# Patient Record
Sex: MALE | Race: WHITE | NOT HISPANIC OR LATINO | Employment: OTHER | URBAN - METROPOLITAN AREA
[De-identification: names, ages, dates, MRNs, and addresses within clinical notes are randomized per-mention and may not be internally consistent; named-entity substitution may affect disease eponyms.]

---

## 2017-01-31 ENCOUNTER — TRANSCRIBE ORDERS (OUTPATIENT)
Dept: ADMINISTRATIVE | Age: 57
End: 2017-01-31

## 2017-01-31 ENCOUNTER — HOSPITAL ENCOUNTER (OUTPATIENT)
Dept: RADIOLOGY | Age: 57
Discharge: HOME | End: 2017-01-31
Payer: COMMERCIAL

## 2017-01-31 ENCOUNTER — LAB (OUTPATIENT)
Dept: LAB | Age: 57
End: 2017-01-31
Payer: COMMERCIAL

## 2017-01-31 ENCOUNTER — ALLSCRIPTS OFFICE VISIT (OUTPATIENT)
Dept: OTHER | Facility: OTHER | Age: 57
End: 2017-01-31

## 2017-01-31 DIAGNOSIS — R07.89 OTHER CHEST PAIN: ICD-10-CM

## 2017-01-31 DIAGNOSIS — I10 ESSENTIAL HYPERTENSION, MALIGNANT: Primary | ICD-10-CM

## 2017-01-31 DIAGNOSIS — E11.65 TYPE 2 DIABETES MELLITUS WITH HYPERGLYCEMIA (HCC): ICD-10-CM

## 2017-01-31 DIAGNOSIS — IMO0002 UNCONTROLLED DIABETES MELLITUS WITH OTHER COMPLICATION: ICD-10-CM

## 2017-01-31 DIAGNOSIS — I10 ESSENTIAL (PRIMARY) HYPERTENSION: ICD-10-CM

## 2017-01-31 LAB
ALBUMIN SERPL BCP-MCNC: 3.9 G/DL (ref 3.5–5)
ALP SERPL-CCNC: 70 U/L (ref 46–116)
ALT SERPL W P-5'-P-CCNC: 46 U/L (ref 12–78)
ANION GAP SERPL CALCULATED.3IONS-SCNC: 8 MMOL/L (ref 4–13)
AST SERPL W P-5'-P-CCNC: 27 U/L (ref 5–45)
BILIRUB SERPL-MCNC: 0.43 MG/DL (ref 0.2–1)
BILIRUB UR QL STRIP: NEGATIVE
BUN SERPL-MCNC: 19 MG/DL (ref 5–25)
CALCIUM SERPL-MCNC: 9.3 MG/DL (ref 8.3–10.1)
CHLORIDE SERPL-SCNC: 105 MMOL/L (ref 100–108)
CLARITY UR: NORMAL
CO2 SERPL-SCNC: 27 MMOL/L (ref 21–32)
COLOR UR: YELLOW
CREAT SERPL-MCNC: 1.35 MG/DL (ref 0.6–1.3)
DEPRECATED D DIMER PPP: 333 NG/ML (FEU) (ref 0–424)
ERYTHROCYTE [DISTWIDTH] IN BLOOD BY AUTOMATED COUNT: 13.6 % (ref 11.6–15.1)
GFR SERPL CREATININE-BSD FRML MDRD: 54.7 ML/MIN/1.73SQ M
GLUCOSE (HISTORICAL): NEGATIVE
GLUCOSE SERPL-MCNC: 139 MG/DL (ref 65–140)
HCT VFR BLD AUTO: 44.7 % (ref 36.5–49.3)
HGB BLD-MCNC: 15.3 G/DL (ref 12–17)
HGB UR QL STRIP.AUTO: NEGATIVE
KETONES UR STRIP-MCNC: NEGATIVE MG/DL
LEUKOCYTE ESTERASE UR QL STRIP: NEGATIVE
MCH RBC QN AUTO: 31.4 PG (ref 26.8–34.3)
MCHC RBC AUTO-ENTMCNC: 34.2 G/DL (ref 31.4–37.4)
MCV RBC AUTO: 92 FL (ref 82–98)
NITRITE UR QL STRIP: NEGATIVE
PH UR STRIP.AUTO: NEGATIVE [PH]
PLATELET # BLD AUTO: 272 THOUSANDS/UL (ref 149–390)
PMV BLD AUTO: 11.1 FL (ref 8.9–12.7)
POTASSIUM SERPL-SCNC: 4.2 MMOL/L (ref 3.5–5.3)
PROT SERPL-MCNC: 7.7 G/DL (ref 6.4–8.2)
PROT UR STRIP-MCNC: NEGATIVE MG/DL
RBC # BLD AUTO: 4.87 MILLION/UL (ref 3.88–5.62)
SODIUM SERPL-SCNC: 140 MMOL/L (ref 136–145)
SP GR UR STRIP.AUTO: NEGATIVE
UROBILINOGEN UR QL STRIP.AUTO: NEGATIVE
WBC # BLD AUTO: 7.29 THOUSAND/UL (ref 4.31–10.16)

## 2017-01-31 PROCEDURE — 80053 COMPREHEN METABOLIC PANEL: CPT

## 2017-01-31 PROCEDURE — 71020 HB CHEST X-RAY 2VW FRONTAL&LATL: CPT

## 2017-01-31 PROCEDURE — 36415 COLL VENOUS BLD VENIPUNCTURE: CPT

## 2017-01-31 PROCEDURE — 85379 FIBRIN DEGRADATION QUANT: CPT

## 2017-01-31 PROCEDURE — 85027 COMPLETE CBC AUTOMATED: CPT

## 2017-02-15 ENCOUNTER — APPOINTMENT (OUTPATIENT)
Dept: LAB | Age: 57
End: 2017-02-15
Payer: COMMERCIAL

## 2017-02-15 DIAGNOSIS — I10 ESSENTIAL HYPERTENSION, MALIGNANT: ICD-10-CM

## 2017-02-15 DIAGNOSIS — IMO0002 UNCONTROLLED DIABETES MELLITUS WITH OTHER COMPLICATION: ICD-10-CM

## 2017-02-15 LAB
ALBUMIN SERPL BCP-MCNC: 4 G/DL (ref 3.5–5)
ALP SERPL-CCNC: 62 U/L (ref 46–116)
ALT SERPL W P-5'-P-CCNC: 51 U/L (ref 12–78)
ANION GAP SERPL CALCULATED.3IONS-SCNC: 8 MMOL/L (ref 4–13)
AST SERPL W P-5'-P-CCNC: 25 U/L (ref 5–45)
BACTERIA UR QL AUTO: NORMAL /HPF
BASOPHILS # BLD AUTO: 0.04 THOUSANDS/ΜL (ref 0–0.1)
BASOPHILS NFR BLD AUTO: 1 % (ref 0–1)
BILIRUB SERPL-MCNC: 0.66 MG/DL (ref 0.2–1)
BILIRUB UR QL STRIP: NEGATIVE
BUN SERPL-MCNC: 19 MG/DL (ref 5–25)
CALCIUM SERPL-MCNC: 9.1 MG/DL (ref 8.3–10.1)
CHLORIDE SERPL-SCNC: 104 MMOL/L (ref 100–108)
CHOLEST SERPL-MCNC: 151 MG/DL (ref 50–200)
CLARITY UR: CLEAR
CO2 SERPL-SCNC: 24 MMOL/L (ref 21–32)
COLOR UR: YELLOW
CREAT SERPL-MCNC: 1.3 MG/DL (ref 0.6–1.3)
CREAT UR-MCNC: 158 MG/DL
EOSINOPHIL # BLD AUTO: 0.25 THOUSAND/ΜL (ref 0–0.61)
EOSINOPHIL NFR BLD AUTO: 3 % (ref 0–6)
ERYTHROCYTE [DISTWIDTH] IN BLOOD BY AUTOMATED COUNT: 13.9 % (ref 11.6–15.1)
EST. AVERAGE GLUCOSE BLD GHB EST-MCNC: 126 MG/DL
GFR SERPL CREATININE-BSD FRML MDRD: 57.1 ML/MIN/1.73SQ M
GLUCOSE SERPL-MCNC: 95 MG/DL (ref 65–140)
GLUCOSE UR STRIP-MCNC: ABNORMAL MG/DL
HBA1C MFR BLD: 6 % (ref 4.2–6.3)
HCT VFR BLD AUTO: 44.9 % (ref 36.5–49.3)
HDLC SERPL-MCNC: 33 MG/DL (ref 40–60)
HGB BLD-MCNC: 15 G/DL (ref 12–17)
HGB UR QL STRIP.AUTO: NEGATIVE
KETONES UR STRIP-MCNC: NEGATIVE MG/DL
LDLC SERPL CALC-MCNC: 71 MG/DL (ref 0–100)
LEUKOCYTE ESTERASE UR QL STRIP: NEGATIVE
LYMPHOCYTES # BLD AUTO: 2.2 THOUSANDS/ΜL (ref 0.6–4.47)
LYMPHOCYTES NFR BLD AUTO: 29 % (ref 14–44)
MCH RBC QN AUTO: 30.7 PG (ref 26.8–34.3)
MCHC RBC AUTO-ENTMCNC: 33.4 G/DL (ref 31.4–37.4)
MCV RBC AUTO: 92 FL (ref 82–98)
MICROALBUMIN UR-MCNC: 6.2 MG/L (ref 0–20)
MICROALBUMIN/CREAT 24H UR: 4 MG/G CREATININE (ref 0–30)
MONOCYTES # BLD AUTO: 0.72 THOUSAND/ΜL (ref 0.17–1.22)
MONOCYTES NFR BLD AUTO: 9 % (ref 4–12)
NEUTROPHILS # BLD AUTO: 4.38 THOUSANDS/ΜL (ref 1.85–7.62)
NEUTS SEG NFR BLD AUTO: 58 % (ref 43–75)
NITRITE UR QL STRIP: NEGATIVE
NON-SQ EPI CELLS URNS QL MICRO: NORMAL /HPF
NRBC BLD AUTO-RTO: 0 /100 WBCS
PH UR STRIP.AUTO: 5.5 [PH] (ref 4.5–8)
PLATELET # BLD AUTO: 285 THOUSANDS/UL (ref 149–390)
PMV BLD AUTO: 10.8 FL (ref 8.9–12.7)
POTASSIUM SERPL-SCNC: 4.3 MMOL/L (ref 3.5–5.3)
PROT SERPL-MCNC: 7.3 G/DL (ref 6.4–8.2)
PROT UR STRIP-MCNC: NEGATIVE MG/DL
PSA SERPL-MCNC: 0.5 NG/ML (ref 0–4)
RBC # BLD AUTO: 4.88 MILLION/UL (ref 3.88–5.62)
RBC #/AREA URNS AUTO: NORMAL /HPF
SODIUM SERPL-SCNC: 136 MMOL/L (ref 136–145)
SP GR UR STRIP.AUTO: 1.03 (ref 1–1.03)
T4 FREE SERPL-MCNC: 1.09 NG/DL (ref 0.76–1.46)
TRIGL SERPL-MCNC: 233 MG/DL
TSH SERPL DL<=0.05 MIU/L-ACNC: 2.97 UIU/ML (ref 0.36–3.74)
UROBILINOGEN UR QL STRIP.AUTO: 0.2 E.U./DL
WBC # BLD AUTO: 7.64 THOUSAND/UL (ref 4.31–10.16)
WBC #/AREA URNS AUTO: NORMAL /HPF

## 2017-02-15 PROCEDURE — 84439 ASSAY OF FREE THYROXINE: CPT

## 2017-02-15 PROCEDURE — 85025 COMPLETE CBC W/AUTO DIFF WBC: CPT

## 2017-02-15 PROCEDURE — 80053 COMPREHEN METABOLIC PANEL: CPT

## 2017-02-15 PROCEDURE — 81001 URINALYSIS AUTO W/SCOPE: CPT | Performed by: INTERNAL MEDICINE

## 2017-02-15 PROCEDURE — 83036 HEMOGLOBIN GLYCOSYLATED A1C: CPT

## 2017-02-15 PROCEDURE — 82570 ASSAY OF URINE CREATININE: CPT | Performed by: INTERNAL MEDICINE

## 2017-02-15 PROCEDURE — G0103 PSA SCREENING: HCPCS

## 2017-02-15 PROCEDURE — 84443 ASSAY THYROID STIM HORMONE: CPT

## 2017-02-15 PROCEDURE — 36415 COLL VENOUS BLD VENIPUNCTURE: CPT

## 2017-02-15 PROCEDURE — 82043 UR ALBUMIN QUANTITATIVE: CPT | Performed by: INTERNAL MEDICINE

## 2017-02-15 PROCEDURE — 80061 LIPID PANEL: CPT

## 2017-02-22 ENCOUNTER — ALLSCRIPTS OFFICE VISIT (OUTPATIENT)
Dept: OTHER | Facility: OTHER | Age: 57
End: 2017-02-22

## 2017-03-20 ENCOUNTER — GENERIC CONVERSION - ENCOUNTER (OUTPATIENT)
Dept: OTHER | Facility: OTHER | Age: 57
End: 2017-03-20

## 2017-03-25 ENCOUNTER — GENERIC CONVERSION - ENCOUNTER (OUTPATIENT)
Dept: OTHER | Facility: OTHER | Age: 57
End: 2017-03-25

## 2017-03-27 LAB
LEFT EYE DIABETIC RETINOPATHY: NORMAL
RIGHT EYE DIABETIC RETINOPATHY: NORMAL

## 2017-03-29 ENCOUNTER — GENERIC CONVERSION - ENCOUNTER (OUTPATIENT)
Dept: OTHER | Facility: OTHER | Age: 57
End: 2017-03-29

## 2017-05-08 ENCOUNTER — GENERIC CONVERSION - ENCOUNTER (OUTPATIENT)
Dept: OTHER | Facility: OTHER | Age: 57
End: 2017-05-08

## 2017-06-19 ENCOUNTER — TRANSCRIBE ORDERS (OUTPATIENT)
Dept: ADMINISTRATIVE | Age: 57
End: 2017-06-19

## 2017-06-19 ENCOUNTER — APPOINTMENT (OUTPATIENT)
Dept: LAB | Age: 57
End: 2017-06-19
Payer: COMMERCIAL

## 2017-06-19 DIAGNOSIS — E11.65 TYPE 2 DIABETES MELLITUS WITH HYPERGLYCEMIA (HCC): ICD-10-CM

## 2017-06-19 LAB
EST. AVERAGE GLUCOSE BLD GHB EST-MCNC: 126 MG/DL
GLUCOSE P FAST SERPL-MCNC: 101 MG/DL (ref 65–99)
HBA1C MFR BLD: 6 % (ref 4.2–6.3)

## 2017-06-19 PROCEDURE — 83036 HEMOGLOBIN GLYCOSYLATED A1C: CPT

## 2017-06-19 PROCEDURE — 82947 ASSAY GLUCOSE BLOOD QUANT: CPT

## 2017-06-19 PROCEDURE — 36415 COLL VENOUS BLD VENIPUNCTURE: CPT

## 2017-06-23 ENCOUNTER — ALLSCRIPTS OFFICE VISIT (OUTPATIENT)
Dept: OTHER | Facility: OTHER | Age: 57
End: 2017-06-23

## 2017-06-23 DIAGNOSIS — R19.7 DIARRHEA: ICD-10-CM

## 2017-06-23 DIAGNOSIS — E11.65 TYPE 2 DIABETES MELLITUS WITH HYPERGLYCEMIA (HCC): ICD-10-CM

## 2017-06-23 DIAGNOSIS — I10 ESSENTIAL (PRIMARY) HYPERTENSION: ICD-10-CM

## 2017-06-24 ENCOUNTER — APPOINTMENT (OUTPATIENT)
Dept: LAB | Age: 57
End: 2017-06-24
Payer: COMMERCIAL

## 2017-06-24 DIAGNOSIS — R19.7 DIARRHEA: ICD-10-CM

## 2017-06-24 LAB — C DIFF TOX GENS STL QL NAA+PROBE: NORMAL

## 2017-06-24 PROCEDURE — 87046 STOOL CULTR AEROBIC BACT EA: CPT

## 2017-06-24 PROCEDURE — 87177 OVA AND PARASITES SMEARS: CPT

## 2017-06-24 PROCEDURE — 87899 AGENT NOS ASSAY W/OPTIC: CPT

## 2017-06-24 PROCEDURE — 87045 FECES CULTURE AEROBIC BACT: CPT

## 2017-06-24 PROCEDURE — 87015 SPECIMEN INFECT AGNT CONCNTJ: CPT

## 2017-06-24 PROCEDURE — 87209 SMEAR COMPLEX STAIN: CPT

## 2017-06-24 PROCEDURE — 87493 C DIFF AMPLIFIED PROBE: CPT

## 2017-06-26 ENCOUNTER — ALLSCRIPTS OFFICE VISIT (OUTPATIENT)
Dept: OTHER | Facility: OTHER | Age: 57
End: 2017-06-26

## 2017-06-26 LAB
BACTERIA STL CULT: NORMAL
BACTERIA STL CULT: NORMAL

## 2017-06-28 LAB — O+P STL CONC: NORMAL

## 2017-06-29 ENCOUNTER — GENERIC CONVERSION - ENCOUNTER (OUTPATIENT)
Dept: OTHER | Facility: OTHER | Age: 57
End: 2017-06-29

## 2017-09-19 ENCOUNTER — GENERIC CONVERSION - ENCOUNTER (OUTPATIENT)
Dept: OTHER | Facility: OTHER | Age: 57
End: 2017-09-19

## 2017-10-18 ENCOUNTER — TRANSCRIBE ORDERS (OUTPATIENT)
Dept: ADMINISTRATIVE | Age: 57
End: 2017-10-18

## 2017-10-18 ENCOUNTER — APPOINTMENT (OUTPATIENT)
Dept: LAB | Age: 57
End: 2017-10-18
Payer: COMMERCIAL

## 2017-10-18 DIAGNOSIS — E11.65 TYPE 2 DIABETES MELLITUS WITH HYPERGLYCEMIA (HCC): ICD-10-CM

## 2017-10-18 DIAGNOSIS — I10 ESSENTIAL (PRIMARY) HYPERTENSION: ICD-10-CM

## 2017-10-18 LAB
ALBUMIN SERPL BCP-MCNC: 3.8 G/DL (ref 3.5–5)
ALP SERPL-CCNC: 54 U/L (ref 46–116)
ALT SERPL W P-5'-P-CCNC: 43 U/L (ref 12–78)
ANION GAP SERPL CALCULATED.3IONS-SCNC: 6 MMOL/L (ref 4–13)
AST SERPL W P-5'-P-CCNC: 29 U/L (ref 5–45)
BACTERIA UR QL AUTO: NORMAL /HPF
BASOPHILS # BLD AUTO: 0.04 THOUSANDS/ΜL (ref 0–0.1)
BASOPHILS NFR BLD AUTO: 1 % (ref 0–1)
BILIRUB SERPL-MCNC: 0.57 MG/DL (ref 0.2–1)
BILIRUB UR QL STRIP: NEGATIVE
BUN SERPL-MCNC: 22 MG/DL (ref 5–25)
CALCIUM SERPL-MCNC: 8.7 MG/DL (ref 8.3–10.1)
CHLORIDE SERPL-SCNC: 107 MMOL/L (ref 100–108)
CHOLEST SERPL-MCNC: 139 MG/DL (ref 50–200)
CLARITY UR: CLEAR
CO2 SERPL-SCNC: 27 MMOL/L (ref 21–32)
COLOR UR: YELLOW
CREAT SERPL-MCNC: 1.25 MG/DL (ref 0.6–1.3)
CREAT UR-MCNC: 153 MG/DL
EOSINOPHIL # BLD AUTO: 0.27 THOUSAND/ΜL (ref 0–0.61)
EOSINOPHIL NFR BLD AUTO: 4 % (ref 0–6)
ERYTHROCYTE [DISTWIDTH] IN BLOOD BY AUTOMATED COUNT: 13.7 % (ref 11.6–15.1)
EST. AVERAGE GLUCOSE BLD GHB EST-MCNC: 126 MG/DL
GFR SERPL CREATININE-BSD FRML MDRD: 64 ML/MIN/1.73SQ M
GLUCOSE P FAST SERPL-MCNC: 76 MG/DL (ref 65–99)
GLUCOSE UR STRIP-MCNC: ABNORMAL MG/DL
HBA1C MFR BLD: 6 % (ref 4.2–6.3)
HCT VFR BLD AUTO: 44.1 % (ref 36.5–49.3)
HDLC SERPL-MCNC: 27 MG/DL (ref 40–60)
HGB BLD-MCNC: 14.4 G/DL (ref 12–17)
HGB UR QL STRIP.AUTO: ABNORMAL
HYALINE CASTS #/AREA URNS LPF: NORMAL /LPF
KETONES UR STRIP-MCNC: NEGATIVE MG/DL
LDLC SERPL CALC-MCNC: 75 MG/DL (ref 0–100)
LEUKOCYTE ESTERASE UR QL STRIP: NEGATIVE
LYMPHOCYTES # BLD AUTO: 1.99 THOUSANDS/ΜL (ref 0.6–4.47)
LYMPHOCYTES NFR BLD AUTO: 31 % (ref 14–44)
MCH RBC QN AUTO: 30.6 PG (ref 26.8–34.3)
MCHC RBC AUTO-ENTMCNC: 32.7 G/DL (ref 31.4–37.4)
MCV RBC AUTO: 94 FL (ref 82–98)
MICROALBUMIN UR-MCNC: 7.2 MG/L (ref 0–20)
MICROALBUMIN/CREAT 24H UR: 5 MG/G CREATININE (ref 0–30)
MONOCYTES # BLD AUTO: 0.73 THOUSAND/ΜL (ref 0.17–1.22)
MONOCYTES NFR BLD AUTO: 12 % (ref 4–12)
NEUTROPHILS # BLD AUTO: 3.28 THOUSANDS/ΜL (ref 1.85–7.62)
NEUTS SEG NFR BLD AUTO: 52 % (ref 43–75)
NITRITE UR QL STRIP: NEGATIVE
NON-SQ EPI CELLS URNS QL MICRO: NORMAL /HPF
NRBC BLD AUTO-RTO: 0 /100 WBCS
PH UR STRIP.AUTO: 6 [PH] (ref 4.5–8)
PLATELET # BLD AUTO: 244 THOUSANDS/UL (ref 149–390)
PMV BLD AUTO: 10.6 FL (ref 8.9–12.7)
POTASSIUM SERPL-SCNC: 4.1 MMOL/L (ref 3.5–5.3)
PROT SERPL-MCNC: 7.1 G/DL (ref 6.4–8.2)
PROT UR STRIP-MCNC: NEGATIVE MG/DL
RBC # BLD AUTO: 4.71 MILLION/UL (ref 3.88–5.62)
RBC #/AREA URNS AUTO: NORMAL /HPF
SODIUM SERPL-SCNC: 140 MMOL/L (ref 136–145)
SP GR UR STRIP.AUTO: 1.02 (ref 1–1.03)
TRIGL SERPL-MCNC: 186 MG/DL
TSH SERPL DL<=0.05 MIU/L-ACNC: 2.17 UIU/ML (ref 0.36–3.74)
UROBILINOGEN UR QL STRIP.AUTO: 0.2 E.U./DL
WBC # BLD AUTO: 6.35 THOUSAND/UL (ref 4.31–10.16)
WBC #/AREA URNS AUTO: NORMAL /HPF

## 2017-10-18 PROCEDURE — 84443 ASSAY THYROID STIM HORMONE: CPT

## 2017-10-18 PROCEDURE — 82570 ASSAY OF URINE CREATININE: CPT

## 2017-10-18 PROCEDURE — 80053 COMPREHEN METABOLIC PANEL: CPT

## 2017-10-18 PROCEDURE — 81001 URINALYSIS AUTO W/SCOPE: CPT

## 2017-10-18 PROCEDURE — 85025 COMPLETE CBC W/AUTO DIFF WBC: CPT

## 2017-10-18 PROCEDURE — 80061 LIPID PANEL: CPT

## 2017-10-18 PROCEDURE — 82043 UR ALBUMIN QUANTITATIVE: CPT

## 2017-10-18 PROCEDURE — 83036 HEMOGLOBIN GLYCOSYLATED A1C: CPT

## 2017-10-18 PROCEDURE — 36415 COLL VENOUS BLD VENIPUNCTURE: CPT

## 2017-10-24 ENCOUNTER — ALLSCRIPTS OFFICE VISIT (OUTPATIENT)
Dept: OTHER | Facility: OTHER | Age: 57
End: 2017-10-24

## 2017-10-24 DIAGNOSIS — E11.9 TYPE 2 DIABETES MELLITUS WITHOUT COMPLICATIONS (HCC): ICD-10-CM

## 2017-11-15 ENCOUNTER — GENERIC CONVERSION - ENCOUNTER (OUTPATIENT)
Dept: OTHER | Facility: OTHER | Age: 57
End: 2017-11-15

## 2017-11-28 ENCOUNTER — GENERIC CONVERSION - ENCOUNTER (OUTPATIENT)
Dept: OTHER | Facility: OTHER | Age: 57
End: 2017-11-28

## 2017-12-29 ENCOUNTER — CLINICAL SUPPORT (OUTPATIENT)
Dept: OTHER | Facility: OTHER | Age: 57
End: 2017-12-29

## 2017-12-29 DIAGNOSIS — M54.16 RADICULOPATHY OF LUMBAR REGION: ICD-10-CM

## 2018-01-11 NOTE — PROGRESS NOTES
Assessment    1  History of snoring (V15 89) (Z87 898)   2  Chronic obstructive pulmonary disease (496) (J44 9)   3  Hypertension (401 9) (I10)   4  Hyperlipidemia (272 4) (E78 5)   5  Controlled insulin dependent diabetes mellitus (250 00,V58 67) (E11 9,Z79 4)   6  Lower back pain (724 2) (M54 5)   7  Encounter for preventive health examination (V70 0) (Z00 00)    Plan  Class 2 obesity with alveolar hypoventilation and body mass index (BMI) of 36 0 to 36 9  in adult, unspecified whether serious comorbidity present, History of snoring    · Sleep Study Unattended - Home; Status:Need Information - Financial Authorization; Requested [id number]; Controlled insulin dependent diabetes mellitus    · (1) GLUCOSE,  FASTING; Status:Active; Requested [id number];    · (1) HEMOGLOBIN A1C; Status:Active; Requested [id number]; Health Maintenance    · Follow-up visit in 1 month Evaluation and Treatment  Follow-up  Status: Hold For -  Scheduling  Requested for: 61GXQ0195  Need for influenza vaccination    · Fluzone Quadrivalent Intramuscular Suspension    Discussion/Summary  health maintenance visit Currently, he eats a poor diet and has an inadequate exercise regimen  Prostate cancer screening: the risks and benefits of prostate cancer screening were discussed and prostate cancer screening is current  Colorectal cancer screening: the risks and benefits of colorectal cancer screening were discussed, colorectal cancer screening is current, colonoscopy is needed every ten years and fecal occult blood testing is needed every year  The risks and benefits of immunizations were discussed, immunizations are needed and immunizations will be given as outlined in the orders  Advice and education were given regarding nutrition, aerobic exercise, cardiovascular risk reduction, weight loss, sunscreen use and vitamin D supplements  Patient discussion: discussed with the patient  1  History of snoring   Patient states he was recently seen by the cardiologist and they are suggesting the patient have a sleep study performed  He does admit to having problems with loud snoring at night and also has problems with daytime sleepiness  We will schedule the patient for an in-home study and he will be seen in the office in 1 month to discuss the results  2  Chronic obstructive pulmonary disease  Patient has had no recent exacerbation and will receive a influenza vaccine today  Patient was told to call if any upper respiratory tract symptoms for further evaluation and treatment acutely  3  Hypertension  Patient's blood pressure is mildly elevated today in the office and patient states he neglected to take his medication today which may be the reason  His blood pressure will be checked in 1 month and if it is still elevated we will modify treatment  4  Controlled insulin-dependent diabetes  Patient is following his blood sugars at home closely and makes adjustments to his insulin dose in order to compensate for hyper, hypoglycemia  We are pleased with the results with a hemoglobin A1c of 6 0, patient has no evidence of diabetic neuropathy and microalbumin in the urine was negative  Patient continues to go yearly for eye exams  Number 5  Low back pain  We did discuss with the patient going back to pain management to see if they can be helpful as far as alleviating his pain  Patient has failed attempts with physical therapy in the past  We did point to to the fact that he severe obesity and reduction in his weight may put less stress on his low back  6  Health maintenance  As mentioned patient had a root in colonoscopy recently along with an EGD  Patient is up-to-date with all parameters as far as controlling his diabetes and he continues to follow up with Cardiology regarding his coronary artery disease     The patient was counseled regarding diagnostic results, instructions for management, risk factor reductions, prognosis, patient and family education, impressions, risks and benefits of treatment options, importance of compliance with treatment  Possible side effects of new medications were reviewed with the patient/guardian today  The treatment plan was reviewed with the patient/guardian  The patient/guardian understands and agrees with the treatment plan      Chief Complaint  Patient is here today for a yearly physical w/ labs      History of Present Illness  HM, Adult Male: The patient is being seen for a health maintenance evaluation  The last health maintenance visit was One year year(s) ago  Social History: He is unmarried  Work status: not currently employed  The patient is a former cigarette smoker  He reports rare alcohol use  The patient has no concerns about alcohol abuse  He has never used illicit drugs  General Health: The patient's health since the last visit is described as poor  He does not have regular dental visits  He complains of vision problems  He denies hearing loss  Immunizations status: up to date  Lifestyle:  He does not have a healthy diet  He has weight concerns  He does not exercise regularly  He does not use tobacco  He consumes alcohol  He denies drug use  Reproductive health:  the patient is not sexually active  Screening: cancer screening reviewed and updated  metabolic screening reviewed and updated  risk screening reviewed and updated  HPI: Patient is a 42-year-old male with a history of hypertension, hyperlipidemia, diabetes mellitus type 2 now on insulin, chronic obstructive pulmonary disease, DJD with chronic low back pain, 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  Patient is here today for a general physical  Patient did have extensive lab testing performed prior to the visit today and we did discuss the results  Patient's comprehensive metabolic profile was normal including a fasting blood sugar 76   His CBC with again was also normal  The only abnormality noted was a cholesterol 139 with an HDL of 27 and LDL was 75 but a triglyceride level of 186  Patient is on a statin and also fenofibrate  Patient states recently he has been having problems again with the low back pain  He has not followed up with pain management because it was not helpful  He continues to take Vicodin occasionally  He states with breast his pain usually subsides after a few days  His weight is still elevated and patient has not been successful in reducing weight and states that a big part of this is the fact that he cannot exercise secondary to his low back pain  He has not been restricting his caloric intake  Review of Systems    Constitutional: recent 6 pound weight gain since last visit lb weight gain, but no fever, not feeling poorly, no chills, not feeling tired and no recent weight loss  Eyes: eyesight problems, but no eye pain, no dryness of the eyes, eyes not red, no purulent discharge from the eyes and no itching of the eyes  ENT: no complaints of earache, no hearing loss, no nosebleeds, no nasal discharge, no sore throat, no hoarseness  Cardiovascular: lower extremity edema and Chronic edema bilateral lower extremities which is much, but the heart rate was not slow, no chest pain, no intermittent leg claudication, the heart rate was not fast and no palpitations  Respiratory: No complaints of shortness of breath, no wheezing, no cough, no SOB on exertion, no orthopnea or PND  Gastrointestinal: no abdominal pain, no nausea, no vomiting, no constipation, no diarrhea and no blood in stools  Genitourinary: No complaints of dysuria, no incontinence, no hesitancy, no nocturia, no genital lesion, no testicular pain  Musculoskeletal: arthralgias, myalgias and joint stiffness, but as noted in HPI, no limb pain and no limb swelling  Integumentary: no rashes, no itching, no dry skin, no skin lesions and no skin wound     Neurological: No compliants of headache, no confusion, no convulsions, no numbness or tingling, no dizziness or fainting, no limb weakness, no difficulty walking  Psychiatric: sleep disturbances and Her loud snoring, daytime sleepiness, but not suicidal, no anxiety, no personality change, no depression and no emotional problems  Endocrine: No complaints of proptosis, no hot flashes, no muscle weakness, no erectile dysfunction, no deepening of the voice, no feelings of weakness  Hematologic/Lymphatic: No complaints of swollen glands, no swollen glands in the neck, does not bleed easily, no easy bruising  ROS reviewed  Active Problems    1  Abdominal pain (789 00) (R10 9)   2  Abscess of left thigh (682 6) (L02 416)   3  Acute bronchitis (466 0) (J20 9)   4  Acute chest wall pain (786 52) (R07 89)   5  Ankle pain, unspecified laterality   6  Arteriosclerotic cardiovascular disease (429 2,440 9) (I25 10)   7  Chronic obstructive pulmonary disease (496) (J44 9)   8  Controlled insulin dependent diabetes mellitus (250 00,V58 67) (E11 9,Z79 4)   9  Diarrhea, unspecified type (787 91) (R19 7)   10  Fatty liver (571 8) (K76 0)   11  Hyperlipidemia (272 4) (E78 5)   12  Hypertension (401 9) (I10)   13  Low back pain (724 2) (M54 5)   14  Lower back pain (724 2) (M54 5)   15  Need for influenza vaccination (V04 81) (Z23)   16  Nicotine dependence (305 1) (F17 200)   17  Obesity (278 00) (E66 9)   18  Poorly controlled type 2 diabetes mellitus (250 00) (E11 65)   19  Screening for diabetic retinopathy (V80 2) (Z13 5)   20  Sinusitis (473 9) (J32 9)   21  Spondylosis of lumbar region without myelopathy or radiculopathy (721 3) (M47 816)   22  Symptoms referable to back (724 8)   23   Vertigo (780 4) (R42)    Past Medical History    · History of Acute myocardial infarction (410 90) (I21 9)   · History of Anxiety (300 00) (F41 9)   · History of Coronary Artery Disease (V12 59)   · History of Diabetes Mellitus (250 00)   · History of Gastric ulcer (531 90) (K25 9)   · History of chest pain (V13 89) (S01 465)   · History of gastroesophageal reflux (GERD) (V12 79) (Z87 19)   · History of hyperlipidemia (V12 29) (Z86 39)   · History of hypertension (V12 59) (Z86 79)    Surgical History    · History of Cath Stent Placement   · History of Neuroplasty Decompression Median Nerve At Carpal Tunnel    Family History  Mother    · Family history of Alzheimer Disease   · Family history of Congestive Heart Failure  Father    · Family history of Acute Myocardial Infarction (V17 3)  Family History    · Family history of Back Pain   · Family history of Hypertension (V17 49)   · Family history of Stroke Complications    Social History    · Denied: History of Alcohol Use (History)   · Denied: History of Drug Use (305 90)   · Former smoker (V15 82) (D76 183)   · Marital History - Single    Current Meds   1  Atorvastatin Calcium 40 MG Oral Tablet; Take 1 tablet daily; Therapy: 65UTR2748 to (22 210176)  Requested for: 12Jun2017; Last   Rx:12Jun2017 Ordered   2  BD Pen Needle Mini U/F 31G X 5 MM Miscellaneous; USE AS DIRECTED; Therapy: 65EAJ4580 to (Evaluate:82Odq4661)  Requested for: 84Wgh1044; Last   Rx:22Feb2017 Ordered   3  Benazepril HCl - 10 MG Oral Tablet; take 1 tablet every day; Therapy: 47ABJ7584 to (Re Mena)  Requested for: 34XIL2684; Last   Rx:04Nov2016 Ordered   4  Chantix 1 MG Oral Tablet; take 1 tablet twice a day; Therapy: 89JRZ8563 to (Evaluate:06Nov2017)  Requested for: 76Mon5911; Last   Rx:12Wcd3193 Ordered   5  Clopidogrel Bisulfate 75 MG Oral Tablet; take 1 tablet every day; Therapy: 69SYN2598 to (Kavita Boss)  Requested for: 76NKU4060; Last   Rx:17Yop4214 Ordered   6  Diphenoxylate-Atropine 2 5-0 025 MG Oral Tablet; TAKE 1 TABLET 4 TIMES DAILY AS   NEEDED; Therapy: 72NQK8678 to (Evaluate:28Jun2017); Last Rx:23Jun2017 Ordered   7  Famotidine 20 MG Oral Tablet; TAKE 1 TABLET TWICE DAILY; Therapy: 14NEW2187 to [id number]) Recorded   8  Fenofibrate 145 MG Oral Tablet;  Take 1 tablet daily; Therapy: 13KDE4592 to ([phone])  Requested for: 71TPB5124; Last   Rx:46Doy7552 Ordered   9  Jardiance 25 MG Oral Tablet; take 1 tablet by mouth every day; Therapy: 73CAC3183 to (96 409460)  Requested for: 43REF5337; Last   FS:25YHB1701 Ordered   10  Lantus SoloStar 100 UNIT/ML Subcutaneous Solution Pen-injector; inject 90 units daily; Therapy: 02FCL0873 to (Evaluate:94Tcp2432)  Requested for: 29Itl0364; Last    Rx:04Pfw6736 Ordered   11  Metoprolol Tartrate 50 MG Oral Tablet; TAKE 1 TABLET DAILY AS DIRECTED; Therapy: 21ZVC9225 to (Evaluate:53Dnf6134)  Requested for: 98PKP5447; Last    Rx:93Cja9620 Ordered   12  Nitrostat 0 3 MG Sublingual Tablet Sublingual; TAKE AS DIRECTED; Therapy: 26YHQ0503 to (Last Rx:49Ege2956)  Requested for: 46Jrx4193 Ordered   13  OneTouch Ultra Blue In [employer]; test blood sugar 4 times a day; Therapy: 75ERN9238 to (Evaluate:77Czc6393)  Requested for: 65Bky4362; Last    Rx:49Xlk1305 Ordered   14  ProAir  (90 Base) MCG/ACT Inhalation Aerosol Solution; INHALE 1 TO 2 PUFFS    EVERY 4 TO 6 HOURS AS NEEDED; Therapy: 33WBM1018 to (Last Rx:70Mlh4813)  Requested for: 82BYL7028 Ordered   15  Vicodin 5-300 MG Oral Tablet; TAKE 1 TABLET EVERY 12 HOURS AS NEEDED; Therapy: 25OPC8304 to (Evaluate:60Muy5158); Last Rx:61Lcb2165 Ordered    Allergies    1  Ceclor CAPS   2  Zocor TABS    Vitals   Recorded: 04CLN4450 12:54PM   Temperature 98 1 F   Heart Rate 87   Systolic 178   Diastolic 76   Height 5 ft 6 in   Weight 267 lb    BMI Calculated 43 1   BSA Calculated 2 26   O2 Saturation 96     Physical Exam    Constitutional Pleasant obese64year-old male who is awake alert and difficulties with ambulation secondary to his back pain  Head and Face   Head and face: Normal     Palpation of the face and sinuses: No sinus tenderness  Eyes   Conjunctiva and lids: No erythema, swelling or discharge      Pupils and irises: Equal, round, reactive to light     Ophthalmoscopic examination: Abnormal   Pupils are small unable to see fundi patient does get regular dilated exam since diagnosed with diabetes  Ears, Nose, Mouth, and Throat   External inspection of ears and nose: Normal     Otoscopic examination: Tympanic membranes translucent with normal light reflex  Canals patent without erythema  Hearing: Normal     Nasal mucosa, septum, and turbinates: Normal without edema or erythema  Lips, teeth, and gums: Normal, good dentition  Oropharynx: Normal with no erythema, edema, exudate or lesions  Neck   Neck: Abnormal   thick neck no masses  Thyroid: Normal, no thyromegaly  Pulmonary   Respiratory effort: No increased work of breathing or signs of respiratory distress  Percussion of chest: Normal     Palpation of chest: Normal     Auscultation of lungs: Abnormal   Decreased breath sounds at deferred anterior and posteriorly with no rales rhonchi wheezes  Cardiovascular   Palpation of heart: Normal PMI, no thrills  Auscultation of heart: Normal rate and rhythm, normal S1 and S2, no murmurs  Carotid pulses: 2+ bilaterally  Abdominal aorta: Normal     Femoral pulses: 2+ bilaterally  Pedal pulses: 2+ bilaterally  Peripheral vascular exam: Normal     Examination of extremities for edema and/or varicosities: Abnormal   Trace edema bilateral lower extremities  Chest   Breasts: Abnormal   Gynecomastia bilaterally but no masses, retraction  Palpation of breasts and axillae: Normal, no masses palpated  Chest: Normal     Abdomen   Abdomen: Abnormal   Morbidly obese soft nontender with positive bowel x4 quadrants  Liver and spleen: No hepatomegaly or splenomegaly  Examination for hernias: No hernias appreciated  Anus, perineum, and rectum: Normal sphincter tone, no masses, no prolapse  He has recently  Genitourinary Defers examination stating it is stream the difficult to perform because of his severe low back pain  Lymphatic   Palpation of lymph nodes in neck: No lymphadenopathy  Palpation of lymph nodes in axillae: No lymphadenopathy  Palpation of lymph nodes in groin: No lymphadenopathy  Palpation of lymph nodes in other areas: No lymphadenopathy  Musculoskeletal   Gait and station: Abnormal   Slow antalgic gait secondary to back pain  Inspection/palpation of digits and nails: Normal without clubbing or cyanosis  Inspection/palpation of joints, bones, and muscles: Abnormal   Examination patient's back has decreased range of motion throughout the pain with all movements  Patient has decreased rate leg raising bilaterally but no weakness to lower extremities  Range of motion: Normal     Stability: Normal     Muscle strength/tone: Normal     Skin   Skin and subcutaneous tissue: Normal without rashes or lesions  Palpation of skin and subcutaneous tissue: Normal turgor  Neurologic   Cranial nerves: Cranial nerves 2-12 intact  Cortical function: Normal mental status  Reflexes: 2+ and symmetric  Sensation: No sensory loss  Coordination: Normal finger to nose and heel to shin  Diabetic Foot Exam: Socks and shoes removed, Right Foot Findings: swelling, but no erythema, no dryness, no tenderness, no warmth, no maceration, no calluses, no pre-ulcers and no ulcers  The toes were normal  The sensory exam showed normal vibratory sensation at the level of the toes and normal position sense at the level of the toes  Socks and Shoes removed, Left Foot Findings: swelling, but no erythema, no dryness, no tenderness, no warmth, no maceration, no calluses, no pre-ulcers and no ulcers  The toes were normal  The sensory exam showed normal vibratory sensation at the level of the toes and normal position sense at the level of the toes  Monofilament Testing:  Vascular: Capillary refills findings on the right were normal in the toes  Pulses: 1+ in the posterior tibialis and 1+ in the dorsalis pedis   Capillary refills findings on the left were normal in the toes  Pulses: 1+ in the posterior tibialis and 1+ in the dorsalis pedis  Assign Risk Category: 0: No loss of protective sensation, no deformity  No present risk  Psychiatric   Judgment and insight: Normal     Orientation to person, place and time: Normal     Recent and remote memory: Intact  Mood and affect: Normal        Results/Data  PHQ-2 Adult Depression Screening 81NPW5963 12:57PM User, s     Test Name Result Flag Reference   PHQ-2 Adult Depression Score 1     Over the last two weeks, how often have you been bothered by any of the following problems?   Little interest or pleasure in doing things: Not at all - 0  Feeling down, depressed, or hopeless: Several days - 1   PHQ-2 Adult Depression Screening Negative         Signatures   Electronically signed by : Steve Brunner, DO; Oct 24 2017  2:31PM EST                       (Author)

## 2018-01-12 VITALS
DIASTOLIC BLOOD PRESSURE: 78 MMHG | SYSTOLIC BLOOD PRESSURE: 136 MMHG | WEIGHT: 264.25 LBS | HEART RATE: 95 BPM | TEMPERATURE: 97.6 F | HEIGHT: 66 IN | BODY MASS INDEX: 42.47 KG/M2 | OXYGEN SATURATION: 98 %

## 2018-01-13 VITALS
DIASTOLIC BLOOD PRESSURE: 72 MMHG | HEIGHT: 66 IN | SYSTOLIC BLOOD PRESSURE: 126 MMHG | TEMPERATURE: 99 F | OXYGEN SATURATION: 95 % | BODY MASS INDEX: 41.95 KG/M2 | HEART RATE: 90 BPM | WEIGHT: 261 LBS

## 2018-01-13 VITALS
HEART RATE: 87 BPM | TEMPERATURE: 98.1 F | OXYGEN SATURATION: 96 % | SYSTOLIC BLOOD PRESSURE: 140 MMHG | HEIGHT: 66 IN | WEIGHT: 267 LBS | DIASTOLIC BLOOD PRESSURE: 76 MMHG | BODY MASS INDEX: 42.91 KG/M2

## 2018-01-14 VITALS
BODY MASS INDEX: 42.17 KG/M2 | HEART RATE: 84 BPM | TEMPERATURE: 97.7 F | DIASTOLIC BLOOD PRESSURE: 70 MMHG | OXYGEN SATURATION: 96 % | WEIGHT: 262.38 LBS | RESPIRATION RATE: 18 BRPM | SYSTOLIC BLOOD PRESSURE: 130 MMHG | HEIGHT: 66 IN

## 2018-01-14 VITALS
BODY MASS INDEX: 41.79 KG/M2 | WEIGHT: 260.01 LBS | HEIGHT: 66 IN | RESPIRATION RATE: 16 BRPM | DIASTOLIC BLOOD PRESSURE: 74 MMHG | TEMPERATURE: 99.1 F | OXYGEN SATURATION: 97 % | SYSTOLIC BLOOD PRESSURE: 146 MMHG | HEART RATE: 94 BPM

## 2018-01-16 ENCOUNTER — HOSPITAL ENCOUNTER (OUTPATIENT)
Dept: MRI IMAGING | Facility: HOSPITAL | Age: 58
Discharge: HOME | End: 2018-01-16
Attending: ANESTHESIOLOGY
Payer: COMMERCIAL

## 2018-01-16 DIAGNOSIS — M54.16 RADICULOPATHY OF LUMBAR REGION: ICD-10-CM

## 2018-01-16 PROCEDURE — 72148 MRI LUMBAR SPINE W/O DYE: CPT

## 2018-01-18 NOTE — PROGRESS NOTES
Assessment    1  Hyperlipidemia (272 4) (E78 5)   2  Hypertension (401 9) (I10)   3  Chronic obstructive pulmonary disease (496) (J44 9)   4  Arteriosclerotic cardiovascular disease (429 2,440 9) (I25 10)   5  Poorly controlled type 2 diabetes mellitus (250 00) (E11 65)    Plan  Chronic obstructive pulmonary disease    · Moxifloxacin HCl - 400 MG Oral Tablet (Avelox)  Hyperlipidemia    · Atorvastatin Calcium 40 MG Oral Tablet; Take 1 tablet daily  Poorly controlled type 2 diabetes mellitus    · (1) GLUCOSE,  FASTING; Status:Active; Requested ABD:[id number];    · (1) HEMOGLOBIN A1C; Status:Active; Requested DKC:[id number];    · *VB-Foot Exam; Status:Active - Retrospective By Protocol Authorization; Requested  OHS:[id number];    · Follow-up visit in 4 Months Evaluation and Treatment  Follow-up  Status: Hold For -  Scheduling  Requested for: 13Jun2016    Discussion/Summary  Impression: health maintenance visit  Currently, he has an inadequate exercise regimen  Prostate cancer screening: the risks and benefits of prostate cancer screening were discussed and prostate cancer screening is current  Testicular cancer screening: the risks and benefits of testicular cancer screening were discussed and testicular cancer screening is current  Colorectal cancer screening: the risks and benefits of colorectal cancer screening were discussed and the patient declines colorectal cancer screening  The risks and benefits of immunizations were discussed, immunizations are needed and patient declines immunizations  Advice and education were given regarding nutrition, aerobic exercise, weight loss and advanced directive planning  Patient discussion: discussed with the patient  #1  Hyperlipidemia  Patient's HDL cholesterol is low especially in light of the fact that he has coronary artery disease   Patient was instructed to start some type of aerobic activity which may be beneficial and he recently had an increased dose of his atorvastatin by his cardiologist  Patient will have this checked in another 6-8 months  #2  Hypertension  Patient's blood pressure shows good control with present treatment and patient will continue with present medication  #3  History of chronic obstructive pulmonary disease  Patient at this point in time is not smoking and was told importance of continuing to do without this  Patient states intermittently he will restart smoking for short period time but is always been able to quit  #4  History of atherosclerotic cardiovascular disease  Patient was recently seen by his cardiologist and there was no evidence of new disease  Patient was started on increased dose of atorvastatin  #5  Diabetes mellitus type 2  Patient sugar showing good control with present diet, medication  We will continue to monitor his blood sugars and check them again with his next visit in 4 months  Possible side effects of new medications were reviewed with the patient/guardian today  The was counseled regarding diagnostic results, instructions for management, risk factor reductions, prognosis, patient and family education, impressions, risks and benefits of treatment options, importance of compliance with treatment  Chief Complaint  Patient is here today for his yearly physical w/labs      Advance Directives  Advance Directive [facility]:   The patient is not in agreement to receive the Advance Care Planning service    NO - Patient does not have an advance health care directive  Capacity/Competence: This patient has full decision making capacity for discussion of advance care planning and This patient has full decision making competency for discussion of advance care planning  Summary of Advance Directive Conversation  Patient has had yearly advice about obtaining a living will  Patient states that he has all the information home and has declined to completed        History of Present Illness  , Adult Male: The patient is being seen for a health maintenance evaluation  The last health maintenance visit was One year year(s) ago  Social History: He is unmarried  Work status: Recently retired is night watchman  The patient is a former cigarette smoker and quit smoking Patient states he quit cigarette smoking approximately 6 months ago  He has smoked for 35 pack years year(s)  He Patient intermittent restart smoking but is now smoke free  He reports rare alcohol use  The patient has no concerns about alcohol abuse  He has never used illicit drugs  General Health: The patient's health since the last visit is described as poor  He has regular dental visits  He denies vision problems  He has hearing loss  Immunizations status: not up to date  Lifestyle:  He does not have a healthy diet  He has weight concerns  He does not exercise regularly  He does not use tobacco  He consumes alcohol  He denies drug use  Reproductive health:  the patient is not sexually active  Screening: cancer screening reviewed and updated  metabolic screening reviewed and updated  risk screening reviewed and updated  HPI: Patient is a 72-year-old male with a history of multiple medical problems including hypertension, hyperlipidemia, diabetes mellitus type 2, history of coronary artery disease, history of tobacco abuse in the past, DJD with chronic low back pain, exogenous obesity  Patient is here today for routine physical  He did have complete labs prior to the visit today and we did discuss the results  Patient's cholesterol was not perfect and she just had an increased dose of atorvastatin by his cardiologist  His sugars is showing good control with present treatment and diet  Patient has some very mild renal insufficiency  Review of Systems    Constitutional: recent 4 pound weight gain since last visit lb weight gain and feeling tired, but no fever, not feeling poorly, no chills and no recent weight loss     Eyes: eyesight problems, but no eye pain, no dryness of the eyes, eyes not red, no purulent discharge from the eyes and no itching of the eyes  ENT: no complaints of earache, no hearing loss, no nosebleeds, no nasal discharge, no sore throat, no hoarseness  Cardiovascular: lower extremity edema and Chronic edema bilateral lower extremities which is much, but the heart rate was not slow, no chest pain, no intermittent leg claudication, the heart rate was not fast and no palpitations  Respiratory: No complaints of shortness of breath, no wheezing, no cough, no SOB on exertion, no orthopnea or PND  Gastrointestinal: No complaints of abdominal pain, no constipation, no nausea or vomiting, no diarrhea or bloody stools  Genitourinary: No complaints of dysuria, no incontinence, no hesitancy, no nocturia, no genital lesion, no testicular pain  Musculoskeletal: arthralgias, myalgias and joint stiffness, but no limb pain and no limb swelling  Integumentary: a rash, itching, skin lesion and Occasional insect bites to both forearms which get very itchy and patient is unknown what causes this  They only happen every month or 2 , but no dry skin and no skin wound  Neurological: No compliants of headache, no confusion, no convulsions, no numbness or tingling, no dizziness or fainting, no limb weakness, no difficulty walking  Psychiatric: Is not suicidal, no sleep disturbances, no anxiety or depression, no change in personality, no emotional problems  Endocrine: No complaints of proptosis, no hot flashes, no muscle weakness, no erectile dysfunction, no deepening of the voice, no feelings of weakness  Hematologic/Lymphatic: No complaints of swollen glands, no swollen glands in the neck, does not bleed easily, no easy bruising  Over the past 2 weeks, how often have you been bothered by the following problems? 1 ) Little interest or pleasure in doing things? Not at all    2 ) Feeling down, depressed or hopeless?  Not at all    3 ) Trouble falling asleep or sleeping too much? Not at all    4 ) Feeling tired or having little energy? Several days  5 ) Poor appetite or overeating? Not at all    6 ) Feeling bad about yourself, or that you are a failure, or have let yourself or your family down? Not at all    7 ) Trouble concentrating on things, such as reading a newspaper or watching television? Not at all    8 ) Moving or speaking so slowly that other people could have noticed, or the opposite, moving or speaking faster than usual? Not at all  How difficult have these problems made it for you to do your work, take care of things at home, or get along with people? Not at all  Score 1     ROS reviewed  Active Problems    1  Abdominal pain (789 00) (R10 9)   2  Abscess of left thigh (682 6) (L02 416)   3  Acute bronchitis (466 0) (J20 9)   4  Acute chest wall pain (786 52) (R07 89)   5  Ankle pain, unspecified laterality   6  Arteriosclerotic cardiovascular disease (429 2,440 9) (I25 10)   7  Chronic obstructive pulmonary disease (496) (J44 9)   8  Fatty liver (571 8) (K76 0)   9  Hyperlipidemia (272 4) (E78 5)   10  Hypertension (401 9) (I10)   11  Low back pain (724 2) (M54 5)   12  Lower back pain (724 2) (M54 5)   13  Nicotine dependence (305 1) (F17 200)   14  Obesity (278 00) (E66 9)   15  Poorly controlled type 2 diabetes mellitus (250 00) (E11 65)   16  Screening for diabetic retinopathy (V80 2) (Z13 5)   17  Sinusitis (473 9) (J32 9)   18  Spondylosis of lumbar region without myelopathy or radiculopathy (721 3) (M47 816)   19  Symptoms referable to back (724 8)   20   Vertigo (780 4) (R42)    Past Medical History    · History of Acute myocardial infarction (410 90) (I21 3)   · History of Anxiety (300 00) (F41 9)   · History of Coronary Artery Disease (V12 59)   · History of Diabetes Mellitus (250 00)   · History of Gastric ulcer (531 90) (K25 9)   · History of chest pain (V13 89) (S08 584)   · History of gastroesophageal reflux (GERD) (V12 79) (Z87 19)   · History of hyperlipidemia (V12 29) (Z86 39)   · History of hypertension (V12 59) (Z86 79)    Surgical History    · History of Cath Stent Placement   · History of Neuroplasty Decompression Median Nerve At Carpal Tunnel    Family History  Mother    · Family history of Alzheimer Disease   · Family history of Congestive Heart Failure  Father    · Family history of Acute Myocardial Infarction (V17 3)  Family History    · Family history of Back Pain   · Family history of Hypertension (V17 49)   · Family history of Stroke Complications    Social History    · Denied: History of Alcohol Use (History)   · Denied: History of Drug Use (305 90)   · Former smoker (V15 82) (V36 550)   · Marital History - Single    Current Meds   1  Atorvastatin Calcium 10 MG Oral Tablet; take 1 tablet every day; Therapy: 96JWQ7739 to (Sruthi Cross)  Requested for: 90KAP9633; Last   Rx:27May2014 Ordered   2  BD Pen Needle Mini U/F 31G X 5 MM Miscellaneous; USE AS DIRECTED; Therapy: 17MUF5470 to (Evaluate:02Jan2017)  Requested for: 71QVU4222; Last   Rx:08Jan2016 Ordered   3  Benazepril HCl - 10 MG Oral Tablet; take 1 tablet every day; Therapy: 96IEY3429 to [phone])  Requested for: 84CVQ8655; Last   Rx:03Nov2015 Ordered   4  Chantix 1 MG Oral Tablet; take 1 tablet twice a day; Therapy: 55FZY4854 to (Evaluate:07Oct2016)  Requested for: 25Mar2016; Last   Rx:25Mar2016 Ordered   5  Clopidogrel Bisulfate 75 MG Oral Tablet; take 1 tablet every day; Therapy: 94MTQ7513 to ([phone])  Requested for: 93LUV4856; Last   Rx:06Apr2016 Ordered   6  Fenofibrate 145 MG Oral Tablet; Take 1 tablet daily; Therapy: 65TAE1190 to ([phone])  Requested for: 62LLU9864; Last   Rx:06Apr2016 Ordered   7  Jardiance 25 MG Oral Tablet; take 1 tablet by mouth every day; Therapy: 92EVE3819 to ([facility])  Requested for: 03OEP5651; Last   Rx:24May2016 Ordered   8   Lantus SoloStar 100 UNIT/ML Subcutaneous Solution Pen-injector; INJECT 90 UNIT   Daily; Therapy: 73POE7349 to (Caridad Medrano)  Requested for: 21MDV6066; Last   Rx:03Vub4780 Ordered   9  Metoprolol Tartrate 50 MG Oral Tablet; TAKE 1 TABLET DAILY AS DIRECTED; Therapy: 10PEH9062 to (Evaluate:17Aug2016)  Requested for: 25YOK4694; Last   Rx:40Jqn6039 Ordered   10  Moxifloxacin HCl - 400 MG Oral Tablet; take 1 tablet daily; Therapy: 41BKR4819 to (Jumana Edwards)  Requested for: 06AZQ4346; Last    Rx:81Zqi6808 Ordered   11  Nitrostat 0 3 MG Sublingual Tablet Sublingual; TAKE AS DIRECTED; Therapy: 62VVW6654 to (Last Rx:20Oct2011)  Requested for: 20Oct2011 Ordered   12  OneTouch Ultra Blue In [employer]; test blood sugar 4 times a day; Therapy: 74RNV3005 to ([phone])  Requested for: 90EYR5270; Last    Rx:01Jun2015 Ordered   13  ProAir  (90 Base) MCG/ACT Inhalation Aerosol Solution; INHALE 1 TO 2 PUFFS    EVERY 4 TO 6 HOURS AS NEEDED; Therapy: 83YRT8282 to (Last Rx:06Rsl9403)  Requested for: 08UAF9850 Ordered   14  SLPG Compound Medication; Flurbiprofen 5%, Cyclobenzaprine 2%, Lidocaine 5%; Therapy: 57BXJ0464 to (Last XY:35MEP2391) Ordered   15  Vicodin 5-300 MG Oral Tablet; TAKE 1 TABLET EVERY 12 HOURS AS NEEDED; Therapy: 92NIH3435 to (Evaluate:06May2016); Last Rx:06Apr2016 Ordered    Allergies    1  Ceclor CAPS   2  Zocor TABS    Vitals   Recorded: 13Jun2016 12:53PM   Temperature 99 1 F   Heart Rate 86   Respiration 18   Systolic 461   Diastolic 70   Height 5 ft 6 in   Weight 258 lb 6 08 oz   BMI Calculated 41 7   BSA Calculated 2 24   O2 Saturation 98     Physical Exam    Constitutional Pleasant obese54year-old male who is awake alert and difficulties with ambulation secondary to his back pain  Head and Face   Head and face: Normal     Palpation of the face and sinuses: No sinus tenderness  Eyes   Conjunctiva and lids: No erythema, swelling or discharge      Pupils and irises: Equal, round, reactive to light  Ophthalmoscopic examination: Abnormal   Pupils are small unable to see fundi patient does get regular dilated exam since diagnosed with diabetes  Ears, Nose, Mouth, and Throat   External inspection of ears and nose: Normal     Otoscopic examination: Tympanic membranes translucent with normal light reflex  Canals patent without erythema  Hearing: Normal     Nasal mucosa, septum, and turbinates: Normal without edema or erythema  Lips, teeth, and gums: Normal, good dentition  Oropharynx: Normal with no erythema, edema, exudate or lesions  Neck   Neck: Abnormal   thick neck no masses  Thyroid: Normal, no thyromegaly  Pulmonary   Respiratory effort: No increased work of breathing or signs of respiratory distress  Percussion of chest: Normal     Palpation of chest: Normal     Auscultation of lungs: Abnormal   Decreased breath sounds at deferred anterior and posteriorly with no rales rhonchi wheezes  Cardiovascular   Palpation of heart: Normal PMI, no thrills  Auscultation of heart: Normal rate and rhythm, normal S1 and S2, no murmurs  Carotid pulses: 2+ bilaterally  Abdominal aorta: Normal     Femoral pulses: 2+ bilaterally  Pedal pulses: 2+ bilaterally  Peripheral vascular exam: Normal     Examination of extremities for edema and/or varicosities: Abnormal   Trace edema bilateral lower extremities  Chest   Breasts: Normal, no dimpling or skin changes appreciated  Palpation of breasts and axillae: Normal, no masses palpated  Chest: Normal     Abdomen   Abdomen: Non-tender, no masses  Liver and spleen: No hepatomegaly or splenomegaly  Examination for hernias: No hernias appreciated  Anus, perineum, and rectum: Normal sphincter tone, no masses, no prolapse  Patient states he's due for routine colonoscopy and has not provided us with heme occult  Genitourinary   Scrotal contents: Normal testes, no masses  Penis: Normal, no lesions      Digital rectal exam of prostate: Normal size, no masses  Lymphatic   Palpation of lymph nodes in neck: No lymphadenopathy  Palpation of lymph nodes in axillae: No lymphadenopathy  Palpation of lymph nodes in groin: No lymphadenopathy  Palpation of lymph nodes in other areas: No lymphadenopathy  Musculoskeletal   Gait and station: Abnormal   Slow antalgic gait secondary to back pain  Inspection/palpation of digits and nails: Normal without clubbing or cyanosis  Inspection/palpation of joints, bones, and muscles: Abnormal   Examination patient's back has decreased range of motion throughout the pain with all movements  Patient has decreased rate leg raising bilaterally but no weakness to lower extremities  Range of motion: Normal     Stability: Normal     Muscle strength/tone: Normal     Skin   Skin and subcutaneous tissue: Normal without rashes or lesions  Palpation of skin and subcutaneous tissue: Normal turgor  Neurologic   Cranial nerves: Cranial nerves 2-12 intact  Cortical function: Normal mental status  Reflexes: 2+ and symmetric  Sensation: No sensory loss  Coordination: Normal finger to nose and heel to shin  Psychiatric   Judgment and insight: Normal     Orientation to person, place and time: Normal     Recent and remote memory: Intact      Mood and affect: Normal        Future Appointments    Date/Time Provider Specialty Site   10/17/2016 01:30 PM Daisy Brooke DO Internal Medicine [facility] INTERNAL MED     Signatures   Electronically signed by : Zaria Mckeon DO; Jun 13 2016  1:35PM EST                       (Author)

## 2018-01-22 ENCOUNTER — GENERIC CONVERSION - ENCOUNTER (OUTPATIENT)
Dept: OTHER | Facility: OTHER | Age: 58
End: 2018-01-22

## 2018-01-22 VITALS
OXYGEN SATURATION: 96 % | WEIGHT: 267.5 LBS | RESPIRATION RATE: 16 BRPM | HEART RATE: 100 BPM | DIASTOLIC BLOOD PRESSURE: 96 MMHG | SYSTOLIC BLOOD PRESSURE: 144 MMHG | TEMPERATURE: 98.8 F | BODY MASS INDEX: 42.99 KG/M2 | HEIGHT: 66 IN

## 2018-01-22 VITALS
SYSTOLIC BLOOD PRESSURE: 132 MMHG | OXYGEN SATURATION: 96 % | TEMPERATURE: 97.7 F | DIASTOLIC BLOOD PRESSURE: 70 MMHG | WEIGHT: 273 LBS | BODY MASS INDEX: 43.87 KG/M2 | HEIGHT: 66 IN | HEART RATE: 84 BPM

## 2018-01-23 ENCOUNTER — GENERIC CONVERSION - ENCOUNTER (OUTPATIENT)
Dept: OTHER | Facility: OTHER | Age: 58
End: 2018-01-23

## 2018-01-23 VITALS
HEART RATE: 101 BPM | WEIGHT: 269 LBS | HEIGHT: 66 IN | DIASTOLIC BLOOD PRESSURE: 79 MMHG | SYSTOLIC BLOOD PRESSURE: 139 MMHG | TEMPERATURE: 98.9 F | BODY MASS INDEX: 43.23 KG/M2

## 2018-01-24 ENCOUNTER — TELEPHONE (OUTPATIENT)
Dept: PAIN MEDICINE | Facility: CLINIC | Age: 58
End: 2018-01-24

## 2018-01-24 NOTE — RESULT NOTES
Verified Results  * MRI LUMBAR SPINE [address] [employer] [id number] 01:45PM Sohail Jeffers Order Number: [id number]    - Patient Instructions: To schedule this appointment, please contact Central Scheduling at [phone]  Test Name Result Flag Reference   MRI LUMBAR SPINE [address] [employer] (Report)     MRI LUMBAR SPINE WITHOUT CONTRAST     INDICATION: M54 16: Radiculopathy, lumbar region  History taken directly from the electronic ordering system  Low back pain radiating to right leg      COMPARISON: 4/3/2014     TECHNIQUE: Sagittal T1, sagittal T2, sagittal inversion recovery, axial T1 and axial T2, coronal T2       IMAGE QUALITY: Diagnostic     FINDINGS:     ALIGNMENT: Normal alignment of the lumbar spine  No compression fracture  No spondylolysis or spondylolisthesis  No scoliosis  MARROW SIGNAL: Normal marrow signal is identified within the visualized bony structures  No discrete marrow lesion  DISTAL CORD AND CONUS: Normal size and signal within the distal cord and conus  The conus ends at the L1 level  PARASPINAL SOFT TISSUES: Rounded T2 hyperintense lesion in the right kidney measures 3 8 cm, likely a cyst, present previously  SACRUM: Normal signal within the sacrum  No evidence of insufficiency or stress fracture  LOWER THORACIC DISC SPACES: Normal disc height and signal  No disc herniation, canal stenosis or foraminal narrowing  LUMBAR DISC SPACES:        L1-L2: Normal      L2-L3: Normal      L3-L4: Tiny left paracentral disc protrusion  No significant central canal or neural foraminal narrowing  This level is similar to the prior study  L4-L5: There is a diffuse disk bulge  No significant central canal or neural foraminal narrowing  Bilateral facet hypertrophy noted  This level is similar to the prior study  L5-S1: There is a diffuse disk bulge  No significant central canal or neural foraminal narrowing  Bilateral facet hypertrophy noted   This level is similar to the prior study         IMPRESSION:     Mild spondylotic changes are similar to the previous study  No significant central canal or neural foraminal narrowing  Workstation performed: [id number]     Signed by:    Rafa Egan MD   1/17/18

## 2018-01-24 NOTE — MISCELLANEOUS
Message   Recorded as Task   Date: 01/22/2018 09:21 AM, Created By: Quan Jefferson   Task Name: Call Patient with results   Assigned To: [facility]   Regarding Patient: Evans Spencer, Status: In Progress   Comment:    Quan Jefferson - 22 Jan 2018 9:21 AM     Patient Phone: [phone]      Please notify the patient that the MRI of his lumbar spine reveals degenerative disc disease from L3-4 to L5-S1  There is a small disc protrusion at L3-4 without any central or foraminal stenosis  There is degenerative changes of the facets from L4-5 to L5-S1  There is no evidence of neural impingement or compression   Tresa Barnhart - 22 Jan 2018 12:49 PM     TASK REASSIGNED: Previously Assigned To Mahnaz Schwab - 22 Jan 2018 1:01 PM     TASK EDITED  **Two tasks open on this patient, once JW gives answer, and c/b pt , give him MRI results as well   Margy Cohen - 23 Jan 2018 7:55 AM     TASK EDITED  Attempted to call the pt  and left a detailed mom in regards to the previous task  Pt  to CB if any questions  Margy Cohen - 23 Jan 2018 7:55 AM     TASK IN PROGRESS   Dorene Singh - 23 Jan 2018 3:36 PM     TASK REPLIED TO: Previously Assigned To [facility]  MRI impression added to alternate task where more information for pt  is being addressed  Please refer to alternate task for more details  Active Problems    1  Abdominal pain (789 00) (R10 9)   2  Abscess of left thigh (682 6) (L02 416)   3  Acute bronchitis (466 0) (J20 9)   4  Acute chest wall pain (786 52) (R07 89)   5  Ankle pain, unspecified laterality   6  Arteriosclerotic cardiovascular disease (429 2,440 9) (I25 10)   7  Chronic obstructive pulmonary disease (496) (J44 9)   8  Class 2 obesity with alveolar hypoventilation and body mass index (BMI) of 36 0 to 36 9   in adult, unspecified whether serious comorbidity present (278 03,V85 36)   (E66 2,Z68 36)   9   Controlled insulin dependent diabetes mellitus (250 00,V58 67) (E11 9,Z79 4)   10  Diarrhea, unspecified type (787 91) (R19 7)   11  Fatty liver (571 8) (K76 0)   12  History of snoring (V15 89) (Z87 898)   13  Hyperlipidemia (272 4) (E78 5)   14  Hypertension (401 9) (I10)   15  Low back pain (724 2) (M54 5)   16  Lower back pain (724 2) (M54 5)   17  Lumbar radiculopathy (724 4) (M54 16)   18  Myofascial pain (729 1) (M79 1)   19  Need for influenza vaccination (V04 81) (Z23)   20  Nicotine dependence (305 1) (F17 200)   21  Obesity (278 00) (E66 9)   22  Obstructive sleep apnea (327 23) (G47 33)   23  Poorly controlled type 2 diabetes mellitus (250 00) (E11 65)   24  Sacroiliitis (720 2) (M46 1)   25  Screening for diabetic retinopathy (V80 2) (Z13 5)   26  Sinusitis (473 9) (J32 9)   27  Sleep apnea (780 57) (G47 30)   28  Spondylosis of lumbar region without myelopathy or radiculopathy (721 3) (M47 816)   29  Symptoms referable to back (724 8)   30  Vertigo (780 4) (R42)    Current Meds   1  Aspirin 325 MG Oral Tablet; Therapy: 31EQF0010 to Recorded   2  Atorvastatin Calcium 40 MG Oral Tablet; Take 1 tablet daily; Therapy: 01FTA4361 to (Evaluate:08Jan2018)  Requested for: 12Jun2017; Last   Rx:12Jun2017 Ordered   3  BD Pen Needle Mini U/F 31G X 5 MM Miscellaneous; use as directed; Therapy: 16YBZ1742 to ([place])  Requested for: 31Oct2017; Last   Rx:31Oct2017 Ordered   4  Benazepril HCl - 10 MG Oral Tablet; take one tablet by mouth every day; Therapy: 39CBR2553 to (Last Rx:28Nov2017)  Requested for: 28Nov2017 Ordered   5  Chantix 1 MG Oral Tablet; take 1 tablet twice a day; Therapy: 05FWC0841 to (Evaluate:01Oqw8779)  Requested for: 13YPC0633; Last   Rx:30Oct2017 Ordered   6  Clopidogrel Bisulfate 75 MG Oral Tablet; take 1 tablet every day; Therapy: 25KXF8860 to (60 124 37 75)  Requested for: 26FJG1074; Last   Rx:04Oct2017 Ordered   7   Diphenoxylate-Atropine 2 5-0 025 MG Oral Tablet; TAKE 1 TABLET 4 TIMES DAILY AS   NEEDED; Therapy: 61SUV3695 to (Evaluate:28Jun2017); Last Rx:58Uot6836 Ordered   8  Famotidine 20 MG Oral Tablet; TAKE 1 TABLET TWICE DAILY; Therapy: 31GAL5868 to 40-45-11-94) Recorded   9  Fenofibrate 145 MG Oral Tablet (Tricor); Take 1 tablet daily; Therapy: 60KZG1437 to (Evaluate:02Apr2018)  Requested for: 04Oct2017; Last   Rx:85Amj8987 Ordered   10  Jardiance 25 MG Oral Tablet; take 1 tablet every day; Therapy: 67GHL2853 to (Evaluate:74Ocm3019)  Requested for: 00PLT0805; Last    Rx:66Yed5100 Ordered   11  Lantus SoloStar 100 UNIT/ML Subcutaneous Solution Pen-injector; inject 90 units daily; Therapy: 54PRF6786 to (Evaluate:07Wxj9842)  Requested for: 98Mnk5242; Last    Rx:65Slf1066 Ordered   12  Methocarbamol 750 MG Oral Tablet; TAKE 1 TABLET 3 TIMES DAILY AS NEEDED FOR    MUSCLE SPASM; Therapy: 42FKF9948 to [id number] [id number])  Requested for: 77LLU2767; Last    Rx:50Iqi7093 Ordered   13  Metoprolol Tartrate 50 MG Oral Tablet; TAKE 1 TABLET DAILY AS DIRECTED; Therapy: 29XSK3225 to (Evaluate:30Frd7846)  Requested for: 59HON1288; Last    Rx:79Dec4295 Ordered   14  Nitrostat 0 3 MG Sublingual Tablet Sublingual (Nitroglycerin); TAKE AS DIRECTED; Therapy: 61XRZ6797 to (Last Rx:20Oct2011)  Requested for: 20Oct2011 Ordered   15  OneTouch Ultra Blue In [employer]; test blood sugar 4 times a day; Therapy: 26PCF8894 to (Evaluate:44Dvm7634)  Requested for: 04Zwp8481; Last    Rx:33Wdo0154 Ordered   16  ProAir  (90 Base) MCG/ACT Inhalation Aerosol Solution; INHALE 1 TO 2 PUFFS    EVERY 4 TO 6 HOURS AS NEEDED; Therapy: 56AUT8974 to (Last Rx:13Zdn4320)  Requested for: 23GPH0103 Ordered   17  Vicodin 5-300 MG Oral Tablet; TAKE 1 TABLET EVERY 12 HOURS AS NEEDED; Therapy: 01ARZ7828 to (Evaluate:73Qms4049); Last Rx:04Jan2018 Ordered    Allergies    1  Ceclor CAPS   2   Zocor TABS    Signatures   Electronically signed by : Zoey Segundo RN; Jan 23 2018  3:36PM EST (Author)

## 2018-01-24 NOTE — TELEPHONE ENCOUNTER
Attempted to call the pt  in regards to previous message stating his pain has been increasing and meds are not helping  Per JW previous note, offer pt  B/L L3,L4,L5 MBB  Pt  to CB and schedule

## 2018-01-29 NOTE — TELEPHONE ENCOUNTER
[facility]- message left on anserve    Given to:             [facility]    Reason: ROUTINE/OFFICE   Pt's Dr: Becca David       For: OFFICE     2nd Call: NO        From: Krystian Gandhi     Phone: [phone]   Ext:     Pt Name: Florian Pierce    Pt : 1960     Message:  [facility] DEPT   LOOKING TO SET-UP APPT FOR INJECTIONS            TRIED TO CALL EARLIER & CALL GOT CUT OFF  [facility] CB

## 2018-02-06 DIAGNOSIS — M54.42 CHRONIC BILATERAL LOW BACK PAIN WITH BILATERAL SCIATICA: Primary | ICD-10-CM

## 2018-02-06 DIAGNOSIS — M54.41 CHRONIC BILATERAL LOW BACK PAIN WITH BILATERAL SCIATICA: Primary | ICD-10-CM

## 2018-02-06 DIAGNOSIS — G89.29 CHRONIC BILATERAL LOW BACK PAIN WITH BILATERAL SCIATICA: Primary | ICD-10-CM

## 2018-02-06 RX ORDER — HYDROCODONE BITARTRATE AND ACETAMINOPHEN 5; 300 MG/1; MG/1
5-300 TABLET ORAL
COMMUNITY
Start: 2018-01-04 | End: 2018-02-06 | Stop reason: SDUPTHER

## 2018-02-06 RX ORDER — HYDROCODONE BITARTRATE AND ACETAMINOPHEN 5; 300 MG/1; MG/1
1 TABLET ORAL EVERY 6 HOURS PRN
Qty: 112 TABLET | Refills: 0 | Status: SHIPPED | OUTPATIENT
Start: 2018-02-06 | End: 2018-03-08

## 2018-02-10 DIAGNOSIS — I10 ESSENTIAL HYPERTENSION: Primary | ICD-10-CM

## 2018-02-10 RX ORDER — METOPROLOL TARTRATE 50 MG/1
TABLET, FILM COATED ORAL
Qty: 30 TABLET | Refills: 5 | Status: SHIPPED | OUTPATIENT
Start: 2018-02-10 | End: 2018-08-03 | Stop reason: DRUGHIGH

## 2018-02-21 ENCOUNTER — HOSPITAL ENCOUNTER (OUTPATIENT)
Dept: RADIOLOGY | Facility: CLINIC | Age: 58
Discharge: HOME | End: 2018-02-21
Payer: COMMERCIAL

## 2018-02-21 VITALS
OXYGEN SATURATION: 94 % | SYSTOLIC BLOOD PRESSURE: 117 MMHG | RESPIRATION RATE: 22 BRPM | DIASTOLIC BLOOD PRESSURE: 74 MMHG | TEMPERATURE: 99 F | HEART RATE: 86 BPM

## 2018-02-21 DIAGNOSIS — M54.50 LOW BACK PAIN: ICD-10-CM

## 2018-02-21 DIAGNOSIS — M47.816 LUMBAR SPONDYLOSIS: ICD-10-CM

## 2018-02-21 PROCEDURE — 64493 INJ PARAVERT F JNT L/S 1 LEV: CPT | Performed by: ANESTHESIOLOGY

## 2018-02-21 PROCEDURE — 64494 INJ PARAVERT F JNT L/S 2 LEV: CPT | Performed by: ANESTHESIOLOGY

## 2018-02-21 RX ORDER — LIDOCAINE HYDROCHLORIDE 10 MG/ML
10 INJECTION, SOLUTION EPIDURAL; INFILTRATION; INTRACAUDAL; PERINEURAL ONCE
Status: COMPLETED | OUTPATIENT
Start: 2018-02-21 | End: 2018-02-21

## 2018-02-21 RX ORDER — ALBUTEROL SULFATE 90 UG/1
90 AEROSOL, METERED RESPIRATORY (INHALATION) AS NEEDED
COMMUNITY
Start: 2012-10-16 | End: 2022-05-26 | Stop reason: SDUPTHER

## 2018-02-21 RX ADMIN — LIDOCAINE HYDROCHLORIDE 6 ML: 10 INJECTION, SOLUTION EPIDURAL; INFILTRATION; INTRACAUDAL; PERINEURAL at 13:27

## 2018-02-21 RX ADMIN — LIDOCAINE HYDROCHLORIDE 3 ML: 20 INJECTION, SOLUTION EPIDURAL; INFILTRATION; INTRACAUDAL; PERINEURAL at 13:24

## 2018-02-21 NOTE — H&P
History of Present Illness: The patient is a 62 y o  male who presents with complaints of low back pain  There is no problem list on file for this patient        Past Medical History:   Diagnosis Date    Abscess of left thigh     Arteriosclerotic cardiovascular disease     Class 2 obesity with alveolar hypoventilation and body mass index (BMI) of 36 0 to 36 9 in adult [facility])     COPD (chronic obstructive pulmonary disease) (HCC)     Coronary artery disease     Diabetes mellitus ([facility] [address] )     Fatty liver     Gastric ulcer     Hyperlipidemia     Hypertension     Low back pain     Myocardial infarction     Obstructive sleep apnea     Spondylosis of lumbar region without myelopathy or radiculopathy        Past Surgical History:   Procedure Laterality Date    CORONARY ANGIOPLASTY WITH STENT PLACEMENT      NEUROPLASTY / TRANSPOSITION MEDIAN NERVE AT CARPAL TUNNEL           Current Outpatient Prescriptions:     albuterol (PROVENTIL HFA,VENTOLIN HFA) 90 mcg/act inhaler, Inhale 90 puffs as needed, Disp: , Rfl:     HYDROcodone-acetaminophen (XODOL) 5-300 MG per tablet, Take 1 tablet by mouth every 6 (six) hours as needed for moderate pain for up to 30 days Earliest Fill Date: 2/6/18 Max Daily Amount: 4 tablets, Disp: 112 tablet, Rfl: 0    metoprolol tartrate (LOPRESSOR) 50 mg tablet, TAKE 1 TABLET DAILY AS DIRECTED , Disp: 30 tablet, Rfl: 5    Current Facility-Administered Medications:     lidocaine (PF) (XYLOCAINE-MPF) 1 % injection 10 mL, 10 mL, Intra-articular, Once, Tony Minort, DO    lidocaine (PF) (XYLOCAINE-MPF) 2 % injection 4 mL, 4 mL, Intra-articular, Once, Tony Cabrera, DO    Allergies   Allergen Reactions    Cefaclor Shortness Of Breath    Simvastatin Shortness Of Breath       Physical Exam:   Vitals:    02/21/18 1252   BP: 150/88   Pulse: 104   Resp: 20   Temp: 99 °F (37 2 °C)   SpO2: 93%     General: Awake, Alert, Oriented x 3, Mood and affect appropriate  Respiratory: Respirations even and unlabored  Cardiovascular: Peripheral pulses intact; no edema  Musculoskeletal Exam:  Bilateral lumbar paraspinals tender to palpation  ASA Score: 2    Assessment:   1  Low back pain     2  Lumbar spondylosis    Plan: B/L L3,L4,L5 MBB #1       Assessment   Assessed    1  Lumbar radiculopathy (724 4) (M54 16)   2  Myofascial pain (729 1) (M79 1)   3  Sacroiliitis (720 2) (M46 1)   4  Spondylosis of lumbar region without myelopathy or radiculopathy (721 3) (M47 816)     Plan   Myofascial pain    · Start: Methocarbamol 500 MG Oral Tablet; take 1 tablet tid prn   Rx By: Ladan Duran; Dispense: 30 Days ; #:90 Tablet; Refill: 2;For: Myofascial pain; AGUSTIN = N; Verified Transmission to [facility]; Last Updated By: SystemVigoda; 12/29/2017 11:16:06 AM        49-year-old male previously seen at our practice in June of 2014 returning for consult of right-sided lumbosacral back pain that radiates into the L5-S1 distribution of the right lower extremity with associated subjective weakness  The patient does have a positive straight leg raise on the right  He also has a component of sacroiliitis which may be contributing to his right-sided low back pain  There does appear to be a myofascial and facet mediated component to his low back pain as well  The last MRI of his lumbar spine in 2014 did reveal a minimal bulge at L4-5 without any significant central or foraminal stenosis as well as some spondylosis at this level  The patient did have favorable results to lumbar facet injections and medial branch blocks in the past, however the patient states that his pain is different in that he is now experiencing radicular symptoms in the right lower extremity  He has done physical therapy without any significant sustainable relief  He is currently managing his pain with Vicodin 5/300 mg q 12 hours p r n , ibuprofen p r n , and Tylenol p r n  which is prescribed by his PCP       1  I will order an MRI of the patient's lumbar spine     2  the patient may continue with Vicodin as prescribed by his PCP     3  I advised the patient that he should discuss NSAID use with his primary care physician as he is currently taking Plavix and this can cause severe platelet dysfunction resulting in spontaneous bleeding  The patient did verbalize understanding      4  the patient may continue with Tylenol p r n  and should not exceed more than 3000 mg in 24 hours     5  I will trial the patient on methocarbamol 500 mg q 8 hours p r n  for his myofascial pain     6  I will follow up the patient in 8 weeks, however we will call him with results of his MRI and our recommendations based upon those results       Discussion/Summary   The patient has the current Goals: Reduced pain and improved function  The patent has the current Barriers: Obesity and physical deconditioning  Patient is able to Self-Care  Possible side effects of new medications were reviewed with the patient/guardian today  The treatment plan was reviewed with the patient/guardian  The patient/guardian understands and agrees with the treatment plan    The patient was counseled regarding diagnostic results,-- instructions for management,-- risk factor reductions,-- prognosis,-- patient and family education,-- impressions,-- risks and benefits of treatment options-- and-- importance of compliance with treatment  total time of encounter was 30 minutes  Self Referrals: No      Chief Complaint   Chief Complaints    1  Back Pain  low back pain      History of Present Illness   51-year-old male previously seen at our practice by Dr Starr Lyman in June of 2014 presenting for consultation regarding lumbosacral back pain that radiates into the posterolateral aspect of the right lower extremity to the knee with some subjective weakness  The patient denies any numbness or paresthesias  He denies any left lower extremity symptoms or bladder or bowel incontinence   He denies any recent trauma or inciting event  last MRI of his lumbar spine from April 2014 revealed lumbar spondylosis and a minimal annular bulge at L4-5 without any significant central or foraminal stenosis  The patient did have lumbar facet injections and medial branch blocks which did provide significant relief of his low back pain, however the patient states that his pain is different now and is involving his right leg  has done numerous rounds of physical therapy which has not provided any significant relief  The patient is currently taking ibuprofen, Tylenol, and Vicodin 5/300 mg q 12 hours p r n  which is provided by his PCP  This provides mild relief  patient rates his pain a 10/10 on the pain is nearly constant  The pain does not follow any particular pattern throughout the day  The pain is described as burning, shooting, sharp, dull, aching, and cutting  The pain is decreased with lying down  The pain is increased with standing, bending at the waist, sitting, walking, exercise, relaxation, coughing / sneezing, and bowel movements  He has not gotten any relief from exercise, heat or ice application, acupuncture, or chiropractic or osteopathic manipulation  He has got moderate relief from previous injections and TENS unit  have personally reviewed and/or updated the patient's past medical history, past surgical history, family history, social history, allergies, and vital signs today  Other than as stated above, the patient denies any interval changes in medications, medical condition, mental condition, symptoms, or allergies since the last office visit  Referring physician is  [facility] physician is  Sandra Gibson Diane presents with complaints of constant episodes of right lower back pain, described as sharp, dull, aching and burning, radiating to the right buttock and right thigh  On a scale of 1 to 10, the patient rates the pain as 10        Review of Systems        Constitutional: recent weight gain, but-- no fever-- and-- no recent weight loss  Eyes: no double vision-- and-- no blurry vision  Cardiovascular: no chest pain,-- no palpitations-- and-- no lower extremity edema  Respiratory: no complaints of shortness of breath-- and-- no wheezing  Musculoskeletal: difficulty walking-- and-- decreased range of motion, but-- no muscle weakness,-- no joint stiffness,-- no joint swelling,-- no limb swelling-- and-- no pain in extremity  Neurological: no dizziness,-- no difficulty swallowing,-- no memory loss,-- no loss of consciousness-- and-- no seizures  Gastrointestinal: no nausea,-- no vomiting,-- no constipation-- and-- no diarrhea  Genitourinary: no difficulty initiating urine stream,-- no genital pain-- and-- no frequent urination  Integumentary: no complaints of skin rash  Psychiatric: no depression  Endocrine: no excessive thirst,-- no adrenal disease,-- no hypothyroidism-- and-- no hyperthyroidism  Hematologic/Lymphatic: no tendency for easy bruising-- and-- no tendency for easy bleeding  ROS reviewed  Active Problems   Problems    1  Abdominal pain (789 00) (R10 9)   2  Abscess of left thigh (682 6) (L02 416)   3  Acute bronchitis (466 0) (J20 9)   4  Acute chest wall pain (786 52) (R07 89)   5  Ankle pain, unspecified laterality   6  Arteriosclerotic cardiovascular disease (429 2,440 9) (I25 10)   7  Chronic obstructive pulmonary disease (496) (J44 9)   8  Class 2 obesity with alveolar hypoventilation and body mass index (BMI) of 36 0 to 36 9     in adult, unspecified whether serious comorbidity present (278 03,V85 36)     (E66 2,Z68 36)   9  Controlled insulin dependent diabetes mellitus (250 00,V58 67) (E11 9,Z79 4)   10  Diarrhea, unspecified type (787 91) (R19 7)   11  Fatty liver (571 8) (K76 0)   12  History of snoring (V15 89) (Z87 898)   13  Hyperlipidemia (272 4) (E78 5)   14  Hypertension (401 9) (I10)   15   Low back pain (724 2) (M54 5)   16  Lower back pain (724 2) (M54 5)   17  Need for influenza vaccination (V04 81) (Z23)   18  Nicotine dependence (305 1) (F17 200)   19  Obesity (278 00) (E66 9)   20  Obstructive sleep apnea (327 23) (G47 33)   21  Poorly controlled type 2 diabetes mellitus (250 00) (E11 65)   22  Screening for diabetic retinopathy (V80 2) (Z13 5)   23  Sinusitis (473 9) (J32 9)   24  Sleep apnea (780 57) (G47 30)   25  Spondylosis of lumbar region without myelopathy or radiculopathy (721 3) (M47 816)   26  Symptoms referable to back (724 8)   27  Vertigo (780 4) (R42)     Past Medical History   Problems    1  History of Acute myocardial infarction (410 90) (I21 9)   2  History of Anxiety (300 00) (F41 9)   3  History of Coronary Artery Disease (V12 59)   4  History of Diabetes Mellitus (250 00)   5  History of Gastric ulcer (531 90) (K25 9)   6  History of asthma (V12 69) (Z87 09)   7  History of chest pain (V13 89) (Z87 898)   8  History of gastroesophageal reflux (GERD) (V12 79) (Z87 19)   9  History of hyperlipidemia (V12 29) (Z86 39)   10  History of hypertension (V12 59) (Z86 79)     Surgical History   Problems    1  History of Cath Stent Placement   2  History of Neuroplasty Decompression Median Nerve At Carpal Tunnel     Family History   Mother    1  Family history of Alzheimer Disease   2  Family history of Congestive Heart Failure  Father    3  Family history of Acute Myocardial Infarction (V17 3)   4  Family history of cardiac disorder (V17 49) (Z82 49)  Unknown    5  Family history of malignant neoplasm (V16 9) (Z80 9)  Family History    6  Family history of Back Pain   7  Family history of Hypertension (V17 49)   8  Family history of Stroke Complications     Social History   Problems    · Denied: History of Alcohol Use (History)   · Denied: History of Drug Use (305 90)   · Former smoker (V15 82) (D57 507)   · Marital History - Single     Current Meds    1   Aspirin 325 MG Oral Tablet; Therapy: 20YXJ5889 to Recorded   2  Atorvastatin Calcium 40 MG Oral Tablet; Take 1 tablet daily; Therapy: 41PUC4830 to (Evaluate:08Jan2018)  Requested for: 12Jun2017; Last     Rx:12Jun2017 Ordered   3  BD Pen Needle Mini U/F 31G X 5 MM Miscellaneous; use as directed; Therapy: 64OLX2725 to ([place])  Requested for: 31Oct2017; Last     Rx:31Oct2017 Ordered   4  Benazepril HCl - 10 MG Oral Tablet; take one tablet by mouth every day; Therapy: 60NXM6746 to (Last Rx:28Nov2017)  Requested for: 28Nov2017 Ordered   5  Chantix 1 MG Oral Tablet; take 1 tablet twice a day; Therapy: 35JHT4043 to (Evaluate:14May2018)  Requested for: 83VQL6853; Last     Rx:30Oct2017 Ordered   6  Clopidogrel Bisulfate 75 MG Oral Tablet; take 1 tablet every day; Therapy: 01DKN6985 to ([id number])  Requested for: 89UHN6160; Last     Rx:04Oct2017 Ordered   7  Diphenoxylate-Atropine 2 5-0 025 MG Oral Tablet; TAKE 1 TABLET 4 TIMES DAILY AS     NEEDED; Therapy: 09RMB2249 to (Evaluate:28Jun2017); Last Rx:23Jun2017 Ordered   8  Famotidine 20 MG Oral Tablet; TAKE 1 TABLET TWICE DAILY; Therapy: 97RTC4374 to [id number]) Recorded   9  Fenofibrate 145 MG Oral Tablet; Take 1 tablet daily; Therapy: 65FML8295 to (Evaluate:02Apr2018)  Requested for: 04Oct2017; Last     Rx:04Oct2017 Ordered   10  Jardiance 25 MG Oral Tablet; take 1 tablet every day; Therapy: 70DYN4089 to (Evaluate:86Yqa9881)  Requested for: 54UYD6801; Last      Rx:92Pjg6091 Ordered   11  Lantus SoloStar 100 UNIT/ML Subcutaneous Solution Pen-injector; inject 90 units daily; Therapy: 36EAP1311 to (Evaluate:87Bic3876)  Requested for: 92Nqr0515; Last      Rx:77Djr1293 Ordered   12  Metoprolol Tartrate 50 MG Oral Tablet; TAKE 1 TABLET DAILY AS DIRECTED; Therapy: 62QPL9570 to (Evaluate:47Rci7212)  Requested for: 81MXI8324; Last      Rx:75Zkw5456 Ordered   13   Nitrostat 0 3 MG Sublingual Tablet Sublingual; TAKE AS DIRECTED; Therapy: 52VIG2471 to (Last Rx:20Oct2011)  Requested for: 20Oct2011 Ordered   14  OneTouch Ultra Blue In [employer]; test blood sugar 4 times a day; Therapy: 55OKA0986 to (Evaluate:43Ium2155)  Requested for: 64Ipi7811; Last      Rx:35Qgo3397 Ordered   15  ProAir  (90 Base) MCG/ACT Inhalation Aerosol Solution; INHALE 1 TO 2 PUFFS      EVERY 4 TO 6 HOURS AS NEEDED; Therapy: 47IUU4186 to (Last Rx:93Tta8322)  Requested for: 74TQR9606 Ordered   16  Vicodin 5-300 MG Oral Tablet; TAKE 1 TABLET EVERY 12 HOURS AS NEEDED; Therapy: 49NLL6033 to (Evaluate:15Uue9452); Last Rx:23Zoi4983 Ordered     Allergies   Medication    1  Ceclor CAPS   2  Zocor TABS     Vitals   Vital Signs     Recorded: 22JHK6529 10:48AM   Temperature 98 9 F   Heart Rate 334   Systolic 498   Diastolic 79   Height 5 ft 6 in   Weight 269 lb    BMI Calculated 43 42   BSA Calculated 2 27   Pain Scale 10      Physical Exam        Constitutional      General appearance: Well developed, well nourished, alert, in no distress, non-toxic and no overt pain behavior  Eyes      Sclera: anicteric      HEENT      Hearing grossly intact  Neck      Neck: Supple, symmetric, trachea midline, no masses  Pulmonary      Respiratory effort: Even and unlabored  Abdomen      Abdomen: Soft, non-tender, non-distended  Skin      Skin and subcutaneous tissue: Normal without rashes or lesions, well hydrated  Psychiatric      Mood and affect: Mood and affect appropriate  Neurologic      the muscle tone was normal      Musculoskeletal      Gait and station: Abnormal  -- Antalgic gait  Lumbar/Sacral Spine examination demonstrates Lumbosacral Spine:      Appearance: Normal       Tenderness: right paraspinal,-- left paraspinal,-- right sciatic notch-- and-- the right sacroiliac joint  Palpatory Findings include right-sided muscle spasms        Lumbosacral Spine Sensory: intact to light touch and pinprick in the lower extremities  ROM Lumbosacral Spine: Full except as noted: Flexion was restricted-- and-- was painful  Extension was restricted-- and-- was painful  Left lateral flexion was restricted-- and-- was painful  Right lateral flexion was restricted-- and-- was painful  ROM Hips: Full      Foot and ankle strength was normal bilaterally  Knee strength was normal bilaterally  Hip strength was normal bilaterally  Evaluation of Muscle Stretch Reflexes on the right side demonstrates 2/4 Knee Jerk Reflex,-- 2/4 Ankle Jerk Reflex-- and-- negative right ankle clonus  Evaluation of Muscle Stretch Reflexes on the left side demonstrates negative left ankle clonus, but-- 2/4 Knee Jerk Reflex-- and-- 2/4 Ankle Jerk Reflex  Special Tests: positive Straight Leg Raise on right,-- positive Dalton's Maneuver on right,-- positive Gaenslen's on the right-- and-- equivocal Gaenslen's Test on right, but-- negative Straight Leg Raise on left-- and-- negative Dalton's Maneuver on lef

## 2018-02-23 ENCOUNTER — CLINICAL SUPPORT (OUTPATIENT)
Dept: PAIN MEDICINE | Facility: CLINIC | Age: 58
End: 2018-02-23
Payer: COMMERCIAL

## 2018-02-23 VITALS
HEART RATE: 87 BPM | WEIGHT: 269 LBS | DIASTOLIC BLOOD PRESSURE: 52 MMHG | BODY MASS INDEX: 43.23 KG/M2 | SYSTOLIC BLOOD PRESSURE: 116 MMHG | TEMPERATURE: 98.1 F | HEIGHT: 66 IN

## 2018-02-23 DIAGNOSIS — M79.18 MYOFASCIAL PAIN: ICD-10-CM

## 2018-02-23 DIAGNOSIS — M54.16 LUMBAR RADICULOPATHY: ICD-10-CM

## 2018-02-23 DIAGNOSIS — M46.1 SACROILIITIS (HCC): Primary | ICD-10-CM

## 2018-02-23 DIAGNOSIS — M47.816 SPONDYLOSIS OF LUMBAR REGION WITHOUT MYELOPATHY OR RADICULOPATHY: ICD-10-CM

## 2018-02-23 PROCEDURE — 99214 OFFICE O/P EST MOD 30 MIN: CPT | Performed by: ANESTHESIOLOGY

## 2018-02-23 RX ORDER — ASPIRIN 325 MG
325 TABLET ORAL DAILY
COMMUNITY
Start: 2017-12-29 | End: 2021-05-04 | Stop reason: ALTCHOICE

## 2018-02-23 RX ORDER — FENOFIBRATE 145 MG/1
1 TABLET, COATED ORAL DAILY
COMMUNITY
Start: 2012-11-29 | End: 2018-03-20 | Stop reason: SDUPTHER

## 2018-02-23 RX ORDER — CLOPIDOGREL BISULFATE 75 MG/1
1 TABLET ORAL DAILY
COMMUNITY
Start: 2012-11-29 | End: 2018-03-20 | Stop reason: SDUPTHER

## 2018-02-23 RX ORDER — METHOCARBAMOL 750 MG/1
1 TABLET, FILM COATED ORAL 3 TIMES DAILY PRN
COMMUNITY
Start: 2017-12-29 | End: 2018-05-31 | Stop reason: SDUPTHER

## 2018-02-23 RX ORDER — FAMOTIDINE 20 MG/1
20 TABLET, FILM COATED ORAL AS NEEDED
COMMUNITY
End: 2019-11-11 | Stop reason: SDUPTHER

## 2018-02-23 RX ORDER — NITROGLYCERIN 0.3 MG/1
TABLET SUBLINGUAL
COMMUNITY
Start: 2011-10-20 | End: 2022-05-27

## 2018-02-23 RX ORDER — ATORVASTATIN CALCIUM 40 MG/1
1 TABLET, FILM COATED ORAL DAILY
COMMUNITY
Start: 2012-11-29 | End: 2018-07-08 | Stop reason: SDUPTHER

## 2018-02-23 RX ORDER — BENAZEPRIL HYDROCHLORIDE 10 MG/1
1 TABLET ORAL DAILY
COMMUNITY
Start: 2011-03-01 | End: 2018-05-13 | Stop reason: SDUPTHER

## 2018-02-23 RX ORDER — INSULIN GLARGINE 100 [IU]/ML
INJECTION, SOLUTION SUBCUTANEOUS
Refills: 2 | COMMUNITY
Start: 2018-02-15 | End: 2018-06-11 | Stop reason: SDUPTHER

## 2018-02-23 RX ORDER — VARENICLINE TARTRATE 1 MG/1
1 TABLET, FILM COATED ORAL 2 TIMES DAILY
COMMUNITY
Start: 2013-07-22 | End: 2018-05-11 | Stop reason: SDUPTHER

## 2018-02-23 RX ORDER — EMPAGLIFLOZIN 25 MG/1
25 TABLET, FILM COATED ORAL DAILY
Refills: 5 | COMMUNITY
Start: 2018-02-15 | End: 2018-05-13 | Stop reason: SDUPTHER

## 2018-02-23 NOTE — PROGRESS NOTES
Assessment:  1  Sacroiliitis ([facility] [address] )    2  Spondylosis of lumbar region without myelopathy or radiculopathy    3  Lumbar radiculopathy    4  Myofascial pain        Plan:  51-year-old male returning for follow-up of lumbosacral back pain that radiates into the L5-S1 distribution of the right lower extremity with subjective weakness  The patient did have an MRI of his lumbar spine revealing degenerative disc disease and spondylosis without any significant central or foraminal stenosis  He did have bilateral L3, L4, and L5 dorsal rami medial branch blocks without any relief  The patient does have evidence of sacroiliitis on the right  This does seem to be contributing to his right-sided low back pain and right buttock pain  I am unable to explain the weakness in his right lower extremity and less it this is secondary to pain inhibition  The patient also states that occasionally the pain will radiate down into the calf  Although the patient does not have any compressive pathology in his lumbar spine I cannot completely rule out a radiculopathy, therefore I will send the patient for an EMG of the right lower extremity for further evaluation  He continues to take methocarbamol 750 mg q 8 hours p r n  and Vicodin 5/300 mg b i d  p r n  which is prescribed by his primary care physician  This does give him about 40% relief  1   I will schedule the patient for right SI joint injection to reduce the inflammatory component of his pain  2  I will order an EMG of the right lower extremity  3  Patient may continue with methocarbamol 750 mg q 8 hours p r n   4   Patient may continue with hydrocodone/acetaminophen 5/300 mg as prescribed by his PCP  5  The patient will continue with his home exercise program  6  We will avoid NSAIDs secondary to Plavix  7    I will follow up the patient in 6-8 weeks after injection      [place] Prescription Drug Monitoring Program report was reviewed and was appropriate     Complete risks and benefits including bleeding, infection, tissue reaction, nerve injury and allergic reaction were discussed  The approach was demonstrated using models and literature was provided  Verbal and written consent was obtained  My impressions and treatment recommendations were discussed in detail with the patient who verbalized understanding and had no further questions  Discharge instructions were provided  I personally saw and examined the patient and I agree with the above discussed plan of care  No orders of the defined types were placed in this encounter  New Medications Ordered This Visit   Medications    aspirin 325 mg tablet     Sig: Take by mouth    atorvastatin (LIPITOR) 40 mg tablet     Sig: Take 1 tablet by mouth daily    Insulin Pen Needle (B-D UF III MINI PEN NEEDLES) 31G X 5 MM MISC     Sig: by Does not apply route    benazepril (LOTENSIN) 10 mg tablet     Sig: Take 1 tablet by mouth daily    varenicline (CHANTIX) 1 mg tablet     Sig: Take 1 tablet by mouth 2 (two) times a day    clopidogrel (PLAVIX) 75 mg tablet     Sig: Take 1 tablet by mouth daily    JARDIANCE 25 MG TABS     Sig: Take 25 mg by mouth daily     Refill:  5    fenofibrate (TRICOR) 145 mg tablet     Sig: Take 1 tablet by mouth daily    ONE TOUCH ULTRA TEST test strip     Si (four) times a day Test     Refill:  2    LANTUS SOLOSTAR injection pen 100 units/mL     Sig: INJECT 90 UNITS DAILY     Refill:  2    methocarbamol (ROBAXIN) 750 mg tablet     Sig: Take 1 tablet by mouth 3 (three) times a day as needed    nitroglycerin (NITROSTAT) 0 3 mg SL tablet     Sig: Place under the tongue    famotidine (PEPCID) 20 mg tablet       History of Present Illness:    Orin Urbina is a 62 y o  male returning for follow-up of lumbosacral back pain that radiates into the L5-S1 distribution of the right lower extremity with a history of lumbar degenerative disc disease and spondylosis    The patient did have an updated MRI of his lumbar spine revealing degenerative disc disease and spondylosis without any significant central or foraminal stenosis  He did have bilateral L3 through L5 dorsal rami medial branch blocks without any relief  His major complaint is his right-sided low back pain that radiates into the buttocks  Occasionally the pain will radiate into the calf with some subjective weakness, however he denies any numbness or paresthesias  He continues to take methocarbamol 750 mg q 8 hours p r n  and Vicodin 5/300 mg q 12 hours p r n  as prescribed by his primary care physician  He does get approximately 40% relief from this medication regimen and denies any side effects  The patient rates his pain as 6/10 on the pain does not follow any particular pattern throughout the day  The pain is constant and described as burning, dull, aching, and sharp and pressure like  The pain is worse with standing, walking, and bending at the waist   The pain is alleviated with sitting and lying down  I have personally reviewed and/or updated the patient's past medical history, past surgical history, family history, social history, current medications, allergies, and vital signs today  Other than as stated above, the patient denies any interval changes in medications, medical condition, mental condition, symptoms, or allergies since the last office visit  Review of Systems:    Review of Systems   Respiratory: Negative for shortness of breath  Cardiovascular: Negative for chest pain  Gastrointestinal: Negative for constipation, diarrhea, nausea and vomiting  Musculoskeletal: Negative for arthralgias, gait problem, joint swelling and myalgias  Difficulty walking, Decreased ROM    Skin: Negative for rash  Neurological: Negative for dizziness, seizures and weakness  All other systems reviewed and are negative  There is no problem list on file for this patient        Past Medical History:   Diagnosis Date    Abscess of left thigh     Arteriosclerotic cardiovascular disease     Class 2 obesity with alveolar hypoventilation and body mass index (BMI) of 36 0 to 36 9 in adult [facility])     COPD (chronic obstructive pulmonary disease) (HCC)     Coronary artery disease     Diabetes mellitus ([facility] [address] )     Fatty liver     Gastric ulcer     Hyperlipidemia     Hypertension     Low back pain     Myocardial infarction     Obstructive sleep apnea     Spondylosis of lumbar region without myelopathy or radiculopathy        Past Surgical History:   Procedure Laterality Date    CORONARY ANGIOPLASTY WITH STENT PLACEMENT      NEUROPLASTY / TRANSPOSITION MEDIAN NERVE AT CARPAL TUNNEL         No family history on file  Social History     Occupational History    Not on file  Social History Main Topics    Smoking status: Not on file    Smokeless tobacco: Not on file    Alcohol use Not on file    Drug use: Unknown    Sexual activity: Not on file       Current Outpatient Prescriptions on File Prior to Visit   Medication Sig    albuterol (PROVENTIL HFA,VENTOLIN HFA) 90 mcg/act inhaler Inhale 90 puffs as needed    HYDROcodone-acetaminophen (XODOL) 5-300 MG per tablet Take 1 tablet by mouth every 6 (six) hours as needed for moderate pain for up to 30 days Earliest Fill Date: 2/6/18 Max Daily Amount: 4 tablets    metoprolol tartrate (LOPRESSOR) 50 mg tablet TAKE 1 TABLET DAILY AS DIRECTED  No current facility-administered medications on file prior to visit          Allergies   Allergen Reactions    Cefaclor Shortness Of Breath    Simvastatin Shortness Of Breath       Physical Exam:    /52   Pulse 87   Temp 98 1 °F (36 7 °C)   Ht 5' 6" (1 676 m)   Wt 122 kg (269 lb)   BMI 43 42 kg/m²     Constitutional: obese  Eyes: anicteric  HEENT: grossly intact  Neck: supple, symmetric, trachea midline and no masses   Pulmonary:even and unlabored  Cardiovascular:No edema or pitting edema present  Skin:Normal without rashes or lesions and well hydrated  Psychiatric:Mood and affect appropriate  Neurologic:Cranial Nerves II-XII grossly intact  Musculoskeletal:antalgic gait  Right-sided lumbar paraspinals tender to palpation from L3-L5  Right SI joint tender to palpation  Bilateral lower extremity strength 5/5 in all muscle groups  Equivocal seated straight leg raise on the right and negative on the left  Positive Dalton's and Gaenslen's test on the right  Positive AP compression test on the right      Imaging  Imaging reviewed

## 2018-02-24 DIAGNOSIS — E11.59 TYPE 2 DIABETES MELLITUS WITH OTHER CIRCULATORY COMPLICATION, WITH LONG-TERM CURRENT USE OF INSULIN (HCC): Primary | ICD-10-CM

## 2018-02-24 DIAGNOSIS — Z79.4 TYPE 2 DIABETES MELLITUS WITH OTHER CIRCULATORY COMPLICATION, WITH LONG-TERM CURRENT USE OF INSULIN (HCC): Primary | ICD-10-CM

## 2018-02-26 RX ORDER — PEN NEEDLE, DIABETIC 31 GX5/16"
NEEDLE, DISPOSABLE MISCELLANEOUS
Qty: 1 EACH | Refills: 3 | Status: SHIPPED | OUTPATIENT
Start: 2018-02-26 | End: 2019-06-21 | Stop reason: SDUPTHER

## 2018-03-06 DIAGNOSIS — Z79.4 TYPE 2 DIABETES MELLITUS WITH COMPLICATION, WITH LONG-TERM CURRENT USE OF INSULIN (HCC): Primary | ICD-10-CM

## 2018-03-06 DIAGNOSIS — E11.8 TYPE 2 DIABETES MELLITUS WITH COMPLICATION, WITH LONG-TERM CURRENT USE OF INSULIN (HCC): Primary | ICD-10-CM

## 2018-03-14 ENCOUNTER — HOSPITAL ENCOUNTER (OUTPATIENT)
Dept: RADIOLOGY | Facility: CLINIC | Age: 58
Discharge: HOME | End: 2018-03-14
Payer: COMMERCIAL

## 2018-03-14 VITALS
HEART RATE: 78 BPM | RESPIRATION RATE: 18 BRPM | TEMPERATURE: 98.1 F | SYSTOLIC BLOOD PRESSURE: 143 MMHG | OXYGEN SATURATION: 98 % | DIASTOLIC BLOOD PRESSURE: 81 MMHG

## 2018-03-14 DIAGNOSIS — M46.1 SACROILIITIS (HCC): ICD-10-CM

## 2018-03-14 PROCEDURE — 27096 INJECT SACROILIAC JOINT: CPT | Performed by: ANESTHESIOLOGY

## 2018-03-14 RX ORDER — METHYLPREDNISOLONE ACETATE 80 MG/ML
80 INJECTION, SUSPENSION INTRA-ARTICULAR; INTRALESIONAL; INTRAMUSCULAR; PARENTERAL; SOFT TISSUE ONCE
Status: COMPLETED | OUTPATIENT
Start: 2018-03-14 | End: 2018-03-14

## 2018-03-14 RX ORDER — BUPIVACAINE HCL/PF 2.5 MG/ML
10 VIAL (ML) INJECTION ONCE
Status: COMPLETED | OUTPATIENT
Start: 2018-03-14 | End: 2018-03-14

## 2018-03-14 RX ORDER — LIDOCAINE HYDROCHLORIDE 10 MG/ML
5 INJECTION, SOLUTION EPIDURAL; INFILTRATION; INTRACAUDAL; PERINEURAL ONCE
Status: COMPLETED | OUTPATIENT
Start: 2018-03-14 | End: 2018-03-14

## 2018-03-14 RX ADMIN — METHYLPREDNISOLONE ACETATE 80 MG: 80 INJECTION, SUSPENSION INTRA-ARTICULAR; INTRALESIONAL; INTRAMUSCULAR; SOFT TISSUE at 08:43

## 2018-03-14 RX ADMIN — BUPIVACAINE HYDROCHLORIDE 1.5 ML: 2.5 INJECTION, SOLUTION EPIDURAL; INFILTRATION; INTRACAUDAL at 08:43

## 2018-03-14 RX ADMIN — IOHEXOL 1 ML: 300 INJECTION, SOLUTION INTRAVENOUS at 08:41

## 2018-03-14 RX ADMIN — LIDOCAINE HYDROCHLORIDE 1 ML: 10 INJECTION, SOLUTION EPIDURAL; INFILTRATION; INTRACAUDAL; PERINEURAL at 08:44

## 2018-03-14 NOTE — DISCHARGE INSTRUCTIONS
Steroid Joint Injection   WHAT YOU NEED TO KNOW:   A steroid joint injection is a procedure to inject steroid medicine into a joint  Steroid medicine decreases pain and inflammation  The injection may also contain an anesthetic (numbing medicine) to decrease pain  It may be done to treat conditions such as arthritis, gout, or carpal tunnel syndrome  The injections may be given in your knee, ankle, shoulder, elbow, wrist, ankle or sacroiliac joint  1  Do not apply heat to any area that is numb  If you have discomfort or soreness at the injection site, you may apply ice today, 20 minutes on and 20 minutes off  Tomorrow you may use ice or warm, moist heat  Do not apply ice or heat directly to the skin  2  You may have an increase or change in the discomfort for 36-48 hours after your treatment  Apply ice and continue with any pain medicine you have been prescribed  3  Do not do anything strenuous today  You may shower, but no tub baths or hot tubs today  You may resume your normal activities tomorrow, but do not overdo it  Resume normal activities slowly when you are feeling better  4  If you experience redness, drainage or swelling at the injection site, or if you develop a fever above 100 degrees, please call [facility] at [phone] or go to the Emergency Room  5  Continue to take all routine medicines prescribed by your primary care physician unless otherwise instructed by our staff  Most blood thinners should be started again according to your regularly scheduled dosing  If you have any questions, please give our office a call  If you have a problem specifically related to your procedure, please call our office at [phone]  Problems not related to your procedure should be directed to your primary care physician

## 2018-03-14 NOTE — H&P
History of Present Illness: The patient is a 62 y o  male who presents with complaints of right-sided low back pain      Patient Active Problem List   Diagnosis    Lumbar radiculopathy    Myofascial pain    Sacroiliitis (HCC)    Spondylosis of lumbar region without myelopathy or radiculopathy       Past Medical History:   Diagnosis Date    Abscess of left thigh     Arteriosclerotic cardiovascular disease     Class 2 obesity with alveolar hypoventilation and body mass index (BMI) of 36 0 to 36 9 in [facility])     COPD [facility]chronic obstructive pulmonary disease) ([facility] )     Coronary artery disease     Diabetes mellitus ([place] [address] )     Fatty liver     Gastric ulcer     Hyperlipidemia     Hypertension     Low back pain     Myocardial infarction     Obstructive sleep apnea     Spondylosis of lumbar region without myelopathy or radiculopathy        Past Surgical History:   Procedure Laterality Date    CORONARY ANGIOPLASTY WITH STENT PLACEMENT      NEUROPLASTY / TRANSPOSITION MEDIAN NERVE AT CARPAL TUNNEL           Current Outpatient Prescriptions:     albuterol (PROVENTIL HFA,VENTOLIN HFA) 90 mcg/act inhaler, Inhale 90 puffs as needed, Disp: , Rfl:     aspirin 325 mg tablet, Take by mouth, Disp: , Rfl:     atorvastatin (LIPITOR) 40 mg tablet, Take 1 tablet by mouth daily, Disp: , Rfl:     B-D UF III MINI PEN NEEDLES 31G X 5 MM MISC, USE AS DIRECTED, Disp: 1 each, Rfl: 3    benazepril (LOTENSIN) 10 mg tablet, Take 1 tablet by mouth daily, Disp: , Rfl:     clopidogrel (PLAVIX) 75 mg tablet, Take 1 tablet by mouth daily, Disp: , Rfl:     famotidine (PEPCID) 20 mg tablet, , Disp: , Rfl:     fenofibrate (TRICOR) 145 mg tablet, Take 1 tablet by mouth daily, Disp: , Rfl:     JARDIANCE 25 MG TABS, Take 25 mg by mouth daily, Disp: , Rfl: 5    LANTUS SOLOSTAR injection pen 100 units/mL, INJECT 90 UNITS DAILY, Disp: , Rfl: 2    methocarbamol (ROBAXIN) 750 mg tablet, Take 1 tablet by mouth 3 (three) times a day as needed, Disp: , Rfl:     metoprolol tartrate (LOPRESSOR) 50 mg tablet, TAKE 1 TABLET DAILY AS DIRECTED , Disp: 30 tablet, Rfl: 5    nitroglycerin (NITROSTAT) 0 3 mg SL tablet, Place under the tongue, Disp: , Rfl:     ONE TOUCH ULTRA TEST test strip, TEST 4 TIMES DAILY, Disp: 200 each, Rfl: 1    varenicline (CHANTIX) 1 mg tablet, Take 1 tablet by mouth 2 (two) times a day, Disp: , Rfl:     Allergies   Allergen Reactions    Cefaclor Shortness Of Breath    Simvastatin Shortness Of Breath       Physical Exam:   Vitals:    03/14/18 0813   BP: 138/87   Pulse: 78   Resp: 20   Temp: 98 1 °F (36 7 °C)   SpO2: 97%     General: Awake, Alert, Oriented x 3, Mood and affect appropriate  Respiratory: Respirations even and unlabored  Cardiovascular: Peripheral pulses intact; no edema  Musculoskeletal Exam:   Right SI joint tender to palpation  ASA Score: 3    Assessment:   1  Sacroiliitis ([facility])        Plan: sacroiliitis - right SI joint injection        Assessment:  1  Sacroiliitis ([facility])    2  Spondylosis of lumbar region without myelopathy or radiculopathy    3  Lumbar radiculopathy    4  Myofascial pain          Plan:  80-year-old male returning for follow-up of lumbosacral back pain that radiates into the L5-S1 distribution of the right lower extremity with subjective weakness  The patient did have an MRI of his lumbar spine revealing degenerative disc disease and spondylosis without any significant central or foraminal stenosis  He did have bilateral L3, L4, and L5 dorsal rami medial branch blocks without any relief  The patient does have evidence of sacroiliitis on the right  This does seem to be contributing to his right-sided low back pain and right buttock pain  I am unable to explain the weakness in his right lower extremity and less it this is secondary to pain inhibition  The patient also states that occasionally the pain will radiate down into the calf    Although the patient does not have any compressive pathology in his lumbar spine I cannot completely rule out a radiculopathy, therefore I will send the patient for an EMG of the right lower extremity for further evaluation  He continues to take methocarbamol 750 mg q 8 hours p r n  and Vicodin 5/300 mg b i d  p r n  which is prescribed by his primary care physician  This does give him about 40% relief  1   I will schedule the patient for right SI joint injection to reduce the inflammatory component of his pain  2  I will order an EMG of the right lower extremity  3  Patient may continue with methocarbamol 750 mg q 8 hours p r n   4   Patient may continue with hydrocodone/acetaminophen 5/300 mg as prescribed by his PCP  5  The patient will continue with his home exercise program  6  We will avoid NSAIDs secondary to Plavix  7  I will follow up the patient in 6-8 weeks after injection        [place] Prescription Drug Monitoring Program report was reviewed and was appropriate      Complete risks and benefits including bleeding, infection, tissue reaction, nerve injury and allergic reaction were discussed  The approach was demonstrated using models and literature was provided  Verbal and written consent was obtained      My impressions and treatment recommendations were discussed in detail with the patient who verbalized understanding and had no further questions  Discharge instructions were provided   I personally saw and examined the patient and I agree with the above discussed plan of care      No orders of the defined types were placed in this encounter           New Medications Ordered This Visit   Medications    aspirin 325 mg tablet       Sig: Take by mouth    atorvastatin (LIPITOR) 40 mg tablet       Sig: Take 1 tablet by mouth daily    Insulin Pen Needle (B-D UF III MINI PEN NEEDLES) 31G X 5 MM MISC       Sig: by Does not apply route    benazepril (LOTENSIN) 10 mg tablet       Sig: Take 1 tablet by mouth daily    varenicline (CHANTIX) 1 mg tablet       Sig: Take 1 tablet by mouth 2 (two) times a day    clopidogrel (PLAVIX) 75 mg tablet       Sig: Take 1 tablet by mouth daily    JARDIANCE 25 MG TABS       Sig: Take 25 mg by mouth daily       Refill:  5    fenofibrate (TRICOR) 145 mg tablet       Sig: Take 1 tablet by mouth daily    ONE TOUCH ULTRA TEST test strip       Si (four) times a day Test       Refill:  2    LANTUS SOLOSTAR injection pen 100 units/mL       Sig: INJECT 90 UNITS DAILY       Refill:  2    methocarbamol (ROBAXIN) 750 mg tablet       Sig: Take 1 tablet by mouth 3 (three) times a day as needed    nitroglycerin (NITROSTAT) 0 3 mg SL tablet       Sig: Place under the tongue    famotidine (PEPCID) 20 mg tablet         History of Present Illness:    Urmila Moran is a 62 y o  male returning for follow-up of lumbosacral back pain that radiates into the L5-S1 distribution of the right lower extremity with a history of lumbar degenerative disc disease and spondylosis  The patient did have an updated MRI of his lumbar spine revealing degenerative disc disease and spondylosis without any significant central or foraminal stenosis  He did have bilateral L3 through L5 dorsal rami medial branch blocks without any relief  His major complaint is his right-sided low back pain that radiates into the buttocks  Occasionally the pain will radiate into the calf with some subjective weakness, however he denies any numbness or paresthesias  He continues to take methocarbamol 750 mg q 8 hours p r n  and Vicodin 5/300 mg q 12 hours p r n  as prescribed by his primary care physician  He does get approximately 40% relief from this medication regimen and denies any side effects  The patient rates his pain as 6/10 on the pain does not follow any particular pattern throughout the day  The pain is constant and described as burning, dull, aching, and sharp and pressure like    The pain is worse with standing, walking, and bending at the waist  The pain is alleviated with sitting and lying down      I have personally reviewed and/or updated the patient's past medical history, past surgical history, family history, social history, current medications, allergies, and vital signs today       Other than as stated above, the patient denies any interval changes in medications, medical condition, mental condition, symptoms, or allergies since the last office visit  Review of Systems:     Review of Systems   Respiratory: Negative for shortness of breath  Cardiovascular: Negative for chest pain  Gastrointestinal: Negative for constipation, diarrhea, nausea and vomiting  Musculoskeletal: Negative for arthralgias, gait problem, joint swelling and myalgias  Difficulty walking, Decreased ROM    Skin: Negative for rash  Neurological: Negative for dizziness, seizures and weakness     All other systems reviewed and are negative         There is no problem list on file for this patient         Medical History   Past Medical History:   Diagnosis Date    Abscess of left thigh      Arteriosclerotic cardiovascular disease      Class 2 obesity with alveolar hypoventilation and body mass index (BMI) of 36 0 to 36 9 in [facility])      COPD (chronic obstructive pulmonary disease) (HCC)      Coronary artery disease      Diabetes mellitus (HCC)      Fatty liver      Gastric ulcer      Hyperlipidemia      Hypertension      Low back pain      Myocardial infarction      Obstructive sleep apnea      Spondylosis of lumbar region without myelopathy or radiculopathy              Surgical History         Past Surgical History:   Procedure Laterality Date    CORONARY ANGIOPLASTY WITH STENT PLACEMENT        NEUROPLASTY / TRANSPOSITION MEDIAN NERVE AT CARPAL TUNNEL                No family history on file      Social History          Occupational History    Not on file            Social History Main Topics    Smoking status: Not on file    Smokeless tobacco: Not on file    Alcohol use Not on file    Drug use: Unknown    Sexual activity: Not on file              Current Outpatient Prescriptions on File Prior to Visit   Medication Sig    albuterol (PROVENTIL HFA,VENTOLIN HFA) 90 mcg/act inhaler Inhale 90 puffs as needed    HYDROcodone-acetaminophen (XODOL) 5-300 MG per tablet Take 1 tablet by mouth every 6 (six) hours as needed for moderate pain for up to 30 days Earliest Fill Date: 2/6/18 Max Daily Amount: 4 tablets    metoprolol tartrate (LOPRESSOR) 50 mg tablet TAKE 1 TABLET DAILY AS DIRECTED       No current facility-administered medications on file prior to visit                Allergies   Allergen Reactions    Cefaclor Shortness Of Breath    Simvastatin Shortness Of Breath         Physical Exam:     /52   Pulse 87   Temp 98 1 °F (36 7 °C)   Ht 5' 6" (1 676 m)   Wt 122 kg (269 lb)   BMI 43 42 kg/m²      Constitutional: obese  Eyes: anicteric  HEENT: grossly intact  Neck: supple, symmetric, trachea midline and no masses   Pulmonary:even and unlabored  Cardiovascular:No edema or pitting edema present  Skin:Normal without rashes or lesions and well hydrated  Psychiatric:Mood and affect appropriate  Neurologic:Cranial Nerves II-XII grossly intact  Musculoskeletal:antalgic gait  Right-sided lumbar paraspinals tender to palpation from L3-L5  Right SI joint tender to palpation  Bilateral lower extremity strength 5/5 in all muscle groups  Equivocal seated straight leg raise on the right and negative on the left  Positive Dalton's and Gaenslen's test on the right  Positive AP compression test on the right

## 2018-03-20 ENCOUNTER — TELEPHONE (OUTPATIENT)
Dept: PAIN MEDICINE | Facility: CLINIC | Age: 58
End: 2018-03-20

## 2018-03-20 ENCOUNTER — TRANSCRIBE ORDERS (OUTPATIENT)
Dept: ADMINISTRATIVE | Age: 58
End: 2018-03-20

## 2018-03-20 ENCOUNTER — APPOINTMENT (OUTPATIENT)
Dept: LAB | Age: 58
End: 2018-03-20
Payer: COMMERCIAL

## 2018-03-20 DIAGNOSIS — I25.10 CORONARY ARTERY DISEASE INVOLVING NATIVE HEART, ANGINA PRESENCE UNSPECIFIED, UNSPECIFIED VESSEL OR LESION TYPE: ICD-10-CM

## 2018-03-20 DIAGNOSIS — E11.9 TYPE 2 DIABETES MELLITUS WITHOUT COMPLICATIONS (HCC): ICD-10-CM

## 2018-03-20 DIAGNOSIS — E78.00 PURE HYPERCHOLESTEROLEMIA: Primary | ICD-10-CM

## 2018-03-20 LAB
EST. AVERAGE GLUCOSE BLD GHB EST-MCNC: 131 MG/DL
GLUCOSE P FAST SERPL-MCNC: 99 MG/DL (ref 65–99)
HBA1C MFR BLD: 6.2 % (ref 4.2–6.3)

## 2018-03-20 PROCEDURE — 83036 HEMOGLOBIN GLYCOSYLATED A1C: CPT

## 2018-03-20 PROCEDURE — 36415 COLL VENOUS BLD VENIPUNCTURE: CPT

## 2018-03-20 PROCEDURE — 82947 ASSAY GLUCOSE BLOOD QUANT: CPT

## 2018-03-20 RX ORDER — FENOFIBRATE 145 MG/1
TABLET, COATED ORAL
Qty: 30 TABLET | Refills: 5 | Status: SHIPPED | OUTPATIENT
Start: 2018-03-20 | End: 2018-03-22 | Stop reason: SDUPTHER

## 2018-03-20 RX ORDER — CLOPIDOGREL BISULFATE 75 MG/1
TABLET ORAL
Qty: 30 TABLET | Refills: 5 | Status: SHIPPED | OUTPATIENT
Start: 2018-03-20 | End: 2018-03-22 | Stop reason: SDUPTHER

## 2018-03-22 DIAGNOSIS — E78.00 PURE HYPERCHOLESTEROLEMIA: ICD-10-CM

## 2018-03-22 DIAGNOSIS — I25.10 CORONARY ARTERY DISEASE INVOLVING NATIVE HEART, ANGINA PRESENCE UNSPECIFIED, UNSPECIFIED VESSEL OR LESION TYPE: ICD-10-CM

## 2018-03-22 RX ORDER — FENOFIBRATE 145 MG/1
145 TABLET, COATED ORAL DAILY
Qty: 30 TABLET | Refills: 0 | Status: SHIPPED | OUTPATIENT
Start: 2018-03-22 | End: 2018-10-26 | Stop reason: SDUPTHER

## 2018-03-22 RX ORDER — CLOPIDOGREL BISULFATE 75 MG/1
75 TABLET ORAL DAILY
Qty: 30 TABLET | Refills: 0 | Status: SHIPPED | OUTPATIENT
Start: 2018-03-22 | End: 2018-10-26 | Stop reason: SDUPTHER

## 2018-03-22 NOTE — TELEPHONE ENCOUNTER
S/w the patient this am about his injection on 3/14  He stated he only has had about 10% relief so far  He continues on the methocarbamol and vicodin  Reiterated the postoperative instructions that his pain may increase and that it may take up to two weeks to get the full effects of the medication  Oncall physician to be notified and give recommendations as Raiza Cheung is on vacation  Please advise  Thanks

## 2018-03-22 NOTE — TELEPHONE ENCOUNTER
I agree with the information communicated to the pt  The pt can revisit the lack of relief when the injection would be at its peak which is in 2 weeks  Pt may continue pain regimen and can use heat/ice treatment to also supplement

## 2018-03-22 NOTE — TELEPHONE ENCOUNTER
Attempted to call the patient and left a detailed mom in regards to the previous task  Pt  To CB if any more questions

## 2018-03-27 LAB
LEFT EYE DIABETIC RETINOPATHY: NORMAL
RIGHT EYE DIABETIC RETINOPATHY: NORMAL

## 2018-03-28 ENCOUNTER — OFFICE VISIT (OUTPATIENT)
Dept: INTERNAL MEDICINE CLINIC | Facility: CLINIC | Age: 58
End: 2018-03-28
Payer: COMMERCIAL

## 2018-03-28 VITALS
WEIGHT: 272 LBS | HEIGHT: 66 IN | DIASTOLIC BLOOD PRESSURE: 86 MMHG | TEMPERATURE: 97.8 F | OXYGEN SATURATION: 96 % | SYSTOLIC BLOOD PRESSURE: 128 MMHG | HEART RATE: 93 BPM | RESPIRATION RATE: 16 BRPM | BODY MASS INDEX: 43.71 KG/M2

## 2018-03-28 DIAGNOSIS — M46.1 SACROILIITIS (HCC): ICD-10-CM

## 2018-03-28 DIAGNOSIS — J43.1 PANLOBULAR EMPHYSEMA (HCC): ICD-10-CM

## 2018-03-28 DIAGNOSIS — E11.8 TYPE 2 DIABETES MELLITUS WITH COMPLICATION, WITH LONG-TERM CURRENT USE OF INSULIN (HCC): ICD-10-CM

## 2018-03-28 DIAGNOSIS — I25.10 ARTERIOSCLEROTIC CARDIOVASCULAR DISEASE: ICD-10-CM

## 2018-03-28 DIAGNOSIS — I10 BENIGN ESSENTIAL HYPERTENSION: ICD-10-CM

## 2018-03-28 DIAGNOSIS — M47.816 SPONDYLOSIS OF LUMBAR REGION WITHOUT MYELOPATHY OR RADICULOPATHY: ICD-10-CM

## 2018-03-28 DIAGNOSIS — M54.16 LUMBAR RADICULOPATHY: Primary | ICD-10-CM

## 2018-03-28 DIAGNOSIS — IMO0001 CONTROLLED INSULIN DEPENDENT DIABETES MELLITUS: ICD-10-CM

## 2018-03-28 DIAGNOSIS — Z79.4 TYPE 2 DIABETES MELLITUS WITH COMPLICATION, WITH LONG-TERM CURRENT USE OF INSULIN (HCC): ICD-10-CM

## 2018-03-28 PROCEDURE — 3079F DIAST BP 80-89 MM HG: CPT | Performed by: INTERNAL MEDICINE

## 2018-03-28 PROCEDURE — 99214 OFFICE O/P EST MOD 30 MIN: CPT | Performed by: INTERNAL MEDICINE

## 2018-03-28 PROCEDURE — 3074F SYST BP LT 130 MM HG: CPT | Performed by: INTERNAL MEDICINE

## 2018-03-28 NOTE — ASSESSMENT & PLAN NOTE
Hypertension  As noted patient's diastolic pressure is mildly elevated today  Approximately 1 month ago he was showing excellent control  He feels that part of the elevation with his blood pressure is his injections with corticosteroids for his low back pain  We will continue to monitor this and make adjustments to medication as needed    He has this checked with his pain specialist and also his cardiologist

## 2018-03-28 NOTE — PROGRESS NOTES
Assessment/Plan:    Controlled insulin dependent diabetes mellitus ([facility] [address] )  Controlled insulin-dependent diabetes  Patient did have a sugar checked prior to the visit today and were happy to report to the patient that his hemoglobin A1c is stable at 6 2  Patient is commended on this  Patient actually thought his blood sugar would be elevated because of recent injections to his lumbar spine for his control the pain of corticosteroids  Patient has been following his blood sugars closely and making adjustments to his insulin dose that have been appropriate  He denies any hypoglycemia  He is not always compliant with his diet but states he will try to work harder at this in the near future  He is very limited with his exercise capacity because of his chronic low back pain    Chronic obstructive pulmonary disease (HCC)  Chronic obstructive pulmonary disease  Patient has not had any acute exacerbations this winter which is very unusual for him  A major factor is the patient still is not smoking  Patient continues to uses inhalers as directed  He was told to call the office if any acute exacerbation of his lung disease or upper respiratory tract infection    Benign essential hypertension  Hypertension  As noted patient's diastolic pressure is mildly elevated today  Approximately 1 month ago he was showing excellent control  He feels that part of the elevation with his blood pressure is his injections with corticosteroids for his low back pain  We will continue to monitor this and make adjustments to medication as needed  He has this checked with his pain specialist and also his cardiologist     Lumbar radiculopathy  Lumbar radiculopathy  Patient is being seen by pain management for his low back pain which is been severe  They are treating him both with medication and therapeutic injections with corticosteroids to the areas of pain   Patient states he has had some relief but he is still not back to baseline  Diagnoses and all orders for this visit:    Lumbar radiculopathy    Sacroiliitis ([facility] [address] )    Spondylosis of lumbar region without myelopathy or radiculopathy    Type 2 diabetes mellitus with complication, with long-term current use of insulin (HCC)  -     Glucose, fasting; Future  -     HEMOGLOBIN A1C W/ EAG ESTIMATION; Future    Controlled insulin dependent diabetes mellitus (HCC)    Panlobular emphysema (HCC)    Arteriosclerotic cardiovascular disease    Benign essential hypertension          Subjective:      Patient ID: Rj Rios is a 62 y o  male  Patient is a 59-year-old male with a history of multiple medical problems as outlined previously  Patient is here today for routine follow-up after four-month period of time  Since last seen he has been evaluated by pain management and they are working to help the patient with his chronic low back pain and radiculopathy  Patient did have a sugar checked prior to the visit today we did discuss the results showing good control  The following portions of the patient's history were reviewed and updated as appropriate:   He  has a past medical history of Abscess of left thigh; Acute myocardial infarction; Anxiety; Arteriosclerotic cardiovascular disease; Asthma; Chest pain; Class 2 obesity with alveolar hypoventilation and body mass index (BMI) of 36 0 to 36 9 in [facility]); COPD (chronic obstructive pulmonary disease) ([facility] [address] ); Coronary artery disease; Diabetes mellitus ([facility] [address] ); Fatty liver; Gastric ulcer; GERD (gastroesophageal reflux disease); Hyperlipidemia; Hypertension; Low back pain; Myocardial infarction; Obstructive sleep apnea; and Spondylosis of lumbar region without myelopathy or radiculopathy    He   Patient Active Problem List    Diagnosis Date Noted    Lumbar radiculopathy 12/29/2017    Myofascial pain 12/29/2017    Sacroiliitis ([facility] [address] ) 12/29/2017    Controlled insulin dependent diabetes mellitus ([facility] [address] ) 02/22/2017    Spondylosis of lumbar region without myelopathy or radiculopathy 04/15/2014    Benign essential hypertension 10/16/2012    Arteriosclerotic cardiovascular disease 06/14/2012    Chronic obstructive pulmonary disease ([address] ) 06/14/2012     He  has a past surgical history that includes Coronary angioplasty with stent (2006, 2007) and Neuroplasty / transposition median nerve at carpal tunnel (Right)  His family history includes Alzheimer's disease in his mother; Cancer in his other; Heart attack in his father; Heart failure in his mother; Hypertension in his family; Other in his family and father; Stroke in his family  He  reports that he has quit smoking  He has never used smokeless tobacco  He reports that he does not drink alcohol or use drugs  Current Outpatient Prescriptions   Medication Sig Dispense Refill    albuterol (PROVENTIL HFA,VENTOLIN HFA) 90 mcg/act inhaler Inhale 90 puffs as needed      aspirin 325 mg tablet Take by mouth      atorvastatin (LIPITOR) 40 mg tablet Take 1 tablet by mouth daily      B-D UF III MINI PEN NEEDLES 31G X 5 MM MISC USE AS DIRECTED 1 each 3    benazepril (LOTENSIN) 10 mg tablet Take 1 tablet by mouth daily      clopidogrel (PLAVIX) 75 mg tablet Take 1 tablet (75 mg total) by mouth daily 30 tablet 0    famotidine (PEPCID) 20 mg tablet       fenofibrate (TRICOR) 145 mg tablet Take 1 tablet (145 mg total) by mouth daily 30 tablet 0    JARDIANCE 25 MG TABS Take 25 mg by mouth daily  5    LANTUS SOLOSTAR injection pen 100 units/mL INJECT 90 UNITS DAILY  2    methocarbamol (ROBAXIN) 750 mg tablet Take 1 tablet by mouth 3 (three) times a day as needed      metoprolol tartrate (LOPRESSOR) 50 mg tablet TAKE 1 TABLET DAILY AS DIRECTED   30 tablet 5    nitroglycerin (NITROSTAT) 0 3 mg SL tablet Place under the tongue      ONE TOUCH ULTRA TEST test strip TEST 4 TIMES DAILY 200 each 1    varenicline (CHANTIX) 1 mg tablet Take 1 tablet by mouth 2 (two) times a day       No current facility-administered medications for this visit  Current Outpatient Prescriptions on File Prior to Visit   Medication Sig    albuterol (PROVENTIL HFA,VENTOLIN HFA) 90 mcg/act inhaler Inhale 90 puffs as needed    aspirin 325 mg tablet Take by mouth    atorvastatin (LIPITOR) 40 mg tablet Take 1 tablet by mouth daily    B-D UF III MINI PEN NEEDLES 31G X 5 MM MISC USE AS DIRECTED    benazepril (LOTENSIN) 10 mg tablet Take 1 tablet by mouth daily    clopidogrel (PLAVIX) 75 mg tablet Take 1 tablet (75 mg total) by mouth daily    famotidine (PEPCID) 20 mg tablet     fenofibrate (TRICOR) 145 mg tablet Take 1 tablet (145 mg total) by mouth daily    JARDIANCE 25 MG TABS Take 25 mg by mouth daily    LANTUS SOLOSTAR injection pen 100 units/mL INJECT 90 UNITS DAILY    methocarbamol (ROBAXIN) 750 mg tablet Take 1 tablet by mouth 3 (three) times a day as needed    metoprolol tartrate (LOPRESSOR) 50 mg tablet TAKE 1 TABLET DAILY AS DIRECTED   nitroglycerin (NITROSTAT) 0 3 mg SL tablet Place under the tongue    ONE TOUCH ULTRA TEST test strip TEST 4 TIMES DAILY    varenicline (CHANTIX) 1 mg tablet Take 1 tablet by mouth 2 (two) times a day     No current facility-administered medications on file prior to visit  He is allergic to cefaclor and simvastatin       Review of Systems   Constitutional: Positive for activity change (Patient states that there has been decrease in his activity level secondary to his chronic back pain  )  Negative for appetite change, chills, diaphoresis, fatigue, fever and unexpected weight change  HENT: Negative for congestion, dental problem, drooling, ear discharge, ear pain, facial swelling, hearing loss, mouth sores, nosebleeds, postnasal drip, rhinorrhea, sinus pain, sinus pressure, sneezing, sore throat, tinnitus, trouble swallowing and voice change  Eyes: Negative for photophobia, pain, discharge, redness, itching and visual disturbance     Respiratory: Positive for shortness of breath ( patient has a history of chronic shortness of breath with exertion especially brisk exertion but states this is been stable without change)  Negative for apnea, cough, choking, chest tightness, wheezing and stridor  Cardiovascular: Positive for leg swelling ( chronic trace edema bilateral lower extremities without change)  Negative for chest pain and palpitations  Gastrointestinal: Negative for abdominal distention, abdominal pain, anal bleeding, blood in stool, constipation, diarrhea, nausea, rectal pain and vomiting  Endocrine: Negative for cold intolerance, heat intolerance, polydipsia, polyphagia and polyuria  Genitourinary: Negative for decreased urine volume, difficulty urinating, discharge, dysuria, enuresis, flank pain, frequency, genital sores, hematuria, penile pain, penile swelling, scrotal swelling, testicular pain and urgency  Musculoskeletal: Positive for arthralgias, back pain and gait problem  Negative for joint swelling, myalgias, neck pain and neck stiffness  Skin: Negative for color change, pallor, rash and wound  Allergic/Immunologic: Negative for environmental allergies, food allergies and immunocompromised state  Neurological: Negative for dizziness, tremors, seizures, syncope, facial asymmetry, speech difficulty, weakness, light-headedness, numbness and headaches  Hematological: Negative for adenopathy  Does not bruise/bleed easily  Psychiatric/Behavioral: Negative for agitation, behavioral problems, confusion, decreased concentration, dysphoric mood, hallucinations, self-injury, sleep disturbance and suicidal ideas  The patient is not nervous/anxious and is not hyperactive            Objective:      /86 (BP Location: Left arm, Patient Position: Sitting)   Pulse 93   Temp 97 8 °F (36 6 °C)   Resp 16   Ht 5' 6" (1 676 m)   Wt 123 kg (272 lb)   SpO2 96%   BMI 43 90 kg/m²          Physical Exam   Constitutional: He is oriented to person, place, and time  He appears well-developed and well-nourished  No distress  Pleasant, overweight 77-year-old male who is awake alert  Patient has noticeable slowing of his gait secondary to his low back pain  Patient has difficulties with transfer because of his pain  HENT:   Head: Normocephalic and atraumatic  Right Ear: External ear normal    Left Ear: External ear normal    Nose: Nose normal    Mouth/Throat: Oropharynx is clear and moist  No oropharyngeal exudate  Eyes: Conjunctivae and EOM are normal  Pupils are equal, round, and reactive to light  Right eye exhibits no discharge  Left eye exhibits no discharge  No scleral icterus  Neck: Normal range of motion  Neck supple  No JVD present  No tracheal deviation present  No thyromegaly present  Cardiovascular: Normal rate, regular rhythm, normal heart sounds and intact distal pulses  Exam reveals no gallop and no friction rub  No murmur heard  Pulmonary/Chest: Effort normal  No stridor  No respiratory distress  He has no wheezes  He has no rales  He exhibits no tenderness  Patient on examination today has decreased breath sounds anterior and posteriorly but no rales rhonchi or wheezes could be appreciated on exam    Abdominal: Soft  Bowel sounds are normal  He exhibits no distension and no mass  There is no tenderness  There is no rebound and no guarding  Musculoskeletal: He exhibits edema, tenderness and deformity  Patient continues with problems with severe low back pain  Patient has decreased range of motion both passively and actively to the lumbar spine with movement  Patient has multiple tender points to the low back especially the area of T3-T4 T5 bilaterally  Tender at both sacroiliac joints bilaterally  Patient has flattening of his lumbar curve which is chronic   Lymphadenopathy:     He has no cervical adenopathy  Neurological: He is alert and oriented to person, place, and time  He displays abnormal reflex   No cranial nerve deficit  He exhibits normal muscle tone  Coordination abnormal    Patient has absent patella and Achilles deep tendon reflexes bilaterally   Skin: Skin is warm and dry  No rash noted  He is not diaphoretic  No erythema  No pallor  Psychiatric: He has a normal mood and affect  His behavior is normal  Judgment and thought content normal    Nursing note and vitals reviewed

## 2018-03-28 NOTE — PROGRESS NOTES
Diabetic Foot Exam    Patient's shoes and socks removed  Right Foot/Ankle   Right Foot Inspection  Skin Exam: skin normal and skin intact no dry skin, no warmth, no callus, no erythema, no maceration, no abnormal color, no pre-ulcer, no ulcer and no callus                          Toe Exam: ROM and strength within normal limits and swelling (Patient has slight swelling to both feet which is chronic)no tenderness, erythema and  no right toe deformity  Sensory   Vibration: intact  Proprioception: intact   Monofilament testing: intact  Vascular  Capillary refills: < 3 seconds  The right DP pulse is 2+  Left Foot/Ankle  Left Foot Inspection  Skin Exam: skin normal and skin intactno dry skin, no warmth, no erythema, no maceration, normal color, no pre-ulcer, no ulcer and no callus                         Toe Exam: ROM and strength within normal limits and swellingno tenderness, no erythema and no left toe deformity                   Sensory   Vibration: intact  Proprioception: intact  Monofilament: intact  Vascular  Capillary refills: < 3 seconds  The left DP pulse is 2+  The left PT pulse is 2+  Assign Risk Category:  No deformity present; Loss of protective sensation;  No weak pulses       Risk: 0

## 2018-03-28 NOTE — ASSESSMENT & PLAN NOTE
Chronic obstructive pulmonary disease  Patient has not had any acute exacerbations this winter which is very unusual for him  A major factor is the patient still is not smoking  Patient continues to uses inhalers as directed    He was told to call the office if any acute exacerbation of his lung disease or upper respiratory tract infection

## 2018-03-28 NOTE — ASSESSMENT & PLAN NOTE
Controlled insulin-dependent diabetes  Patient did have a sugar checked prior to the visit today and were happy to report to the patient that his hemoglobin A1c is stable at 6 2  Patient is commended on this  Patient actually thought his blood sugar would be elevated because of recent injections to his lumbar spine for his control the pain of corticosteroids  Patient has been following his blood sugars closely and making adjustments to his insulin dose that have been appropriate  He denies any hypoglycemia  He is not always compliant with his diet but states he will try to work harder at this in the near future    He is very limited with his exercise capacity because of his chronic low back pain

## 2018-05-11 DIAGNOSIS — Z72.0 TOBACCO ABUSE: Primary | ICD-10-CM

## 2018-05-11 RX ORDER — VARENICLINE TARTRATE 1 MG/1
TABLET, FILM COATED ORAL
Qty: 56 TABLET | Refills: 6 | Status: SHIPPED | OUTPATIENT
Start: 2018-05-11 | End: 2018-11-20 | Stop reason: SDUPTHER

## 2018-05-13 DIAGNOSIS — I10 ESSENTIAL HYPERTENSION: ICD-10-CM

## 2018-05-13 DIAGNOSIS — E11.59 TYPE 2 DIABETES MELLITUS WITH OTHER CIRCULATORY COMPLICATION, WITH LONG-TERM CURRENT USE OF INSULIN (HCC): Primary | ICD-10-CM

## 2018-05-13 DIAGNOSIS — Z79.4 TYPE 2 DIABETES MELLITUS WITH OTHER CIRCULATORY COMPLICATION, WITH LONG-TERM CURRENT USE OF INSULIN (HCC): Primary | ICD-10-CM

## 2018-05-14 PROCEDURE — 4010F ACE/ARB THERAPY RXD/TAKEN: CPT | Performed by: INTERNAL MEDICINE

## 2018-05-14 RX ORDER — BENAZEPRIL HYDROCHLORIDE 10 MG/1
TABLET ORAL
Qty: 30 TABLET | Refills: 5 | Status: SHIPPED | OUTPATIENT
Start: 2018-05-14 | End: 2018-11-05 | Stop reason: SDUPTHER

## 2018-05-14 RX ORDER — EMPAGLIFLOZIN 25 MG/1
TABLET, FILM COATED ORAL
Qty: 30 TABLET | Refills: 5 | Status: SHIPPED | OUTPATIENT
Start: 2018-05-14 | End: 2018-11-05 | Stop reason: SDUPTHER

## 2018-05-31 ENCOUNTER — TELEPHONE (OUTPATIENT)
Dept: PAIN MEDICINE | Facility: CLINIC | Age: 58
End: 2018-05-31

## 2018-05-31 DIAGNOSIS — M62.838 MUSCLE SPASM: Primary | ICD-10-CM

## 2018-05-31 RX ORDER — METHOCARBAMOL 750 MG/1
750 TABLET, FILM COATED ORAL 3 TIMES DAILY PRN
Qty: 90 TABLET | Refills: 0 | Status: SHIPPED | OUTPATIENT
Start: 2018-05-31 | End: 2018-09-19 | Stop reason: SDUPTHER

## 2018-05-31 NOTE — TELEPHONE ENCOUNTER
Pt called for a refill on his methocarbamol 750mg and sent to [facility] at [address]   You may call pt at [phone]

## 2018-06-11 DIAGNOSIS — Z79.4 TYPE 2 DIABETES MELLITUS WITH OTHER CIRCULATORY COMPLICATION, WITH LONG-TERM CURRENT USE OF INSULIN (HCC): Primary | ICD-10-CM

## 2018-06-11 DIAGNOSIS — E11.59 TYPE 2 DIABETES MELLITUS WITH OTHER CIRCULATORY COMPLICATION, WITH LONG-TERM CURRENT USE OF INSULIN (HCC): Primary | ICD-10-CM

## 2018-06-11 RX ORDER — INSULIN GLARGINE 100 [IU]/ML
INJECTION, SOLUTION SUBCUTANEOUS
Qty: 5 PEN | Refills: 2 | Status: SHIPPED | OUTPATIENT
Start: 2018-06-11 | End: 2018-07-08 | Stop reason: SDUPTHER

## 2018-06-12 DIAGNOSIS — G89.29 CHRONIC BILATERAL LOW BACK PAIN WITHOUT SCIATICA: Primary | ICD-10-CM

## 2018-06-12 DIAGNOSIS — M54.50 CHRONIC BILATERAL LOW BACK PAIN WITHOUT SCIATICA: Primary | ICD-10-CM

## 2018-06-12 RX ORDER — HYDROCODONE BITARTRATE AND ACETAMINOPHEN 5; 300 MG/1; MG/1
1 TABLET ORAL EVERY 6 HOURS PRN
Qty: 112 TABLET | Refills: 0 | Status: SHIPPED | OUTPATIENT
Start: 2018-06-12 | End: 2018-10-19 | Stop reason: SDUPTHER

## 2018-06-23 DIAGNOSIS — E11.8 TYPE 2 DIABETES MELLITUS WITH COMPLICATION, WITH LONG-TERM CURRENT USE OF INSULIN (HCC): ICD-10-CM

## 2018-06-23 DIAGNOSIS — Z79.4 TYPE 2 DIABETES MELLITUS WITH COMPLICATION, WITH LONG-TERM CURRENT USE OF INSULIN (HCC): ICD-10-CM

## 2018-07-08 DIAGNOSIS — E78.01 FAMILIAL HYPERCHOLESTEROLEMIA: Primary | ICD-10-CM

## 2018-07-08 DIAGNOSIS — Z79.4 TYPE 2 DIABETES MELLITUS WITH OTHER CIRCULATORY COMPLICATION, WITH LONG-TERM CURRENT USE OF INSULIN (HCC): ICD-10-CM

## 2018-07-08 DIAGNOSIS — E11.59 TYPE 2 DIABETES MELLITUS WITH OTHER CIRCULATORY COMPLICATION, WITH LONG-TERM CURRENT USE OF INSULIN (HCC): ICD-10-CM

## 2018-07-09 RX ORDER — INSULIN GLARGINE 100 [IU]/ML
INJECTION, SOLUTION SUBCUTANEOUS
Qty: 10 PEN | Refills: 3 | Status: SHIPPED | OUTPATIENT
Start: 2018-07-09 | End: 2018-11-05 | Stop reason: SDUPTHER

## 2018-07-09 RX ORDER — ATORVASTATIN CALCIUM 40 MG/1
TABLET, FILM COATED ORAL
Qty: 90 TABLET | Refills: 5 | Status: SHIPPED | OUTPATIENT
Start: 2018-07-09 | End: 2019-07-22 | Stop reason: SDUPTHER

## 2018-07-23 ENCOUNTER — TELEPHONE (OUTPATIENT)
Dept: INTERNAL MEDICINE CLINIC | Facility: CLINIC | Age: 58
End: 2018-07-23

## 2018-07-23 NOTE — TELEPHONE ENCOUNTER
Patient called early this morning and talked to the medical assistant  Patient apparently was having difficulties with urination, burning with urination  No message was sent who us specifically about the problems he was having  Patient does have an appointment tomorrow was told in between if he has fever, chills, inability urinate to go to the emergency room immediately

## 2018-07-24 ENCOUNTER — OFFICE VISIT (OUTPATIENT)
Dept: INTERNAL MEDICINE CLINIC | Facility: CLINIC | Age: 58
End: 2018-07-24
Payer: COMMERCIAL

## 2018-07-24 VITALS
TEMPERATURE: 98.9 F | HEIGHT: 66 IN | HEART RATE: 99 BPM | SYSTOLIC BLOOD PRESSURE: 140 MMHG | OXYGEN SATURATION: 97 % | DIASTOLIC BLOOD PRESSURE: 70 MMHG | WEIGHT: 278 LBS | BODY MASS INDEX: 44.68 KG/M2

## 2018-07-24 DIAGNOSIS — R31.9 URINARY TRACT INFECTION WITH HEMATURIA, SITE UNSPECIFIED: Primary | ICD-10-CM

## 2018-07-24 DIAGNOSIS — N39.0 URINARY TRACT INFECTION WITH HEMATURIA, SITE UNSPECIFIED: Primary | ICD-10-CM

## 2018-07-24 DIAGNOSIS — I10 BENIGN ESSENTIAL HYPERTENSION: ICD-10-CM

## 2018-07-24 DIAGNOSIS — R30.0 DYSURIA: ICD-10-CM

## 2018-07-24 DIAGNOSIS — IMO0001 CONTROLLED INSULIN DEPENDENT DIABETES MELLITUS: ICD-10-CM

## 2018-07-24 LAB
SL AMB  POCT GLUCOSE, UA: 1000
SL AMB LEUKOCYTE ESTERASE,UA: ABNORMAL
SL AMB POCT BILIRUBIN,UA: ABNORMAL
SL AMB POCT BLOOD,UA: ABNORMAL
SL AMB POCT CLARITY,UA: ABNORMAL
SL AMB POCT COLOR,UA: YELLOW
SL AMB POCT KETONES,UA: ABNORMAL
SL AMB POCT NITRITE,UA: ABNORMAL
SL AMB POCT PH,UA: 8
SL AMB POCT SPECIFIC GRAVITY,UA: 1.01
SL AMB POCT URINE PROTEIN: ABNORMAL
SL AMB POCT UROBILINOGEN: ABNORMAL

## 2018-07-24 PROCEDURE — 81002 URINALYSIS NONAUTO W/O SCOPE: CPT | Performed by: INTERNAL MEDICINE

## 2018-07-24 PROCEDURE — 99214 OFFICE O/P EST MOD 30 MIN: CPT | Performed by: INTERNAL MEDICINE

## 2018-07-24 PROCEDURE — 87086 URINE CULTURE/COLONY COUNT: CPT | Performed by: INTERNAL MEDICINE

## 2018-07-24 RX ORDER — SULFAMETHOXAZOLE AND TRIMETHOPRIM 800; 160 MG/1; MG/1
1 TABLET ORAL EVERY 12 HOURS SCHEDULED
Qty: 20 TABLET | Refills: 0 | Status: SHIPPED | OUTPATIENT
Start: 2018-07-24 | End: 2018-08-03

## 2018-07-24 NOTE — PROGRESS NOTES
Assessment/Plan:    Dysuria  Dysuria  Patient states starting approximately 2 days ago he was experiencing difficulty, burning, pain in the groin and the rectum with urination  Patient states he also noted some hematuria, some blood stains in his underwear  He does have a history of kidney stones but he states this pain and discomfort is not similar to that that he is experience in the past that with stones  He denies any fever or chills  He denies any urinary frequency  Urinary dipstick in the office today shows a large amount of glucose but he is watching his sugars at home and making adjustments to his insulin dose  His sugars have not been uncontrolled  On exam patient has some mild tenderness to the left lower quadrant to palpation but no rebound rigidity  Digital rectal examination shows a nontender prostate with no masses  The differential at this point time is an acute urinary tract infection versus kidney stone  His symptoms are not consistent with diverticulitis either with history or exam   Patient's urine will be sent for culture  Patient was placed on Bactrim DS it a 1 pill twice a day for 10 days and he was told if culture shows lack of sensitivity to the Bactrim we will change the antibiotic  He was also told he was aware of any changes in his symptoms or condition  Benign essential hypertension  Benign essential hypertension  Patient's blood pressure is very slightly elevated today  Patient will continue with present medication and he is coming in for regular appointment in 1 week and this will be recheck  Controlled insulin dependent diabetes mellitus ([facility] )  Lab Results   Component Value Date    HGBA1C 6 2 03/20/2018       No results for input(s): POCGLU in the last 72 hours  Blood Sugar Average: Last 72 hrs:   diabetes mellitus type 2  Patient does have a slip to check on a blood sugar, hemoglobin A1c prior to his visit in the office next week    Patient is shown excellent control with present treatment including his insulin injections  Patient is made adjustment over period of time in order to correct for elevated blood sugar readings or hypoglycemia  Diagnoses and all orders for this visit:    Urinary tract infection with hematuria, site unspecified  -     sulfamethoxazole-trimethoprim (BACTRIM DS) 800-160 mg per tablet; Take 1 tablet by mouth every 12 (twelve) hours for 10 days    Dysuria    Benign essential hypertension    Controlled insulin dependent diabetes mellitus ([facility] )          Subjective:      Patient ID: Veronica Lazaro is a 62 y o  male  Patient is a 26-year-old male with a history of multiple medical problems including hypertension, hyperlipidemia, atherosclerotic cardiovascular disease, chronic obstructive pulmonary disease  Patient is here today for evaluation of difficulty with urination, burning with urination, pain in the groin mostly on the left side, episode of hematuria  He denies any fever chills and states the discomfort has subsided somewhat today as well as his hematuria  The following portions of the patient's history were reviewed and updated as appropriate:   He  has a past medical history of Abscess of left thigh; Acute myocardial infarction ([facility] ); Anxiety; Arteriosclerotic cardiovascular disease; Asthma; Chest pain; Class 2 obesity with alveolar hypoventilation and body mass index (BMI) of 36 0 to 36 9 in [facility]); COPD (chronic obstructive pulmonary disease) ([facility] ); Coronary artery disease; Diabetes mellitus ([facility] ); Fatty liver; Gastric ulcer; GERD (gastroesophageal reflux disease); Hyperlipidemia; Hypertension; Low back pain; Myocardial infarction ([facility] ); Obstructive sleep apnea; and Spondylosis of lumbar region without myelopathy or radiculopathy    He   Patient Active Problem List    Diagnosis Date Noted    Dysuria 07/24/2018    Lumbar radiculopathy 12/29/2017    Myofascial pain 12/29/2017    Sacroiliitis ([address] ) 12/29/2017    Controlled insulin dependent diabetes mellitus ([facility] ) 02/22/2017    Spondylosis of lumbar region without myelopathy or radiculopathy 04/15/2014    Benign essential hypertension 10/16/2012    Arteriosclerotic cardiovascular disease 06/14/2012    Chronic obstructive pulmonary disease ([facility] ) 06/14/2012     He  has a past surgical history that includes Coronary angioplasty with stent (2006, 2007) and Neuroplasty / transposition median nerve at carpal tunnel (Right)  His family history includes Alzheimer's disease in his mother; Cancer in his other; Heart attack in his father; Heart failure in his mother; Hypertension in his family; Other in his family and father; Stroke in his family  He  reports that he has quit smoking  He has never used smokeless tobacco  He reports that he does not drink alcohol or use drugs    Current Outpatient Prescriptions   Medication Sig Dispense Refill    albuterol (PROVENTIL HFA,VENTOLIN HFA) 90 mcg/act inhaler Inhale 90 puffs as needed      aspirin 325 mg tablet Take by mouth      atorvastatin (LIPITOR) 40 mg tablet TAKE 1 TABLET DAILY 90 tablet 5    B-D UF III MINI PEN NEEDLES 31G X 5 MM MISC USE AS DIRECTED 1 each 3    benazepril (LOTENSIN) 10 mg tablet TAKE ONE TABLET BY MOUTH EVERY DAY 30 tablet 5    CHANTIX CONTINUING MONTH SHALONDA 1 MG tablet TAKE 1 TABLET TWICE A DAY 56 tablet 6    clopidogrel (PLAVIX) 75 mg tablet Take 1 tablet (75 mg total) by mouth daily 30 tablet 0    famotidine (PEPCID) 20 mg tablet       fenofibrate (TRICOR) 145 mg tablet Take 1 tablet (145 mg total) by mouth daily 30 tablet 0    HYDROcodone-acetaminophen (VICODIN) 5-300 MG per tablet Take 1 tablet by mouth every 6 (six) hours as needed for moderate pain Max Daily Amount: 4 tablets 112 tablet 0    JARDIANCE 25 MG TABS TAKE 1 TABLET EVERY DAY 30 tablet 5    LANTUS SOLOSTAR 100 units/mL injection pen INJECT 90 UNITS DAILY 10 pen 3    methocarbamol (ROBAXIN) 750 mg tablet Take 1 tablet (750 mg total) by mouth 3 (three) times a day as needed (Muscle spasm) 90 tablet 0    metoprolol tartrate (LOPRESSOR) 50 mg tablet TAKE 1 TABLET DAILY AS DIRECTED  30 tablet 5    nitroglycerin (NITROSTAT) 0 3 mg SL tablet Place under the tongue      ONE TOUCH ULTRA TEST test strip TEST 4 TIMES DAILY 3 each 3    sulfamethoxazole-trimethoprim (BACTRIM DS) 800-160 mg per tablet Take 1 tablet by mouth every 12 (twelve) hours for 10 days 20 tablet 0     No current facility-administered medications for this visit  Current Outpatient Prescriptions on File Prior to Visit   Medication Sig    albuterol (PROVENTIL HFA,VENTOLIN HFA) 90 mcg/act inhaler Inhale 90 puffs as needed    aspirin 325 mg tablet Take by mouth    atorvastatin (LIPITOR) 40 mg tablet TAKE 1 TABLET DAILY    B-D UF III MINI PEN NEEDLES 31G X 5 MM MISC USE AS DIRECTED    benazepril (LOTENSIN) 10 mg tablet TAKE ONE TABLET BY MOUTH EVERY DAY    CHANTIX CONTINUING MONTH SHALONDA 1 MG tablet TAKE 1 TABLET TWICE A DAY    clopidogrel (PLAVIX) 75 mg tablet Take 1 tablet (75 mg total) by mouth daily    famotidine (PEPCID) 20 mg tablet     fenofibrate (TRICOR) 145 mg tablet Take 1 tablet (145 mg total) by mouth daily    HYDROcodone-acetaminophen (VICODIN) 5-300 MG per tablet Take 1 tablet by mouth every 6 (six) hours as needed for moderate pain Max Daily Amount: 4 tablets    JARDIANCE 25 MG TABS TAKE 1 TABLET EVERY DAY    LANTUS SOLOSTAR 100 units/mL injection pen INJECT 90 UNITS DAILY    methocarbamol (ROBAXIN) 750 mg tablet Take 1 tablet (750 mg total) by mouth 3 (three) times a day as needed (Muscle spasm)    metoprolol tartrate (LOPRESSOR) 50 mg tablet TAKE 1 TABLET DAILY AS DIRECTED   nitroglycerin (NITROSTAT) 0 3 mg SL tablet Place under the tongue    ONE TOUCH ULTRA TEST test strip TEST 4 TIMES DAILY     No current facility-administered medications on file prior to visit  He is allergic to cefaclor and simvastatin       Review of Systems Constitutional: Positive for activity change (Patient is restricted in his activity level secondary to his chronic low back pain which is unchanged)  Negative for appetite change, chills, diaphoresis, fatigue and unexpected weight change  HENT: Negative  Eyes: Negative  Respiratory: Negative  Cardiovascular: Negative  Gastrointestinal: Positive for abdominal pain  Negative for abdominal distention, anal bleeding, blood in stool, constipation, diarrhea, nausea, rectal pain and vomiting  Endocrine: Negative  Genitourinary: Positive for dysuria and hematuria  Negative for decreased urine volume, difficulty urinating, discharge, enuresis, flank pain, frequency, genital sores, penile pain, penile swelling, scrotal swelling, testicular pain and urgency  Musculoskeletal: Positive for arthralgias, back pain and gait problem  Negative for joint swelling, myalgias, neck pain and neck stiffness  Skin: Negative  Allergic/Immunologic: Negative  Hematological: Negative  Psychiatric/Behavioral: Negative  Objective:      /70   Pulse 99   Temp 98 9 °F (37 2 °C)   Ht 5' 6" (1 676 m)   Wt 126 kg (278 lb)   SpO2 97%   BMI 44 87 kg/m²          Physical Exam   Constitutional: He is oriented to person, place, and time  He appears well-developed and well-nourished  No distress  Moderately obese 51-year-old male who is awake alert, antalgic gait which is at baseline for the patient secondary to his chronic low back pain which is not increased  Patient does have difficulty with transfer   HENT:   Head: Normocephalic and atraumatic  Right Ear: External ear normal    Left Ear: External ear normal    Nose: Nose normal    Mouth/Throat: Oropharynx is clear and moist    Eyes: Conjunctivae and EOM are normal  Pupils are equal, round, and reactive to light  Right eye exhibits no discharge  Left eye exhibits no discharge  No scleral icterus  Neck: Normal range of motion  Neck supple   No JVD present  No tracheal deviation present  No thyromegaly present  Very thick neck   Cardiovascular: Normal rate, regular rhythm, normal heart sounds and intact distal pulses  Exam reveals no gallop and no friction rub  No murmur heard  Pulmonary/Chest: Effort normal  No stridor  No respiratory distress  He has no wheezes  He has no rales  He exhibits no tenderness  Patient has some decreased breath sounds anteriorly and posteriorly but no rales rhonchi or wheezes could be appreciated on exam    Abdominal: Soft  Bowel sounds are normal  He exhibits no distension and no mass  There is tenderness (Patient has some some very slight tenderness to the suprapubic region, lower left quadrant of the abdomen  Patient has no rebound rigidity)  There is no rebound and no guarding  Genitourinary: Rectum normal and prostate normal    Musculoskeletal: Normal range of motion  He exhibits edema and tenderness  He exhibits no deformity  Chronic trace edema bilaterally without change  Patient has some flattening to his lumbar curve patient does have some chronic difficulties with ambulation, transfer with his chronic low back pain, tenderness to the SI joints bilaterally, no CVA tenderness to percussion   Lymphadenopathy:     He has no cervical adenopathy  Neurological: He is alert and oriented to person, place, and time  He has normal reflexes  He displays normal reflexes  No cranial nerve deficit  He exhibits normal muscle tone  Coordination normal    Skin: Skin is warm and dry  No rash noted  He is not diaphoretic  No erythema  No pallor  Psychiatric: He has a normal mood and affect  His behavior is normal  Judgment and thought content normal    Nursing note and vitals reviewed

## 2018-07-24 NOTE — ASSESSMENT & PLAN NOTE
Dysuria  Patient states starting approximately 2 days ago he was experiencing difficulty, burning, pain in the groin and the rectum with urination  Patient states he also noted some hematuria, some blood stains in his underwear  He does have a history of kidney stones but he states this pain and discomfort is not similar to that that he is experience in the past that with stones  He denies any fever or chills  He denies any urinary frequency  Urinary dipstick in the office today shows a large amount of glucose but he is watching his sugars at home and making adjustments to his insulin dose  His sugars have not been uncontrolled  On exam patient has some mild tenderness to the left lower quadrant to palpation but no rebound rigidity  Digital rectal examination shows a nontender prostate with no masses  The differential at this point time is an acute urinary tract infection versus kidney stone  His symptoms are not consistent with diverticulitis either with history or exam   Patient's urine will be sent for culture  Patient was placed on Bactrim DS it a 1 pill twice a day for 10 days and he was told if culture shows lack of sensitivity to the Bactrim we will change the antibiotic  He was also told he was aware of any changes in his symptoms or condition

## 2018-07-24 NOTE — ASSESSMENT & PLAN NOTE
Lab Results   Component Value Date    HGBA1C 6 2 03/20/2018       No results for input(s): POCGLU in the last 72 hours  Blood Sugar Average: Last 72 hrs:   diabetes mellitus type 2  Patient does have a slip to check on a blood sugar, hemoglobin A1c prior to his visit in the office next week  Patient is shown excellent control with present treatment including his insulin injections  Patient is made adjustment over period of time in order to correct for elevated blood sugar readings or hypoglycemia

## 2018-07-24 NOTE — ASSESSMENT & PLAN NOTE
Benign essential hypertension  Patient's blood pressure is very slightly elevated today  Patient will continue with present medication and he is coming in for regular appointment in 1 week and this will be recheck

## 2018-07-25 LAB — BACTERIA UR CULT: NORMAL

## 2018-07-26 ENCOUNTER — APPOINTMENT (OUTPATIENT)
Dept: LAB | Age: 58
End: 2018-07-26
Payer: COMMERCIAL

## 2018-07-26 DIAGNOSIS — Z79.4 TYPE 2 DIABETES MELLITUS WITH COMPLICATION, WITH LONG-TERM CURRENT USE OF INSULIN (HCC): ICD-10-CM

## 2018-07-26 DIAGNOSIS — E11.8 TYPE 2 DIABETES MELLITUS WITH COMPLICATION, WITH LONG-TERM CURRENT USE OF INSULIN (HCC): ICD-10-CM

## 2018-07-26 LAB
EST. AVERAGE GLUCOSE BLD GHB EST-MCNC: 134 MG/DL
GLUCOSE P FAST SERPL-MCNC: 94 MG/DL (ref 65–99)
HBA1C MFR BLD: 6.3 % (ref 4.2–6.3)

## 2018-07-26 PROCEDURE — 83036 HEMOGLOBIN GLYCOSYLATED A1C: CPT

## 2018-07-26 PROCEDURE — 36415 COLL VENOUS BLD VENIPUNCTURE: CPT

## 2018-07-26 PROCEDURE — 82947 ASSAY GLUCOSE BLOOD QUANT: CPT

## 2018-08-01 ENCOUNTER — TRANSITIONAL CARE MANAGEMENT (OUTPATIENT)
Dept: INTERNAL MEDICINE CLINIC | Facility: CLINIC | Age: 58
End: 2018-08-01

## 2018-08-03 ENCOUNTER — OFFICE VISIT (OUTPATIENT)
Dept: INTERNAL MEDICINE CLINIC | Facility: CLINIC | Age: 58
End: 2018-08-03
Payer: COMMERCIAL

## 2018-08-03 VITALS
HEIGHT: 66 IN | OXYGEN SATURATION: 97 % | SYSTOLIC BLOOD PRESSURE: 128 MMHG | DIASTOLIC BLOOD PRESSURE: 78 MMHG | WEIGHT: 270 LBS | TEMPERATURE: 98.4 F | HEART RATE: 81 BPM | BODY MASS INDEX: 43.39 KG/M2

## 2018-08-03 DIAGNOSIS — R07.9 CHEST PAIN, UNSPECIFIED TYPE: ICD-10-CM

## 2018-08-03 DIAGNOSIS — IMO0001 CONTROLLED INSULIN DEPENDENT DIABETES MELLITUS: ICD-10-CM

## 2018-08-03 DIAGNOSIS — J43.1 PANLOBULAR EMPHYSEMA (HCC): ICD-10-CM

## 2018-08-03 DIAGNOSIS — E11.9 TYPE 2 DIABETES MELLITUS WITHOUT COMPLICATION, WITH LONG-TERM CURRENT USE OF INSULIN (HCC): Primary | ICD-10-CM

## 2018-08-03 DIAGNOSIS — Z79.4 TYPE 2 DIABETES MELLITUS WITHOUT COMPLICATION, WITH LONG-TERM CURRENT USE OF INSULIN (HCC): Primary | ICD-10-CM

## 2018-08-03 DIAGNOSIS — M54.16 LUMBAR RADICULOPATHY: ICD-10-CM

## 2018-08-03 PROCEDURE — 99495 TRANSJ CARE MGMT MOD F2F 14D: CPT | Performed by: INTERNAL MEDICINE

## 2018-08-03 RX ORDER — METOPROLOL TARTRATE 50 MG/1
50 TABLET, FILM COATED ORAL EVERY 12 HOURS SCHEDULED
COMMUNITY
Start: 2018-07-31 | End: 2020-03-12 | Stop reason: SDUPTHER

## 2018-08-03 NOTE — ASSESSMENT & PLAN NOTE
Lumbar radiculopathy  Patient is still having some low back pain which limits his ability to ambulate  He states the pain since his last visit as lessened somewhat    Patient will continue to follow up with pain management

## 2018-08-03 NOTE — ASSESSMENT & PLAN NOTE
Lab Results   Component Value Date    HGBA1C 6 3 07/26/2018       No results for input(s): POCGLU in the last 72 hours  Blood Sugar Average: Last 72 hrs:   patient continues to make adjustments to his insulin dose in order to control his blood sugars  As noted his hemoglobin A1c that was performed recently is 6 3 showing good control  Patient admits to the fact that he is not always compliant with his diet  We will check a fasting blood sugar, hemoglobin Y7C, basic metabolic profile with his next visit in approximately 3 months  Patient was told if any difficulties either with hypo or hyperglycemia to please call the office for evaluation

## 2018-08-03 NOTE — ASSESSMENT & PLAN NOTE
Chronic obstructive pulmonary disease  Patient states that he is having no difficulties with his breathing other than shortness of breath at baseline  Patient will continue with present inhalers and was told if any acute exacerbations to please call for evaluation immediately

## 2018-08-03 NOTE — PROGRESS NOTES
Assessment/Plan:  Date and time hospital follow up call was made:  8/1/2018 10:27 AM  Hospital care reviewed:  Records reviewed  Patient was hopsitalized at:  [facility]  Date of admission:  7/30/18  Date of discharge:  7/31/18  Diagnosis:  Chest pain  Disposition:  Home  Current symptoms:  None  Scheduled for follow up?:  Yes  I have advised the patient to call PCP with any new or worsening symptoms (please type in name along with any credentials): Meggan Gabriel CMA  Counseling:  Patient  Comments:  Patient appt is scheduled for 8/3/18         Chest pain  Patient is here today for transition of care visit after brief hospitalizations  Patient relates approximately Monday he was at home sitting on the couch watching television and began to have a fluttering sensation in his chest   He states this lasted for approximately 1 hr and when he attempted to go up the stairs to do laundry he then had some chest pain and pressure  Went immediately for evaluation to the emergency room  He does have a history of coronary artery disease with acute myocardial infarction in the past   Patient was ruled out for any kind of myocardial ischemia  Other diagnosis is during her stay was acute renal insufficiency which was felt to be secondary to being on Bactrim  Patient was hydrated and again did not have any episodes of chest pain or pressure  They did give him a diagnosis of chest pain secondary to gastroesophageal reflux disease  He will have an appointment for follow-up with his cardiologist   The only change in medication otherwise was increased doses of metoprolol and his heart rate is stable today with no ectopy and his blood pressure is slightly improved    Controlled insulin dependent diabetes mellitus ([facility] [address] )  Lab Results   Component Value Date    HGBA1C 6 3 07/26/2018       No results for input(s): POCGLU in the last 72 hours      Blood Sugar Average: Last 72 hrs:   patient continues to make adjustments to his insulin dose in order to control his blood sugars  As noted his hemoglobin A1c that was performed recently is 6 3 showing good control  Patient admits to the fact that he is not always compliant with his diet  We will check a fasting blood sugar, hemoglobin J6E, basic metabolic profile with his next visit in approximately 3 months  Patient was told if any difficulties either with hypo or hyperglycemia to please call the office for evaluation  Chronic obstructive pulmonary disease (HCC)  Chronic obstructive pulmonary disease  Patient states that he is having no difficulties with his breathing other than shortness of breath at baseline  Patient will continue with present inhalers and was told if any acute exacerbations to please call for evaluation immediately  Lumbar radiculopathy  Lumbar radiculopathy  Patient is still having some low back pain which limits his ability to ambulate  He states the pain since his last visit as lessened somewhat  Patient will continue to follow up with pain management       Diagnoses and all orders for this visit:    Type 2 diabetes mellitus without complication, with long-term current use of insulin ([address] )  -     Basic metabolic panel; Future  -     Hemoglobin A1C; Future    Chest pain, unspecified type    Controlled insulin dependent diabetes mellitus (HCC)    Panlobular emphysema (HCC)    Lumbar radiculopathy    Other orders  -     metoprolol tartrate (LOPRESSOR) 50 mg tablet; Take 50 mg by mouth every 12 (twelve) hours            Subjective:      Patient ID: Neto Gill is a 62 y o  male  Patient is a 63-year-old male with a history of multiple medical problems including hypertension, hyperlipidemia, coronary artery disease with history of acute myocardial infarction in the past, diabetes mellitus type 2, chronic low back pain,  D  Patient is here today for transition of care visit and routine follow-up visit    Patient states he was admitted recently to the hospital with an episode of heart palpitations and chest pain  Patient was found to have no acute cardiac ischemia and some minor adjustments were made to his medication and patient was discharged to home with follow-up with Cardiology  He did have labs performed prior to the visit today and his sugar is stable with a hemoglobin A1c of 6 3  Patient states he has had no further chest pain or pressure and no palpitations        The following portions of the patient's history were reviewed and updated as appropriate: He  has a past medical history of Abscess of left thigh; Acute myocardial infarction ([facility] [address] ); Anxiety; Arteriosclerotic cardiovascular disease; Asthma; Chest pain; Class 2 obesity with alveolar hypoventilation and body mass index (BMI) of 36 0 to 36 9 in [facility]); COPD (chronic obstructive pulmonary disease) ([facility] [address] ); Coronary artery disease; Diabetes mellitus ([facility] [address] ); Fatty liver; Gastric ulcer; GERD (gastroesophageal reflux disease); Hyperlipidemia; Hypertension; Low back pain; Myocardial infarction ([facility] [address] ); Obstructive sleep apnea; and Spondylosis of lumbar region without myelopathy or radiculopathy  He   Patient Active Problem List    Diagnosis Date Noted    Dysuria 07/24/2018    Lumbar radiculopathy 12/29/2017    Myofascial pain 12/29/2017    Sacroiliitis ([facility] [address] ) 12/29/2017    Controlled insulin dependent diabetes mellitus ([facility] [address] ) 02/22/2017    Chest pain 08/02/2016    Spondylosis of lumbar region without myelopathy or radiculopathy 04/15/2014    Benign essential hypertension 10/16/2012    Arteriosclerotic cardiovascular disease 06/14/2012    Chronic obstructive pulmonary disease ([facility] [address] ) 06/14/2012     He  has a past surgical history that includes Coronary angioplasty with stent (2006, 2007) and Neuroplasty / transposition median nerve at carpal tunnel (Right)    His family history includes Alzheimer's disease in his mother; Cancer in his other; Heart attack in his father; Heart failure in his mother; Hypertension in his family; Other in his family and father; Stroke in his family  He  reports that he has quit smoking  He has never used smokeless tobacco  He reports that he does not drink alcohol or use drugs  Current Outpatient Prescriptions   Medication Sig Dispense Refill    albuterol (PROVENTIL HFA,VENTOLIN HFA) 90 mcg/act inhaler Inhale 90 puffs as needed      aspirin 325 mg tablet Take by mouth      atorvastatin (LIPITOR) 40 mg tablet TAKE 1 TABLET DAILY 90 tablet 5    B-D UF III MINI PEN NEEDLES 31G X 5 MM MISC USE AS DIRECTED 1 each 3    benazepril (LOTENSIN) 10 mg tablet TAKE ONE TABLET BY MOUTH EVERY DAY 30 tablet 5    CHANTIX CONTINUING MONTH SHALONDA 1 MG tablet TAKE 1 TABLET TWICE A DAY 56 tablet 6    clopidogrel (PLAVIX) 75 mg tablet Take 1 tablet (75 mg total) by mouth daily 30 tablet 0    famotidine (PEPCID) 20 mg tablet       fenofibrate (TRICOR) 145 mg tablet Take 1 tablet (145 mg total) by mouth daily 30 tablet 0    HYDROcodone-acetaminophen (VICODIN) 5-300 MG per tablet Take 1 tablet by mouth every 6 (six) hours as needed for moderate pain Max Daily Amount: 4 tablets 112 tablet 0    JARDIANCE 25 MG TABS TAKE 1 TABLET EVERY DAY 30 tablet 5    LANTUS SOLOSTAR 100 units/mL injection pen INJECT 90 UNITS DAILY 10 pen 3    methocarbamol (ROBAXIN) 750 mg tablet Take 1 tablet (750 mg total) by mouth 3 (three) times a day as needed (Muscle spasm) 90 tablet 0    metoprolol tartrate (LOPRESSOR) 50 mg tablet Take 50 mg by mouth every 12 (twelve) hours        nitroglycerin (NITROSTAT) 0 3 mg SL tablet Place under the tongue      ONE TOUCH ULTRA TEST test strip TEST 4 TIMES DAILY 3 each 3    sulfamethoxazole-trimethoprim (BACTRIM DS) 800-160 mg per tablet Take 1 tablet by mouth every 12 (twelve) hours for 10 days 20 tablet 0     No current facility-administered medications for this visit        Current Outpatient Prescriptions on File Prior to Visit   Medication Sig  albuterol (PROVENTIL HFA,VENTOLIN HFA) 90 mcg/act inhaler Inhale 90 puffs as needed    aspirin 325 mg tablet Take by mouth    atorvastatin (LIPITOR) 40 mg tablet TAKE 1 TABLET DAILY    B-D UF III MINI PEN NEEDLES 31G X 5 MM MISC USE AS DIRECTED    benazepril (LOTENSIN) 10 mg tablet TAKE ONE TABLET BY MOUTH EVERY DAY    CHANTIX CONTINUING MONTH SHALONDA 1 MG tablet TAKE 1 TABLET TWICE A DAY    clopidogrel (PLAVIX) 75 mg tablet Take 1 tablet (75 mg total) by mouth daily    famotidine (PEPCID) 20 mg tablet     fenofibrate (TRICOR) 145 mg tablet Take 1 tablet (145 mg total) by mouth daily    HYDROcodone-acetaminophen (VICODIN) 5-300 MG per tablet Take 1 tablet by mouth every 6 (six) hours as needed for moderate pain Max Daily Amount: 4 tablets    JARDIANCE 25 MG TABS TAKE 1 TABLET EVERY DAY    LANTUS SOLOSTAR 100 units/mL injection pen INJECT 90 UNITS DAILY    methocarbamol (ROBAXIN) 750 mg tablet Take 1 tablet (750 mg total) by mouth 3 (three) times a day as needed (Muscle spasm)    nitroglycerin (NITROSTAT) 0 3 mg SL tablet Place under the tongue    ONE TOUCH ULTRA TEST test strip TEST 4 TIMES DAILY    sulfamethoxazole-trimethoprim (BACTRIM DS) 800-160 mg per tablet Take 1 tablet by mouth every 12 (twelve) hours for 10 days    [DISCONTINUED] metoprolol tartrate (LOPRESSOR) 50 mg tablet TAKE 1 TABLET DAILY AS DIRECTED  No current facility-administered medications on file prior to visit  He is allergic to cefaclor and simvastatin       Review of Systems   Constitutional: Negative  HENT: Negative  Eyes: Negative  Respiratory: Negative  Cardiovascular: Positive for chest pain, palpitations and leg swelling (Patient does have some chronic edema to lower extremities but no increase)  Gastrointestinal: Negative  Endocrine: Negative  Genitourinary: Negative  Musculoskeletal: Positive for arthralgias, back pain and gait problem   Negative for joint swelling, myalgias, neck pain and neck stiffness  Skin: Negative  Allergic/Immunologic: Negative  Neurological: Negative for dizziness, tremors, seizures, syncope, facial asymmetry, speech difficulty, weakness, light-headedness, numbness and headaches  Psychiatric/Behavioral: Negative  Objective:      /78   Pulse 81   Temp 98 4 °F (36 9 °C)   Ht 5' 6" (1 676 m)   Wt 122 kg (270 lb)   SpO2 97%   BMI 43 58 kg/m²          Physical Exam   Constitutional: He is oriented to person, place, and time  He appears well-developed  No distress  Patient is a pleasant 40-year-old male with a history of multiple medical problems who is here today for transition of care visit  Patient is awake alert in no acute distress this time having difficulties with transfer secondary to his chronic low back pain   HENT:   Head: Normocephalic and atraumatic  Right Ear: External ear normal    Left Ear: External ear normal    Nose: Nose normal    Mouth/Throat: Oropharynx is clear and moist  No oropharyngeal exudate  Eyes: Conjunctivae and EOM are normal  Pupils are equal, round, and reactive to light  Right eye exhibits no discharge  Left eye exhibits no discharge  No scleral icterus  Neck: Normal range of motion  Neck supple  No JVD present  No tracheal deviation present  No thyromegaly present  Cardiovascular: Normal rate, regular rhythm and normal heart sounds  Pulmonary/Chest: Effort normal and breath sounds normal  No stridor  Abdominal: Soft  Bowel sounds are normal    Musculoskeletal: He exhibits edema, tenderness and deformity  Patient on examination has a flattening of his lumbar curve, tenderness to palpation to the area of both SI joints bilaterally, patient has discomfort with movement, decreased range of motion throughout his lumbar spine with all maneuvers both passively and actively    Patient has no weakness to lower extremities, patient has some mild degenerative changes to the hands and digits, trace edema bilateral lower extremities   Lymphadenopathy:     He has no cervical adenopathy  Neurological: He is alert and oriented to person, place, and time  No cranial nerve deficit  Skin: Skin is warm and dry  No rash noted  He is not diaphoretic  No erythema  No pallor  Psychiatric: He has a normal mood and affect  His behavior is normal  Judgment and thought content normal    Nursing note and vitals reviewed

## 2018-08-03 NOTE — ASSESSMENT & PLAN NOTE
Patient is here today for transition of care visit after brief hospitalizations  Patient relates approximately Monday he was at home sitting on the couch watching television and began to have a fluttering sensation in his chest   He states this lasted for approximately 1 hr and when he attempted to go up the stairs to do laundry he then had some chest pain and pressure  Went immediately for evaluation to the emergency room  He does have a history of coronary artery disease with acute myocardial infarction in the past   Patient was ruled out for any kind of myocardial ischemia  Other diagnosis is during her stay was acute renal insufficiency which was felt to be secondary to being on Bactrim  Patient was hydrated and again did not have any episodes of chest pain or pressure  They did give him a diagnosis of chest pain secondary to gastroesophageal reflux disease    He will have an appointment for follow-up with his cardiologist   The only change in medication otherwise was increased doses of metoprolol and his heart rate is stable today with no ectopy and his blood pressure is slightly improved

## 2018-08-05 DIAGNOSIS — I10 ESSENTIAL HYPERTENSION: ICD-10-CM

## 2018-08-06 RX ORDER — METOPROLOL TARTRATE 50 MG/1
TABLET, FILM COATED ORAL
Qty: 30 TABLET | Refills: 5 | Status: SHIPPED | OUTPATIENT
Start: 2018-08-06 | End: 2018-10-22

## 2018-08-21 ENCOUNTER — TRANSCRIBE ORDERS (OUTPATIENT)
Dept: ADMINISTRATIVE | Age: 58
End: 2018-08-21

## 2018-08-21 ENCOUNTER — APPOINTMENT (OUTPATIENT)
Dept: LAB | Age: 58
End: 2018-08-21
Payer: COMMERCIAL

## 2018-08-21 DIAGNOSIS — N17.9 ACUTE RENAL FAILURE, UNSPECIFIED ACUTE RENAL FAILURE TYPE (HCC): Primary | ICD-10-CM

## 2018-08-21 DIAGNOSIS — I10 ESSENTIAL HYPERTENSION, MALIGNANT: ICD-10-CM

## 2018-08-21 DIAGNOSIS — N17.9 ACUTE RENAL FAILURE, UNSPECIFIED ACUTE RENAL FAILURE TYPE (HCC): ICD-10-CM

## 2018-08-21 LAB
ANION GAP SERPL CALCULATED.3IONS-SCNC: 10 MMOL/L (ref 4–13)
BUN SERPL-MCNC: 27 MG/DL (ref 5–25)
CALCIUM SERPL-MCNC: 9 MG/DL (ref 8.3–10.1)
CHLORIDE SERPL-SCNC: 103 MMOL/L (ref 100–108)
CO2 SERPL-SCNC: 26 MMOL/L (ref 21–32)
CREAT SERPL-MCNC: 1.45 MG/DL (ref 0.6–1.3)
GFR SERPL CREATININE-BSD FRML MDRD: 53 ML/MIN/1.73SQ M
GLUCOSE P FAST SERPL-MCNC: 97 MG/DL (ref 65–99)
POTASSIUM SERPL-SCNC: 4.1 MMOL/L (ref 3.5–5.3)
SODIUM SERPL-SCNC: 139 MMOL/L (ref 136–145)

## 2018-08-21 PROCEDURE — 80048 BASIC METABOLIC PNL TOTAL CA: CPT

## 2018-08-21 PROCEDURE — 36415 COLL VENOUS BLD VENIPUNCTURE: CPT

## 2018-09-19 DIAGNOSIS — M62.838 MUSCLE SPASM: ICD-10-CM

## 2018-09-20 RX ORDER — METHOCARBAMOL 750 MG/1
750 TABLET, FILM COATED ORAL 3 TIMES DAILY PRN
Qty: 90 TABLET | Refills: 2 | Status: SHIPPED | OUTPATIENT
Start: 2018-09-20 | End: 2018-10-22 | Stop reason: SDUPTHER

## 2018-09-20 NOTE — TELEPHONE ENCOUNTER
From: Vale Bills  Sent: 9/19/2018 9:09 PM EDT  Subject: Medication Renewal Request    Vale Bills would like a refill of the following medications:     methocarbamol (ROBAXIN) 750 mg tablet SARAH Valentine    Preferred pharmacy: [facility][id number]

## 2018-10-19 DIAGNOSIS — M54.50 CHRONIC BILATERAL LOW BACK PAIN WITHOUT SCIATICA: ICD-10-CM

## 2018-10-19 DIAGNOSIS — G89.29 CHRONIC BILATERAL LOW BACK PAIN WITHOUT SCIATICA: ICD-10-CM

## 2018-10-19 RX ORDER — HYDROCODONE BITARTRATE AND ACETAMINOPHEN 5; 300 MG/1; MG/1
1 TABLET ORAL EVERY 6 HOURS PRN
Qty: 112 TABLET | Refills: 0 | Status: SHIPPED | OUTPATIENT
Start: 2018-10-19 | End: 2018-12-06 | Stop reason: SDUPTHER

## 2018-10-22 ENCOUNTER — OFFICE VISIT (OUTPATIENT)
Dept: PAIN MEDICINE | Facility: CLINIC | Age: 58
End: 2018-10-22
Payer: COMMERCIAL

## 2018-10-22 VITALS
SYSTOLIC BLOOD PRESSURE: 122 MMHG | TEMPERATURE: 98.8 F | HEIGHT: 66 IN | BODY MASS INDEX: 43.87 KG/M2 | DIASTOLIC BLOOD PRESSURE: 73 MMHG | WEIGHT: 273 LBS

## 2018-10-22 DIAGNOSIS — M54.41 CHRONIC RIGHT-SIDED LOW BACK PAIN WITH RIGHT-SIDED SCIATICA: ICD-10-CM

## 2018-10-22 DIAGNOSIS — G89.29 CHRONIC RIGHT-SIDED LOW BACK PAIN WITH RIGHT-SIDED SCIATICA: ICD-10-CM

## 2018-10-22 DIAGNOSIS — M62.838 MUSCLE SPASM: ICD-10-CM

## 2018-10-22 DIAGNOSIS — M79.18 MYOFASCIAL PAIN: Primary | ICD-10-CM

## 2018-10-22 DIAGNOSIS — M46.1 SACROILIITIS (HCC): ICD-10-CM

## 2018-10-22 DIAGNOSIS — M47.816 SPONDYLOSIS OF LUMBAR REGION WITHOUT MYELOPATHY OR RADICULOPATHY: ICD-10-CM

## 2018-10-22 DIAGNOSIS — M54.16 LUMBAR RADICULOPATHY: ICD-10-CM

## 2018-10-22 PROBLEM — M54.50 LOW BACK PAIN: Status: ACTIVE | Noted: 2018-10-22

## 2018-10-22 PROCEDURE — 99214 OFFICE O/P EST MOD 30 MIN: CPT | Performed by: NURSE PRACTITIONER

## 2018-10-22 RX ORDER — METHOCARBAMOL 750 MG/1
750 TABLET, FILM COATED ORAL 3 TIMES DAILY PRN
Qty: 90 TABLET | Refills: 2 | Status: SHIPPED | OUTPATIENT
Start: 2018-10-22 | End: 2018-12-27 | Stop reason: SDUPTHER

## 2018-10-22 NOTE — PROGRESS NOTES
Assessment:  1  Myofascial pain    2  Lumbar radiculopathy    3  Sacroiliitis ([address] )    4  Chronic right-sided low back pain with right-sided sciatica    5  Muscle spasm    6  Spondylosis of lumbar region without myelopathy or radiculopathy        Plan:  1  Patient will obtain EMG of the RLE that was ordered last office visit  2   I will schedule the patient for trigger point injections into the right lumbar paraspinal musculature  3  Patient may continue with methocarbamol 750 mg every 8 hours p r n   4   Patient may continue with hydrocodone/acetaminophen 5/300 mg as prescribed by PCP  5  May consider MRI of the thoracic spine   6  Patient will continue his home exercise program  7  I will avoid NSAIDs secondary to Plavix  8  The patient will follow-up in 4 weeks for medication prescription refill and reevaluation  The patient was advised to contact the office should their symptoms worsen in the interim  The patient was agreeable and verbalized an understanding  [place] Prescription Drug Monitoring Program report was reviewed and was appropriate     I attest that I have spent at least 25 minutes face to face with the patient and that at least 50% of the time was spent educating and/or discussing the patient's symptoms and treatment plan options  History of Present Illness: The patient is a 62 y o  male last seen on 2/23/18 who presents for a follow up office visit in regards to chronic right-sided lumbosacral back pain that radiates to the right thorax secondary to degenerative disc disease, myofascial pain syndrome, spondylosis and sacroiliitis  The patient had a right SI joint injection with Dr Antoni Muñoz on March 14, 2018 which only provided about 10% relief of his pain complaints  The patient has also undergone bilateral L3-5 medial branch blocks without any significant relief  Per the patient, he has also been worked up for kidney-related causes, however was negative      The patient states that throughout the summer, he was not experiencing much pain, however just recently, the patient's right lumbosacral back pain returned without any inciting event or trauma  He states he has had previous epidural steroid injections from an outside pain specialist in the past for this specific pain with moderate relief  The patient states the pain originates in the right lumbar paraspinal region and radiates around his thorax, but not into the abdomen  He denies any left sided pain, radicular symptoms into the bilateral lower extremities, bowel or bladder incontinence, or saddle anesthesia  He was experiencing pain into the RLE previously, however has become more rare  MRI of the lumbar spine reveals mild spondylitic changes, however no significant central or foraminal narrowing  The patient states he has also had an x-ray of his right hip in the past which was normal      The patient currently rates his pain an 8/10 on the numeric pain rating scale  He states his pain is constant in nature and bothersome throughout the entirety of the day  He characterizes the pain as dull aching, sharp, pressure like and shooting  Current pain medications includes:  Methocarbamol 750 mg 1 tablet t i d  p r n , and hydrocodone/acetaminophen will 5/300 mg 1 tablet every 6 hours as needed as prescribed by his primary care physician    The patient reports that this regimen is providing 20% pain relief  The patient is reporting no side effects from this pain medication regimen  The patient is unable to take NSAIDs secondary to anticoagulation  The patient has tried a TENS unit in the past with moderate relief  He does apply topical heat with mild relief  He has not gotten any relief from over-the-counter topical analgesics such as lidocaine patch      I have personally reviewed and/or updated the patient's past medical history, past surgical history, family history, social history, current medications, allergies, and vital signs today  Review of Systems:    Review of Systems   Respiratory: Negative for shortness of breath  Cardiovascular: Negative for chest pain  Gastrointestinal: Negative for constipation, diarrhea, nausea and vomiting  Musculoskeletal: Positive for gait problem (Difficulty walking, decreased range of motion)  Negative for arthralgias, joint swelling and myalgias  Skin: Negative for rash  Neurological: Negative for dizziness, seizures and weakness  All other systems reviewed and are negative  Past Medical History:   Diagnosis Date    Abscess of left thigh     Acute myocardial infarction ([place] [address] )     Anxiety     Arteriosclerotic cardiovascular disease     Asthma     Chest pain     Class 2 obesity with alveolar hypoventilation and body mass index (BMI) of 36 0 to 36 9 in [facility])     COPD (chronic obstructive pulmonary disease) (HCC)     Coronary artery disease     Diabetes mellitus ([place] [address] )     Fatty liver     Gastric ulcer     GERD (gastroesophageal reflux disease)     Hyperlipidemia     Hypertension     Low back pain     Myocardial infarction ([place] [address] )     Obstructive sleep apnea     Spondylosis of lumbar region without myelopathy or radiculopathy        Past Surgical History:   Procedure Laterality Date    CORONARY ANGIOPLASTY WITH STENT PLACEMENT  2006, 2007    NEUROPLASTY / TRANSPOSITION MEDIAN NERVE AT CARPAL TUNNEL Right        Family History   Problem Relation Age of Onset    Alzheimer's disease Mother     Heart failure Mother     Heart attack Father     Other Father         Cardiac Disorder    Other Family         Back Pain    Hypertension Family     Stroke Family         Complications    Cancer Other        Social History     Occupational History    Not on file       Social History Main Topics    Smoking status: Former Smoker    Smokeless tobacco: Never Used    Alcohol use No    Drug use: No    Sexual activity: Not on file Current Outpatient Prescriptions:     albuterol (PROVENTIL HFA,VENTOLIN HFA) 90 mcg/act inhaler, Inhale 90 puffs as needed, Disp: , Rfl:     aspirin 325 mg tablet, Take by mouth, Disp: , Rfl:     atorvastatin (LIPITOR) 40 mg tablet, TAKE 1 TABLET DAILY, Disp: 90 tablet, Rfl: 5    B-D UF III MINI PEN NEEDLES 31G X 5 MM MISC, USE AS DIRECTED, Disp: 1 each, Rfl: 3    benazepril (LOTENSIN) 10 mg tablet, TAKE ONE TABLET BY MOUTH EVERY DAY, Disp: 30 tablet, Rfl: 5    CHANTIX CONTINUING MONTH SHALONDA 1 MG tablet, TAKE 1 TABLET TWICE A DAY, Disp: 56 tablet, Rfl: 6    clopidogrel (PLAVIX) 75 mg tablet, Take 1 tablet (75 mg total) by mouth daily, Disp: 30 tablet, Rfl: 0    famotidine (PEPCID) 20 mg tablet, Take 20 mg by mouth as needed  , Disp: , Rfl:     fenofibrate (TRICOR) 145 mg tablet, Take 1 tablet (145 mg total) by mouth daily, Disp: 30 tablet, Rfl: 0    HYDROcodone-acetaminophen (VICODIN) 5-300 MG per tablet, Take 1 tablet by mouth every 6 (six) hours as needed for moderate pain Earliest Fill Date: 10/19/18 Max Daily Amount: 4 tablets, Disp: 112 tablet, Rfl: 0    JARDIANCE 25 MG TABS, TAKE 1 TABLET EVERY DAY, Disp: 30 tablet, Rfl: 5    LANTUS SOLOSTAR 100 units/mL injection pen, INJECT 90 UNITS DAILY, Disp: 10 pen, Rfl: 3    methocarbamol (ROBAXIN) 750 mg tablet, Take 1 tablet (750 mg total) by mouth 3 (three) times a day as needed (Muscle spasm), Disp: 90 tablet, Rfl: 2    metoprolol tartrate (LOPRESSOR) 50 mg tablet, Take 50 mg by mouth every 12 (twelve) hours  , Disp: , Rfl:     nitroglycerin (NITROSTAT) 0 3 mg SL tablet, Place under the tongue, Disp: , Rfl:     ONE TOUCH ULTRA TEST test strip, TEST 4 TIMES DAILY, Disp: 3 each, Rfl: 3    Allergies   Allergen Reactions    Cefaclor Shortness Of Breath    Simvastatin Shortness Of Breath       Physical Exam:    /73   Temp 98 8 °F (37 1 °C)   Ht 5' 6" (1 676 m)   Wt 124 kg (273 lb)   BMI 44 06 kg/m²     Constitutional:normal, well developed, well nourished, alert, in no distress and non-toxic and no overt pain behavior  Eyes:anicteric  HEENT:grossly intact  Neck:supple, symmetric, trachea midline and no masses   Pulmonary:even and unlabored  Cardiovascular:No edema or pitting edema present  Skin:Normal without rashes or lesions and well hydrated  Psychiatric:Mood and affect appropriate  Neurologic:Cranial Nerves II-XII grossly intact  Musculoskeletal:antalgic gait  Right lumbar paraspinal tender to palpation  Right SI joint tender to palpation  Bilateral lower extremity strength 5/5 in all muscle groups  Negative straight leg raise bilaterally  Negative Dalton's, AP compression, and Gaenslen's test bilaterally  Negative Stinchfield test bilaterally  Full, pain-free range of motion the bilateral hips  Bilateral greater trochanter bursa nontender to palpation  Negative right CVA tenderness  Imaging  No orders to display     MRI LUMBAR SPINE WITHOUT CONTRAST     INDICATION:  [id number]: Radiculopathy, lumbar region  History taken directly from the electronic ordering system  Low back pain radiating to right leg      COMPARISON:  4/3/2014     TECHNIQUE:  Sagittal T1, sagittal T2, sagittal inversion recovery, axial T1 and axial T2, coronal T2        IMAGE QUALITY:  Diagnostic     FINDINGS:     ALIGNMENT:  Normal alignment of the lumbar spine  No compression fracture  No spondylolysis or spondylolisthesis  No scoliosis      MARROW SIGNAL:  Normal marrow signal is identified within the visualized bony structures  No discrete marrow lesion      DISTAL CORD AND CONUS:  Normal size and signal within the distal cord and conus  The conus ends at the L1 level      PARASPINAL SOFT TISSUES:  Rounded T2 hyperintense lesion in the right kidney measures 3 8 cm, likely a cyst, present previously      SACRUM:  Normal signal within the sacrum   No evidence of insufficiency or stress fracture      LOWER THORACIC DISC SPACES:  Normal disc height and signal   No disc herniation, canal stenosis or foraminal narrowing      LUMBAR DISC SPACES:          L1-L2:  Normal      L2-L3:  Normal      L3-L4:  Tiny left paracentral disc protrusion  No significant central canal or neural foraminal narrowing  This level is similar to the prior study      L4-L5:  There is a diffuse disk bulge  No significant central canal or neural foraminal narrowing  Bilateral facet hypertrophy noted  This level is similar to the prior study       L5-S1:  There is a diffuse disk bulge  No significant central canal or neural foraminal narrowing  Bilateral facet hypertrophy noted  This level is similar to the prior study       IMPRESSION:     Mild spondylotic changes are similar to the previous study    No significant central canal or neural foraminal narrowing        Orders Placed This Encounter   Procedures    EMG 1 Limb

## 2018-10-26 DIAGNOSIS — I25.10 CORONARY ARTERY DISEASE INVOLVING NATIVE HEART, ANGINA PRESENCE UNSPECIFIED, UNSPECIFIED VESSEL OR LESION TYPE: ICD-10-CM

## 2018-10-26 DIAGNOSIS — E78.00 PURE HYPERCHOLESTEROLEMIA: ICD-10-CM

## 2018-10-26 RX ORDER — CLOPIDOGREL BISULFATE 75 MG/1
75 TABLET ORAL DAILY
Qty: 30 TABLET | Refills: 0 | Status: SHIPPED | OUTPATIENT
Start: 2018-10-26 | End: 2018-12-06 | Stop reason: SDUPTHER

## 2018-10-26 RX ORDER — FENOFIBRATE 145 MG/1
145 TABLET, COATED ORAL DAILY
Qty: 30 TABLET | Refills: 0 | Status: SHIPPED | OUTPATIENT
Start: 2018-10-26 | End: 2018-12-06 | Stop reason: SDUPTHER

## 2018-10-30 DIAGNOSIS — Z79.4 TYPE 2 DIABETES MELLITUS WITH COMPLICATION, WITH LONG-TERM CURRENT USE OF INSULIN (HCC): ICD-10-CM

## 2018-10-30 DIAGNOSIS — E11.8 TYPE 2 DIABETES MELLITUS WITH COMPLICATION, WITH LONG-TERM CURRENT USE OF INSULIN (HCC): ICD-10-CM

## 2018-11-01 ENCOUNTER — CLINICAL SUPPORT (OUTPATIENT)
Dept: PAIN MEDICINE | Facility: CLINIC | Age: 58
End: 2018-11-01
Payer: COMMERCIAL

## 2018-11-01 VITALS
SYSTOLIC BLOOD PRESSURE: 141 MMHG | WEIGHT: 273 LBS | BODY MASS INDEX: 43.87 KG/M2 | DIASTOLIC BLOOD PRESSURE: 78 MMHG | HEIGHT: 66 IN | TEMPERATURE: 98.4 F | HEART RATE: 71 BPM

## 2018-11-01 DIAGNOSIS — M79.18 MYOFASCIAL PAIN SYNDROME: Primary | ICD-10-CM

## 2018-11-01 PROCEDURE — 20552 NJX 1/MLT TRIGGER POINT 1/2: CPT | Performed by: ANESTHESIOLOGY

## 2018-11-01 RX ORDER — BUPIVACAINE HYDROCHLORIDE 2.5 MG/ML
10 INJECTION, SOLUTION INFILTRATION; PERINEURAL ONCE
Status: COMPLETED | OUTPATIENT
Start: 2018-11-01 | End: 2018-11-01

## 2018-11-01 RX ORDER — METHYLPREDNISOLONE ACETATE 40 MG/ML
40 INJECTION, SUSPENSION INTRA-ARTICULAR; INTRALESIONAL; INTRAMUSCULAR; SOFT TISSUE ONCE
Status: COMPLETED | OUTPATIENT
Start: 2018-11-01 | End: 2018-11-01

## 2018-11-01 RX ADMIN — METHYLPREDNISOLONE ACETATE 40 MG: 40 INJECTION, SUSPENSION INTRA-ARTICULAR; INTRALESIONAL; INTRAMUSCULAR; SOFT TISSUE at 13:40

## 2018-11-01 RX ADMIN — BUPIVACAINE HYDROCHLORIDE 10 ML: 2.5 INJECTION, SOLUTION INFILTRATION; PERINEURAL at 13:39

## 2018-11-01 NOTE — PROGRESS NOTES
66-year-old male with myofascial pain and chronic right lumbosacral back pain presenting for trigger point injections into the right lumbar paraspinal musculature were right quadratus lumborum muscles  After discussing the risks, benefits, and alternatives to the procedure, the patient expressed understanding and wished to proceed  Procedural pause conducted to verify:  correct patient identity, procedure to be performed and as applicable, correct side and site, correct patient position, and availability of implants, special equipment and special requirements  The appropriate hyper irritable musculoskeletal tender points were marked with a sterile pen, prepped with ChloraPrep and sterilely draped  After appropriate reproduction of pain at each of the tender points marked, a total of 10 mL of 0 25% bupivacaine and 40 mg of Depo-Medrol was injected after negative aspiration of air, CSF, heme, or other bodily fluids with a 1 5 25-gauge needle  A total number of 2 muscle groups were injected  Vital signs were monitored before, during, and after the procedure and remained stable at all points  The patient was discharged with no apparent complications on their own power after an appropriate observation period in the recovery area

## 2018-11-05 DIAGNOSIS — I10 ESSENTIAL HYPERTENSION: ICD-10-CM

## 2018-11-05 DIAGNOSIS — Z79.4 TYPE 2 DIABETES MELLITUS WITH OTHER CIRCULATORY COMPLICATION, WITH LONG-TERM CURRENT USE OF INSULIN (HCC): ICD-10-CM

## 2018-11-05 DIAGNOSIS — E11.59 TYPE 2 DIABETES MELLITUS WITH OTHER CIRCULATORY COMPLICATION, WITH LONG-TERM CURRENT USE OF INSULIN (HCC): ICD-10-CM

## 2018-11-05 PROCEDURE — 4010F ACE/ARB THERAPY RXD/TAKEN: CPT | Performed by: INTERNAL MEDICINE

## 2018-11-05 RX ORDER — BENAZEPRIL HYDROCHLORIDE 10 MG/1
TABLET ORAL
Qty: 30 TABLET | Refills: 5 | Status: SHIPPED | OUTPATIENT
Start: 2018-11-05 | End: 2019-04-25 | Stop reason: SDUPTHER

## 2018-11-05 RX ORDER — INSULIN GLARGINE 100 [IU]/ML
INJECTION, SOLUTION SUBCUTANEOUS
Qty: 5 PEN | Refills: 3 | Status: SHIPPED | OUTPATIENT
Start: 2018-11-05 | End: 2019-08-24 | Stop reason: SDUPTHER

## 2018-11-05 RX ORDER — EMPAGLIFLOZIN 25 MG/1
TABLET, FILM COATED ORAL
Qty: 30 TABLET | Refills: 5 | Status: SHIPPED | OUTPATIENT
Start: 2018-11-05 | End: 2019-04-25 | Stop reason: SDUPTHER

## 2018-11-20 DIAGNOSIS — Z72.0 TOBACCO ABUSE: ICD-10-CM

## 2018-11-21 RX ORDER — VARENICLINE TARTRATE 1 MG/1
TABLET, FILM COATED ORAL
Qty: 56 TABLET | Refills: 6 | Status: SHIPPED | OUTPATIENT
Start: 2018-11-21 | End: 2019-06-02 | Stop reason: SDUPTHER

## 2018-11-26 DIAGNOSIS — E78.00 PURE HYPERCHOLESTEROLEMIA: ICD-10-CM

## 2018-11-26 DIAGNOSIS — I25.10 CORONARY ARTERY DISEASE INVOLVING NATIVE HEART, ANGINA PRESENCE UNSPECIFIED, UNSPECIFIED VESSEL OR LESION TYPE: ICD-10-CM

## 2018-11-26 RX ORDER — CLOPIDOGREL BISULFATE 75 MG/1
TABLET ORAL
Qty: 30 TABLET | Refills: 0 | OUTPATIENT
Start: 2018-11-26

## 2018-11-26 RX ORDER — FENOFIBRATE 145 MG/1
TABLET, COATED ORAL
Qty: 30 TABLET | Refills: 0 | OUTPATIENT
Start: 2018-11-26

## 2018-11-29 PROBLEM — E66.01 MORBID OBESITY WITH BMI OF 40.0-44.9, ADULT (HCC): Status: ACTIVE | Noted: 2018-11-29

## 2018-11-30 ENCOUNTER — APPOINTMENT (OUTPATIENT)
Dept: LAB | Age: 58
End: 2018-11-30
Payer: COMMERCIAL

## 2018-11-30 DIAGNOSIS — Z79.4 TYPE 2 DIABETES MELLITUS WITHOUT COMPLICATION, WITH LONG-TERM CURRENT USE OF INSULIN (HCC): ICD-10-CM

## 2018-11-30 DIAGNOSIS — E78.00 PURE HYPERCHOLESTEROLEMIA: ICD-10-CM

## 2018-11-30 DIAGNOSIS — I25.10 CORONARY ARTERY DISEASE INVOLVING NATIVE HEART, ANGINA PRESENCE UNSPECIFIED, UNSPECIFIED VESSEL OR LESION TYPE: ICD-10-CM

## 2018-11-30 DIAGNOSIS — E11.9 TYPE 2 DIABETES MELLITUS WITHOUT COMPLICATION, WITH LONG-TERM CURRENT USE OF INSULIN (HCC): ICD-10-CM

## 2018-11-30 LAB
ANION GAP SERPL CALCULATED.3IONS-SCNC: 5 MMOL/L (ref 4–13)
BUN SERPL-MCNC: 18 MG/DL (ref 5–25)
CALCIUM SERPL-MCNC: 9.6 MG/DL (ref 8.3–10.1)
CHLORIDE SERPL-SCNC: 105 MMOL/L (ref 100–108)
CO2 SERPL-SCNC: 25 MMOL/L (ref 21–32)
CREAT SERPL-MCNC: 1.36 MG/DL (ref 0.6–1.3)
EST. AVERAGE GLUCOSE BLD GHB EST-MCNC: 137 MG/DL
GFR SERPL CREATININE-BSD FRML MDRD: 57 ML/MIN/1.73SQ M
GLUCOSE P FAST SERPL-MCNC: 84 MG/DL (ref 65–99)
HBA1C MFR BLD: 6.4 % (ref 4.2–6.3)
POTASSIUM SERPL-SCNC: 4.4 MMOL/L (ref 3.5–5.3)
SODIUM SERPL-SCNC: 135 MMOL/L (ref 136–145)

## 2018-11-30 PROCEDURE — 80048 BASIC METABOLIC PNL TOTAL CA: CPT

## 2018-11-30 PROCEDURE — 83036 HEMOGLOBIN GLYCOSYLATED A1C: CPT

## 2018-11-30 PROCEDURE — 36415 COLL VENOUS BLD VENIPUNCTURE: CPT

## 2018-11-30 RX ORDER — FENOFIBRATE 145 MG/1
TABLET, COATED ORAL
Qty: 30 TABLET | Refills: 0 | OUTPATIENT
Start: 2018-11-30

## 2018-11-30 RX ORDER — CLOPIDOGREL BISULFATE 75 MG/1
TABLET ORAL
Qty: 30 TABLET | Refills: 0 | OUTPATIENT
Start: 2018-11-30

## 2018-12-06 ENCOUNTER — OFFICE VISIT (OUTPATIENT)
Dept: INTERNAL MEDICINE CLINIC | Facility: CLINIC | Age: 58
End: 2018-12-06
Payer: COMMERCIAL

## 2018-12-06 VITALS
SYSTOLIC BLOOD PRESSURE: 130 MMHG | HEART RATE: 79 BPM | OXYGEN SATURATION: 98 % | WEIGHT: 276 LBS | TEMPERATURE: 98.3 F | DIASTOLIC BLOOD PRESSURE: 70 MMHG | BODY MASS INDEX: 44.36 KG/M2 | HEIGHT: 66 IN

## 2018-12-06 DIAGNOSIS — G89.29 CHRONIC BILATERAL LOW BACK PAIN WITHOUT SCIATICA: ICD-10-CM

## 2018-12-06 DIAGNOSIS — Z12.11 COLON CANCER SCREENING: ICD-10-CM

## 2018-12-06 DIAGNOSIS — I25.10 ARTERIOSCLEROTIC CARDIOVASCULAR DISEASE: ICD-10-CM

## 2018-12-06 DIAGNOSIS — M79.18 MYOFASCIAL PAIN SYNDROME: ICD-10-CM

## 2018-12-06 DIAGNOSIS — J43.1 PANLOBULAR EMPHYSEMA (HCC): ICD-10-CM

## 2018-12-06 DIAGNOSIS — M47.816 SPONDYLOSIS OF LUMBAR REGION WITHOUT MYELOPATHY OR RADICULOPATHY: ICD-10-CM

## 2018-12-06 DIAGNOSIS — IMO0001 CONTROLLED INSULIN DEPENDENT DIABETES MELLITUS: Primary | ICD-10-CM

## 2018-12-06 DIAGNOSIS — M46.1 SACROILIITIS (HCC): ICD-10-CM

## 2018-12-06 DIAGNOSIS — R30.0 DYSURIA: ICD-10-CM

## 2018-12-06 DIAGNOSIS — M54.50 CHRONIC BILATERAL LOW BACK PAIN WITHOUT SCIATICA: ICD-10-CM

## 2018-12-06 DIAGNOSIS — G89.29 CHRONIC RIGHT-SIDED LOW BACK PAIN WITH RIGHT-SIDED SCIATICA: ICD-10-CM

## 2018-12-06 DIAGNOSIS — I25.10 CORONARY ARTERY DISEASE INVOLVING NATIVE HEART, ANGINA PRESENCE UNSPECIFIED, UNSPECIFIED VESSEL OR LESION TYPE: ICD-10-CM

## 2018-12-06 DIAGNOSIS — Z12.5 PROSTATE CANCER SCREENING: ICD-10-CM

## 2018-12-06 DIAGNOSIS — Z23 NEED FOR INFLUENZA VACCINATION: ICD-10-CM

## 2018-12-06 DIAGNOSIS — E66.01 MORBID OBESITY WITH BMI OF 40.0-44.9, ADULT (HCC): ICD-10-CM

## 2018-12-06 DIAGNOSIS — E78.00 PURE HYPERCHOLESTEROLEMIA: ICD-10-CM

## 2018-12-06 DIAGNOSIS — M54.16 LUMBAR RADICULOPATHY: ICD-10-CM

## 2018-12-06 DIAGNOSIS — R07.9 CHEST PAIN, UNSPECIFIED TYPE: ICD-10-CM

## 2018-12-06 DIAGNOSIS — M54.41 CHRONIC RIGHT-SIDED LOW BACK PAIN WITH RIGHT-SIDED SCIATICA: ICD-10-CM

## 2018-12-06 DIAGNOSIS — I10 BENIGN ESSENTIAL HYPERTENSION: ICD-10-CM

## 2018-12-06 PROCEDURE — 3075F SYST BP GE 130 - 139MM HG: CPT | Performed by: INTERNAL MEDICINE

## 2018-12-06 PROCEDURE — 90682 RIV4 VACC RECOMBINANT DNA IM: CPT

## 2018-12-06 PROCEDURE — 99214 OFFICE O/P EST MOD 30 MIN: CPT | Performed by: INTERNAL MEDICINE

## 2018-12-06 PROCEDURE — 90471 IMMUNIZATION ADMIN: CPT

## 2018-12-06 PROCEDURE — 3008F BODY MASS INDEX DOCD: CPT | Performed by: INTERNAL MEDICINE

## 2018-12-06 PROCEDURE — 3078F DIAST BP <80 MM HG: CPT | Performed by: INTERNAL MEDICINE

## 2018-12-06 RX ORDER — FENOFIBRATE 145 MG/1
145 TABLET, COATED ORAL DAILY
Qty: 30 TABLET | Refills: 2 | Status: SHIPPED | OUTPATIENT
Start: 2018-12-06 | End: 2019-03-02 | Stop reason: SDUPTHER

## 2018-12-06 RX ORDER — HYDROCODONE BITARTRATE AND ACETAMINOPHEN 5; 300 MG/1; MG/1
1 TABLET ORAL EVERY 6 HOURS PRN
Qty: 112 TABLET | Refills: 0 | Status: SHIPPED | OUTPATIENT
Start: 2018-12-06 | End: 2019-02-15 | Stop reason: SDUPTHER

## 2018-12-06 RX ORDER — CLOPIDOGREL BISULFATE 75 MG/1
75 TABLET ORAL DAILY
Qty: 30 TABLET | Refills: 2 | Status: SHIPPED | OUTPATIENT
Start: 2018-12-06 | End: 2019-03-02 | Stop reason: SDUPTHER

## 2018-12-06 NOTE — ASSESSMENT & PLAN NOTE
Blood pressure showing excellent control with present treatment  Patient will continue with present medication and surveillance    We will make adjustments to medication in the future if needed

## 2018-12-06 NOTE — ASSESSMENT & PLAN NOTE
Patient is denying any chest pain or pressure and no increasing shortness of breath with exertion  He does have an upcoming appointment to be seen by Cardiology in the future    We will continue to monitor his cholesterol and he knows to call immediately or proceed to the emergency room if any episodes of chest pain or pressure, increasing shortness of breath

## 2018-12-06 NOTE — ASSESSMENT & PLAN NOTE
Patient has a longstanding history of chronic obstructive pulmonary disease which was points in time difficult to control in the past but now the patient is stable  He is no longer smoking which is been helpful  He continues to uses inhalers directed and will receive an influenza vaccine today    He was told if any acute exacerbations, upper respiratory tract infections to please proceed to the office for immediate evaluation

## 2018-12-06 NOTE — ASSESSMENT & PLAN NOTE
Patient was seen by pain management and did have been numerous therapeutic injections to his lumbar spine to help with his chronic pain

## 2018-12-06 NOTE — ASSESSMENT & PLAN NOTE
Lab Results   Component Value Date    HGBA1C 6 4 (H) 11/30/2018       No results for input(s): POCGLU in the last 72 hours  Blood Sugar Average: Last 72 hrs:   patient remains under excellent control with his diabetes  He admits to the fact that he is not always perfect with his diet he has had no recent significant weight loss  He states a problem was aggravation of his low back pain with reduced exercise capacity  He states now that his back is feeling better he opened to exercise more and hopefully will show some better weight loss  Patient will continue with present treatment and patient was told if any questions about his control of his sugars that he should please call for adjustment of dose of insulin  Diabetic foot exam today was performed showing no neuropathic changes  Trace edema bilateral lower extremities    Patient continues with yearly eye exams

## 2018-12-06 NOTE — PROGRESS NOTES
Assessment/Plan:    Controlled insulin dependent diabetes mellitus ([place] [address] )  Lab Results   Component Value Date    HGBA1C 6 4 (H) 11/30/2018       No results for input(s): POCGLU in the last 72 hours  Blood Sugar Average: Last 72 hrs:   patient remains under excellent control with his diabetes  He admits to the fact that he is not always perfect with his diet he has had no recent significant weight loss  He states a problem was aggravation of his low back pain with reduced exercise capacity  He states now that his back is feeling better he opened to exercise more and hopefully will show some better weight loss  Patient will continue with present treatment and patient was told if any questions about his control of his sugars that he should please call for adjustment of dose of insulin  Diabetic foot exam today was performed showing no neuropathic changes  Trace edema bilateral lower extremities  Patient continues with yearly eye exams    Chronic obstructive pulmonary disease ([place] [address] )  Patient has a longstanding history of chronic obstructive pulmonary disease which was points in time difficult to control in the past but now the patient is stable  He is no longer smoking which is been helpful  He continues to uses inhalers directed and will receive an influenza vaccine today  He was told if any acute exacerbations, upper respiratory tract infections to please proceed to the office for immediate evaluation    Arteriosclerotic cardiovascular disease  Patient is denying any chest pain or pressure and no increasing shortness of breath with exertion  He does have an upcoming appointment to be seen by Cardiology in the future  We will continue to monitor his cholesterol and he knows to call immediately or proceed to the emergency room if any episodes of chest pain or pressure, increasing shortness of breath    Benign essential hypertension  Blood pressure showing excellent control with present treatment    Patient will continue with present medication and surveillance  We will make adjustments to medication in the future if needed    Lumbar radiculopathy  Patient was seen by pain management and did have been numerous therapeutic injections to his lumbar spine to help with his chronic pain       Diagnoses and all orders for this visit:    Controlled insulin dependent diabetes mellitus ([facility] )  -     Comprehensive metabolic panel; Future  -     CBC and differential; Future  -     Lipid panel; Future  -     TSH, 3rd generation with Free T4 reflex; Future  -     Urinalysis with reflex to microscopic; Future  -     Microalbumin / creatinine urine ratio  -     Hemoglobin A1C; Future    Panlobular emphysema (HCC)    Arteriosclerotic cardiovascular disease  -     Lipid panel; Future    Benign essential hypertension  -     Comprehensive metabolic panel; Future  -     CBC and differential; Future  -     Lipid panel; Future  -     TSH, 3rd generation with Free T4 reflex; Future  -     Urinalysis with reflex to microscopic; Future  -     Microalbumin / creatinine urine ratio    Lumbar radiculopathy    Myofascial pain syndrome    Sacroiliitis (HCC)    Spondylosis of lumbar region without myelopathy or radiculopathy    Dysuria    Chest pain, unspecified type    Chronic right-sided low back pain with right-sided sciatica    Morbid obesity with BMI of 40 0-44 9, adult ([place] [address] )  -     Comprehensive metabolic panel; Future  -     CBC and differential; Future    Prostate cancer screening  -     PSA, Total Screen; Future    Colon cancer screening  -     Fecal Globin By Immunochemistry; Future    Chronic bilateral low back pain without sciatica  -     HYDROcodone-acetaminophen (VICODIN) 5-300 MG per tablet;  Take 1 tablet by mouth every 6 (six) hours as needed for moderate pain Max Daily Amount: 4 tablets    Need for influenza vaccination  -     influenza vaccine, [id number], quadrivalent, recombinant, PF, 0 5 mL, for patients 18 yr+ (FLUBLOK) Subjective:      Patient ID: Jsoette Miles is a 62 y o  male  Patient is a 19-year-old male with a history of multiple medical problems as outlined previously  Patient is here today for a routine follow-up  He did have labs performed prior to being seen today we did discuss the results  His sugar continues to be under excellent control  Patient did have an exacerbation recently was low back pain and was seen by pain management and did receive multiple therapeutic injections which did his he states help him dramatically with his chronic pain  He denies any chest pain or pressure and no increasing shortness of breath  He does have an upcoming visit to be seen by Cardiology for further evaluation  The following portions of the patient's history were reviewed and updated as appropriate:   He  has a past medical history of Abscess of left thigh; Acute myocardial infarction ([facility] ); Anxiety; Arteriosclerotic cardiovascular disease; Asthma; Chest pain; Class 2 obesity with alveolar hypoventilation and body mass index (BMI) of 36 0 to 36 9 in adult [facility]); COPD (chronic obstructive pulmonary disease) ([facility] ); Coronary artery disease; Diabetes mellitus ([facility] ); Fatty liver; Gastric ulcer; GERD (gastroesophageal reflux disease); Hyperlipidemia; Hypertension; Low back pain; Myocardial infarction ([facility] ); Obstructive sleep apnea; and Spondylosis of lumbar region without myelopathy or radiculopathy    He   Patient Active Problem List    Diagnosis Date Noted    Morbid obesity with BMI of 40 0-44 9, adult ([facility] ) 11/29/2018    Low back pain 10/22/2018    Dysuria 07/24/2018    Lumbar radiculopathy 12/29/2017    Myofascial pain syndrome 12/29/2017    Sacroiliitis ([facility] ) 12/29/2017    Controlled insulin dependent diabetes mellitus ([facility] ) 02/22/2017    Chest pain 08/02/2016    Spondylosis of lumbar region without myelopathy or radiculopathy 04/15/2014    Benign essential hypertension 10/16/2012    Arteriosclerotic cardiovascular disease 06/14/2012    Chronic obstructive pulmonary disease ([facility] ) 06/14/2012     He  has a past surgical history that includes Coronary angioplasty with stent (2006, 2007) and Neuroplasty / transposition median nerve at carpal tunnel (Right)  His family history includes Alzheimer's disease in his mother; Cancer in his other; Heart attack in his father; Heart failure in his mother; Hypertension in his family; Other in his family and father; Stroke in his family  He  reports that he has quit smoking  He has never used smokeless tobacco  He reports that he does not drink alcohol or use drugs    Current Outpatient Prescriptions   Medication Sig Dispense Refill    albuterol (PROVENTIL HFA,VENTOLIN HFA) 90 mcg/act inhaler Inhale 90 puffs as needed      aspirin 325 mg tablet Take by mouth      atorvastatin (LIPITOR) 40 mg tablet TAKE 1 TABLET DAILY 90 tablet 5    B-D UF III MINI PEN NEEDLES 31G X 5 MM MISC USE AS DIRECTED 1 each 3    benazepril (LOTENSIN) 10 mg tablet TAKE ONE TABLET BY MOUTH EVERY DAY 30 tablet 5    CHANTIX 1 MG tablet TAKE 1 TABLET TWICE A DAY 56 tablet 6    clopidogrel (PLAVIX) 75 mg tablet Take 1 tablet (75 mg total) by mouth daily 30 tablet 0    famotidine (PEPCID) 20 mg tablet Take 20 mg by mouth as needed        fenofibrate (TRICOR) 145 mg tablet Take 1 tablet (145 mg total) by mouth daily 30 tablet 0    HYDROcodone-acetaminophen (VICODIN) 5-300 MG per tablet Take 1 tablet by mouth every 6 (six) hours as needed for moderate pain Max Daily Amount: 4 tablets 112 tablet 0    JARDIANCE 25 MG TABS TAKE 1 TABLET EVERY DAY 30 tablet 5    LANTUS SOLOSTAR 100 units/mL injection pen INJECT 90 UNITS DAILY 5 pen 3    methocarbamol (ROBAXIN) 750 mg tablet Take 1 tablet (750 mg total) by mouth 3 (three) times a day as needed (Muscle spasm) 90 tablet 2    metoprolol tartrate (LOPRESSOR) 50 mg tablet Take 50 mg by mouth every 12 (twelve) hours        nitroglycerin (NITROSTAT) 0 3 mg SL tablet Place under the tongue      ONE TOUCH ULTRA TEST test strip TEST 4 TIMES DAILY 100 each 3     No current facility-administered medications for this visit  Current Outpatient Prescriptions on File Prior to Visit   Medication Sig    albuterol (PROVENTIL HFA,VENTOLIN HFA) 90 mcg/act inhaler Inhale 90 puffs as needed    aspirin 325 mg tablet Take by mouth    atorvastatin (LIPITOR) 40 mg tablet TAKE 1 TABLET DAILY    B-D UF III MINI PEN NEEDLES 31G X 5 MM MISC USE AS DIRECTED    benazepril (LOTENSIN) 10 mg tablet TAKE ONE TABLET BY MOUTH EVERY DAY    CHANTIX 1 MG tablet TAKE 1 TABLET TWICE A DAY    clopidogrel (PLAVIX) 75 mg tablet Take 1 tablet (75 mg total) by mouth daily    famotidine (PEPCID) 20 mg tablet Take 20 mg by mouth as needed      fenofibrate (TRICOR) 145 mg tablet Take 1 tablet (145 mg total) by mouth daily    JARDIANCE 25 MG TABS TAKE 1 TABLET EVERY DAY    LANTUS SOLOSTAR 100 units/mL injection pen INJECT 90 UNITS DAILY    methocarbamol (ROBAXIN) 750 mg tablet Take 1 tablet (750 mg total) by mouth 3 (three) times a day as needed (Muscle spasm)    metoprolol tartrate (LOPRESSOR) 50 mg tablet Take 50 mg by mouth every 12 (twelve) hours      nitroglycerin (NITROSTAT) 0 3 mg SL tablet Place under the tongue    ONE TOUCH ULTRA TEST test strip TEST 4 TIMES DAILY    [DISCONTINUED] HYDROcodone-acetaminophen (VICODIN) 5-300 MG per tablet Take 1 tablet by mouth every 6 (six) hours as needed for moderate pain Earliest Fill Date: 10/19/18 Max Daily Amount: 4 tablets     No current facility-administered medications on file prior to visit  He is allergic to cefaclor and simvastatin       Review of Systems   Constitutional: Negative  HENT: Negative  Eyes: Negative  Respiratory: Positive for shortness of breath (Some chronic shortness of breath with exertion but no increase)  Negative for apnea, cough, choking, chest tightness, wheezing and stridor      Cardiovascular: Positive for leg swelling ( he states he still has some chronic edema to both lower extremities by the end of the day but no increase)  Negative for chest pain and palpitations  Gastrointestinal: Negative  Endocrine: Negative  Genitourinary: Negative  Musculoskeletal: Positive for arthralgias and back pain  Skin: Negative  Allergic/Immunologic: Negative  Neurological: Negative  Hematological: Negative  Psychiatric/Behavioral: Negative  Objective:      /70   Pulse 79   Temp 98 3 °F (36 8 °C)   Ht 5' 6" (1 676 m)   Wt 125 kg (276 lb)   SpO2 98%   BMI 44 55 kg/m²          Physical Exam   Constitutional: He is oriented to person, place, and time  He appears well-developed and well-nourished  No distress  Pleasant, articulate, obese 80-year-old male who is awake alert in no acute distress and oriented x3   HENT:   Head: Normocephalic and atraumatic  Right Ear: External ear normal    Left Ear: External ear normal    Nose: Nose normal    Mouth/Throat: Oropharynx is clear and moist  No oropharyngeal exudate  Eyes: Pupils are equal, round, and reactive to light  Conjunctivae and EOM are normal  Right eye exhibits no discharge  Left eye exhibits no discharge  No scleral icterus  Neck: No JVD present  No tracheal deviation present  No thyromegaly present  Very thick neck with decreased range of motion both passively and actively but no pain with movement   Cardiovascular: Normal rate, regular rhythm, normal heart sounds and intact distal pulses  Exam reveals no gallop and no friction rub  No murmur heard  Pulmonary/Chest: Effort normal  No stridor  No respiratory distress  He has no wheezes  He has no rales  He exhibits no tenderness  Some decreased breath sounds anteriorly and posteriorly but no rales rhonchi or wheezes could be appreciated on exam today   Abdominal: Soft  Bowel sounds are normal  He exhibits no distension and no mass  There is no tenderness   There is no rebound and no guarding  Obese   Musculoskeletal: He exhibits edema and deformity  He exhibits no tenderness  Flattening to his lumbar curve with decreased range of motion both passively and actively but no pain with movement today and no specific areas of tenderness to palpation  Some diffuse arthritic changes to the hands and digits but again no acute lesions   Lymphadenopathy:     He has no cervical adenopathy  Neurological: He is alert and oriented to person, place, and time  He displays abnormal reflex (Absent patella and Achilles deep tendon reflexes bilaterally)  No cranial nerve deficit  He exhibits normal muscle tone  Coordination (Slightly waddling gait, some mild difficulties with transfer especially getting up onto the exam table but no pain with movement,) abnormal    Skin: Skin is warm and dry  No rash noted  He is not diaphoretic  No erythema  No pallor  Psychiatric: He has a normal mood and affect  His behavior is normal  Judgment and thought content normal    Nursing note and vitals reviewed

## 2018-12-27 ENCOUNTER — CLINICAL SUPPORT (OUTPATIENT)
Dept: PAIN MEDICINE | Facility: CLINIC | Age: 58
End: 2018-12-27
Payer: COMMERCIAL

## 2018-12-27 VITALS
WEIGHT: 277 LBS | TEMPERATURE: 98.6 F | HEART RATE: 74 BPM | BODY MASS INDEX: 44.52 KG/M2 | HEIGHT: 66 IN | SYSTOLIC BLOOD PRESSURE: 130 MMHG | DIASTOLIC BLOOD PRESSURE: 75 MMHG

## 2018-12-27 DIAGNOSIS — M46.1 SACROILIITIS (HCC): ICD-10-CM

## 2018-12-27 DIAGNOSIS — M62.838 MUSCLE SPASM: ICD-10-CM

## 2018-12-27 DIAGNOSIS — M79.18 MYOFASCIAL PAIN SYNDROME: Primary | ICD-10-CM

## 2018-12-27 DIAGNOSIS — M47.816 SPONDYLOSIS OF LUMBAR REGION WITHOUT MYELOPATHY OR RADICULOPATHY: ICD-10-CM

## 2018-12-27 PROCEDURE — 99213 OFFICE O/P EST LOW 20 MIN: CPT | Performed by: ANESTHESIOLOGY

## 2018-12-27 RX ORDER — METHOCARBAMOL 750 MG/1
750 TABLET, FILM COATED ORAL 3 TIMES DAILY PRN
Qty: 90 TABLET | Refills: 1 | Status: SHIPPED | OUTPATIENT
Start: 2018-12-27 | End: 2019-03-21 | Stop reason: SDUPTHER

## 2018-12-27 NOTE — PROGRESS NOTES
Assessment:  1  Myofascial pain syndrome    2  Muscle spasm    3  Sacroiliitis ([address] )    4  Spondylosis of lumbar region without myelopathy or radiculopathy        Plan:  54-year-old male returning for follow-up of lumbosacral back pain worse on the right than the left  The patient does have a history of lumbar degenerative disc disease and spondylosis, sacroiliitis, and chronic myofascial pain  The patient recently had trigger point injections into the lumbar paraspinal musculature and quadratus lumborum muscles on the right with about 50% relief of his right-sided low back pain  He has previously had a right SI joint injection and lumbar medial branch blocks which were ineffective  The patient is currently taking methocarbamol 750 mg q 8 hours p r n  and hydrocodone/acetaminophen 5/300 mg 1-2 times daily which is prescribed by his PCP with about 20% relief  He denies any side effects to the medication  The patient's low back pain seems to be mostly myofascial in nature as he did find significant relief with trigger point injections  He may have a facet mediated and sacroiliac mediated component to his back pain, however the patient did not have any relief with SI joint injection or medial branch blocks  He does not have any evidence of radiculopathy or myelopathy on physical exam   1  We will repeat trigger point injections p r n   2  The patient may continue with methocarbamol 750 mg q 8 hours p r n   3  The patient may continue with hydrocodone/acetaminophen as prescribed by PCP  4  Patient will continue with his home exercise program  5  I will follow up the patient in 3 months     My impressions and treatment recommendations were discussed in detail with the patient who verbalized understanding and had no further questions  Discharge instructions were provided  I personally saw and examined the patient and I agree with the above discussed plan of care      No orders of the defined types were placed in this encounter  No orders of the defined types were placed in this encounter  History of Present Illness:    Josette Miles is a 62 y o  male presenting for follow-up of lumbosacral back pain which is mostly right-sided  He denies any radiation into his legs, numbness, paresthesias, or subjective weakness into his legs  He denies any bladder or bowel incontinence or saddle anesthesia  The patient does have a history of lumbar degenerative disc disease and spondylosis, sacroiliitis, as well as chronic myofascial pain  The patient recently had trigger point injections into the lumbar paraspinal musculature and quadratus lumborum muscles on the right with about 50% relief of his right-sided low back pain  He has previously had a right SI joint injection and lumbar medial branch blocks which were ineffective  He has also had epidural steroid injections in the past without relief  The patient is currently taking methocarbamol 750 mg q 8 hours p r n  and hydrocodone/acetaminophen 5/300 mg 1-2 times daily which is prescribed by his PCP with about 20% relief  He denies any side effects to the medication  The patient rates his pain as 7/10 on the pain is not follow any particular pattern throughout the day  The pain is constant and described as dull, aching, sharp, pressure-like, and shooting  The pain is worse with standing, walking, bending, and exercise  The pain is alleviated with sitting, relaxation, and lying down  I have personally reviewed and/or updated the patient's past medical history, past surgical history, family history, social history, current medications, allergies, and vital signs today  Other than as stated above, the patient denies any interval changes in medications, medical condition, mental condition, symptoms, or allergies since the last office visit  Review of Systems:    Review of Systems   Constitutional: Negative for fever and unexpected weight change     HENT: Negative for trouble swallowing  Eyes: Negative for visual disturbance  Respiratory: Negative for shortness of breath and wheezing  Cardiovascular: Negative for chest pain and palpitations  Gastrointestinal: Negative for constipation, diarrhea, nausea and vomiting  Endocrine: Negative for cold intolerance, heat intolerance and polydipsia  Genitourinary: Negative for difficulty urinating and frequency  Musculoskeletal: Positive for gait problem  Negative for arthralgias, joint swelling and myalgias  Decreased ROM   Skin: Negative for rash  Neurological: Negative for dizziness, seizures, syncope, weakness and headaches  Hematological: Does not bruise/bleed easily  Psychiatric/Behavioral: Negative for dysphoric mood  All other systems reviewed and are negative        Patient Active Problem List   Diagnosis    Lumbar radiculopathy    Myofascial pain syndrome    Sacroiliitis (HCC)    Spondylosis of lumbar region without myelopathy or radiculopathy    Arteriosclerotic cardiovascular disease    Chronic obstructive pulmonary disease (HCC)    Controlled insulin dependent diabetes mellitus ([place] [address] )    Benign essential hypertension    Dysuria    Chest pain    Low back pain    Morbid obesity with BMI of 40 0-44 9, adult ([place] [address] )       Past Medical History:   Diagnosis Date    Abscess of left thigh     Acute myocardial infarction ([place] [address] )     Anxiety     Arteriosclerotic cardiovascular disease     Asthma     Chest pain     Class 2 obesity with alveolar hypoventilation and body mass index (BMI) of 36 0 to 36 9 in adult [facility])     [facility]     Coronary artery disease     Diabetes mellitus ([place] [address] )     Fatty liver     Gastric ulcer     GERD (gastroesophageal reflux disease)     Hyperlipidemia     Hypertension     Low back pain     Myocardial infarction ([place] [address] )     Obstructive sleep apnea     Spondylosis of lumbar region without myelopathy or radiculopathy        Past Surgical History:   Procedure Laterality Date    CORONARY ANGIOPLASTY WITH STENT PLACEMENT  2006, 2007    NEUROPLASTY / TRANSPOSITION MEDIAN NERVE AT CARPAL TUNNEL Right        Family History   Problem Relation Age of Onset    Alzheimer's disease Mother     Heart failure Mother     Heart attack Father     Other Father         Cardiac Disorder    Other Family         Back Pain    Hypertension Family     Stroke Family         Complications    Cancer Other        Social History     Occupational History    Not on file       Social History Main Topics    Smoking status: Former Smoker    Smokeless tobacco: Never Used    Alcohol use No    Drug use: No    Sexual activity: Not on file       Current Outpatient Prescriptions on File Prior to Visit   Medication Sig    albuterol (PROVENTIL HFA,VENTOLIN HFA) 90 mcg/act inhaler Inhale 90 puffs as needed    aspirin 325 mg tablet Take by mouth    atorvastatin (LIPITOR) 40 mg tablet TAKE 1 TABLET DAILY    B-D UF III MINI PEN NEEDLES 31G X 5 MM MISC USE AS DIRECTED    benazepril (LOTENSIN) 10 mg tablet TAKE ONE TABLET BY MOUTH EVERY DAY    CHANTIX 1 MG tablet TAKE 1 TABLET TWICE A DAY    clopidogrel (PLAVIX) 75 mg tablet Take 1 tablet (75 mg total) by mouth daily    famotidine (PEPCID) 20 mg tablet Take 20 mg by mouth as needed      fenofibrate (TRICOR) 145 mg tablet Take 1 tablet (145 mg total) by mouth daily    HYDROcodone-acetaminophen (VICODIN) 5-300 MG per tablet Take 1 tablet by mouth every 6 (six) hours as needed for moderate pain Max Daily Amount: 4 tablets    JARDIANCE 25 MG TABS TAKE 1 TABLET EVERY DAY    LANTUS SOLOSTAR 100 units/mL injection pen INJECT 90 UNITS DAILY    methocarbamol (ROBAXIN) 750 mg tablet Take 1 tablet (750 mg total) by mouth 3 (three) times a day as needed (Muscle spasm)    metoprolol tartrate (LOPRESSOR) 50 mg tablet Take 50 mg by mouth every 12 (twelve) hours      nitroglycerin (NITROSTAT) 0 3 mg SL tablet Place under the tongue    ONE TOUCH ULTRA TEST test strip TEST 4 TIMES DAILY     No current facility-administered medications on file prior to visit  Allergies   Allergen Reactions    Cefaclor Shortness Of Breath    Simvastatin Shortness Of Breath       Physical Exam:    /75   Pulse 74   Temp 98 6 °F (37 °C) (Oral)   Ht 5' 6" (1 676 m)   Wt 126 kg (277 lb)   BMI 44 71 kg/m²     Constitutional: overweight  Eyes: anicteric  HEENT: grossly intact  Neck: supple, symmetric, trachea midline and no masses   Pulmonary:even and unlabored  Cardiovascular:No edema or pitting edema present  Skin:Normal without rashes or lesions and well hydrated  Psychiatric:Mood and affect appropriate  Neurologic:Cranial Nerves II-XII grossly intact  Musculoskeletal:normal gait  Right lumbar paraspinals mildly tender to palpation and ropy in texture from L2-L5  Right SI joint minimally tender to palpation  Left SI joint nontender to palpation  Bilateral greater trochanters nontender to palpation  Bilateral lower extremity strength 5/5 in all muscle groups  Negative seated straight leg raise bilaterally      Imaging  Imaging reviewed

## 2019-01-31 ENCOUNTER — HOSPITAL ENCOUNTER (OUTPATIENT)
Dept: NEUROLOGY | Facility: AMBULATORY SURGERY CENTER | Age: 59
Discharge: HOME | End: 2019-01-31
Payer: COMMERCIAL

## 2019-01-31 DIAGNOSIS — M54.41 CHRONIC RIGHT-SIDED LOW BACK PAIN WITH RIGHT-SIDED SCIATICA: ICD-10-CM

## 2019-01-31 DIAGNOSIS — M54.16 LUMBAR RADICULOPATHY: ICD-10-CM

## 2019-01-31 DIAGNOSIS — G89.29 CHRONIC RIGHT-SIDED LOW BACK PAIN WITH RIGHT-SIDED SCIATICA: ICD-10-CM

## 2019-01-31 PROCEDURE — 95909 NRV CNDJ TST 5-6 STUDIES: CPT | Performed by: PSYCHIATRY & NEUROLOGY

## 2019-01-31 PROCEDURE — 95886 MUSC TEST DONE W/N TEST COMP: CPT | Performed by: PSYCHIATRY & NEUROLOGY

## 2019-02-12 DIAGNOSIS — E11.8 TYPE 2 DIABETES MELLITUS WITH COMPLICATION, WITH LONG-TERM CURRENT USE OF INSULIN (HCC): ICD-10-CM

## 2019-02-12 DIAGNOSIS — Z79.4 TYPE 2 DIABETES MELLITUS WITH COMPLICATION, WITH LONG-TERM CURRENT USE OF INSULIN (HCC): ICD-10-CM

## 2019-02-15 DIAGNOSIS — M54.50 CHRONIC BILATERAL LOW BACK PAIN WITHOUT SCIATICA: ICD-10-CM

## 2019-02-15 DIAGNOSIS — G89.29 CHRONIC BILATERAL LOW BACK PAIN WITHOUT SCIATICA: ICD-10-CM

## 2019-02-15 RX ORDER — HYDROCODONE BITARTRATE AND ACETAMINOPHEN 5; 300 MG/1; MG/1
1 TABLET ORAL EVERY 6 HOURS PRN
Qty: 112 TABLET | Refills: 0 | Status: SHIPPED | OUTPATIENT
Start: 2019-02-15 | End: 2019-05-09 | Stop reason: SDUPTHER

## 2019-03-02 DIAGNOSIS — E78.00 PURE HYPERCHOLESTEROLEMIA: ICD-10-CM

## 2019-03-02 DIAGNOSIS — I25.10 CORONARY ARTERY DISEASE INVOLVING NATIVE HEART, ANGINA PRESENCE UNSPECIFIED, UNSPECIFIED VESSEL OR LESION TYPE: ICD-10-CM

## 2019-03-04 RX ORDER — CLOPIDOGREL BISULFATE 75 MG/1
TABLET ORAL
Qty: 30 TABLET | Refills: 2 | Status: SHIPPED | OUTPATIENT
Start: 2019-03-04 | End: 2019-03-05 | Stop reason: SDUPTHER

## 2019-03-04 RX ORDER — FENOFIBRATE 145 MG/1
TABLET, COATED ORAL
Qty: 30 TABLET | Refills: 2 | Status: SHIPPED | OUTPATIENT
Start: 2019-03-04 | End: 2019-03-05 | Stop reason: SDUPTHER

## 2019-03-05 DIAGNOSIS — I25.10 CORONARY ARTERY DISEASE INVOLVING NATIVE HEART, ANGINA PRESENCE UNSPECIFIED, UNSPECIFIED VESSEL OR LESION TYPE: ICD-10-CM

## 2019-03-05 DIAGNOSIS — E78.00 PURE HYPERCHOLESTEROLEMIA: ICD-10-CM

## 2019-03-05 RX ORDER — CLOPIDOGREL BISULFATE 75 MG/1
75 TABLET ORAL DAILY
Qty: 30 TABLET | Refills: 5 | Status: SHIPPED | OUTPATIENT
Start: 2019-03-05 | End: 2019-11-23 | Stop reason: SDUPTHER

## 2019-03-05 RX ORDER — FENOFIBRATE 145 MG/1
145 TABLET, COATED ORAL DAILY
Qty: 30 TABLET | Refills: 5 | Status: SHIPPED | OUTPATIENT
Start: 2019-03-05 | End: 2019-11-23 | Stop reason: SDUPTHER

## 2019-03-21 ENCOUNTER — OFFICE VISIT (OUTPATIENT)
Dept: PAIN MEDICINE | Facility: CLINIC | Age: 59
End: 2019-03-21
Payer: COMMERCIAL

## 2019-03-21 VITALS
HEART RATE: 78 BPM | SYSTOLIC BLOOD PRESSURE: 127 MMHG | DIASTOLIC BLOOD PRESSURE: 74 MMHG | BODY MASS INDEX: 44.36 KG/M2 | HEIGHT: 66 IN | WEIGHT: 276 LBS

## 2019-03-21 DIAGNOSIS — M62.838 MUSCLE SPASM: ICD-10-CM

## 2019-03-21 DIAGNOSIS — M79.18 MYOFASCIAL PAIN SYNDROME: Primary | ICD-10-CM

## 2019-03-21 PROCEDURE — 99214 OFFICE O/P EST MOD 30 MIN: CPT | Performed by: NURSE PRACTITIONER

## 2019-03-21 RX ORDER — METHOCARBAMOL 750 MG/1
750 TABLET, FILM COATED ORAL 3 TIMES DAILY PRN
Qty: 90 TABLET | Refills: 2 | Status: SHIPPED | OUTPATIENT
Start: 2019-03-21 | End: 2019-08-24 | Stop reason: SDUPTHER

## 2019-03-21 NOTE — PROGRESS NOTES
Assessment:  1  Myofascial pain syndrome    2  Muscle spasm        Plan:  1  I will schedule the patient for repeat trigger point injections into the right lumbar paraspinal musculature  2  I offered to trial the patient on a different muscle relaxer, however he is happy with the overall relief he receives from methocarbamol 750mg TID PRN  This medication was refilled at today's office visit  3   Patient may continue Vicodin as prescribed by his PCP  4  I will avoid NSAIDs secondary to anticoagulation  5  The patient will continue with his home exercise program  6  I will follow up with the patient pending results of the trigger point injections or sooner if needed  The patient was advised to contact the office should their symptoms worsen in the interim  The patient was agreeable and verbalized an understanding  [place] Pres[employer] Drug Monitoring Program report was reviewed and was appropriate     History of Present Illness: The patient is a 62 y o  male last seen on 12/27/18 who presents for a follow up office visit in regards to chronic right-sided lumbosacral back pain secondary to myofascial pain syndrome  The patient denies radicular symptoms into the bilateral lower extremities, bowel or bladder incontinence, or saddle anesthesia  The patient has had previous right SI joint injection and lumbar medial branch blocks in the past which were ineffective  He has also had epidural steroid injections in the past without relief  He wrist recently had trigger point injections into the lumbar paraspinal musculature on November 1, 2018 which did provide approximately 50% relief of his right-sided low back pain  MRI of the lumbar spine reveals bilateral facet hypertrophy at L4-5 and L5-S1, however no significant central or foraminal stenosis at any level  EMG of the right lower extremity was within normal limits  The patient currently rates his pain as 7/10 on the numeric pain rating scale  States his pain is intermittent in nature and follows no particular pattern throughout the day  He characterizes the pain as dull aching, sharp and pressure like  Current pain medications includes:  Methocarbamol 750 mg t i d  P r n  myofascial pain and Vicodin 5/300 mg Q 6 hours p r n  Pain as prescribed by his PCP  The patient reports that this regimen is providing 20% pain relief  The patient is reporting no side effects from this pain medication regimen  I have personally reviewed and/or updated the patient's past medical history, past surgical history, family history, social history, current medications, allergies, and vital signs today  Review of Systems:    Review of Systems   Respiratory: Negative for shortness of breath  Cardiovascular: Negative for chest pain  Gastrointestinal: Negative for constipation, diarrhea, nausea and vomiting  Musculoskeletal: Negative for arthralgias, gait problem, joint swelling and myalgias  Skin: Negative for rash  Neurological: Negative for dizziness, seizures and weakness  All other systems reviewed and are negative          Past Medical History:   Diagnosis Date    Abscess of left thigh     Acute [facility] infarction ([facility] )     Anxiety     Arteriosclerotic cardiovascular disease     Asthma     Chest pain     Class 2 obesity with alveolar hypoventilation and body mass index (BMI) of 36 0 to 36 9 in adult [facility])     COPD (chronic obstructive pulmonary disease) (HCC)     Coronary artery disease     [facility] )     Fatty liver     Gastric ulcer     GERD (gastroesophageal reflux disease)     Hyperlipidemia     Hypertension     Low back pain     Myocardial infarction ([facility] )     Obstructive sleep apnea     Spondylosis of lumbar region without myelopathy or radiculopathy        Past Surgical History:   Procedure Laterality Date    CORONARY ANGIOPLASTY WITH STENT PLACEMENT  2006, 2007    NEUROPLASTY / TRANSPOSITION MEDIAN NERVE AT CARPAL TUNNEL Right        Family History   Problem Relation Age of Onset    Alzheimer's disease Mother     Heart failure Mother     Heart attack Father     Other Father         Cardiac Disorder    Other Family         Back Pain    Hypertension Family     Stroke Family         Complications    Cancer Other        Social History     Occupational History    Not on file   Tobacco Use    Smoking status: Former Smoker    Smokeless tobacco: Never Used   Substance and Sexual Activity    Alcohol use: No    Drug use: No    Sexual activity: Not on file         Current Outpatient Medications:     albuterol (PROVENTIL HFA,VENTOLIN HFA) 90 mcg/act inhaler, Inhale 90 puffs as needed, Disp: , Rfl:     aspirin 325 mg tablet, Take by mouth, Disp: , Rfl:     atorvastatin (LIPITOR) 40 mg tablet, TAKE 1 TABLET DAILY, Disp: 90 tablet, Rfl: 5    B-D UF III MINI PEN NEEDLES 31G X 5 MM MISC, USE AS DIRECTED, Disp: 1 each, Rfl: 3    benazepril (LOTENSIN) 10 mg tablet, TAKE ONE TABLET BY MOUTH EVERY DAY, Disp: 30 tablet, Rfl: 5    CHANTIX 1 MG tablet, TAKE 1 TABLET TWICE A DAY, Disp: 56 tablet, Rfl: 6    clopidogrel (PLAVIX) 75 mg tablet, Take 1 tablet (75 mg total) by mouth daily, Disp: 30 tablet, Rfl: 5    famotidine (PEPCID) 20 mg tablet, Take 20 mg by mouth as needed  , Disp: , Rfl:     fenofibrate (TRICOR) 145 mg tablet, Take 1 tablet (145 mg total) by mouth daily, Disp: 30 tablet, Rfl: 5    HYDROcodone-acetaminophen (VICODIN) 5-300 MG per tablet, Take 1 tablet by mouth every 6 (six) hours as needed for moderate painMax Daily Amount: 4 tablets, Disp: 112 tablet, Rfl: 0    JARDIANCE 25 MG TABS, TAKE 1 TABLET EVERY DAY, Disp: 30 tablet, Rfl: 5    LANTUS SOLOSTAR 100 units/mL injection pen, INJECT 90 UNITS DAILY, Disp: 5 pen, Rfl: 3    methocarbamol (ROBAXIN) 750 mg tablet, Take 1 tablet (750 mg total) by mouth 3 (three) times a day as needed (Muscle spasm), Disp: 90 tablet, Rfl: 2    metoprolol tartrate (LOPRESSOR) 50 mg tablet, Take 50 mg by mouth every 12 (twelve) hours  , Disp: , Rfl:     nitroglycerin (NITROSTAT) 0 3 mg SL tablet, Place under the tongue, Disp: , Rfl:     ONE TOUCH ULTRA TEST test strip, TEST 4 TIMES DAILY, Disp: 100 each, Rfl: 3    Allergies   Allergen Reactions    Cefaclor Shortness Of Breath     Other reaction(s): Respiratory Distress    Simvastatin Shortness Of Breath     Other reaction(s): Respiratory Distress       Physical Exam:    /74   Pulse 78   Ht 5' 6" (1 676 m)   Wt 125 kg (276 lb)   BMI 44 55 kg/m²     Constitutional:overweight  Eyes:anicteric  HEENT:grossly intact  Neck:supple, symmetric, trachea midline and no masses   Pulmonary:even and unlabored  Cardiovascular:No edema or pitting edema present  Skin:Normal without rashes or lesions and well hydrated  Psychiatric:Mood and affect appropriate  Neurologic:Cranial Nerves II-XII grossly intact  Musculoskeletal:Slightly antalgic gait, but steady without the use of assistive devices  Right lumbar paraspinal musculature tender to palpation      Imaging  No orders to display     MRI LUMBAR SPINE WITHOUT CONTRAST     INDICATION:  [id number]: Radiculopathy, lumbar region  History taken directly from the electronic ordering system  Low back pain radiating to right leg      COMPARISON:  4/3/2014     TECHNIQUE:  Sagittal T1, sagittal T2, sagittal inversion recovery, axial T1 and axial T2, coronal T2        IMAGE QUALITY:  Diagnostic     FINDINGS:     ALIGNMENT:  Normal alignment of the lumbar spine  No compression fracture  No spondylolysis or spondylolisthesis  No scoliosis      MARROW SIGNAL:  Normal marrow signal is identified within the visualized bony structures  No discrete marrow lesion      DISTAL CORD AND CONUS:  Normal size and signal within the distal cord and conus    The conus ends at the L1 level      PARASPINAL SOFT TISSUES:  Rounded T2 hyperintense lesion in the right kidney measures 3 8 cm, likely a cyst, present previously      SACRUM:  Normal signal within the sacrum  No evidence of insufficiency or stress fracture      LOWER THORACIC DISC SPACES:  Normal disc height and signal   No disc herniation, canal stenosis or foraminal narrowing      LUMBAR DISC SPACES:          L1-L2:  Normal      L2-L3:  Normal      L3-L4:  Tiny left paracentral disc protrusion  No significant central canal or neural foraminal narrowing  This level is similar to the prior study      L4-L5:  There is a diffuse disk bulge  No significant central canal or neural foraminal narrowing  Bilateral facet hypertrophy noted  This level is similar to the prior study       L5-S1:  There is a diffuse disk bulge  No significant central canal or neural foraminal narrowing  Bilateral facet hypertrophy noted  This level is similar to the prior study       IMPRESSION:     Mild spondylotic changes are similar to the previous study  No significant central canal or neural foraminal narrowing  No orders of the defined types were placed in this encounter

## 2019-03-26 NOTE — DISCHARGE INSTRUCTIONS

## 2019-04-03 ENCOUNTER — APPOINTMENT (OUTPATIENT)
Dept: LAB | Age: 59
End: 2019-04-03
Payer: COMMERCIAL

## 2019-04-03 DIAGNOSIS — I10 BENIGN ESSENTIAL HYPERTENSION: ICD-10-CM

## 2019-04-03 DIAGNOSIS — E66.01 MORBID OBESITY WITH BMI OF 40.0-44.9, ADULT (HCC): ICD-10-CM

## 2019-04-03 DIAGNOSIS — Z12.5 PROSTATE CANCER SCREENING: ICD-10-CM

## 2019-04-03 DIAGNOSIS — IMO0001 CONTROLLED INSULIN DEPENDENT DIABETES MELLITUS: ICD-10-CM

## 2019-04-03 DIAGNOSIS — I25.10 ARTERIOSCLEROTIC CARDIOVASCULAR DISEASE: ICD-10-CM

## 2019-04-03 LAB
ALBUMIN SERPL BCP-MCNC: 4 G/DL (ref 3.5–5)
ALP SERPL-CCNC: 59 U/L (ref 46–116)
ALT SERPL W P-5'-P-CCNC: 46 U/L (ref 12–78)
ANION GAP SERPL CALCULATED.3IONS-SCNC: 4 MMOL/L (ref 4–13)
AST SERPL W P-5'-P-CCNC: 29 U/L (ref 5–45)
BACTERIA UR QL AUTO: NORMAL /HPF
BASOPHILS # BLD AUTO: 0.06 THOUSANDS/ΜL (ref 0–0.1)
BASOPHILS NFR BLD AUTO: 1 % (ref 0–1)
BILIRUB SERPL-MCNC: 0.64 MG/DL (ref 0.2–1)
BILIRUB UR QL STRIP: NEGATIVE
BUN SERPL-MCNC: 19 MG/DL (ref 5–25)
CALCIUM SERPL-MCNC: 9.1 MG/DL (ref 8.3–10.1)
CHLORIDE SERPL-SCNC: 107 MMOL/L (ref 100–108)
CHOLEST SERPL-MCNC: 141 MG/DL (ref 50–200)
CLARITY UR: CLEAR
CO2 SERPL-SCNC: 25 MMOL/L (ref 21–32)
COLOR UR: YELLOW
CREAT SERPL-MCNC: 1.32 MG/DL (ref 0.6–1.3)
EOSINOPHIL # BLD AUTO: 0.2 THOUSAND/ΜL (ref 0–0.61)
EOSINOPHIL NFR BLD AUTO: 3 % (ref 0–6)
ERYTHROCYTE [DISTWIDTH] IN BLOOD BY AUTOMATED COUNT: 13.5 % (ref 11.6–15.1)
EST. AVERAGE GLUCOSE BLD GHB EST-MCNC: 123 MG/DL
GFR SERPL CREATININE-BSD FRML MDRD: 59 ML/MIN/1.73SQ M
GLUCOSE P FAST SERPL-MCNC: 81 MG/DL (ref 65–99)
GLUCOSE UR STRIP-MCNC: ABNORMAL MG/DL
HBA1C MFR BLD: 5.9 % (ref 4.2–6.3)
HCT VFR BLD AUTO: 44.8 % (ref 36.5–49.3)
HDLC SERPL-MCNC: 22 MG/DL (ref 40–60)
HGB BLD-MCNC: 14.7 G/DL (ref 12–17)
HGB UR QL STRIP.AUTO: NEGATIVE
HYALINE CASTS #/AREA URNS LPF: NORMAL /LPF
IMM GRANULOCYTES # BLD AUTO: 0.05 THOUSAND/UL (ref 0–0.2)
IMM GRANULOCYTES NFR BLD AUTO: 1 % (ref 0–2)
KETONES UR STRIP-MCNC: NEGATIVE MG/DL
LDLC SERPL CALC-MCNC: 75 MG/DL (ref 0–100)
LEUKOCYTE ESTERASE UR QL STRIP: ABNORMAL
LYMPHOCYTES # BLD AUTO: 1.81 THOUSANDS/ΜL (ref 0.6–4.47)
LYMPHOCYTES NFR BLD AUTO: 23 % (ref 14–44)
MCH RBC QN AUTO: 30.9 PG (ref 26.8–34.3)
MCHC RBC AUTO-ENTMCNC: 32.8 G/DL (ref 31.4–37.4)
MCV RBC AUTO: 94 FL (ref 82–98)
MONOCYTES # BLD AUTO: 0.69 THOUSAND/ΜL (ref 0.17–1.22)
MONOCYTES NFR BLD AUTO: 9 % (ref 4–12)
NEUTROPHILS # BLD AUTO: 5.11 THOUSANDS/ΜL (ref 1.85–7.62)
NEUTS SEG NFR BLD AUTO: 63 % (ref 43–75)
NITRITE UR QL STRIP: NEGATIVE
NON-SQ EPI CELLS URNS QL MICRO: NORMAL /HPF
NONHDLC SERPL-MCNC: 119 MG/DL
NRBC BLD AUTO-RTO: 0 /100 WBCS
PH UR STRIP.AUTO: 6 [PH]
PLATELET # BLD AUTO: 242 THOUSANDS/UL (ref 149–390)
PMV BLD AUTO: 10.6 FL (ref 8.9–12.7)
POTASSIUM SERPL-SCNC: 4.1 MMOL/L (ref 3.5–5.3)
PROT SERPL-MCNC: 7.1 G/DL (ref 6.4–8.2)
PROT UR STRIP-MCNC: NEGATIVE MG/DL
PSA SERPL-MCNC: 0.5 NG/ML (ref 0–4)
RBC # BLD AUTO: 4.75 MILLION/UL (ref 3.88–5.62)
RBC #/AREA URNS AUTO: NORMAL /HPF
SODIUM SERPL-SCNC: 136 MMOL/L (ref 136–145)
SP GR UR STRIP.AUTO: 1.02 (ref 1–1.03)
TRIGL SERPL-MCNC: 220 MG/DL
TSH SERPL DL<=0.05 MIU/L-ACNC: 1.84 UIU/ML (ref 0.36–3.74)
UROBILINOGEN UR QL STRIP.AUTO: 0.2 E.U./DL
WBC # BLD AUTO: 7.92 THOUSAND/UL (ref 4.31–10.16)
WBC #/AREA URNS AUTO: NORMAL /HPF

## 2019-04-03 PROCEDURE — 80061 LIPID PANEL: CPT

## 2019-04-03 PROCEDURE — 85025 COMPLETE CBC W/AUTO DIFF WBC: CPT

## 2019-04-03 PROCEDURE — 83036 HEMOGLOBIN GLYCOSYLATED A1C: CPT

## 2019-04-03 PROCEDURE — 84443 ASSAY THYROID STIM HORMONE: CPT

## 2019-04-03 PROCEDURE — 80053 COMPREHEN METABOLIC PANEL: CPT

## 2019-04-03 PROCEDURE — G0103 PSA SCREENING: HCPCS

## 2019-04-03 PROCEDURE — 36415 COLL VENOUS BLD VENIPUNCTURE: CPT

## 2019-04-03 PROCEDURE — 81001 URINALYSIS AUTO W/SCOPE: CPT

## 2019-04-04 ENCOUNTER — CLINICAL SUPPORT (OUTPATIENT)
Dept: PAIN MEDICINE | Facility: CLINIC | Age: 59
End: 2019-04-04
Payer: COMMERCIAL

## 2019-04-04 VITALS
HEIGHT: 66 IN | TEMPERATURE: 98.3 F | SYSTOLIC BLOOD PRESSURE: 138 MMHG | HEART RATE: 71 BPM | DIASTOLIC BLOOD PRESSURE: 77 MMHG | BODY MASS INDEX: 43.87 KG/M2 | WEIGHT: 273 LBS

## 2019-04-04 DIAGNOSIS — M79.18 MYOFASCIAL PAIN SYNDROME: Primary | ICD-10-CM

## 2019-04-04 PROCEDURE — 20552 NJX 1/MLT TRIGGER POINT 1/2: CPT | Performed by: ANESTHESIOLOGY

## 2019-04-04 RX ORDER — METHYLPREDNISOLONE ACETATE 40 MG/ML
40 INJECTION, SUSPENSION INTRA-ARTICULAR; INTRALESIONAL; INTRAMUSCULAR; SOFT TISSUE ONCE
Status: COMPLETED | OUTPATIENT
Start: 2019-04-04 | End: 2019-04-04

## 2019-04-04 RX ORDER — ROPIVACAINE HYDROCHLORIDE 5 MG/ML
30 INJECTION, SOLUTION EPIDURAL; INFILTRATION; PERINEURAL ONCE
Status: COMPLETED | OUTPATIENT
Start: 2019-04-04 | End: 2019-04-04

## 2019-04-04 RX ADMIN — METHYLPREDNISOLONE ACETATE 40 MG: 40 INJECTION, SUSPENSION INTRA-ARTICULAR; INTRALESIONAL; INTRAMUSCULAR; SOFT TISSUE at 13:25

## 2019-04-04 RX ADMIN — ROPIVACAINE HYDROCHLORIDE 30 ML: 5 INJECTION, SOLUTION EPIDURAL; INFILTRATION; PERINEURAL at 13:24

## 2019-04-10 ENCOUNTER — OFFICE VISIT (OUTPATIENT)
Dept: INTERNAL MEDICINE CLINIC | Facility: CLINIC | Age: 59
End: 2019-04-10
Payer: COMMERCIAL

## 2019-04-10 VITALS
WEIGHT: 273 LBS | DIASTOLIC BLOOD PRESSURE: 70 MMHG | HEIGHT: 66 IN | OXYGEN SATURATION: 98 % | HEART RATE: 71 BPM | SYSTOLIC BLOOD PRESSURE: 114 MMHG | BODY MASS INDEX: 43.87 KG/M2 | TEMPERATURE: 97.5 F

## 2019-04-10 DIAGNOSIS — E66.01 MORBID OBESITY WITH BMI OF 40.0-44.9, ADULT (HCC): ICD-10-CM

## 2019-04-10 DIAGNOSIS — J43.1 PANLOBULAR EMPHYSEMA (HCC): ICD-10-CM

## 2019-04-10 DIAGNOSIS — IMO0001 CONTROLLED INSULIN DEPENDENT DIABETES MELLITUS: Primary | ICD-10-CM

## 2019-04-10 DIAGNOSIS — Z00.00 HEALTHCARE MAINTENANCE: ICD-10-CM

## 2019-04-10 DIAGNOSIS — I25.10 ARTERIOSCLEROTIC CARDIOVASCULAR DISEASE: ICD-10-CM

## 2019-04-10 DIAGNOSIS — M47.816 SPONDYLOSIS OF LUMBAR REGION WITHOUT MYELOPATHY OR RADICULOPATHY: ICD-10-CM

## 2019-04-10 DIAGNOSIS — I10 BENIGN ESSENTIAL HYPERTENSION: ICD-10-CM

## 2019-04-10 PROCEDURE — 99396 PREV VISIT EST AGE 40-64: CPT | Performed by: INTERNAL MEDICINE

## 2019-04-11 ENCOUNTER — TELEPHONE (OUTPATIENT)
Dept: PAIN MEDICINE | Facility: CLINIC | Age: 59
End: 2019-04-11

## 2019-04-25 DIAGNOSIS — I10 ESSENTIAL HYPERTENSION: ICD-10-CM

## 2019-04-25 DIAGNOSIS — Z79.4 TYPE 2 DIABETES MELLITUS WITH OTHER CIRCULATORY COMPLICATION, WITH LONG-TERM CURRENT USE OF INSULIN (HCC): ICD-10-CM

## 2019-04-25 DIAGNOSIS — E11.59 TYPE 2 DIABETES MELLITUS WITH OTHER CIRCULATORY COMPLICATION, WITH LONG-TERM CURRENT USE OF INSULIN (HCC): ICD-10-CM

## 2019-04-25 RX ORDER — EMPAGLIFLOZIN 25 MG/1
TABLET, FILM COATED ORAL
Qty: 30 TABLET | Refills: 5 | Status: SHIPPED | OUTPATIENT
Start: 2019-04-25 | End: 2019-10-13 | Stop reason: SDUPTHER

## 2019-04-25 RX ORDER — BENAZEPRIL HYDROCHLORIDE 10 MG/1
TABLET ORAL
Qty: 30 TABLET | Refills: 5 | Status: SHIPPED | OUTPATIENT
Start: 2019-04-25 | End: 2019-10-11 | Stop reason: SDUPTHER

## 2019-05-09 DIAGNOSIS — G89.29 CHRONIC BILATERAL LOW BACK PAIN WITHOUT SCIATICA: ICD-10-CM

## 2019-05-09 DIAGNOSIS — M54.50 CHRONIC BILATERAL LOW BACK PAIN WITHOUT SCIATICA: ICD-10-CM

## 2019-05-09 RX ORDER — HYDROCODONE BITARTRATE AND ACETAMINOPHEN 5; 300 MG/1; MG/1
1 TABLET ORAL EVERY 6 HOURS PRN
Qty: 112 TABLET | Refills: 0 | Status: SHIPPED | OUTPATIENT
Start: 2019-05-09 | End: 2019-08-26 | Stop reason: SDUPTHER

## 2019-05-30 DIAGNOSIS — E11.8 TYPE 2 DIABETES MELLITUS WITH COMPLICATION, WITH LONG-TERM CURRENT USE OF INSULIN (HCC): ICD-10-CM

## 2019-05-30 DIAGNOSIS — Z79.4 TYPE 2 DIABETES MELLITUS WITH COMPLICATION, WITH LONG-TERM CURRENT USE OF INSULIN (HCC): ICD-10-CM

## 2019-06-02 DIAGNOSIS — Z72.0 TOBACCO ABUSE: ICD-10-CM

## 2019-06-03 RX ORDER — VARENICLINE TARTRATE 1 MG/1
TABLET, FILM COATED ORAL
Qty: 56 TABLET | Refills: 6 | Status: SHIPPED | OUTPATIENT
Start: 2019-06-03 | End: 2019-12-13 | Stop reason: SDUPTHER

## 2019-06-21 DIAGNOSIS — E11.59 TYPE 2 DIABETES MELLITUS WITH OTHER CIRCULATORY COMPLICATION, WITH LONG-TERM CURRENT USE OF INSULIN (HCC): ICD-10-CM

## 2019-06-21 DIAGNOSIS — Z79.4 TYPE 2 DIABETES MELLITUS WITH OTHER CIRCULATORY COMPLICATION, WITH LONG-TERM CURRENT USE OF INSULIN (HCC): ICD-10-CM

## 2019-07-08 ENCOUNTER — CLINICAL SUPPORT (OUTPATIENT)
Dept: PAIN MEDICINE | Facility: CLINIC | Age: 59
End: 2019-07-08
Payer: COMMERCIAL

## 2019-07-08 VITALS
DIASTOLIC BLOOD PRESSURE: 76 MMHG | BODY MASS INDEX: 43.71 KG/M2 | WEIGHT: 272 LBS | HEIGHT: 66 IN | SYSTOLIC BLOOD PRESSURE: 133 MMHG | HEART RATE: 76 BPM | TEMPERATURE: 98.8 F

## 2019-07-08 DIAGNOSIS — M79.18 MYOFASCIAL PAIN SYNDROME: ICD-10-CM

## 2019-07-08 DIAGNOSIS — M54.50 CHRONIC LOW BACK PAIN WITHOUT SCIATICA, UNSPECIFIED BACK PAIN LATERALITY: ICD-10-CM

## 2019-07-08 DIAGNOSIS — M46.1 SACROILIITIS (HCC): ICD-10-CM

## 2019-07-08 DIAGNOSIS — M47.816 SPONDYLOSIS OF LUMBAR REGION WITHOUT MYELOPATHY OR RADICULOPATHY: Primary | ICD-10-CM

## 2019-07-08 DIAGNOSIS — G89.29 CHRONIC LOW BACK PAIN WITHOUT SCIATICA, UNSPECIFIED BACK PAIN LATERALITY: ICD-10-CM

## 2019-07-08 PROCEDURE — 99213 OFFICE O/P EST LOW 20 MIN: CPT | Performed by: ANESTHESIOLOGY

## 2019-07-08 NOTE — PROGRESS NOTES
Assessment  1  Spondylosis of lumbar region without myelopathy or radiculopathy    2  Sacroiliitis ([address] )    3  Myofascial pain syndrome    4  Chronic low back pain without sciatica, unspecified back pain laterality        Plan  63-year-old male returning for follow-up of chronic right-sided lumbosacral back pain which seems to be mostly myofascial in nature  The patient does have lumbar degenerative disc disease and spondylosis  He does have some evidence of sacroiliitis as well  The patient was experiencing some radiating right lower extremity pains, however EMG was within normal limits  The patient is no longer complaining of right lower extremity symptoms  The patient has tried and failed epidural steroid injections, sacroiliac joint injections, and lumbar medial branch blocks  He initially had some relief with trigger point injections which seems to be losing their efficacy  He is currently taking methocarbamol 750 mg q 8 hours p r n  And Vicodin 5/300 mg q 6 hours p r n  Which is prescribed by his PCP which does provide moderate relief  He has tried various other muscle relaxants which cause side effects  He is unable to take NSAIDs secondary to anti-platelet therapy  He has not had any relief with membrane stabilizers in the past   I did discuss with the patient that I do not have any further interventional therapy that the patient would benefit from  The patient has tried and failed numerous medications we have offered him and seems to be currently stable on his current doses of methocarbamol and Vicodin as prescribed by PCP  The patient may follow up with us on a p r n  Basis  My impressions and treatment recommendations were discussed in detail with the patient who verbalized understanding and had no further questions  Discharge instructions were provided  I personally saw and examined the patient and I agree with the above discussed plan of care      No orders of the defined types were placed in this encounter  No orders of the defined types were placed in this encounter  History of Present Illness    Ruy Garcia is a 62 y o  male returning for follow-up of right-sided low back pain that will occasionally radiate into the right buttock  He was having some pain radiating into the right lower extremity which has resolved  He denies any lower extremity pains, numbness, tingling, or weakness  He denies any bladder or bowel incontinence or saddle anesthesia  The patient rates his pain as 6/10 on the pain does not follow any particular pattern throughout the day  The pain is intermittent and described as burning, dull, aching, and sharp  The pain is worse with standing, walking, bending, and exercise  The pain is alleviated with sitting, relaxation, and lying down  The patient is currently taking Vicodin 5/300 mg q 6 hours p r n  Which is prescribed by his PCP  The patient states he usually takes 1/2 tablet b i d  P r n  He also takes methocarbamol 750 mg q 8 hours p r n  He states he gets approximately 40% of relief from his current pain medicine regimen and denies any side effects  I have personally reviewed and/or updated the patient's past medical history, past surgical history, family history, social history, current medications, allergies, and vital signs today  Other than as stated above, the patient denies any interval changes in medications, medical condition, mental condition, symptoms, or allergies since the last office visit  Review of Systems   Constitutional: Negative for fever and unexpected weight change  HENT: Negative for trouble swallowing  Eyes: Negative for visual disturbance  Respiratory: Negative for shortness of breath and wheezing  Cardiovascular: Negative for chest pain and palpitations  Gastrointestinal: Negative for constipation, diarrhea, nausea and vomiting     Endocrine: Negative for cold intolerance, heat intolerance and polydipsia  Genitourinary: Negative for difficulty urinating and frequency  Musculoskeletal: Negative for arthralgias, gait problem, joint swelling and myalgias  Decreased ROM   Skin: Negative for rash  Neurological: Negative for dizziness, seizures, syncope, weakness and headaches  Hematological: Does not bruise/bleed easily  Psychiatric/Behavioral: Negative for dysphoric mood  All other systems reviewed and are negative        Patient Active Problem List   Diagnosis    Lumbar radiculopathy    Myofascial pain syndrome    Sacroiliitis (HCC)    Spondylosis of lumbar region without myelopathy or radiculopathy    Arteriosclerotic cardiovascular disease    Chronic obstructive pulmonary disease (HCC)    Controlled insulin dependent diabetes mellitus ([place] [address] )    Benign essential hypertension    Dysuria    Chest pain    Low back pain    Morbid obesity with BMI of 40 0-44 9, adult ([place] [address] )    Healthcare maintenance       Past Medical History:   Diagnosis Date    Abscess of left thigh     Acute myocardial infarction ([place] [address] )     Anxiety     Arteriosclerotic cardiovascular disease     Asthma     Chest pain     Class 2 obesity with alveolar hypoventilation and body mass index (BMI) of 36 0 to 36 9 in adult [facility])     COPD [facility]ch[facility] pulmonary [facility]) ([facility])     Coronary artery disease     Diabetes mellitus ([place] [address] )     Fatty liver     Gastric ulcer     GERD (gastroesophageal reflux disease)     Hyperlipidemia     Hypertension     Low back pain     Myocardial infarction ([place] [address] )     Obstructive sleep apnea     Spondylosis of lumbar region without myelopathy or radiculopathy        Past Surgical History:   Procedure Laterality Date    CORONARY ANGIOPLASTY WITH STENT PLACEMENT  2006, 2007    NEUROPLASTY / TRANSPOSITION MEDIAN NERVE AT CARPAL TUNNEL Right        Family History   Problem Relation Age of Onset    Alzheimer's disease Mother     Heart failure Mother     Heart attack Father     Other Father         Cardiac Disorder    Other Family         Back Pain    Hypertension Family     Stroke Family         Complications    Cancer Other        Social History     Occupational History    Not on file   Tobacco Use    Smoking status: Former Smoker    Smokeless tobacco: Never Used   Substance and Sexual Activity    Alcohol use: No    Drug use: No    Sexual activity: Not on file       Current Outpatient Medications on File Prior to Visit   Medication Sig    albuterol (PROVENTIL HFA,VENTOLIN HFA) 90 mcg/act inhaler Inhale 90 puffs as needed    aspirin 325 mg tablet Take by mouth    atorvastatin (LIPITOR) 40 mg tablet TAKE 1 TABLET DAILY    benazepril (LOTENSIN) 10 mg tablet TAKE ONE TABLET BY MOUTH EVERY DAY    CHANTIX CONTINUING MONTH HSALONDA 1 MG tablet TAKE 1 TABLET TWICE A DAY    clopidogrel (PLAVIX) 75 mg tablet Take 1 tablet (75 mg total) by mouth daily    famotidine (PEPCID) 20 mg tablet Take 20 mg by mouth as needed      fenofibrate (TRICOR) 145 mg tablet Take 1 tablet (145 mg total) by mouth daily    HYDROcodone-acetaminophen (VICODIN) 5-300 MG per tablet Take 1 tablet by mouth every 6 (six) hours as needed for moderate painMax Daily Amount: 4 tablets    Insulin Pen Needle (B-D UF III MINI PEN NEEDLES) 31G X 5 MM MISC Inject as directed 2 (two) times a day Use as directed    JARDIANCE 25 MG TABS TAKE 1 TABLET EVERY DAY    LANTUS SOLOSTAR 100 units/mL injection pen INJECT 90 UNITS DAILY    methocarbamol (ROBAXIN) 750 mg tablet Take 1 tablet (750 mg total) by mouth 3 (three) times a day as needed (Muscle spasm)    metoprolol tartrate (LOPRESSOR) 50 mg tablet Take 50 mg by mouth every 12 (twelve) hours      nitroglycerin (NITROSTAT) 0 3 mg SL tablet Place under the tongue    ONE TOUCH ULTRA TEST test strip TEST 4 TIMES DAILY     No current facility-administered medications on file prior to visit          Allergies   Allergen Reactions    Cefaclor Shortness Of Breath Other reaction(s): Respiratory Distress    Simvastatin Shortness Of Breath     Other reaction(s): Respiratory Distress       Physical Exam    /76   Pulse 76   Temp 98 8 °F (37 1 °C) (Oral)   Ht 5' 6" (1 676 m)   Wt 123 kg (272 lb)   BMI 43 90 kg/m²     Constitutional: overweight  Eyes: anicteric  HEENT: grossly intact  Neck: supple, symmetric, trachea midline and no masses   Pulmonary:even and unlabored  Cardiovascular:No edema or pitting edema present  Skin:Normal without rashes or lesions and well hydrated  Psychiatric:Mood and affect appropriate  Neurologic:Cranial Nerves II-XII grossly intact  Musculoskeletal:normal gait  Right lumbar paraspinals mildly tender to palpation from L3-S1  Right SI joint minimally tender to palpation  Left SI joint nontender to palpation  Bilateral lower extremity strength 5/5 in all muscle groups  Sensation intact to light touch in L3 thru S1 dermatomes bilaterally  Negative seated straight leg raise bilaterally      Imaging

## 2019-07-22 DIAGNOSIS — E78.01 FAMILIAL HYPERCHOLESTEROLEMIA: ICD-10-CM

## 2019-07-22 RX ORDER — ATORVASTATIN CALCIUM 40 MG/1
TABLET, FILM COATED ORAL
Qty: 30 TABLET | Refills: 17 | Status: SHIPPED | OUTPATIENT
Start: 2019-07-22 | End: 2020-03-12 | Stop reason: SDUPTHER

## 2019-08-13 ENCOUNTER — APPOINTMENT (OUTPATIENT)
Dept: LAB | Age: 59
End: 2019-08-13
Payer: COMMERCIAL

## 2019-08-13 DIAGNOSIS — I10 BENIGN ESSENTIAL HYPERTENSION: ICD-10-CM

## 2019-08-13 DIAGNOSIS — IMO0001 CONTROLLED INSULIN DEPENDENT DIABETES MELLITUS: ICD-10-CM

## 2019-08-13 LAB
ANION GAP SERPL CALCULATED.3IONS-SCNC: 7 MMOL/L (ref 4–13)
BUN SERPL-MCNC: 16 MG/DL (ref 5–25)
CALCIUM SERPL-MCNC: 9.2 MG/DL (ref 8.3–10.1)
CHLORIDE SERPL-SCNC: 111 MMOL/L (ref 100–108)
CO2 SERPL-SCNC: 25 MMOL/L (ref 21–32)
CREAT SERPL-MCNC: 1.37 MG/DL (ref 0.6–1.3)
EST. AVERAGE GLUCOSE BLD GHB EST-MCNC: 120 MG/DL
GFR SERPL CREATININE-BSD FRML MDRD: 56 ML/MIN/1.73SQ M
GLUCOSE P FAST SERPL-MCNC: 74 MG/DL (ref 65–99)
HBA1C MFR BLD: 5.8 % (ref 4.2–6.3)
POTASSIUM SERPL-SCNC: 4.3 MMOL/L (ref 3.5–5.3)
SODIUM SERPL-SCNC: 143 MMOL/L (ref 136–145)

## 2019-08-13 PROCEDURE — 80048 BASIC METABOLIC PNL TOTAL CA: CPT

## 2019-08-13 PROCEDURE — 83036 HEMOGLOBIN GLYCOSYLATED A1C: CPT

## 2019-08-13 PROCEDURE — 36415 COLL VENOUS BLD VENIPUNCTURE: CPT

## 2019-08-21 ENCOUNTER — OFFICE VISIT (OUTPATIENT)
Dept: INTERNAL MEDICINE CLINIC | Facility: CLINIC | Age: 59
End: 2019-08-21
Payer: COMMERCIAL

## 2019-08-21 VITALS
HEIGHT: 66 IN | TEMPERATURE: 98.1 F | BODY MASS INDEX: 44.03 KG/M2 | SYSTOLIC BLOOD PRESSURE: 122 MMHG | WEIGHT: 274 LBS | OXYGEN SATURATION: 96 % | HEART RATE: 80 BPM | DIASTOLIC BLOOD PRESSURE: 70 MMHG

## 2019-08-21 DIAGNOSIS — I25.10 ARTERIOSCLEROTIC CARDIOVASCULAR DISEASE: ICD-10-CM

## 2019-08-21 DIAGNOSIS — J43.1 PANLOBULAR EMPHYSEMA (HCC): ICD-10-CM

## 2019-08-21 DIAGNOSIS — M25.512 CHRONIC LEFT SHOULDER PAIN: Primary | ICD-10-CM

## 2019-08-21 DIAGNOSIS — G89.29 CHRONIC LEFT SHOULDER PAIN: Primary | ICD-10-CM

## 2019-08-21 DIAGNOSIS — I10 BENIGN ESSENTIAL HYPERTENSION: ICD-10-CM

## 2019-08-21 DIAGNOSIS — IMO0001 CONTROLLED INSULIN DEPENDENT DIABETES MELLITUS: ICD-10-CM

## 2019-08-21 DIAGNOSIS — E66.01 MORBID OBESITY WITH BMI OF 40.0-44.9, ADULT (HCC): ICD-10-CM

## 2019-08-21 PROCEDURE — 3008F BODY MASS INDEX DOCD: CPT | Performed by: INTERNAL MEDICINE

## 2019-08-21 PROCEDURE — 99214 OFFICE O/P EST MOD 30 MIN: CPT | Performed by: INTERNAL MEDICINE

## 2019-08-21 PROCEDURE — 3074F SYST BP LT 130 MM HG: CPT | Performed by: INTERNAL MEDICINE

## 2019-08-21 PROCEDURE — 1036F TOBACCO NON-USER: CPT | Performed by: INTERNAL MEDICINE

## 2019-08-21 NOTE — ASSESSMENT & PLAN NOTE
Blood pressure is controlled  Patient will continue present medication and surveillance    We will check on his renal function with his next visit

## 2019-08-21 NOTE — PROGRESS NOTES
Assessment/Plan:    Controlled insulin dependent diabetes mellitus ([facility] )  Lab Results   Component Value Date    HGBA1C 5 8 08/13/2019       No results for input(s): POCGLU in the last 72 hours  Blood Sugar Average: Last 72 hrs:   patient maintains excellent control of his diabetes as noted  Last hemoglobin A1c is 5 8  Patient will continue present medication and surveillance  We will check a fasting blood sugar, hemoglobin A1c with his next visit  Patient is watching his blood sugars very closely at home with his glucose meter makes adjustments to his insulin dose as needed  Patient states that he is due for diabetic eye exam and intends to make that appointment in the near future  Chronic obstructive pulmonary disease ([facility] )  Patient states that he has had no recent problems with increasing shortness of breath  He does have a history of chronic obstructive pulmonary disease and did smoke cigarettes for prolonged period of time in the past   Again he has had no acute exacerbations with his lung disease and for the 1st time that both he and I can recall is lungs are clear to auscultation on exam   Patient knows to call immediately if any problems with his breathing  Continue inhalers as previously  Call for any acute to upper respiratory tract infection, obtain a flu shot when available    Arteriosclerotic cardiovascular disease  History of atherosclerotic cardiovascular disease  He denies any chest pain or pressure and no increasing shortness of breath with exertion  He continues to follow-up with cardiology  He knows to call immediately or go to the emergency room if any cardiac symptoms  Benign essential hypertension  Blood pressure is controlled  Patient will continue present medication and surveillance  We will check on his renal function with his next visit    Morbid obesity with BMI of 40 0-44 9, adult [facility])  Patient has not lost any significant weight since his last visit    He states that he has not made any modification in his diet  We did have a long discussion with the patient today being as he is limited in his exercise capabilities secondary to his chronic low back pain working on his caloric intake and reducing his caloric intake at least to 2000 calories per day  He was told he can count his calories with convenient applications on line and hopefully we can see some appreciable weight loss with his next visit       Diagnoses and all orders for this visit:    Chronic left shoulder pain  -     Ambulatory referral to Orthopedic Surgery; Future    Controlled insulin dependent diabetes mellitus ([facility] )  -     Basic metabolic panel; Future  -     Hemoglobin A1C; Future    Panlobular emphysema (HCC)    Benign essential hypertension  -     Basic metabolic panel; Future    Arteriosclerotic cardiovascular disease    Morbid obesity with BMI of 40 0-44 9, adult ([facility])          Subjective:      Patient ID: Sally Valladares is a 62 y o  male  Patient is a 31-year-old male with a history of multiple medical problems as outlined previously  Patient is here today for routine follow-up  Patient states in general he is doing about the same  He further relates that he is having a bad day today with more muscle skeletal aches and pains than usual   He continues to follow-up with pain management  He denies any chest pain or pressure and no increasing shortness of breath with exertion  He did have lab tests performed prior to the visit today we did discuss the results showing excellent control of his diabetes        The following portions of the patient's history were reviewed and updated as appropriate:   He  has a past medical history of Abscess of left thigh, Acute myocardial infarction ([facility] ), Anxiety, Arteriosclerotic cardiovascular disease, Asthma, Chest pain, Class 2 obesity with alveolar hypoventilation and body mass index (BMI) of 36 0 to 36 9 in adult [facility]), COPD (chronic obstructive pulmonary disease) [facility]), Coronary artery disease, Diabetes mellitus ([facility] ), Fatty liver, Gastric ulcer, GERD (gastroesophageal reflux disease), Hyperlipidemia, Hypertension, Low back pain, Myocardial infarction ([facility] ), Obstructive sleep apnea, and Spondylosis of lumbar region without myelopathy or radiculopathy  He   Patient Active Problem List    Diagnosis Date Noted    Chronic left shoulder pain 08/21/2019    Healthcare maintenance 04/10/2019    Morbid obesity with BMI of 40 0-44 9, adult ([facility] ) 11/29/2018    Low back pain 10/22/2018    Dysuria 07/24/2018    Lumbar radiculopathy 12/29/2017    Myofascial pain syndrome 12/29/2017    Sacroiliitis ([facility] ) 12/29/2017    Controlled insulin dependent diabetes mellitus ([place] [address] ) 02/22/2017    Chest pain 08/02/2016    Spondylosis of lumbar region without myelopathy or radiculopathy 04/15/2014    Benign essential hypertension 10/16/2012    Arteriosclerotic cardiovascular disease 06/14/2012    Chronic obstructive pulmonary disease ([place] [address] ) 06/14/2012     He  has a past surgical history that includes Coronary angioplasty with stent (2006, 2007) and Neuroplasty / transposition median nerve at carpal tunnel (Right)  His family history includes Alzheimer's disease in his mother; Cancer in his other; Dementia in his mother; Heart attack in his father; Heart failure in his mother; Hypertension in his family; Other in his family and father; Stroke in his family  He  reports that he has quit smoking  He smoked 0 00 packs per day for 0 00 years  He has never used smokeless tobacco  He reports that he does not drink alcohol or use drugs    Current Outpatient Medications   Medication Sig Dispense Refill    albuterol (PROVENTIL HFA,VENTOLIN HFA) 90 mcg/act inhaler Inhale 90 puffs as needed      aspirin 325 mg tablet Take by mouth      atorvastatin (LIPITOR) 40 mg tablet TAKE 1 TABLET DAILY 30 tablet 17    benazepril (LOTENSIN) 10 mg tablet TAKE ONE TABLET BY MOUTH EVERY DAY 30 tablet 5    CHANTIX CONTINUING MONTH SHALONDA 1 MG tablet TAKE 1 TABLET TWICE A DAY 56 tablet 6    clopidogrel (PLAVIX) 75 mg tablet Take 1 tablet (75 mg total) by mouth daily 30 tablet 5    famotidine (PEPCID) 20 mg tablet Take 20 mg by mouth as needed        fenofibrate (TRICOR) 145 mg tablet Take 1 tablet (145 mg total) by mouth daily 30 tablet 5    HYDROcodone-acetaminophen (VICODIN) 5-300 MG per tablet Take 1 tablet by mouth every 6 (six) hours as needed for moderate painMax Daily Amount: 4 tablets 112 tablet 0    Insulin Pen Needle (B-D UF III MINI PEN NEEDLES) 31G X 5 MM MISC Inject as directed 2 (two) times a day Use as directed 100 each 5    JARDIANCE 25 MG TABS TAKE 1 TABLET EVERY DAY 30 tablet 5    LANTUS SOLOSTAR 100 units/mL injection pen INJECT 90 UNITS DAILY 5 pen 3    methocarbamol (ROBAXIN) 750 mg tablet Take 1 tablet (750 mg total) by mouth 3 (three) times a day as needed (Muscle spasm) 90 tablet 2    metoprolol tartrate (LOPRESSOR) 50 mg tablet Take 50 mg by mouth every 12 (twelve) hours        nitroglycerin (NITROSTAT) 0 3 mg SL tablet Place under the tongue      ONE TOUCH ULTRA TEST test strip TEST 4 TIMES DAILY 100 each 3     No current facility-administered medications for this visit        Current Outpatient Medications on File Prior to Visit   Medication Sig    albuterol (PROVENTIL HFA,VENTOLIN HFA) 90 mcg/act inhaler Inhale 90 puffs as needed    aspirin 325 mg tablet Take by mouth    atorvastatin (LIPITOR) 40 mg tablet TAKE 1 TABLET DAILY    benazepril (LOTENSIN) 10 mg tablet TAKE ONE TABLET BY MOUTH EVERY DAY    CHANTIX CONTINUING MONTH SHALONDA 1 MG tablet TAKE 1 TABLET TWICE A DAY    clopidogrel (PLAVIX) 75 mg tablet Take 1 tablet (75 mg total) by mouth daily    famotidine (PEPCID) 20 mg tablet Take 20 mg by mouth as needed      fenofibrate (TRICOR) 145 mg tablet Take 1 tablet (145 mg total) by mouth daily    HYDROcodone-acetaminophen (VICODIN) 5-300 MG per tablet Take 1 tablet by mouth every 6 (six) hours as needed for moderate painMax Daily Amount: 4 tablets    Insulin Pen Needle (B-D UF III MINI PEN NEEDLES) 31G X 5 MM MISC Inject as directed 2 (two) times a day Use as directed    JARDIANCE 25 MG TABS TAKE 1 TABLET EVERY DAY    LANTUS SOLOSTAR 100 units/mL injection pen INJECT 90 UNITS DAILY    methocarbamol (ROBAXIN) 750 mg tablet Take 1 tablet (750 mg total) by mouth 3 (three) times a day as needed (Muscle spasm)    metoprolol tartrate (LOPRESSOR) 50 mg tablet Take 50 mg by mouth every 12 (twelve) hours      nitroglycerin (NITROSTAT) 0 3 mg SL tablet Place under the tongue    ONE TOUCH ULTRA TEST test strip TEST 4 TIMES DAILY     No current facility-administered medications on file prior to visit  He is allergic to cefaclor and simvastatin       Review of Systems   Constitutional: Negative  HENT: Negative  Eyes: Negative  Respiratory: Negative  Cardiovascular: Negative  Gastrointestinal: Negative  Endocrine: Negative  Genitourinary: Negative  Musculoskeletal: Positive for arthralgias ( complaining of some increase and arthritic pain in his left shoulder) and back pain (Patient admits to some increasing discomfort today both in his shoulder girdle but also low back  )  Negative for gait problem, joint swelling, myalgias, neck pain and neck stiffness  Skin: Negative  Allergic/Immunologic: Negative  Neurological: Negative  Hematological: Negative  Psychiatric/Behavioral: Negative  Objective:      /70 (BP Location: Left arm, Patient Position: Sitting, Cuff Size: Adult)   Pulse 80   Temp 98 1 °F (36 7 °C) (Tympanic)   Ht 5' 6" (1 676 m)   Wt 124 kg (274 lb)   SpO2 96%   BMI 44 22 kg/m²          Physical Exam   Constitutional: He is oriented to person, place, and time  He appears well-developed and well-nourished  No distress     Pleasant 59-year-old male who is awake alert no acute distress oriented x3, obese   HENT:   Head: Normocephalic  Right Ear: External ear normal    Left Ear: External ear normal    Nose: Nose normal    Mouth/Throat: Oropharynx is clear and moist  No oropharyngeal exudate  Crowding of his oral airway   Eyes: Pupils are equal, round, and reactive to light  Conjunctivae and EOM are normal  Right eye exhibits no discharge  Left eye exhibits no discharge  No scleral icterus  Neck: Normal range of motion  Neck supple  No JVD present  No tracheal deviation present  No thyromegaly present  Cardiovascular: Normal rate, regular rhythm and normal heart sounds  Exam reveals no gallop and no friction rub  No murmur heard  Pulmonary/Chest: Effort normal  No stridor  No respiratory distress  He has no wheezes  He has no rales  He exhibits no tenderness  Decreased breath sounds anteriorly and posteriorly but lungs were clear to auscultation on exam   Abdominal: Soft  Bowel sounds are normal  He exhibits no distension and no mass  There is no tenderness  There is no rebound and no guarding  No hernia  Obese   Musculoskeletal: He exhibits edema, tenderness and deformity  On evaluation of his left shoulder patient does have a decreased range of motion throughout with all movements, some weakness against resistance with his left shoulder girdle, tenderness the AC joint  Patient has diffuse muscle spasm to the cervical, upper thoracic spine, decreased range of motion to the lumbar spine with all movements and maneuvers, chronic +1 edema lower extremities   Lymphadenopathy:     He has no cervical adenopathy  Neurological: He is alert and oriented to person, place, and time  He displays normal reflexes  No cranial nerve deficit or sensory deficit  He exhibits normal muscle tone  Coordination normal    Skin: Skin is warm and dry  Capillary refill takes less than 2 seconds  No rash noted  He is not diaphoretic  No erythema  No pallor  Psychiatric: He has a normal mood and affect   His behavior is normal  Judgment and thought content normal    Nursing note and vitals reviewed

## 2019-08-21 NOTE — ASSESSMENT & PLAN NOTE
Patient states that he is having some increased discomfort in his left shoulder  Patient does have decreased range of motion to the left shoulder girdle, exquisite tenderness to palpation to the [facility] joint on the left  Patient states that he does not wish to be seen by physical therapist and we will arrange for the patient be seen by Orthopedics for further evaluation    Patient may benefit from a therapeutic injection to the [facility] joint on the left and then hopefully could tolerate physical therapy much better

## 2019-08-21 NOTE — ASSESSMENT & PLAN NOTE
History of atherosclerotic cardiovascular disease  He denies any chest pain or pressure and no increasing shortness of breath with exertion  He continues to follow-up with cardiology  He knows to call immediately or go to the emergency room if any cardiac symptoms

## 2019-08-21 NOTE — ASSESSMENT & PLAN NOTE
Patient has not lost any significant weight since his last visit  He states that he has not made any modification in his diet  We did have a long discussion with the patient today being as he is limited in his exercise capabilities secondary to his chronic low back pain working on his caloric intake and reducing his caloric intake at least to 2000 calories per day    He was told he can count his calories with convenient applications on line and hopefully we can see some appreciable weight loss with his next visit

## 2019-08-21 NOTE — ASSESSMENT & PLAN NOTE
Lab Results   Component Value Date    HGBA1C 5 8 08/13/2019       No results for input(s): POCGLU in the last 72 hours  Blood Sugar Average: Last 72 hrs:   patient maintains excellent control of his diabetes as noted  Last hemoglobin A1c is 5 8  Patient will continue present medication and surveillance  We will check a fasting blood sugar, hemoglobin A1c with his next visit  Patient is watching his blood sugars very closely at home with his glucose meter makes adjustments to his insulin dose as needed  Patient states that he is due for diabetic eye exam and intends to make that appointment in the near future

## 2019-08-21 NOTE — ASSESSMENT & PLAN NOTE
Patient states that he has had no recent problems with increasing shortness of breath  He does have a history of chronic obstructive pulmonary disease and did smoke cigarettes for prolonged period of time in the past   Again he has had no acute exacerbations with his lung disease and for the 1st time that both he and I can recall is lungs are clear to auscultation on exam   Patient knows to call immediately if any problems with his breathing  Continue inhalers as previously    Call for any acute to upper respiratory tract infection, obtain a flu shot when available

## 2019-08-24 DIAGNOSIS — M62.838 MUSCLE SPASM: ICD-10-CM

## 2019-08-24 DIAGNOSIS — E11.59 TYPE 2 DIABETES MELLITUS WITH OTHER CIRCULATORY COMPLICATION, WITH LONG-TERM CURRENT USE OF INSULIN (HCC): ICD-10-CM

## 2019-08-24 DIAGNOSIS — Z79.4 TYPE 2 DIABETES MELLITUS WITH OTHER CIRCULATORY COMPLICATION, WITH LONG-TERM CURRENT USE OF INSULIN (HCC): ICD-10-CM

## 2019-08-26 DIAGNOSIS — G89.29 CHRONIC BILATERAL LOW BACK PAIN WITHOUT SCIATICA: ICD-10-CM

## 2019-08-26 DIAGNOSIS — M54.50 CHRONIC BILATERAL LOW BACK PAIN WITHOUT SCIATICA: ICD-10-CM

## 2019-08-26 RX ORDER — METHOCARBAMOL 750 MG/1
750 TABLET, FILM COATED ORAL 3 TIMES DAILY PRN
Qty: 90 TABLET | Refills: 0 | Status: SHIPPED | OUTPATIENT
Start: 2019-08-26 | End: 2020-02-18 | Stop reason: SDUPTHER

## 2019-08-26 RX ORDER — HYDROCODONE BITARTRATE AND ACETAMINOPHEN 5; 300 MG/1; MG/1
1 TABLET ORAL EVERY 6 HOURS PRN
Qty: 112 TABLET | Refills: 0 | Status: SHIPPED | OUTPATIENT
Start: 2019-08-26 | End: 2019-12-06 | Stop reason: SDUPTHER

## 2019-08-26 RX ORDER — INSULIN GLARGINE 100 [IU]/ML
INJECTION, SOLUTION SUBCUTANEOUS
Qty: 30 PEN | Refills: 3 | Status: SHIPPED | OUTPATIENT
Start: 2019-08-26 | End: 2019-12-15 | Stop reason: SDUPTHER

## 2019-08-26 NOTE — TELEPHONE ENCOUNTER
Information noted. Rx updated and e-prescribed to pharmacy.   Pt returning call and can be reached at [phone]

## 2019-08-26 NOTE — TELEPHONE ENCOUNTER
**BTH pt**    S/w pt, he is requesting a RF of Methocarbamol 750 mg TID PRN  Pt said he is doing good on current dose and denies s/e  Pt said he has enough for today  Confirmed pharmacy

## 2019-08-28 DIAGNOSIS — M54.50 CHRONIC BILATERAL LOW BACK PAIN WITHOUT SCIATICA: ICD-10-CM

## 2019-08-28 DIAGNOSIS — G89.29 CHRONIC BILATERAL LOW BACK PAIN WITHOUT SCIATICA: ICD-10-CM

## 2019-08-28 RX ORDER — HYDROCODONE BITARTRATE AND ACETAMINOPHEN 5; 300 MG/1; MG/1
1 TABLET ORAL EVERY 6 HOURS PRN
Qty: 112 TABLET | Refills: 0 | Status: CANCELLED | OUTPATIENT
Start: 2019-08-28

## 2019-09-03 ENCOUNTER — OFFICE VISIT (OUTPATIENT)
Dept: OBGYN CLINIC | Facility: OTHER | Age: 59
End: 2019-09-03
Payer: COMMERCIAL

## 2019-09-03 ENCOUNTER — APPOINTMENT (OUTPATIENT)
Dept: RADIOLOGY | Facility: OTHER | Age: 59
End: 2019-09-03
Payer: COMMERCIAL

## 2019-09-03 VITALS
HEIGHT: 66 IN | DIASTOLIC BLOOD PRESSURE: 79 MMHG | SYSTOLIC BLOOD PRESSURE: 127 MMHG | BODY MASS INDEX: 44.22 KG/M2 | HEART RATE: 73 BPM

## 2019-09-03 DIAGNOSIS — M25.512 CHRONIC LEFT SHOULDER PAIN: ICD-10-CM

## 2019-09-03 DIAGNOSIS — M75.02 ADHESIVE CAPSULITIS OF LEFT SHOULDER: Primary | ICD-10-CM

## 2019-09-03 DIAGNOSIS — G89.29 CHRONIC LEFT SHOULDER PAIN: ICD-10-CM

## 2019-09-03 PROCEDURE — 99204 OFFICE O/P NEW MOD 45 MIN: CPT | Performed by: FAMILY MEDICINE

## 2019-09-03 PROCEDURE — 73030 X-RAY EXAM OF SHOULDER: CPT

## 2019-09-03 NOTE — PATIENT INSTRUCTIONS
Left shoulder adhesive capsulitis:  Will schedule for ultrasound-guided steroid injection on Friday  Will refer to physical therapy    Adhesive Capsulitis   WHAT YOU NEED TO KNOW:   Adhesive capsulitis happens when tissues in your shoulder tighten and swell  The condition is often called frozen shoulder because the swollen tissues cause pain and decrease your shoulder movement  DISCHARGE INSTRUCTIONS:   Medicines:   · Pain medicine: You may be given medicine to take away or decrease pain  Do not wait until the pain is severe before you take your medicine  · NSAIDs  help decrease swelling and pain or fever  This medicine is available with or without a doctor's order  NSAIDs can cause stomach bleeding or kidney problems in certain people  If you take blood thinner medicine, always ask your healthcare provider if NSAIDs are safe for you  Always read the medicine label and follow directions  · Take your medicine as directed  Contact your healthcare provider if you think your medicine is not helping or if you have side effects  Tell him of her if you are allergic to any medicine  Keep a list of the medicines, vitamins, and herbs you take  Include the amounts, and when and why you take them  Bring the list or the pill bottles to follow-up visits  Carry your medicine list with you in case of an emergency  Physical therapy:  A physical therapist teaches you exercises to help improve movement and strength, and to decrease pain  Stretches to do at home: Your healthcare provider may suggest stretches to help improve your symptoms  Ask your healthcare provider which stretches are best and how to do them  The following stretches may be suggested:  · Doorway stretch:   a doorway with your painful arm bent at the elbow  Place your hand on the door frame and turn your body away from the door frame  Hold this position for 30 seconds  Relax and repeat       · Forward stretch:  Lie on your back with your legs straight out  Use your healthy arm to push your painful arm up over your head until you feel a gentle stretch  Hold this position for 15 seconds  Slowly lower your arm to the starting position  Relax and repeat  · Crossover stretch:  Use your healthy arm to gently pull your painful arm across your chest just below your chin  Pull until you feel a gentle stretch  Hold this position for 30 seconds  Relax and repeat  Ice or heat: Ice or heat on your shoulder may help decrease your pain and improve shoulder movement  Apply ice to help ease pain after stretching  Put heat on your shoulder to help relax your muscles  Use ice or heat as directed  Follow up with your healthcare provider as directed:  Write down your questions so you remember to ask them during your visits  Contact your healthcare provider if:   · You have worse pain and stiffness in your shoulder  · You have questions or concerns about your condition  Return to the emergency department if:   · You have new or more trouble moving your arm  © 2017 [employer]  Information is for End User's use only and may not be sold, redistributed or otherwise used for commercial purposes  All illustrations and images included in CareNotes® are the copyrighted property of [employer]  or [employer]  The above information is an  only  It is not intended as medical advice for individual conditions or treatments  Talk to your doctor, nurse or pharmacist before following any medical regimen to see if it is safe and effective for you

## 2019-09-03 NOTE — PROGRESS NOTES
1  Adhesive capsulitis of left shoulder  Ambulatory referral to Physical Therapy   2  Chronic left shoulder pain  Ambulatory referral to Orthopedic Surgery    XR shoulder 2+ vw left     Orders Placed This Encounter   Procedures    XR shoulder 2+ vw left    Ambulatory referral to Physical Therapy        Imaging Studies (I personally reviewed images in PACS and report):  X-ray left shoulder 09/03/2019:  No acute osseous abnormality  IMPRESSION:  Left adhesive capsulitis  PMH:  Diabetes-hemoglobin A1c on 08/13/2019 at 5 8    Repeat X-ray next visit:   None      Return in about 3 days (around 9/6/2019)  Patient Instructions   Left shoulder adhesive capsulitis:  Will schedule for ultrasound-guided steroid injection on Friday  Will refer to physical therapy    Adhesive Capsulitis   WHAT YOU NEED TO KNOW:   Adhesive capsulitis happens when tissues in your shoulder tighten and swell  The condition is often called frozen shoulder because the swollen tissues cause pain and decrease your shoulder movement  DISCHARGE INSTRUCTIONS:   Medicines:   · Pain medicine: You may be given medicine to take away or decrease pain  Do not wait until the pain is severe before you take your medicine  · NSAIDs  help decrease swelling and pain or fever  This medicine is available with or without a doctor's order  NSAIDs can cause stomach bleeding or kidney problems in certain people  If you take blood thinner medicine, always ask your healthcare provider if NSAIDs are safe for you  Always read the medicine label and follow directions  · Take your medicine as directed  Contact your healthcare provider if you think your medicine is not helping or if you have side effects  Tell him of her if you are allergic to any medicine  Keep a list of the medicines, vitamins, and herbs you take  Include the amounts, and when and why you take them  Bring the list or the pill bottles to follow-up visits   Carry your medicine list with you in case of an emergency  Physical therapy:  A physical therapist teaches you exercises to help improve movement and strength, and to decrease pain  Stretches to do at home: Your healthcare provider may suggest stretches to help improve your symptoms  Ask your healthcare provider which stretches are best and how to do them  The following stretches may be suggested:  · Doorway stretch:   a doorway with your painful arm bent at the elbow  Place your hand on the door frame and turn your body away from the door frame  Hold this position for 30 seconds  Relax and repeat  · Forward stretch:  Lie on your back with your legs straight out  Use your healthy arm to push your painful arm up over your head until you feel a gentle stretch  Hold this position for 15 seconds  Slowly lower your arm to the starting position  Relax and repeat  · Crossover stretch:  Use your healthy arm to gently pull your painful arm across your chest just below your chin  Pull until you feel a gentle stretch  Hold this position for 30 seconds  Relax and repeat  Ice or heat: Ice or heat on your shoulder may help decrease your pain and improve shoulder movement  Apply ice to help ease pain after stretching  Put heat on your shoulder to help relax your muscles  Use ice or heat as directed  Follow up with your healthcare provider as directed:  Write down your questions so you remember to ask them during your visits  Contact your healthcare provider if:   · You have worse pain and stiffness in your shoulder  · You have questions or concerns about your condition  Return to the emergency department if:   · You have new or more trouble moving your arm  © 2017 [employer] Information is for End User's use only and may not be sold, redistributed or otherwise used for commercial purposes  All illustrations and images included in CareNotes® are the copyrighted property of [employer]  or [employer]    The above information is an  only  It is not intended as medical advice for individual conditions or treatments  Talk to your doctor, nurse or pharmacist before following any medical regimen to see if it is safe and effective for you  CHIEF COMPLAINT:  Left shoulder pain    HPI:  Teresa Manuel is a 62 y o  male  who presents for       Visit 09/03/2019  62year-old diabetic male presents with 1 here history of progressive decreased range of movement of his left shoulder and pain to anterior aspect of his shoulder  He states the pain is worse with overhead activities  He describes it as moderate, sharp  He has not tried any treatment  No previous injury to the shoulder  He describes occasional clicking of his shoulder  Review of Systems   Constitutional: Negative for chills and fever  HENT: Negative for hearing loss  Eyes: Negative for visual disturbance  Respiratory: Negative for shortness of breath  Cardiovascular: Negative for chest pain  Gastrointestinal: Negative for abdominal pain  Genitourinary: Negative for difficulty urinating and dysuria  Neurological: Negative for weakness and numbness  Psychiatric/Behavioral: Negative for suicidal ideas           Following history reviewed and update:    Past Medical History:   Diagnosis Date    Abscess of left thigh     [facility]     Anxiety     Arteriosclerotic cardiovascular disease     Asthma     Chest pain     Class 2 obesity with alveolar hypoventilation and body mass index (BMI) of 36 0 to 36 9 in adult [facility])     COPD (chronic obstructive pulmonary disease) (HCC)     Coronary artery disease     Diabetes mellitus [facility] )     Fatty liver     Gastric ulcer     GERD (gastroesophageal reflux disease)     Hyperlipidemia     Hypertension     Low back pain     [facility] infarction [facility]     Obstructive sleep apnea     Spondylosis of lumbar region without myelopathy or radiculopathy Past Surgical History:   Procedure Laterality Date    CORONARY ANGIOPLASTY WITH STENT PLACEMENT  2006, 2007    NEUROPLASTY / TRANSPOSITION MEDIAN NERVE AT CARPAL TUNNEL Right      Social History   Social History     Substance and Sexual Activity   Alcohol Use No     Social History     Substance and Sexual Activity   Drug Use No     Social History     Tobacco Use   Smoking Status Former Smoker    Packs/day: 0 00    Years: 0 00    Pack years: 0 00   Smokeless Tobacco Never Used     Family History   Problem Relation Age of Onset    Alzheimer's disease Mother     Heart failure Mother     Dementia Mother     Heart attack Father     Other Father         Cardiac Disorder    Other Family         Back Pain    Hypertension Family     Stroke Family         Complications    Cancer Other      Allergies   Allergen Reactions    Cefaclor Shortness Of Breath     Other reaction(s): Respiratory Distress    Simvastatin Shortness Of Breath     Other reaction(s): Respiratory Distress          Physical Exam  /79 (BP Location: Right arm, Patient Position: Sitting, Cuff Size: Standard)   Pulse 73   Ht 5' 6" (1 676 m)   BMI 44 22 kg/m²     Constitutional:  see vital signs  Gen: well-developed, normocephalic/atraumatic, well-groomed  Eyes: No inflammation or discharge of conjunctiva or lids; sclera clear   Pharynx: no inflammation, lesion, or mass of lips  Neck: supple, no masses, non-distended  MSK: no inflammation, lesion, mass, or clubbing of nails and digits except for other than mentioned below  SKIN: no visible rashes or skin lesions  Pulmonary/Chest: Effort normal  No respiratory distress     NEURO: cranial nerves grossly intact  PSYCH:  Alert and oriented to person, place, and time; recent and remote memory intact; mood normal, no depression, anxiety, or agitation, judgment and insight good and intact     Ortho Exam  LEFT SHOULDER:  Erythema: no  Swelling: no  Increased Warmth: no    Tenderness:  Mild over AC joint    ROM:   Left shoulder abduction active 110 degrees, passive 140, right shoulder 160 active, passive 160  Left shoulder Internal rotation L5 level, right shoulder T6  Left shoulder ABER:  60 degrees, right shoulder ABER:   90 degrees  Left shoulder ABIR:  90 degrees, right shoulder ABIR :90 degrees    Strength  Abduction: 5/5  ER: 5/5  IR: 5/5    Drop-Arm: negative  Emptycan: negative  Belly Press: negative  Lift-off Test:  Not done    Caputo:  Negative  Neer:  Positive  Cross-Arm: negative  Speeds:  Negative      RIGHT SHOULDER:  Strength  Abduction: 5/5  ER: 5/5  IR: 5/5    ROM  Touchdown sign: intact  Appley Scratch Test: symmetric  Passive: symmetric    Empty can: negative      Procedures    ATTESTATION:  I have personally interviewed and examined patient  I have reviewed fox findings and plan with resident/fellow as outlined in note  I agree with exam and plan as documented in note

## 2019-09-06 ENCOUNTER — OFFICE VISIT (OUTPATIENT)
Dept: OBGYN CLINIC | Facility: OTHER | Age: 59
End: 2019-09-06
Payer: COMMERCIAL

## 2019-09-06 VITALS
HEART RATE: 75 BPM | WEIGHT: 274.4 LBS | HEIGHT: 66 IN | BODY MASS INDEX: 44.1 KG/M2 | DIASTOLIC BLOOD PRESSURE: 79 MMHG | SYSTOLIC BLOOD PRESSURE: 127 MMHG

## 2019-09-06 DIAGNOSIS — Z79.4 TYPE 2 DIABETES MELLITUS WITH COMPLICATION, WITH LONG-TERM CURRENT USE OF INSULIN (HCC): ICD-10-CM

## 2019-09-06 DIAGNOSIS — E11.8 TYPE 2 DIABETES MELLITUS WITH COMPLICATION, WITH LONG-TERM CURRENT USE OF INSULIN (HCC): ICD-10-CM

## 2019-09-06 DIAGNOSIS — M75.02 ADHESIVE CAPSULITIS OF LEFT SHOULDER: Primary | ICD-10-CM

## 2019-09-06 PROCEDURE — 20611 DRAIN/INJ JOINT/BURSA W/US: CPT | Performed by: FAMILY MEDICINE

## 2019-09-06 RX ORDER — LIDOCAINE HYDROCHLORIDE 10 MG/ML
4 INJECTION, SOLUTION INFILTRATION; PERINEURAL
Status: COMPLETED | OUTPATIENT
Start: 2019-09-06 | End: 2019-09-06

## 2019-09-06 RX ORDER — LIDOCAINE HYDROCHLORIDE 10 MG/ML
3 INJECTION, SOLUTION INFILTRATION; PERINEURAL
Status: COMPLETED | OUTPATIENT
Start: 2019-09-06 | End: 2019-09-06

## 2019-09-06 RX ORDER — TRIAMCINOLONE ACETONIDE 40 MG/ML
40 INJECTION, SUSPENSION INTRA-ARTICULAR; INTRAMUSCULAR
Status: COMPLETED | OUTPATIENT
Start: 2019-09-06 | End: 2019-09-06

## 2019-09-06 RX ADMIN — LIDOCAINE HYDROCHLORIDE 3 ML: 10 INJECTION, SOLUTION INFILTRATION; PERINEURAL at 11:18

## 2019-09-06 RX ADMIN — TRIAMCINOLONE ACETONIDE 40 MG: 40 INJECTION, SUSPENSION INTRA-ARTICULAR; INTRAMUSCULAR at 11:18

## 2019-09-06 RX ADMIN — LIDOCAINE HYDROCHLORIDE 4 ML: 10 INJECTION, SOLUTION INFILTRATION; PERINEURAL at 11:18

## 2019-09-06 NOTE — PATIENT INSTRUCTIONS
Physical therapy is the key to improving your range of motion with adhesive capsulitis  Home exercise program and formal physical therapy visits    reviewed red flags and risks with patient regarding injection including but not limited to infection <0 072% as referenced in some sources, nerve or artery penetration, and if steroids are used-skin dimpling <1%, hypo-pigmentation <1%,  or even damage to the bone as referenced in some sources  I reviewed contraindications including but not limited to active infection, prosthetic joint, fracture, allergy to steroid if used or anesthetics such as lidocaine, and uncontrolled blood thinner medications such as coumadin/warfarin  patient expressed understanding and agreed to proceed with procedure  educated if any symptoms including fevers, chills, swelling, or worsening symptoms occur then to call office or go to hospital for immediate care if physician unavailable  patient expressed understanding and agreed to plan  fice or go to hospital for immediate care if physician unavailable  patient expressed understanding and agreed to plan

## 2019-09-06 NOTE — PROGRESS NOTES
1  Adhesive capsulitis of left shoulder       Orders Placed This Encounter   Procedures    Large joint arthrocentesis: L glenohumeral        Imaging Studies (I personally reviewed images in PACS and report):    IMPRESSION:  Left adhesive capsulitis  PMH:  Diabetes-hemoglobin A1c on 08/13/2019 at 5 8      Repeat X-ray next visit:   none      Return in about 2 months (around 11/6/2019)  Patient Instructions   Physical therapy is the key to improving your range of motion with adhesive capsulitis  Home exercise program and formal physical therapy visits    reviewed red flags and risks with patient regarding injection including but not limited to infection <0 072% as referenced in some sources, nerve or artery penetration, and if steroids are used-skin dimpling <1%, hypo-pigmentation <1%,  or even damage to the bone as referenced in some sources  I reviewed contraindications including but not limited to active infection, prosthetic joint, fracture, allergy to steroid if used or anesthetics such as lidocaine, and uncontrolled blood thinner medications such as coumadin/warfarin  patient expressed understanding and agreed to proceed with procedure  educated if any symptoms including fevers, chills, swelling, or worsening symptoms occur then to call office or go to hospital for immediate care if physician unavailable  patient expressed understanding and agreed to plan  fice or go to hospital for immediate care if physician unavailable  patient expressed understanding and agreed to plan  CHIEF COMPLAINT:  Follow-up left adhesive capsulitis    HPI:  Karla Latham is a 62 y o  male  who presents for       Visit 09/06/2019: Follow-up left adhesive capsulitis:  Uncontrolled  Review of Systems   Constitutional: Negative for chills and fever  Neurological: Negative for weakness           Following history reviewed and update:    Past Medical History:   Diagnosis Date    Abscess of left thigh     Acute myocardial infarction (HCC)     Anxiety     Arteriosclerotic cardiovascular disease     Asthma     Chest pain     Class 2 obesity with alveolar hypoventilation and body mass index (BMI) of 36 0 to 36 9 in adult [facility])     COPD (chronic obstructive pulmonary disease) (HCC)     Coronary artery disease     Diabetes mellitus (HCC)     Fatty liver     Gastric ulcer     GERD (gastroesophageal reflux disease)     Hyperlipidemia     Hypertension     Low back pain     Myocardial infarction ([address] )     Obstructive sleep apnea     Spondylosis of lumbar region without myelopathy or radiculopathy      Past Surgical History:   Procedure Laterality Date    CORONARY ANGIOPLASTY WITH STENT PLACEMENT  2006, 2007    NEUROPLASTY / TRANSPOSITION MEDIAN NERVE AT CARPAL TUNNEL Right      Social History   Social History     Substance and Sexual Activity   Alcohol Use No     Social History     Substance and Sexual Activity   Drug Use No     Social History     Tobacco Use   Smoking Status Former Smoker    Packs/day: 0 00    Years: 0 00    Pack years: 0 00   Smokeless Tobacco Never Used     Family History   Problem Relation Age of Onset    Alzheimer's disease Mother     Heart failure Mother     Dementia Mother     Heart attack Father     Other Father         Cardiac Disorder    Other Family         Back Pain    Hypertension Family     Stroke Family         Complications    Cancer Other      Allergies   Allergen Reactions    Cefaclor Shortness Of Breath     Other reaction(s): Respiratory Distress    Simvastatin Shortness Of Breath     Other reaction(s): Respiratory Distress          Physical Exam  /79 (BP Location: Right arm, Patient Position: Sitting, Cuff Size: Adult)   Pulse 75   Ht 5' 6" (1 676 m)   Wt 124 kg (274 lb 6 4 oz)   BMI 44 29 kg/m²     Constitutional:  see vital signs  Gen: well-developed, normocephalic/atraumatic, well-groomed  Eyes: No inflammation or discharge of conjunctiva or lids; sclera clear   Pharynx: no inflammation, lesion, or mass of lips  Neck: supple, no masses, non-distended  MSK: no inflammation, lesion, mass, or clubbing of nails and digits except for other than mentioned below  SKIN: no visible rashes or skin lesions  Pulmonary/Chest: Effort normal  No respiratory distress  NEURO: cranial nerves grossly intact  PSYCH:  Alert and oriented to person, place, and time; recent and remote memory intact; mood normal, no depression, anxiety, or agitation, judgment and insight good and intact     Ortho Exam     LEFT SHOULDER:  Erythema: no  Swelling: no  Increased Warmth: no    Large joint arthrocentesis: L glenohumeral  Date/Time: 9/6/2019 11:18 AM  Consent given by: patient  Site marked: site marked  Timeout: Immediately prior to procedure a time out was called to verify the correct patient, procedure, equipment, support staff and site/side marked as required   Supporting Documentation  Indications: pain and diagnostic evaluation   Procedure Details  Location: shoulder - L glenohumeral  Needle size: 22 G  Ultrasound guidance: yes  Approach: posterolateral  Medications administered: 3 mL lidocaine 1 %; 4 mL lidocaine 1 %; 40 mg triamcinolone acetonide 40 mg/mL    Patient tolerance: patient tolerated the procedure well with no immediate complications  Dressing:  Sterile dressing applied          USG GLENOHUMERAL JOINT  Ultrasound-guided glenohumeral joint injection performed with sterile field, gloves, probe cover  25 gauge needle i used to anesthetize needle track  22g needle with injectate in line with linear array probe in posterolateral to medial fashion  Separate syringe of anesthetic and corticosteroid injectate then switched and injected  No artery or nerve visualized in needle path  Injectate visualized filling glenohumeral joint capsule  Patient tolerated well  Sterile bandage placed  Minimal to no bleeding

## 2019-09-25 ENCOUNTER — EVALUATION (OUTPATIENT)
Dept: PHYSICAL THERAPY | Facility: REHABILITATION | Age: 59
End: 2019-09-25
Payer: COMMERCIAL

## 2019-09-25 DIAGNOSIS — M75.02 ADHESIVE CAPSULITIS OF LEFT SHOULDER: Primary | ICD-10-CM

## 2019-09-25 PROCEDURE — 97110 THERAPEUTIC EXERCISES: CPT | Performed by: PHYSICAL THERAPIST

## 2019-09-25 PROCEDURE — 97161 PT EVAL LOW COMPLEX 20 MIN: CPT | Performed by: PHYSICAL THERAPIST

## 2019-09-25 NOTE — PROGRESS NOTES
PT Evaluation     Today's date: 2019  Patient name: Gem Younger  : 1960  MRN: [id number]  Referring provider: Elizabeth Chaidez MD  Dx:   Encounter Diagnosis     ICD-10-CM    1  Adhesive capsulitis of left shoulder M75 02 Ambulatory referral to Physical Therapy                  Assessment  Assessment details: Patient presents with pain, decreased shoulder ROM, decreased shoulder strength, and decreased function secondary to left shoulder adhesive capsulitis  Patient would benefit from skilled PT intervention to address these issues and to maximize function  Thank you for the referral   Impairments: abnormal or restricted ROM, impaired physical strength, lacks appropriate home exercise program and pain with function  Understanding of Dx/Px/POC: good   Prognosis: good    Goals  Short term goals (to be achieved in 6 weeks):  1  Pt will demonstrate full PROM  2  Pt will report decreased levels of pain by at least 2 subjective ratings  3  Pt will be independent with current HEP    Long term goals (to be achieved in 12 weeks):  1  Pt will demonstrate full AROM  2  Pt will be able to don/doff a coat with minimal limitations  3  Pt will be able to perform light OH activities with minimal pain  4  Pt will demonstrate improved FOTO score, >69  5  Pt will be independent in [facility] details: [place] [address]  All questions were answered to pt's satisfaction      Patient would benefit from: skilled physical therapy  Planned modality interventions: thermotherapy: hydrocollator packs and cryotherapy  Planned therapy interventions: joint mobilization, manual therapy, neuromuscular re-education, patient education, therapeutic exercise, therapeutic activities, flexibility, functional ROM exercises and home exercise program  Frequency: 2x week  Duration in weeks: 12  Plan of Care beginning date: 2019  Plan of Care expiration date: 2019  Treatment plan discussed with: patient        Subjective Evaluation    History of Present Illness  Mechanism of injury: Pt is a 62 y o male with a c/o ongoing left shoulder pain for several months of progressive onset  Pt saw PCP and was referred to Ortho  Pt had radiographs, which were negative  Pt was diagnosed with adhesive capsulitis and was given an injection with some relief  Pt is now referred for PT  Pt reports pain/difficulty with sleep, reaching overhead (into kitchen cabinets), washing hair, reaching behind back, household chores (primarily using R UE only)  Pain  At best pain ratin  At worst pain ratin  Location: left shoulder  Relieving factors: heat and rest      Diagnostic Tests  X-ray: normal  Treatments  Current treatment: injection treatment  Patient Goals  Patient goals for therapy: decreased pain, increased motion, independence with ADLs/IADLs and increased strength          Objective     Tenderness     Left Shoulder   No tenderness     Neurological Testing     Additional Neurological Details  Pt reports intermittent N/T digits 4+5 on left    Active Range of Motion   Left Shoulder   Flexion: 128 degrees with pain  Abduction: 80 degrees with pain  External rotation 0°: 65 degrees   External rotation BTH: T1 with pain    Additional Active Range of Motion Details  Functional IR to glute only with pain    Passive Range of Motion   Left Shoulder   Flexion: 140 degrees with pain  Abduction: 128 degrees with pain  External rotation 90°: 50 degrees with pain  Internal rotation 90°: 40 degrees with pain    Strength/Myotome Testing     Left Shoulder     Planes of Motion   Flexion: 3-   Abduction: 3-   External rotation at 0°: 4+   Internal rotation at 0°: 4+ (pain)     Tests     Left Shoulder   Positive Hawkin's and Neer's  Negative crossover and drop arm  General Comments:      Shoulder Comments   Pt denies crepitus, but noted this occurred initially                Precautions: anxiety, asthma, COPD, CAD, diabetes, HTN, MI    Manual              Left shoulder PROM to tolerance             Post/inf GH JM's Gr3-4                                                        Exercise Diary              Pulleys flex/scap             Wall climbs             Table slides ER             Supine cane flexion             Supine cane scaption             Supine cane ER             Sleeper stretch             S/L ER AROM             Towel IR stretch             Post cap stretch                                                                                                                                                   Modalities               to initiate session             CP PRN post

## 2019-10-02 ENCOUNTER — OFFICE VISIT (OUTPATIENT)
Dept: PHYSICAL THERAPY | Facility: REHABILITATION | Age: 59
End: 2019-10-02
Payer: COMMERCIAL

## 2019-10-02 DIAGNOSIS — M75.02 ADHESIVE CAPSULITIS OF LEFT SHOULDER: Primary | ICD-10-CM

## 2019-10-02 PROCEDURE — 97140 MANUAL THERAPY 1/> REGIONS: CPT

## 2019-10-02 PROCEDURE — 97112 NEUROMUSCULAR REEDUCATION: CPT

## 2019-10-02 PROCEDURE — 97110 THERAPEUTIC EXERCISES: CPT

## 2019-10-02 NOTE — PROGRESS NOTES
Daily Note     Today's date: 10/2/2019  Patient name: Carlos Gabriel  : 1960  MRN: [id number]  Referring provider: Sharron Cadet MD  Dx:   Encounter Diagnosis     ICD-10-CM    1  Adhesive capsulitis of left shoulder M75 02                   Subjective: Pt reported continued discomfort/tightness  He noted increased pain with quick movements  Compliant with HEP  Objective: See treatment diary below  Precautions: anxiety, asthma, COPD, CAD, diabetes, HTN, MI    Manual  10/2            Left shoulder PROM to tolerance TC            Post/inf GH JM's Gr3-4 NV                                                       Exercise Diary  10/2            Pulleys flex/scap 10"x3'/3'            Wall climbs 10"x10            Table slides ER 5"x10            Supine cane flexion 10"x10            Supine cane scaption 10"x10            Supine cane ER 10"x10            Sleeper stretch nv            S/L ER AROM 3"x20            Towel IR stretch 10"x10            Post cap stretch 10"x10                                                                                                                                                  Modalities  10/2            MH to initiate session NV            CP PRN post np                                  Assessment: Tolerated treatment fair  Patient demonstrated fatigue post treatment and exhibited good technique with therapeutic exercises  Vc's needed for correct technique throughout session  Discomfort noted at end range of PROM and stretches  Add MH pre session NV  Plan: Continue per plan of care

## 2019-10-04 ENCOUNTER — OFFICE VISIT (OUTPATIENT)
Dept: PHYSICAL THERAPY | Facility: REHABILITATION | Age: 59
End: 2019-10-04
Payer: COMMERCIAL

## 2019-10-04 DIAGNOSIS — M75.02 ADHESIVE CAPSULITIS OF LEFT SHOULDER: Primary | ICD-10-CM

## 2019-10-04 PROCEDURE — 97140 MANUAL THERAPY 1/> REGIONS: CPT

## 2019-10-04 PROCEDURE — 97112 NEUROMUSCULAR REEDUCATION: CPT

## 2019-10-04 NOTE — PROGRESS NOTES
Daily Note     Today's date: 10/4/2019  Patient name: Blank Herring  : 1960  MRN: [id number]  Referring provider: Opal Holloway MD  Dx:   Encounter Diagnosis     ICD-10-CM    1  Adhesive capsulitis of left shoulder M75 02                   Subjective: Pt reported some discomfort in shoulder after LV but decreased a little  He also noted increased low back pain  Objective: See treatment diary below  Manual  10/2  10/4                   Left shoulder PROM to tolerance TC  TC                   Post/inf GH JM's Gr3-4 NV  TP                                                                                                 Exercise Diary  10/2  10/4                   Pulleys flex/scap 10"x3'/3'  10" x3'/3'                   Wall climbs 10"x10  10"x10                   Table slides ER 5"x10  5"x10                   Supine cane flexion 10"x10  10"x10                   Supine cane scaption 10"x10  10"x10                   Supine cane ER 10"x10  10"x10                   Sleeper stretch nv  np                   S/L ER AROM 3"x20  3"x20                   Towel IR stretch 10"x10  10"x10                   Post cap stretch 10"x10  10"x10                                                                                                                                                                                                                                                                         Modalities  10/2  10/4                    to initiate session NV  10'                   CP PRN post np  np                                                Assessment: Tolerated treatment fair  Patient demonstrated fatigue post treatment and exhibited good technique with therapeutic exercises  VC's needed for correct technique throughout session  Some discomfort noted with exercises but able to perform to his tolerance  Monitor symptoms NV  Plan: Continue per plan of care   1 on  with PTA for 40 mins; performed rest of session under fitness program

## 2019-10-09 ENCOUNTER — OFFICE VISIT (OUTPATIENT)
Dept: PHYSICAL THERAPY | Facility: REHABILITATION | Age: 59
End: 2019-10-09
Payer: COMMERCIAL

## 2019-10-09 DIAGNOSIS — M75.02 ADHESIVE CAPSULITIS OF LEFT SHOULDER: Primary | ICD-10-CM

## 2019-10-09 PROCEDURE — 97110 THERAPEUTIC EXERCISES: CPT

## 2019-10-09 PROCEDURE — 97112 NEUROMUSCULAR REEDUCATION: CPT

## 2019-10-09 PROCEDURE — 97140 MANUAL THERAPY 1/> REGIONS: CPT

## 2019-10-09 NOTE — PROGRESS NOTES
Daily Note     Today's date: 10/9/2019  Patient name: Chaya Marsh  : 1960  MRN: [id number]  Referring provider: Kerry Brittle, MD  Dx:   Encounter Diagnosis     ICD-10-CM    1  Adhesive capsulitis of left shoulder M75 02                   Subjective: Pt reported intermittent tightness and discomfort in shoulder  Pt noted mornings are the worst        Objective: See treatment diary below  [facility] 10/2  10/4  10/9                 Left shoulder PROM to tolerance TC  TC  TC                 Post/inf GH JM's Gr3-4 NV  TP  np                                                                                               Exercise Diary  10/2  10/4  10/9                 Pulleys flex/scap 10"x3'/3'  10" x3'/3'  10" x3'/3'                 Wall climbs 10"x10  10"x10  10"x10                 Table slides ER 5"x10  5"x10  5"x10                 Supine cane flexion 10"x10  10"x10  10"x10                 Supine cane scaption 10"x10  10"x10  10"x10                 Supine cane ER 10"x10  10"x10  10"x10                 Sleeper stretch nv  np  np                 S/L ER AROM 3"x20  3"x20 3"x20                 Towel IR stretch 10"x10  10"x10  10"x10                 Post cap stretch 10"x10  10"x10  10"x10                                                                                                                                                                                                                                                                       Modalities  10/2  10/4  10/9                 MH to initiate session NV  10'  10'                 CP PRN post np  np  np                                               Assessment: Tolerated treatment well  Patient demonstrated fatigue post treatment and exhibited good technique with therapeutic exercises  VC's needed for correct technique throughout session  Most challenged with towel stretch  Continue to progress as tolerated  Plan: Continue per plan of care 
(2) well flexed

## 2019-10-11 ENCOUNTER — OFFICE VISIT (OUTPATIENT)
Dept: PHYSICAL THERAPY | Facility: REHABILITATION | Age: 59
End: 2019-10-11
Payer: COMMERCIAL

## 2019-10-11 DIAGNOSIS — M75.02 ADHESIVE CAPSULITIS OF LEFT SHOULDER: Primary | ICD-10-CM

## 2019-10-11 DIAGNOSIS — I10 ESSENTIAL HYPERTENSION: ICD-10-CM

## 2019-10-11 PROCEDURE — 97140 MANUAL THERAPY 1/> REGIONS: CPT

## 2019-10-11 PROCEDURE — 97110 THERAPEUTIC EXERCISES: CPT

## 2019-10-11 PROCEDURE — 97112 NEUROMUSCULAR REEDUCATION: CPT

## 2019-10-11 RX ORDER — BENAZEPRIL HYDROCHLORIDE 10 MG/1
TABLET ORAL
Qty: 30 TABLET | Refills: 5 | Status: SHIPPED | OUTPATIENT
Start: 2019-10-11 | End: 2020-03-12 | Stop reason: SDUPTHER

## 2019-10-11 NOTE — PROGRESS NOTES
Daily Note     Today's date: 10/11/2019  Patient name: Remsen Officer  : 1960  MRN: [id number]  Referring provider: Abhijit Carvalho MD  Dx:   Encounter Diagnosis     ICD-10-CM    1  Adhesive capsulitis of left shoulder M75 02                   Subjective: Pt reported back pain still persists  Tightness persists in shoulder but has been seeing some improvement  He can now lift light items and reach up into a cabinet  Reaching behind his back is still difficult  Objective: See treatment diary below  [facility] 10/2  10/4  10/9  10/11               Left shoulder PROM to tolerance TC  TC  TC  TC               Post/inf GH JM's Gr3-4 NV  TP  np  np                                                                                             Exercise Diary  10/2  10/4  10/9  10/11               Pulleys flex/scap 10"x3'/3'  10" x3'/3'  10" x3'/3' 10"x3'/3'               Wall climbs 10"x10  10"x10  10"x10  10"x10               Table slides ER 5"x10  5"x10  5"x10  10"x10               Supine cane flexion 10"x10  10"x10  10"x10  10"x10               Supine cane scaption 10"x10  10"x10  10"x10  10"x10               Supine cane ER 10"x10  10"x10  10"x10  10"x10               Sleeper stretch nv  np  np  np               S/L ER AROM 3"x20  3"x20 3"x20  3"x20               Towel IR stretch 10"x10  10"x10  10"x10  10"x10               Post cap stretch 10"x10  10"x10  10"x10  10"x10                                                                                                                                                                                                                                                                     Modalities  10/2  10/4  10/9  10/11                to initiate session NV  10'  10'  10'               CP PRN post np  np  np  np                                              Assessment: Tolerated treatment well   Patient demonstrated fatigue post treatment and exhibited good technique with therapeutic exercises  VC's needed for correct technique throughout session  Some discomfort noted with exercises but able to perform to his tolerance  Improvement noted with PROM  Plan: Continue per plan of care

## 2019-10-13 DIAGNOSIS — E11.59 TYPE 2 DIABETES MELLITUS WITH OTHER CIRCULATORY COMPLICATION, WITH LONG-TERM CURRENT USE OF INSULIN (HCC): ICD-10-CM

## 2019-10-13 DIAGNOSIS — Z79.4 TYPE 2 DIABETES MELLITUS WITH OTHER CIRCULATORY COMPLICATION, WITH LONG-TERM CURRENT USE OF INSULIN (HCC): ICD-10-CM

## 2019-10-14 RX ORDER — EMPAGLIFLOZIN 25 MG/1
TABLET, FILM COATED ORAL
Qty: 30 TABLET | Refills: 5 | Status: SHIPPED | OUTPATIENT
Start: 2019-10-14 | End: 2020-03-12 | Stop reason: SDUPTHER

## 2019-10-16 ENCOUNTER — OFFICE VISIT (OUTPATIENT)
Dept: PHYSICAL THERAPY | Facility: REHABILITATION | Age: 59
End: 2019-10-16
Payer: COMMERCIAL

## 2019-10-16 DIAGNOSIS — M75.02 ADHESIVE CAPSULITIS OF LEFT SHOULDER: Primary | ICD-10-CM

## 2019-10-16 PROCEDURE — 97140 MANUAL THERAPY 1/> REGIONS: CPT

## 2019-10-16 PROCEDURE — 97110 THERAPEUTIC EXERCISES: CPT

## 2019-10-16 PROCEDURE — 97112 NEUROMUSCULAR REEDUCATION: CPT

## 2019-10-16 NOTE — PROGRESS NOTES
Daily Note     Today's date: 10/16/2019  Patient name: Delio Pineda  : 1960  MRN: [id number]  Referring provider: Dina Mckoy MD  Dx:   Encounter Diagnosis     ICD-10-CM    1  Adhesive capsulitis of left shoulder M75 02                   Subjective: Pt reported increased pain the past few days possibly due to weather changes  Objective: See treatment diary below  [facility] 10/2  10/4  10/9  10/11  10/16             Left shoulder PROM to tolerance TC  TC  TC  TC  TC             Post/inf GH JM's Gr3-4 NV  TP  np  np  np                                                                                           Exercise Diary  10/2  10/4  10/9  10/11  10/16             Pulleys flex/scap 10"x3'/3'  10" x3'/3'  10" x3'/3' 10"x3'/3'  10" x3'/3'             Wall climbs 10"x10  10"x10  10"x10  10"x10 10"x10             Table slides ER 5"x10  5"x10  5"x10  10"x10  10"x10             Supine cane flexion 10"x10  10"x10  10"x10  10"x10  10"x10             Supine cane scaption 10"x10  10"x10  10"x10  10"x10  10"x10             Supine cane ER 10"x10  10"x10  10"x10  10"x10 10"x10             Sleeper stretch nv  np  np  np  np             S/L ER AROM 3"x20  3"x20 3"x20  3"x20  3"x20             Towel IR stretch 10"x10  10"x10  10"x10  10"x10  10"x10             Post cap stretch 10"x10  10"x10  10"x10  10"x10  10"x10                                                                                                                                                                                                                                                                   Modalities  10/2  10/4  10/9  10/11  10/16              to initiate session NV  10'  10'  10'  10'             CP PRN post np  np  np  np  np                                            Assessment: Tolerated treatment well  Patient demonstrated fatigue post treatment and exhibited good technique with therapeutic exercises   VC's needed for correct technique throughout session  Some improvement noted with ROM but still reports pain  Plan: Continue per plan of care

## 2019-10-18 ENCOUNTER — OFFICE VISIT (OUTPATIENT)
Dept: PHYSICAL THERAPY | Facility: REHABILITATION | Age: 59
End: 2019-10-18
Payer: COMMERCIAL

## 2019-10-18 DIAGNOSIS — M75.02 ADHESIVE CAPSULITIS OF LEFT SHOULDER: Primary | ICD-10-CM

## 2019-10-18 PROCEDURE — 97140 MANUAL THERAPY 1/> REGIONS: CPT | Performed by: PHYSICAL THERAPIST

## 2019-10-18 PROCEDURE — 97110 THERAPEUTIC EXERCISES: CPT | Performed by: PHYSICAL THERAPIST

## 2019-10-18 NOTE — PROGRESS NOTES
Daily Note     Today's date: 10/18/2019  Patient name: Terry Palacios  : 1960  MRN: [id number]  Referring provider: Félix Leigh MD  Dx:   Encounter Diagnosis     ICD-10-CM    1  Adhesive capsulitis of left shoulder M75 02                   Subjective: Pt reports his shoulder is so-so today, noting his low back pain is worse  Pt notes some improvement in ROM since starting and is able to get a shirt on a little easier, as well as reach overhead to a shelve  Pt reports HEP is going well         Objective: See treatment diary below  [facility] 10/2  10/4  10/9  10/11  10/16  10/18           Left shoulder PROM to tolerance TC  TC  TC  TC  TC  TP           Post/inf GH JM's Gr3-4 NV  TP  np  np  np TP                                                                                         Exercise Diary  10/2  10/4  10/9  10/11  10/16  10/18           Pulleys flex/scap 10"x3'/3'  10" x3'/3'  10" x3'/3' 10"x3'/3'  10" x3'/3'  10"x 3'/3'           Wall climbs 10"x10  10"x10  10"x10  10"x10 10"x10  10"x10           Table slides ER 5"x10  5"x10  5"x10  10"x10  10"x10  10"x10           Supine cane flexion 10"x10  10"x10  10"x10  10"x10  10"x10  10"x10           Supine cane scaption 10"x10  10"x10  10"x10  10"x10  10"x10  10"x10           Supine cane ER 10"x10  10"x10  10"x10  10"x10 10"x10  10"x10           Sleeper stretch nv  np  np  np  np  np           S/L ER AROM 3"x20  3"x20 3"x20  3"x20  3"x20  3"x20           Towel IR stretch 10"x10  10"x10  10"x10  10"x10  10"x10  10"x10           Post cap stretch 10"x10  10"x10  10"x10  10"x10  10"x10  10"x10                                                                                                                                                                                                                                                                 Modalities  10/2  10/4  10/9  10/11  10/16  10/18            to initiate session NV  10'  10'  10'  10'  10'           CP PRN post np  np  np  np  np  np                 Assessment: Tolerated treatment well  Pt demonstrating improved ROM with TE performance and manuals  Pt notes greatest difficulty with reaching behind his back  Patient would benefit from continued PT      Plan: Continue per plan of care

## 2019-10-23 ENCOUNTER — OFFICE VISIT (OUTPATIENT)
Dept: PHYSICAL THERAPY | Facility: REHABILITATION | Age: 59
End: 2019-10-23
Payer: COMMERCIAL

## 2019-10-23 DIAGNOSIS — M75.02 ADHESIVE CAPSULITIS OF LEFT SHOULDER: Primary | ICD-10-CM

## 2019-10-23 PROCEDURE — 97140 MANUAL THERAPY 1/> REGIONS: CPT

## 2019-10-23 PROCEDURE — 97110 THERAPEUTIC EXERCISES: CPT

## 2019-10-23 PROCEDURE — 97112 NEUROMUSCULAR REEDUCATION: CPT

## 2019-10-23 NOTE — PROGRESS NOTES
Daily Note     Today's date: 10/23/2019  Patient name: Gomez Cazares  : 1960  MRN: [id number]  Referring provider: Joao Valencia MD  Dx:   Encounter Diagnosis     ICD-10-CM    1  Adhesive capsulitis of left shoulder M75 02                   Subjective: Pt reports continued improvement overall, states he is having an easier time reaching overhead items at home      Objective: See treatment diary below      Assessment: Tolerated treatment well  Patient would benefit from continued PT  Pt did well with all exercises  Pt continues to have some limitations with flexion and ER, though is improving  Pt  able to complete all exercises with no increase in pain during or after session  Pt  1:1 with PTA for entirety  Plan: Continue per plan of care          Manual  10/2  10/4  10/9  10/11  10/16  10/18  10/23         Left shoulder PROM to tolerance TC  TC  TC  TC  TC  TP  KP 10'         Post/inf GH JM's Gr3-4 NV  TP  np  np  np TP  np                                                                                       Exercise Diary  10/2  10/4  10/9  10/11  10/16  10/18  10/23         Pulleys flex/scap 10"x3'/3'  10" x3'/3'  10" x3'/3' 10"x3'/3'  10" x3'/3'  10"x 3'/3'  3/3 10"         Wall climbs 10"x10  10"x10  10"x10  10"x10 10"x10  10"x10  10x10"         Table slides ER 5"x10  5"x10  5"x10  10"x10  10"x10  10"x10  10x10"         Supine cane flexion 10"x10  10"x10  10"x10  10"x10  10"x10  10"x10  10x10"         Supine cane scaption 10"x10  10"x10  10"x10  10"x10  10"x10  10"x10  10x10"         Supine cane ER 10"x10  10"x10  10"x10  10"x10 10"x10  10"x10  10x10"         Sleeper stretch nv  np  np  np  np  np  np         S/L ER AROM 3"x20  3"x20 3"x20  3"x20  3"x20  3"x20  20x3"         Towel IR stretch 10"x10  10"x10  10"x10  10"x10  10"x10  10"x10  10x10"         Post cap stretch 10"x10  10"x10  10"x10  10"x10  10"x10  10"x10  10x10"                                                                                                                                                                                                                                                               Modalities  10/2  10/4  10/9  10/11  10/16  10/18  10/23          to initiate session NV  10'  10'  10'  10'  10'  10'         CP PRN post np  np  np  np  np  np  np

## 2019-10-25 ENCOUNTER — OFFICE VISIT (OUTPATIENT)
Dept: PHYSICAL THERAPY | Facility: REHABILITATION | Age: 59
End: 2019-10-25
Payer: COMMERCIAL

## 2019-10-25 DIAGNOSIS — M75.02 ADHESIVE CAPSULITIS OF LEFT SHOULDER: Primary | ICD-10-CM

## 2019-10-25 PROCEDURE — 97140 MANUAL THERAPY 1/> REGIONS: CPT

## 2019-10-25 PROCEDURE — 97110 THERAPEUTIC EXERCISES: CPT

## 2019-10-25 PROCEDURE — 97112 NEUROMUSCULAR REEDUCATION: CPT

## 2019-10-25 NOTE — PROGRESS NOTES
Daily Note     Today's date: 10/25/2019  Patient name: Adron Buerger  : 1960  MRN: [id number]  Referring provider: Cesar Nye MD  Dx:   Encounter Diagnosis     ICD-10-CM    1  Adhesive capsulitis of left shoulder M75 02                   Subjective: Pt reported low back pain during the day and shoulder pain more at night  Once pt gets moving his shoulder pain dissipates  Pt noted he can slowly start performing a little more activity then when he started PT        Objective: See treatment diary below  [facility] 10/2  10/4  10/9  10/11  10/16  10/18  10/23  10/25       Left shoulder PROM to tolerance TC  TC  TC  TC  TC  TP  KP 10'  TC       Post/inf GH JM's Gr3-4 NV  TP  np  np  np TP  np  np                                                                                     Exercise Diary  10/2  10/4  10/9  10/11  10/16  10/18  10/23  10/25       Pulleys flex/scap 10"x3'/3'  10" x3'/3'  10" x3'/3' 10"x3'/3'  10" x3'/3'  10"x 3'/3'  3/3 10"  3'/3' x10"       Wall climbs 10"x10  10"x10  10"x10  10"x10 10"x10  10"x10  10x10"  10"x10       Table slides ER 5"x10  5"x10  5"x10  10"x10  10"x10  10"x10  10x10"  10"x10       Supine cane flexion 10"x10  10"x10  10"x10  10"x10  10"x10  10"x10  10x10"  10"x10       Supine cane scaption 10"x10  10"x10  10"x10  10"x10  10"x10  10"x10  10x10"  10"x10       Supine cane ER 10"x10  10"x10  10"x10  10"x10 10"x10  10"x10  10x10"  10"x10       Sleeper stretch nv  np  np  np  np  np  np  np       S/L ER AROM 3"x20  3"x20 3"x20  3"x20  3"x20  3"x20  20x3"  20x3"       Towel IR stretch 10"x10  10"x10  10"x10  10"x10  10"x10  10"x10  10x10"  10"x10       Post cap stretch 10"x10  10"x10  10"x10  10"x10  10"x10  10"x10  10x10"  10"x10                                                                                                                                                                                                                                                           Modalities  10/2  10/4  10/9  10/11  10/16  10/18  10/23  10/25        to initiate session NV  10'  10'  10'  10'  10'  10'  10'       CP PRN post np  np  np  np  np  np  np  np             Assessment: Tolerated treatment well  Patient demonstrated fatigue post treatment and exhibited good technique with therapeutic exercises  Vc's needed for correct technique throughout session  Improved tolerance with exercises and PROM  Plan: Continue per plan of care   1 on 1 with PTA and PT  MD appt 11-7

## 2019-10-30 ENCOUNTER — OFFICE VISIT (OUTPATIENT)
Dept: PHYSICAL THERAPY | Facility: REHABILITATION | Age: 59
End: 2019-10-30
Payer: COMMERCIAL

## 2019-10-30 DIAGNOSIS — M75.02 ADHESIVE CAPSULITIS OF LEFT SHOULDER: Primary | ICD-10-CM

## 2019-10-30 PROCEDURE — 97140 MANUAL THERAPY 1/> REGIONS: CPT

## 2019-10-30 PROCEDURE — 97110 THERAPEUTIC EXERCISES: CPT

## 2019-10-30 PROCEDURE — 97112 NEUROMUSCULAR REEDUCATION: CPT

## 2019-10-30 NOTE — PROGRESS NOTES
Daily Note     Today's date: 10/30/2019  Patient name: Sierra Quintero  : 1960  MRN: [id number]  Referring provider: Bacilio Hunt MD  Dx:   Encounter Diagnosis     ICD-10-CM    1  Adhesive capsulitis of left shoulder M75 02                   Subjective: Pt reported back and L UE discomfort the past few days         Objective: See treatment diary below  [facility] 10/2  10/4  10/9  10/11  10/16  10/18  10/23  10/25  10/30     Left shoulder PROM to tolerance TC  TC  TC  TC  TC  TP  KP 10'  TC  TC     Post/inf GH JM's Gr3-4 NV  TP  np  np  np TP  np  np  np                                                                                   Exercise Diary  10/2  10/4  10/9  10/11  10/16  10/18  10/23  10/25 10/30     Pulleys flex/scap 10"x3'/3'  10" x3'/3'  10" x3'/3' 10"x3'/3'  10" x3'/3'  10"x 3'/3'  3/3 10"  3'/3' x10"  10" 3'/3'     Wall climbs 10"x10  10"x10  10"x10  10"x10 10"x10  10"x10  10x10"  10"x10  10"x10     Table slides ER 5"x10  5"x10  5"x10  10"x10  10"x10  10"x10  10x10"  10"x10  10"x10     Supine cane flexion 10"x10  10"x10  10"x10  10"x10  10"x10  10"x10  10x10"  10"x10  10"x10     Supine cane scaption 10"x10  10"x10  10"x10  10"x10  10"x10  10"x10  10x10"  10"x10  10"x10     Supine cane ER 10"x10  10"x10  10"x10  10"x10 10"x10  10"x10  10x10"  10"x10  10"x10     Sleeper stretch nv  np  np  np  np  np  np  np  np     S/L ER AROM 3"x20  3"x20 3"x20  3"x20  3"x20  3"x20  20x3"  20x3"  20x3"     Towel IR stretch 10"x10  10"x10  10"x10  10"x10  10"x10  10"x10  10x10"  10"x10  10"x10     Post cap stretch 10"x10  10"x10  10"x10  10"x10  10"x10  10"x10  10x10"  10"x10  10"x10                                                                                                                                                                                                                                                           Modalities  10/2  10/4  10/9  10/11  10/16  10/18  10/23  10/25  10/30     MH to initiate session NV  10'  10'  10'  10'  10'  10'  10'  10'     CP PRN post np  np  np  np  np  np  np  np  np           Assessment: Tolerated treatment well  Patient demonstrated fatigue post treatment and exhibited good technique with therapeutic exercises  VC's needed for correct technique throughout session  Discomfort noted during session but performed exercises to his tolerance  Plan: Continue per plan of care

## 2019-11-01 ENCOUNTER — OFFICE VISIT (OUTPATIENT)
Dept: PHYSICAL THERAPY | Facility: REHABILITATION | Age: 59
End: 2019-11-01
Payer: COMMERCIAL

## 2019-11-01 DIAGNOSIS — M75.02 ADHESIVE CAPSULITIS OF LEFT SHOULDER: Primary | ICD-10-CM

## 2019-11-01 PROCEDURE — 97140 MANUAL THERAPY 1/> REGIONS: CPT

## 2019-11-01 PROCEDURE — 97110 THERAPEUTIC EXERCISES: CPT

## 2019-11-01 PROCEDURE — 97112 NEUROMUSCULAR REEDUCATION: CPT

## 2019-11-01 NOTE — PROGRESS NOTES
Daily Note     Today's date: 2019  Patient name: Jake Kumar  : 1960  MRN: [id number]  Referring provider: Theresa Rice MD  Dx:   Encounter Diagnosis     ICD-10-CM    1  Adhesive capsulitis of left shoulder M75 02                   Subjective: Pt reported intermittent tightness and pain today  Objective: See treatment diary below  [facility]             Left shoulder PROM to tolerance TC            Post/inf GH JM's Gr3-4 np                                                                                          Exercise Diary               Pulleys flex/scap 10"x3'/3'             Wall climbs 10"x10             Table slides ER 10"x10              Supine cane flexion 10"x10              Supine cane scaption 10"x10             Supine cane ER 10"x10             Sleeper stretch np              S/L ER AROM 3"x20             Towel IR stretch 10"x10             Post cap stretch 10"x10                                                                                                                                                                                                                                                     Modalities               MH to initiate session 10'             CP PRN post np                    Assessment: Tolerated treatment well  Patient demonstrated fatigue post treatment and exhibited good technique with therapeutic exercises  VC's needed for correct technique throughout session  Improvement noted with PROM  Plan: Continue per plan of care

## 2019-11-06 ENCOUNTER — EVALUATION (OUTPATIENT)
Dept: PHYSICAL THERAPY | Facility: REHABILITATION | Age: 59
End: 2019-11-06
Payer: COMMERCIAL

## 2019-11-06 DIAGNOSIS — M75.02 ADHESIVE CAPSULITIS OF LEFT SHOULDER: Primary | ICD-10-CM

## 2019-11-06 PROCEDURE — 97140 MANUAL THERAPY 1/> REGIONS: CPT | Performed by: PHYSICAL THERAPIST

## 2019-11-06 PROCEDURE — 97110 THERAPEUTIC EXERCISES: CPT | Performed by: PHYSICAL THERAPIST

## 2019-11-06 NOTE — PROGRESS NOTES
PT Re-Evaluation     Today's date: 2019  Patient name: Lj Pettit  : 1960  MRN: [id number]  Referring provider: Karmen Kwan MD  Dx:   Encounter Diagnosis     ICD-10-CM    1  Adhesive capsulitis of left shoulder M75 02                   Assessment  Assessment details: Pt has demonstrated improvement in shoulder ROM (active and passive), strength, and function with a decrease in pain since starting therapy  Pt continues to present with some deficits in ROM, strength, and function and pt would benefit from continued skilled PT intervention to address these issues and to maximize function  Pt is scheduled to f/u with MD tomorrow  Please advise as pt states he may be losing his medical insurance this Friday  Impairments: abnormal or restricted ROM, impaired physical strength and pain with function  Understanding of Dx/Px/POC: good   Prognosis: good    Goals  Short term goals (to be achieved in 6 weeks):  1  Pt will demonstrate full PROM-partially met  2  Pt will report decreased levels of pain by at least 2 subjective ratings-met  3  Pt will be independent with current HEP-met    Long term goals (to be achieved in 12 weeks):  1  Pt will demonstrate full AROM-partially met  2  Pt will be able to don/doff a coat with minimal limitations-partially met  3  Pt will be able to perform light OH activities with minimal pain-met  4  Pt will demonstrate improved FOTO score, >69- met (75)  5  Pt will be independent in HEP-partially met     Plan  Plan details: Patient was educated in [employer]  All questions were answered to pt's satisfaction      Patient would benefit from: skilled physical therapy  Planned modality interventions: thermotherapy: hydrocollator packs and cryotherapy  Planned therapy interventions: patient education, manual therapy, joint mobilization, neuromuscular re-education, flexibility, therapeutic exercise, therapeutic activities, functional ROM exercises and home exercise program  Frequency: 2x week  Duration in weeks: 4  Plan of Care beginning date: 2019  Plan of Care expiration date: 2019  Treatment plan discussed with: patient        Subjective Evaluation    History of Present Illness  Mechanism of injury: Pt reports at least 70% improvement since starting therapy  Pt notes improvement with all ADL's and household chores, but some pain and difficulty persists  Pt reports pain persists with reaching behind his back (improved ROM though), sleep (less painful than it was), washing hair (initially it is painful and difficult and then loosens up towards the end of the shower)    Pain  At best pain ratin  At worst pain ratin  Location: left shoulder    Treatments  Current treatment: physical therapy  Patient Goals  Patient goals for therapy: decreased pain, increased motion, increased strength and independence with ADLs/IADLs          Objective     Active Range of Motion   Left Shoulder   Flexion: 150 degrees with pain  Abduction: 152 degrees with pain  External rotation 0°: 70 degrees   External rotation BTH: T2   Internal rotation BTB: T10 with pain    Passive Range of Motion   Left Shoulder   Flexion: 162 degrees with pain  Abduction: 160 degrees with pain  External rotation 90°: 70 degrees with pain  Internal rotation 90°: 60 degrees     Strength/Myotome Testing     Left Shoulder     Planes of Motion   Flexion: 4   Abduction: 4+   External rotation at 0°: 4+   Internal rotation at 0°: 5              Precautions: anxiety, asthma, COPD, CAD, diabetes, HTN, MI      Manual                     Left shoulder PROM to tolerance TC  TP 8 mins                   Post/inf GH JM's Gr3-4 np  TP 2 mins                                                                                                 Exercise Diary                     Pulleys flex/scap 10"x3'/3'  10"x3'/3'                   Wall climbs 10"x10  10"x10                   Table slides ER 10"x10  10"x10                   Supine cane flexion 10"x10  10"x10                   Supine cane scaption 10"x10  10"x10                   Supine cane ER 10"x10  10"x10                   Sleeper stretch np  np                   S/L ER AROM 3"x20  3"x20                   Towel IR stretch 10"x10  10"x10                   Post cap stretch 10"x10  10"x10                                                                                                                                                                                                                                                                         Modalities  11/1  11/6                    to initiate session 10'  10'                   CP PRN post np  np                       Updated measurements and functional status taken this session  Pt is scheduled to see MD tomorrow and states he may be losing his medical coverage this Friday

## 2019-11-08 ENCOUNTER — OFFICE VISIT (OUTPATIENT)
Dept: PHYSICAL THERAPY | Facility: REHABILITATION | Age: 59
End: 2019-11-08
Payer: COMMERCIAL

## 2019-11-08 DIAGNOSIS — M75.02 ADHESIVE CAPSULITIS OF LEFT SHOULDER: Primary | ICD-10-CM

## 2019-11-08 PROCEDURE — 97140 MANUAL THERAPY 1/> REGIONS: CPT | Performed by: PHYSICAL THERAPIST

## 2019-11-08 PROCEDURE — 97110 THERAPEUTIC EXERCISES: CPT | Performed by: PHYSICAL THERAPIST

## 2019-11-08 NOTE — PROGRESS NOTES
Daily Note     Today's date: 2019  Patient name: Mekhi Little  : 1960  MRN: [id number]  Referring provider: Bettylou Eisenmenger, MD  Dx:   Encounter Diagnosis     ICD-10-CM    1  Adhesive capsulitis of left shoulder M75 02         1on1 1045 to 1115 and performed remaining TE's as part of IEP  Subjective: Pt states his MD appointment was re-scheduled until next Tuesday due to his MD being out of the office  Pt notes continued sleep disturbance due to shoulder pain  Objective: See treatment diary below      Assessment: Tolerated treatment well  ROM continues to improve  Patient would benefit from continued PT      Plan: Continue per plan of care        Precautions: anxiety, asthma, COPD, CAD, diabetes, HTN, MI      Manual                   Left shoulder PROM to tolerance TC  TP 8 mins  TP 8 mins                 Post/inf GH JM's Gr3-4 np  TP 2 mins  TP 2 mins                                                                                               Exercise Diary                   Pulleys flex/scap 10"x3'/3'  10"x3'/3' 10" x3'/3'                 Wall climbs 10"x10  10"x10  10"x10                 Table slides ER 10"x10  10"x10  10"x10                 Supine cane flexion 10"x10  10"x10  10"x10                 Supine cane scaption 10"x10  10"x10  10"x10                 Supine cane ER 10"x10  10"x10  10"x10                 Sleeper stretch np  np  np                 S/L ER AROM 3"x20  3"x20  3"x20                 Towel IR stretch 10"x10  10"x10  10"x10                 Post cap stretch 10"x10  10"x10  10"x10                                                                                                                                                                                                                                                                       Modalities                   MH to initiate session 10'  10'  np               CP PRN post np  np  np

## 2019-11-12 ENCOUNTER — OFFICE VISIT (OUTPATIENT)
Dept: OBGYN CLINIC | Facility: OTHER | Age: 59
End: 2019-11-12
Payer: COMMERCIAL

## 2019-11-12 VITALS
SYSTOLIC BLOOD PRESSURE: 127 MMHG | WEIGHT: 274.8 LBS | DIASTOLIC BLOOD PRESSURE: 77 MMHG | HEART RATE: 76 BPM | BODY MASS INDEX: 44.35 KG/M2

## 2019-11-12 DIAGNOSIS — M75.42 IMPINGEMENT SYNDROME OF LEFT SHOULDER: Primary | ICD-10-CM

## 2019-11-12 DIAGNOSIS — M75.02 ADHESIVE CAPSULITIS OF LEFT SHOULDER: ICD-10-CM

## 2019-11-12 PROCEDURE — 20610 DRAIN/INJ JOINT/BURSA W/O US: CPT | Performed by: FAMILY MEDICINE

## 2019-11-12 PROCEDURE — 99214 OFFICE O/P EST MOD 30 MIN: CPT | Performed by: FAMILY MEDICINE

## 2019-11-12 RX ADMIN — LIDOCAINE HYDROCHLORIDE 4 ML: 10 INJECTION, SOLUTION INFILTRATION; PERINEURAL at 12:22

## 2019-11-12 RX ADMIN — TRIAMCINOLONE ACETONIDE 40 MG: 40 INJECTION, SUSPENSION INTRA-ARTICULAR; INTRAMUSCULAR at 12:22

## 2019-11-12 NOTE — PATIENT INSTRUCTIONS
Adhesive capsulitis:  Improving  Continue physical therapy  Follow-up in 2 months  I explained my clinical and radiological findings to the patient  I believe he patient has concomitant left shoulder impingement syndrome  I have provided him with corticosteroid injection in left subacromial bursa today  He was advised to continue physical therapy     You have been diagnosed with Rotator cuff impingement which is a pinching of the tendon, or cable, that attaches to the arm bone known as the humerus on the outside of your shoulder and can lead to pain, feelings of weakness due to pain, and worsening pain with reaching overhead, repetitive movements with your arm, or lying on your shoulder at night  There is no indication today of a significant or large rotator cuff tear requiring surgery although it is possible you may still have a small tear that does not require surgery at the moment  Impingement and even small rotator cuff tear symptoms usually improve with rest, range of motion exercises, and physical therapy over 1-3 months but sometimes can take 4-6 months  Additional treatments include steroid injection for inflammation; however, the pain relief from steroid injections is not long lasting and the curative treatment is physical therapy  If you continue to fail to improve with conservative measures after 3 months or if there is a red flag, then we will discuss obtaining an MRI at future visits to evaluate for severe rotator cuff tear  Surgery for impingement syndrome is usually not considered until after 4-6 months of initial therapy unless a severe rotator cuff tear is found on MRI

## 2019-11-12 NOTE — PROGRESS NOTES
1  Impingement syndrome of left shoulder  Large joint arthrocentesis: L subacromial bursa   2  Adhesive capsulitis of left shoulder       Orders Placed This Encounter   Procedures    Large joint arthrocentesis: L subacromial bursa         Imaging Studies (I personally reviewed images in PACS and report):  X-ray left shoulder 09/03/2019:  No acute osseous abnormality  Mild AC arthritis    IMPRESSION:  Left adhesive capsulitis:  Improving  Concomitant left shoulder impingement syndrome; subacromial bursa corticosteroid injection injection-11/12/2019  PMH:  Diabetes-hemoglobin A1c on 08/13/2019 at 5 8  09/06/2019:  Left shoulder steroid injection      Repeat X-ray next visit:   none      Return in about 2 months (around 1/12/2020)  Patient Instructions   Adhesive capsulitis:  Improving  Continue physical therapy  Follow-up in 2 months  I explained my clinical and radiological findings to the patient  I believe he patient has concomitant left shoulder impingement syndrome  I have provided him with corticosteroid injection in left subacromial bursa today  He was advised to continue physical therapy     You have been diagnosed with Rotator cuff impingement which is a pinching of the tendon, or cable, that attaches to the arm bone known as the humerus on the outside of your shoulder and can lead to pain, feelings of weakness due to pain, and worsening pain with reaching overhead, repetitive movements with your arm, or lying on your shoulder at night  There is no indication today of a significant or large rotator cuff tear requiring surgery although it is possible you may still have a small tear that does not require surgery at the moment  Impingement and even small rotator cuff tear symptoms usually improve with rest, range of motion exercises, and physical therapy over 1-3 months but sometimes can take 4-6 months   Additional treatments include steroid injection for inflammation; however, the pain relief from steroid injections is not long lasting and the curative treatment is physical therapy  If you continue to fail to improve with conservative measures after 3 months or if there is a red flag, then we will discuss obtaining an MRI at future visits to evaluate for severe rotator cuff tear  Surgery for impingement syndrome is usually not considered until after 4-6 months of initial therapy unless a severe rotator cuff tear is found on MRI  CHIEF COMPLAINT:  Follow-up left adhesive capsulitis    HPI:  Mirna Mandujano is a 62 y o  male  who presents for     Visit 11/12/2019  Follow-up left adhesive capsulitis:  Improving  Patient reports significant improvement in range of movement of his left shoulder after steroid injection  He has been going to physical therapy as directed   He does however complain of persistent shoulder pain  However he still has pain in his shoulder which gets worse with overhead movement  Describes pain as sharp, 8/10, nonradiating, located to lateral and anterior aspect of his shoulder  Denies any weakness, numbness, tingling  Review of Systems   Constitutional: Negative for chills and fever  HENT: Negative for hearing loss and sore throat  Respiratory: Negative for cough and shortness of breath  Cardiovascular: Negative for chest pain and palpitations  Gastrointestinal: Negative for abdominal pain and nausea  Neurological: Negative for dizziness and numbness           Following history reviewed and update:    Past Medical History:   Diagnosis Date    Abscess of left thigh     Acute myocardial infarction ([facility] [address] )     Anxiety     Arteriosclerotic cardiovascular disease     Asthma     Chest pain     Class 2 obesity with alveolar hypoventilation and body mass index (BMI) of 36 0 to 36 9 in adult [facility])     COPD (chronic obstructive pulmonary disease) (HCC)     Coronary artery disease     Diabetes mellitus ([facility] [address] )     Fatty liver     Gastric ulcer     GERD (gastroesophageal reflux disease)     Hyperlipidemia     Hypertension     Low back pain     Myocardial infarction (HCC)     Obstructive sleep apnea     Spondylosis of lumbar region without myelopathy or radiculopathy      Past Surgical History:   Procedure Laterality Date    CORONARY ANGIOPLASTY WITH STENT PLACEMENT  2006, 2007    NEUROPLASTY / TRANSPOSITION MEDIAN NERVE AT CARPAL TUNNEL Right      Social History   Social History     Substance and Sexual Activity   Alcohol Use No     Social History     Substance and Sexual Activity   Drug Use No     Social History     Tobacco Use   Smoking Status Former Smoker    Packs/day: 0 00    Years: 0 00    Pack years: 0 00   Smokeless Tobacco Never Used     Family History   Problem Relation Age of Onset    Alzheimer's disease Mother     Heart failure Mother     Dementia Mother     Heart attack Father     Other Father         Cardiac Disorder    Other Family         Back Pain    Hypertension Family     Stroke Family         Complications    Cancer Other      Allergies   Allergen Reactions    Cefaclor Shortness Of Breath     Other reaction(s): Respiratory Distress    Simvastatin Shortness Of Breath     Other reaction(s): Respiratory Distress          Physical Exam  /77 (BP Location: Right arm, Patient Position: Sitting, Cuff Size: Adult)   Pulse 76   Wt 125 kg (274 lb 12 8 oz)   BMI 44 35 kg/m²     Constitutional:  see vital signs  Gen: well-developed, normocephalic/atraumatic, well-groomed  Eyes: No inflammation or discharge of conjunctiva or lids; sclera clear   Pharynx: no inflammation, lesion, or mass of lips  Neck: supple, no masses, non-distended  MSK: no inflammation, lesion, mass, or clubbing of nails and digits except for other than mentioned below  SKIN: no visible rashes or skin lesions  Pulmonary/Chest: Effort normal  No respiratory distress     NEURO: cranial nerves grossly intact  PSYCH:  Alert and oriented to person, place, and time; recent and remote memory intact; mood normal, no depression, anxiety, or agitation, judgment and insight good and intact     Ortho Exam     LEFT SHOULDER:  Erythema: no  Swelling: no  Increased Warmth: no    Tenderness:  Tender to subacromial area and AC joint    ROM  External rotation 60°  Abduction:  Active 160°, passive 170°  Degrees range of motion clarified on addendum 01/15/2020  There has been no change on his 01/15/2020 visit compared to visit from November 2019       Strength  Abduction: 5/5  ER: 5/5  IR: 5/5    Drop-Arm: negative  Emptycan: negative  Belly Press: negative  Lift-off Test:  Negative    Caputo: negative  Neer: negative  Cross-Arm: negative  Speeds: negative      RIGHT SHOULDER:  Strength  Abduction: 5/5  ER: 5/5  IR: 5/5    ROM  Touchdown sign: intact    Empty can: negative      Large joint arthrocentesis: L subacromial bursa  Date/Time: 11/12/2019 12:22 PM  Consent given by: patient  Supporting Documentation  Indications: pain   Procedure Details  Location: shoulder - L subacromial bursa  Needle size: 22 G  Ultrasound guidance: no  Approach: posterolateral  Medications administered: 4 mL lidocaine 1 %; 40 mg triamcinolone acetonide 40 mg/mL    Patient tolerance: patient tolerated the procedure well with no immediate complications  Dressing:  Sterile dressing applied

## 2019-11-13 ENCOUNTER — OFFICE VISIT (OUTPATIENT)
Dept: PHYSICAL THERAPY | Facility: REHABILITATION | Age: 59
End: 2019-11-13
Payer: COMMERCIAL

## 2019-11-13 DIAGNOSIS — M75.02 ADHESIVE CAPSULITIS OF LEFT SHOULDER: Primary | ICD-10-CM

## 2019-11-13 PROCEDURE — 97110 THERAPEUTIC EXERCISES: CPT

## 2019-11-13 PROCEDURE — 97140 MANUAL THERAPY 1/> REGIONS: CPT

## 2019-11-13 NOTE — PROGRESS NOTES
Daily Note     Today's date: 2019  Patient name: Jake Kumar  : 1960  MRN: [id number]  Referring provider: Theresa Rice MD  Dx:   Encounter Diagnosis     ICD-10-CM    1  Adhesive capsulitis of left shoulder M75 02         1:1 with PTA CR 2:50- 3:35  Subjective: Improved pain following injection yesterday  Able to sleep through the night  Objective: See treatment diary below      Assessment: Tolerated treatment well  Improved AA/PROM noted today  Patient would benefit from continued PT      Plan: Continue per plan of care        Precautions: anxiety, asthma, COPD, CAD, diabetes, HTN, MI      [facility]                Left shoulder PROM to tolerance TC  TP 8 mins  TP 8 mins  CR  10 mins                 Post/inf GH JM's Gr3-4 np  TP 2 mins  TP 2 mins  NP                                                                                             Exercise Diary                 Pulleys flex/scap 10"x3'/3'  10"x3'/3' 10" x3'/3'  10"  3'/3'               Wall climbs 10"x10  10"x10  10"x10  10"x10               Table slides ER 10"x10  10"x10  10"x10  10"x10               Supine cane flexion 10"x10  10"x10  10"x10  10"x10               Supine cane scaption 10"x10  10"x10  10"x10  10"x10               Supine cane ER 10"x10  10"x10  10"x10  10"x10               Sleeper stretch np  np  np  NP               S/L ER AROM 3"x20  3"x20  3"x20  3"x20               Towel IR stretch 10"x10  10"x10  10"x10  10"x10               Post cap stretch 10"x10  10"x10  10"x10  10"x10                                                                                                                                                                                                                                                                     Modalities                 MH to initiate session 10'  10'  np  NP               CP PRN post np  np  np  NP

## 2019-11-15 ENCOUNTER — OFFICE VISIT (OUTPATIENT)
Dept: PHYSICAL THERAPY | Facility: REHABILITATION | Age: 59
End: 2019-11-15
Payer: COMMERCIAL

## 2019-11-15 DIAGNOSIS — M75.02 ADHESIVE CAPSULITIS OF LEFT SHOULDER: Primary | ICD-10-CM

## 2019-11-15 PROCEDURE — 97110 THERAPEUTIC EXERCISES: CPT

## 2019-11-15 PROCEDURE — 97140 MANUAL THERAPY 1/> REGIONS: CPT

## 2019-11-15 NOTE — PROGRESS NOTES
Daily Note     Today's date: 11/15/2019  Patient name: Mirna Mandujano  : 1960  MRN: [id number]  Referring provider: Frances Mtz MD  Dx:   Encounter Diagnosis     ICD-10-CM    1  Adhesive capsulitis of left shoulder M75 02         1:1 with PTA CR 10:45-11:30  Subjective: Not feeling as well as he was on Wednesday  Feels injection is already wearing off  Objective: See treatment diary below      Assessment: Tolerated treatment fair  More discomfort with AAROM today  Pain with passive IR; all others pain free  Patient would benefit from continued PT      Plan: Continue per plan of care        Precautions: anxiety, asthma, COPD, CAD, diabetes, HTN, MI      [facility] 11/1  11/6  11/8  11/13  11/15             Left shoulder PROM to tolerance TC  TP 8 mins  TP 8 mins  CR  10 mins    CR  10 mins             Post/inf GH JM's Gr3-4 np  TP 2 mins  TP 2 mins  NP TP  2 mins                                                                   Exercise Diary  11/1  11/6  11/8  11/13  11/15             Pulleys flex/scap 10"x3'/3'  10"x3'/3' 10" x3'/3'  10"  3'/3'  10"  3'/3'             Wall climbs 10"x10  10"x10  10"x10  10"x10  10"x10             Table slides ER 10"x10  10"x10  10"x10  10"x10  10"x10             Supine cane flexion 10"x10  10"x10  10"x10  10"x10  10"x10             Supine cane scaption 10"x10  10"x10  10"x10  10"x10  10"x10             Supine cane ER 10"x10  10"x10  10"x10  10"x10  10"x10             Sleeper stretch np  np  np  NP  NP             S/L ER AROM 3"x20  3"x20  3"x20  3"x20  3"x20             Towel IR stretch 10"x10  10"x10  10"x10  10"x10  10"x10             Post cap stretch 10"x10  10"x10  10"x10  10"x10  10"x10                                                                                                                                                                                                                   Modalities  11/1  11/6  11/8  11/13  11/15            to initiate session 10'  10'  np  NP               CP PRN post np  np  np  NP

## 2019-11-20 ENCOUNTER — OFFICE VISIT (OUTPATIENT)
Dept: PHYSICAL THERAPY | Facility: REHABILITATION | Age: 59
End: 2019-11-20
Payer: COMMERCIAL

## 2019-11-20 DIAGNOSIS — M75.02 ADHESIVE CAPSULITIS OF LEFT SHOULDER: Primary | ICD-10-CM

## 2019-11-20 PROCEDURE — 97110 THERAPEUTIC EXERCISES: CPT

## 2019-11-20 PROCEDURE — 97140 MANUAL THERAPY 1/> REGIONS: CPT

## 2019-11-20 NOTE — PROGRESS NOTES
Daily Note     Today's date: 2019  Patient name: Carlos Gabriel  : 1960  MRN: [id number]  Referring provider: Sharron Cadet MD  Dx:   Encounter Diagnosis     ICD-10-CM    1  Adhesive capsulitis of left shoulder M75 02         1:1 with PTA CR 2:45-3:30  Subjective: Feeling better than he did last week  Occasional pain persists with activity primarily at end range flexion and extension  Occasional popping when shoulder is inflamed  Pain woke him up this morning in right side lying  Objective: See treatment diary below      Assessment: Tolerated treatment fair  Progressed AROM and scap stabilization  Limited tolerance to AROM flexion/scaption- advised to limit to pain free range  Patient would benefit from continued PT      Plan: Continue per plan of care        Precautions: anxiety, asthma, COPD, CAD, diabetes, HTN, MI      [facility] 11/1  11/6  11/8  11/13  11/15  11/20           Left shoulder PROM to tolerance TC  TP 8 mins  TP 8 mins  CR  10 mins    CR  10 mins  CR  10 mins           Post/inf GH JM's Gr3-4 np  TP 2 mins  TP 2 mins  NP TP  2 mins  NP                                                                 Exercise Diary  11/1  11/6  11/8  11/13  11/15  11/20           Pulleys flex/scap 10"x3'/3'  10"x3'/3' 10" x3'/3'  10"  3'/3'  10"  3'/3'  10"  3'/3'           Wall climbs 10"x10  10"x10  10"x10  10"x10  10"x10  10"x10           Table slides ER 10"x10  10"x10  10"x10  10"x10  10"x10  10"x10           Supine cane flexion 10"x10  10"x10  10"x10  10"x10  10"x10  HEP           Supine cane scaption 10"x10  10"x10  10"x10  10"x10  10"x10  HEP           Supine cane ER 10"x10  10"x10  10"x10  10"x10  10"x10  HEP           Sleeper stretch np  np  np  NP  NP  NP           S/L ER AROM 3"x20  3"x20  3"x20  3"x20  3"x20  3"x20           Towel IR stretch 10"x10  10"x10  10"x10  10"x10  10"x10  10"x10           Post cap stretch 10"x10  10"x10  10"x10  10"x10  10"x10  10"x10          AROM flexion            2x10            AROM scaption            11  Pain!            scap punches            3"x20            scap ABC's            x1                                                                                                                 Modalities  11/1  11/6  11/8  11/13  11/15  11/20            to initiate session 10'  10'  np  NP  NP  NP           CP PRN post np  np  np  NP  NP  NP

## 2019-11-22 ENCOUNTER — OFFICE VISIT (OUTPATIENT)
Dept: PHYSICAL THERAPY | Facility: REHABILITATION | Age: 59
End: 2019-11-22
Payer: COMMERCIAL

## 2019-11-22 DIAGNOSIS — M75.02 ADHESIVE CAPSULITIS OF LEFT SHOULDER: Primary | ICD-10-CM

## 2019-11-22 PROCEDURE — 97110 THERAPEUTIC EXERCISES: CPT

## 2019-11-22 PROCEDURE — 97140 MANUAL THERAPY 1/> REGIONS: CPT

## 2019-11-22 PROCEDURE — 97112 NEUROMUSCULAR REEDUCATION: CPT

## 2019-11-22 NOTE — PROGRESS NOTES
Daily Note     Today's date: 2019  Patient name: Lj Pettit  : 1960  MRN: [id number]  Referring provider: Karmen Kwan MD  Dx:   Encounter Diagnosis     ICD-10-CM    1  Adhesive capsulitis of left shoulder M75 02         1:1 with PTA CR 10:05- 10:50  Subjective: Minimal residual pain despite being limited with reps last session  Objective: See treatment diary below      Assessment: Tolerated treatment fair  Included scap stab utilizing TB today  AROM flexion/scaption limited to 90* due to shoulder pain and performed with back against wall due to back pain  Patient would benefit from continued PT  Plan: Continue per plan of care        Precautions: anxiety, asthma, COPD, CAD, diabetes, HTN, [facility] 11/1  11/6  11/8  11/13  11/15  11/20  11/22         Left shoulder PROM to tolerance TC  TP 8 mins  TP 8 mins  CR  10 mins    CR  10 mins  CR  10 mins  CR  10 mins         Post/inf GH JM's Gr3-4 np  TP 2 mins  TP 2 mins  NP TP  2 mins  NP  NP                                       Exercise Diary  11/1  11/6  11/8  11/13  11/15  11/20  11/22         Pulleys flex/scap 10"x3'/3'  10"x3'/3' 10" x3'/3'  10"  3'/3'  10"  3'/3'  10"  3'/3'  10"  3'/3'         Wall climbs 10"x10  10"x10  10"x10  10"x10  10"x10  10"x10  10"x10         Table slides ER 10"x10  10"x10  10"x10  10"x10  10"x10  10"x10  10"x10         Supine cane flexion 10"x10  10"x10  10"x10  10"x10  10"x10  HEP  HEP         Supine cane scaption 10"x10  10"x10  10"x10  10"x10  10"x10  HEP  HEP         Supine cane ER 10"x10  10"x10  10"x10  10"x10  10"x10  HEP  HEP         Sleeper stretch np  np  np  NP  NP  NP  NP         S/L ER AROM 3"x20  3"x20  3"x20  3"x20  3"x20  3"x20  3"x20         Towel IR stretch 10"x10  10"x10  10"x10  10"x10  10"x10  10"x10  10'x10         Post cap stretch 10"x10  10"x10  10"x10  10"x10  10"x10  10"x10  10"x10          AROM flexion            2x10  against wall  90*  1x10          AROM scaption            11  Pain!  against wall  90*  1x10          scap punches            3"x20  3"x20          scap ABC's            x1  x1          TB extensions              OTB  3"x15          TB rows              OTB  3"x15                                                               Modalities  11/1  11/6  11/8  11/13  11/15  11/20  11/22          to initiate session 10'  10'  np  NP  NP  NP  NP         CP PRN post np  np  np  NP  NP  NP  NP

## 2019-11-23 DIAGNOSIS — I25.10 CORONARY ARTERY DISEASE INVOLVING NATIVE HEART, ANGINA PRESENCE UNSPECIFIED, UNSPECIFIED VESSEL OR LESION TYPE: ICD-10-CM

## 2019-11-23 DIAGNOSIS — E78.00 PURE HYPERCHOLESTEROLEMIA: ICD-10-CM

## 2019-11-25 RX ORDER — FENOFIBRATE 145 MG/1
TABLET, COATED ORAL
Qty: 30 TABLET | Refills: 5 | Status: SHIPPED | OUTPATIENT
Start: 2019-11-25 | End: 2020-03-12 | Stop reason: SDUPTHER

## 2019-11-25 RX ORDER — CLOPIDOGREL BISULFATE 75 MG/1
TABLET ORAL
Qty: 30 TABLET | Refills: 5 | Status: SHIPPED | OUTPATIENT
Start: 2019-11-25 | End: 2020-03-12 | Stop reason: SDUPTHER

## 2019-11-27 ENCOUNTER — OFFICE VISIT (OUTPATIENT)
Dept: PHYSICAL THERAPY | Facility: REHABILITATION | Age: 59
End: 2019-11-27
Payer: COMMERCIAL

## 2019-11-27 DIAGNOSIS — M75.02 ADHESIVE CAPSULITIS OF LEFT SHOULDER: Primary | ICD-10-CM

## 2019-11-27 PROCEDURE — 97110 THERAPEUTIC EXERCISES: CPT

## 2019-11-27 PROCEDURE — 97112 NEUROMUSCULAR REEDUCATION: CPT

## 2019-11-27 PROCEDURE — 97140 MANUAL THERAPY 1/> REGIONS: CPT

## 2019-11-27 RX ORDER — TRIAMCINOLONE ACETONIDE 40 MG/ML
40 INJECTION, SUSPENSION INTRA-ARTICULAR; INTRAMUSCULAR
Status: COMPLETED | OUTPATIENT
Start: 2019-11-12 | End: 2019-11-12

## 2019-11-27 RX ORDER — LIDOCAINE HYDROCHLORIDE 10 MG/ML
4 INJECTION, SOLUTION INFILTRATION; PERINEURAL
Status: COMPLETED | OUTPATIENT
Start: 2019-11-12 | End: 2019-11-12

## 2019-11-27 NOTE — PROGRESS NOTES
Daily Note     Today's date: 2019  Patient name: Luis Leal  : 1960  MRN: [id number]  Referring provider: Coby Villegas MD  Dx:   Encounter Diagnosis     ICD-10-CM    1  Adhesive capsulitis of left shoulder M75 02        Start Time: 6046  Stop Time: 1800  Total time in clinic (min): 45 minutes    Subjective: Patient continues to note improvements but still has pain that wakes him at night  Objective: See treatment diary below    Assessment: Tolerated treatment fair  Patient continued with shoulder flexion to 90° secondary to UT compensation  He demonstrated good form t/o with no increase in pain  Continue to progress his strength as able  Plan: Continue per plan of care        Precautions: anxiety, asthma, COPD, CAD, diabetes, HTN, MI    [facility] 11/1  11/6  11/8  11/13  11/15  11/20  11/22  11/27       Left shoulder PROM to tolerance TC  TP 8 mins  TP 8 mins  CR  10 mins    CR  10 mins  CR  10 mins  CR  10 mins  10 mins       Post/inf GH JM's Gr3-4 np  TP 2 mins  TP 2 mins  NP TP  2 mins  NP  NP  NP                                  Exercise Diary  11/1  11/6  11/8  11/13  11/15  11/20  11/22  11/27       Pulleys flex/scap 10"x3'/3'  10"x3'/3' 10" x3'/3'  10"  3'/3'  10"  3'/3'  10"  3'/3'  10"  3'/3'  10"  3'/3'       Wall climbs 10"x10  10"x10  10"x10  10"x10  10"x10  10"x10  10"x10  10"x10       Table slides ER 10"x10  10"x10  10"x10  10"x10  10"x10  10"x10  10"x10  10"x10       Supine cane flexion 10"x10  10"x10  10"x10  10"x10  10"x10  HEP  HEP  HEP       Supine cane scaption 10"x10  10"x10  10"x10  10"x10  10"x10  HEP  HEP  HEP       Supine cane ER 10"x10  10"x10  10"x10  10"x10  10"x10  HEP  HEP  HEP       Sleeper stretch np  np  np  NP  NP  NP  NP  NP       S/L ER AROM 3"x20  3"x20  3"x20  3"x20  3"x20  3"x20  3"x20  3"x20       Towel IR stretch 10"x10  10"x10  10"x10  10"x10  10"x10  10"x10  10'x10  10"x10       Post cap stretch 10"x10  10"x10  10"x10  10"x10  10"x10  10"x10  10"x10  10"x10        AROM flexion            2x10  against wall  90*  1x10 against wall  90*  2x10        AROM scaption            11  Pain!  against wall  90*  1x10 against wall  90*  2x10        scap punches            3"x20  3"x20  3"x20        scap ABC's            x1  x1  x1        TB extensions              OTB  3"x15  OTB  3"x20        TB rows              OTB  3"x15  OTB  3"x20                                                          Modalities  11/1  11/6  11/8  11/13  11/15  11/20  11/22  11/27        to initiate session 10'  10'  np  NP  NP  NP  NP  NP       CP PRN post np  np  np  NP  NP  NP  NP  NP

## 2019-11-29 ENCOUNTER — OFFICE VISIT (OUTPATIENT)
Dept: PHYSICAL THERAPY | Facility: REHABILITATION | Age: 59
End: 2019-11-29
Payer: COMMERCIAL

## 2019-11-29 DIAGNOSIS — M75.02 ADHESIVE CAPSULITIS OF LEFT SHOULDER: Primary | ICD-10-CM

## 2019-11-29 PROCEDURE — 97110 THERAPEUTIC EXERCISES: CPT | Performed by: PHYSICAL THERAPIST

## 2019-11-29 PROCEDURE — 97140 MANUAL THERAPY 1/> REGIONS: CPT | Performed by: PHYSICAL THERAPIST

## 2019-11-29 NOTE — PROGRESS NOTES
Daily Note     Today's date: 2019  Patient name: Chaya Marsh  : 1960  MRN: [id number]  Referring provider: Kerry Brittle, MD  Dx:   Encounter Diagnosis     ICD-10-CM    1  Adhesive capsulitis of left shoulder M75 02                   Subjective: Pt reports soreness in L shoulder following last visit  Increased pain this morning in LB and L shoulder due to yesterdays increased activities  He cooked, traveled and had to pack and unpack food  Pt continues to try heat but minimal to no benefit in reducing pain  Objective: See treatment diary below      Assessment: Tolerated treatment well  Pain/tightness at available end ranges when performing PROM  Pt noted LBP, effecting his transfers  Patient exhibited good technique with therapeutic exercises and would benefit from continued PT to increase AROM and decrease pain  Plan: Progress treatment as tolerated         Precautions: anxiety, asthma, COPD, CAD, diabetes, HTN, [facility] 11/1  11/6  11/8  11/13  11/15  11/20  11/22  11/27  11/29     Left shoulder PROM to tolerance TC  TP 8 mins  TP 8 mins  CR  10 mins    CR  10 mins  CR  10 mins  CR  10 mins  10 mins AK   10 mins     Post/inf GH JM's Gr3-4 np  TP 2 mins  TP 2 mins  NP TP  2 mins  NP  NP  NP  np                                Exercise Diary  11/1  11/6  11/8  11/13  11/15  11/20  11/22  11/27  11/29     Pulleys flex/scap 10"x3'/3'  10"x3'/3' 10" x3'/3'  10"  3'/3'  10"  3'/3'  10"  3'/3'  10"  3'/3'  10"  3'/3'  10"  3'/3'     Wall climbs 10"x10  10"x10  10"x10  10"x10  10"x10  10"x10  10"x10  10"x10  10"x10     Table slides ER 10"x10  10"x10  10"x10  10"x10  10"x10  10"x10  10"x10  10"x10  10"x10     Supine cane flexion 10"x10  10"x10  10"x10  10"x10  10"x10  HEP  HEP  HEP  10"x10     Supine cane scaption 10"x10  10"x10  10"x10  10"x10  10"x10  HEP  HEP  HEP  10"x10     Supine cane ER 10"x10  10"x10  10"x10  10"x10  10"x10  HEP  HEP  HEP  10"x10     Sleeper stretch np  np  np  NP  NP  NP  NP  NP  np     S/L ER AROM 3"x20  3"x20  3"x20  3"x20  3"x20  3"x20  3"x20  3"x20  3"x20     Towel IR stretch 10"x10  10"x10  10"x10  10"x10  10"x10  10"x10  10'x10  10"x10  10"x10     Post cap stretch 10"x10  10"x10  10"x10  10"x10  10"x10  10"x10  10"x10  10"x10  10"x10      AROM flexion            2x10  against wall  90*  1x10 against wall  90*  2x10  against wall 2x10      AROM scaption            11  Pain!  against wall  90*  1x10 against wall  90*  2x10  2x10      scap punches            3"x20  3"x20  3"x20  3"x20      scap ABC's            x1  x1  x1  x1      TB extensions              OTB  3"x15  OTB  3"x20  OTB   3"x20      TB rows              OTB  3"x15  OTB  3"x20  OTB  3"x20                                                        Modalities  11/1  11/6  11/8  11/13  11/15  11/20  11/22  11/27  11/29      to initiate session 10'  10'  np  NP  NP  NP  NP  NP  np     CP PRN post np  np  np  NP  NP  NP  NP  NP  np

## 2019-12-04 ENCOUNTER — OFFICE VISIT (OUTPATIENT)
Dept: PHYSICAL THERAPY | Facility: REHABILITATION | Age: 59
End: 2019-12-04
Payer: COMMERCIAL

## 2019-12-04 DIAGNOSIS — M75.02 ADHESIVE CAPSULITIS OF LEFT SHOULDER: Primary | ICD-10-CM

## 2019-12-04 PROCEDURE — 97112 NEUROMUSCULAR REEDUCATION: CPT | Performed by: PHYSICAL THERAPIST

## 2019-12-04 PROCEDURE — 97140 MANUAL THERAPY 1/> REGIONS: CPT | Performed by: PHYSICAL THERAPIST

## 2019-12-04 PROCEDURE — 97110 THERAPEUTIC EXERCISES: CPT | Performed by: PHYSICAL THERAPIST

## 2019-12-04 NOTE — PROGRESS NOTES
Daily Note     Today's date: 2019  Patient name: Don Thorpe  : 1960  MRN: [id number]  Referring provider: Raymond Fofana MD  Dx:   Encounter Diagnosis     ICD-10-CM    1  Adhesive capsulitis of left shoulder M75 02                   Subjective: Pt reports minimal pain in the shoulder, most of his pain is located in LB and C/S today  Objective: See treatment diary below      Assessment: Tolerated treatment well  Patient has difficulty with transfers secondary to intense LBP, denies any shoulder pain with TE's  Pt compliant with HEP with intermittent neck pain  Patient exhibited good technique with therapeutic exercises and would benefit from continued PT to decrease pain and increase shoulder ROM  Plan: Progress treatment as tolerated         Precautions: anxiety, asthma, COPD, CAD, diabetes, HTN, [facility] 11/1  11/6  11/8  11/13  11/15  11/20  11/22  11/27  11/29  12   Left shoulder PROM to tolerance TC  TP 8 mins  TP 8 mins  CR  10 mins    CR  10 mins  CR  10 mins  CR  10 mins  10 mins AK   10 mins  AK  10'   Post/inf GH JM's Gr3-4 np  TP 2 mins  TP 2 mins  NP TP  2 mins  NP  NP  NP  np                                Exercise Diary  11/1  11/6  11/8  11/13  11/15  11/20  11/22  11/27  11/29  124   Pulleys flex/scap 10"x3'/3'  10"x3'/3' 10" x3'/3'  10"  3'/3'  10"  3'/3'  10"  3'/3'  10"  3'/3'  10"  3'/3'  10"  3'/3' 10"   3'/3'   Wall climbs 10"x10  10"x10  10"x10  10"x10  10"x10  10"x10  10"x10  10"x10  10"x10  10"x10   Table slides ER 10"x10  10"x10  10"x10  10"x10  10"x10  10"x10  10"x10  10"x10  10"x10  10"x10   Supine cane flexion 10"x10  10"x10  10"x10  10"x10  10"x10  HEP  HEP  HEP  10"x10  HEP   Supine cane scaption 10"x10  10"x10  10"x10  10"x10  10"x10  HEP  HEP  HEP  10"x10  HEP   Supine cane ER 10"x10  10"x10  10"x10  10"x10  10"x10  HEP  HEP  HEP  10"x10  HEP   Sleeper stretch np  np  np  NP  NP  NP  NP  NP  np  10"x10 standing   S/L ER AROM 3"x20  3"x20  3"x20  3"x20  3"x20  3"x20  3"x20  3"x20  3"x20  3"x20   Towel IR stretch 10"x10  10"x10  10"x10  10"x10  10"x10  10"x10  10'x10  10"x10  10"x10  10"x10   Post cap stretch 10"x10  10"x10  10"x10  10"x10  10"x10  10"x10  10"x10  10"x10  10"x10  10"x10    AROM flexion            2x10  against wall  90*  1x10 against wall  90*  2x10  against wall 2x10  against wall 2x10    AROM scaption            11  Pain!  against wall  90*  1x10 against wall  90*  2x10  2x10  against wall  2x10    scap punches            3"x20  3"x20  3"x20  3"x20  3"x20    scap ABC's            x1  x1  x1  x1  x1    TB extensions              OTB  3"x15  OTB  3"x20  OTB   3"x20  OTB  3"x20    TB rows              OTB  3"x15  OTB  3"x20  OTB  3"x20  OTB 3"x20                                                      Modalities  11/1  11/6  11/8  11/13  11/15  11/20  11/22  11/27  11/29  12/4    to initiate session 10'  10'  np  NP  NP  NP  NP  NP  np  np   CP PRN post np  np  np  NP  NP  NP  NP  NP  np  np

## 2019-12-06 ENCOUNTER — OFFICE VISIT (OUTPATIENT)
Dept: PHYSICAL THERAPY | Facility: REHABILITATION | Age: 59
End: 2019-12-06
Payer: COMMERCIAL

## 2019-12-06 DIAGNOSIS — M54.50 CHRONIC BILATERAL LOW BACK PAIN WITHOUT SCIATICA: ICD-10-CM

## 2019-12-06 DIAGNOSIS — G89.29 CHRONIC BILATERAL LOW BACK PAIN WITHOUT SCIATICA: ICD-10-CM

## 2019-12-06 DIAGNOSIS — M75.02 ADHESIVE CAPSULITIS OF LEFT SHOULDER: Primary | ICD-10-CM

## 2019-12-06 PROCEDURE — 97112 NEUROMUSCULAR REEDUCATION: CPT | Performed by: PHYSICAL THERAPIST

## 2019-12-06 PROCEDURE — 97140 MANUAL THERAPY 1/> REGIONS: CPT | Performed by: PHYSICAL THERAPIST

## 2019-12-06 PROCEDURE — 97110 THERAPEUTIC EXERCISES: CPT | Performed by: PHYSICAL THERAPIST

## 2019-12-06 RX ORDER — HYDROCODONE BITARTRATE AND ACETAMINOPHEN 5; 300 MG/1; MG/1
1 TABLET ORAL EVERY 6 HOURS PRN
Qty: 112 TABLET | Refills: 0 | Status: SHIPPED | OUTPATIENT
Start: 2019-12-06 | End: 2019-12-09 | Stop reason: SDUPTHER

## 2019-12-06 NOTE — PROGRESS NOTES
Daily Note     Today's date: 2019  Patient name: Heriberto Ruelas  : 1960  MRN: [id number]  Referring provider: Isaak Deal MD  Dx:   Encounter Diagnosis     ICD-10-CM    1  Adhesive capsulitis of left shoulder M75 02                   Subjective: Pt mentions he is feeling much better today after taking full dose of prescribed medications a few days ago that seemed to reduce his back and neck pain  Pt reports minimal shoulder pain which may have been due to the severe back and neck pain he was experiencing  Objective: See treatment diary below      Assessment: Tolerated treatment well  Pt demonstrating improved active/passive shoulder ROM  Patient exhibited good technique with therapeutic exercises and would benefit from continued PT      Plan: RA NV for possible d/c to ongoing HEP        Precautions: anxiety, asthma, COPD, CAD, diabetes, HTN, MI    [facility]  11/6  11/8  11/13  11/15  11/20  11/22  11/27  11/29  12/4 12/6   Left shoulder PROM to tolerance  TP 8 mins  TP 8 mins  CR  10 mins    CR  10 mins  CR  10 mins  CR  10 mins  10 mins AK   10 mins  AK  10' TP x8'   Post/inf GH JM's Gr3-4  TP 2 mins  TP 2 mins  NP TP  2 mins  NP  NP  NP  np   TP 2 mins                             Exercise Diary  11/1  11/6  11/8  11/13  11/15  11/20  11/22  11/27  11/29  12/4 12/6   Pulleys flex/scap 10"x3'/3'  10"x3'/3' 10" x3'/3'  10"  3'/3'  10"  3'/3'  10"  3'/3'  10"  3'/3'  10"  3'/3'  10"  3'/3' 10"   3'/3' 10"  3'/3'   Wall climbs 10"x10  10"x10  10"x10  10"x10  10"x10  10"x10  10"x10  10"x10  10"x10  10"x10 10"x10   Table slides ER 10"x10  10"x10  10"x10  10"x10  10"x10  10"x10  10"x10  10"x10  10"x10  10"x10 10"x10   Supine cane flexion 10"x10  10"x10  10"x10  10"x10  10"x10  HEP  HEP  HEP  10"x10  HEP hep   Supine cane scaption 10"x10  10"x10  10"x10  10"x10  10"x10  HEP  HEP  HEP  10"x10  HEP hep   Supine cane ER 10"x10  10"x10  10"x10  10"x10  10"x10  HEP  HEP  HEP  10"x10  HEP hep   Sleeper stretch np  np  np  NP  NP  NP  NP  NP  np  10"x10 standing 10"x10 standing   S/L ER AROM 3"x20  3"x20  3"x20  3"x20  3"x20  3"x20  3"x20  3"x20  3"x20  3"x20 1# x20   Towel IR stretch 10"x10  10"x10  10"x10  10"x10  10"x10  10"x10  10'x10  10"x10  10"x10  10"x10 10'x10   Post cap stretch 10"x10  10"x10  10"x10  10"x10  10"x10  10"x10  10"x10  10"x10  10"x10  10"x10 10"x10    AROM flexion            2x10  against wall  90*  1x10 against wall  90*  2x10  against wall 2x10  against wall 2x10 Against wall 2x10    AROM scaption            11  Pain!  against wall  90*  1x10 against wall  90*  2x10  2x10  against wall  2x10 Against wall 2x10    scap punches            3"x20  3"x20  3"x20  3"x20  3"x20 3"x20    scap ABC's            x1  x1  x1  x1  x1 x1    TB extensions              OTB  3"x15  OTB  3"x20  OTB   3"x20  OTB  3"x20 otb 3"x20    TB rows              OTB  3"x15  OTB  3"x20  OTB  3"x20  OTB 3"x20 otb 3"x20                                                        Modalities  11/1  11/6  11/8  11/13  11/15  11/20  11/22  11/27  11/29  12/4    to initiate session 10'  10'  np  NP  NP  NP  NP  NP  np  np   CP PRN post np  np  np  NP  NP  NP  NP  NP  np  np

## 2019-12-09 DIAGNOSIS — M54.50 CHRONIC BILATERAL LOW BACK PAIN WITHOUT SCIATICA: ICD-10-CM

## 2019-12-09 DIAGNOSIS — G89.29 CHRONIC BILATERAL LOW BACK PAIN WITHOUT SCIATICA: ICD-10-CM

## 2019-12-09 RX ORDER — HYDROCODONE BITARTRATE AND ACETAMINOPHEN 5; 300 MG/1; MG/1
1 TABLET ORAL EVERY 6 HOURS PRN
Qty: 112 TABLET | Refills: 0 | Status: SHIPPED | OUTPATIENT
Start: 2019-12-09 | End: 2020-03-31 | Stop reason: SDUPTHER

## 2019-12-09 NOTE — TELEPHONE ENCOUNTER
Prescription was set to print need to reorder so I can go to the pharmacy electronically   Order put in and setting changed to normal

## 2019-12-12 ENCOUNTER — EVALUATION (OUTPATIENT)
Dept: PHYSICAL THERAPY | Facility: REHABILITATION | Age: 59
End: 2019-12-12
Payer: COMMERCIAL

## 2019-12-12 DIAGNOSIS — M75.02 ADHESIVE CAPSULITIS OF LEFT SHOULDER: Primary | ICD-10-CM

## 2019-12-12 PROCEDURE — 97110 THERAPEUTIC EXERCISES: CPT | Performed by: PHYSICAL THERAPIST

## 2019-12-12 NOTE — PROGRESS NOTES
PT Re-Evaluation     Today's date: 2019  Patient name: Don Thorpe  : 1960  MRN: [id number]  Referring provider: Raymond Fofana MD  Dx:   Encounter Diagnosis     ICD-10-CM    1  Adhesive capsulitis of left shoulder M75 02                   Assessment  Assessment details: Pt has progressed very well, demonstrating improvement in shoulder ROM, strength, and function with a decrease in pain  Pt reports compliance with HEP and feels ready to be d/c to ongoing HEP at this time  All PT goals have been met  Impairments: abnormal or restricted ROM and pain with function  Understanding of Dx/Px/POC: good   Prognosis: good    Goals  Short term goals (to be achieved in 6 weeks):  1  Pt will demonstrate full PROM-partially met  2  Pt will report decreased levels of pain by at least 2 subjective ratings-met  3  Pt will be independent with current HEP-met    Long term goals (to be achieved in 12 weeks):  1  Pt will demonstrate full AROM-met  2  Pt will be able to don/doff a coat with minimal limitations-met  3  Pt will be able to perform light OH activities with minimal pain-met  4  Pt will demonstrate improved FOTO score, >69- met (87)  5  Pt will be independent in HEP- met     Plan  Plan details: Patient was educated in [employer]  All questions were answered to pt's satisfaction  Planned therapy interventions: home exercise program  Treatment plan discussed with: patient        Subjective Evaluation    History of Present Illness  Mechanism of injury: Pt reports continues improvement with his shoulder, noting improvement with household chores, sleep, washing hair, reaching behind back, dressing  Pt notes some pain persists with overstretching his arm      Pain  At best pain ratin  At worst pain ratin  Location: left shoulder    Treatments  Current treatment: physical therapy  Patient Goals  Patient goals for therapy: decreased pain, increased motion, increased strength and independence with ADLs/IADLs          Objective     Active Range of Motion   Left Shoulder   Flexion: 158 degrees   Abduction: 162 degrees   External rotation 0°: 70 degrees   External rotation BTH: WFL  Internal rotation BTB: WFL    Passive Range of Motion   Left Shoulder   Flexion: 165 degrees   Abduction: 165 degrees   External rotation 90°: 80 degrees   Internal rotation 90°: WFL    Strength/Myotome Testing     Left Shoulder     Planes of Motion   Flexion: 5   Abduction: 5   External rotation at 0°: 5   Internal rotation at 0°: 5              Precautions: anxiety, asthma, COPD, CAD, diabetes, HTN, MI    [facility]  11/6  11/8  11/13  11/15  11/20  11/22  11/27  11/29  12/4 12/6   Left shoulder PROM to tolerance  TP 8 mins  TP 8 mins  CR  10 mins     CR  10 mins  CR  10 mins  CR  10 mins  10 mins AK   10 mins  AK  10' TP x8'   Post/inf GH JM's Gr3-4  TP 2 mins  TP 2 mins  NP TP  2 mins  NP  NP  NP  np   TP 2 mins                              Exercise Diary  11/1  11/6  11/8  11/13  11/15  11/20  11/22  11/27  11/29  12/4 12/6   Pulleys flex/scap 10"x3'/3'  10"x3'/3' 10" x3'/3'  10"  3'/3'  10"  3'/3'  10"  3'/3'  10"  3'/3'  10"  3'/3'  10"  3'/3' 10"   3'/3' 10"  3'/3'   Wall climbs 10"x10  10"x10  10"x10  10"x10  10"x10  10"x10  10"x10  10"x10  10"x10  10"x10 10"x10   Table slides ER 10"x10  10"x10  10"x10  10"x10  10"x10  10"x10  10"x10  10"x10  10"x10  10"x10 10"x10   Supine cane flexion 10"x10  10"x10  10"x10  10"x10  10"x10  HEP  HEP  HEP  10"x10  HEP hep   Supine cane scaption 10"x10  10"x10  10"x10  10"x10  10"x10  HEP  HEP  HEP  10"x10  HEP hep   Supine cane ER 10"x10  10"x10  10"x10  10"x10  10"x10  HEP  HEP  HEP  10"x10  HEP hep   Sleeper stretch np  np  np  NP  NP  NP  NP  NP  np  10"x10 standing 10"x10 standing   S/L ER AROM 3"x20  3"x20  3"x20  3"x20  3"x20  3"x20  3"x20  3"x20  3"x20  3"x20 1# x20   Towel IR stretch 10"x10  10"x10  10"x10  10"x10  10"x10  10"x10  10'x10  10"x10  10"x10  10"x10 10'x10 Post cap stretch 10"x10  10"x10  10"x10  10"x10  10"x10  10"x10  10"x10  10"x10  10"x10  10"x10 10"x10    AROM flexion            2x10  against wall  90*  1x10 against wall  90*  2x10  against wall 2x10  against wall 2x10 Against wall 2x10    AROM scaption            11  Pain!  against wall  90*  1x10 against wall  90*  2x10  2x10  against wall  2x10 Against wall 2x10    scap punches            3"x20  3"x20  3"x20  3"x20  3"x20 3"x20    scap ABC's            x1  x1  x1  x1  x1 x1    TB extensions              OTB  3"x15  OTB  3"x20  OTB   3"x20  OTB  3"x20 otb 3"x20    TB rows              OTB  3"x15  OTB  3"x20  OTB  3"x20  OTB 3"x20 otb 3"x20                                                          Modalities  11/1  11/6  11/8  11/13  11/15  11/20  11/22  11/27  11/29  12/4    to initiate session 10'  10'  np  NP  NP  NP  NP  NP  np  np   CP PRN post np  np  np  NP  NP  NP  NP  NP  np  np     Updated measurements and functional status taken this session  Reviewed TE' s for d/c

## 2019-12-13 DIAGNOSIS — Z72.0 TOBACCO ABUSE: ICD-10-CM

## 2019-12-13 RX ORDER — VARENICLINE TARTRATE 1 MG/1
TABLET, FILM COATED ORAL
Qty: 56 TABLET | Refills: 6 | Status: SHIPPED | OUTPATIENT
Start: 2019-12-13 | End: 2020-06-08

## 2019-12-15 DIAGNOSIS — E11.59 TYPE 2 DIABETES MELLITUS WITH OTHER CIRCULATORY COMPLICATION, WITH LONG-TERM CURRENT USE OF INSULIN (HCC): ICD-10-CM

## 2019-12-15 DIAGNOSIS — Z79.4 TYPE 2 DIABETES MELLITUS WITH OTHER CIRCULATORY COMPLICATION, WITH LONG-TERM CURRENT USE OF INSULIN (HCC): ICD-10-CM

## 2019-12-16 ENCOUNTER — APPOINTMENT (OUTPATIENT)
Dept: LAB | Age: 59
End: 2019-12-16
Payer: COMMERCIAL

## 2019-12-16 ENCOUNTER — TRANSCRIBE ORDERS (OUTPATIENT)
Dept: ADMINISTRATIVE | Age: 59
End: 2019-12-16

## 2019-12-16 DIAGNOSIS — I10 BENIGN ESSENTIAL HYPERTENSION: ICD-10-CM

## 2019-12-16 DIAGNOSIS — IMO0001 CONTROLLED INSULIN DEPENDENT DIABETES MELLITUS: ICD-10-CM

## 2019-12-16 LAB
ANION GAP SERPL CALCULATED.3IONS-SCNC: 4 MMOL/L (ref 4–13)
BUN SERPL-MCNC: 20 MG/DL (ref 5–25)
CALCIUM SERPL-MCNC: 9.7 MG/DL (ref 8.3–10.1)
CHLORIDE SERPL-SCNC: 107 MMOL/L (ref 100–108)
CO2 SERPL-SCNC: 29 MMOL/L (ref 21–32)
CREAT SERPL-MCNC: 1.31 MG/DL (ref 0.6–1.3)
CREAT UR-MCNC: 243 MG/DL
EST. AVERAGE GLUCOSE BLD GHB EST-MCNC: 126 MG/DL
GFR SERPL CREATININE-BSD FRML MDRD: 59 ML/MIN/1.73SQ M
GLUCOSE P FAST SERPL-MCNC: 73 MG/DL (ref 65–99)
HBA1C MFR BLD: 6 % (ref 4.2–6.3)
MICROALBUMIN UR-MCNC: 21.6 MG/L (ref 0–20)
MICROALBUMIN/CREAT 24H UR: 9 MG/G CREATININE (ref 0–30)
POTASSIUM SERPL-SCNC: 4.3 MMOL/L (ref 3.5–5.3)
SODIUM SERPL-SCNC: 140 MMOL/L (ref 136–145)

## 2019-12-16 PROCEDURE — 82570 ASSAY OF URINE CREATININE: CPT | Performed by: INTERNAL MEDICINE

## 2019-12-16 PROCEDURE — 82043 UR ALBUMIN QUANTITATIVE: CPT | Performed by: INTERNAL MEDICINE

## 2019-12-16 PROCEDURE — 83036 HEMOGLOBIN GLYCOSYLATED A1C: CPT

## 2019-12-16 PROCEDURE — 80048 BASIC METABOLIC PNL TOTAL CA: CPT

## 2019-12-16 PROCEDURE — 36415 COLL VENOUS BLD VENIPUNCTURE: CPT

## 2019-12-16 RX ORDER — INSULIN GLARGINE 100 [IU]/ML
INJECTION, SOLUTION SUBCUTANEOUS
Qty: 5 PEN | Refills: 3 | Status: SHIPPED | OUTPATIENT
Start: 2019-12-16 | End: 2020-05-11 | Stop reason: SDUPTHER

## 2019-12-18 DIAGNOSIS — E11.8 TYPE 2 DIABETES MELLITUS WITH COMPLICATION, WITH LONG-TERM CURRENT USE OF INSULIN (HCC): ICD-10-CM

## 2019-12-18 DIAGNOSIS — Z79.4 TYPE 2 DIABETES MELLITUS WITH COMPLICATION, WITH LONG-TERM CURRENT USE OF INSULIN (HCC): ICD-10-CM

## 2019-12-23 ENCOUNTER — OFFICE VISIT (OUTPATIENT)
Dept: INTERNAL MEDICINE CLINIC | Facility: CLINIC | Age: 59
End: 2019-12-23
Payer: COMMERCIAL

## 2019-12-23 VITALS
SYSTOLIC BLOOD PRESSURE: 128 MMHG | BODY MASS INDEX: 44.03 KG/M2 | HEIGHT: 66 IN | DIASTOLIC BLOOD PRESSURE: 66 MMHG | OXYGEN SATURATION: 97 % | HEART RATE: 74 BPM | TEMPERATURE: 97.9 F | WEIGHT: 274 LBS

## 2019-12-23 DIAGNOSIS — I10 BENIGN ESSENTIAL HYPERTENSION: ICD-10-CM

## 2019-12-23 DIAGNOSIS — I25.10 ARTERIOSCLEROTIC CARDIOVASCULAR DISEASE: ICD-10-CM

## 2019-12-23 DIAGNOSIS — G89.29 CHRONIC LEFT SHOULDER PAIN: ICD-10-CM

## 2019-12-23 DIAGNOSIS — IMO0001 CONTROLLED INSULIN DEPENDENT DIABETES MELLITUS: ICD-10-CM

## 2019-12-23 DIAGNOSIS — Z12.11 COLON CANCER SCREENING: ICD-10-CM

## 2019-12-23 DIAGNOSIS — Z12.5 PROSTATE CANCER SCREENING: ICD-10-CM

## 2019-12-23 DIAGNOSIS — J43.1 PANLOBULAR EMPHYSEMA (HCC): ICD-10-CM

## 2019-12-23 DIAGNOSIS — Z23 ENCOUNTER FOR IMMUNIZATION: Primary | ICD-10-CM

## 2019-12-23 DIAGNOSIS — M25.512 CHRONIC LEFT SHOULDER PAIN: ICD-10-CM

## 2019-12-23 PROCEDURE — 3078F DIAST BP <80 MM HG: CPT | Performed by: INTERNAL MEDICINE

## 2019-12-23 PROCEDURE — 90471 IMMUNIZATION ADMIN: CPT

## 2019-12-23 PROCEDURE — 3074F SYST BP LT 130 MM HG: CPT | Performed by: INTERNAL MEDICINE

## 2019-12-23 PROCEDURE — 1036F TOBACCO NON-USER: CPT | Performed by: INTERNAL MEDICINE

## 2019-12-23 PROCEDURE — 99214 OFFICE O/P EST MOD 30 MIN: CPT | Performed by: INTERNAL MEDICINE

## 2019-12-23 PROCEDURE — 3008F BODY MASS INDEX DOCD: CPT | Performed by: INTERNAL MEDICINE

## 2019-12-23 PROCEDURE — 90682 RIV4 VACC RECOMBINANT DNA IM: CPT

## 2019-12-23 RX ORDER — PANTOPRAZOLE SODIUM 40 MG/1
40 TABLET, DELAYED RELEASE ORAL DAILY
Refills: 5 | COMMUNITY
Start: 2019-11-25 | End: 2020-03-12 | Stop reason: SDUPTHER

## 2019-12-23 NOTE — ASSESSMENT & PLAN NOTE
Blood pressure is controlled  Patient will continue present medication    We will be checking on his renal function with his next visit

## 2019-12-23 NOTE — PROGRESS NOTES
Assessment/Plan:    Controlled insulin dependent diabetes mellitus ([facility] 75 )    Lab Results   Component Value Date    HGBA1C 6 0 12/16/2019    Recent testing shows the patient still is maintaining excellent control of his diabetes with present treatment  Patient is commended on his efforts and his surveillance  He did have a discussion with the patient that despite the fact that his sugars are under great control he still needs to work harder at weight loss  Problem important consideration would be reducing his caloric intake which she does not follow  He was told to start very slowly trying to reduce his caloric intake to 8366-0421 calories per day and as he slowly begins to lose weight reduce his caloric intake further  He is limited with his exercise capacity because of his chronic arthritis, back pain  We will check a fasting blood sugar, hemoglobin A1c, urine for microalbumin when he returns to the office in 4 months for general physical   He is up-to-date with diabetic eye exam, diabetic foot exam was performed today    Chronic obstructive pulmonary disease ([facility] 75 )  Patient has a history of chronic obstructive pulmonary disease  Patient is had no acute exacerbations recently  We did discuss with the patient the importance of call immediately if any acute infections  Patient will receive the influenza vaccine today in the office    Arteriosclerotic cardiovascular disease  Patient denies any chest pain or pressure and no increasing shortness of breath with exertion  He states that he should have an upcoming appointment to be seen by Cardiology for evaluation  Benign essential hypertension  Blood pressure is controlled  Patient will continue present medication  We will be checking on his renal function with his next visit    Chronic left shoulder pain  Patient states with physical therapy he is having left pain in his shoulder  Better range of motion    He was told he needs to continue with the exercises that were given at [facility]       Diagnoses and all orders for this visit:    Encounter for immunization  -     influenza vaccine, [id number], quadrivalent, recombinant, PF, 0 5 mL, for patients 18 yr+ (FLUBLOK)    Chronic left shoulder pain    Controlled insulin dependent diabetes mellitus ([facility] )  -     Comprehensive metabolic panel; Future  -     CBC and differential; Future  -     Lipid panel; Future  -     TSH, 3rd generation with Free T4 reflex; Future  -     Hemoglobin A1C; Future  -     Microalbumin / creatinine urine ratio    Benign essential hypertension  -     Comprehensive metabolic panel; Future  -     CBC and differential; Future  -     Lipid panel; Future  -     TSH, 3rd generation with Free T4 reflex; Future    Arteriosclerotic cardiovascular disease  -     Comprehensive metabolic panel; Future  -     CBC and differential; Future  -     Lipid panel; Future  -     TSH, 3rd generation with Free T4 reflex; Future    Prostate cancer screening  -     PSA, Total Screen; Future    Colon cancer screening  -     Occult Blood, Fecal Immunochemical; Future    Panlobular emphysema (HCC)    Other orders  -     pantoprazole (PROTONIX) 40 mg tablet; Take 40 mg by mouth daily          Subjective:      Patient ID: Frances Barbour is a 61 y o  male  Patient is a 51-year-old male with a history of multiple medical problems as outlined previously including hypertension, hyperlipidemia, coronary artery disease, diabetes mellitus type 2, chronic back pain, obesity  Patient is here today for routine follow-up  He did have labs performed prior to the visit today and we did discuss the results  Patient states in general he is feeling well and he remarks that he has no new complaints or problems  He has finished a course of physical therapy for her shoulder pain and states that it is much improved        The following portions of the patient's history were reviewed and updated as appropriate: He  has a past medical history of Abscess of left thigh, Acute myocardial infarction ([facility] ), Anxiety, Arteriosclerotic cardiovascular disease, Asthma, Chest pain, Class 2 obesity with alveolar hypoventilation and body mass index (BMI) of 36 0 to 36 9 in adult [facility]), COPD (chronic obstructive pulmonary disease) ([facility] ), Coronary artery disease, Diabetes mellitus ([facility] ), Fatty liver, Gastric ulcer, GERD (gastroesophageal reflux disease), Hyperlipidemia, Hypertension, Low back pain, Myocardial infarction ([facility] ), Obstructive sleep apnea, and Spondylosis of lumbar region without myelopathy or radiculopathy  He   Patient Active Problem List    Diagnosis Date Noted    Chronic left shoulder pain 08/21/2019    Healthcare maintenance 04/10/2019    Morbid obesity with BMI of 40 0-44 9, adult ([facility] ) 11/29/2018    Low back pain 10/22/2018    Dysuria 07/24/2018    Lumbar radiculopathy 12/29/2017    Myofascial pain syndrome 12/29/2017    Sacroiliitis ([facility] ) 12/29/2017    Controlled insulin dependent diabetes mellitus ([facility] ) 02/22/2017    Chest pain 08/02/2016    Spondylosis of lumbar region without myelopathy or radiculopathy 04/15/2014    Benign essential hypertension 10/16/2012    Arteriosclerotic cardiovascular disease 06/14/2012    Chronic obstructive pulmonary disease ([facility] ) 06/14/2012     He  has a past surgical history that includes Coronary angioplasty with stent (2006, 2007) and Neuroplasty / transposition median nerve at carpal tunnel (Right)  His family history includes Alzheimer's disease in his mother; Cancer in his other; Dementia in his mother; Heart attack in his father; Heart failure in his mother; Hypertension in his family; Other in his family and father; Stroke in his family  He  reports that he has quit smoking  He smoked 0 00 packs per day for 0 00 years  He has never used smokeless tobacco  He reports that he does not drink alcohol or use drugs    Current Outpatient Medications   Medication Sig Dispense Refill    albuterol (PROVENTIL HFA,VENTOLIN HFA) 90 mcg/act inhaler Inhale 90 puffs as needed      aspirin 325 mg tablet Take by mouth      atorvastatin (LIPITOR) 40 mg tablet TAKE 1 TABLET DAILY 30 tablet 17    benazepril (LOTENSIN) 10 mg tablet TAKE ONE TABLET BY MOUTH EVERY DAY 30 tablet 5    CHANTIX 1 MG tablet TAKE 1 TABLET TWICE A DAY 56 tablet 6    clopidogrel (PLAVIX) 75 mg tablet TAKE 1 TABLET BY MOUTH EVERY DAY 30 tablet 5    fenofibrate (TRICOR) 145 mg tablet TAKE 1 TABLET BY MOUTH EVERY DAY 30 tablet 5    HYDROcodone-acetaminophen (VICODIN) 5-300 MG per tablet Take 1 tablet by mouth every 6 (six) hours as needed for moderate painMax Daily Amount: 4 tablets 112 tablet 0    Insulin Pen Needle (B-D UF III MINI PEN NEEDLES) 31G X 5 MM MISC Inject as directed 2 (two) times a day Use as directed 100 each 5    JARDIANCE 25 MG TABS TAKE 1 TABLET EVERY DAY 30 tablet 5    LANTUS SOLOSTAR 100 units/mL injection pen INJECT 90 UNITS DAILY 5 pen 3    methocarbamol (ROBAXIN) 750 mg tablet Take 1 tablet (750 mg total) by mouth 3 (three) times a day as needed (Muscle spasm) 90 tablet 0    metoprolol tartrate (LOPRESSOR) 50 mg tablet Take 50 mg by mouth every 12 (twelve) hours        nitroglycerin (NITROSTAT) 0 3 mg SL tablet Place under the tongue      ONE TOUCH ULTRA TEST test strip USE TO TEST 4 TIMES A  each 3    pantoprazole (PROTONIX) 40 mg tablet Take 40 mg by mouth daily  5     No current facility-administered medications for this visit        Current Outpatient Medications on File Prior to Visit   Medication Sig    albuterol (PROVENTIL HFA,VENTOLIN HFA) 90 mcg/act inhaler Inhale 90 puffs as needed    aspirin 325 mg tablet Take by mouth    atorvastatin (LIPITOR) 40 mg tablet TAKE 1 TABLET DAILY    benazepril (LOTENSIN) 10 mg tablet TAKE ONE TABLET BY MOUTH EVERY DAY    CHANTIX 1 MG tablet TAKE 1 TABLET TWICE A DAY    clopidogrel (PLAVIX) 75 mg tablet TAKE 1 TABLET BY MOUTH EVERY DAY    fenofibrate (TRICOR) 145 mg tablet TAKE 1 TABLET BY MOUTH EVERY DAY    HYDROcodone-acetaminophen (VICODIN) 5-300 MG per tablet Take 1 tablet by mouth every 6 (six) hours as needed for moderate painMax Daily Amount: 4 tablets    Insulin Pen Needle (B-D UF III MINI PEN NEEDLES) 31G X 5 MM MISC Inject as directed 2 (two) times a day Use as directed    JARDIANCE 25 MG TABS TAKE 1 TABLET EVERY DAY    LANTUS SOLOSTAR 100 units/mL injection pen INJECT 90 UNITS DAILY    methocarbamol (ROBAXIN) 750 mg tablet Take 1 tablet (750 mg total) by mouth 3 (three) times a day as needed (Muscle spasm)    metoprolol tartrate (LOPRESSOR) 50 mg tablet Take 50 mg by mouth every 12 (twelve) hours      nitroglycerin (NITROSTAT) 0 3 mg SL tablet Place under the tongue    ONE TOUCH ULTRA TEST test strip USE TO TEST 4 TIMES A DAY    pantoprazole (PROTONIX) 40 mg tablet Take 40 mg by mouth daily     No current facility-administered medications on file prior to visit  He is allergic to cefaclor and simvastatin       Review of Systems   Constitutional: Negative  HENT: Negative  Eyes: Negative  Respiratory: Positive for shortness of breath (Patient does have chronic shortness of breath with brisk exertion but no increase)  Negative for apnea, cough, choking, chest tightness, wheezing and stridor  Cardiovascular: Negative  Gastrointestinal: Negative  Endocrine: Negative  Genitourinary: Negative  Musculoskeletal: Positive for arthralgias and back pain  Negative for gait problem, joint swelling, myalgias, neck pain and neck stiffness  Skin: Negative  Allergic/Immunologic: Negative  Neurological: Negative  Hematological: Negative  Psychiatric/Behavioral: Negative  Objective:      /66   Pulse 74   Temp 97 9 °F (36 6 °C)   Ht 5' 6" (1 676 m)   Wt 124 kg (274 lb)   SpO2 97%   BMI 44 22 kg/m²          Physical Exam   Constitutional: He is oriented to person, place, and time   He appears well-developed and well-nourished  No distress  Pleasant, articulate, overweight 63-year-old male who is awake alert in no acute distress and oriented x3   HENT:   Head: Normocephalic and atraumatic  Right Ear: External ear normal    Left Ear: External ear normal    Nose: Nose normal    Mouth/Throat: Oropharynx is clear and moist  No oropharyngeal exudate  Some crowding of his oral airway, thick tongue   Eyes: Pupils are equal, round, and reactive to light  Conjunctivae and EOM are normal  Right eye exhibits no discharge  Left eye exhibits no discharge  No scleral icterus  Neck: Normal range of motion  Neck supple  No JVD present  No tracheal deviation present  No thyromegaly present  Cardiovascular: Normal rate, regular rhythm, normal heart sounds and intact distal pulses  Exam reveals no gallop and no friction rub  No murmur heard  Pulmonary/Chest: Effort normal  No stridor  No respiratory distress  He has no wheezes  He has no rales  He exhibits no tenderness  Patient has decreased breath sounds anteriorly and posteriorly but no rales rhonchi or wheezes could be appreciated on exam today   Abdominal: Soft  Bowel sounds are normal  He exhibits no distension and no mass  There is no tenderness  There is no rebound and no guarding  No hernia  Obese   Musculoskeletal: He exhibits edema and deformity  He exhibits no tenderness  Trace edema bilaterally to lower extremities  Patient on examination does have some flattening to his lumbar curve with decreased range of motion both actively and passively but no pain with movement today  Lymphadenopathy:     He has no cervical adenopathy  Neurological: He is alert and oriented to person, place, and time  He displays abnormal reflex (Absent patella and Achilles tendon reflexes bilaterally)  No cranial nerve deficit or sensory deficit  He exhibits normal muscle tone  Coordination normal    Skin: Skin is warm and dry   Capillary refill takes less than 2 seconds  No rash noted  He is not diaphoretic  No erythema  No pallor  Psychiatric: He has a normal mood and affect  His behavior is normal  Judgment and thought content normal    Nursing note and vitals reviewed

## 2019-12-23 NOTE — PROGRESS NOTES
Diabetic Foot Exam    Patient's shoes and socks removed  Right Foot/Ankle   Right Foot Inspection  Skin Exam: skin normal and skin intact no dry skin, no warmth, no callus, no erythema, no maceration, no abnormal color, no pre-ulcer, no ulcer and no callus                          Toe Exam: swelling (Trace edema bilateral both lower extremities, feet)no tenderness, erythema and  no right toe deformity  Sensory   Vibration: intact  Proprioception: intact   Monofilament testing: intact  Vascular  Capillary refills: < 3 seconds  The right DP pulse is 2+  The right PT pulse is 2+  Left Foot/Ankle  Left Foot Inspection  Skin Exam: skin normal and skin intactno dry skin, no warmth, no erythema, no maceration, normal color, no pre-ulcer, no ulcer and no callus                         Toe Exam: ROM and strength within normal limits and swellingno tenderness, no erythema and no left toe deformity                   Sensory   Vibration: intact  Proprioception: intact  Monofilament: intact  Vascular  Capillary refills: < 3 seconds  The left DP pulse is 2+  The left PT pulse is 2+  Assign Risk Category:  No deformity present; No loss of protective sensation;  No weak pulses       Risk: 0

## 2019-12-23 NOTE — ASSESSMENT & PLAN NOTE
Lab Results   Component Value Date    HGBA1C 6 0 12/16/2019    Recent testing shows the patient still is maintaining excellent control of his diabetes with present treatment  Patient is commended on his efforts and his surveillance  He did have a discussion with the patient that despite the fact that his sugars are under great control he still needs to work harder at weight loss  Problem important consideration would be reducing his caloric intake which she does not follow  He was told to start very slowly trying to reduce his caloric intake to 7562-1408 calories per day and as he slowly begins to lose weight reduce his caloric intake further  He is limited with his exercise capacity because of his chronic arthritis, back pain   We will check a fasting blood sugar, hemoglobin A1c, urine for microalbumin when he returns to the office in 4 months for general physical   He is up-to-date with diabetic eye exam, diabetic foot exam was performed today

## 2019-12-23 NOTE — ASSESSMENT & PLAN NOTE
Patient states with physical therapy he is having left pain in his shoulder  Better range of motion    He was told he needs to continue with the exercises that were given at [facility]

## 2019-12-23 NOTE — ASSESSMENT & PLAN NOTE
Patient denies any chest pain or pressure and no increasing shortness of breath with exertion  He states that he should have an upcoming appointment to be seen by Cardiology for evaluation

## 2019-12-23 NOTE — ASSESSMENT & PLAN NOTE
Patient has a history of chronic obstructive pulmonary disease  Patient is had no acute exacerbations recently  We did discuss with the patient the importance of call immediately if any acute infections    Patient will receive the influenza vaccine today in the office

## 2020-01-14 ENCOUNTER — OFFICE VISIT (OUTPATIENT)
Dept: OBGYN CLINIC | Facility: OTHER | Age: 60
End: 2020-01-14
Payer: COMMERCIAL

## 2020-01-14 VITALS
WEIGHT: 273 LBS | HEART RATE: 67 BPM | DIASTOLIC BLOOD PRESSURE: 71 MMHG | BODY MASS INDEX: 44.06 KG/M2 | SYSTOLIC BLOOD PRESSURE: 126 MMHG

## 2020-01-14 DIAGNOSIS — G89.29 CHRONIC LEFT SHOULDER PAIN: ICD-10-CM

## 2020-01-14 DIAGNOSIS — M75.02 ADHESIVE CAPSULITIS OF LEFT SHOULDER: ICD-10-CM

## 2020-01-14 DIAGNOSIS — M75.42 IMPINGEMENT SYNDROME OF LEFT SHOULDER: Primary | ICD-10-CM

## 2020-01-14 DIAGNOSIS — M25.512 CHRONIC LEFT SHOULDER PAIN: ICD-10-CM

## 2020-01-14 PROCEDURE — 99214 OFFICE O/P EST MOD 30 MIN: CPT | Performed by: FAMILY MEDICINE

## 2020-01-14 NOTE — PROGRESS NOTES
1  Impingement syndrome of left shoulder  MRI shoulder left wo contrast    Ambulatory referral to Physical Therapy   2  Adhesive capsulitis of left shoulder  MRI shoulder left wo contrast    Ambulatory referral to Physical Therapy   3  Chronic left shoulder pain  MRI shoulder left wo contrast     Orders Placed This Encounter   Procedures    MRI shoulder left wo contrast    Ambulatory referral to Physical Therapy        Imaging Studies (I personally reviewed images in PACS and report):  X-ray left shoulder 09/03/2019:  No acute osseous abnormality  IMPRESSION:  Left adhesive capsulitis  Concomitant left shoulder impingement syndrome; Interventions:  subacromial bursa corticosteroid injection injection-11/12/2019  Ultrasound-guided glenohumeral injection corticosteroid 09/06/2019  Physical therapy initial evaluation 09/25/2019; last physical therapy re-evaluation 12/12/2019      PMH:  Diabetes-hemoglobin A1c on 08/13/2019 at 5 8      Repeat X-ray next visit:   N/A      Return in about 3 months (around 4/14/2020)  Patient Instructions   I explained my clinical and radiological findings to the patient today  There is only minimal improvement of his range of motion and pain of left shoulder with physical therapy and corticosteroid injection  I will order MRI of left shoulder to exclude internal derangements  He was advised to continue physical therapy  I will follow-up in 3 months          CHIEF COMPLAINT:  Follow-up left shoulder pain    HPI:  Dominga Farris is a 61 y o  male  who presents for       Visit 01/14/2020: Follow-up evaluation of left shoulder pain secondary to adhesive capsulitis  Previously seen on 11/12/2019 in which he was given a subacromial cortisone injection instructed to continue physical therapy  He reports today that 75% of his usual baseline with only mild to moderate improvement overall    He does have subjective improvement of range of motion but none on physical examination  He went 11 times to physical therapy and he stopped [place] time  He reports that corticalsteroid injection did not give him any relief  Describes pain as sharp, 4/10, worse with movement, improves with Vicodin and Tylenol that he takes for his back pain  Denies any radiation of pain, weakness, numbness of left upper extremity      Review of Systems   Constitutional: Negative for chills, fever and unexpected weight change  HENT: Negative for hearing loss, nosebleeds and sore throat  Eyes: Negative for pain, redness and visual disturbance  Respiratory: Negative for cough, shortness of breath and wheezing  Cardiovascular: Negative for chest pain, palpitations and leg swelling  Gastrointestinal: Negative for abdominal pain, nausea and vomiting  Endocrine: Negative for polyphagia and polyuria  Genitourinary: Negative for dysuria and hematuria  Musculoskeletal:        As per HPI   Skin: Negative for rash and wound  Neurological:        As per HPI   Psychiatric/Behavioral: Negative for decreased concentration and suicidal ideas  The patient is not nervous/anxious            Following history reviewed and update:    Past Medical History:   Diagnosis Date    Abscess of left thigh     Acute myocardial infarction ([facility] )     Anxiety     Arteriosclerotic cardiovascular disease     Asthma     Chest pain     Class 2 obesity with alveolar hypoventilation and body mass index (BMI) of 36 0 to 36 9 in adult [facility])     COPD (chronic obstructive pulmonary disease) (HCC)     Coronary artery disease     Diabetes mellitus ([place] [address] )     Fatty liver     Gastric ulcer     GERD (gastroesophageal reflux disease)     Hyperlipidemia     Hypertension     Low back pain     Myocardial infarction ([facility] )     Obstructive sleep apnea     Spondylosis of lumbar region without myelopathy or radiculopathy      Past Surgical History:   Procedure Laterality Date    CORONARY ANGIOPLASTY WITH STENT PLACEMENT 2006, 2007    NEUROPLASTY / TRANSPOSITION MEDIAN NERVE AT CARPAL TUNNEL Right      Social History   Social History     Substance and Sexual Activity   Alcohol Use No     Social History     Substance and Sexual Activity   Drug Use No     Social History     Tobacco Use   Smoking Status Former Smoker    Packs/day: 0 00    Years: 0 00    Pack years: 0 00   Smokeless Tobacco Never Used     Family History   Problem Relation Age of Onset    Alzheimer's disease Mother     Heart failure Mother     Dementia Mother     Heart attack Father     Other Father         Cardiac Disorder    Other Family         Back Pain    Hypertension Family     Stroke Family         Complications    Cancer Other      Allergies   Allergen Reactions    Cefaclor Shortness Of Breath     Other reaction(s): Respiratory Distress    Simvastatin Shortness Of Breath     Other reaction(s): Respiratory Distress          Physical Exam  /71 (BP Location: Right arm, Patient Position: Sitting, Cuff Size: Adult)   Pulse 67   Wt 124 kg (273 lb)   BMI 44 06 kg/m²     Constitutional:  see vital signs  Gen: well-developed, normocephalic/atraumatic, well-groomed  Eyes: No inflammation or discharge of conjunctiva or lids; sclera clear   Pharynx: no inflammation, lesion, or mass of lips  Neck: supple, no masses, non-distended  MSK: no inflammation, lesion, mass, or clubbing of nails and digits except for other than mentioned below  SKIN: no visible rashes or skin lesions  Pulmonary/Chest: Effort normal  No respiratory distress     NEURO: cranial nerves grossly intact  PSYCH:  Alert and oriented to person, place, and time; recent and remote memory intact; mood normal, no depression, anxiety, or agitation, judgment and insight good and intact     Ortho Exam  LEFT SHOULDER:  Erythema: no  Swelling: no  Increased Warmth: no    Tenderness:  Subacromial area    ROM  Passive:  External rotation 60  Flexion:  Active 160 °, Passive  170°  Abduction:  Active 160°, passive 170°    Strength  Abduction: 5/5  ER: 5/5  IR: 5/5    Drop-Arm: negative  Emptycan: negative  Belly Press: negative  Lift-off Test:  Negative    Caputo:  Positive  Neer:  Positive  Cross-Arm: negative  Speeds: negative    Internal Impingement:  (crank position pain)    Labral Crank Test :  (abducted 90, axial load, guided IR & Er)    Modified Labral Shift:  (seated, ER, abduction, axial load, guided abd/add)    [place]'s Test:   (FF 90, abd 15, resist thumbs up-, resist thumbs down+)    Apprehension:  Avelino's Relocation Maneuver:    RIGHT SHOULDER:  Strength  Abduction: 5/5  ER: 5/5  IR: 5/5    ROM  Touchdown sign: intact    Empty can: negative        Procedures        ATTESTATION:  I have personally interviewed and examined patient  I have reviewed fox findings and plan with resident/fellow as outlined in note  I agree with exam and plan as documented in note

## 2020-01-14 NOTE — PATIENT INSTRUCTIONS
I explained my clinical and radiological findings to the patient today  There is only minimal improvement of his range of motion and pain of left shoulder with physical therapy and corticosteroid injection  I will order MRI of left shoulder to exclude internal derangements  He was advised to continue physical therapy    I will follow-up in 3 months

## 2020-01-22 ENCOUNTER — HOSPITAL ENCOUNTER (OUTPATIENT)
Dept: RADIOLOGY | Facility: HOSPITAL | Age: 60
Discharge: HOME | End: 2020-01-22
Payer: COMMERCIAL

## 2020-01-22 DIAGNOSIS — M25.512 CHRONIC LEFT SHOULDER PAIN: ICD-10-CM

## 2020-01-22 DIAGNOSIS — M75.02 ADHESIVE CAPSULITIS OF LEFT SHOULDER: ICD-10-CM

## 2020-01-22 DIAGNOSIS — M75.42 IMPINGEMENT SYNDROME OF LEFT SHOULDER: ICD-10-CM

## 2020-01-22 DIAGNOSIS — G89.29 CHRONIC LEFT SHOULDER PAIN: ICD-10-CM

## 2020-01-22 PROCEDURE — 73221 MRI JOINT UPR EXTREM W/O DYE: CPT

## 2020-02-18 DIAGNOSIS — M62.838 MUSCLE SPASM: ICD-10-CM

## 2020-02-18 RX ORDER — METHOCARBAMOL 750 MG/1
750 TABLET, FILM COATED ORAL 3 TIMES DAILY PRN
Qty: 90 TABLET | Refills: 1 | Status: SHIPPED | OUTPATIENT
Start: 2020-02-18 | End: 2020-06-08

## 2020-02-18 NOTE — TELEPHONE ENCOUNTER
SW patient, states medication is still helping with his pain and spasms  Patient denies any side effects at this time  Patient has 12 tablets left  Next office visit 3/21/2020

## 2020-03-12 DIAGNOSIS — I25.10 CORONARY ARTERY DISEASE INVOLVING NATIVE HEART, ANGINA PRESENCE UNSPECIFIED, UNSPECIFIED VESSEL OR LESION TYPE: ICD-10-CM

## 2020-03-12 DIAGNOSIS — E78.00 PURE HYPERCHOLESTEROLEMIA: ICD-10-CM

## 2020-03-12 DIAGNOSIS — E11.59 TYPE 2 DIABETES MELLITUS WITH OTHER CIRCULATORY COMPLICATION, WITH LONG-TERM CURRENT USE OF INSULIN (HCC): ICD-10-CM

## 2020-03-12 DIAGNOSIS — K21.9 GASTROESOPHAGEAL REFLUX DISEASE WITHOUT ESOPHAGITIS: Primary | ICD-10-CM

## 2020-03-12 DIAGNOSIS — E78.01 FAMILIAL HYPERCHOLESTEROLEMIA: ICD-10-CM

## 2020-03-12 DIAGNOSIS — Z79.4 TYPE 2 DIABETES MELLITUS WITH OTHER CIRCULATORY COMPLICATION, WITH LONG-TERM CURRENT USE OF INSULIN (HCC): ICD-10-CM

## 2020-03-12 DIAGNOSIS — I10 ESSENTIAL HYPERTENSION: ICD-10-CM

## 2020-03-12 PROCEDURE — 4010F ACE/ARB THERAPY RXD/TAKEN: CPT | Performed by: INTERNAL MEDICINE

## 2020-03-12 RX ORDER — ATORVASTATIN CALCIUM 40 MG/1
40 TABLET, FILM COATED ORAL DAILY
Qty: 30 TABLET | Refills: 6 | Status: SHIPPED | OUTPATIENT
Start: 2020-03-12 | End: 2020-09-08

## 2020-03-12 RX ORDER — BENAZEPRIL HYDROCHLORIDE 10 MG/1
10 TABLET ORAL DAILY
Qty: 30 TABLET | Refills: 5 | Status: SHIPPED | OUTPATIENT
Start: 2020-03-12 | End: 2020-09-06

## 2020-03-12 RX ORDER — METOPROLOL TARTRATE 50 MG/1
50 TABLET, FILM COATED ORAL EVERY 12 HOURS SCHEDULED
Qty: 90 TABLET | Refills: 3 | Status: SHIPPED | OUTPATIENT
Start: 2020-03-12 | End: 2020-09-08 | Stop reason: SDUPTHER

## 2020-03-12 RX ORDER — PANTOPRAZOLE SODIUM 40 MG/1
40 TABLET, DELAYED RELEASE ORAL DAILY
Qty: 90 TABLET | Refills: 5 | Status: SHIPPED | OUTPATIENT
Start: 2020-03-12 | End: 2021-06-09

## 2020-03-12 RX ORDER — FENOFIBRATE 145 MG/1
145 TABLET, COATED ORAL DAILY
Qty: 30 TABLET | Refills: 5 | Status: SHIPPED | OUTPATIENT
Start: 2020-03-12 | End: 2020-09-06

## 2020-03-12 RX ORDER — CLOPIDOGREL BISULFATE 75 MG/1
75 TABLET ORAL DAILY
Qty: 30 TABLET | Refills: 5 | Status: SHIPPED | OUTPATIENT
Start: 2020-03-12 | End: 2020-05-13 | Stop reason: SDUPTHER

## 2020-03-31 DIAGNOSIS — G89.29 CHRONIC BILATERAL LOW BACK PAIN WITHOUT SCIATICA: ICD-10-CM

## 2020-03-31 DIAGNOSIS — M54.50 CHRONIC BILATERAL LOW BACK PAIN WITHOUT SCIATICA: ICD-10-CM

## 2020-04-01 RX ORDER — HYDROCODONE BITARTRATE AND ACETAMINOPHEN 5; 300 MG/1; MG/1
1 TABLET ORAL EVERY 6 HOURS PRN
Qty: 112 TABLET | Refills: 0 | Status: SHIPPED | OUTPATIENT
Start: 2020-04-01 | End: 2020-06-24 | Stop reason: SDUPTHER

## 2020-04-14 ENCOUNTER — TELEPHONE (OUTPATIENT)
Dept: OBGYN CLINIC | Facility: OTHER | Age: 60
End: 2020-04-14

## 2020-04-14 ENCOUNTER — TELEMEDICINE (OUTPATIENT)
Dept: OBGYN CLINIC | Facility: OTHER | Age: 60
End: 2020-04-14
Payer: COMMERCIAL

## 2020-04-14 DIAGNOSIS — G89.29 CHRONIC LEFT SHOULDER PAIN: ICD-10-CM

## 2020-04-14 DIAGNOSIS — M75.02 ADHESIVE CAPSULITIS OF LEFT SHOULDER: Primary | ICD-10-CM

## 2020-04-14 DIAGNOSIS — M25.512 CHRONIC LEFT SHOULDER PAIN: ICD-10-CM

## 2020-04-14 PROCEDURE — 99441 PR PHYS/QHP TELEPHONE EVALUATION 5-10 MIN: CPT | Performed by: FAMILY MEDICINE

## 2020-05-11 DIAGNOSIS — Z79.4 TYPE 2 DIABETES MELLITUS WITH OTHER CIRCULATORY COMPLICATION, WITH LONG-TERM CURRENT USE OF INSULIN (HCC): ICD-10-CM

## 2020-05-11 DIAGNOSIS — E11.59 TYPE 2 DIABETES MELLITUS WITH OTHER CIRCULATORY COMPLICATION, WITH LONG-TERM CURRENT USE OF INSULIN (HCC): ICD-10-CM

## 2020-05-13 DIAGNOSIS — E11.59 TYPE 2 DIABETES MELLITUS WITH OTHER CIRCULATORY COMPLICATION, WITH LONG-TERM CURRENT USE OF INSULIN (HCC): ICD-10-CM

## 2020-05-13 DIAGNOSIS — M62.838 MUSCLE SPASM: ICD-10-CM

## 2020-05-13 DIAGNOSIS — I25.10 CORONARY ARTERY DISEASE INVOLVING NATIVE HEART, ANGINA PRESENCE UNSPECIFIED, UNSPECIFIED VESSEL OR LESION TYPE: ICD-10-CM

## 2020-05-13 DIAGNOSIS — Z79.4 TYPE 2 DIABETES MELLITUS WITH OTHER CIRCULATORY COMPLICATION, WITH LONG-TERM CURRENT USE OF INSULIN (HCC): ICD-10-CM

## 2020-05-13 RX ORDER — METHOCARBAMOL 750 MG/1
750 TABLET, FILM COATED ORAL 3 TIMES DAILY PRN
Qty: 90 TABLET | Refills: 0 | Status: CANCELLED | OUTPATIENT
Start: 2020-05-13

## 2020-05-14 ENCOUNTER — OFFICE VISIT (OUTPATIENT)
Dept: OBGYN CLINIC | Facility: OTHER | Age: 60
End: 2020-05-14
Payer: COMMERCIAL

## 2020-05-14 VITALS
HEIGHT: 66 IN | DIASTOLIC BLOOD PRESSURE: 78 MMHG | WEIGHT: 275 LBS | HEART RATE: 77 BPM | SYSTOLIC BLOOD PRESSURE: 142 MMHG | BODY MASS INDEX: 44.2 KG/M2

## 2020-05-14 DIAGNOSIS — M75.02 ADHESIVE CAPSULITIS OF LEFT SHOULDER: Primary | ICD-10-CM

## 2020-05-14 PROCEDURE — 99243 OFF/OP CNSLTJ NEW/EST LOW 30: CPT | Performed by: ORTHOPAEDIC SURGERY

## 2020-05-14 RX ORDER — CLOPIDOGREL BISULFATE 75 MG/1
75 TABLET ORAL DAILY
Qty: 30 TABLET | Refills: 0 | Status: SHIPPED | OUTPATIENT
Start: 2020-05-14 | End: 2020-12-18

## 2020-05-27 ENCOUNTER — ANESTHESIA EVENT (OUTPATIENT)
Dept: PERIOP | Facility: AMBULARY SURGERY CENTER | Age: 60
End: 2020-05-27
Payer: COMMERCIAL

## 2020-06-04 DIAGNOSIS — M75.02 ADHESIVE CAPSULITIS OF LEFT SHOULDER: ICD-10-CM

## 2020-06-04 PROCEDURE — U0003 INFECTIOUS AGENT DETECTION BY NUCLEIC ACID (DNA OR RNA); SEVERE ACUTE RESPIRATORY SYNDROME CORONAVIRUS 2 (SARS-COV-2) (CORONAVIRUS DISEASE [COVID-19]), AMPLIFIED PROBE TECHNIQUE, MAKING USE OF HIGH THROUGHPUT TECHNOLOGIES AS DESCRIBED BY CMS-2020-01-R: HCPCS

## 2020-06-06 LAB — SARS-COV-2 RNA SPEC QL NAA+PROBE: NOT DETECTED

## 2020-06-08 RX ORDER — ACETAMINOPHEN 325 MG/1
650 TABLET ORAL EVERY 6 HOURS PRN
COMMUNITY

## 2020-06-10 ENCOUNTER — ANESTHESIA (OUTPATIENT)
Dept: PERIOP | Facility: AMBULARY SURGERY CENTER | Age: 60
End: 2020-06-10
Payer: COMMERCIAL

## 2020-06-10 ENCOUNTER — HOSPITAL ENCOUNTER (OUTPATIENT)
Facility: AMBULARY SURGERY CENTER | Age: 60
Setting detail: OUTPATIENT SURGERY
Discharge: HOME | End: 2020-06-10
Attending: ORTHOPAEDIC SURGERY | Admitting: ORTHOPAEDIC SURGERY
Payer: COMMERCIAL

## 2020-06-10 VITALS
OXYGEN SATURATION: 94 % | TEMPERATURE: 99 F | HEART RATE: 83 BPM | BODY MASS INDEX: 43.71 KG/M2 | RESPIRATION RATE: 16 BRPM | WEIGHT: 272 LBS | SYSTOLIC BLOOD PRESSURE: 111 MMHG | HEIGHT: 66 IN | DIASTOLIC BLOOD PRESSURE: 64 MMHG

## 2020-06-10 LAB
GLUCOSE SERPL-MCNC: 69 MG/DL (ref 65–140)
GLUCOSE SERPL-MCNC: 74 MG/DL (ref 65–140)

## 2020-06-10 PROCEDURE — 29825 SHO ARTHRS SRG LSS&RESCJ ADS: CPT | Performed by: ORTHOPAEDIC SURGERY

## 2020-06-10 PROCEDURE — 82948 REAGENT STRIP/BLOOD GLUCOSE: CPT

## 2020-06-10 PROCEDURE — C9290 INJ, BUPIVACAINE LIPOSOME: HCPCS | Performed by: STUDENT IN AN ORGANIZED HEALTH CARE EDUCATION/TRAINING PROGRAM

## 2020-06-10 PROCEDURE — 29825 SHO ARTHRS SRG LSS&RESCJ ADS: CPT | Performed by: PHYSICIAN ASSISTANT

## 2020-06-10 PROCEDURE — 99024 POSTOP FOLLOW-UP VISIT: CPT | Performed by: ORTHOPAEDIC SURGERY

## 2020-06-10 RX ORDER — BUPIVACAINE HYDROCHLORIDE 5 MG/ML
INJECTION, SOLUTION PERINEURAL
Status: COMPLETED | OUTPATIENT
Start: 2020-06-10 | End: 2020-06-10

## 2020-06-10 RX ORDER — LIDOCAINE HYDROCHLORIDE 10 MG/ML
INJECTION, SOLUTION EPIDURAL; INFILTRATION; INTRACAUDAL; PERINEURAL
Status: COMPLETED | OUTPATIENT
Start: 2020-06-10 | End: 2020-06-10

## 2020-06-10 RX ORDER — GLYCOPYRROLATE 0.2 MG/ML
INJECTION INTRAMUSCULAR; INTRAVENOUS AS NEEDED
Status: DISCONTINUED | OUTPATIENT
Start: 2020-06-10 | End: 2020-06-10 | Stop reason: SURG

## 2020-06-10 RX ORDER — ROCURONIUM BROMIDE 10 MG/ML
INJECTION, SOLUTION INTRAVENOUS AS NEEDED
Status: DISCONTINUED | OUTPATIENT
Start: 2020-06-10 | End: 2020-06-10 | Stop reason: SURG

## 2020-06-10 RX ORDER — ONDANSETRON 2 MG/ML
4 INJECTION INTRAMUSCULAR; INTRAVENOUS EVERY 6 HOURS PRN
Status: DISCONTINUED | OUTPATIENT
Start: 2020-06-10 | End: 2020-06-10 | Stop reason: HOSPADM

## 2020-06-10 RX ORDER — ACETAMINOPHEN 325 MG/1
650 TABLET ORAL EVERY 6 HOURS PRN
Status: DISCONTINUED | OUTPATIENT
Start: 2020-06-10 | End: 2020-06-10 | Stop reason: HOSPADM

## 2020-06-10 RX ORDER — FENTANYL CITRATE 50 UG/ML
INJECTION, SOLUTION INTRAMUSCULAR; INTRAVENOUS
Status: COMPLETED | OUTPATIENT
Start: 2020-06-10 | End: 2020-06-10

## 2020-06-10 RX ORDER — METOCLOPRAMIDE HYDROCHLORIDE 5 MG/ML
10 INJECTION INTRAMUSCULAR; INTRAVENOUS ONCE AS NEEDED
Status: DISCONTINUED | OUTPATIENT
Start: 2020-06-10 | End: 2020-06-10 | Stop reason: HOSPADM

## 2020-06-10 RX ORDER — CEFAZOLIN SODIUM 2 G/50ML
2000 SOLUTION INTRAVENOUS ONCE
Status: COMPLETED | OUTPATIENT
Start: 2020-06-10 | End: 2020-06-10

## 2020-06-10 RX ORDER — MIDAZOLAM HYDROCHLORIDE 2 MG/2ML
INJECTION, SOLUTION INTRAMUSCULAR; INTRAVENOUS
Status: COMPLETED | OUTPATIENT
Start: 2020-06-10 | End: 2020-06-10

## 2020-06-10 RX ORDER — OXYCODONE HYDROCHLORIDE 5 MG/1
5 TABLET ORAL EVERY 4 HOURS PRN
Status: DISCONTINUED | OUTPATIENT
Start: 2020-06-10 | End: 2020-06-10 | Stop reason: HOSPADM

## 2020-06-10 RX ORDER — PROPOFOL 10 MG/ML
INJECTION, EMULSION INTRAVENOUS AS NEEDED
Status: DISCONTINUED | OUTPATIENT
Start: 2020-06-10 | End: 2020-06-10 | Stop reason: SURG

## 2020-06-10 RX ORDER — SUCCINYLCHOLINE/SOD CL,ISO/PF 100 MG/5ML
SYRINGE (ML) INTRAVENOUS AS NEEDED
Status: DISCONTINUED | OUTPATIENT
Start: 2020-06-10 | End: 2020-06-10 | Stop reason: SURG

## 2020-06-10 RX ORDER — ONDANSETRON 2 MG/ML
INJECTION INTRAMUSCULAR; INTRAVENOUS AS NEEDED
Status: DISCONTINUED | OUTPATIENT
Start: 2020-06-10 | End: 2020-06-10 | Stop reason: SURG

## 2020-06-10 RX ORDER — SODIUM CHLORIDE, SODIUM LACTATE, POTASSIUM CHLORIDE, CALCIUM CHLORIDE 600; 310; 30; 20 MG/100ML; MG/100ML; MG/100ML; MG/100ML
125 INJECTION, SOLUTION INTRAVENOUS CONTINUOUS
Status: DISCONTINUED | OUTPATIENT
Start: 2020-06-10 | End: 2020-06-10

## 2020-06-10 RX ORDER — FENTANYL CITRATE 50 UG/ML
INJECTION, SOLUTION INTRAMUSCULAR; INTRAVENOUS AS NEEDED
Status: DISCONTINUED | OUTPATIENT
Start: 2020-06-10 | End: 2020-06-10

## 2020-06-10 RX ORDER — MIDAZOLAM HYDROCHLORIDE 2 MG/2ML
INJECTION, SOLUTION INTRAMUSCULAR; INTRAVENOUS AS NEEDED
Status: DISCONTINUED | OUTPATIENT
Start: 2020-06-10 | End: 2020-06-10

## 2020-06-10 RX ORDER — LIDOCAINE HYDROCHLORIDE 10 MG/ML
INJECTION, SOLUTION EPIDURAL; INFILTRATION; INTRACAUDAL; PERINEURAL AS NEEDED
Status: DISCONTINUED | OUTPATIENT
Start: 2020-06-10 | End: 2020-06-10 | Stop reason: SURG

## 2020-06-10 RX ADMIN — ROCURONIUM BROMIDE 10 MG: 10 SOLUTION INTRAVENOUS at 10:30

## 2020-06-10 RX ADMIN — CEFAZOLIN SODIUM 250 MG: 2 SOLUTION INTRAVENOUS at 10:35

## 2020-06-10 RX ADMIN — SUGAMMADEX 200 MG: 100 INJECTION, SOLUTION INTRAVENOUS at 11:09

## 2020-06-10 RX ADMIN — SODIUM CHLORIDE, SODIUM LACTATE, POTASSIUM CHLORIDE, AND CALCIUM CHLORIDE 125 ML/HR: .6; .31; .03; .02 INJECTION, SOLUTION INTRAVENOUS at 10:01

## 2020-06-10 RX ADMIN — CEFAZOLIN SODIUM 1000 MG: 2 SOLUTION INTRAVENOUS at 10:45

## 2020-06-10 RX ADMIN — BUPIVACAINE 20 ML: 13.3 INJECTION, SUSPENSION, LIPOSOMAL INFILTRATION at 10:20

## 2020-06-10 RX ADMIN — ROCURONIUM BROMIDE 30 MG: 10 SOLUTION INTRAVENOUS at 10:34

## 2020-06-10 RX ADMIN — BUPIVACAINE HYDROCHLORIDE 10 ML: 5 INJECTION, SOLUTION PERINEURAL at 10:20

## 2020-06-10 RX ADMIN — Medication 100 MG: at 10:31

## 2020-06-10 RX ADMIN — PHENYLEPHRINE HYDROCHLORIDE 200 MCG: 10 INJECTION INTRAVENOUS at 10:40

## 2020-06-10 RX ADMIN — LIDOCAINE HYDROCHLORIDE 5 ML: 10 INJECTION, SOLUTION EPIDURAL; INFILTRATION; INTRACAUDAL; PERINEURAL at 10:20

## 2020-06-10 RX ADMIN — CEFAZOLIN SODIUM 1750 MG: 2 SOLUTION INTRAVENOUS at 10:40

## 2020-06-10 RX ADMIN — PROPOFOL 100 MG: 10 INJECTION, EMULSION INTRAVENOUS at 10:34

## 2020-06-10 RX ADMIN — PROPOFOL 200 MG: 10 INJECTION, EMULSION INTRAVENOUS at 10:30

## 2020-06-10 RX ADMIN — MIDAZOLAM HYDROCHLORIDE 2 MG: 1 INJECTION, SOLUTION INTRAMUSCULAR; INTRAVENOUS at 10:20

## 2020-06-10 RX ADMIN — ONDANSETRON 4 MG: 2 INJECTION INTRAMUSCULAR; INTRAVENOUS at 10:40

## 2020-06-10 RX ADMIN — PHENYLEPHRINE HYDROCHLORIDE 200 MCG: 10 INJECTION INTRAVENOUS at 10:42

## 2020-06-10 RX ADMIN — LIDOCAINE HYDROCHLORIDE 50 MG: 10 INJECTION, SOLUTION EPIDURAL; INFILTRATION; INTRACAUDAL; PERINEURAL at 10:30

## 2020-06-10 RX ADMIN — FENTANYL CITRATE 50 MCG: 50 INJECTION, SOLUTION INTRAMUSCULAR; INTRAVENOUS at 10:20

## 2020-06-10 RX ADMIN — OXYCODONE HYDROCHLORIDE 5 MG: 5 TABLET ORAL at 12:17

## 2020-06-10 RX ADMIN — GLYCOPYRROLATE 0.2 MG: 0.2 INJECTION, SOLUTION INTRAMUSCULAR; INTRAVENOUS at 10:40

## 2020-06-15 ENCOUNTER — EVALUATION (OUTPATIENT)
Dept: PHYSICAL THERAPY | Facility: REHABILITATION | Age: 60
End: 2020-06-15
Payer: COMMERCIAL

## 2020-06-15 ENCOUNTER — OFFICE VISIT (OUTPATIENT)
Dept: OBGYN CLINIC | Facility: HOSPITAL | Age: 60
End: 2020-06-15

## 2020-06-15 VITALS
BODY MASS INDEX: 44.2 KG/M2 | SYSTOLIC BLOOD PRESSURE: 127 MMHG | HEIGHT: 66 IN | HEART RATE: 71 BPM | WEIGHT: 275 LBS | DIASTOLIC BLOOD PRESSURE: 72 MMHG

## 2020-06-15 DIAGNOSIS — Z47.89 AFTERCARE FOLLOWING SURGERY OF THE MUSCULOSKELETAL SYSTEM: Primary | ICD-10-CM

## 2020-06-15 DIAGNOSIS — M75.02 ADHESIVE CAPSULITIS OF LEFT SHOULDER: Primary | ICD-10-CM

## 2020-06-15 PROCEDURE — 3008F BODY MASS INDEX DOCD: CPT | Performed by: PHYSICIAN ASSISTANT

## 2020-06-15 PROCEDURE — 3074F SYST BP LT 130 MM HG: CPT | Performed by: PHYSICIAN ASSISTANT

## 2020-06-15 PROCEDURE — 3078F DIAST BP <80 MM HG: CPT | Performed by: PHYSICIAN ASSISTANT

## 2020-06-15 PROCEDURE — 99024 POSTOP FOLLOW-UP VISIT: CPT | Performed by: PHYSICIAN ASSISTANT

## 2020-06-15 PROCEDURE — 97140 MANUAL THERAPY 1/> REGIONS: CPT | Performed by: PHYSICAL THERAPIST

## 2020-06-15 PROCEDURE — 97161 PT EVAL LOW COMPLEX 20 MIN: CPT | Performed by: PHYSICAL THERAPIST

## 2020-06-17 ENCOUNTER — OFFICE VISIT (OUTPATIENT)
Dept: PHYSICAL THERAPY | Facility: REHABILITATION | Age: 60
End: 2020-06-17
Payer: COMMERCIAL

## 2020-06-17 DIAGNOSIS — M75.02 ADHESIVE CAPSULITIS OF LEFT SHOULDER: Primary | ICD-10-CM

## 2020-06-17 PROCEDURE — 97110 THERAPEUTIC EXERCISES: CPT

## 2020-06-17 PROCEDURE — 97140 MANUAL THERAPY 1/> REGIONS: CPT

## 2020-06-22 ENCOUNTER — OFFICE VISIT (OUTPATIENT)
Dept: PHYSICAL THERAPY | Facility: REHABILITATION | Age: 60
End: 2020-06-22
Payer: COMMERCIAL

## 2020-06-22 DIAGNOSIS — M75.02 ADHESIVE CAPSULITIS OF LEFT SHOULDER: Primary | ICD-10-CM

## 2020-06-22 PROCEDURE — 97140 MANUAL THERAPY 1/> REGIONS: CPT | Performed by: PHYSICAL THERAPIST

## 2020-06-22 PROCEDURE — 97110 THERAPEUTIC EXERCISES: CPT | Performed by: PHYSICAL THERAPIST

## 2020-06-24 DIAGNOSIS — G89.29 CHRONIC BILATERAL LOW BACK PAIN WITHOUT SCIATICA: ICD-10-CM

## 2020-06-24 DIAGNOSIS — M54.50 CHRONIC BILATERAL LOW BACK PAIN WITHOUT SCIATICA: ICD-10-CM

## 2020-06-24 RX ORDER — HYDROCODONE BITARTRATE AND ACETAMINOPHEN 5; 300 MG/1; MG/1
1 TABLET ORAL EVERY 6 HOURS PRN
Qty: 112 TABLET | Refills: 0 | Status: SHIPPED | OUTPATIENT
Start: 2020-06-24 | End: 2020-10-13 | Stop reason: SDUPTHER

## 2020-06-25 ENCOUNTER — OFFICE VISIT (OUTPATIENT)
Dept: PHYSICAL THERAPY | Facility: REHABILITATION | Age: 60
End: 2020-06-25
Payer: COMMERCIAL

## 2020-06-25 DIAGNOSIS — M75.02 ADHESIVE CAPSULITIS OF LEFT SHOULDER: Primary | ICD-10-CM

## 2020-06-25 PROCEDURE — 97140 MANUAL THERAPY 1/> REGIONS: CPT

## 2020-06-25 PROCEDURE — 97110 THERAPEUTIC EXERCISES: CPT

## 2020-06-29 ENCOUNTER — APPOINTMENT (OUTPATIENT)
Dept: PHYSICAL THERAPY | Facility: REHABILITATION | Age: 60
End: 2020-06-29
Payer: COMMERCIAL

## 2020-06-30 ENCOUNTER — APPOINTMENT (OUTPATIENT)
Dept: LAB | Age: 60
End: 2020-06-30
Payer: COMMERCIAL

## 2020-06-30 DIAGNOSIS — I25.10 ARTERIOSCLEROTIC CARDIOVASCULAR DISEASE: ICD-10-CM

## 2020-06-30 DIAGNOSIS — I10 BENIGN ESSENTIAL HYPERTENSION: ICD-10-CM

## 2020-06-30 DIAGNOSIS — IMO0001 CONTROLLED INSULIN DEPENDENT DIABETES MELLITUS: ICD-10-CM

## 2020-06-30 DIAGNOSIS — Z12.5 PROSTATE CANCER SCREENING: ICD-10-CM

## 2020-06-30 LAB
ALBUMIN SERPL BCP-MCNC: 3.7 G/DL (ref 3.5–5)
ALP SERPL-CCNC: 54 U/L (ref 46–116)
ALT SERPL W P-5'-P-CCNC: 41 U/L (ref 12–78)
ANION GAP SERPL CALCULATED.3IONS-SCNC: 9 MMOL/L (ref 4–13)
AST SERPL W P-5'-P-CCNC: 30 U/L (ref 5–45)
BASOPHILS # BLD AUTO: 0.06 THOUSANDS/ΜL (ref 0–0.1)
BASOPHILS NFR BLD AUTO: 1 % (ref 0–1)
BILIRUB SERPL-MCNC: 0.65 MG/DL (ref 0.2–1)
BUN SERPL-MCNC: 15 MG/DL (ref 5–25)
CALCIUM SERPL-MCNC: 9 MG/DL (ref 8.3–10.1)
CHLORIDE SERPL-SCNC: 103 MMOL/L (ref 100–108)
CHOLEST SERPL-MCNC: 143 MG/DL (ref 50–200)
CO2 SERPL-SCNC: 25 MMOL/L (ref 21–32)
CREAT SERPL-MCNC: 1.25 MG/DL (ref 0.6–1.3)
CREAT UR-MCNC: 89 MG/DL
EOSINOPHIL # BLD AUTO: 0.17 THOUSAND/ΜL (ref 0–0.61)
EOSINOPHIL NFR BLD AUTO: 3 % (ref 0–6)
ERYTHROCYTE [DISTWIDTH] IN BLOOD BY AUTOMATED COUNT: 13.6 % (ref 11.6–15.1)
EST. AVERAGE GLUCOSE BLD GHB EST-MCNC: 123 MG/DL
GFR SERPL CREATININE-BSD FRML MDRD: 63 ML/MIN/1.73SQ M
GLUCOSE P FAST SERPL-MCNC: 87 MG/DL (ref 65–99)
HBA1C MFR BLD: 5.9 %
HCT VFR BLD AUTO: 43.3 % (ref 36.5–49.3)
HDLC SERPL-MCNC: 21 MG/DL
HGB BLD-MCNC: 14.2 G/DL (ref 12–17)
IMM GRANULOCYTES # BLD AUTO: 0.03 THOUSAND/UL (ref 0–0.2)
IMM GRANULOCYTES NFR BLD AUTO: 1 % (ref 0–2)
LDLC SERPL CALC-MCNC: 80 MG/DL (ref 0–100)
LYMPHOCYTES # BLD AUTO: 1.39 THOUSANDS/ΜL (ref 0.6–4.47)
LYMPHOCYTES NFR BLD AUTO: 22 % (ref 14–44)
MCH RBC QN AUTO: 30.9 PG (ref 26.8–34.3)
MCHC RBC AUTO-ENTMCNC: 32.8 G/DL (ref 31.4–37.4)
MCV RBC AUTO: 94 FL (ref 82–98)
MICROALBUMIN UR-MCNC: 5.1 MG/L (ref 0–20)
MICROALBUMIN/CREAT 24H UR: 6 MG/G CREATININE (ref 0–30)
MONOCYTES # BLD AUTO: 0.6 THOUSAND/ΜL (ref 0.17–1.22)
MONOCYTES NFR BLD AUTO: 9 % (ref 4–12)
NEUTROPHILS # BLD AUTO: 4.18 THOUSANDS/ΜL (ref 1.85–7.62)
NEUTS SEG NFR BLD AUTO: 64 % (ref 43–75)
NONHDLC SERPL-MCNC: 122 MG/DL
NRBC BLD AUTO-RTO: 0 /100 WBCS
PLATELET # BLD AUTO: 239 THOUSANDS/UL (ref 149–390)
PMV BLD AUTO: 10.1 FL (ref 8.9–12.7)
POTASSIUM SERPL-SCNC: 3.7 MMOL/L (ref 3.5–5.3)
PROT SERPL-MCNC: 7 G/DL (ref 6.4–8.2)
PSA SERPL-MCNC: 0.4 NG/ML (ref 0–4)
RBC # BLD AUTO: 4.6 MILLION/UL (ref 3.88–5.62)
SODIUM SERPL-SCNC: 137 MMOL/L (ref 136–145)
TRIGL SERPL-MCNC: 208 MG/DL
TSH SERPL DL<=0.05 MIU/L-ACNC: 2.13 UIU/ML (ref 0.36–3.74)
WBC # BLD AUTO: 6.43 THOUSAND/UL (ref 4.31–10.16)

## 2020-06-30 PROCEDURE — 85025 COMPLETE CBC W/AUTO DIFF WBC: CPT

## 2020-06-30 PROCEDURE — 80061 LIPID PANEL: CPT

## 2020-06-30 PROCEDURE — 82043 UR ALBUMIN QUANTITATIVE: CPT | Performed by: INTERNAL MEDICINE

## 2020-06-30 PROCEDURE — 80053 COMPREHEN METABOLIC PANEL: CPT

## 2020-06-30 PROCEDURE — 3044F HG A1C LEVEL LT 7.0%: CPT | Performed by: INTERNAL MEDICINE

## 2020-06-30 PROCEDURE — 84443 ASSAY THYROID STIM HORMONE: CPT

## 2020-06-30 PROCEDURE — 83036 HEMOGLOBIN GLYCOSYLATED A1C: CPT

## 2020-06-30 PROCEDURE — 36415 COLL VENOUS BLD VENIPUNCTURE: CPT

## 2020-06-30 PROCEDURE — 82570 ASSAY OF URINE CREATININE: CPT | Performed by: INTERNAL MEDICINE

## 2020-06-30 PROCEDURE — 3061F NEG MICROALBUMINURIA REV: CPT | Performed by: INTERNAL MEDICINE

## 2020-06-30 PROCEDURE — G0103 PSA SCREENING: HCPCS

## 2020-07-02 ENCOUNTER — OFFICE VISIT (OUTPATIENT)
Dept: PHYSICAL THERAPY | Facility: REHABILITATION | Age: 60
End: 2020-07-02
Payer: COMMERCIAL

## 2020-07-02 DIAGNOSIS — M75.02 ADHESIVE CAPSULITIS OF LEFT SHOULDER: Primary | ICD-10-CM

## 2020-07-02 PROCEDURE — 97110 THERAPEUTIC EXERCISES: CPT

## 2020-07-02 PROCEDURE — 97140 MANUAL THERAPY 1/> REGIONS: CPT

## 2020-07-02 NOTE — PROGRESS NOTES
Daily Note     Today's date: 2020  Patient name: Kendra Hanks  : 1960  MRN: [id number]  Referring provider: Josesito Engle MD  Dx:   Encounter Diagnosis     ICD-10-CM    1  Adhesive capsulitis of left shoulder M75 02                   Subjective: Pt reports he is a little sore upon arrival      Objective: See treatment diary below      Assessment: Tolerated treatment well  Patient would benefit from continued PT   Pt  able to complete all exercises with no increase in pain during or after session  Pt able to progress exercises this session without complaint  Pt  1:1 with PTA for entirety  Plan: Continue per plan of care        Precautions: None      Manuals 6/15 6/17 6/22 6/25 7/2        Inf joint mobs   REMI 5'          L shoulder PROM 10' KP 10' REMI 10' KP 8' KP 8'                                  Neuro Re-Ed             Finger ladder  10x10" ea f/abd 10x10" 10x10" ea 10x10" ea                                                                                      Ther Ex             UBE  3/3 3/3 3/3 3/3        Pulleys  np 6' 6' D/c        Supine cane flexion  10x10" 2# 2# 10sx10 10x10" 3# 10x10" 3#        Supine cane ER  10x10" 2# 10s x10 10x10" 10x10"        Tableslides abd  10x10" 10'x20 20x10" np        ER st at table  10x10" wedge 10s x10 20x10" 20x10"        Seated scap retraction  20x5" 5s x20 20x5" D/c        TB ext/row     2x15 ea otb        PB rolls at table     ABD 20x10"        Ther Activity                                       Gait Training                                       Modalities

## 2020-07-06 ENCOUNTER — OFFICE VISIT (OUTPATIENT)
Dept: INTERNAL MEDICINE CLINIC | Facility: CLINIC | Age: 60
End: 2020-07-06
Payer: COMMERCIAL

## 2020-07-06 VITALS
SYSTOLIC BLOOD PRESSURE: 135 MMHG | WEIGHT: 279 LBS | HEART RATE: 82 BPM | HEIGHT: 66 IN | OXYGEN SATURATION: 98 % | BODY MASS INDEX: 44.84 KG/M2 | TEMPERATURE: 97.7 F | DIASTOLIC BLOOD PRESSURE: 65 MMHG

## 2020-07-06 DIAGNOSIS — Z79.4 TYPE 2 DIABETES MELLITUS WITHOUT COMPLICATION, WITH LONG-TERM CURRENT USE OF INSULIN (HCC): ICD-10-CM

## 2020-07-06 DIAGNOSIS — E66.01 MORBID OBESITY WITH BMI OF 45.0-49.9, ADULT (HCC): ICD-10-CM

## 2020-07-06 DIAGNOSIS — M54.16 LUMBAR RADICULOPATHY: ICD-10-CM

## 2020-07-06 DIAGNOSIS — J43.1 PANLOBULAR EMPHYSEMA (HCC): ICD-10-CM

## 2020-07-06 DIAGNOSIS — M75.02 ADHESIVE CAPSULITIS OF LEFT SHOULDER: ICD-10-CM

## 2020-07-06 DIAGNOSIS — E11.9 TYPE 2 DIABETES MELLITUS WITHOUT COMPLICATION, WITH LONG-TERM CURRENT USE OF INSULIN (HCC): ICD-10-CM

## 2020-07-06 DIAGNOSIS — I25.10 ARTERIOSCLEROTIC CARDIOVASCULAR DISEASE: ICD-10-CM

## 2020-07-06 DIAGNOSIS — G47.33 OBSTRUCTIVE SLEEP APNEA SYNDROME: Primary | ICD-10-CM

## 2020-07-06 DIAGNOSIS — Z00.00 HEALTHCARE MAINTENANCE: ICD-10-CM

## 2020-07-06 DIAGNOSIS — I10 BENIGN ESSENTIAL HYPERTENSION: ICD-10-CM

## 2020-07-06 DIAGNOSIS — E66.01 MORBID OBESITY WITH BMI OF 40.0-44.9, ADULT (HCC): ICD-10-CM

## 2020-07-06 PROCEDURE — 99396 PREV VISIT EST AGE 40-64: CPT | Performed by: INTERNAL MEDICINE

## 2020-07-06 PROCEDURE — 3044F HG A1C LEVEL LT 7.0%: CPT | Performed by: INTERNAL MEDICINE

## 2020-07-06 PROCEDURE — 3008F BODY MASS INDEX DOCD: CPT | Performed by: INTERNAL MEDICINE

## 2020-07-06 NOTE — PROGRESS NOTES
Assessment/Plan:    Type 2 diabetes mellitus, with long-term current use of insulin ([facility])    Lab Results   Component Value Date    HGBA1C 5 9 (H) 06/30/2020    Patient's diabetes continues to show excellent control  Hemoglobin A1c 5 9  He does not report any episodes of hypoglycemia  There is no protein in the urine  Patient has been hesitant to return for evaluation by his ophthalmologist but make appointment in the near future  Diabetic foot exam was performed today and other than elongated nails from his mom not seeing his podiatrist no abnormalities were found  Patient will continue present treatment but we did encourage the patient to look closer at his caloric intake in order to lose weight  Healthcare maintenance  Patient is here today for repeat general physical   History of hypertension, coronary artery disease, chronic obstructive pulmonary disease, hyperlipidemia, COPD  Patient just underwent surgery of left shoulder for adhesive capsulitis  States the shoulder is doing well postoperatively  He did have extensive lab testing performed prior to the visit today we did discuss the results  Patient states in general he has been doing relatively well  Because of the COVID virus he has been isolated at home  He has gained some weight  Less physically active status post surgery  We did review with the patient his medications  Patient also has a history of obstructive sleep apnea did have a sleep study greater than 4-5 years ago but was never called by sleep medicine to set up for the CPAP device  Chronic obstructive pulmonary disease (HCC)  Patient does have a history of chronic obstructive pulmonary disease  He states he did have a mild exacerbation after being placed under anesthesia for his shoulder repair but now is back to normal   Patient continues to uses inhalers as directed and was told if any acute exacerbations to please call    Patient will be returning in the fall for influenza vaccine    Obstructive sleep apnea syndrome  Patient does have a history of obstructive sleep apnea  As noted patient did go for studies in the past which were completed and did verify this diagnosis  Patient was lost to follow-up with the sleep specialist   Because of the prolonged amount of time since his last study a new study will be arranged for the patient  Consult sleep medicine specialist depending on the results    Arteriosclerotic cardiovascular disease  History of coronary artery disease in the past   States has no problems with chest pain or pressure  Has some chronic shortness of breath with exertion but no increase  Patient continues to follow-up with cardiology  We will continue to monitor his cholesterol and blood pressure    Benign essential hypertension  Initially with presentation today patient's blood pressure was mildly elevated  Once patient was allowed to sit and relax did come down to an acceptable range  Patient's kidney function is stable    Lumbar radiculopathy  Patient does have a history of chronic back pain  Patient is been seen by a pain specialist and occasionally has therapeutic injections but no severe arthritis was found either with x-rays or MRI  Patient occasionally needs to take hydrocodone to help with his discomfort    Adhesive capsulitis of left shoulder  Status post procedure by Orthopedics  Patient is continuing to follow-up with physical therapy and states the shoulder is improving with decreased pain better motion  Morbid obesity with BMI of 40 0-44 9, adult ([address] )  As noted with the patient's BMI he is considered morbidly obese  Patient admits that he has been less active after his recent surgery to his left shoulder and with restrictions because the COVID virus  Patient was told that he needs to start some type of exercise program and look closely at his caloric intake in order to help lose weight    The patient is restricted with ambulation secondary to his chronic back pain       Diagnoses and all orders for this visit:    Obstructive sleep apnea syndrome  -     Home Study; Future    Panlobular emphysema ([place] [address] )  -     Home Study; Future    Benign essential hypertension  -     Basic metabolic panel; Future    Adhesive capsulitis of left shoulder    Healthcare maintenance    Morbid obesity with BMI of 40 0-44 9, adult ([place] [address] )    Type 2 diabetes mellitus without complication, with long-term current use of insulin ([facility])  -     Basic metabolic panel; Future  -     Hemoglobin A1C; Future    Arteriosclerotic cardiovascular disease    Lumbar radiculopathy          Subjective:      Patient ID: Madelaine Mistry is a 61 y o  male  27-year-old male history of hypertension, hyperlipidemia, coronary artery disease, chronic pain syndrome, obstructive sleep apnea, exogenous obesity  Patient is here today for routine physical   Did have extensive lab testing performed prior to the visit today we did discuss the results  Patient states he is status post surgical procedure to his left shoulder because of impingement and he is pleased with the results  He denies any new medical problems or concerns      The following portions of the patient's history were reviewed and updated as appropriate:   He  has a past medical history of Abscess of left thigh, Acute myocardial infarction [facility]), Anesthesia, Anxiety, Arteriosclerotic cardiovascular disease, Asthma, Chest pain, Class 2 obesity with alveolar hypoventilation and body mass index (BMI) of 36 0 to 36 9 in adult [facility]), COPD (chronic obstructive pulmonary disease) ([place] [address] ), Coronary artery disease, Diabetes mellitus ([place] [address] ), Fatty liver, Gastric ulcer, GERD (gastroesophageal reflux disease), Hyperlipidemia, Hypertension, Low back pain, Myocardial infarction ([place] [address] ) ([id number]), Obstructive sleep apnea, and Spondylosis of lumbar region without myelopathy or radiculopathy    He   Patient Active Problem List    Diagnosis Date Noted    Type 2 diabetes mellitus, with long-term current use of insulin ([address] ) 07/06/2020    Obstructive sleep apnea syndrome 07/06/2020    Aftercare following surgery of the musculoskeletal system 06/15/2020    Impingement syndrome of left shoulder 01/14/2020    Adhesive capsulitis of left shoulder 01/14/2020    Chronic left shoulder pain 08/21/2019    Healthcare maintenance 04/10/2019    Morbid obesity with BMI of 40 0-44 9, adult ([address] ) 11/29/2018    Low back pain 10/22/2018    Dysuria 07/24/2018    Lumbar radiculopathy 12/29/2017    Myofascial pain syndrome 12/29/2017    Sacroiliitis ([address] ) 12/29/2017    Controlled insulin dependent diabetes mellitus 02/22/2017    Chest pain 08/02/2016    Spondylosis of lumbar region without myelopathy or radiculopathy 04/15/2014    Benign essential hypertension 10/16/2012    Arteriosclerotic cardiovascular disease 06/14/2012    Chronic obstructive pulmonary disease ([address] ) 06/14/2012     He  has a past surgical history that includes Coronary angioplasty with stent (2006, 2007); Neuroplasty / transposition median nerve at carpal tunnel (Right); EGD; Carpal tunnel release (Right); Colonoscopy; Hemorrhoid surgery; and pr shldr arthroscop,lyse adhesns (Left, 6/10/2020)  His family history includes Alzheimer's disease in his mother; Cancer in his other; Dementia in his mother; Heart attack in his father; Heart failure in his mother; Hypertension in his family; Other in his family and father; Stroke in his family  He  reports that he quit smoking about 7 years ago  He smoked 0 00 packs per day for 0 00 years  He has never used smokeless tobacco  He reports that he does not drink alcohol or use drugs    Current Outpatient Medications   Medication Sig Dispense Refill    acetaminophen (TYLENOL) 325 mg tablet Take 650 mg by mouth every 6 (six) hours as needed for mild pain      albuterol (PROVENTIL HFA,VENTOLIN HFA) 90 mcg/act inhaler Inhale 90 puffs as needed      aspirin 325 mg tablet Take 325 mg by mouth daily       atorvastatin (LIPITOR) 40 mg tablet Take 1 tablet (40 mg total) by mouth daily 30 tablet 6    benazepril (LOTENSIN) 10 mg tablet Take 1 tablet (10 mg total) by mouth daily 30 tablet 5    clopidogrel (PLAVIX) 75 mg tablet Take 1 tablet (75 mg total) by mouth daily 30 tablet 0    Empagliflozin (Jardiance) 25 MG TABS Take 1 tablet (25 mg total) by mouth daily 30 tablet 5    fenofibrate (TRICOR) 145 mg tablet Take 1 tablet (145 mg total) by mouth daily 30 tablet 5    HYDROcodone-acetaminophen (Vicodin) 5-300 MG per tablet Take 1 tablet by mouth every 6 (six) hours as needed for moderate painMax Daily Amount: 4 tablets 112 tablet 0    insulin glargine (Lantus SoloStar) 100 units/mL injection pen Inject 100 Units under the skin daily (Patient taking differently: Inject 60 Units under the skin every 12 (twelve) hours ) 10 pen 2    Insulin Pen Needle (B-D UF III MINI PEN NEEDLES) 31G X 5 MM MISC Inject as directed 2 (two) times a day Use as directed 100 each 5    metoprolol tartrate (LOPRESSOR) 50 mg tablet Take 1 tablet (50 mg total) by mouth every 12 (twelve) hours 90 tablet 3    nitroglycerin (NITROSTAT) 0 3 mg SL tablet Place under the tongue      ONE TOUCH ULTRA TEST test strip USE TO TEST 4 TIMES A  each 3    pantoprazole (PROTONIX) 40 mg tablet Take 1 tablet (40 mg total) by mouth daily 90 tablet 5     No current facility-administered medications for this visit        Current Outpatient Medications on File Prior to Visit   Medication Sig    acetaminophen (TYLENOL) 325 mg tablet Take 650 mg by mouth every 6 (six) hours as needed for mild pain    albuterol (PROVENTIL HFA,VENTOLIN HFA) 90 mcg/act inhaler Inhale 90 puffs as needed    aspirin 325 mg tablet Take 325 mg by mouth daily     atorvastatin (LIPITOR) 40 mg tablet Take 1 tablet (40 mg total) by mouth daily    benazepril (LOTENSIN) 10 mg tablet Take 1 tablet (10 mg total) by mouth daily    clopidogrel (PLAVIX) 75 mg tablet Take 1 tablet (75 mg total) by mouth daily    Empagliflozin (Jardiance) 25 MG TABS Take 1 tablet (25 mg total) by mouth daily    fenofibrate (TRICOR) 145 mg tablet Take 1 tablet (145 mg total) by mouth daily    HYDROcodone-acetaminophen (Vicodin) 5-300 MG per tablet Take 1 tablet by mouth every 6 (six) hours as needed for moderate painMax Daily Amount: 4 tablets    insulin glargine (Lantus SoloStar) 100 units/mL injection pen Inject 100 Units under the skin daily (Patient taking differently: Inject 60 Units under the skin every 12 (twelve) hours )    Insulin Pen Needle (B-D UF III MINI PEN NEEDLES) 31G X 5 MM MISC Inject as directed 2 (two) times a day Use as directed    metoprolol tartrate (LOPRESSOR) 50 mg tablet Take 1 tablet (50 mg total) by mouth every 12 (twelve) hours    nitroglycerin (NITROSTAT) 0 3 mg SL tablet Place under the tongue    ONE TOUCH ULTRA TEST test strip USE TO TEST 4 TIMES A DAY    pantoprazole (PROTONIX) 40 mg tablet Take 1 tablet (40 mg total) by mouth daily     No current facility-administered medications on file prior to visit  He is allergic to cefaclor and simvastatin       Review of Systems   Constitutional: Positive for activity change (Admits to some restriction in activity level secondary to recent surgical procedure to his left shoulder  The chronic pain)  Negative for appetite change, chills, diaphoresis, fatigue, fever and unexpected weight change  HENT: Negative  Eyes: Negative  Respiratory: Positive for shortness of breath and wheezing  Negative for apnea, cough, choking, chest tightness and stridor  Cardiovascular: Negative  Endocrine: Negative  Genitourinary: Negative  Musculoskeletal: Positive for arthralgias and back pain  Negative for gait problem, joint swelling, myalgias, neck pain and neck stiffness  Skin: Positive for wound  Negative for color change, pallor and rash          Completely healing to operative site, left shoulder   Allergic/Immunologic: Negative  Neurological: Negative  Hematological: Negative  Psychiatric/Behavioral: Negative  Objective:      /65   Pulse 82   Temp 97 7 °F (36 5 °C)   Ht 5' 6" (1 676 m)   Wt 127 kg (279 lb)   SpO2 98%   BMI 45 03 kg/m²          Physical Exam   Constitutional: He is oriented to person, place, and time  He appears well-developed and well-nourished  No distress  Very pleasant, articulate 59-year-old male who is awake alert no acute distress, morbidly obese, very slow gait secondary to chronic back pain   HENT:   Head: Normocephalic and atraumatic  Right Ear: External ear normal    Left Ear: External ear normal    Nose: Nose normal    Crowding of his oral airway, pink and moist mucous membranes   Eyes: Pupils are equal, round, and reactive to light  Conjunctivae and EOM are normal  Right eye exhibits no discharge  Left eye exhibits no discharge  No scleral icterus  Neck: Normal range of motion  Neck supple  No JVD present  No tracheal deviation present  No thyromegaly present  Thick neck   Cardiovascular: Normal rate, regular rhythm, normal heart sounds and intact distal pulses  Exam reveals no gallop and no friction rub  No murmur heard  Pulmonary/Chest: Effort normal  No stridor  No respiratory distress  He has wheezes  He has no rales  He exhibits no tenderness  Significantly decreased breath sounds anteriorly and posteriorly with faint end-expiratory wheezes heard through all lung fields  Abdominal: Soft  Bowel sounds are normal  He exhibits no distension and no mass  There is no tenderness  There is no rebound and no guarding  No hernia  Morbidly obese   Musculoskeletal: He exhibits edema (Trace edema bilateral lower extremities), tenderness and deformity  Slight increased kyphosis to dorsal spine, flattening to his lumbar curve with decreased range of motion both actively and passively    Patient a does have some pain to palpation it to the SI joints bilaterally   Lymphadenopathy:     He has no cervical adenopathy  Neurological: He is alert and oriented to person, place, and time  He displays abnormal reflex ( unable to appreciate either patella or Achilles deep tendon reflexes bilaterally)  No cranial nerve deficit or sensory deficit  He exhibits normal muscle tone  Coordination normal    Skin: Skin is dry  Capillary refill takes less than 2 seconds  He is not diaphoretic  No erythema  No pallor  Psychiatric: He has a normal mood and affect  His behavior is normal  Judgment and thought content normal    Nursing note and vitals reviewed

## 2020-07-06 NOTE — ASSESSMENT & PLAN NOTE
Patient does have a history of obstructive sleep apnea  As noted patient did go for studies in the past which were completed and did verify this diagnosis  Patient was lost to follow-up with the sleep specialist   Because of the prolonged amount of time since his last study a new study will be arranged for the patient    Consult sleep medicine specialist depending on the results

## 2020-07-06 NOTE — PROGRESS NOTES
Diabetic Foot Exam    Patient's shoes and socks removed  Right Foot/Ankle   Right Foot Inspection  Skin Exam: skin normal and skin intact no dry skin, no warmth, no callus, no erythema, no maceration, no abnormal color, no pre-ulcer, no ulcer and no callus                          Toe Exam: ROM and strength within normal limits and swelling (Trace pedal edema bilaterally lower extremities)no tenderness, erythema and  no right toe deformity  Sensory   Vibration: intact  Proprioception: intact     Vascular  Capillary refills: < 3 seconds  The right DP pulse is 1+  The right PT pulse is 1+  Left Foot/Ankle  Left Foot Inspection  Skin Exam: skin normal and skin intactno dry skin, no warmth, no erythema, no maceration, normal color, no pre-ulcer, no ulcer and no callus                         Toe Exam: ROM and strength within normal limits and swellingno tenderness and no erythema                   Sensory   Vibration: intact  Proprioception: intact  Monofilament: intact  Vascular  Capillary refills: < 3 seconds  The left DP pulse is 1+  The left PT pulse is 1+  Assign Risk Category:  No deformity present; No loss of protective sensation; No weak pulses       Risk: 0    BMI Counseling: Body mass index is 45 03 kg/m²  The BMI is above normal  Nutrition recommendations include reducing portion sizes, decreasing overall calorie intake, consuming healthier snacks, moderation in carbohydrate intake, increasing intake of lean protein, reducing intake of saturated fat and trans fat and reducing intake of cholesterol  Exercise recommendations include moderate aerobic physical activity for 150 minutes/week

## 2020-07-06 NOTE — ASSESSMENT & PLAN NOTE
Status post procedure by Orthopedics  Patient is continuing to follow-up with physical therapy and states the shoulder is improving with decreased pain better motion

## 2020-07-06 NOTE — ASSESSMENT & PLAN NOTE
Patient does have a history of chronic obstructive pulmonary disease  He states he did have a mild exacerbation after being placed under anesthesia for his shoulder repair but now is back to normal   Patient continues to uses inhalers as directed and was told if any acute exacerbations to please call    Patient will be returning in the fall for influenza vaccine

## 2020-07-06 NOTE — ASSESSMENT & PLAN NOTE
Lab Results   Component Value Date    HGBA1C 5 9 (H) 06/30/2020    Patient's diabetes continues to show excellent control  Hemoglobin A1c 5 9  He does not report any episodes of hypoglycemia  There is no protein in the urine  Patient has been hesitant to return for evaluation by his ophthalmologist but make appointment in the near future  Diabetic foot exam was performed today and other than elongated nails from his mom not seeing his podiatrist no abnormalities were found  Patient will continue present treatment but we did encourage the patient to look closer at his caloric intake in order to lose weight

## 2020-07-06 NOTE — ASSESSMENT & PLAN NOTE
As noted with the patient's BMI he is considered morbidly obese  Patient admits that he has been less active after his recent surgery to his left shoulder and with restrictions because the COVID virus  Patient was told that he needs to start some type of exercise program and look closely at his caloric intake in order to help lose weight    The patient is restricted with ambulation secondary to his chronic back pain

## 2020-07-06 NOTE — ASSESSMENT & PLAN NOTE
Patient is here today for repeat general physical   History of hypertension, coronary artery disease, chronic obstructive pulmonary disease, hyperlipidemia, COPD  Patient just underwent surgery of left shoulder for adhesive capsulitis  States the shoulder is doing well postoperatively  He did have extensive lab testing performed prior to the visit today we did discuss the results  Patient states in general he has been doing relatively well  Because of the COVID virus he has been isolated at home  He has gained some weight  Less physically active status post surgery  We did review with the patient his medications  Patient also has a history of obstructive sleep apnea did have a sleep study greater than 4-5 years ago but was never called by sleep medicine to set up for the CPAP device

## 2020-07-06 NOTE — ASSESSMENT & PLAN NOTE
Patient does have a history of chronic back pain  Patient is been seen by a pain specialist and occasionally has therapeutic injections but no severe arthritis was found either with x-rays or MRI    Patient occasionally needs to take hydrocodone to help with his discomfort

## 2020-07-06 NOTE — ASSESSMENT & PLAN NOTE
History of coronary artery disease in the past   States has no problems with chest pain or pressure  Has some chronic shortness of breath with exertion but no increase  Patient continues to follow-up with cardiology    We will continue to monitor his cholesterol and blood pressure

## 2020-07-06 NOTE — ASSESSMENT & PLAN NOTE
Initially with presentation today patient's blood pressure was mildly elevated  Once patient was allowed to sit and relax did come down to an acceptable range    Patient's kidney function is stable

## 2020-07-06 NOTE — PATIENT INSTRUCTIONS
Calorie Counting Diet   WHAT YOU NEED TO KNOW:   What is a calorie counting diet? It is a meal plan based on counting calories each day to reach a healthy body weight  You will need to eat fewer calories if you are trying to lose weight  Weight loss may decrease your risk for certain health problems or improve your health if you have health problems  Some of these health problems include heart disease, high blood pressure, and diabetes  What foods should I avoid? Your dietitian will tell you if you need to avoid certain foods based on your body weight and health condition  You may need to avoid high-fat foods if you are at risk for or have heart disease  You may need to eat fewer foods from the breads and starches food group if you have diabetes  How many calories are in foods? The following is a list of foods and drinks with the approximate number of calories in each  Check the food label to find the exact number of calories  A dietitian can tell you how many calories you should have from each food group each day    · Carbohydrate:      ¨ ½ of a 3-inch bagel, 1 slice of bread, or ½ of a hamburger bun or hot dog bun (80)    ¨ 1 (8-inch) flour tortilla or ½ cup of cooked rice (100)    ¨ 1 (6-inch) corn tortilla (80)    ¨ 1 (6-inch) pancake or 1 cup of bran flakes cereal (110)    ¨ ½ cup of cooked cereal (80)    ¨ ½ cup of cooked pasta (85)    ¨ 1 ounce of pretzels (100)    ¨ 3 cups of air-popped popcorn without butter or oil (80)    · Dairy:      ¨ 1 cup of skim or 1% milk (90)    ¨ 1 cup of 2% milk (120)    ¨ 1 cup of whole milk (160)    ¨ 1 cup of 2% chocolate milk (220)    ¨ 1 ounce of low-fat cheese with 3 grams of fat per ounce (70)    ¨ 1 ounce of cheddar cheese (114)    ¨ ½ cup of 1% fat cottage cheese (80)    ¨ 1 cup of plain or sugar-free, fat-free yogurt (90)    · Protein foods:      ¨ 3 ounces of fish (not breaded or fried) (95)    ¨ 3 ounces of breaded, fried fish (195)    ¨ ¾ cup of tuna canned in water (105)    ¨ 3 ounces of chicken breast without skin (105)    ¨ 1 fried chicken breast with skin (350)    ¨ ¼ cup of fat free egg substitute (40)    ¨ 1 large egg (75)    ¨ 3 ounces of lean beef or pork (165)    ¨ 3 ounces of fried pork chop or ham (185)    ¨ ½ cup of cooked dried beans, such as kidney, nava, lentils, or navy (115)    ¨ 3 ounces of bologna or lunch meat (225)    ¨ 2 links of breakfast sausage (140)    · Vegetables:      ¨ ½ cup of sliced mushrooms (10)    ¨ 1 cup of salad greens, such as lettuce, spinach, or moon (15)    ¨ ½ cup of steamed asparagus (20)    ¨ ½ cup of cooked summer squash, zucchini squash, or green or wax beans (25)    ¨ 1 cup of broccoli or cauliflower florets, or 1 medium tomato (25)    ¨ 1 large raw carrot or ½ cup of cooked carrots (40)    ¨ ? of a medium cucumber or 1 stalk of celery (5)    ¨ 1 small baked potato (160)    ¨ 1 cup of breaded, fried vegetables (230)    · Fruit:      ¨ 1 (6-inch) banana (55)     ¨ ½ of a 4-inch grapefruit (55)    ¨ 15 grapes (60)    ¨ 1 medium orange or apple (70)    ¨ 1 large peach (65)    ¨ 1 cup of fresh pineapple chunks (75)    ¨ 1 cup of melon cubes (50)    ¨ 1¼ cups of whole strawberries (45)    ¨ ½ cup of fruit canned in juice (55)    ¨ ½ cup of fruit canned in heavy syrup (110)    ¨ ?  cup of raisins (130)    ¨ ½ cup of unsweetened fruit juice (60)    ¨ ½ cup of grape, cranberry, or prune juice (90)    · Fat:      ¨ 10 peanuts or 2 teaspoons of peanut butter (55)    ¨ 2 tablespoons of avocado or 1 tablespoon of regular salad dressing (45)    ¨ 2 slices of phan (90)    ¨ 1 teaspoon of oil, such as safflower, canola, corn, or olive oil (45)    ¨ 2 teaspoons of low-fat margarine, or 1 tablespoon of low-fat mayonnaise (50)    ¨ 1 teaspoon of regular margarine (40)    ¨ 1 tablespoon of regular mayonnaise (135)    ¨ 1 tablespoon of cream cheese or 2 tablespoons of low-fat cream cheese (45)    ¨ 2 tablespoons of vegetable shortening (215)    · Dessert and sweets:      ¨ 8 animal crackers or 5 vanilla wafers (80)    ¨ 1 frozen fruit juice bar (80)    ¨ ½ cup of ice milk or low-fat frozen yogurt (90)    ¨ ½ cup of sherbet or sorbet (125)    ¨ ½ cup of sugar-free pudding or custard (60)    ¨ ½ cup of ice cream (140)    ¨ ½ cup of pudding or custard (175)    ¨ 1 (2-inch) square chocolate brownie (185)    · Combination foods:      ¨ Bean burrito made with an 8-inch tortilla, without cheese (275)    ¨ Chicken breast sandwich with lettuce and tomato (325)    ¨ 1 cup of chicken noodle soup (60)    ¨ 1 beef taco (175)    ¨ Regular hamburger with lettuce and tomato (310)    ¨ Regular cheeseburger with lettuce and tomato (410)     ¨ ¼ of a 12-inch cheese pizza (280)    ¨ Fried fish sandwich with lettuce and tomato (425)    ¨ Hot dog and bun (275)    ¨ 1½ cups of macaroni and cheese (310)    ¨ Taco salad with a fried tortilla shell (870)    · Low-calorie foods:      ¨ 1 tablespoon of ketchup or 1 tablespoon of fat free sour cream (15)    ¨ 1 teaspoon of mustard (5)    ¨ ¼ cup of salsa (20)    ¨ 1 large dill pickle (15)    ¨ 1 tablespoon of fat free salad dressing (10)    ¨ 2 teaspoons of low-sugar, light jam or jelly, or 1 tablespoon of sugar-free syrup (15)    ¨ 1 sugar-free popsicle (15)    ¨ 1 cup of club soda, seltzer water, or diet soda (0)  CARE AGREEMENT:   You have the right to help plan your care  Discuss treatment options with your caregivers to decide what care you want to receive  You always have the right to refuse treatment  The above information is an  only  It is not intended as medical advice for individual conditions or treatments  Talk to your doctor, nurse or pharmacist before following any medical regimen to see if it is safe and effective for you  © 2017 [facility] Information is for End User's use only and may not be sold, redistributed or otherwise used for commercial purposes   All illustrations and images included in CareNotes® are the copyrighted property of A [employer]  or [employer]aine [employer]

## 2020-07-07 ENCOUNTER — OFFICE VISIT (OUTPATIENT)
Dept: PHYSICAL THERAPY | Facility: REHABILITATION | Age: 60
End: 2020-07-07
Payer: COMMERCIAL

## 2020-07-07 DIAGNOSIS — M75.02 ADHESIVE CAPSULITIS OF LEFT SHOULDER: Primary | ICD-10-CM

## 2020-07-07 PROCEDURE — 97110 THERAPEUTIC EXERCISES: CPT | Performed by: PHYSICAL THERAPIST

## 2020-07-07 PROCEDURE — 97140 MANUAL THERAPY 1/> REGIONS: CPT | Performed by: PHYSICAL THERAPIST

## 2020-07-07 NOTE — PROGRESS NOTES
Daily Note     Today's date: 2020  Patient name: Kendra Hanks  : 1960  MRN: [id number]  Referring provider: Josesito Engle MD  Dx:   Encounter Diagnosis     ICD-10-CM    1  Adhesive capsulitis of left shoulder M75 02                   Subjective: Reports improved functional use of L shoulder      Objective: See treatment diary below      Assessment: Progressing well towards established goals      Plan: Continue per plan of care        Precautions: None      Manuals 6/15 6/17 6/22 6/25 7/2 7/7       Inf joint mobs   REMI 5'          L shoulder PROM 10' KP 10' REMI 10' KP 8' KP 8' REMI 10'                                 Neuro Re-Ed             Finger ladder  10x10" ea f/abd 10x10" 10x10" ea 10x10" ea 25                                                                                     Ther Ex             UBE  3/3 3/3 3/3 3/3 55       Pulleys  np 6' 6' D/c        Supine cane flexion  10x10" 2# 2# 10sx10 10x10" 3# 10x10" 3# 3# 10x10"       Supine cane ER  10x10" 2# 10s x10 10x10" 10x10" 10x10"       Tableslides abd  10x10" 10'x20 20x10" np        ER st at table  10x10" wedge 10s x10 20x10" 20x10"        Seated scap retraction  20x5" 5s x20 20x5" D/c        TB ext/row     2x15 ea otb OTB 2x15       PB rolls at table     ABD 20x10" 10s x20 Abd       Ther Activity                                       Gait Training                                       Modalities

## 2020-07-07 NOTE — PROGRESS NOTES
Daily Note     Today's date: 2020  Patient name: Becka Ya  : 1960  MRN: [id number]  Referring provider: Gracia Blake MD  Dx:   Encounter Diagnosis     ICD-10-CM    1  Adhesive capsulitis of left shoulder M75 02                   Subjective: No new complaints  Objective: See treatment diary below      Assessment: Tolerated treatment {Tolerated treatment :2781696247}   Patient {assessment:7001653947}      Plan: {PLAN:4280764203}     Precautions: None      Manuals 6/15 6/17 6/22 6/25 7/2 7/7       Inf joint mobs   REMI 5'          L shoulder PROM 10' KP 10' REMI 10' KP 8' KP 8' REMI 10'                                 Neuro Re-Ed             Finger ladder  10x10" ea f/abd 10x10" 10x10" ea 10x10" ea x25                                                                                     Ther Ex             UBE  3/3 3/3 3/3 3/3 55       Pulleys  np 6' 6' D/c        Supine cane flexion  10x10" 2# 2# 10sx10 10x10" 3# 10x10" 3#        Supine cane ER  10x10" 2# 10s x10 10x10" 10x10"        Tableslides abd  10x10" 10'x20 20x10" np        ER st at table  10x10" wedge 10s x10 20x10" 20x10"        Seated scap retraction  20x5" 5s x20 20x5" D/c        TB ext/row     2x15 ea otb        PB rolls at table     ABD 20x10"        Ther Activity                                       Gait Training                                       Modalities

## 2020-07-09 ENCOUNTER — OFFICE VISIT (OUTPATIENT)
Dept: PHYSICAL THERAPY | Facility: REHABILITATION | Age: 60
End: 2020-07-09
Payer: COMMERCIAL

## 2020-07-09 DIAGNOSIS — M75.02 ADHESIVE CAPSULITIS OF LEFT SHOULDER: Primary | ICD-10-CM

## 2020-07-09 PROCEDURE — 97140 MANUAL THERAPY 1/> REGIONS: CPT

## 2020-07-09 PROCEDURE — 97110 THERAPEUTIC EXERCISES: CPT

## 2020-07-09 NOTE — PROGRESS NOTES
Daily Note     Today's date: 2020  Patient name: Francisco Horn  : 1960  MRN: [id number]  Referring provider: Lenore Jones MD  Dx:   Encounter Diagnosis     ICD-10-CM    1  Adhesive capsulitis of left shoulder M75 02                   Subjective: Pt reports he has felt a little sore in shoulder, but no overall increase in pain  Objective: See treatment diary below      Assessment: Tolerated treatment well  Patient would benefit from continued PT   Pt  able to complete all exercises with no increase in pain during or after session  Pt noted no additional pain or soreness at end  Of session  Pt  1:1 with PTA for entirety  Plan: Continue per plan of care        Precautions: None      Manuals 6/15 6/17 6/22 6/25 7      Inf joint mobs   REMI 5'          L shoulder PROM 10' KP 10' REMI 10' KP 8' KP 8' REMI 10' KP 10'                                Neuro Re-Ed             Finger ladder  10x10" ea f/abd 10x10" 10x10" ea 10x10" ea 25 10x10" ea                                                                                    Ther Ex             UBE  3/3 3/3 3/3 3/3 5 4/4      Pulleys  np 6' 6' D/c        Supine cane flexion  10x10" 2# 2# 10sx10 10x10" 3# 10x10" 3# 3# 10x10" 10x10" 3#      Supine cane ER  10x10" 2# 10s x10 10x10" 10x10" 10x10" 10x10"      Tableslides abd  10x10" 10'x20 20x10" np        ER st at table  10x10" wedge 10s x10 20x10" 20x10"        Seated scap retraction  20x5" 5s x20 20x5" D/c        TB ext/row     2x15 ea otb OTB 2x15 2x15 ea gtb      PB rolls at table     ABD 20x10" 10s x20 Abd 20x10" abd      Ther Activity                                       Gait Training                                       Modalities

## 2020-07-14 ENCOUNTER — OFFICE VISIT (OUTPATIENT)
Dept: PHYSICAL THERAPY | Facility: REHABILITATION | Age: 60
End: 2020-07-14
Payer: COMMERCIAL

## 2020-07-14 DIAGNOSIS — M75.02 ADHESIVE CAPSULITIS OF LEFT SHOULDER: Primary | ICD-10-CM

## 2020-07-14 PROCEDURE — 97110 THERAPEUTIC EXERCISES: CPT

## 2020-07-14 PROCEDURE — 97140 MANUAL THERAPY 1/> REGIONS: CPT

## 2020-07-14 NOTE — PROGRESS NOTES
Daily Note     Today's date: 2020  Patient name: Becka Ya  : 1960  MRN: [id number]  Referring provider: Gracia Blake MD  Dx:   Encounter Diagnosis     ICD-10-CM    1  Adhesive capsulitis of left shoulder M75 02                   Subjective: Pt reports that shoulder feels some improvement today      Objective: See treatment diary below      Assessment: Tolerated treatment well  Patient would benefit from continued PT   Pt  able to complete all exercises with no increase in pain during or after session  Pt remains most limited with ER  Pt  1:1 with PTA for entirety  Plan: Continue per plan of care        Precautions: None      Manuals 6/15 6/17 6/22 6/25 7/2 7/7 7/9 7/14     Inf joint mobs   REMI 5'          L shoulder PROM 10' KP 10' REMI 10' KP 8' KP 8' REMI 10' KP 10' KP 8'                               Neuro Re-Ed             Finger ladder  10x10" ea f/abd 10x10" 10x10" ea 10x10" ea 25 10x10" ea 10x10" ea                                                                                   Ther Ex             UBE  3/3 3/3 3/3 3/3 5/5 4/4 4/4     Pulleys  np 6' 6' D/c        Supine cane flexion  10x10" 2# 2# 10sx10 10x10" 3# 10x10" 3# 3# 10x10" 10x10" 3# 10x10" 3#     Supine cane ER  10x10" 2# 10s x10 10x10" 10x10" 10x10" 10x10" 10x10"     Tableslides abd  10x10" 10'x20 20x10" np        ER st at table  10x10" wedge 10s x10 20x10" 20x10"        Seated scap retraction  20x5" 5s x20 20x5" D/c        TB ext/row     2x15 ea otb OTB 2x15 2x15 ea gtb 2x15 ea gtb     PB rolls at table     ABD 20x10" 10s x20 Abd 20x10" abd 20x10"     Ther Activity                                       Gait Training                                       Modalities

## 2020-07-16 ENCOUNTER — OFFICE VISIT (OUTPATIENT)
Dept: PHYSICAL THERAPY | Facility: REHABILITATION | Age: 60
End: 2020-07-16
Payer: COMMERCIAL

## 2020-07-16 DIAGNOSIS — M75.02 ADHESIVE CAPSULITIS OF LEFT SHOULDER: Primary | ICD-10-CM

## 2020-07-16 PROCEDURE — 97110 THERAPEUTIC EXERCISES: CPT

## 2020-07-16 PROCEDURE — 97140 MANUAL THERAPY 1/> REGIONS: CPT

## 2020-07-16 NOTE — PROGRESS NOTES
Daily Note     Today's date: 2020  Patient name: Cristine Jane  : 1960  MRN: [id number]  Referring provider: Jasmyne Tan MD  Dx:   Encounter Diagnosis     ICD-10-CM    1  Adhesive capsulitis of left shoulder M75 02                   Subjective: Pt  reports no change in status upon arrival       Objective: See treatment diary below      Assessment: Tolerated treatment well  Patient would benefit from continued PT   Pt  able to complete all exercises with no increase in pain during or after session  Pt able to progress exercises this session without complaint  Pt  1:1 with PTA for entirety  Plan: Continue per plan of care        Precautions: None      Manuals 6/15 6/17 6/22 6/25 7    Inf joint mobs   REMI 5'          L shoulder PROM 10' KP 10' REMI 10' KP 8' KP 8' REMI 10' KP 10' KP 8' KP 8'                              Neuro Re-Ed             Finger ladder  10x10" ea f/abd 10x10" 10x10" ea 10x10" ea 25 10x10" ea 10x10" ea 10x10" ea                                                                                  Ther Ex             UBE  3/3 3/3 3/3 3/3 5/5 4/4 4/4 4/4    Pulleys  np 6' 6' D/c        Supine cane flexion  10x10" 2# 2# 10sx10 10x10" 3# 10x10" 3# 3# 10x10" 10x10" 3# 10x10" 3# 10x10" 3#    Supine cane ER  10x10" 2# 10s x10 10x10" 10x10" 10x10" 10x10" 10x10" 10x10"    Tableslides abd  10x10" 10'x20 20x10" np        ER st at table  10x10" wedge 10s x10 20x10" 20x10"        Seated scap retraction  20x5" 5s x20 20x5" D/c        TB ext/row     2x15 ea otb OTB 2x15 2x15 ea gtb 2x15 ea gtb 2x20 ea gtb    PB rolls at table     ABD 20x10" 10s x20 Abd 20x10" abd 20x10" 20x10"    Ther Activity                                       Gait Training                                       Modalities

## 2020-07-21 ENCOUNTER — OFFICE VISIT (OUTPATIENT)
Dept: PHYSICAL THERAPY | Facility: REHABILITATION | Age: 60
End: 2020-07-21
Payer: COMMERCIAL

## 2020-07-21 DIAGNOSIS — M75.02 ADHESIVE CAPSULITIS OF LEFT SHOULDER: Primary | ICD-10-CM

## 2020-07-21 PROCEDURE — 97140 MANUAL THERAPY 1/> REGIONS: CPT

## 2020-07-21 PROCEDURE — 97110 THERAPEUTIC EXERCISES: CPT

## 2020-07-21 NOTE — PROGRESS NOTES
Daily Note     Today's date: 2020  Patient name: Margarita Anthony  : 1960  MRN: [id number]  Referring provider: Vicente Read MD  Dx:   Encounter Diagnosis     ICD-10-CM    1  Adhesive capsulitis of left shoulder M75 02                   Subjective: Pt reports that ROM is improved, still has pain and weakness at end range of motion  Objective: See treatment diary below      Assessment: Tolerated treatment well  Patient would benefit from continued PT   Pt  able to complete all exercises with no increase in pain during or after session  Introduced additional weighted exercises to pt to address weakness at end range, pt tolerated well, did exhibit some fatigue  Pt  1:1 with PTA for entirety  Plan: Continue per plan of care        Precautions: None      Manuals 6/15 6/17 6/22 6/25 7/2 7/7 7/9 7/14 7/16 7/21   Inf joint mobs   REMI 5'          L shoulder PROM 10' KP 10' REMI 10' KP 8' KP 8' REMI 10' KP 10' KP 8' KP 8' KP 8'                             Neuro Re-Ed             Finger ladder  10x10" ea f/abd 10x10" 10x10" ea 10x10" ea 25 10x10" ea 10x10" ea 10x10" ea 10x10" ea                                                                                 Ther Ex             UBE  3/3 3/3 3/3 3/3 5/5 4/4 4/4 4/4 4/4   Pulleys  np 6' 6' D/c        Supine cane flexion  10x10" 2# 2# 10sx10 10x10" 3# 10x10" 3# 3# 10x10" 10x10" 3# 10x10" 3# 10x10" 3# 10x10" 3#   Supine cane ER  10x10" 2# 10s x10 10x10" 10x10" 10x10" 10x10" 10x10" 10x10" 10x10"   Tableslides abd  10x10" 10'x20 20x10" np        ER st at table  10x10" wedge 10s x10 20x10" 20x10"        Seated scap retraction  20x5" 5s x20 20x5" D/c        TB ext/row     2x15 ea otb OTB 2x15 2x15 ea gtb 2x15 ea gtb 2x20 ea gtb 2x20 ea gtb   PB rolls at table     ABD 20x10" 10s x20 Abd 20x10" abd 20x10" 20x10" 20x10"   TB Wall Clock          3x otb   Shoulder 3 way          2x10 ea 1#   Ther Activity                                       Gait Training Modalities

## 2020-07-23 ENCOUNTER — OFFICE VISIT (OUTPATIENT)
Dept: PHYSICAL THERAPY | Facility: REHABILITATION | Age: 60
End: 2020-07-23
Payer: COMMERCIAL

## 2020-07-23 DIAGNOSIS — M75.02 ADHESIVE CAPSULITIS OF LEFT SHOULDER: Primary | ICD-10-CM

## 2020-07-23 PROCEDURE — 97110 THERAPEUTIC EXERCISES: CPT

## 2020-07-23 PROCEDURE — 97140 MANUAL THERAPY 1/> REGIONS: CPT

## 2020-07-23 NOTE — PROGRESS NOTES
Daily Note     Today's date: 2020  Patient name: Juanis Juarez  : 1960  MRN: [id number]  Referring provider: Alexa Mccann MD  Dx:   Encounter Diagnosis     ICD-10-CM    1  Adhesive capsulitis of left shoulder M75 02                   Subjective: Pt reports some soreness after last visit, but no overall increase in pain  Objective: See treatment diary below      Assessment: Tolerated treatment well  Patient would benefit from continued PT   Pt  able to complete all exercises with no increase in pain during or after session  Pt demonstrated some improved tolerance to increased exercises from last visit  Pt  1:1 with PTA for entirety  Plan: Continue per plan of care        Precautions: None      Manuals        Inf joint mobs             L shoulder PROM REMI 10' KP 10' KP 8' KP 8' KP 8' KP 8'                                 Neuro Re-Ed             Finger ladder 25 10x10" ea 10x10" ea 10x10" ea 10x10" ea 10x10" ea                                                                                     Ther Ex             UBE 5/5 4/4 4/4 4/4 4/4 4/4       Pulleys             Supine cane flexion 3# 10x10" 10x10" 3# 10x10" 3# 10x10" 3# 10x10" 3# 10x10" 3#       Supine cane ER 10x10" 10x10" 10x10" 10x10" 10x10" 10x10"       Tableslides abd             ER st at table             Seated scap retraction             TB ext/row OTB 2x15 2x15 ea gtb 2x15 ea gtb 2x20 ea gtb 2x20 ea gtb 2x20 ea gtb       PB rolls at table 10s x20 Abd 20x10" abd 20x10" 20x10" 20x10" 20x10"       TB Wall Clock     3x otb 3x otb       Shoulder 3 way     2x10 ea 1# 2x10 ea 1#       Ther Activity                                       Gait Training                                       Modalities

## 2020-07-24 ENCOUNTER — TELEPHONE (OUTPATIENT)
Dept: OBGYN CLINIC | Facility: HOSPITAL | Age: 60
End: 2020-07-24

## 2020-07-24 NOTE — TELEPHONE ENCOUNTER
Left a voice mail message for the patient to call us back  If the patient calls back please do COVID 19 screening and document on the appointment line      Thank you

## 2020-07-27 ENCOUNTER — OFFICE VISIT (OUTPATIENT)
Dept: OBGYN CLINIC | Facility: HOSPITAL | Age: 60
End: 2020-07-27

## 2020-07-27 VITALS
SYSTOLIC BLOOD PRESSURE: 120 MMHG | WEIGHT: 276.4 LBS | HEART RATE: 71 BPM | DIASTOLIC BLOOD PRESSURE: 73 MMHG | BODY MASS INDEX: 44.61 KG/M2

## 2020-07-27 DIAGNOSIS — Z47.89 AFTERCARE FOLLOWING SURGERY OF THE MUSCULOSKELETAL SYSTEM: Primary | ICD-10-CM

## 2020-07-27 PROCEDURE — 99024 POSTOP FOLLOW-UP VISIT: CPT | Performed by: ORTHOPAEDIC SURGERY

## 2020-07-27 PROCEDURE — 3074F SYST BP LT 130 MM HG: CPT | Performed by: ORTHOPAEDIC SURGERY

## 2020-07-27 PROCEDURE — 3078F DIAST BP <80 MM HG: CPT | Performed by: ORTHOPAEDIC SURGERY

## 2020-07-27 NOTE — PROGRESS NOTES
Assessment:       1  Aftercare following surgery of the musculoskeletal system          Plan:        Patient is doing well postoperatively and making progress with PT  All questions were addressed to the patient's satisfaction  Patient should continue PT  Follow-up will be in 2 months to assess patient's progress  Subjective:     Patient ID: Socorro Benson is a 61 y o  male  Chief Complaint:    HPI     Patient presents to the office for follow-up status post left shoulder arthroscopy on 06/10/2020  He states he is doing well and having pain-free range of motion  He denies any new concerns at this time  Social History     Occupational History    Not on file   Tobacco Use    Smoking status: Former Smoker     Packs/day: 0 00     Years: 0 00     Pack years: 0 00     Last attempt to quit: 2013     Years since quittin 1    Smokeless tobacco: Never Used   Substance and Sexual Activity    Alcohol use: No    Drug use: No    Sexual activity: Not Currently      Review of Systems   Constitutional: Negative  Respiratory: Negative  Musculoskeletal: Positive for myalgias  Negative for arthralgias  Skin: Negative for wound  Neurological: Positive for weakness  Negative for numbness  Psychiatric/Behavioral: Negative  Objective:     Ortho ExamPhysical Exam   Constitutional: He is oriented to person, place, and time  He appears well-nourished  HENT:   Head: Atraumatic  Cardiovascular: Intact distal pulses  Pulmonary/Chest: Effort normal    Musculoskeletal:   Left shoulder range motion: Forward flexion 150°, external rotation 75°  Internal rotation to left flank  Neurological: He is alert and oriented to person, place, and time  No sensory deficit  Skin: Skin is warm and dry  Capillary refill takes less than 2 seconds  Surgical incisions closed  Psychiatric: He has a normal mood and affect   His behavior is normal

## 2020-07-28 ENCOUNTER — OFFICE VISIT (OUTPATIENT)
Dept: PHYSICAL THERAPY | Facility: REHABILITATION | Age: 60
End: 2020-07-28
Payer: COMMERCIAL

## 2020-07-28 DIAGNOSIS — M75.02 ADHESIVE CAPSULITIS OF LEFT SHOULDER: Primary | ICD-10-CM

## 2020-07-28 PROCEDURE — 97110 THERAPEUTIC EXERCISES: CPT

## 2020-07-28 NOTE — PROGRESS NOTES
Daily Note     Today's date: 2020  Patient name: Josette Miles  : 1960  MRN: [id number]  Referring provider: Cresencio Gonsales MD  Dx:   Encounter Diagnosis     ICD-10-CM    1  Adhesive capsulitis of left shoulder M75 02                   Subjective: Pt reports he was at ortho yesterday for a follow up, and it went very well  Pt reports dr was pleased with ROM progress at this time  Objective: See treatment diary below      Assessment: Tolerated treatment well  Patient would benefit from continued PT   Pt  able to complete all exercises with no increase in pain during or after session  Pt able to progress exercises this session without complaint  Pt  1:1 with PTA for entirety  Plan: Continue per plan of care        Precautions: None      Manuals       Inf joint mobs             L shoulder PROM REMI 10' KP 10' KP 8' KP 8' KP 8' KP 8' KP 8'                                Neuro Re-Ed             Finger ladder 25 10x10" ea 10x10" ea 10x10" ea 10x10" ea 10x10" ea np                                                                                    Ther Ex             UBE 5/5 4/4 4/4 4/4 4/4 4/ 4/4      Pulleys             Supine cane flexion 3# 10x10" 10x10" 3# 10x10" 3# 10x10" 3# 10x10" 3# 10x10" 3# 20x10" 3#      Supine cane ER 10x10" 10x10" 10x10" 10x10" 10x10" 10x10" 20x10"      Tableslides abd             ER st at table             Seated scap retraction             TB ext/row OTB 2x15 2x15 ea gtb 2x15 ea gtb 2x20 ea gtb 2x20 ea gtb 2x20 ea gtb 2x20 ea btb      PB rolls at table 10s x20 Abd 20x10" abd 20x10" 20x10" 20x10" 20x10" 20x10"       TB Wall Clock     3x otb 3x otb 3x otb      Shoulder 3 way     2x10 ea 1# 2x10 ea 1# 2x12 ea 1#      Ther Activity                                       Gait Training                                       Modalities

## 2020-07-30 ENCOUNTER — OFFICE VISIT (OUTPATIENT)
Dept: PHYSICAL THERAPY | Facility: REHABILITATION | Age: 60
End: 2020-07-30
Payer: COMMERCIAL

## 2020-07-30 DIAGNOSIS — M75.02 ADHESIVE CAPSULITIS OF LEFT SHOULDER: Primary | ICD-10-CM

## 2020-07-30 PROCEDURE — 97110 THERAPEUTIC EXERCISES: CPT | Performed by: PHYSICAL THERAPIST

## 2020-07-30 PROCEDURE — 97112 NEUROMUSCULAR REEDUCATION: CPT | Performed by: PHYSICAL THERAPIST

## 2020-07-30 PROCEDURE — 97140 MANUAL THERAPY 1/> REGIONS: CPT | Performed by: PHYSICAL THERAPIST

## 2020-07-30 NOTE — PROGRESS NOTES
Daily Note     Today's date: 2020  Patient name: Chadd Cintron  : 1960  MRN: [id number]  Referring provider: Umesh Siddiqui MD  Dx:   Encounter Diagnosis     ICD-10-CM    1  Adhesive capsulitis of left shoulder M75 02                   Subjective: Shoulder continues to improve     Objective: See treatment diary below      Assessment: ROM progressing to near full in all planes  Plan: Continue per plan of care        Precautions: None      Manuals      Inf joint mobs             L shoulder PROM REMI 10' KP 10' KP 8' KP 8' KP 8' KP 8' KP 8' REMI 8'                               Neuro Re-Ed             Finger ladder 25 10x10" ea 10x10" ea 10x10" ea 10x10" ea 10x10" ea np D/c                                                                                   Ther Ex             UBE 5/5 4/4 4/4 4/4 4/4 4/4 4/ 55     Pulleys             Supine cane flexion 3# 10x10" 10x10" 3# 10x10" 3# 10x10" 3# 10x10" 3# 10x10" 3# 20x10" 3# 20x10 3#     Supine cane ER 10x10" 10x10" 10x10" 10x10" 10x10" 10x10" 20x10" 20x10     Tableslides abd             ER st at table             Seated scap retraction             TB ext/row OTB 2x15 2x15 ea gtb 2x15 ea gtb 2x20 ea gtb 2x20 ea gtb 2x20 ea gtb 2x20 ea btb 2x20 ea btb     PB rolls at table 10s x20 Abd 20x10" abd 20x10" 20x10" 20x10" 20x10" 20x10"  20x10"     TB Wall Clock     3x otb 3x otb 3x otb 3x otb     Shoulder 3 way     2x10 ea 1# 2x10 ea 1# 2x12 ea 1# 1# 2x12     Ther Activity                                       Gait Training                                       Modalities

## 2020-08-03 ENCOUNTER — OFFICE VISIT (OUTPATIENT)
Dept: PHYSICAL THERAPY | Facility: REHABILITATION | Age: 60
End: 2020-08-03
Payer: COMMERCIAL

## 2020-08-03 DIAGNOSIS — M75.02 ADHESIVE CAPSULITIS OF LEFT SHOULDER: Primary | ICD-10-CM

## 2020-08-03 PROCEDURE — 97112 NEUROMUSCULAR REEDUCATION: CPT | Performed by: PHYSICAL THERAPIST

## 2020-08-03 PROCEDURE — 97140 MANUAL THERAPY 1/> REGIONS: CPT | Performed by: PHYSICAL THERAPIST

## 2020-08-03 PROCEDURE — 97110 THERAPEUTIC EXERCISES: CPT | Performed by: PHYSICAL THERAPIST

## 2020-08-03 NOTE — PROGRESS NOTES
Daily Note     Today's date: 8/3/2020  Patient name: Josette Miles  : 1960  MRN: [id number]  Referring provider: Cresencio Gonsales MD  Dx:   Encounter Diagnosis     ICD-10-CM    1  Adhesive capsulitis of left shoulder  M75 02        Start Time: 1400  Stop Time: 1455  Total time in clinic (min): 55 minutes    Subjective: Patient reports that symptoms were increased over the weekend, even waking him up in the middle of the night  However, he reports good improvement by this morning  Objective: See treatment diary below      Assessment: Patient tolerated treatment well and continues to respond positively to manuals, as demonstrated by improvements in ROM  Continue to progress strengthening, as tolerated  Plan: Continue per plan of care        Precautions: None      Manuals 7/7 7/9 7/14 7/16 7/21 7/23 7/28 7/30 8/3    Inf joint mobs         Inf/post 5'    L shoulder PROM REMI 10' KP 10' KP 8' KP 8' KP 8' KP 8' KP 8' REMI 8' 5'    SL scap mobs w/ STM         5'                 Neuro Re-Ed             Finger ladder 25 10x10" ea 10x10" ea 10x10" ea 10x10" ea 10x10" ea np D/c --                                                                                  Ther Ex             UBE 5/5 4/4 4/4 4/4 4/4 4/4 4/4 5/5 5/5    Pulleys             Supine cane flexion 3# 10x10" 10x10" 3# 10x10" 3# 10x10" 3# 10x10" 3# 10x10" 3# 20x10" 3# 20x10 3# 20x10" 4#    Supine cane ER 10x10" 10x10" 10x10" 10x10" 10x10" 10x10" 20x10" 20x10 20x10"    Tableslides abd             ER st at table             Seated scap retraction             TB ext/row OTB 2x15 2x15 ea gtb 2x15 ea gtb 2x20 ea gtb 2x20 ea gtb 2x20 ea gtb 2x20 ea btb 2x20 ea btb 2x20 ea    PB rolls at table 10s x20 Abd 20x10" abd 20x10" 20x10" 20x10" 20x10" 20x10"  20x10" 20x10"      TB Wall Clock     3x otb 3x otb 3x otb 3x otb 3x otb    Shoulder 3 way     2x10 ea 1# 2x10 ea 1# 2x12 ea 1# 1# 2x12 1# 2x12    Ther Activity                                       Gait Training                                       Modalities

## 2020-08-05 DIAGNOSIS — E11.59 TYPE 2 DIABETES MELLITUS WITH OTHER CIRCULATORY COMPLICATION, WITH LONG-TERM CURRENT USE OF INSULIN (HCC): ICD-10-CM

## 2020-08-05 DIAGNOSIS — Z79.4 TYPE 2 DIABETES MELLITUS WITH OTHER CIRCULATORY COMPLICATION, WITH LONG-TERM CURRENT USE OF INSULIN (HCC): ICD-10-CM

## 2020-08-05 RX ORDER — INSULIN GLARGINE 100 [IU]/ML
INJECTION, SOLUTION SUBCUTANEOUS
Qty: 5 PEN | Refills: 0 | Status: SHIPPED | OUTPATIENT
Start: 2020-08-05 | End: 2020-08-13 | Stop reason: SDUPTHER

## 2020-08-06 ENCOUNTER — OFFICE VISIT (OUTPATIENT)
Dept: PHYSICAL THERAPY | Facility: REHABILITATION | Age: 60
End: 2020-08-06
Payer: COMMERCIAL

## 2020-08-06 DIAGNOSIS — M75.02 ADHESIVE CAPSULITIS OF LEFT SHOULDER: Primary | ICD-10-CM

## 2020-08-06 PROCEDURE — 97140 MANUAL THERAPY 1/> REGIONS: CPT | Performed by: PHYSICAL THERAPIST

## 2020-08-06 PROCEDURE — 97110 THERAPEUTIC EXERCISES: CPT | Performed by: PHYSICAL THERAPIST

## 2020-08-06 NOTE — PROGRESS NOTES
Daily Note     Today's date: 2020  Patient name: Socorro Benson  : 1960  MRN: [id number]  Referring provider: Anat Ortiz MD  Dx: No diagnosis found  Subjective: No new complaints      Objective: See treatment diary below      Assessment: Able to progress resistance without increased pain  Still minor stiffness present      Plan: Continue per plan of care        Precautions: None      Manuals 7/7 7/9 7/14 7/16 7/21 7/23 7/28 7/30 8/3 8/6   Inf joint mobs         Inf/post 5'    L shoulder PROM REMI 10' KP 10' KP 8' KP 8' KP 8' KP 8' KP 8' REMI 8' 5'    SL scap mobs w/ STM         5'                 Neuro Re-Ed             Finger ladder 25 10x10" ea 10x10" ea 10x10" ea 10x10" ea 10x10" ea np D/c --                                                                                  Ther Ex             UBE  4///5 5 5/5   Pulleys             Supine cane flexion 3# 10x10" 10x10" 3# 10x10" 3# 10x10" 3# 10x10" 3# 10x10" 3# 20x10" 3# 20x10 3# 20x10" 4# 4# 2x15   Supine cane ER 10x10" 10x10" 10x10" 10x10" 10x10" 10x10" 20x10" 20x10 20x10" DC   Tableslides abd             ER st at table             Seated scap retraction             TB ext/row OTB 2x15 2x15 ea gtb 2x15 ea gtb 2x20 ea gtb 2x20 ea gtb 2x20 ea gtb 2x20 ea btb 2x20 ea btb 2x20 ea 2x20 BTB   PB rolls at table 10s x20 Abd 20x10" abd 20x10" 20x10" 20x10" 20x10" 20x10"  20x10" 20x10"   20x10"   TB Wall Clock     3x otb 3x otb 3x otb 3x otb 3x otb 3x otb   Shoulder 3 way     2x10 ea 1# 2x10 ea 1# 2x12 ea 1# 1# 2x12 1# 2x12 2# 2x15   Ther Activity                                       Gait Training                                       Modalities

## 2020-08-10 ENCOUNTER — OFFICE VISIT (OUTPATIENT)
Dept: PHYSICAL THERAPY | Facility: REHABILITATION | Age: 60
End: 2020-08-10
Payer: COMMERCIAL

## 2020-08-10 DIAGNOSIS — M75.02 ADHESIVE CAPSULITIS OF LEFT SHOULDER: Primary | ICD-10-CM

## 2020-08-10 PROCEDURE — 97110 THERAPEUTIC EXERCISES: CPT

## 2020-08-10 PROCEDURE — 97140 MANUAL THERAPY 1/> REGIONS: CPT

## 2020-08-10 NOTE — PROGRESS NOTES
Daily Note     Today's date: 8/10/2020  Patient name: Tab Verma  : 1960  MRN: [id number]  Referring provider: Kaci Young MD  Dx:   Encounter Diagnosis     ICD-10-CM    1  Adhesive capsulitis of left shoulder  M75 02                   Subjective: Pt reports some improvement overall upon arrival, states he still had difficulty with activities that involve lifting above shoulder level  Pt states that ROM above shoulder level with no weight is good  Objective: See treatment diary below      Assessment: Tolerated treatment well  Patient would benefit from continued PT   Pt  able to complete all exercises with no increase in pain during or after session  Pt able to progress exercises this session without complaint  Introduced RS at 120, pt responded well  Pt  1:1 with PTA for entirety  Plan: Continue per plan of care        Precautions: None      Manuals  8/3 8 8/10   Inf joint mobs         Inf/post 5'     L shoulder PROM REMI 10' KP 10' KP 8' KP 8' KP 8' KP 8' KP 8' REMI 8' 5'  KP 6'   SL scap mobs w/ STM         5'     Rhythmic Stab  120*           3x1'   Neuro Re-Ed              Finger ladder 25 10x10" ea 10x10" ea 10x10" ea 10x10" ea 10x10" ea np D/c --                                                                                         Ther Ex              UBE 5/5 4/4 4/4 4/4 4/4 4/4 4/4 5/5 5/5 5/5    Pulleys           6'   Supine cane flexion 3# 10x10" 10x10" 3# 10x10" 3# 10x10" 3# 10x10" 3# 10x10" 3# 20x10" 3# 20x10 3# 20x10" 4# 4# 2x15 2x15 4#   Supine cane ER 10x10" 10x10" 10x10" 10x10" 10x10" 10x10" 20x10" 20x10 20x10" DC    Tableslides abd              ER st at table              Seated scap retraction              TB ext/row OTB 2x15 2x15 ea gtb 2x15 ea gtb 2x20 ea gtb 2x20 ea gtb 2x20 ea gtb 2x20 ea btb 2x20 ea btb 2x20 ea 2x20 BTB 2x20 ea BTB   PB rolls at table 10s x20 Abd 20x10" abd 20x10" 20x10" 20x10" 20x10" 20x10"  20x10" 20x10" 20x10" 20x10"   TB Wall Clock     3x otb 3x otb 3x otb 3x otb 3x otb 3x otb 3x otb   Shoulder 3 way     2x10 ea 1# 2x10 ea 1# 2x12 ea 1# 1# 2x12 1# 2x12 2# 2x15 2# 2x15   Ther Activity                                          Gait Training                                          Modalities

## 2020-08-13 ENCOUNTER — OFFICE VISIT (OUTPATIENT)
Dept: PHYSICAL THERAPY | Facility: REHABILITATION | Age: 60
End: 2020-08-13
Payer: COMMERCIAL

## 2020-08-13 DIAGNOSIS — M75.02 ADHESIVE CAPSULITIS OF LEFT SHOULDER: Primary | ICD-10-CM

## 2020-08-13 DIAGNOSIS — Z79.4 TYPE 2 DIABETES MELLITUS WITH OTHER CIRCULATORY COMPLICATION, WITH LONG-TERM CURRENT USE OF INSULIN (HCC): ICD-10-CM

## 2020-08-13 DIAGNOSIS — E11.59 TYPE 2 DIABETES MELLITUS WITH OTHER CIRCULATORY COMPLICATION, WITH LONG-TERM CURRENT USE OF INSULIN (HCC): ICD-10-CM

## 2020-08-13 PROCEDURE — 97112 NEUROMUSCULAR REEDUCATION: CPT | Performed by: PHYSICAL THERAPIST

## 2020-08-13 PROCEDURE — 97110 THERAPEUTIC EXERCISES: CPT | Performed by: PHYSICAL THERAPIST

## 2020-08-13 PROCEDURE — 97140 MANUAL THERAPY 1/> REGIONS: CPT | Performed by: PHYSICAL THERAPIST

## 2020-08-13 RX ORDER — INSULIN GLARGINE 100 [IU]/ML
INJECTION, SOLUTION SUBCUTANEOUS
Qty: 30 PEN | Refills: 3 | Status: SHIPPED | OUTPATIENT
Start: 2020-08-13 | End: 2021-01-08 | Stop reason: SDUPTHER

## 2020-08-13 NOTE — PROGRESS NOTES
Daily Note     Today's date: 2020  Patient name: Javon Chaney  : 1960  MRN: [id number]  Referring provider: Kt Aguilar MD  Dx:   Encounter Diagnosis     ICD-10-CM    1  Adhesive capsulitis of left shoulder  M75 02                   Subjective: No new complaints      Objective: See treatment diary below      Assessment: Tolerated treatment well  Patient demonstrated fatigue post treatment      Plan: Continue per plan of care        Precautions: None      Manuals              Inf joint mobs              L shoulder PROM REMI 8''             SL scap mobs w/ STM                            Neuro Re-Ed              Finger ladder              Rhythmic Stab  120* 45s x6                                                                                   Ther Ex              UBE 5/5             Pulleys 6'             Supine cane flexion 4# 10x10"             Supine cane ER 10x10"             S/L ER/ABD 0# 2x15             Supine shoulder flex 3# 2x10             Seated scap retraction              TB ext/row BTB 2x15             PB rolls at table              TB Wall Clock              Shoulder 3 way 2# 2x15             Ther Activity                                          Gait Training                                          Modalities

## 2020-08-17 ENCOUNTER — TELEPHONE (OUTPATIENT)
Dept: SLEEP CENTER | Facility: CLINIC | Age: 60
End: 2020-08-17

## 2020-08-17 NOTE — TELEPHONE ENCOUNTER
----- Message from Lucian Loja MD sent at 8/14/2020  4:02 PM EDT -----  Approved  ----- Message -----  From: Rey Madrid  Sent: 8/10/2020   9:16 AM EDT  To: Sleep Medicine Alivia Baker, #    PLEASE REVIEW FOR APPROVAL OR DENIAL AND WHY

## 2020-08-18 ENCOUNTER — OFFICE VISIT (OUTPATIENT)
Dept: PHYSICAL THERAPY | Facility: REHABILITATION | Age: 60
End: 2020-08-18
Payer: COMMERCIAL

## 2020-08-18 DIAGNOSIS — M75.02 ADHESIVE CAPSULITIS OF LEFT SHOULDER: Primary | ICD-10-CM

## 2020-08-18 PROCEDURE — 97112 NEUROMUSCULAR REEDUCATION: CPT

## 2020-08-18 PROCEDURE — 97140 MANUAL THERAPY 1/> REGIONS: CPT

## 2020-08-18 PROCEDURE — 97110 THERAPEUTIC EXERCISES: CPT

## 2020-08-18 NOTE — PROGRESS NOTES
Daily Note     Today's date: 2020  Patient name: Luis Nunez  : 1960  MRN: [id number]  Referring provider: Jenna Mcadams MD  Dx:   Encounter Diagnosis     ICD-10-CM    1  Adhesive capsulitis of left shoulder  M75 02                   Subjective: Pt reports his shoulder is feeling good today, but his lower back has been bothering him lately  Pt reports some shaking/difficulty when reaching for objects on top shelves/back of fridge  Objective: See treatment diary below      Assessment: Tolerated treatment well  Patient would benefit from continued PT  Pt performed all exercises without an increase in pain during or after treatment session  Pt exhibited fasciculations in standing shoulder flexion above shoulder lvl  Pt was 1:1 with SPTA for entirety  Treated by MIRNA Hernandez under direct supervision from Raymundo Dailey PTA  Plan: Continue per plan of care        Precautions: None      Manuals             Inf joint mobs              L shoulder PROM REMI 8'' KM 8'            SL scap mobs w/ STM                            Neuro Re-Ed              Finger ladder              Rhythmic Stab  120* 45s x6 45s x6                                                                                  Ther Ex              UBE 5/5 5'/5'            Pulleys 6' np            Supine cane flexion 4# 10x10" 4# 10x10"            Supine cane ER 10x10" 10x10"            S/L ER/ABD 0# 2x15 0# 2x15            Supine shoulder flex 3# 2x10 3# 2x10            Seated scap retraction              TB ext/row BTB 2x15 BTB 2x15 ea            PB rolls at table  20x10"            TB Wall Clock  3x gtb            Shoulder 3 way 2# 2x15 2# 2x15 ea            Ther Activity                                          Gait Training                                          Modalities

## 2020-08-20 ENCOUNTER — OFFICE VISIT (OUTPATIENT)
Dept: PHYSICAL THERAPY | Facility: REHABILITATION | Age: 60
End: 2020-08-20
Payer: COMMERCIAL

## 2020-08-20 DIAGNOSIS — M75.02 ADHESIVE CAPSULITIS OF LEFT SHOULDER: Primary | ICD-10-CM

## 2020-08-20 PROCEDURE — 97112 NEUROMUSCULAR REEDUCATION: CPT

## 2020-08-20 PROCEDURE — 97110 THERAPEUTIC EXERCISES: CPT

## 2020-08-20 PROCEDURE — 97140 MANUAL THERAPY 1/> REGIONS: CPT

## 2020-08-20 NOTE — PROGRESS NOTES
Daily Note     Today's date: 2020  Patient name: Maddie Nunes  : 1960  MRN: [id number]  Referring provider: Martita Taylor MD  Dx:   Encounter Diagnosis     ICD-10-CM    1  Adhesive capsulitis of left shoulder  M75 02                   Subjective: Pt reports he was sore upon arrival to session  Objective: See treatment diary below      Assessment: Tolerated treatment well  Patient would benefit from continued PT  Pt experienced some fatigue and soreness during the shoulder 3-way exercises with 2#, decreased to 1# for 2nd set  Pt performed all exercises without an increase in pain during or after session  Pt was 1:1 with SPTA for entirety  Treated by Heather Armas SPTA under direct supervision from Kimberly Villarreal PTA  Plan: Continue per plan of care        Precautions: None      Manuals            Inf joint mobs              L shoulder PROM REMI 8'' KM 8' KM 8'           SL scap mobs w/ STM                            Neuro Re-Ed              Finger ladder              Rhythmic Stab  120* 45s x6 45s x6 4x1'                                                                                  Ther Ex              UBE 5/5 5'/5' 5'/5'           Pulleys 6' np np           Supine cane flexion 4# 10x10" 4# 10x10" 4# 10x10"           Supine cane ER 10x10" 10x10" 10x10"           S/L ER/ABD 0# 2x15 0# 2x15 0# 2x15           Supine shoulder flex 3# 2x10 3# 2x10 3# 2x10           Seated scap retraction              TB ext/row BTB 2x15 BTB 2x15 ea btb 2x15 ea           PB rolls at table  20x10" np           TB Wall Clock  3x gtb  3x gtb           Shoulder 3 way 2# 2x15 2# 2x15 ea 1x15 2# ea   1x15 1# ea            Ther Activity                                          Gait Training                                          Modalities

## 2020-08-25 ENCOUNTER — OFFICE VISIT (OUTPATIENT)
Dept: PHYSICAL THERAPY | Facility: REHABILITATION | Age: 60
End: 2020-08-25
Payer: COMMERCIAL

## 2020-08-25 DIAGNOSIS — M75.02 ADHESIVE CAPSULITIS OF LEFT SHOULDER: Primary | ICD-10-CM

## 2020-08-25 PROCEDURE — 97110 THERAPEUTIC EXERCISES: CPT

## 2020-08-25 PROCEDURE — 97140 MANUAL THERAPY 1/> REGIONS: CPT

## 2020-08-25 NOTE — PROGRESS NOTES
Daily Note     Today's date: 2020  Patient name: Kendra Hanks  : 1960  MRN: [id number]  Referring provider: Josesito Engle MD  Dx:   Encounter Diagnosis     ICD-10-CM    1  Adhesive capsulitis of left shoulder  M75 02                   Subjective: Pt reports today is below average, his back and arm are both sore  Pt reports his arm is feeling better after treatment session  Objective: See treatment diary below      Assessment: Tolerated treatment well  Patient would benefit from continued PT  Pt exhibited soreness upon arrival and throughout session  Pt reduced weight for 3-way shoulder exercises due to pain/soreness  Skipped TB exercises today due to pain in shoulder and back  Pt was 1:1 with PTA from [id number]  IEP from [id number]  Treated by MIRNA Alicia under direct supervision from [facility]  Plan: Continue per plan of care        Precautions: None      Manuals           Inf joint mobs              L shoulder PROM REMI 8'' KM 8' KM 8' KM 8'          SL scap mobs w/ STM                            Neuro Re-Ed              Finger ladder              Rhythmic Stab  120* 45s x6 45s x6 4x1'  4x1'                                                                                Ther Ex              UBE 5/5 5'/5' 5'/5' 5'/5'          Pulleys 6' np np           Supine cane flexion 4# 10x10" 4# 10x10" 4# 10x10" 4# 10x10"          Supine cane ER 10x10" 10x10" 10x10" 10x10"          S/L ER/ABD 0# 2x15 0# 2x15 0# 2x15 0# 2x15          Supine shoulder flex 3# 2x10 3# 2x10 3# 2x10 3# 2x10          Seated scap retraction              TB ext/row BTB 2x15 BTB 2x15 ea btb 2x15 ea np          PB rolls at table  20x10" np           TB Wall Clock  3x gtb  3x gtb 3x gtb          Shoulder 3 way 2# 2x15 2# 2x15 ea 1x15 2# ea   1x15 1# ea  2x15 1# ea          Ther Activity                                          Gait Training                                          Modalities

## 2020-08-27 ENCOUNTER — OFFICE VISIT (OUTPATIENT)
Dept: PHYSICAL THERAPY | Facility: REHABILITATION | Age: 60
End: 2020-08-27
Payer: COMMERCIAL

## 2020-08-27 DIAGNOSIS — M75.02 ADHESIVE CAPSULITIS OF LEFT SHOULDER: Primary | ICD-10-CM

## 2020-08-27 PROCEDURE — 97110 THERAPEUTIC EXERCISES: CPT | Performed by: PHYSICAL THERAPIST

## 2020-08-27 PROCEDURE — 97112 NEUROMUSCULAR REEDUCATION: CPT | Performed by: PHYSICAL THERAPIST

## 2020-08-27 NOTE — PROGRESS NOTES
Daily Note     Today's date: 2020  Patient name: Kodak May  : 1960  MRN: [id number]  Referring provider: Nicki Dawson MD  Dx: No diagnosis found  Subjective: No new complaints      Objective: See treatment diary below      Assessment: Mild loss of end range motion persists      Plan: Continue per plan of care        Precautions: None      Manuals  8726         Inf joint mobs              L shoulder PROM REMI 8'' KM 8' KM 8' KM 8' REMI 8'         SL scap mobs w/ STM                            Neuro Re-Ed              Finger ladder              Rhythmic Stab  120* 45s x6 45s x6 4x1'  4x1' 1'x4                                                                               Ther Ex              UBE 5/5 5'/5' 5'/5' 5'/5' 5'/5'         Pulleys 6' np np           Supine cane flexion 4# 10x10" 4# 10x10" 4# 10x10" 4# 10x10" 4# 10s x10         Supine cane ER 10x10" 10x10" 10x10" 10x10" dc         S/L ER/ABD 0# 2x15 0# 2x15 0# 2x15 0# 2x15 1# 2x15         Supine shoulder flex 3# 2x10 3# 2x10 3# 2x10 3# 2x10 3# 2x15         Seated scap retraction              TB ext/row BTB 2x15 BTB 2x15 ea btb 2x15 ea np BTB 2x15         PB rolls at table  20x10" np           TB Wall Clock  3x gtb  3x gtb 3x gtb 3x gtb         Shoulder 3 way 2# 2x15 2# 2x15 ea 1x15 2# ea   1x15 1# ea  2x15 1# ea 2# 2x15 ea         Ther Activity                                          Gait Training                                          Modalities

## 2020-08-30 NOTE — TELEPHONE ENCOUNTER
Pt called back and stated 10% and his pain level is about 8 1/2 -9  Please call pt at [phone]  No adenopathy or splenomegaly. No cervical or inguinal lymphadenopathy.

## 2020-08-31 ENCOUNTER — OFFICE VISIT (OUTPATIENT)
Dept: PHYSICAL THERAPY | Facility: REHABILITATION | Age: 60
End: 2020-08-31
Payer: COMMERCIAL

## 2020-08-31 DIAGNOSIS — M75.02 ADHESIVE CAPSULITIS OF LEFT SHOULDER: Primary | ICD-10-CM

## 2020-08-31 PROCEDURE — 97140 MANUAL THERAPY 1/> REGIONS: CPT

## 2020-08-31 PROCEDURE — 97110 THERAPEUTIC EXERCISES: CPT

## 2020-08-31 NOTE — PROGRESS NOTES
Daily Note     Today's date: 2020  Patient name: Chadd Cintron  : 1960  MRN: [id number]  Referring provider: Umesh Siddiqui MD  Dx:   Encounter Diagnosis     ICD-10-CM    1  Adhesive capsulitis of left shoulder  M75 02                   Subjective: Pt reports he is feeling okay today, was able to do yard work over the weekend without issues  Objective: See treatment diary below      Assessment: Tolerated treatment well  Patient would benefit from continued PT  Pt performed all exercises without an increase in pain during or after session  Pt struggled slightly with s/l ex  Rot  this visit  Pt was 1:1 with SPTA from [id number], IEP [id number]  Treated by MIRNA Vasques under direct supervision from Reva Kee PTA  Plan: Continue per plan of care        Precautions: None      Manuals  8726         Inf joint mobs              L shoulder PROM REMI 8'' KM 8' KM 8' KM 8' REMI 8' KM 8'        SL scap mobs w/ STM                            Neuro Re-Ed              Finger ladder              Rhythmic Stab  120* 45s x6 45s x6 4x1'  4x1' 1'x4 4x1'                                                                              Ther Ex              UBE 5/5 5'/5' 5'/5' 5'/5' 5'/5' 5'/5'        Pulleys 6' np np           Supine cane flexion 4# 10x10" 4# 10x10" 4# 10x10" 4# 10x10" 4# 10s x10 4# 10x10"        Supine cane ER 10x10" 10x10" 10x10" 10x10" dc         S/L ER/ABD 0# 2x15 0# 2x15 0# 2x15 0# 2x15 1# 2x15 1# 2x15 ea        Supine shoulder flex 3# 2x10 3# 2x10 3# 2x10 3# 2x10 3# 2x15 3# 2x15        Seated scap retraction              TB ext/row BTB 2x15 BTB 2x15 ea btb 2x15 ea np BTB 2x15 2x15 btb        PB rolls at table  20x10" np           TB Wall Clock  3x gtb  3x gtb 3x gtb 3x gtb 3x gtb        Shoulder 3 way 2# 2x15 2# 2x15 ea 1x15 2# ea   1x15 1# ea  2x15 1# ea 2# 2x15 ea 2# 2x15 ea        Ther Activity                                          Gait Training Modalities

## 2020-09-04 ENCOUNTER — OFFICE VISIT (OUTPATIENT)
Dept: PHYSICAL THERAPY | Facility: REHABILITATION | Age: 60
End: 2020-09-04
Payer: COMMERCIAL

## 2020-09-04 DIAGNOSIS — M75.02 ADHESIVE CAPSULITIS OF LEFT SHOULDER: Primary | ICD-10-CM

## 2020-09-04 PROCEDURE — 97140 MANUAL THERAPY 1/> REGIONS: CPT

## 2020-09-04 PROCEDURE — 97112 NEUROMUSCULAR REEDUCATION: CPT

## 2020-09-04 PROCEDURE — 97110 THERAPEUTIC EXERCISES: CPT

## 2020-09-04 NOTE — PROGRESS NOTES
Daily Note     Today's date: 2020  Patient name: Josette Miles  : 1960  MRN: [id number]  Referring provider: Cresencio Gonsales MD  Dx:   Encounter Diagnosis     ICD-10-CM    1  Adhesive capsulitis of left shoulder  M75 02                   Subjective: Pt reports he is feeling good today, reports he is feeling better than last visit  Pt reports he has been doing well at home with functional activities around the house  Pt reports he only has discomfort/weakness with far reaches for objects (ie  Back of fridge, top shelf of cabinets)  Objective: See treatment diary below      Assessment: Tolerated treatment well  Patient would benefit from continued PT  Pt performed all exercises without an increase in pain during or after treatment session  Reviewed HEP, pt understood  Pt was 1:1 with SPTA for entirety  Treated by MIRNA Hensley under direct supervision from Zeus Donovan PTA        Plan: Pt discharged to [facility]     Precautions: None      Manuals  8726        Inf joint mobs              L shoulder PROM REMI 8'' KM 8' KM 8' KM 8' REMI 8' KM 8' KM 8'       SL scap mobs w/ STM                            Neuro Re-Ed              Finger ladder              Rhythmic Stab  120* 45s x6 45s x6 4x1'  4x1' 1'x4 4x1' 4x1'                                                                             Ther Ex              UBE 5/5 5'/5' 5'/5' 5'/5' 5'/5' 5'/5' 5'/5'       Pulleys 6' np np           Supine cane flexion 4# 10x10" 4# 10x10" 4# 10x10" 4# 10x10" 4# 10s x10 4# 10x10" 4# 10x10"        Supine cane ER 10x10" 10x10" 10x10" 10x10" dc         S/L ER/ABD 0# 2x15 0# 2x15 0# 2x15 0# 2x15 1# 2x15 1# 2x15 ea np       Supine shoulder flex 3# 2x10 3# 2x10 3# 2x10 3# 2x10 3# 2x15 3# 2x15 3# 2x15       Seated scap retraction              TB ext/row BTB 2x15 BTB 2x15 ea btb 2x15 ea np BTB 2x15 2x15 btb 2x15 btb       PB rolls at table  20x10" np           TB Wall Clock  3x gtb  3x gtb 3x gtb 3x gtb 3x gtb 3x gtb       Shoulder 3 way 2# 2x15 2# 2x15 ea 1x15 2# ea   1x15 1# ea  2x15 1# ea 2# 2x15 ea 2# 2x15 ea 2# 2x15       Ther Activity                                          Gait Training                                          Modalities

## 2020-09-06 DIAGNOSIS — I10 ESSENTIAL HYPERTENSION: ICD-10-CM

## 2020-09-06 DIAGNOSIS — E78.00 PURE HYPERCHOLESTEROLEMIA: ICD-10-CM

## 2020-09-06 RX ORDER — FENOFIBRATE 145 MG/1
TABLET, COATED ORAL
Qty: 90 TABLET | Refills: 1 | Status: SHIPPED | OUTPATIENT
Start: 2020-09-06 | End: 2021-03-01

## 2020-09-06 RX ORDER — BENAZEPRIL HYDROCHLORIDE 10 MG/1
TABLET ORAL
Qty: 90 TABLET | Refills: 1 | Status: SHIPPED | OUTPATIENT
Start: 2020-09-06 | End: 2021-03-03

## 2020-09-08 DIAGNOSIS — E78.01 FAMILIAL HYPERCHOLESTEROLEMIA: ICD-10-CM

## 2020-09-08 DIAGNOSIS — I10 ESSENTIAL HYPERTENSION: ICD-10-CM

## 2020-09-08 RX ORDER — METOPROLOL TARTRATE 50 MG/1
50 TABLET, FILM COATED ORAL EVERY 12 HOURS SCHEDULED
Qty: 90 TABLET | Refills: 3 | Status: SHIPPED | OUTPATIENT
Start: 2020-09-08 | End: 2021-03-03

## 2020-09-08 RX ORDER — ATORVASTATIN CALCIUM 40 MG/1
TABLET, FILM COATED ORAL
Qty: 90 TABLET | Refills: 2 | Status: SHIPPED | OUTPATIENT
Start: 2020-09-08 | End: 2021-05-31

## 2020-09-23 DIAGNOSIS — E11.59 TYPE 2 DIABETES MELLITUS WITH OTHER CIRCULATORY COMPLICATION, WITH LONG-TERM CURRENT USE OF INSULIN (HCC): ICD-10-CM

## 2020-09-23 DIAGNOSIS — Z79.4 TYPE 2 DIABETES MELLITUS WITH OTHER CIRCULATORY COMPLICATION, WITH LONG-TERM CURRENT USE OF INSULIN (HCC): ICD-10-CM

## 2020-09-23 RX ORDER — EMPAGLIFLOZIN 25 MG/1
TABLET, FILM COATED ORAL
Qty: 90 TABLET | Refills: 1 | Status: SHIPPED | OUTPATIENT
Start: 2020-09-23 | End: 2021-03-11 | Stop reason: SDUPTHER

## 2020-10-13 DIAGNOSIS — G89.29 CHRONIC BILATERAL LOW BACK PAIN WITHOUT SCIATICA: ICD-10-CM

## 2020-10-13 DIAGNOSIS — M54.50 CHRONIC BILATERAL LOW BACK PAIN WITHOUT SCIATICA: ICD-10-CM

## 2020-10-13 RX ORDER — HYDROCODONE BITARTRATE AND ACETAMINOPHEN 5; 300 MG/1; MG/1
1 TABLET ORAL EVERY 6 HOURS PRN
Qty: 112 TABLET | Refills: 0 | Status: SHIPPED | OUTPATIENT
Start: 2020-10-13 | End: 2021-01-27 | Stop reason: SDUPTHER

## 2020-11-06 ENCOUNTER — LAB (OUTPATIENT)
Dept: LAB | Age: 60
End: 2020-11-06
Payer: COMMERCIAL

## 2020-11-06 ENCOUNTER — APPOINTMENT (OUTPATIENT)
Dept: LAB | Age: 60
End: 2020-11-06
Payer: COMMERCIAL

## 2020-11-06 ENCOUNTER — TRANSCRIBE ORDERS (OUTPATIENT)
Dept: ADMINISTRATIVE | Age: 60
End: 2020-11-06

## 2020-11-06 DIAGNOSIS — E11.9 TYPE 2 DIABETES MELLITUS WITHOUT COMPLICATION, WITH LONG-TERM CURRENT USE OF INSULIN (HCC): ICD-10-CM

## 2020-11-06 DIAGNOSIS — Z12.11 SPECIAL SCREENING FOR MALIGNANT NEOPLASMS, COLON: ICD-10-CM

## 2020-11-06 DIAGNOSIS — Z79.4 TYPE 2 DIABETES MELLITUS WITHOUT COMPLICATION, WITH LONG-TERM CURRENT USE OF INSULIN (HCC): ICD-10-CM

## 2020-11-06 DIAGNOSIS — I10 BENIGN ESSENTIAL HYPERTENSION: ICD-10-CM

## 2020-11-06 DIAGNOSIS — Z12.11 SPECIAL SCREENING FOR MALIGNANT NEOPLASMS, COLON: Primary | ICD-10-CM

## 2020-11-06 DIAGNOSIS — Z12.11 COLON CANCER SCREENING: ICD-10-CM

## 2020-11-06 LAB
ANION GAP SERPL CALCULATED.3IONS-SCNC: 3 MMOL/L (ref 4–13)
BUN SERPL-MCNC: 16 MG/DL (ref 5–25)
CALCIUM SERPL-MCNC: 9.5 MG/DL (ref 8.3–10.1)
CHLORIDE SERPL-SCNC: 111 MMOL/L (ref 100–108)
CO2 SERPL-SCNC: 28 MMOL/L (ref 21–32)
CREAT SERPL-MCNC: 1.28 MG/DL (ref 0.6–1.3)
EST. AVERAGE GLUCOSE BLD GHB EST-MCNC: 131 MG/DL
GFR SERPL CREATININE-BSD FRML MDRD: 61 ML/MIN/1.73SQ M
GLUCOSE P FAST SERPL-MCNC: 93 MG/DL (ref 65–99)
HBA1C MFR BLD: 6.2 %
HEMOCCULT STL QL IA: NEGATIVE
POTASSIUM SERPL-SCNC: 5 MMOL/L (ref 3.5–5.3)
SODIUM SERPL-SCNC: 142 MMOL/L (ref 136–145)

## 2020-11-06 PROCEDURE — 80048 BASIC METABOLIC PNL TOTAL CA: CPT

## 2020-11-06 PROCEDURE — G0328 FECAL BLOOD SCRN IMMUNOASSAY: HCPCS

## 2020-11-06 PROCEDURE — 3044F HG A1C LEVEL LT 7.0%: CPT | Performed by: INTERNAL MEDICINE

## 2020-11-06 PROCEDURE — 36415 COLL VENOUS BLD VENIPUNCTURE: CPT

## 2020-11-06 PROCEDURE — 83036 HEMOGLOBIN GLYCOSYLATED A1C: CPT

## 2020-11-13 ENCOUNTER — OFFICE VISIT (OUTPATIENT)
Dept: INTERNAL MEDICINE CLINIC | Facility: CLINIC | Age: 60
End: 2020-11-13
Payer: COMMERCIAL

## 2020-11-13 VITALS
BODY MASS INDEX: 44.03 KG/M2 | TEMPERATURE: 97.8 F | DIASTOLIC BLOOD PRESSURE: 72 MMHG | SYSTOLIC BLOOD PRESSURE: 136 MMHG | HEIGHT: 66 IN | WEIGHT: 274 LBS | HEART RATE: 71 BPM | OXYGEN SATURATION: 96 %

## 2020-11-13 DIAGNOSIS — E11.9 TYPE 2 DIABETES MELLITUS WITHOUT COMPLICATION, WITH LONG-TERM CURRENT USE OF INSULIN (HCC): ICD-10-CM

## 2020-11-13 DIAGNOSIS — M47.816 SPONDYLOSIS OF LUMBAR REGION WITHOUT MYELOPATHY OR RADICULOPATHY: ICD-10-CM

## 2020-11-13 DIAGNOSIS — Z79.4 TYPE 2 DIABETES MELLITUS WITHOUT COMPLICATION, WITH LONG-TERM CURRENT USE OF INSULIN (HCC): ICD-10-CM

## 2020-11-13 DIAGNOSIS — J43.1 PANLOBULAR EMPHYSEMA (HCC): ICD-10-CM

## 2020-11-13 DIAGNOSIS — E66.01 MORBID OBESITY WITH BMI OF 40.0-44.9, ADULT (HCC): ICD-10-CM

## 2020-11-13 DIAGNOSIS — I10 BENIGN ESSENTIAL HYPERTENSION: ICD-10-CM

## 2020-11-13 DIAGNOSIS — J34.89 LESION OF NOSE: Primary | ICD-10-CM

## 2020-11-13 DIAGNOSIS — Z00.00 HEALTHCARE MAINTENANCE: ICD-10-CM

## 2020-11-13 DIAGNOSIS — Z23 ENCOUNTER FOR IMMUNIZATION: ICD-10-CM

## 2020-11-13 PROCEDURE — 3075F SYST BP GE 130 - 139MM HG: CPT | Performed by: INTERNAL MEDICINE

## 2020-11-13 PROCEDURE — 99214 OFFICE O/P EST MOD 30 MIN: CPT | Performed by: INTERNAL MEDICINE

## 2020-11-13 PROCEDURE — 3008F BODY MASS INDEX DOCD: CPT | Performed by: INTERNAL MEDICINE

## 2020-11-13 PROCEDURE — 90471 IMMUNIZATION ADMIN: CPT

## 2020-11-13 PROCEDURE — 90682 RIV4 VACC RECOMBINANT DNA IM: CPT

## 2020-11-13 PROCEDURE — 1036F TOBACCO NON-USER: CPT | Performed by: INTERNAL MEDICINE

## 2020-11-13 PROCEDURE — 3078F DIAST BP <80 MM HG: CPT | Performed by: INTERNAL MEDICINE

## 2020-11-29 ENCOUNTER — HOSPITAL ENCOUNTER (OUTPATIENT)
Dept: SLEEP CENTER | Facility: CLINIC | Age: 60
Discharge: HOME | End: 2020-11-29
Payer: COMMERCIAL

## 2020-11-29 DIAGNOSIS — G47.33 OBSTRUCTIVE SLEEP APNEA SYNDROME: ICD-10-CM

## 2020-11-29 DIAGNOSIS — J43.1 PANLOBULAR EMPHYSEMA (HCC): ICD-10-CM

## 2020-11-29 PROCEDURE — G0399 HOME SLEEP TEST/TYPE 3 PORTA: HCPCS

## 2020-12-03 ENCOUNTER — TELEPHONE (OUTPATIENT)
Dept: SLEEP CENTER | Facility: CLINIC | Age: 60
End: 2020-12-03

## 2020-12-18 DIAGNOSIS — I25.10 CORONARY ARTERY DISEASE INVOLVING NATIVE HEART, ANGINA PRESENCE UNSPECIFIED, UNSPECIFIED VESSEL OR LESION TYPE: ICD-10-CM

## 2020-12-18 RX ORDER — CLOPIDOGREL BISULFATE 75 MG/1
TABLET ORAL
Qty: 30 TABLET | Refills: 5 | Status: SHIPPED | OUTPATIENT
Start: 2020-12-18 | End: 2021-04-23 | Stop reason: SDUPTHER

## 2021-01-07 ENCOUNTER — OFFICE VISIT (OUTPATIENT)
Dept: SLEEP CENTER | Facility: CLINIC | Age: 61
End: 2021-01-07
Payer: COMMERCIAL

## 2021-01-07 VITALS
WEIGHT: 280 LBS | HEIGHT: 66 IN | SYSTOLIC BLOOD PRESSURE: 120 MMHG | BODY MASS INDEX: 45 KG/M2 | HEART RATE: 78 BPM | DIASTOLIC BLOOD PRESSURE: 60 MMHG

## 2021-01-07 DIAGNOSIS — G47.33 OSA (OBSTRUCTIVE SLEEP APNEA): Primary | ICD-10-CM

## 2021-01-07 PROCEDURE — 3008F BODY MASS INDEX DOCD: CPT | Performed by: INTERNAL MEDICINE

## 2021-01-07 PROCEDURE — 3078F DIAST BP <80 MM HG: CPT | Performed by: INTERNAL MEDICINE

## 2021-01-07 PROCEDURE — 99203 OFFICE O/P NEW LOW 30 MIN: CPT | Performed by: INTERNAL MEDICINE

## 2021-01-07 PROCEDURE — 3074F SYST BP LT 130 MM HG: CPT | Performed by: INTERNAL MEDICINE

## 2021-01-07 PROCEDURE — 1036F TOBACCO NON-USER: CPT | Performed by: INTERNAL MEDICINE

## 2021-01-07 NOTE — PROGRESS NOTES
Consultation - Sleep Center   Pablo Christopher : 1960  MRN: [id number]      Assessment:  The patient has moderate obstructive sleep apnea on home sleep testing  He has a history of cardiac dysfunction and hypertension  He also has a history of COPD  For these reasons he requires an in-lab polysomnography for positive airway pressure titration  Plan:  Titration study    Follow up:  Compliance check    History of Present Illness:   60 y o male with a longstanding history of loud snoring  He recalls that some 20 years ago he may have had prior sleep testing, but does not recall the results  He has excessive daytime sleepiness, due in part to an irregular sleep schedule  He used to work overnight as a   His most recent home sleep apnea test showed ALEKSEY = 23 5  He reports morning headaches  He denies awakening with a choking sensation  He reportedly has a history of COPD as well as possibly CHF  I will review his cardiac records when they are available        Review of Systems      Genitourinary difficulty with erection   Cardiology ankle/leg swelling   Gastrointestinal frequent heartburn/acid reflux   Neurology awaken with headache   Constitutional claustrophobia, fatigue and weight change   Integumentary none   Psychiatry none   Musculoskeletal joint pain, muscle aches, back pain and sciatica   Pulmonary shortness of breath with activity   ENT ringing in ears   Endocrine none   Hematological none         I have reviewed and updated the review of systems as necessary    Historical Information    Past Medical History:  Diabetes mellitus, COPD, ASCVD, hypertension    Family History: non-contributory    Social History     Socioeconomic History    Marital status: Single     Spouse name: None    Number of children: None    Years of education: None    Highest education level: None   Occupational History    None   Social Needs    Financial resource strain: None    Food insecurity     Worry: None     Inability: None    Transportation needs     Medical: None     Non-medical: None   Tobacco Use    Smoking status: Former Smoker     Packs/day: 0 00     Years: 0 00     Pack years: 0 00     Quit date: 2013     Years since quittin 5    Smokeless tobacco: Never Used   Substance and Sexual Activity    Alcohol use: No    Drug use: No    Sexual activity: Not Currently   Lifestyle    Physical activity     Days per week: None     Minutes per session: None    Stress: None   Relationships    Social connections     Talks on phone: None     Gets together: None     Attends Taoist service: None     Active member of club or organization: None     Attends meetings of clubs or organizations: None     Relationship status: None    Intimate partner violence     Fear of current or ex partner: None     Emotionally abused: None     Physically abused: None     Forced sexual activity: None   Other Topics Concern    None   Social History Narrative    None         Sleep Schedule: unremarkable    Snoring:  Yes    Witnessed Apnea:  No    Medications/Allergies:    Current Outpatient Medications:     acetaminophen (TYLENOL) 325 mg tablet, Take 650 mg by mouth every 6 (six) hours as needed for mild pain, Disp: , Rfl:     albuterol (PROVENTIL HFA,VENTOLIN HFA) 90 mcg/act inhaler, Inhale 90 puffs as needed, Disp: , Rfl:     aspirin 325 mg tablet, Take 325 mg by mouth daily , Disp: , Rfl:     atorvastatin (LIPITOR) 40 mg tablet, TAKE 1 TABLET BY MOUTH EVERY DAY, Disp: 90 tablet, Rfl: 2    benazepril (LOTENSIN) 10 mg tablet, TAKE 1 TABLET BY MOUTH EVERY DAY, Disp: 90 tablet, Rfl: 1    clopidogrel (PLAVIX) 75 mg tablet, TAKE 1 TABLET BY MOUTH EVERY DAY, Disp: 30 tablet, Rfl: 5    fenofibrate (TRICOR) 145 mg tablet, TAKE 1 TABLET BY MOUTH EVERY DAY, Disp: 90 tablet, Rfl: 1    HYDROcodone-acetaminophen (Vicodin) 5-300 MG per tablet, Take 1 tablet by mouth every 6 (six) hours as needed for moderate painMax Daily Amount: 4 tablets, Disp: 112 tablet, Rfl: 0    insulin glargine (Lantus SoloStar) 100 units/mL injection pen, 100 units subcutaneously to q day, Disp: 30 pen, Rfl: 3    Insulin Pen Needle (B-D UF III MINI PEN NEEDLES) 31G X 5 MM MISC, Inject as directed 2 (two) times a day Use as directed, Disp: 100 each, Rfl: 5    Jardiance 25 MG TABS, TAKE 1 TABLET BY MOUTH EVERY DAY, Disp: 90 tablet, Rfl: 1    metoprolol tartrate (LOPRESSOR) 50 mg tablet, Take 1 tablet (50 mg total) by mouth every 12 (twelve) hours, Disp: 90 tablet, Rfl: 3    nitroglycerin (NITROSTAT) 0 3 mg SL tablet, Place under the tongue, Disp: , Rfl:     ONE TOUCH ULTRA TEST test strip, USE TO TEST 4 TIMES A DAY, Disp: 200 each, Rfl: 3    pantoprazole (PROTONIX) 40 mg tablet, Take 1 tablet (40 mg total) by mouth daily, Disp: 90 tablet, Rfl: 5        Henny Elder  3/5/7477 10:55 AM  Sign when Signing Visit    Review of Systems      Genitourinary difficulty with erection   Cardiology ankle/leg swelling   Gastrointestinal frequent heartburn/acid reflux   Neurology awaken with headache   Constitutional claustrophobia, fatigue and weight change   Integumentary none   Psychiatry none   Musculoskeletal joint pain, muscle aches, back pain and sciatica   Pulmonary shortness of breath with activity   ENT ringing in ears   Endocrine none   Hematological none                         Objective:    Vital Signs:   Vitals:    01/07/21 1000   BP: 120/60   Pulse: 78   Weight: 127 kg (280 lb)   Height: 5' 6" (1 676 m)     Neck Circumference: 19 5      [place] Sleepiness Scale:  Total score: 12    Physical Exam:    General: Alert, appropriate, cooperative, overweight    Head: NC/AT, moderate retrognathia    Nose: No septal deviation    Throat: Airway diminished, tongue base markedly thickened, no tonsils visualized    Neck: Normal, no thyromegaly or lymphadenopathy, no JVD    Heart: RR, normal S1 and S2, no murmurs    Chest: Clear bilaterally    Extremity: No clubbing, cyanosis, trace edema    Skin: Warm, dry    Neuro: No motor abnormalities, cranial nerves appear intact    Sleep Study Results:   ALEKSEY = 23 5  There is not a significant degree of oxygen desaturation  Counseling / Coordination of Care  A description of the counseling / coordination of care: We discussed the pathophysiology of obstructive sleep apnea as well as the possible treatment options  We also discussed the rationale for positive airway pressure therapy  Board Certified Sleep Specialist    Portions of the record may have been created with voice recognition software  Occasional wrong word or "sound a like" substitutions may have occurred due to the inherent limitations of voice recognition software  Read the chart carefully and recognize, using context, where substitutions have occurred

## 2021-01-08 ENCOUNTER — TELEPHONE (OUTPATIENT)
Dept: SLEEP CENTER | Facility: CLINIC | Age: 61
End: 2021-01-08

## 2021-01-08 DIAGNOSIS — E11.59 TYPE 2 DIABETES MELLITUS WITH OTHER CIRCULATORY COMPLICATION, WITH LONG-TERM CURRENT USE OF INSULIN (HCC): ICD-10-CM

## 2021-01-08 DIAGNOSIS — Z01.812 ENCOUNTER FOR PREPROCEDURE SCREENING LABORATORY TESTING FOR COVID-19: Primary | ICD-10-CM

## 2021-01-08 DIAGNOSIS — Z79.4 TYPE 2 DIABETES MELLITUS WITH OTHER CIRCULATORY COMPLICATION, WITH LONG-TERM CURRENT USE OF INSULIN (HCC): ICD-10-CM

## 2021-01-08 DIAGNOSIS — Z20.822 ENCOUNTER FOR PREPROCEDURE SCREENING LABORATORY TESTING FOR COVID-19: Primary | ICD-10-CM

## 2021-01-08 RX ORDER — INSULIN GLARGINE 100 [IU]/ML
INJECTION, SOLUTION SUBCUTANEOUS
Qty: 30 PEN | Refills: 3 | Status: SHIPPED | OUTPATIENT
Start: 2021-01-08 | End: 2021-04-20 | Stop reason: SDUPTHER

## 2021-01-08 NOTE — TELEPHONE ENCOUNTER
1/7/21 Pt is schedule for Sleep CPAP Study on 2/22/21 in [facility]  Pt was explained about Covid test lc   Patient informed that COVID testing is to be performed 10 days prior to PAP study  Patient is to tell site that it is needed for a procedure so it is expedited  Testing site information provided

## 2021-01-27 DIAGNOSIS — M54.50 CHRONIC BILATERAL LOW BACK PAIN WITHOUT SCIATICA: ICD-10-CM

## 2021-01-27 DIAGNOSIS — G89.29 CHRONIC BILATERAL LOW BACK PAIN WITHOUT SCIATICA: ICD-10-CM

## 2021-01-28 RX ORDER — HYDROCODONE BITARTRATE AND ACETAMINOPHEN 5; 300 MG/1; MG/1
1 TABLET ORAL EVERY 6 HOURS PRN
Qty: 112 TABLET | Refills: 0 | Status: SHIPPED | OUTPATIENT
Start: 2021-01-28 | End: 2021-05-04 | Stop reason: SDUPTHER

## 2021-02-12 DIAGNOSIS — Z20.822 ENCOUNTER FOR PREPROCEDURE SCREENING LABORATORY TESTING FOR COVID-19: ICD-10-CM

## 2021-02-12 DIAGNOSIS — Z01.812 ENCOUNTER FOR PREPROCEDURE SCREENING LABORATORY TESTING FOR COVID-19: ICD-10-CM

## 2021-02-12 LAB — SARS-COV-2 RNA RESP QL NAA+PROBE: NEGATIVE

## 2021-02-12 PROCEDURE — 87635 SARS-COV-2 COVID-19 AMP PRB: CPT | Performed by: INTERNAL MEDICINE

## 2021-02-17 DIAGNOSIS — Z79.4 TYPE 2 DIABETES MELLITUS WITH OTHER CIRCULATORY COMPLICATION, WITH LONG-TERM CURRENT USE OF INSULIN (HCC): ICD-10-CM

## 2021-02-17 DIAGNOSIS — E11.59 TYPE 2 DIABETES MELLITUS WITH OTHER CIRCULATORY COMPLICATION, WITH LONG-TERM CURRENT USE OF INSULIN (HCC): ICD-10-CM

## 2021-02-17 RX ORDER — PEN NEEDLE, DIABETIC 31 GX5/16"
NEEDLE, DISPOSABLE MISCELLANEOUS 2 TIMES DAILY
Qty: 100 EACH | Refills: 0 | Status: SHIPPED | OUTPATIENT
Start: 2021-02-17 | End: 2021-04-07 | Stop reason: SDUPTHER

## 2021-02-22 ENCOUNTER — HOSPITAL ENCOUNTER (OUTPATIENT)
Dept: SLEEP CENTER | Facility: CLINIC | Age: 61
Discharge: HOME | End: 2021-02-22
Payer: COMMERCIAL

## 2021-02-22 DIAGNOSIS — G47.33 OSA (OBSTRUCTIVE SLEEP APNEA): ICD-10-CM

## 2021-02-22 PROCEDURE — 95811 POLYSOM 6/>YRS CPAP 4/> PARM: CPT

## 2021-02-23 NOTE — PROGRESS NOTES
Sleep Study Documentation    Pre-Sleep Study       Sleep testing procedure explained to patient:YES    Patient napped prior to study:NO    [address] [place] worker after 12PM   Caffeine use:NO    Alcohol:Dayshift workers after 5PM: Alcohol use:NO    Typical day for patient:YES       Study Documentation    Sleep Study Indications: FREDY DIAGNOSED HOME STUDY    Sleep Study: Treatment   Optimal PAP pressure: 18/14  Leak:Medium  Snore:Eliminated  REM Obtained:yes  Supplemental O2: no    Minimum SaO2 86  Baseline SaO2 94  PAP mask tried (list all)RESMED AIRFIT F20, RESMED AIRFIT P10, F&P SIMPLUS  PAP mask choice (final)RESMED AIRFIT F20  PAP mask type:full face  PAP pressure at which snoring was eliminated 18/14  Minimum SaO2 at final PAP pressure 90  Mode of Therapy:BiPAP  ETCO2:No  CPAP changed to BiPAP:Yes  If yes why PT COMFORT    Mode of Therapy:BiPAP    EKG abnormalities: no     EEG abnormalities: no    Sleep Study Recorded < 2 hours: N/A    Sleep Study Recorded > 2 hours but incomplete study: N/A    Sleep Study Recorded 6 hours but no sleep obtained: NO    Patient classification: employed       Post-Sleep Study    Medication used at bedtime or during sleep study:YES other prescription medications    Patient reports time it took to fall asleep:greater than 60 minutes    Patient reports waking up during study:3 or more times  Patient reports returning to sleep in 10 to 30 minutes  Patient reports sleeping 2 to 4 hours without dreaming  Patient reports sleep during study:typical    Patient rated sleepiness: Somewhat sleepy or tired    PAP treatment:yes: Post PAP treatment patient reports feeling unsure if a change is noted and  would wear PAP mask at home

## 2021-02-26 ENCOUNTER — TELEPHONE (OUTPATIENT)
Dept: SLEEP CENTER | Facility: CLINIC | Age: 61
End: 2021-02-26

## 2021-02-26 DIAGNOSIS — G47.33 OSA (OBSTRUCTIVE SLEEP APNEA): Primary | ICD-10-CM

## 2021-02-26 NOTE — TELEPHONE ENCOUNTER
Left message for patient to call office  Sleep study resulted  BiPAP ordered  Needs to schedule DME set up and compliance follow up

## 2021-02-26 NOTE — TELEPHONE ENCOUNTER
----- Message from Dennie Loyal, MD sent at 2/26/2021  8:54 AM EST -----  BiPAP 18/14 cm with full face mask   Thanks

## 2021-03-01 DIAGNOSIS — E78.00 PURE HYPERCHOLESTEROLEMIA: ICD-10-CM

## 2021-03-01 RX ORDER — FENOFIBRATE 145 MG/1
TABLET, COATED ORAL
Qty: 90 TABLET | Refills: 1 | Status: SHIPPED | OUTPATIENT
Start: 2021-03-01 | End: 2021-08-27

## 2021-03-03 DIAGNOSIS — I10 ESSENTIAL HYPERTENSION: ICD-10-CM

## 2021-03-03 PROCEDURE — 4010F ACE/ARB THERAPY RXD/TAKEN: CPT | Performed by: INTERNAL MEDICINE

## 2021-03-03 RX ORDER — METOPROLOL TARTRATE 50 MG/1
50 TABLET, FILM COATED ORAL EVERY 12 HOURS SCHEDULED
Qty: 180 TABLET | Refills: 1 | Status: SHIPPED | OUTPATIENT
Start: 2021-03-03 | End: 2021-06-23 | Stop reason: DRUGHIGH

## 2021-03-03 RX ORDER — BENAZEPRIL HYDROCHLORIDE 10 MG/1
TABLET ORAL
Qty: 90 TABLET | Refills: 1 | Status: SHIPPED | OUTPATIENT
Start: 2021-03-03 | End: 2021-06-23 | Stop reason: ALTCHOICE

## 2021-03-04 NOTE — TELEPHONE ENCOUNTER
Patient returned our call, I advised above    Scheduled DME set up scheduled 3/16/2021 in [facility] representative aware    Compliance follow up scheduled 5/5/2021 with Dr David Olson

## 2021-03-10 DIAGNOSIS — Z23 ENCOUNTER FOR IMMUNIZATION: ICD-10-CM

## 2021-03-11 ENCOUNTER — APPOINTMENT (OUTPATIENT)
Dept: LAB | Age: 61
End: 2021-03-11
Payer: COMMERCIAL

## 2021-03-11 DIAGNOSIS — E11.59 TYPE 2 DIABETES MELLITUS WITH OTHER CIRCULATORY COMPLICATION, WITH LONG-TERM CURRENT USE OF INSULIN (HCC): ICD-10-CM

## 2021-03-11 DIAGNOSIS — I10 BENIGN ESSENTIAL HYPERTENSION: ICD-10-CM

## 2021-03-11 DIAGNOSIS — E11.9 TYPE 2 DIABETES MELLITUS WITHOUT COMPLICATION, WITH LONG-TERM CURRENT USE OF INSULIN (HCC): ICD-10-CM

## 2021-03-11 DIAGNOSIS — Z79.4 TYPE 2 DIABETES MELLITUS WITHOUT COMPLICATION, WITH LONG-TERM CURRENT USE OF INSULIN (HCC): ICD-10-CM

## 2021-03-11 DIAGNOSIS — Z79.4 TYPE 2 DIABETES MELLITUS WITH OTHER CIRCULATORY COMPLICATION, WITH LONG-TERM CURRENT USE OF INSULIN (HCC): ICD-10-CM

## 2021-03-11 LAB
ANION GAP SERPL CALCULATED.3IONS-SCNC: 4 MMOL/L (ref 4–13)
BUN SERPL-MCNC: 17 MG/DL (ref 5–25)
CALCIUM SERPL-MCNC: 9.1 MG/DL (ref 8.3–10.1)
CHLORIDE SERPL-SCNC: 111 MMOL/L (ref 100–108)
CO2 SERPL-SCNC: 26 MMOL/L (ref 21–32)
CREAT SERPL-MCNC: 1.39 MG/DL (ref 0.6–1.3)
EST. AVERAGE GLUCOSE BLD GHB EST-MCNC: 120 MG/DL
GFR SERPL CREATININE-BSD FRML MDRD: 55 ML/MIN/1.73SQ M
GLUCOSE P FAST SERPL-MCNC: 114 MG/DL (ref 65–99)
HBA1C MFR BLD: 5.8 %
POTASSIUM SERPL-SCNC: 4.5 MMOL/L (ref 3.5–5.3)
SODIUM SERPL-SCNC: 141 MMOL/L (ref 136–145)

## 2021-03-11 PROCEDURE — 80048 BASIC METABOLIC PNL TOTAL CA: CPT

## 2021-03-11 PROCEDURE — 36415 COLL VENOUS BLD VENIPUNCTURE: CPT

## 2021-03-11 PROCEDURE — 3044F HG A1C LEVEL LT 7.0%: CPT | Performed by: INTERNAL MEDICINE

## 2021-03-11 PROCEDURE — 83036 HEMOGLOBIN GLYCOSYLATED A1C: CPT

## 2021-03-12 RX ORDER — EMPAGLIFLOZIN 25 MG/1
25 TABLET, FILM COATED ORAL DAILY
Qty: 90 TABLET | Refills: 0 | Status: SHIPPED | OUTPATIENT
Start: 2021-03-12 | End: 2021-03-16

## 2021-03-15 ENCOUNTER — OFFICE VISIT (OUTPATIENT)
Dept: INTERNAL MEDICINE CLINIC | Facility: CLINIC | Age: 61
End: 2021-03-15
Payer: COMMERCIAL

## 2021-03-15 VITALS
DIASTOLIC BLOOD PRESSURE: 70 MMHG | TEMPERATURE: 96.1 F | HEART RATE: 65 BPM | WEIGHT: 281 LBS | SYSTOLIC BLOOD PRESSURE: 136 MMHG | BODY MASS INDEX: 45.16 KG/M2 | HEIGHT: 66 IN | OXYGEN SATURATION: 97 %

## 2021-03-15 DIAGNOSIS — E66.01 MORBID OBESITY WITH BMI OF 40.0-44.9, ADULT (HCC): ICD-10-CM

## 2021-03-15 DIAGNOSIS — Z79.4 TYPE 2 DIABETES MELLITUS WITHOUT COMPLICATION, WITH LONG-TERM CURRENT USE OF INSULIN (HCC): ICD-10-CM

## 2021-03-15 DIAGNOSIS — G47.33 OSA (OBSTRUCTIVE SLEEP APNEA): ICD-10-CM

## 2021-03-15 DIAGNOSIS — E11.9 TYPE 2 DIABETES MELLITUS WITHOUT COMPLICATION, WITH LONG-TERM CURRENT USE OF INSULIN (HCC): ICD-10-CM

## 2021-03-15 DIAGNOSIS — I10 BENIGN ESSENTIAL HYPERTENSION: ICD-10-CM

## 2021-03-15 DIAGNOSIS — M54.16 LUMBAR RADICULOPATHY: ICD-10-CM

## 2021-03-15 DIAGNOSIS — E66.01 MORBID OBESITY WITH BMI OF 45.0-49.9, ADULT (HCC): ICD-10-CM

## 2021-03-15 DIAGNOSIS — I25.10 ARTERIOSCLEROTIC CARDIOVASCULAR DISEASE: Primary | ICD-10-CM

## 2021-03-15 PROCEDURE — 3008F BODY MASS INDEX DOCD: CPT | Performed by: INTERNAL MEDICINE

## 2021-03-15 PROCEDURE — 1036F TOBACCO NON-USER: CPT | Performed by: INTERNAL MEDICINE

## 2021-03-15 PROCEDURE — 3075F SYST BP GE 130 - 139MM HG: CPT | Performed by: INTERNAL MEDICINE

## 2021-03-15 PROCEDURE — 99214 OFFICE O/P EST MOD 30 MIN: CPT | Performed by: INTERNAL MEDICINE

## 2021-03-15 PROCEDURE — 3078F DIAST BP <80 MM HG: CPT | Performed by: INTERNAL MEDICINE

## 2021-03-15 NOTE — ASSESSMENT & PLAN NOTE
With the patient's BMI he is considered morbidly obese  Again we did have a long discussion with the patient today about working harder at his reduction in dietary intake in order to help lose weight  Again he is extremely limited in his exercise capacity because of his chronic back pain  He was told that he needs to count his caloric intake and make proper food choices in order to help with weight loss

## 2021-03-15 NOTE — PROGRESS NOTES
Assessment/Plan:    Type 2 diabetes mellitus, with long-term current use of insulin (HCC)    Lab Results   Component Value Date    HGBA1C 5 8 (H) 03/11/2021   Patient did have labs performed prior to the visit today happy to report that sugar with present treatment is under good control despite the fact patient is on very large amounts of insulin  Patient still is having problems with his weight  With discussion patient states he has been overweight since he was a young child and is always had difficulty losing weight  Is extremely limited in his exercise capacity because of his chronic back pain  We did discuss with the patient looking at his caloric intake and reduce it to 1800 calories a day or less in order to help with weight loss  Patient was told this needs to be a concerted effort on his part looking very closely at his dietary intake  He is well aware of what needs to be done as far as changes in his insulin dose if he is able to lose weight  Chronic obstructive pulmonary disease (HCC)  History of chronic obstructive pulmonary disease  Patient has had no acute exacerbations recently  A major change occurred when he stop smoking and this is been beneficial   Patient knows to call us immediately if any acute exacerbations of his chronic lung disease for immediate evaluation either through our office or his pulmonologist period    FREDY (obstructive sleep apnea)  Patient recently underwent repeat studies for his obstructive sleep apnea  Titration of his BiPAP  Patient will be starting within the next few days  Continue to follow-up with his sleep specialist     Arteriosclerotic cardiovascular disease  Patient does have a history of atherosclerotic cardiovascular disease history of MI in the past   Patient states he has not seen his cardiologist in 2 years because of the [place] virus pandemic but he promises to make an appointment to be seen in the near future for re-evaluation    Patient denies any new cardiac symptoms such as chest pain or pressure and no increasing shortness of breath with exertion  He states that he does understand the importance of continued surveillance    Benign essential hypertension  Blood pressure is showing adequate control  Patient's renal function is stable  Patient will continue present medication and surveillance  We will continue to monitor his renal function  Lumbar radiculopathy  History of chronic low back pain  Patient admits that this is intermittent and sometimes ears nose particular aggravating factors  He continues to take Vicodin intermittently to help with his discomfort  He has had full workup and evaluation in the past and no specific indication as to why he has this chronic back pain and discomfort  He was told he should continue the exercises as prescribed by physical therapy    Morbid obesity with BMI of 40 0-44 9, adult ([facility] )  With the patient's BMI he is considered morbidly obese  Again we did have a long discussion with the patient today about working harder at his reduction in dietary intake in order to help lose weight  Again he is extremely limited in his exercise capacity because of his chronic back pain  He was told that he needs to count his caloric intake and make proper food choices in order to help with weight loss  Diagnoses and all orders for this visit:    Arteriosclerotic cardiovascular disease  -     Lipid panel; Future    Benign essential hypertension  -     Basic metabolic panel; Future    Type 2 diabetes mellitus without complication, with long-term current use of insulin ([facility])  -     Basic metabolic panel; Future  -     Hemoglobin A1C; Future    FREDY (obstructive sleep apnea)    Lumbar radiculopathy    Morbid obesity with BMI of 40 0-44 9, adult ([facility])          Subjective:      Patient ID: Jake Kumar is a 61 y o  male  70-year-old male history of multiple medical problems as outlined previously    Patient is here today for routine follow-up  Patient did have labs performed prior to the visit today we did discuss the results looking at his sugar control and renal function  Patient states in general he is doing relatively well and he has missed numerous appointments with his specialists including Cardiology, pulmonology because of the COVID virus pandemic but intends to catch up with all evaluations in the near future  Patient recently had studies and overnight evaluation for his obstructive sleep apnea and will be starting a new BiPAP device in the near future  The following portions of the patient's history were reviewed and updated as appropriate: He  has a past medical history of Abscess of left thigh, Acute myocardial infarction [facility]), Anesthesia, Anxiety, Arteriosclerotic cardiovascular disease, Asthma, Chest pain, Class 2 obesity with alveolar hypoventilation and body mass index (BMI) of 36 0 to 36 9 in adult [facility]), COPD (chronic obstructive pulmonary disease) ([facility] ), Coronary artery disease, Diabetes mellitus ([facility] ), Fatty liver, Gastric ulcer, GERD (gastroesophageal reflux disease), Hyperlipidemia, Hypertension, Low back pain, Myocardial infarction ([facility] ) ([id number]), Obstructive sleep apnea, and Spondylosis of lumbar region without myelopathy or radiculopathy    He   Patient Active Problem List    Diagnosis Date Noted    Lesion of nose 11/13/2020    Type 2 diabetes mellitus, with long-term current use of insulin ([facility] ) 07/06/2020    FREDY (obstructive sleep apnea) 07/06/2020    Aftercare following surgery of the musculoskeletal system 06/15/2020    Impingement syndrome of left shoulder 01/14/2020    Adhesive capsulitis of left shoulder 01/14/2020    Chronic left shoulder pain 08/21/2019    Healthcare maintenance 04/10/2019    Morbid obesity with BMI of 40 0-44 9, adult ([address] ) 11/29/2018    Low back pain 10/22/2018    Dysuria 07/24/2018    Lumbar radiculopathy 12/29/2017    Myofascial pain syndrome 12/29/2017  Sacroiliitis ([facility] ) 12/29/2017    Controlled insulin dependent diabetes mellitus 02/22/2017    Chest pain 08/02/2016    Spondylosis of lumbar region without myelopathy or radiculopathy 04/15/2014    Benign essential hypertension 10/16/2012    Arteriosclerotic cardiovascular disease 06/14/2012    Chronic obstructive pulmonary disease ([facility] ) 06/14/2012     He  has a past surgical history that includes Coronary angioplasty with stent (2006, 2007); Neuroplasty / transposition median nerve at carpal tunnel (Right); EGD; Carpal tunnel release (Right); Colonoscopy; Hemorrhoid surgery; and pr shldr arthroscop,lyse adhesns (Left, 6/10/2020)  His family history includes Alzheimer's disease in his mother; Cancer in his other; Dementia in his mother; Heart attack in his father; Heart failure in his mother; Hypertension in his family; Other in his family and father; Stroke in his family  He  reports that he quit smoking about 7 years ago  He smoked 0 00 packs per day for 0 00 years  He has never used smokeless tobacco  He reports that he does not drink alcohol or use drugs    Current Outpatient Medications   Medication Sig Dispense Refill    acetaminophen (TYLENOL) 325 mg tablet Take 650 mg by mouth every 6 (six) hours as needed for mild pain      albuterol (PROVENTIL HFA,VENTOLIN HFA) 90 mcg/act inhaler Inhale 90 puffs as needed      aspirin 325 mg tablet Take 325 mg by mouth daily       atorvastatin (LIPITOR) 40 mg tablet TAKE 1 TABLET BY MOUTH EVERY DAY 90 tablet 2    benazepril (LOTENSIN) 10 mg tablet TAKE 1 TABLET BY MOUTH EVERY DAY 90 tablet 1    clopidogrel (PLAVIX) 75 mg tablet TAKE 1 TABLET BY MOUTH EVERY DAY 30 tablet 5    Empagliflozin (Jardiance) 25 MG TABS Take 1 tablet (25 mg total) by mouth daily 90 tablet 0    fenofibrate (TRICOR) 145 mg tablet TAKE 1 TABLET BY MOUTH EVERY DAY 90 tablet 1    HYDROcodone-acetaminophen (Vicodin) 5-300 MG per tablet Take 1 tablet by mouth every 6 (six) hours as needed for moderate painMax Daily Amount: 4 tablets 112 tablet 0    insulin glargine (Lantus SoloStar) 100 units/mL injection pen 100 units subcutaneously to q day (Patient taking differently: 140 Units 100 units subcutaneously to q day) 30 pen 3    Insulin Pen Needle (B-D UF III MINI PEN NEEDLES) 31G X 5 MM MISC Inject as directed 2 (two) times a day Use as directed 100 each 0    metoprolol tartrate (LOPRESSOR) 50 mg tablet TAKE 1 TABLET (50 MG TOTAL) BY MOUTH EVERY 12 (TWELVE) HOURS 180 tablet 1    nitroglycerin (NITROSTAT) 0 3 mg SL tablet Place under the tongue      ONE TOUCH ULTRA TEST test strip USE TO TEST 4 TIMES A  each 3    pantoprazole (PROTONIX) 40 mg tablet Take 1 tablet (40 mg total) by mouth daily 90 tablet 5     No current facility-administered medications for this visit        Current Outpatient Medications on File Prior to Visit   Medication Sig    acetaminophen (TYLENOL) 325 mg tablet Take 650 mg by mouth every 6 (six) hours as needed for mild pain    albuterol (PROVENTIL HFA,VENTOLIN HFA) 90 mcg/act inhaler Inhale 90 puffs as needed    aspirin 325 mg tablet Take 325 mg by mouth daily     atorvastatin (LIPITOR) 40 mg tablet TAKE 1 TABLET BY MOUTH EVERY DAY    benazepril (LOTENSIN) 10 mg tablet TAKE 1 TABLET BY MOUTH EVERY DAY    clopidogrel (PLAVIX) 75 mg tablet TAKE 1 TABLET BY MOUTH EVERY DAY    Empagliflozin (Jardiance) 25 MG TABS Take 1 tablet (25 mg total) by mouth daily    fenofibrate (TRICOR) 145 mg tablet TAKE 1 TABLET BY MOUTH EVERY DAY    HYDROcodone-acetaminophen (Vicodin) 5-300 MG per tablet Take 1 tablet by mouth every 6 (six) hours as needed for moderate painMax Daily Amount: 4 tablets    insulin glargine (Lantus SoloStar) 100 units/mL injection pen 100 units subcutaneously to q day (Patient taking differently: 140 Units 100 units subcutaneously to q day)    Insulin Pen Needle (B-D UF III MINI PEN NEEDLES) 31G X 5 MM MISC Inject as directed 2 (two) times a day Use as directed    metoprolol tartrate (LOPRESSOR) 50 mg tablet TAKE 1 TABLET (50 MG TOTAL) BY MOUTH EVERY 12 (TWELVE) HOURS    nitroglycerin (NITROSTAT) 0 3 mg SL tablet Place under the tongue    ONE TOUCH ULTRA TEST test strip USE TO TEST 4 TIMES A DAY    pantoprazole (PROTONIX) 40 mg tablet Take 1 tablet (40 mg total) by mouth daily     No current facility-administered medications on file prior to visit  He is allergic to cefaclor and simvastatin       Review of Systems   Constitutional: Negative  HENT: Negative  Eyes: Negative  Respiratory: Negative  Cardiovascular: Negative  Gastrointestinal: Negative  Endocrine: Negative  Genitourinary: Negative  Musculoskeletal: Positive for back pain  Negative for arthralgias, gait problem, joint swelling, myalgias, neck pain and neck stiffness  Skin: Negative  Allergic/Immunologic: Negative  Neurological: Negative  Hematological: Negative  Psychiatric/Behavioral: Negative  Objective:      /70   Pulse 65   Temp (!) 96 1 °F (35 6 °C) (Tympanic)   Ht 5' 6" (1 676 m)   Wt 127 kg (281 lb)   SpO2 97%   BMI 45 35 kg/m²          Physical Exam  Vitals signs and nursing note reviewed  Constitutional:       General: He is not in acute distress  Appearance: Normal appearance  He is obese  He is not ill-appearing, toxic-appearing or diaphoretic  Comments: Or morbidly obese, articulate 51-year-old male who is awake alert in no acute distress and oriented x3   HENT:      Head: Normocephalic and atraumatic  Right Ear: Tympanic membrane, ear canal and external ear normal  There is no impacted cerumen  Left Ear: Tympanic membrane, ear canal and external ear normal  There is no impacted cerumen  Nose: Nose normal  No congestion or rhinorrhea  Mouth/Throat:      Mouth: Mucous membranes are moist       Pharynx: Oropharynx is clear  No oropharyngeal exudate or posterior oropharyngeal erythema  Comments: Crowding of his oral airway  Eyes:      General: No scleral icterus  Right eye: No discharge  Left eye: No discharge  Extraocular Movements: Extraocular movements intact  Conjunctiva/sclera: Conjunctivae normal       Pupils: Pupils are equal, round, and reactive to light  Neck:      Musculoskeletal: Normal range of motion and neck supple  No neck rigidity or muscular tenderness  Vascular: No carotid bruit  Comments: Thick short neck  Cardiovascular:      Rate and Rhythm: Normal rate and regular rhythm  Heart sounds: Normal heart sounds  No murmur  No friction rub  No gallop  Pulmonary:      Effort: Pulmonary effort is normal  No respiratory distress  Breath sounds: Normal breath sounds  No stridor  No wheezing, rhonchi or rales  Chest:      Chest wall: No tenderness  Abdominal:      General: Bowel sounds are normal  There is no distension  Palpations: Abdomen is soft  There is no mass  Tenderness: There is no abdominal tenderness  There is no right CVA tenderness, left CVA tenderness, guarding or rebound  Hernia: No hernia is present  Comments: Morbidly obese   Musculoskeletal:         General: Deformity present  No swelling, tenderness or signs of injury  Right lower leg: Edema ( trace edema bilateral lower extremities, feet and toes) present  Left lower leg: Edema present  Lymphadenopathy:      Cervical: No cervical adenopathy  Skin:     General: Skin is warm and dry  Capillary Refill: Capillary refill takes less than 2 seconds  Coloration: Skin is not jaundiced or pale  Findings: No bruising, erythema, lesion or rash  Neurological:      General: No focal deficit present  Mental Status: He is alert and oriented to person, place, and time  Mental status is at baseline  Cranial Nerves: No cranial nerve deficit  Sensory: No sensory deficit  Motor: No weakness        Coordination: Coordination normal       Gait: Gait normal       Deep Tendon Reflexes: Reflexes normal    Psychiatric:         Mood and Affect: Mood normal          Behavior: Behavior normal          Thought Content: Thought content normal          Judgment: Judgment normal          BMI Counseling: Body mass index is 45 35 kg/m²  The BMI is above normal  Nutrition recommendations include reducing portion sizes, decreasing overall calorie intake, 3-5 servings of fruits/vegetables daily, consuming healthier snacks, moderation in carbohydrate intake, increasing intake of lean protein, reducing intake of saturated fat and trans fat and reducing intake of cholesterol  Exercise recommendations include moderate aerobic physical activity for 150 minutes/week

## 2021-03-15 NOTE — PATIENT INSTRUCTIONS
Obesity   AMBULATORY CARE:   Obesity  is when your body mass index (BMI) is greater than 30  Your healthcare provider will use your height and weight to measure your BMI  The risks of obesity include  many health problems, such as injuries or physical disability  You may need tests to check for the following:  · Diabetes    · High blood pressure or high cholesterol    · Heart disease    · Gallbladder or liver disease    · Cancer of the colon, breast, prostate, liver, or kidney    · Sleep apnea    · Arthritis or gout    Seek care immediately if:   · You have a severe headache, confusion, or difficulty speaking  · You have weakness on one side of your body  · You have chest pain, sweating, or shortness of breath  Contact your healthcare provider if:   · You have symptoms of gallbladder or liver disease, such as pain in your upper abdomen  · You have knee or hip pain and discomfort while walking  · You have symptoms of diabetes, such as intense hunger and thirst, and frequent urination  · You have symptoms of sleep apnea, such as snoring or daytime sleepiness  · You have questions or concerns about your condition or care  Treatment for obesity  focuses on helping you lose weight to improve your health  Even a small decrease in BMI can reduce the risk for many health problems  Your healthcare provider will help you set a weight-loss goal   · Lifestyle changes  are the first step in treating obesity  These include making healthy food choices and getting regular physical activity  Your healthcare provider may suggest a weight-loss program that involves coaching, education, and therapy  · Medicine  may help you lose weight when it is used with a healthy diet and physical activity  · Surgery  can help you lose weight if you are very obese and have other health problems  There are several types of weight-loss surgery  Ask your healthcare provider for more information      Be successful losing weight:   · Set small, realistic goals  An example of a small goal is to walk for 20 minutes 5 days a week  Anther goal is to lose 5% of your body weight  · Tell friends, family members, and coworkers about your goals  and ask for their support  Ask a friend to lose weight with you, or join a weight-loss support group  · Identify foods or triggers that may cause you to overeat , and find ways to avoid them  Remove tempting high-calorie foods from your home and workplace  Place a bowl of fresh fruit on your kitchen counter  If stress causes you to eat, then find other ways to cope with stress  · Keep a diary to track what you eat and drink  Also write down how many minutes of physical activity you do each day  Weigh yourself once a week and record it in your diary  Eating changes: You will need to eat 500 to 1,000 fewer calories each day than you currently eat to lose 1 to 2 pounds a week  The following changes will help you cut calories:  · Eat smaller portions  Use small plates, no larger than 9 inches in diameter  Fill your plate half full of fruits and vegetables  Measure your food using measuring cups until you know what a serving size looks like  · Eat 3 meals and 1 or 2 snacks each day  Plan your meals in advance  [place] and eat at home most of the time  Eat slowly  Do not skip meals  Skipping meals can lead to overeating later in the day  This can make it harder for you to lose weight  Talk with a dietitian to help you make a meal plan and schedule that is right for you  · Eat fruits and vegetables at every meal   They are low in calories and high in fiber, which makes you feel full  Do not add butter, margarine, or cream sauce to vegetables  Use herbs to season steamed vegetables  · Eat less fat and fewer fried foods  Eat more baked or grilled chicken and fish  These protein sources are lower in calories and fat than red meat  Limit fast food   Dress your salads with olive oil and vinegar instead of bottled dressing  · Limit the amount of sugar you eat  Do not drink sugary beverages  Limit alcohol  Activity changes:  Physical activity is good for your body in many ways  It helps you burn calories and build strong muscles  It decreases stress and depression, and improves your mood  It can also help you sleep better  Talk to your healthcare provider before you begin an exercise program   · Exercise for at least 30 minutes 5 days a week  Start slowly  Set aside time each day for physical activity that you enjoy and that is convenient for you  It is best to do both weight training and an activity that increases your heart rate, such as walking, bicycling, or swimming  · Find ways to be more active  Do yard work and housecleaning  Walk up the stairs instead of using elevators  Spend your leisure time going to events that require walking, such as outdoor festivals or fairs  This extra physical activity can help you lose weight and keep it off  Follow up with your healthcare provider as directed: You may need to meet with a dietitian  Write down your questions so you remember to ask them during your visits  © Copyright [address] [place] [address] Drive Information is for End User's use only and may not be sold, redistributed or otherwise used for commercial purposes  All illustrations and images included in CareNotes® are the copyrighted property of A D A M , Inc  or [facility] Corina Warner   The above information is an  only  It is not intended as medical advice for individual conditions or treatments  Talk to your doctor, nurse or pharmacist before following any medical regimen to see if it is safe and effective for you  Weight Management   AMBULATORY CARE:   Why it is important to manage your weight:  Being overweight increases your risk of health conditions such as heart disease, high blood pressure, type 2 diabetes, and certain types of cancer   It can also increase your risk for osteoarthritis, sleep apnea, and other respiratory problems  Aim for a slow, steady weight loss  Even a small amount of weight loss can lower your risk of health problems  How to lose weight safely:  A safe and healthy way to lose weight is to eat fewer calories and get regular exercise  · You can lose up about 1 pound a week by decreasing the number of calories you eat by 500 calories each day  You can decrease calories by eating smaller portion sizes or by cutting out high-calorie foods  Read labels to find out how many calories are in the foods you eat  · You can also burn calories with exercise such as walking, swimming, or biking  You will be more likely to keep weight off if you make these changes part of your lifestyle  Exercise at least 30 minutes per day on most days of the week  You can also fit in more physical activity by taking the stairs instead of the elevator or parking farther away from stores  Ask your healthcare provider about the best exercise plan for you  Healthy meal plan for weight management:  A healthy meal plan includes a variety of foods, contains fewer calories, and helps you stay healthy  A healthy meal plan includes the following:     · Eat whole-grain foods more often  A healthy meal plan should contain fiber  Fiber is the part of grains, fruits, and vegetables that is not broken down by your body  Whole-grain foods are healthy and provide extra fiber in your diet  Some examples of whole-grain foods are whole-wheat breads and pastas, oatmeal, brown rice, and bulgur  · Eat a variety of vegetables every day  Include dark, leafy greens such as spinach, kale, rosalba greens, and mustard greens  Eat yellow and orange vegetables such as carrots, sweet potatoes, and winter squash  · Eat a variety of fruits every day  Choose fresh or canned fruit (canned in its own juice or light syrup) instead of juice  Fruit juice has very little or no fiber      · Eat low-fat dairy foods  Drink fat-free (skim) milk or 1% milk  Eat fat-free yogurt and low-fat cottage cheese  Try low-fat cheeses such as mozzarella and other reduced-fat cheeses  · Choose meat and other protein foods that are low in fat  Choose beans or other legumes such as split peas or lentils  Choose fish, skinless poultry (chicken or turkey), or lean cuts of red meat (beef or pork)  Before you cook meat or poultry, cut off any visible fat  · Use less fat and oil  Try baking foods instead of frying them  Add less fat, such as margarine, sour cream, regular salad dressing and mayonnaise to foods  Eat fewer high-fat foods  Some examples of high-fat foods include french fries, doughnuts, ice cream, and cakes  · Eat fewer sweets  Limit foods and drinks that are high in sugar  This includes candy, cookies, regular soda, and sweetened drinks  Ways to decrease calories:   · Eat smaller portions  ? Use a small plate with smaller servings  ? Do not eat second helpings  ? When you eat at a restaurant, ask for a box and place half of your meal in the box before you eat  ? Share an entrée with someone else  · Replace high-calorie snacks with healthy, low-calorie snacks  ? Choose fresh fruit, vegetables, fat-free rice cakes, or air-popped popcorn instead of potato chips, nuts, or chocolate  ? Choose water or calorie-free drinks instead of soda or sweetened drinks  · Do not shop for groceries when you are hungry  You may be more likely to make unhealthy food choices  Take a grocery list of healthy foods and shop after you have eaten  · Eat regular meals  Do not skip meals  Skipping meals can lead to overeating later in the day  This can make it harder for you to lose weight  Eat a healthy snack in place of a meal if you do not have time to eat a regular meal  Talk with a dietitian to help you create a meal plan and schedule that is right for you      Other things to consider as you try to lose weight:   · Be aware of situations that may give you the urge to overeat, such as eating while watching television  Find ways to avoid these situations  For example, read a book, go for a walk, or do crafts  · Meet with a weight loss support group or friends who are also trying to lose weight  This may help you stay motivated to continue working on your weight loss goals  © Copyright [address] Information is for End User's use only and may not be sold, redistributed or otherwise used for commercial purposes  All illustrations and images included in CareNotes® are the copyrighted property of A D A M , Inc  or [facility] Corina Warner   The above information is an  only  It is not intended as medical advice for individual conditions or treatments  Talk to your doctor, nurse or pharmacist before following any medical regimen to see if it is safe and effective for you  Low Fat Diet   AMBULATORY CARE:   A low-fat diet  is an eating plan that is low in total fat, unhealthy fat, and cholesterol  You may need to follow a low-fat diet if you have trouble digesting or absorbing fat  You may also need to follow this diet if you have high cholesterol  You can also lower your cholesterol by increasing the amount of fiber in your diet  Soluble fiber is a type of fiber that helps to decrease cholesterol levels  Different types of fat in food:   · Limit unhealthy fats  A diet that is high in cholesterol, saturated fat, and trans fat may cause unhealthy cholesterol levels  Unhealthy cholesterol levels increase your risk of heart disease  ? Cholesterol:  Limit intake of cholesterol to less than 200 mg per day  Cholesterol is found in meat, eggs, and dairy  ? Saturated fat:  Limit saturated fat to less than 7% of your total daily calories  Ask your dietitian how many calories you need each day  Saturated fat is found in butter, cheese, ice cream, whole milk, and palm oil   Saturated fat is also found in meat, such as beef, pork, chicken skin, and processed meats  Processed meats include sausage, hot dogs, and bologna  ? Trans fat:  Avoid trans fat as much as possible  Trans fat is used in fried and baked foods  Foods that say trans fat free on the label may still have up to 0 5 grams of trans fat per serving  · Include healthy fats  Replace foods that are high in saturated and trans fat with foods high in healthy fats  This may help to decrease high cholesterol levels  ? Monounsaturated fats: These are found in avocados, nuts, and vegetable oils, such as olive, canola, and sunflower oil  ? Polyunsaturated fats: These can be found in vegetable oils, such as soybean or corn oil  Omega-3 fats can help to decrease the risk of heart disease  Omega-3 fats are found in fish, such as salmon, herring, trout, and tuna  Omega-3 fats can also be found in plant foods, such as walnuts, flaxseed, soybeans, and canola oil  Foods to limit or avoid:   · Grains:      ? Snacks that are made with partially hydrogenated oils, such as chips, regular crackers, and butter-flavored popcorn    ? High-fat baked goods, such as biscuits, croissants, doughnuts, pies, cookies, and pastries    · Dairy:      ? Whole milk, 2% milk, and yogurt and ice cream made with whole milk    ? Half and half creamer, heavy cream, and whipping cream    ? Cheese, cream cheese, and sour cream    · Meats and proteins:      ? High-fat cuts of meat (T-bone steak, regular hamburger, and ribs)    ? Fried meat, poultry (turkey and chicken), and fish    ? Poultry (chicken and turkey) with skin    ? Cold cuts (salami or bologna), hot dogs, phan, and sausage    ? Whole eggs and egg yolks    · Vegetables and fruits with added fat:      ? Fried vegetables or vegetables in butter or high-fat sauces, such as cream or cheese sauces    ? Fried fruit or fruit served with butter or cream    · Fats:      ?  Butter, stick margarine, and shortening    ? Coconut, palm oil, and palm kernel oil    Foods to include:   · Grains:      ? Whole-grain breads, cereals, pasta, and brown rice    ? Low-fat crackers and pretzels    · Vegetables and fruits:      ? Fresh, frozen, or canned vegetables (no salt or low-sodium)    ? Fresh, frozen, dried, or canned fruit (canned in light syrup or fruit juice)    ? Avocado    · Low-fat dairy products:      ? Nonfat (skim) or 1% milk    ? Nonfat or low-fat cheese, yogurt, and cottage cheese    · Meats and proteins:      ? Chicken or turkey with no skin    ? Baked or broiled fish    ? Lean beef and pork (loin, round, extra lean hamburger)    ? Beans and peas, unsalted nuts, soy products    ? Egg whites and substitutes    ? Seeds and nuts    · Fats:      ? Unsaturated oil, such as canola, olive, peanut, soybean, or sunflower oil    ? Soft or liquid margarine and vegetable oil spread    ? Low-fat salad dressing    Other ways to decrease fat:   · Read food labels before you buy foods  Choose foods that have less than 30% of calories from fat  Choose low-fat or fat-free dairy products  Remember that fat free does not mean calorie free  These foods still contain calories, and too many calories can lead to weight gain  · Trim fat from meat and avoid fried food  Trim all visible fat from meat before you cook it  Remove the skin from poultry  Do not vaughn meat, fish, or poultry  Bake, roast, boil, or broil these foods instead  Avoid fried foods  Eat a baked potato instead of [place] fries  Steam vegetables instead of sautéing them in butter  · Add less fat to foods  Use imitation phan bits on salads and baked potatoes instead of regular phan bits  Use fat-free or low-fat salad dressings instead of regular dressings  Use low-fat or nonfat butter-flavored topping instead of regular butter or margarine on popcorn and other foods  Ways to decrease fat in recipes:  Replace high-fat ingredients with low-fat or nonfat ones  This may cause baked goods to be drier than usual  You may need to use nonfat cooking spray on pans to prevent food from sticking  You also may need to change the amount of other ingredients, such as water, in the recipe  Try the following:  · Use low-fat or light margarine instead of regular margarine or shortening  · Use lean ground turkey breast or chicken, or lean ground beef (less than 5% fat) instead of hamburger  · Add 1 teaspoon of canola oil to 8 ounces of skim milk instead of using cream or half and half  · Use grated zucchini, carrots, or apples in breads instead of coconut  · Use blenderized, low-fat cottage cheese, plain tofu, or low-fat ricotta cheese instead of cream cheese  · Use 1 egg white and 1 teaspoon of canola oil, or use ¼ cup (2 ounces) of fat-free egg substitute instead of a whole egg  · Replace half of the oil that is called for in a recipe with applesauce when you bake  Use 3 tablespoons of cocoa powder and 1 tablespoon of canola oil instead of a square of baking chocolate  How to increase fiber:  Eat enough high-fiber foods to get 20 to 30 grams of fiber every day  Slowly increase your fiber intake to avoid stomach cramps, gas, and other problems  · Eat 3 ounces of whole-grain foods each day  An ounce is about 1 slice of bread  Eat whole-grain breads, such as whole-wheat bread  Whole wheat, whole-wheat flour, or other whole grains should be listed as the first ingredient on the food label  Replace white flour with whole-grain flour or use half of each in recipes  Whole-grain flour is heavier than white flour, so you may have to add more yeast or baking powder  · Eat a high-fiber cereal for breakfast   Oatmeal is a good source of soluble fiber  Look for cereals that have bran or fiber in the name  Choose whole-grain products, such as brown rice, barley, and whole-wheat pasta  · Eat more beans, peas, and lentils    For example, add beans to soups or salads  Eat at least 5 cups of fruits and vegetables each day  Eat fruits and vegetables with the peel because the peel is high in fiber  © Copyright [address] Information is for End User's use only and may not be sold, redistributed or otherwise used for commercial purposes  All illustrations and images included in CareNotes® are the copyrighted property of A D A M , Inc  or [address] [place] [address]  The above information is an  only  It is not intended as medical advice for individual conditions or treatments  Talk to your doctor, nurse or pharmacist before following any medical regimen to see if it is safe and effective for you  Heart Healthy Diet   AMBULATORY CARE:   A heart healthy diet  is an eating plan low in unhealthy fats and sodium (salt)  The plan is high in healthy fats and fiber  A heart healthy diet helps improve your cholesterol levels and lowers your risk for heart disease and stroke  A dietitian will teach you how to read and understand food labels  Heart healthy diet guidelines to follow:   · Choose foods that contain healthy fats  ? Unsaturated fats  include monounsaturated and polyunsaturated fats  Unsaturated fat is found in foods such as soybean, canola, olive, corn, and safflower oils  It is also found in soft tub margarine that is made with liquid vegetable oil  ? Omega-3 fat  is found in certain fish, such as salmon, tuna, and trout, and in walnuts and flaxseed  Eat fish high in omega-3 fats at least 2 times a week  · Get 20 to 30 grams of fiber each day  Fruits, vegetables, whole-grain foods, and legumes (cooked beans) are good sources of fiber  · Limit or do not have unhealthy fats  ? Cholesterol  is found in animal foods, such as eggs and lobster, and in dairy products made from whole milk  Limit cholesterol to less than 200 mg each day  ? Saturated fat  is found in meats, such as phan and hamburger   It is also found in chicken or turkey skin, whole milk, and butter  Limit saturated fat to less than 7% of your total daily calories  ? Trans fat  is found in packaged foods, such as potato chips and cookies  It is also in hard margarine, some fried foods, and shortening  Do not eat foods that contain trans fats  · Limit sodium as directed  You may be told to limit sodium to 2,000 to 2,300 mg each day  Choose low-sodium or no-salt-added foods  Add little or no salt to food you prepare  Use herbs and spices in place of salt  Include the following in your heart healthy plan:  Ask your dietitian or healthcare provider how many servings to have from each of the following food groups:  · Grains:      ? Whole-wheat breads, cereals, and pastas, and brown rice    ? Low-fat, low-sodium crackers and chips    · Vegetables:      ? Broccoli, green beans, green peas, and spinach    ? Collards, kale, and lima beans    ? Carrots, sweet potatoes, tomatoes, and peppers    ? Canned vegetables with no salt added    · Fruits:      ? Bananas, peaches, pears, and pineapple    ? Grapes, raisins, and dates    ? Oranges, tangerines, grapefruit, orange juice, and grapefruit juice    ? Apricots, mangoes, melons, and papaya    ? Raspberries and strawberries    ? Canned fruit with no added sugar    · Low-fat dairy:      ? Nonfat (skim) milk, 1% milk, and low-fat almond, cashew, or soy milks fortified with calcium    ? Low-fat cheese, regular or frozen yogurt, and cottage cheese    · Meats and proteins:      ? Lean cuts of beef and pork (loin, leg, round), skinless chicken and turkey    ? Legumes, soy products, egg whites, or nuts    Limit or do not include the following in your heart healthy plan:   · Unhealthy fats and oils:      ? Whole or 2% milk, cream cheese, sour cream, or cheese    ? High-fat cuts of beef (T-bone steaks, ribs), chicken or turkey with skin, and organ meats such as liver    ?  Butter, stick margarine, shortening, and cooking oils such as coconut or palm oil    · Foods and liquids high in sodium:      ? Packaged foods, such as frozen dinners, cookies, macaroni and cheese, and cereals with more than 300 mg of sodium per serving    ? Vegetables with added sodium, such as instant potatoes, vegetables with added sauces, or regular canned vegetables    ? Cured or smoked meats, such as hot dogs, phan, and sausage    ? High-sodium ketchup, barbecue sauce, salad dressing, pickles, olives, soy sauce, or miso    · Foods and liquids high in sugar:      ? Candy, cake, cookies, pies, or doughnuts    ? Soft drinks (soda), sports drinks, or sweetened tea    ? Canned or dry mixes for cakes, soups, sauces, or gravies    Other healthy heart guidelines:   · Do not smoke  Nicotine and other chemicals in cigarettes and cigars can cause lung and heart damage  Ask your healthcare provider for information if you currently smoke and need help to quit  E-cigarettes or smokeless tobacco still contain nicotine  Talk to your healthcare provider before you use these products  · Limit or do not drink alcohol as directed  Alcohol can damage your heart and raise your blood pressure  Your healthcare provider may give you specific daily and weekly limits  The general recommended limit is 1 drink a day for women 21 or older and for men 72 or older  Do not have more than 3 drinks in a day or 7 in a week  The recommended limit is 2 drinks a day for men 24to 59years of age  Do not have more than 4 drinks in a day or 14 in a week  A drink of alcohol is 12 ounces of beer, 5 ounces of wine, or 1½ ounces of liquor  · Exercise regularly  Exercise can help you maintain a healthy weight and improve your blood pressure and cholesterol levels  Regular exercise can also decrease your risk for heart problems  Ask your healthcare provider about the best exercise plan for you  Do not start an exercise program without asking your healthcare provider         Follow up with your doctor or cardiologist as directed:  Write down your questions so you remember to ask them during your visits  © Copyright [employer] Information is for End User's use only and may not be sold, redistributed or otherwise used for commercial purposes  All illustrations and images included in CareNotes® are the copyrighted property of A D A M , Inc  or Johny Warner   The above information is an  only  It is not intended as medical advice for individual conditions or treatments  Talk to your doctor, nurse or pharmacist before following any medical regimen to see if it is safe and effective for you  Calorie Counting Diet   WHAT YOU NEED TO KNOW:   What is a calorie counting diet? It is a meal plan based on counting calories each day to reach a healthy body weight  You will need to eat fewer calories if you are trying to lose weight  Weight loss may decrease your risk for certain health problems or improve your health if you have health problems  Some of these health problems include heart disease, high blood pressure, and diabetes  What foods should I avoid? Your dietitian will tell you if you need to avoid certain foods based on your body weight and health condition  You may need to avoid high-fat foods if you are at risk for or have heart disease  You may need to eat fewer foods from the breads and starches food group if you have diabetes  How many calories are in foods? The following is a list of foods and drinks with the approximate number of calories in each  Check the food label to find the exact number of calories  A dietitian can tell you how many calories you should have from each food group each day  · Carbohydrate:      ? ½ of a 3-inch bagel, 1 slice of bread, or ½ of a hamburger bun or hot dog bun (80)    ? 1 (8-inch) flour tortilla or ½ cup of cooked rice (100)    ? 1 (6-inch) corn tortilla (80)    ? 1 (6-inch) pancake or 1 cup of bran flakes cereal (110)    ?  ½ cup of cooked cereal (80)    ? ½ cup of cooked pasta (85)    ? 1 ounce of pretzels (100)    ? 3 cups of air-popped popcorn without butter or oil (80)    · Dairy:      ? 1 cup of skim or 1% milk (90)    ? 1 cup of 2% milk (120)    ? 1 cup of whole milk (160)    ? 1 cup of 2% chocolate milk (220)    ? 1 ounce of low-fat cheese with 3 grams of fat per ounce (70)    ? 1 ounce of cheddar cheese (114)    ? ½ cup of 1% fat cottage cheese (80)    ? 1 cup of plain or sugar-free, fat-free yogurt (90)    · Protein foods:      ? 3 ounces of fish (not breaded or fried) (95)    ? 3 ounces of breaded, fried fish (195)    ? ¾ cup of tuna canned in water (105)    ? 3 ounces of chicken breast without skin (105)    ? 1 fried chicken breast with skin (350)    ? ¼ cup of fat free egg substitute (40)    ? 1 large egg (75)    ? 3 ounces of lean beef or pork (165)    ? 3 ounces of fried pork chop or ham (185)    ? ½ cup of cooked dried beans, such as kidney, nava, lentils, or navy (115)    ? 3 ounces of bologna or lunch meat (225)    ? 2 links of breakfast sausage (140)    · Vegetables:      ? ½ cup of sliced mushrooms (10)    ? 1 cup of salad greens, such as lettuce, spinach, or moon (15)    ? ½ cup of steamed asparagus (20)    ? ½ cup of cooked summer squash, zucchini squash, or green or wax beans (25)    ? 1 cup of broccoli or cauliflower florets, or 1 medium tomato (25)    ? 1 large raw carrot or ½ cup of cooked carrots (40)    ? ? of a medium cucumber or 1 stalk of celery (5)    ? 1 small baked potato (160)    ? 1 cup of breaded, fried vegetables (230)    · Fruit:      ? 1 (6-inch) banana (55)     ? ½ of a 4-inch grapefruit (55)    ? 15 grapes (60)    ? 1 medium orange or apple (70)    ? 1 large peach (65)    ? 1 cup of fresh pineapple chunks (75)    ? 1 cup of melon cubes (50)    ? 1¼ cups of whole strawberries (45)    ? ½ cup of fruit canned in juice (55)    ? ½ cup of fruit canned in heavy syrup (110)    ? ? cup of raisins (130)    ?  ½ cup of unsweetened fruit juice (60)    ? ½ cup of grape, cranberry, or prune juice (90)    · Fat:      ? 10 peanuts or 2 teaspoons of peanut butter (55)    ? 2 tablespoons of avocado or 1 tablespoon of regular salad dressing (45)    ? 2 slices of phan (90)    ? 1 teaspoon of oil, such as safflower, canola, corn, or olive oil (45)    ? 2 teaspoons of low-fat margarine, or 1 tablespoon of low-fat mayonnaise (50)    ? 1 teaspoon of regular margarine (40)    ? 1 tablespoon of regular mayonnaise (135)    ? 1 tablespoon of cream cheese or 2 tablespoons of low-fat cream cheese (45)    ? 2 tablespoons of vegetable shortening (215)    · Dessert and sweets:      ? 8 animal crackers or 5 vanilla wafers (80)    ? 1 frozen fruit juice bar (80)    ? ½ cup of ice milk or low-fat frozen yogurt (90)    ? ½ cup of sherbet or sorbet (125)    ? ½ cup of sugar-free pudding or custard (60)    ? ½ cup of ice cream (140)    ? ½ cup of pudding or custard (175)    ? 1 (2-inch) square chocolate brownie (185)    · Combination foods:      ? Bean burrito made with an 8-inch tortilla, without cheese (275)    ? Chicken breast sandwich with lettuce and tomato (325)    ? 1 cup of chicken noodle soup (60)    ? 1 beef taco (175)    ? Regular hamburger with lettuce and tomato (310)    ? Regular cheeseburger with lettuce and tomato (410)     ? ¼ of a 12-inch cheese pizza (280)    ? Fried fish sandwich with lettuce and tomato (425)    ? Hot dog and bun (275)    ? 1½ cups of macaroni and cheese (310)    ? Taco salad with a fried tortilla shell (870)    · Low-calorie foods:      ? 1 tablespoon of ketchup or 1 tablespoon of fat free sour cream (15)    ? 1 teaspoon of mustard (5)    ? ¼ cup of salsa (20)    ? 1 large dill pickle (15)    ? 1 tablespoon of fat free salad dressing (10)    ? 2 teaspoons of low-sugar, light jam or jelly, or 1 tablespoon of sugar-free syrup (15)    ? 1 sugar-free popsicle (15)    ?  1 cup of club soda, seltzer water, or diet soda (0)    CARE AGREEMENT:   You have the right to help plan your care  Discuss treatment options with your healthcare provider to decide what care you want to receive  You always have the right to refuse treatment  The above information is an  only  It is not intended as medical advice for individual conditions or treatments  Talk to your doctor, nurse or pharmacist before following any medical regimen to see if it is safe and effective for you  © Copyright [address] Information is for End User's use only and may not be sold, redistributed or otherwise used for commercial purposes   All illustrations and images included in CareNotes® are the copyrighted property of [employer]  or [address] [place] [address]

## 2021-03-15 NOTE — ASSESSMENT & PLAN NOTE
Patient does have a history of atherosclerotic cardiovascular disease history of MI in the past   Patient states he has not seen his cardiologist in 2 years because of the [place] virus pandemic but he promises to make an appointment to be seen in the near future for re-evaluation  Patient denies any new cardiac symptoms such as chest pain or pressure and no increasing shortness of breath with exertion    He states that he does understand the importance of continued surveillance

## 2021-03-15 NOTE — ASSESSMENT & PLAN NOTE
Lab Results   Component Value Date    HGBA1C 5 8 (H) 03/11/2021   Patient did have labs performed prior to the visit today happy to report that sugar with present treatment is under good control despite the fact patient is on very large amounts of insulin  Patient still is having problems with his weight  With discussion patient states he has been overweight since he was a young child and is always had difficulty losing weight  Is extremely limited in his exercise capacity because of his chronic back pain  We did discuss with the patient looking at his caloric intake and reduce it to 1800 calories a day or less in order to help with weight loss  Patient was told this needs to be a concerted effort on his part looking very closely at his dietary intake  He is well aware of what needs to be done as far as changes in his insulin dose if he is able to lose weight

## 2021-03-15 NOTE — ASSESSMENT & PLAN NOTE
Blood pressure is showing adequate control  Patient's renal function is stable  Patient will continue present medication and surveillance  We will continue to monitor his renal function

## 2021-03-15 NOTE — ASSESSMENT & PLAN NOTE
History of chronic low back pain  Patient admits that this is intermittent and sometimes ears nose particular aggravating factors  He continues to take Vicodin intermittently to help with his discomfort  He has had full workup and evaluation in the past and no specific indication as to why he has this chronic back pain and discomfort    He was told he should continue the exercises as prescribed by physical therapy

## 2021-03-15 NOTE — PROGRESS NOTES
Diabetic Foot Exam    Patient's shoes and socks removed  Right Foot/Ankle   Right Foot Inspection  Skin Exam: skin normal and skin intact no dry skin, no warmth, no callus, no erythema, no maceration, no abnormal color, no pre-ulcer, no ulcer and no callus                          Toe Exam: ROM and strength within normal limits and swelling (Trace to +1 edema bilateral lower extremities feet and toes)no tenderness, erythema and  no right toe deformity  Sensory   Vibration: diminished  Proprioception: diminished   Monofilament testing: diminished  Vascular  Capillary refills: < 3 seconds  The right DP pulse is 1+  The right PT pulse is 1+  Left Foot/Ankle  Left Foot Inspection  Skin Exam: skin normal and skin intactno dry skin, no warmth, no erythema, no maceration, normal color, no pre-ulcer, no ulcer and no callus                         Toe Exam: ROM and strength within normal limits and swellingno tenderness, no erythema and no left toe deformity                   Sensory   Vibration: diminished  Proprioception: diminished  Monofilament: diminished  Vascular  Capillary refills: < 3 seconds  The left DP pulse is 1+  The left PT pulse is 1+  Assign Risk Category:  No deformity present; No loss of protective sensation;  No weak pulses       Risk: 1

## 2021-03-15 NOTE — ASSESSMENT & PLAN NOTE
Patient recently underwent repeat studies for his obstructive sleep apnea  Titration of his BiPAP  Patient will be starting within the next few days    Continue to follow-up with his sleep specialist

## 2021-03-15 NOTE — ASSESSMENT & PLAN NOTE
History of chronic obstructive pulmonary disease  Patient has had no acute exacerbations recently    A major change occurred when he stop smoking and this is been beneficial   Patient knows to call us immediately if any acute exacerbations of his chronic lung disease for immediate evaluation either through our office or his pulmonologist period

## 2021-03-16 ENCOUNTER — TELEPHONE (OUTPATIENT)
Dept: SLEEP CENTER | Facility: CLINIC | Age: 61
End: 2021-03-16

## 2021-03-16 DIAGNOSIS — E11.59 TYPE 2 DIABETES MELLITUS WITH OTHER CIRCULATORY COMPLICATION, WITH LONG-TERM CURRENT USE OF INSULIN (HCC): ICD-10-CM

## 2021-03-16 DIAGNOSIS — Z79.4 TYPE 2 DIABETES MELLITUS WITH OTHER CIRCULATORY COMPLICATION, WITH LONG-TERM CURRENT USE OF INSULIN (HCC): ICD-10-CM

## 2021-03-16 RX ORDER — EMPAGLIFLOZIN 25 MG/1
TABLET, FILM COATED ORAL
Qty: 90 TABLET | Refills: 1 | Status: SHIPPED | OUTPATIENT
Start: 2021-03-16 | End: 2021-11-18

## 2021-03-19 ENCOUNTER — IMMUNIZATIONS (OUTPATIENT)
Dept: FAMILY MEDICINE CLINIC | Facility: HOSPITAL | Age: 61
End: 2021-03-19

## 2021-03-19 DIAGNOSIS — Z23 ENCOUNTER FOR IMMUNIZATION: Primary | ICD-10-CM

## 2021-03-19 PROCEDURE — 0001A SARS-COV-2 / COVID-19 MRNA VACCINE (PFIZER-BIONTECH) 30 MCG: CPT

## 2021-03-19 PROCEDURE — 91300 SARS-COV-2 / COVID-19 MRNA VACCINE (PFIZER-BIONTECH) 30 MCG: CPT

## 2021-04-07 DIAGNOSIS — E11.59 TYPE 2 DIABETES MELLITUS WITH OTHER CIRCULATORY COMPLICATION, WITH LONG-TERM CURRENT USE OF INSULIN (HCC): ICD-10-CM

## 2021-04-07 DIAGNOSIS — Z79.4 TYPE 2 DIABETES MELLITUS WITH OTHER CIRCULATORY COMPLICATION, WITH LONG-TERM CURRENT USE OF INSULIN (HCC): ICD-10-CM

## 2021-04-07 RX ORDER — PEN NEEDLE, DIABETIC 31 GX5/16"
NEEDLE, DISPOSABLE MISCELLANEOUS 2 TIMES DAILY
Qty: 100 EACH | Refills: 0 | Status: SHIPPED | OUTPATIENT
Start: 2021-04-07 | End: 2021-06-08 | Stop reason: SDUPTHER

## 2021-04-10 ENCOUNTER — IMMUNIZATIONS (OUTPATIENT)
Dept: FAMILY MEDICINE CLINIC | Facility: HOSPITAL | Age: 61
End: 2021-04-10

## 2021-04-10 DIAGNOSIS — Z23 ENCOUNTER FOR IMMUNIZATION: Primary | ICD-10-CM

## 2021-04-10 PROCEDURE — 0002A SARS-COV-2 / COVID-19 MRNA VACCINE (PFIZER-BIONTECH) 30 MCG: CPT

## 2021-04-10 PROCEDURE — 91300 SARS-COV-2 / COVID-19 MRNA VACCINE (PFIZER-BIONTECH) 30 MCG: CPT

## 2021-04-20 ENCOUNTER — TELEPHONE (OUTPATIENT)
Dept: INTERNAL MEDICINE CLINIC | Facility: CLINIC | Age: 61
End: 2021-04-20

## 2021-04-20 DIAGNOSIS — E11.59 TYPE 2 DIABETES MELLITUS WITH OTHER CIRCULATORY COMPLICATION, WITH LONG-TERM CURRENT USE OF INSULIN (HCC): ICD-10-CM

## 2021-04-20 DIAGNOSIS — Z79.4 TYPE 2 DIABETES MELLITUS WITH OTHER CIRCULATORY COMPLICATION, WITH LONG-TERM CURRENT USE OF INSULIN (HCC): ICD-10-CM

## 2021-04-20 RX ORDER — INSULIN GLARGINE 100 [IU]/ML
INJECTION, SOLUTION SUBCUTANEOUS
Qty: 4200 ML | Refills: 3 | Status: SHIPPED | OUTPATIENT
Start: 2021-04-20 | End: 2021-05-04 | Stop reason: ALTCHOICE

## 2021-04-20 NOTE — TELEPHONE ENCOUNTER
Pt [facility] and he states that he takes 140 units and he wants a 90 day supply

## 2021-04-23 ENCOUNTER — TELEPHONE (OUTPATIENT)
Dept: INTERNAL MEDICINE CLINIC | Facility: CLINIC | Age: 61
End: 2021-04-23

## 2021-04-23 DIAGNOSIS — Z79.4 TYPE 2 DIABETES MELLITUS WITH OTHER CIRCULATORY COMPLICATION, WITH LONG-TERM CURRENT USE OF INSULIN (HCC): ICD-10-CM

## 2021-04-23 DIAGNOSIS — I10 ESSENTIAL HYPERTENSION: ICD-10-CM

## 2021-04-23 DIAGNOSIS — E11.59 TYPE 2 DIABETES MELLITUS WITH OTHER CIRCULATORY COMPLICATION, WITH LONG-TERM CURRENT USE OF INSULIN (HCC): ICD-10-CM

## 2021-04-23 RX ORDER — CLOPIDOGREL BISULFATE 75 MG/1
75 TABLET ORAL DAILY
Qty: 90 TABLET | Refills: 3 | Status: SHIPPED | OUTPATIENT
Start: 2021-04-23 | End: 2022-04-14

## 2021-04-23 NOTE — TELEPHONE ENCOUNTER
Pt wants a order sent for clopidogrel 75 mg to [facility] [address],90[address] with 3 refills  It keeps asking me for a replacement

## 2021-04-26 ENCOUNTER — TELEPHONE (OUTPATIENT)
Dept: INTERNAL MEDICINE CLINIC | Facility: CLINIC | Age: 61
End: 2021-04-26

## 2021-04-26 ENCOUNTER — TELEPHONE (OUTPATIENT)
Dept: OTHER | Facility: OTHER | Age: 61
End: 2021-04-26

## 2021-04-26 NOTE — TELEPHONE ENCOUNTER
Patient called and said his feet are swollen and he is having shortness of breath on and off since last week  I scheduled him for tomorrow morning and he can be reached at [phone] if you think he should go to the ER    Thank you

## 2021-05-03 ENCOUNTER — APPOINTMENT (OUTPATIENT)
Dept: LAB | Age: 61
End: 2021-05-03
Payer: COMMERCIAL

## 2021-05-03 ENCOUNTER — TRANSCRIBE ORDERS (OUTPATIENT)
Dept: ADMINISTRATIVE | Age: 61
End: 2021-05-03

## 2021-05-03 DIAGNOSIS — I50.31 ACUTE DIASTOLIC HEART FAILURE (HCC): Primary | ICD-10-CM

## 2021-05-03 DIAGNOSIS — I25.10 ARTERIOSCLEROTIC CARDIOVASCULAR DISEASE: ICD-10-CM

## 2021-05-03 DIAGNOSIS — I10 BENIGN ESSENTIAL HYPERTENSION: ICD-10-CM

## 2021-05-03 DIAGNOSIS — I50.31 ACUTE DIASTOLIC HEART FAILURE (HCC): ICD-10-CM

## 2021-05-03 DIAGNOSIS — E11.9 TYPE 2 DIABETES MELLITUS WITHOUT COMPLICATION, WITH LONG-TERM CURRENT USE OF INSULIN (HCC): ICD-10-CM

## 2021-05-03 DIAGNOSIS — Z79.4 TYPE 2 DIABETES MELLITUS WITHOUT COMPLICATION, WITH LONG-TERM CURRENT USE OF INSULIN (HCC): ICD-10-CM

## 2021-05-03 LAB
ANION GAP SERPL CALCULATED.3IONS-SCNC: 6 MMOL/L (ref 4–13)
BUN SERPL-MCNC: 46 MG/DL (ref 5–25)
CALCIUM SERPL-MCNC: 9.5 MG/DL (ref 8.3–10.1)
CHLORIDE SERPL-SCNC: 104 MMOL/L (ref 100–108)
CO2 SERPL-SCNC: 26 MMOL/L (ref 21–32)
CREAT SERPL-MCNC: 1.7 MG/DL (ref 0.6–1.3)
GFR SERPL CREATININE-BSD FRML MDRD: 43 ML/MIN/1.73SQ M
GLUCOSE SERPL-MCNC: 129 MG/DL (ref 65–140)
POTASSIUM SERPL-SCNC: 3.8 MMOL/L (ref 3.5–5.3)
SODIUM SERPL-SCNC: 136 MMOL/L (ref 136–145)

## 2021-05-03 PROCEDURE — 80048 BASIC METABOLIC PNL TOTAL CA: CPT

## 2021-05-03 PROCEDURE — 36415 COLL VENOUS BLD VENIPUNCTURE: CPT

## 2021-05-04 ENCOUNTER — OFFICE VISIT (OUTPATIENT)
Dept: INTERNAL MEDICINE CLINIC | Facility: CLINIC | Age: 61
End: 2021-05-04
Payer: COMMERCIAL

## 2021-05-04 ENCOUNTER — TRANSITIONAL CARE MANAGEMENT (OUTPATIENT)
Dept: INTERNAL MEDICINE CLINIC | Facility: CLINIC | Age: 61
End: 2021-05-04

## 2021-05-04 VITALS
HEIGHT: 66 IN | BODY MASS INDEX: 43.71 KG/M2 | SYSTOLIC BLOOD PRESSURE: 122 MMHG | WEIGHT: 272 LBS | TEMPERATURE: 98.1 F | OXYGEN SATURATION: 95 % | HEART RATE: 73 BPM | DIASTOLIC BLOOD PRESSURE: 60 MMHG

## 2021-05-04 DIAGNOSIS — I50.23 ACUTE ON CHRONIC SYSTOLIC CONGESTIVE HEART FAILURE (HCC): Primary | ICD-10-CM

## 2021-05-04 DIAGNOSIS — I25.10 ARTERIOSCLEROTIC CARDIOVASCULAR DISEASE: ICD-10-CM

## 2021-05-04 DIAGNOSIS — J43.1 PANLOBULAR EMPHYSEMA (HCC): ICD-10-CM

## 2021-05-04 DIAGNOSIS — G89.29 CHRONIC BILATERAL LOW BACK PAIN WITHOUT SCIATICA: ICD-10-CM

## 2021-05-04 DIAGNOSIS — I10 BENIGN ESSENTIAL HYPERTENSION: ICD-10-CM

## 2021-05-04 DIAGNOSIS — G47.33 OSA (OBSTRUCTIVE SLEEP APNEA): ICD-10-CM

## 2021-05-04 DIAGNOSIS — M54.50 CHRONIC BILATERAL LOW BACK PAIN WITHOUT SCIATICA: ICD-10-CM

## 2021-05-04 PROCEDURE — 99496 TRANSJ CARE MGMT HIGH F2F 7D: CPT | Performed by: INTERNAL MEDICINE

## 2021-05-04 RX ORDER — FUROSEMIDE 40 MG/1
40 TABLET ORAL DAILY
COMMUNITY
Start: 2021-04-28 | End: 2021-06-23 | Stop reason: DRUGHIGH

## 2021-05-04 RX ORDER — HYDROCODONE BITARTRATE AND ACETAMINOPHEN 5; 300 MG/1; MG/1
1 TABLET ORAL EVERY 6 HOURS PRN
Qty: 112 TABLET | Refills: 0 | Status: SHIPPED | OUTPATIENT
Start: 2021-05-04 | End: 2021-08-24 | Stop reason: SDUPTHER

## 2021-05-04 NOTE — ASSESSMENT & PLAN NOTE
Chronic low back pain with radiculopathy  Patient remains on intermittent dosage of hydrocodone  A new prescription was sent to the pharmacy    Patient has been monitor closely does not abuse

## 2021-05-04 NOTE — ASSESSMENT & PLAN NOTE
Lab Results   Component Value Date    HGBA1C 6 1 (H) 04/27/2021    as noted patient's sugar has been under excellent control and patient has been monitoring this at home very closely  He is on insulin and states they did reduce his insulin dose during his hospitalization  Patient has been able to the years to make adjustments with his insulin dose depending on the findings with blood sugars  Patient will continue present medication and surveillance  He is due to have a fasting blood sugar, hemoglobin A1c performed with his next visit    The he was told if any changes with his blood sugar readings and questions or concerns again to call

## 2021-05-04 NOTE — ASSESSMENT & PLAN NOTE
Wt Readings from Last 3 Encounters:   05/04/21 123 kg (272 lb)   03/15/21 127 kg (281 lb)   01/07/21 127 kg (280 lb)      patient is here today for transition of care visit  Recently seen acutely in the emergency room with complaint of increasing shortness of breath with exertion increased swelling to the feet ankles and lower extremities  Patient was admitted and was found to be in congestive heart failure  Patient was vigorously diuresed and states that he is now feeling much better and has lost approximately 9 lb form recent evaluation in our office   On discharge patient was given specific instructions about working harder to limit sodium in his diet and states he has  Now looking at the labels of the foods that his ingesting in the hopes of reducing his sodium load  He does have a follow-up visit to be seen by his cardiologist in the near future  At this point patient continue present medication including his Lasix  We did review evaluation was performed in the emergency room and hospital   He had no evidence of acute coronary ischemia and no exacerbations of his chronic lung disease  He did have labs performed recently post discharge looking at his renal function which is stable as well as his potassium  Patient does have a follow-up visit be seen in the office but he was told the knee interim if any questions or concerns to please call

## 2021-05-04 NOTE — ASSESSMENT & PLAN NOTE
With his long-term tobacco abuse in the past had developed chronic obstructive pulmonary disease  No acute exacerbations recently  He continues to uses inhalers as instructed    He is up-to-date with COVID vaccine

## 2021-05-04 NOTE — ASSESSMENT & PLAN NOTE
Patient's blood pressure remains under adequate control    Patient will continue present medication and surveillance

## 2021-05-04 NOTE — ASSESSMENT & PLAN NOTE
The patient does have a history of obstructive sleep apnea    Had recent adjustment in treatment by his sleep specialist   He states he is now on BiPAP and feels this may be beneficial

## 2021-05-04 NOTE — ASSESSMENT & PLAN NOTE
Patient does have history of atherosclerotic cardiovascular disease status post myocardial infarction in the past   Since that incident patient has stop smoking  His taking medication as directed and continues to have good control of his blood sugars    Again they did check patient's troponin levels showing no acute ischemia, has follow-up to be seen by Cardiology in the near future

## 2021-05-04 NOTE — PROGRESS NOTES
Assessment/Plan:  TCM Call (since 4/3/2021)     Date and time call was made  4/30/2021 11:03 AM    Hospital care reviewed  Records reviewed    Patient was hospitialized at  [facility]        Date of Admission  04/27/21    Date of discharge  04/29/21    Diagnosis  Lower Extremity Edema    Disposition  Home    Current Symptoms  None      TCM Call (since 4/3/2021)     Post hospital issues  None    Scheduled for follow up? Yes    I have advised the patient to call PCP with any new or worsening symptoms  Johanny Reyes CMA    Counseling  Patient    Comments  Patient appointment scheduled for 5/4/21          Acute on chronic systolic congestive heart failure ([facility] )  Wt Readings from Last 3 Encounters:   05/04/21 123 kg (272 lb)   03/15/21 127 kg (281 lb)   01/07/21 127 kg (280 lb)      patient is here today for transition of care visit  Recently seen acutely in the emergency room with complaint of increasing shortness of breath with exertion increased swelling to the feet ankles and lower extremities  Patient was admitted and was found to be in congestive heart failure  Patient was vigorously diuresed and states that he is now feeling much better and has lost approximately 9 lb form recent evaluation in our office   On discharge patient was given specific instructions about working harder to limit sodium in his diet and states he has  Now looking at the labels of the foods that his ingesting in the hopes of reducing his sodium load  He does have a follow-up visit to be seen by his cardiologist in the near future  At this point patient continue present medication including his Lasix  We did review evaluation was performed in the emergency room and hospital   He had no evidence of acute coronary ischemia and no exacerbations of his chronic lung disease  He did have labs performed recently post discharge looking at his renal function which is stable as well as his potassium    Patient does have a follow-up visit be seen in the office but he was told the knee interim if any questions or concerns to please call  Arteriosclerotic cardiovascular disease    Patient does have history of atherosclerotic cardiovascular disease status post myocardial infarction in the past   Since that incident patient has stop smoking  His taking medication as directed and continues to have good control of his blood sugars  Again they did check patient's troponin levels showing no acute ischemia, has follow-up to be seen by Cardiology in the near future    Benign essential hypertension   Patient's blood pressure remains under adequate control  Patient will continue present medication and surveillance    Diabetes mellitus ([facility] )    Lab Results   Component Value Date    HGBA1C 6 1 (H) 04/27/2021    as noted patient's sugar has been under excellent control and patient has been monitoring this at home very closely  He is on insulin and states they did reduce his insulin dose during his hospitalization  Patient has been able to the years to make adjustments with his insulin dose depending on the findings with blood sugars  Patient will continue present medication and surveillance  He is due to have a fasting blood sugar, hemoglobin A1c performed with his next visit  The he was told if any changes with his blood sugar readings and questions or concerns again to call    Chronic obstructive pulmonary disease ([facility] )   With his long-term tobacco abuse in the past had developed chronic obstructive pulmonary disease  No acute exacerbations recently  He continues to uses inhalers as instructed  He is up-to-date with COVID vaccine    Low back pain   Chronic low back pain with radiculopathy  Patient remains on intermittent dosage of hydrocodone  A new prescription was sent to the pharmacy    Patient has been monitor closely does not abuse    FREDY (obstructive sleep apnea)   The patient does have a history of obstructive sleep apnea  Had recent adjustment in treatment by his sleep specialist   He states he is now on BiPAP and feels this may be beneficial        Diagnoses and all orders for this visit:    Acute on chronic systolic congestive heart failure (HCC)    Chronic bilateral low back pain without sciatica  -     HYDROcodone-acetaminophen (Vicodin) 5-300 MG per tablet; Take 1 tablet by mouth every 6 (six) hours as needed for moderate painMax Daily Amount: 4 tablets    Arteriosclerotic cardiovascular disease    Benign essential hypertension    Panlobular emphysema (HCC)    FREDY (obstructive sleep apnea)    Other orders  -     furosemide (LASIX) 40 mg tablet; Take 40 mg by mouth daily          Subjective:      Patient ID: Francisco Horn is a 61 y o  male  43-year-old male history of extensive medical problems as outlined previously  Patient is here today for transition of care visit after recent hospitalization, diagnosis of acute congestive heart failure  Patient states over the few months prior to his admission to the hospital he was having problems with increasing shortness of breath, difficulty with exertion, increased swelling to the feet and ankles  The following portions of the patient's history were reviewed and updated as appropriate:   He  has a past medical history of Abscess of left thigh, Acute myocardial infarction [facility]), Anesthesia, Anxiety, Arteriosclerotic cardiovascular disease, Asthma, Chest pain, Class 2 obesity with alveolar hypoventilation and body mass index (BMI) of 36 0 to 36 9 in adult [facility]), [facility]chronic obstructive [facility] disease) [facility] Coronary artery disease, Diabetes mellitus ([facility] ), Fatty liver, Gastric ulcer, GERD (gastroesophageal reflux disease), Hyperlipidemia, Hypertension, Low back pain, Myocardial infarction ([facility] ) ([id number]), Obstructive sleep apnea, and Spondylosis of lumbar region without myelopathy or radiculopathy    He   Patient Active Problem List    Diagnosis Date Noted    [facility] on chronic systolic [facility] 05/04/2021    Diabetes mellitus ([facility] )     Lesion of nose 11/13/2020    Type 2 diabetes mellitus, with long-term current use of insulin ([facility] ) 07/06/2020    FREDY (obstructive sleep apnea) 07/06/2020    Aftercare following surgery of the musculoskeletal system 06/15/2020    Impingement syndrome of left shoulder 01/14/2020    Adhesive capsulitis of left shoulder 01/14/2020    Chronic left shoulder pain 08/21/2019    Healthcare maintenance 04/10/2019    Morbid obesity with BMI of 40 0-44 9, adult ([facility] ) 11/29/2018    Low back pain 10/22/2018    Dysuria 07/24/2018    Lumbar radiculopathy 12/29/2017    Myofascial pain syndrome 12/29/2017    Sacroiliitis ([facility] ) 12/29/2017    Chest pain 08/02/2016    Spondylosis of lumbar region without myelopathy or radiculopathy 04/15/2014    Benign essential hypertension 10/16/2012    Arteriosclerotic cardiovascular disease 06/14/2012    Chronic obstructive pulmonary disease ([facility] ) 06/14/2012     He  has a past surgical history that includes Coronary angioplasty with stent (2006, 2007); Neuroplasty / transposition median nerve at carpal tunnel (Right); EGD; Carpal tunnel release (Right); Colonoscopy; Hemorrhoid surgery; and pr shldr arthroscop,lyse adhesns (Left, 6/10/2020)  His family history includes Alzheimer's disease in his mother; Cancer in his other; Dementia in his mother; Heart attack in his father; Heart failure in his mother; Hypertension in his family; Other in his family and father; Stroke in his family  He  reports that he quit smoking about 7 years ago  He smoked 0 00 packs per day for 0 00 years  He has never used smokeless tobacco  He reports that he does not drink alcohol or use drugs    Current Outpatient Medications   Medication Sig Dispense Refill    acetaminophen (TYLENOL) 325 mg tablet Take 650 mg by mouth every 6 (six) hours as needed for mild pain      albuterol (PROVENTIL HFA,VENTOLIN HFA) 90 mcg/act inhaler Inhale 90 puffs as needed      atorvastatin (LIPITOR) 40 mg tablet TAKE 1 TABLET BY MOUTH EVERY DAY 90 tablet 2    benazepril (LOTENSIN) 10 mg tablet TAKE 1 TABLET BY MOUTH EVERY DAY 90 tablet 1    clopidogrel (PLAVIX) 75 mg tablet Take 1 tablet (75 mg total) by mouth daily 90 tablet 3    fenofibrate (TRICOR) 145 mg tablet TAKE 1 TABLET BY MOUTH EVERY DAY 90 tablet 1    furosemide (LASIX) 40 mg tablet Take 40 mg by mouth daily      HYDROcodone-acetaminophen (Vicodin) 5-300 MG per tablet Take 1 tablet by mouth every 6 (six) hours as needed for moderate painMax Daily Amount: 4 tablets 112 tablet 0    Insulin Pen Needle (B-D UF III MINI PEN NEEDLES) 31G X 5 MM MISC Inject as directed 2 (two) times a day Use as directed 100 each 0    Jardiance 25 MG TABS TAKE 1 TABLET BY MOUTH EVERY DAY 90 tablet 1    metoprolol tartrate (LOPRESSOR) 50 mg tablet TAKE 1 TABLET (50 MG TOTAL) BY MOUTH EVERY 12 (TWELVE) HOURS 180 tablet 1    nitroglycerin (NITROSTAT) 0 3 mg SL tablet Place under the tongue      ONE TOUCH ULTRA TEST test strip USE TO TEST 4 TIMES A  each 3    pantoprazole (PROTONIX) 40 mg tablet Take 1 tablet (40 mg total) by mouth daily 90 tablet 5     No current facility-administered medications for this visit        Current Outpatient Medications on File Prior to Visit   Medication Sig    acetaminophen (TYLENOL) 325 mg tablet Take 650 mg by mouth every 6 (six) hours as needed for mild pain    albuterol (PROVENTIL HFA,VENTOLIN HFA) 90 mcg/act inhaler Inhale 90 puffs as needed    atorvastatin (LIPITOR) 40 mg tablet TAKE 1 TABLET BY MOUTH EVERY DAY    benazepril (LOTENSIN) 10 mg tablet TAKE 1 TABLET BY MOUTH EVERY DAY    clopidogrel (PLAVIX) 75 mg tablet Take 1 tablet (75 mg total) by mouth daily    fenofibrate (TRICOR) 145 mg tablet TAKE 1 TABLET BY MOUTH EVERY DAY    furosemide (LASIX) 40 mg tablet Take 40 mg by mouth daily    Insulin Pen Needle (B-D UF III MINI PEN NEEDLES) 31G X 5 MM MISC Inject as directed 2 (two) times a day Use as directed    Jardiance 25 MG TABS TAKE 1 TABLET BY MOUTH EVERY DAY    metoprolol tartrate (LOPRESSOR) 50 mg tablet TAKE 1 TABLET (50 MG TOTAL) BY MOUTH EVERY 12 (TWELVE) HOURS    nitroglycerin (NITROSTAT) 0 3 mg SL tablet Place under the tongue    ONE TOUCH ULTRA TEST test strip USE TO TEST 4 TIMES A DAY    pantoprazole (PROTONIX) 40 mg tablet Take 1 tablet (40 mg total) by mouth daily    [DISCONTINUED] HYDROcodone-acetaminophen (Vicodin) 5-300 MG per tablet Take 1 tablet by mouth every 6 (six) hours as needed for moderate painMax Daily Amount: 4 tablets    [DISCONTINUED] aspirin 325 mg tablet Take 325 mg by mouth daily     [DISCONTINUED] Lantus SoloStar 100 units/mL injection pen 100 units subcutaneously to q day     No current facility-administered medications on file prior to visit  He is allergic to cefaclor and simvastatin       Review of Systems   Constitutional: Positive for activity change (  States thatSince discharge she slowly returning to his normal baseline activity level with less problems with shortness of breath and fatigue)  Negative for appetite change, chills, diaphoresis, fatigue, fever and unexpected weight change  HENT: Negative  Eyes: Negative  Respiratory: Positive for shortness of breath ( patientHas multifactorial shortness of breath combination now of CHF, chronic obstructive pulmonary disease  States that he is moving slowly but seems to do this with less difficulty)  Negative for apnea, cough, choking, chest tightness, wheezing and stridor  Cardiovascular: Positive for leg swelling ( significant decrease in swelling to lower extremities)  Negative for chest pain and palpitations  Gastrointestinal: Negative  Endocrine: Negative  Genitourinary: Positive for frequency ( increased frequency with urination)   Negative for decreased urine volume, difficulty urinating, discharge, dysuria, enuresis, flank pain, genital sores, hematuria, penile pain, penile swelling, scrotal swelling, testicular pain and urgency  Musculoskeletal: Positive for back pain  Negative for arthralgias, gait problem, joint swelling, myalgias, neck pain and neck stiffness  Skin: Negative  Allergic/Immunologic: Negative  Neurological: Negative  Hematological: Negative  Psychiatric/Behavioral: Negative  Objective:      /60   Pulse 73   Temp 98 1 °F (36 7 °C) (Tympanic)   Ht 5' 6" (1 676 m)   Wt 123 kg (272 lb)   SpO2 95%   BMI 43 90 kg/m²          Physical Exam  Vitals signs and nursing note reviewed  Constitutional:       General: He is not in acute distress  Appearance: Normal appearance  He is obese  He is not ill-appearing, toxic-appearing or diaphoretic  Comments: Pleasant articulate obese 61-year-old male who is awake alert in no acute distress and oriented x3   HENT:      Head: Normocephalic and atraumatic  Right Ear: Tympanic membrane, ear canal and external ear normal  There is no impacted cerumen  Left Ear: Tympanic membrane, ear canal and external ear normal  There is no impacted cerumen  Nose: Nose normal  No congestion or rhinorrhea  Mouth/Throat:      Mouth: Mucous membranes are moist       Pharynx: Oropharynx is clear  No oropharyngeal exudate or posterior oropharyngeal erythema  Eyes:      General: No scleral icterus  Right eye: No discharge  Left eye: No discharge  Extraocular Movements: Extraocular movements intact  Conjunctiva/sclera: Conjunctivae normal       Pupils: Pupils are equal, round, and reactive to light  Neck:      Musculoskeletal: Normal range of motion and neck supple  No neck rigidity or muscular tenderness  Vascular: No carotid bruit  Cardiovascular:      Rate and Rhythm: Normal rate  Heart sounds: Normal heart sounds  No murmur  No friction rub  No gallop      Pulmonary:      Effort: Pulmonary effort is normal  No respiratory distress  Breath sounds: No stridor  No wheezing, rhonchi or rales  Comments: A decreased breath sounds noted at bilateral bases otherwise clear to auscultation with no rales rhonchi or wheezes appreciated  Chest:      Chest wall: No tenderness  Abdominal:      General: Bowel sounds are normal  There is no distension  Palpations: Abdomen is soft  There is no mass  Tenderness: There is no abdominal tenderness  There is no right CVA tenderness, left CVA tenderness, guarding or rebound  Hernia: No hernia is present  Comments: Obese   Musculoskeletal:         General: No swelling, tenderness, deformity or signs of injury  Right lower leg: Edema ( a trace edema bilateral lower extremities and he states is  significantly improved) present  Left lower leg: Edema present  Lymphadenopathy:      Cervical: No cervical adenopathy  Skin:     General: Skin is warm and dry  Coloration: Skin is not jaundiced or pale  Findings: No bruising, erythema, lesion or rash  Neurological:      General: No focal deficit present  Mental Status: He is alert and oriented to person, place, and time  Mental status is at baseline  Cranial Nerves: No cranial nerve deficit  Sensory: No sensory deficit  Motor: No weakness  Coordination: Coordination normal       Gait: Gait normal       Deep Tendon Reflexes: Reflexes normal    Psychiatric:         Mood and Affect: Mood normal          Behavior: Behavior normal          Thought Content:  Thought content normal          Judgment: Judgment normal

## 2021-05-26 ENCOUNTER — TRANSCRIBE ORDERS (OUTPATIENT)
Dept: ADMINISTRATIVE | Age: 61
End: 2021-05-26

## 2021-05-26 ENCOUNTER — APPOINTMENT (OUTPATIENT)
Dept: LAB | Age: 61
End: 2021-05-26
Payer: COMMERCIAL

## 2021-05-26 DIAGNOSIS — R06.02 SHORTNESS OF BREATH: ICD-10-CM

## 2021-05-26 DIAGNOSIS — R06.02 SHORTNESS OF BREATH: Primary | ICD-10-CM

## 2021-05-26 LAB
ANION GAP SERPL CALCULATED.3IONS-SCNC: 9 MMOL/L (ref 4–13)
BUN SERPL-MCNC: 76 MG/DL (ref 5–25)
CALCIUM SERPL-MCNC: 10.8 MG/DL (ref 8.3–10.1)
CHLORIDE SERPL-SCNC: 103 MMOL/L (ref 100–108)
CO2 SERPL-SCNC: 25 MMOL/L (ref 21–32)
CREAT SERPL-MCNC: 3 MG/DL (ref 0.6–1.3)
GFR SERPL CREATININE-BSD FRML MDRD: 22 ML/MIN/1.73SQ M
GLUCOSE P FAST SERPL-MCNC: 80 MG/DL (ref 65–99)
POTASSIUM SERPL-SCNC: 4.9 MMOL/L (ref 3.5–5.3)
SODIUM SERPL-SCNC: 137 MMOL/L (ref 136–145)

## 2021-05-26 PROCEDURE — 80048 BASIC METABOLIC PNL TOTAL CA: CPT

## 2021-05-26 PROCEDURE — 36415 COLL VENOUS BLD VENIPUNCTURE: CPT

## 2021-05-27 ENCOUNTER — OFFICE VISIT (OUTPATIENT)
Dept: SLEEP CENTER | Facility: CLINIC | Age: 61
End: 2021-05-27
Payer: COMMERCIAL

## 2021-05-27 VITALS
HEIGHT: 66 IN | WEIGHT: 270 LBS | SYSTOLIC BLOOD PRESSURE: 102 MMHG | BODY MASS INDEX: 43.39 KG/M2 | HEART RATE: 78 BPM | DIASTOLIC BLOOD PRESSURE: 60 MMHG

## 2021-05-27 DIAGNOSIS — G47.33 OSA (OBSTRUCTIVE SLEEP APNEA): Primary | ICD-10-CM

## 2021-05-27 PROCEDURE — 1036F TOBACCO NON-USER: CPT | Performed by: INTERNAL MEDICINE

## 2021-05-27 PROCEDURE — 99213 OFFICE O/P EST LOW 20 MIN: CPT | Performed by: INTERNAL MEDICINE

## 2021-05-27 PROCEDURE — 3008F BODY MASS INDEX DOCD: CPT | Performed by: INTERNAL MEDICINE

## 2021-05-27 NOTE — PROGRESS NOTES
Progress Note - Sleep Center   Familia Arnold :1960 MRN: [id number]      Reason for Visit:  61 y o male here for PAP compliance check    Assessment:  Doing well with new PAP device  Sleep quality is improved and the patient feels less drowsy  Compliance data show utilization for greater than or equal to 70% of nights, for greater than or equal to 4 hours per night  Plan:  Adequate compliance and successful treatment    Follow up: One year    History of Present Illness:  History of FREDY on PAP therapy  Meets adequate compliance          Review of Systems      Genitourinary none   Cardiology palpitations/fluttering feeling in the chest and ankle/leg swelling   Gastrointestinal abdominal pain or cramping that disturb sleep    Neurology none   Constitutional none   Integumentary none   Psychiatry none   Musculoskeletal back pain and leg cramps   Pulmonary shortness of breath with activity   ENT ringing in ears   Endocrine none   Hematological none           I have reviewed and updated the review of systems as necessary    Historical Information    Past Medical History:   Diagnosis Date    Abscess of left thigh     Acute myocardial infarction [facility])     Anesthesia     woke up during procedure    Anxiety     Arteriosclerotic cardiovascular disease     Asthma     Chest pain     Class 2 obesity with alveolar hypoventilation and body mass index (BMI) of 36 0 to 36 9 in adult [facility])     COPD (chronic obstructive pulmonary disease) (HCC)     Coronary artery disease     Diabetes mellitus ([facility] )     Fatty liver     Gastric ulcer     GERD (gastroesophageal reflux disease)     Hyperlipidemia     Hypertension     Low back pain     [facility] [id number]    Obstructive sleep apnea     no Cpap    Spondylosis of lumbar region without myelopathy or radiculopathy          Past Surgical History:   Procedure Laterality Date    CARPAL TUNNEL RELEASE Right     COLONOSCOPY      CORONARY ANGIOPLASTY WITH STENT PLACEMENT  ,     EGD      HEMORRHOID SURGERY      NEUROPLASTY / TRANSPOSITION MEDIAN NERVE AT CARPAL TUNNEL Right     MI SHLDR ARTHROSCOP,LYSE ADHESNS Left 6/10/2020    Procedure: SHOULDER ARTHROSCOPIC CAPSULAR RELEASE;  Surgeon: Favian Morales MD;  Location: [facility]  MAIN [facility];  Service: Orthopedics         Social History     Socioeconomic History    Marital status: Single     Spouse name: None    Number of children: None    Years of education: None    Highest education level: None   Occupational History    None   Social Needs    Financial resource strain: None    Food insecurity     Worry: None     Inability: None    Transportation needs     Medical: None     Non-medical: None   Tobacco Use    Smoking status: Former Smoker     Packs/day: 0 00     Years: 0 00     Pack years: 0 00     Quit date: 2013     Years since quittin 9    Smokeless tobacco: Never Used   Substance and Sexual Activity    Alcohol use: No    Drug use: No    Sexual activity: Not Currently   Lifestyle    Physical activity     Days per week: None     Minutes per session: None    Stress: None   Relationships    Social connections     Talks on phone: None     Gets together: None     Attends Sikh service: None     Active member of club or organization: None     Attends meetings of clubs or organizations: None     Relationship status: None    Intimate partner violence     Fear of current or ex partner: None     Emotionally abused: None     Physically abused: None     Forced sexual activity: None   Other Topics Concern    None   Social History Narrative    None         Family History   Problem Relation Age of Onset    Alzheimer's disease Mother     Heart failure Mother     Dementia Mother     Heart attack Father     Other Father         Cardiac Disorder    Other Family         Back Pain    Hypertension Family     Stroke Family         Complications    Cancer Other Medications/Allergies:      Current Outpatient Medications:     acetaminophen (TYLENOL) 325 mg tablet, Take 650 mg by mouth every 6 (six) hours as needed for mild pain, Disp: , Rfl:     albuterol (PROVENTIL HFA,VENTOLIN HFA) 90 mcg/act inhaler, Inhale 90 puffs as needed, Disp: , Rfl:     atorvastatin (LIPITOR) 40 mg tablet, TAKE 1 TABLET BY MOUTH EVERY DAY, Disp: 90 tablet, Rfl: 2    benazepril (LOTENSIN) 10 mg tablet, TAKE 1 TABLET BY MOUTH EVERY DAY, Disp: 90 tablet, Rfl: 1    clopidogrel (PLAVIX) 75 mg tablet, Take 1 tablet (75 mg total) by mouth daily, Disp: 90 tablet, Rfl: 3    fenofibrate (TRICOR) 145 mg tablet, TAKE 1 TABLET BY MOUTH EVERY DAY, Disp: 90 tablet, Rfl: 1    furosemide (LASIX) 40 mg tablet, Take 40 mg by mouth daily, Disp: , Rfl:     HYDROcodone-acetaminophen (Vicodin) 5-300 MG per tablet, Take 1 tablet by mouth every 6 (six) hours as needed for moderate painMax Daily Amount: 4 tablets, Disp: 112 tablet, Rfl: 0    Insulin Pen Needle (B-D UF III MINI PEN NEEDLES) 31G X 5 MM MISC, Inject as directed 2 (two) times a day Use as directed, Disp: 100 each, Rfl: 0    Jardiance 25 MG TABS, TAKE 1 TABLET BY MOUTH EVERY DAY, Disp: 90 tablet, Rfl: 1    nitroglycerin (NITROSTAT) 0 3 mg SL tablet, Place under the tongue, Disp: , Rfl:     ONE TOUCH ULTRA TEST test strip, USE TO TEST 4 TIMES A DAY, Disp: 200 each, Rfl: 3    pantoprazole (PROTONIX) 40 mg tablet, Take 1 tablet (40 mg total) by mouth daily, Disp: 90 tablet, Rfl: 5    metoprolol tartrate (LOPRESSOR) 50 mg tablet, TAKE 1 TABLET (50 MG TOTAL) BY MOUTH EVERY 12 (TWELVE) HOURS, Disp: 180 tablet, Rfl: 1      Objective    Vital Signs:   Vitals:    05/27/21 1300   BP: 102/60   Pulse: 78     [place] Sleepiness Scale:  Total score: 7        Physical Exam:    General: Alert, appropriate, cooperative, overweight    Head: NC/AT    Skin: Warm, dry    Neuro: No motor abnormalities, cranial nerves appear intact    Extremity: No clubbing, cyanosis    PAP setting:   BiPAP 18/14 cm  DME Provider: [employer] [employer]  Test results:  ALEKSEY = 23 5    Counseling / Coordination of Care  Total clinic time spent today 10 minutes  A description of the counseling / coordination of care: Maintain compliance  Discussed equipment  LITTLE Bauer    Board Certified Sleep Specialist

## 2021-05-30 DIAGNOSIS — E78.01 FAMILIAL HYPERCHOLESTEROLEMIA: ICD-10-CM

## 2021-05-31 RX ORDER — ATORVASTATIN CALCIUM 40 MG/1
TABLET, FILM COATED ORAL
Qty: 90 TABLET | Refills: 2 | Status: SHIPPED | OUTPATIENT
Start: 2021-05-31 | End: 2022-02-27

## 2021-06-08 DIAGNOSIS — E11.59 TYPE 2 DIABETES MELLITUS WITH OTHER CIRCULATORY COMPLICATION, WITH LONG-TERM CURRENT USE OF INSULIN (HCC): ICD-10-CM

## 2021-06-08 DIAGNOSIS — Z79.4 TYPE 2 DIABETES MELLITUS WITH OTHER CIRCULATORY COMPLICATION, WITH LONG-TERM CURRENT USE OF INSULIN (HCC): ICD-10-CM

## 2021-06-09 DIAGNOSIS — K21.9 GASTROESOPHAGEAL REFLUX DISEASE WITHOUT ESOPHAGITIS: ICD-10-CM

## 2021-06-09 RX ORDER — PANTOPRAZOLE SODIUM 40 MG/1
TABLET, DELAYED RELEASE ORAL
Qty: 90 TABLET | Refills: 5 | Status: SHIPPED | OUTPATIENT
Start: 2021-06-09 | End: 2022-07-08

## 2021-06-09 RX ORDER — PEN NEEDLE, DIABETIC 31 GX5/16"
NEEDLE, DISPOSABLE MISCELLANEOUS 2 TIMES DAILY
Qty: 100 EACH | Refills: 0 | Status: SHIPPED | OUTPATIENT
Start: 2021-06-09 | End: 2021-07-21

## 2021-06-23 ENCOUNTER — OFFICE VISIT (OUTPATIENT)
Dept: INTERNAL MEDICINE CLINIC | Facility: CLINIC | Age: 61
End: 2021-06-23
Payer: COMMERCIAL

## 2021-06-23 VITALS
DIASTOLIC BLOOD PRESSURE: 62 MMHG | OXYGEN SATURATION: 97 % | BODY MASS INDEX: 43.39 KG/M2 | TEMPERATURE: 97 F | HEART RATE: 75 BPM | WEIGHT: 270 LBS | SYSTOLIC BLOOD PRESSURE: 124 MMHG | HEIGHT: 66 IN

## 2021-06-23 DIAGNOSIS — Z01.818 PREOPERATIVE CLEARANCE: ICD-10-CM

## 2021-06-23 DIAGNOSIS — E11.9 TYPE 2 DIABETES MELLITUS WITHOUT COMPLICATION, WITH LONG-TERM CURRENT USE OF INSULIN (HCC): ICD-10-CM

## 2021-06-23 DIAGNOSIS — Z79.4 TYPE 2 DIABETES MELLITUS WITHOUT COMPLICATION, WITH LONG-TERM CURRENT USE OF INSULIN (HCC): ICD-10-CM

## 2021-06-23 DIAGNOSIS — J43.1 PANLOBULAR EMPHYSEMA (HCC): ICD-10-CM

## 2021-06-23 DIAGNOSIS — G47.33 OSA (OBSTRUCTIVE SLEEP APNEA): Primary | ICD-10-CM

## 2021-06-23 DIAGNOSIS — I50.23 ACUTE ON CHRONIC SYSTOLIC CONGESTIVE HEART FAILURE (HCC): ICD-10-CM

## 2021-06-23 DIAGNOSIS — I10 BENIGN ESSENTIAL HYPERTENSION: ICD-10-CM

## 2021-06-23 PROCEDURE — 3074F SYST BP LT 130 MM HG: CPT | Performed by: INTERNAL MEDICINE

## 2021-06-23 PROCEDURE — 3078F DIAST BP <80 MM HG: CPT | Performed by: INTERNAL MEDICINE

## 2021-06-23 PROCEDURE — 99214 OFFICE O/P EST MOD 30 MIN: CPT | Performed by: INTERNAL MEDICINE

## 2021-06-23 RX ORDER — ASPIRIN 81 MG/1
TABLET ORAL
COMMUNITY
Start: 2021-04-29 | End: 2022-03-16 | Stop reason: SDUPTHER

## 2021-06-23 RX ORDER — FUROSEMIDE 40 MG/1
20 TABLET ORAL
COMMUNITY
Start: 2021-06-16 | End: 2022-06-16

## 2021-06-23 RX ORDER — INSULIN GLARGINE 100 [IU]/ML
30 INJECTION, SOLUTION SUBCUTANEOUS 4 TIMES DAILY
COMMUNITY
End: 2021-07-08 | Stop reason: SDUPTHER

## 2021-06-23 RX ORDER — METOPROLOL TARTRATE 50 MG/1
25 TABLET, FILM COATED ORAL 2 TIMES DAILY
COMMUNITY
Start: 2021-05-20

## 2021-06-23 RX ORDER — AMOXICILLIN 500 MG/1
CAPSULE ORAL
COMMUNITY
Start: 2021-06-09 | End: 2021-08-26

## 2021-06-23 NOTE — ASSESSMENT & PLAN NOTE
Lab Results   Component Value Date    HGBA1C 6 1 (H) 04/27/2021     Patient's blood sugars have shown good control with present treatment  I did discuss with the patient the importance changing his insulin dose the morning of procedure  Was told to take 1/2 of his usual does of insulin in the morning prior to undergoing procedures planned

## 2021-06-23 NOTE — PROGRESS NOTES
Assessment/Plan:    Preoperative clearance   Patient is here today for preop evaluation at request of his oral surgeon  Patient is to have under IV sedation extraction of tooth number 30 molar lower on the right  Patient has had extraction in the past and these have been difficult and he is wishing to have sedation for the procedure which he states  had been traumatic  Again in the past   Patient states overall he is feeling well with no new respiratory symptoms  Denies any chest pain or pressure and no increasing shortness of breath with exertion  He did approximately 1 month ago have a cardiac stress test performed by his cardiologist which was negative for any ischemia  He is compliant with his medication and blood pressure is stable  Does have a history of diabetes mellitus type 2 but this is been extremely well controlled and we continue to monitor his hemoglobin A1c which is   Showing good control  Patient did undergo physical exam today in the office showing no abnormalities that would contraindicate him undergoing the procedure with IV sedation  We do suggest that he have his O2 sat followed closely during the procedure and have nasal cannula with O2 placed during the procedure     We did give the patient instructions as far as his insulin dose and to take half his normal dose in the morning prior to the procedure to prevent hypoglycemia  Not to take his Jardiance the day of the procedure  Until cleared by the oral surgeons to return to a diet period patient is to call if any concerns or problems pre or postoperatively and likewise with his oral surgeons   FREDY (obstructive sleep apnea)   Patient states that he recently had notice that his BiPAP machine has been recalled  I year to the patient to call immediately to his sleep medicine physician for evaluation and or adjustment of his machine replacement  Of the device as soon as possible  Patient understands and agrees    Will call if any difficulty  He is compliant with his  BiPAP    Benign essential hypertension   Patient's blood pressure has been controlled  No further problems with hypotension  Does have the ability to check his blood pressure at home and does this on a regular basis  Call if any new problems    Acute on chronic systolic congestive heart failure ([facility] [place] [facility] )  Wt Readings from Last 3 Encounters:   06/23/21 122 kg (270 lb)   05/27/21 122 kg (270 lb)   05/04/21 123 kg (272 lb)          patient states he has been stable from a respiratory standpoint  He continues to take his diuretics as directed by his cardiologist   Again recently had cardiology workup and evaluation  No edema noted to lower extremities on exam today  Type 2 diabetes mellitus, with long-term current use of insulin ([facility] )    Lab Results   Component Value Date    HGBA1C 6 1 (H) 04/27/2021     Patient's blood sugars have shown good control with present treatment  I did discuss with the patient the importance changing his insulin dose the morning of procedure  Was told to take 1/2 of his usual does of insulin in the morning prior to undergoing procedures planned  Diagnoses and all orders for this visit:    FREDY (obstructive sleep apnea)    Preoperative clearance    Benign essential hypertension    Panlobular emphysema (HCC)    Acute on chronic systolic congestive heart failure (HCC)    Type 2 diabetes mellitus without complication, with long-term current use of insulin ([facility])    Other orders  -     amoxicillin (AMOXIL) 500 mg capsule  -     furosemide (LASIX) 40 mg tablet; Take 20 mg by mouth  -     metoprolol tartrate (LOPRESSOR) 50 mg tablet; Take 25 mg by mouth 2 (two) times a day  -     aspirin (ECOTRIN LOW STRENGTH) 81 mg EC tablet; aspirin 81 mg tablet,delayed release  -     insulin glargine (LANTUS) 100 units/mL subcutaneous injection;  Inject 30 Units under the skin 4 (four) times a day Inject 2 times in the morning and 2 times in the night Subjective:      Patient ID: Familia Arnold is a 61 y o  male  Patient is a 43-year-old male history of multiple medical problems as outlined previously  Patient is here today for preop evaluation prior to undergoing tooth extraction under anesthesia extraction of to number 30 by his oral surgeon  The following portions of the patient's history were reviewed and updated as appropriate:   He  has a past medical history of Abscess of left thigh, Acute myocardial infarction [facility]), Anesthesia, Anxiety, Arteriosclerotic cardiovascular disease, Asthma, Chest pain, Class 2 obesity with alveolar hypoventilation and body mass index (BMI) of 36 0 to 36 9 in adult [facility]), COPD (chronic obstructive pulmonary disease) ([facility] ), Coronary artery disease, Diabetes mellitus ([address] ), Fatty liver, Gastric ulcer, GERD (gastroesophageal reflux disease), Hyperlipidemia, Hypertension, Low back pain, Myocardial infarction ([facility] ) ([id number]), Obstructive sleep apnea, and Spondylosis of lumbar region without myelopathy or radiculopathy    He   Patient Active Problem List    Diagnosis Date Noted    Preoperative clearance 06/23/2021    Acute on chronic systolic congestive heart failure ([address] ) 05/04/2021    Diabetes mellitus ([address] )     Lesion of nose 11/13/2020    Type 2 diabetes mellitus, with long-term current use of insulin ([address] ) 07/06/2020    FREDY (obstructive sleep apnea) 07/06/2020    Aftercare following surgery of the musculoskeletal system 06/15/2020    Impingement syndrome of left shoulder 01/14/2020    Adhesive capsulitis of left shoulder 01/14/2020    Chronic left shoulder pain 08/21/2019    Healthcare maintenance 04/10/2019    Morbid obesity with BMI of 40 0-44 9, adult ([address] ) 11/29/2018    Low back pain 10/22/2018    Dysuria 07/24/2018    Lumbar radiculopathy 12/29/2017    Myofascial pain syndrome 12/29/2017    Sacroiliitis ([address] ) 12/29/2017    Chest pain 08/02/2016    Spondylosis of lumbar region without myelopathy or radiculopathy 04/15/2014    Benign essential hypertension 10/16/2012    Arteriosclerotic cardiovascular disease 06/14/2012    Chronic obstructive pulmonary disease ([facility] ) 06/14/2012     He  has a past surgical history that includes Coronary angioplasty with stent (2006, 2007); Neuroplasty / transposition median nerve at carpal tunnel (Right); EGD; Carpal tunnel release (Right); Colonoscopy; Hemorrhoid surgery; and pr shldr arthroscop,lyse adhesns (Left, 6/10/2020)  His family history includes Alzheimer's disease in his mother; Cancer in his other; Dementia in his mother; Heart attack in his father; Heart failure in his mother; Hypertension in his family; Other in his family and father; Stroke in his family  He  reports that he quit smoking about 8 years ago  He smoked 0 00 packs per day for 0 00 years  He has never used smokeless tobacco  He reports that he does not drink alcohol and does not use drugs    Current Outpatient Medications   Medication Sig Dispense Refill    acetaminophen (TYLENOL) 325 mg tablet Take 650 mg by mouth every 6 (six) hours as needed for mild pain      albuterol (PROVENTIL HFA,VENTOLIN HFA) 90 mcg/act inhaler Inhale 90 puffs as needed      amoxicillin (AMOXIL) 500 mg capsule       aspirin (ECOTRIN LOW STRENGTH) 81 mg EC tablet aspirin 81 mg tablet,delayed release      atorvastatin (LIPITOR) 40 mg tablet TAKE 1 TABLET BY MOUTH EVERY DAY 90 tablet 2    clopidogrel (PLAVIX) 75 mg tablet Take 1 tablet (75 mg total) by mouth daily 90 tablet 3    fenofibrate (TRICOR) 145 mg tablet TAKE 1 TABLET BY MOUTH EVERY DAY 90 tablet 1    furosemide (LASIX) 40 mg tablet Take 20 mg by mouth      HYDROcodone-acetaminophen (Vicodin) 5-300 MG per tablet Take 1 tablet by mouth every 6 (six) hours as needed for moderate painMax Daily Amount: 4 tablets 112 tablet 0    insulin glargine (LANTUS) 100 units/mL subcutaneous injection Inject 30 Units under the skin 4 (four) times a day Inject 2 times in the morning and 2 times in the night      Insulin Pen Needle (B-D UF III MINI PEN NEEDLES) 31G X 5 MM MISC Inject as directed 2 (two) times a day Use as directed 100 each 0    Jardiance 25 MG TABS TAKE 1 TABLET BY MOUTH EVERY DAY 90 tablet 1    metoprolol tartrate (LOPRESSOR) 50 mg tablet Take 25 mg by mouth 2 (two) times a day      nitroglycerin (NITROSTAT) 0 3 mg SL tablet Place under the tongue      ONE TOUCH ULTRA TEST test strip USE TO TEST 4 TIMES A  each 3    pantoprazole (PROTONIX) 40 mg tablet TAKE 1 TABLET BY MOUTH EVERY DAY 90 tablet 5     No current facility-administered medications for this visit       Current Outpatient Medications on File Prior to Visit   Medication Sig    acetaminophen (TYLENOL) 325 mg tablet Take 650 mg by mouth every 6 (six) hours as needed for mild pain    albuterol (PROVENTIL HFA,VENTOLIN HFA) 90 mcg/act inhaler Inhale 90 puffs as needed    amoxicillin (AMOXIL) 500 mg capsule     aspirin (ECOTRIN LOW STRENGTH) 81 mg EC tablet aspirin 81 mg tablet,delayed release    atorvastatin (LIPITOR) 40 mg tablet TAKE 1 TABLET BY MOUTH EVERY DAY    clopidogrel (PLAVIX) 75 mg tablet Take 1 tablet (75 mg total) by mouth daily    fenofibrate (TRICOR) 145 mg tablet TAKE 1 TABLET BY MOUTH EVERY DAY    furosemide (LASIX) 40 mg tablet Take 20 mg by mouth    HYDROcodone-acetaminophen (Vicodin) 5-300 MG per tablet Take 1 tablet by mouth every 6 (six) hours as needed for moderate painMax Daily Amount: 4 tablets    insulin glargine (LANTUS) 100 units/mL subcutaneous injection Inject 30 Units under the skin 4 (four) times a day Inject 2 times in the morning and 2 times in the night    Insulin Pen Needle (B-D UF III MINI PEN NEEDLES) 31G X 5 MM MISC Inject as directed 2 (two) times a day Use as directed    Jardiance 25 MG TABS TAKE 1 TABLET BY MOUTH EVERY DAY    metoprolol tartrate (LOPRESSOR) 50 mg tablet Take 25 mg by mouth 2 (two) times a day    nitroglycerin (NITROSTAT) 0 3 mg SL tablet Place under the tongue    ONE TOUCH ULTRA TEST test strip USE TO TEST 4 TIMES A DAY    pantoprazole (PROTONIX) 40 mg tablet TAKE 1 TABLET BY MOUTH EVERY DAY    [DISCONTINUED] benazepril (LOTENSIN) 10 mg tablet TAKE 1 TABLET BY MOUTH EVERY DAY    [DISCONTINUED] furosemide (LASIX) 40 mg tablet Take 40 mg by mouth daily    [DISCONTINUED] metoprolol tartrate (LOPRESSOR) 50 mg tablet TAKE 1 TABLET (50 MG TOTAL) BY MOUTH EVERY 12 (TWELVE) HOURS     No current facility-administered medications on file prior to visit  He is allergic to cefaclor and simvastatin       Review of Systems   Constitutional: Negative  HENT: Negative  Tooth pain   Eyes: Negative  Respiratory: Negative  Cardiovascular: Negative  Gastrointestinal: Negative  Endocrine: Negative  Genitourinary: Negative  Musculoskeletal: Positive for back pain (  Chronic back pain as previously with no acute exacerbation)  Negative for arthralgias, gait problem, joint swelling, myalgias, neck pain and neck stiffness  Skin: Negative  Allergic/Immunologic: Negative  Neurological: Negative  Hematological: Negative  Psychiatric/Behavioral: Negative  Objective:      /62   Pulse 75   Temp (!) 97 °F (36 1 °C) (Skin)   Ht 5' 6" (1 676 m)   Wt 122 kg (270 lb)   SpO2 97%   BMI 43 58 kg/m²          Physical Exam  Vitals and nursing note reviewed  Constitutional:       General: He is not in acute distress  Appearance: Normal appearance  He is obese  He is not ill-appearing, toxic-appearing or diaphoretic  Comments: Pleasant articulate obese 27-year-old male who is awake alert no acute distress and oriented x3   HENT:      Head: Normocephalic and atraumatic  Right Ear: Tympanic membrane, ear canal and external ear normal  There is no impacted cerumen  Left Ear: Tympanic membrane, ear canal and external ear normal  There is no impacted cerumen        Nose: Nose normal  No congestion or rhinorrhea  Mouth/Throat:      Mouth: Mucous membranes are moist       Pharynx: Oropharynx is clear  No oropharyngeal exudate or posterior oropharyngeal erythema  Comments: Crowding of oral airway as previously  Eyes:      General: No scleral icterus  Right eye: No discharge  Left eye: No discharge  Extraocular Movements: Extraocular movements intact  Conjunctiva/sclera: Conjunctivae normal       Pupils: Pupils are equal, round, and reactive to light  Neck:      Vascular: No carotid bruit  Cardiovascular:      Rate and Rhythm: Normal rate and regular rhythm  Pulses: Normal pulses  Heart sounds: Normal heart sounds  No murmur heard  No friction rub  No gallop  Pulmonary:      Effort: Pulmonary effort is normal  No respiratory distress  Breath sounds: Normal breath sounds  No stridor  No wheezing, rhonchi or rales  Chest:      Chest wall: No tenderness  Abdominal:      General: Bowel sounds are normal  There is no distension  Palpations: Abdomen is soft  There is no mass  Tenderness: There is no abdominal tenderness  There is no right CVA tenderness, left CVA tenderness, guarding or rebound  Hernia: No hernia is present  Comments: Morbidly obese   Musculoskeletal:         General: No swelling, tenderness, deformity or signs of injury  Normal range of motion  Cervical back: Normal range of motion and neck supple  No rigidity or tenderness  Right lower leg: No edema ( no edema today to lower extremities)  Left lower leg: No edema  Lymphadenopathy:      Cervical: No cervical adenopathy  Skin:     General: Skin is warm and dry  Coloration: Skin is not jaundiced or pale  Findings: No bruising, erythema, lesion or rash  Neurological:      General: No focal deficit present  Mental Status: He is alert and oriented to person, place, and time  Mental status is at baseline        Cranial Nerves: No cranial nerve deficit  Sensory: No sensory deficit  Motor: No weakness  Coordination: Coordination normal       Gait: Gait normal       Deep Tendon Reflexes: Reflexes abnormal ( absent patella and Achilles tendon reflexes bilaterally which is chronic)  Psychiatric:         Mood and Affect: Mood normal          Behavior: Behavior normal          Thought Content:  Thought content normal          Judgment: Judgment normal

## 2021-06-23 NOTE — ASSESSMENT & PLAN NOTE
Wt Readings from Last 3 Encounters:   06/23/21 122 kg (270 lb)   05/27/21 122 kg (270 lb)   05/04/21 123 kg (272 lb)          patient states he has been stable from a respiratory standpoint  He continues to take his diuretics as directed by his cardiologist   Again recently had cardiology workup and evaluation  No edema noted to lower extremities on exam today

## 2021-06-23 NOTE — ASSESSMENT & PLAN NOTE
Patient is here today for preop evaluation at request of his oral surgeon  Patient is to have under IV sedation extraction of tooth number 30 molar lower on the right  Patient has had extraction in the past and these have been difficult and he is wishing to have sedation for the procedure which he states  had been traumatic  Again in the past   Patient states overall he is feeling well with no new respiratory symptoms  Denies any chest pain or pressure and no increasing shortness of breath with exertion  He did approximately 1 month ago have a cardiac stress test performed by his cardiologist which was negative for any ischemia  He is compliant with his medication and blood pressure is stable  Does have a history of diabetes mellitus type 2 but this is been extremely well controlled and we continue to monitor his hemoglobin A1c which is   Showing good control  Patient did undergo physical exam today in the office showing no abnormalities that would contraindicate him undergoing the procedure with IV sedation  We do suggest that he have his O2 sat followed closely during the procedure and have nasal cannula with O2 placed during the procedure     We did give the patient instructions as far as his insulin dose and to take half his normal dose in the morning prior to the procedure to prevent hypoglycemia  Not to take his Jardiance the day of the procedure  Until cleared by the oral surgeons to return to a diet period patient is to call if any concerns or problems pre or postoperatively and likewise with his oral surgeons

## 2021-06-23 NOTE — ASSESSMENT & PLAN NOTE
Patient states that he recently had notice that his BiPAP machine has been recalled  I year to the patient to call immediately to his sleep medicine physician for evaluation and or adjustment of his machine replacement  Of the device as soon as possible  Patient understands and agrees  Will call if any difficulty    He is compliant with his  BiPAP

## 2021-06-23 NOTE — ASSESSMENT & PLAN NOTE
Patient's blood pressure has been controlled  No further problems with hypotension  Does have the ability to check his blood pressure at home and does this on a regular basis    Call if any new problems

## 2021-06-28 ENCOUNTER — APPOINTMENT (OUTPATIENT)
Dept: LAB | Age: 61
End: 2021-06-28
Payer: COMMERCIAL

## 2021-06-28 LAB
ANION GAP SERPL CALCULATED.3IONS-SCNC: 8 MMOL/L (ref 4–13)
BUN SERPL-MCNC: 21 MG/DL (ref 5–25)
CALCIUM SERPL-MCNC: 9.4 MG/DL (ref 8.3–10.1)
CHLORIDE SERPL-SCNC: 104 MMOL/L (ref 100–108)
CHOLEST SERPL-MCNC: 126 MG/DL (ref 50–200)
CO2 SERPL-SCNC: 24 MMOL/L (ref 21–32)
CREAT SERPL-MCNC: 1.45 MG/DL (ref 0.6–1.3)
EST. AVERAGE GLUCOSE BLD GHB EST-MCNC: 117 MG/DL
GFR SERPL CREATININE-BSD FRML MDRD: 52 ML/MIN/1.73SQ M
GLUCOSE P FAST SERPL-MCNC: 93 MG/DL (ref 65–99)
HBA1C MFR BLD: 5.7 %
HDLC SERPL-MCNC: 16 MG/DL
LDLC SERPL CALC-MCNC: 36 MG/DL (ref 0–100)
NONHDLC SERPL-MCNC: 110 MG/DL
POTASSIUM SERPL-SCNC: 3.6 MMOL/L (ref 3.5–5.3)
SODIUM SERPL-SCNC: 136 MMOL/L (ref 136–145)
TRIGL SERPL-MCNC: 370 MG/DL

## 2021-06-28 PROCEDURE — 80048 BASIC METABOLIC PNL TOTAL CA: CPT

## 2021-06-28 PROCEDURE — 3044F HG A1C LEVEL LT 7.0%: CPT | Performed by: INTERNAL MEDICINE

## 2021-06-28 PROCEDURE — 83036 HEMOGLOBIN GLYCOSYLATED A1C: CPT

## 2021-06-28 PROCEDURE — 36415 COLL VENOUS BLD VENIPUNCTURE: CPT

## 2021-06-28 PROCEDURE — 80061 LIPID PANEL: CPT

## 2021-07-07 ENCOUNTER — RA CDI HCC (OUTPATIENT)
Dept: OTHER | Facility: HOSPITAL | Age: 61
End: 2021-07-07

## 2021-07-07 NOTE — PROGRESS NOTES
[facility]  coding opportunities          Chart reviewed, no opportunity found: CHART REVIEWED, NO OPPORTUNITY FOUND                     Patients insurance company: [employer] (Medicare Advantage and Commercial)

## 2021-07-08 DIAGNOSIS — Z79.4 TYPE 2 DIABETES MELLITUS WITHOUT COMPLICATION, WITH LONG-TERM CURRENT USE OF INSULIN (HCC): Primary | ICD-10-CM

## 2021-07-08 DIAGNOSIS — E11.9 TYPE 2 DIABETES MELLITUS WITHOUT COMPLICATION, WITH LONG-TERM CURRENT USE OF INSULIN (HCC): Primary | ICD-10-CM

## 2021-07-08 RX ORDER — INSULIN GLARGINE 100 [IU]/ML
30 INJECTION, SOLUTION SUBCUTANEOUS 4 TIMES DAILY
Qty: 10 ML | Refills: 3 | Status: SHIPPED | OUTPATIENT
Start: 2021-07-08 | End: 2021-07-12 | Stop reason: SDUPTHER

## 2021-07-12 DIAGNOSIS — Z79.4 TYPE 2 DIABETES MELLITUS WITHOUT COMPLICATION, WITH LONG-TERM CURRENT USE OF INSULIN (HCC): ICD-10-CM

## 2021-07-12 DIAGNOSIS — E11.9 TYPE 2 DIABETES MELLITUS WITHOUT COMPLICATION, WITH LONG-TERM CURRENT USE OF INSULIN (HCC): ICD-10-CM

## 2021-07-12 RX ORDER — INSULIN GLARGINE 100 [IU]/ML
30 INJECTION, SOLUTION SUBCUTANEOUS 4 TIMES DAILY
Qty: 30 ML | Refills: 3 | Status: SHIPPED | OUTPATIENT
Start: 2021-07-12 | End: 2021-09-30 | Stop reason: SDUPTHER

## 2021-07-14 ENCOUNTER — OFFICE VISIT (OUTPATIENT)
Dept: INTERNAL MEDICINE CLINIC | Facility: CLINIC | Age: 61
End: 2021-07-14
Payer: COMMERCIAL

## 2021-07-14 VITALS
OXYGEN SATURATION: 96 % | HEIGHT: 66 IN | TEMPERATURE: 97.8 F | WEIGHT: 276 LBS | SYSTOLIC BLOOD PRESSURE: 140 MMHG | BODY MASS INDEX: 44.36 KG/M2 | HEART RATE: 83 BPM | DIASTOLIC BLOOD PRESSURE: 70 MMHG

## 2021-07-14 DIAGNOSIS — E66.01 MORBID OBESITY WITH BMI OF 40.0-44.9, ADULT (HCC): ICD-10-CM

## 2021-07-14 DIAGNOSIS — G47.33 OSA (OBSTRUCTIVE SLEEP APNEA): ICD-10-CM

## 2021-07-14 DIAGNOSIS — Z79.4 TYPE 2 DIABETES MELLITUS WITHOUT COMPLICATION, WITH LONG-TERM CURRENT USE OF INSULIN (HCC): ICD-10-CM

## 2021-07-14 DIAGNOSIS — Z12.5 PROSTATE CANCER SCREENING: Primary | ICD-10-CM

## 2021-07-14 DIAGNOSIS — J43.1 PANLOBULAR EMPHYSEMA (HCC): ICD-10-CM

## 2021-07-14 DIAGNOSIS — Z00.00 HEALTHCARE MAINTENANCE: ICD-10-CM

## 2021-07-14 DIAGNOSIS — I25.10 ARTERIOSCLEROTIC CARDIOVASCULAR DISEASE: ICD-10-CM

## 2021-07-14 DIAGNOSIS — E11.9 TYPE 2 DIABETES MELLITUS WITHOUT COMPLICATION, WITH LONG-TERM CURRENT USE OF INSULIN (HCC): ICD-10-CM

## 2021-07-14 DIAGNOSIS — I10 BENIGN ESSENTIAL HYPERTENSION: ICD-10-CM

## 2021-07-14 PROCEDURE — 3725F SCREEN DEPRESSION PERFORMED: CPT | Performed by: INTERNAL MEDICINE

## 2021-07-14 PROCEDURE — 3008F BODY MASS INDEX DOCD: CPT | Performed by: INTERNAL MEDICINE

## 2021-07-14 PROCEDURE — 99396 PREV VISIT EST AGE 40-64: CPT | Performed by: INTERNAL MEDICINE

## 2021-07-14 PROCEDURE — 1036F TOBACCO NON-USER: CPT | Performed by: INTERNAL MEDICINE

## 2021-07-14 NOTE — ASSESSMENT & PLAN NOTE
Lab Results   Component Value Date    HGBA1C 5 7 (H) 06/28/2021    patient does have a history of diabetes mellitus type 2  As noted recent lab tests show that his sugars under excellent control  Patient will continue present medication, insulin dose and surveillance  We did discuss again the importance of seeing his eye doctor on a yearly basis, podiatrist and watching his caloric intake in order to promote weight loss which is extremely important with his underlying lung and heart disease

## 2021-07-14 NOTE — ASSESSMENT & PLAN NOTE
Patient is here today for yearly physical   He did have some labs performed prior to the visit today looking at his renal function sugar and hemoglobin A1c showing good control of his diabetes  Patient states in general he is feeling back to normal and has no new specific complaints or concerns  He states his vertiginous symptoms completely resolved but with this he is back to his normal appetite  He has is chronic back pain which she states is stable at this point time, it continues to watch his sugar closely and make adjustments to his insulin dose as needed  He denies any chest pain or pressure and no increasing shortness of breath with exertion  He states he was having problems with arthritis in the feet but found out it was because of ill-fitting shoes and once he change these states his ambulating better but again because of his arthritis this is  Minimal   Patient continues to follow-up with his cardiologist and pulmonologist   No recent exacerbation of his chronic obstructive pulmonary disease  He admits that he is not watching his dietary intake calories overall and his weight remains elevated  He did undergo physical exam today in the office including  Rectal exam and prostate  At this time patient will continue with present medication and he was told to return to the office in 4 months  He is overdue for prostate cancer screening and will have a PSA performed now  We will check a comprehensive metabolic profile, hemoglobin A1c, urine for microalbumin with his next appointment along with his lipid profile  Again as previously we did have long discussion with the patient as far as the importance of diet to work towards weight loss  Has severe restrictions with his exercise capacity because of his chronic back pain

## 2021-07-14 NOTE — ASSESSMENT & PLAN NOTE
History of atherosclerotic cardiovascular disease, history of myocardial infarction in the past   Patient continues to follow-up with his cardiologist   He is no longer smoking which is something to decrease his risk for recurrence of disease  He denies any chest pain or pressure and no increased shortness of breath no arrhythmias    Patient does have a follow-up appointment to be seen by Cardiology tomorrow for re-evaluation, his cholesterol is low but also his HDL is extremely low

## 2021-07-14 NOTE — PROGRESS NOTES
Assessment/Plan:    Healthcare maintenance    Patient is here today for yearly physical   He did have some labs performed prior to the visit today looking at his renal function sugar and hemoglobin A1c showing good control of his diabetes  Patient states in general he is feeling back to normal and has no new specific complaints or concerns  He states his vertiginous symptoms completely resolved but with this he is back to his normal appetite  He has is chronic back pain which she states is stable at this point time, it continues to watch his sugar closely and make adjustments to his insulin dose as needed  He denies any chest pain or pressure and no increasing shortness of breath with exertion  He states he was having problems with arthritis in the feet but found out it was because of ill-fitting shoes and once he change these states his ambulating better but again because of his arthritis this is  Minimal   Patient continues to follow-up with his cardiologist and pulmonologist   No recent exacerbation of his chronic obstructive pulmonary disease  He admits that he is not watching his dietary intake calories overall and his weight remains elevated  He did undergo physical exam today in the office including  Rectal exam and prostate  At this time patient will continue with present medication and he was told to return to the office in 4 months  He is overdue for prostate cancer screening and will have a PSA performed now  We will check a comprehensive metabolic profile, hemoglobin A1c, urine for microalbumin with his next appointment along with his lipid profile  Again as previously we did have long discussion with the patient as far as the importance of diet to work towards weight loss  Has severe restrictions with his exercise capacity because of his chronic back pain      Arteriosclerotic cardiovascular disease    History of atherosclerotic cardiovascular disease, history of myocardial infarction in the past   Patient continues to follow-up with his cardiologist   He is no longer smoking which is something to decrease his risk for recurrence of disease  He denies any chest pain or pressure and no increased shortness of breath no arrhythmias  Patient does have a follow-up appointment to be seen by Cardiology tomorrow for re-evaluation, his cholesterol is low but also his HDL is extremely low    Benign essential hypertension   Blood pressure showing relatively good control  Patient will continue present medication and surveillance  Patient is not an ACE-inhibitor or angiotensin receptor blocker which would be indicated with the patient with hypertension, diabetes heart disease  She I asked the patient discuss this with his cardiologist whether they feel this is appropriate medication for the patient this time  Renal function is stable    Chronic obstructive pulmonary disease (HCC)   History chronic obstructive pulmonary disease  Admits that he continues to have some shortness of breath exertion  Acute exacerbations recently  He states that he uses his rescue inhaler intermittently but not on a daily basis  Knows to call us immediately his pulmonologist our office if any acute exacerbations  He is up-to-date of with influenza vaccine, COVID vaccine    Type 2 diabetes mellitus, with long-term current use of insulin ([address] )    Lab Results   Component Value Date    HGBA1C 5 7 (H) 06/28/2021    patient does have a history of diabetes mellitus type 2  As noted recent lab tests show that his sugars under excellent control  Patient will continue present medication, insulin dose and surveillance  We did discuss again the importance of seeing his eye doctor on a yearly basis, podiatrist and watching his caloric intake in order to promote weight loss which is extremely important with his underlying lung and heart disease      FREDY (obstructive sleep apnea)    Patient does have history of some obstructive sleep apnea his CPAP device is on recall but he continues to use the 1 he had prescribed previously  He is awaiting   Replacement machine       Diagnoses and all orders for this visit:    Prostate cancer screening  -     PSA, Total Screen; Future    Benign essential hypertension  -     Comprehensive metabolic panel; Future  -     Lipid panel; Future    Healthcare maintenance  -     PSA, Total Screen; Future  -     Comprehensive metabolic panel; Future  -     Lipid panel; Future    Morbid obesity with BMI of 40 0-44 9, adult ([facility] )    Type 2 diabetes mellitus without complication, with long-term current use of insulin (HCC)  -     Hemoglobin A1C; Future  -     Microalbumin / creatinine urine ratio  -     Lipid panel; Future    FREDY (obstructive sleep apnea)    Panlobular emphysema (HCC)    Arteriosclerotic cardiovascular disease          Subjective:      Patient ID: Bety Lyons is a 61 y o  male  Patient is a 57-year-old male history of hypertension, hyperlipidemia, coronary artery disease, chronic obstructive pulmonary disease, exogenous obesity, chronic low back pain  Patient is here today for routine physical   Did have labs performed prior the visit today we did discuss the results  Patient states in general he is feeling back to normal and offers no new complaints        The following portions of the patient's history were reviewed and updated as appropriate:   He  has a past medical history of Abscess of left thigh, Acute myocardial infarction [facility]), Anesthesia, Anxiety, Arteriosclerotic cardiovascular disease, Asthma, Chest pain, Class 2 obesity with alveolar hypoventilation and body mass index (BMI) of 36 0 to 36 9 in adult [facility]), COPD (chronic obstructive pulmonary disease) ([place] [address] ), Coronary artery disease, Diabetes mellitus ([place] [address] ), Fatty liver, Gastric ulcer, GERD (gastroesophageal reflux disease), Hyperlipidemia, Hypertension, Low back pain, Myocardial infarction ([facility] ) ([id number]), Obstructive sleep apnea, and Spondylosis of lumbar region without myelopathy or radiculopathy  He   Patient Active Problem List    Diagnosis Date Noted    Preoperative clearance 06/23/2021    Acute on chronic systolic congestive heart failure ([facility] ) 05/04/2021    Diabetes mellitus ([facility] )     Lesion of nose 11/13/2020    Type 2 diabetes mellitus, with long-term current use of insulin ([address] ) 07/06/2020    FREDY (obstructive sleep apnea) 07/06/2020    Aftercare following surgery of the musculoskeletal system 06/15/2020    Impingement syndrome of left shoulder 01/14/2020    Adhesive capsulitis of left shoulder 01/14/2020    Chronic left shoulder pain 08/21/2019    Healthcare maintenance 04/10/2019    Morbid obesity with BMI of 40 0-44 9, adult ([facility] ) 11/29/2018    Low back pain 10/22/2018    Dysuria 07/24/2018    Lumbar radiculopathy 12/29/2017    Myofascial pain syndrome 12/29/2017    Sacroiliitis ([facility] ) 12/29/2017    Chest pain 08/02/2016    Spondylosis of lumbar region without myelopathy or radiculopathy 04/15/2014    Benign essential hypertension 10/16/2012    Arteriosclerotic cardiovascular disease 06/14/2012    Chronic obstructive pulmonary disease ([facility] ) 06/14/2012     He  has a past surgical history that includes Coronary angioplasty with stent (2006, 2007); Neuroplasty / transposition median nerve at carpal tunnel (Right); EGD; Carpal tunnel release (Right); Colonoscopy; Hemorrhoid surgery; and pr shldr arthroscop,lyse adhesns (Left, 6/10/2020)  His family history includes Alzheimer's disease in his mother; Cancer in his other; Dementia in his mother; Heart attack in his father; Heart failure in his mother; Hypertension in his family; Other in his family and father; Stroke in his family  He  reports that he quit smoking about 8 years ago  He smoked 0 00 packs per day for 0 00 years  He has never used smokeless tobacco  He reports that he does not drink alcohol and does not use drugs    Current Outpatient Medications Medication Sig Dispense Refill    acetaminophen (TYLENOL) 325 mg tablet Take 650 mg by mouth every 6 (six) hours as needed for mild pain      albuterol (PROVENTIL HFA,VENTOLIN HFA) 90 mcg/act inhaler Inhale 90 puffs as needed      amoxicillin (AMOXIL) 500 mg capsule       aspirin (ECOTRIN LOW STRENGTH) 81 mg EC tablet aspirin 81 mg tablet,delayed release      atorvastatin (LIPITOR) 40 mg tablet TAKE 1 TABLET BY MOUTH EVERY DAY 90 tablet 2    clopidogrel (PLAVIX) 75 mg tablet Take 1 tablet (75 mg total) by mouth daily 90 tablet 3    fenofibrate (TRICOR) 145 mg tablet TAKE 1 TABLET BY MOUTH EVERY DAY 90 tablet 1    furosemide (LASIX) 40 mg tablet Take 20 mg by mouth      HYDROcodone-acetaminophen (Vicodin) 5-300 MG per tablet Take 1 tablet by mouth every 6 (six) hours as needed for moderate painMax Daily Amount: 4 tablets 112 tablet 0    insulin glargine (LANTUS) 100 units/mL subcutaneous injection Inject 30 Units under the skin 4 (four) times a day Inject 2 times in the morning and 2 times in the night 30 mL 3    Insulin Pen Needle (B-D UF III MINI PEN NEEDLES) 31G X 5 MM MISC Inject as directed 2 (two) times a day Use as directed 100 each 0    Jardiance 25 MG TABS TAKE 1 TABLET BY MOUTH EVERY DAY 90 tablet 1    metoprolol tartrate (LOPRESSOR) 50 mg tablet Take 25 mg by mouth 2 (two) times a day      nitroglycerin (NITROSTAT) 0 3 mg SL tablet Place under the tongue      ONE TOUCH ULTRA TEST test strip USE TO TEST 4 TIMES A  each 3    pantoprazole (PROTONIX) 40 mg tablet TAKE 1 TABLET BY MOUTH EVERY DAY 90 tablet 5     No current facility-administered medications for this visit       Current Outpatient Medications on File Prior to Visit   Medication Sig    acetaminophen (TYLENOL) 325 mg tablet Take 650 mg by mouth every 6 (six) hours as needed for mild pain    albuterol (PROVENTIL HFA,VENTOLIN HFA) 90 mcg/act inhaler Inhale 90 puffs as needed    amoxicillin (AMOXIL) 500 mg capsule     aspirin (ECOTRIN LOW STRENGTH) 81 mg EC tablet aspirin 81 mg tablet,delayed release    atorvastatin (LIPITOR) 40 mg tablet TAKE 1 TABLET BY MOUTH EVERY DAY    clopidogrel (PLAVIX) 75 mg tablet Take 1 tablet (75 mg total) by mouth daily    fenofibrate (TRICOR) 145 mg tablet TAKE 1 TABLET BY MOUTH EVERY DAY    furosemide (LASIX) 40 mg tablet Take 20 mg by mouth    HYDROcodone-acetaminophen (Vicodin) 5-300 MG per tablet Take 1 tablet by mouth every 6 (six) hours as needed for moderate painMax Daily Amount: 4 tablets    insulin glargine (LANTUS) 100 units/mL subcutaneous injection Inject 30 Units under the skin 4 (four) times a day Inject 2 times in the morning and 2 times in the night    Insulin Pen Needle (B-D UF III MINI PEN NEEDLES) 31G X 5 MM MISC Inject as directed 2 (two) times a day Use as directed    Jardiance 25 MG TABS TAKE 1 TABLET BY MOUTH EVERY DAY    metoprolol tartrate (LOPRESSOR) 50 mg tablet Take 25 mg by mouth 2 (two) times a day    nitroglycerin (NITROSTAT) 0 3 mg SL tablet Place under the tongue    ONE TOUCH ULTRA TEST test strip USE TO TEST 4 TIMES A DAY    pantoprazole (PROTONIX) 40 mg tablet TAKE 1 TABLET BY MOUTH EVERY DAY     No current facility-administered medications on file prior to visit  He is allergic to cefaclor and simvastatin       Review of Systems   Constitutional: Negative  HENT: Negative  Eyes: Negative  Respiratory: Positive for shortness of breath (  Chronic shortness of breath with any brisk exertion states this has not changed, no acute exacerbation)  Negative for apnea, cough, choking, chest tightness, wheezing and stridor  Cardiovascular: Positive for leg swelling  Negative for chest pain and palpitations ( chronicEdema lower extremities without increase)  Gastrointestinal: Negative  Endocrine: Negative  Genitourinary: Negative  Musculoskeletal: Positive for arthralgias, back pain and gait problem   Negative for joint swelling, myalgias, neck pain and neck stiffness  Skin: Negative  Hematological: Negative  Psychiatric/Behavioral: Negative  Objective:      /70   Pulse 83   Temp 97 8 °F (36 6 °C)   Ht 5' 6" (1 676 m)   Wt 125 kg (276 lb)   SpO2 96%   BMI 44 55 kg/m²          Physical Exam  Vitals and nursing note reviewed  Constitutional:       General: He is not in acute distress  Appearance: Normal appearance  He is obese  He is not ill-appearing, toxic-appearing or diaphoretic  Comments: Obese 49-year-old male who is awake alert no acute distress oriented x3, difficulty with transfer secondary to chronic back pain   HENT:      Head: Normocephalic and atraumatic  Right Ear: Tympanic membrane, ear canal and external ear normal  There is no impacted cerumen  Left Ear: Tympanic membrane, ear canal and external ear normal  There is no impacted cerumen  Nose: Nose normal  No congestion or rhinorrhea  Mouth/Throat:      Mouth: Mucous membranes are moist       Pharynx: Oropharynx is clear  No oropharyngeal exudate or posterior oropharyngeal erythema  Comments: Crowding of oral airway, history of sleep apnea  Eyes:      General: No scleral icterus  Right eye: No discharge  Left eye: No discharge  Extraocular Movements: Extraocular movements intact  Conjunctiva/sclera: Conjunctivae normal       Pupils: Pupils are equal, round, and reactive to light  Neck:      Vascular: No carotid bruit  Comments: Thick neck  Cardiovascular:      Rate and Rhythm: Normal rate and regular rhythm  Pulses: Normal pulses  Heart sounds: Normal heart sounds  No murmur heard  No friction rub  No gallop  Pulmonary:      Effort: Pulmonary effort is normal  No respiratory distress  Breath sounds: Normal breath sounds  No stridor  No wheezing, rhonchi or rales  Chest:      Chest wall: No tenderness     Abdominal:      General: Bowel sounds are normal  There is no distension  Palpations: Abdomen is soft  There is no mass  Tenderness: There is no abdominal tenderness  There is no right CVA tenderness, left CVA tenderness, guarding or rebound  Hernia: No hernia is present  Comments: Morbidly obese   Genitourinary:     Penis: Normal        Testes: Normal       Rectum: Normal       Comments: Extremely difficult exam prostate because of his obesity  Unable to make full assessment  Musculoskeletal:         General: No swelling, tenderness, deformity or signs of injury  Normal range of motion  Cervical back: Normal range of motion and neck supple  No rigidity or tenderness  Right lower leg: Edema (  Trace edema bilateral lower extremities) present  Left lower leg: Edema present  Lymphadenopathy:      Cervical: No cervical adenopathy  Skin:     General: Skin is warm and dry  Coloration: Skin is not jaundiced or pale  Findings: No bruising, erythema, lesion or rash  Neurological:      General: No focal deficit present  Mental Status: He is alert and oriented to person, place, and time  Mental status is at baseline  Cranial Nerves: No cranial nerve deficit  Sensory: No sensory deficit  Motor: No weakness  Coordination: Coordination normal       Gait: Gait normal       Deep Tendon Reflexes: Reflexes normal    Psychiatric:         Mood and Affect: Mood normal          Behavior: Behavior normal          Thought Content:  Thought content normal          Judgment: Judgment normal

## 2021-07-14 NOTE — ASSESSMENT & PLAN NOTE
History chronic obstructive pulmonary disease  Admits that he continues to have some shortness of breath exertion  Acute exacerbations recently  He states that he uses his rescue inhaler intermittently but not on a daily basis  Knows to call us immediately his pulmonologist our office if any acute exacerbations    He is up-to-date of with influenza vaccine, COVID vaccine

## 2021-07-14 NOTE — ASSESSMENT & PLAN NOTE
Blood pressure showing relatively good control  Patient will continue present medication and surveillance  Patient is not an ACE-inhibitor or angiotensin receptor blocker which would be indicated with the patient with hypertension, diabetes heart disease  She I asked the patient discuss this with his cardiologist whether they feel this is appropriate medication for the patient this time    Renal function is stable

## 2021-07-14 NOTE — ASSESSMENT & PLAN NOTE
Patient does have history of some obstructive sleep apnea his CPAP device is on recall but he continues to use the 1 he had prescribed previously    He is awaiting   Replacement machine

## 2021-07-21 DIAGNOSIS — Z79.4 TYPE 2 DIABETES MELLITUS WITH OTHER CIRCULATORY COMPLICATION, WITH LONG-TERM CURRENT USE OF INSULIN (HCC): ICD-10-CM

## 2021-07-21 DIAGNOSIS — E11.59 TYPE 2 DIABETES MELLITUS WITH OTHER CIRCULATORY COMPLICATION, WITH LONG-TERM CURRENT USE OF INSULIN (HCC): ICD-10-CM

## 2021-07-21 RX ORDER — PEN NEEDLE, DIABETIC 31 GX5/16"
NEEDLE, DISPOSABLE MISCELLANEOUS 2 TIMES DAILY
Qty: 100 EACH | Refills: 5 | Status: SHIPPED | OUTPATIENT
Start: 2021-07-21 | End: 2022-02-11 | Stop reason: SDUPTHER

## 2021-08-24 ENCOUNTER — OFFICE VISIT (OUTPATIENT)
Dept: URGENT CARE | Age: 61
End: 2021-08-24
Payer: COMMERCIAL

## 2021-08-24 ENCOUNTER — TELEPHONE (OUTPATIENT)
Dept: INTERNAL MEDICINE CLINIC | Facility: CLINIC | Age: 61
End: 2021-08-24

## 2021-08-24 VITALS
HEART RATE: 84 BPM | TEMPERATURE: 97 F | DIASTOLIC BLOOD PRESSURE: 70 MMHG | OXYGEN SATURATION: 97 % | RESPIRATION RATE: 20 BRPM | SYSTOLIC BLOOD PRESSURE: 153 MMHG

## 2021-08-24 DIAGNOSIS — M54.50 CHRONIC BILATERAL LOW BACK PAIN WITHOUT SCIATICA: ICD-10-CM

## 2021-08-24 DIAGNOSIS — M54.50 ACUTE LOW BACK PAIN WITHOUT SCIATICA, UNSPECIFIED BACK PAIN LATERALITY: Primary | ICD-10-CM

## 2021-08-24 DIAGNOSIS — G89.29 CHRONIC BILATERAL LOW BACK PAIN WITHOUT SCIATICA: ICD-10-CM

## 2021-08-24 PROCEDURE — 96372 THER/PROPH/DIAG INJ SC/IM: CPT | Performed by: FAMILY MEDICINE

## 2021-08-24 PROCEDURE — 99213 OFFICE O/P EST LOW 20 MIN: CPT | Performed by: FAMILY MEDICINE

## 2021-08-24 RX ORDER — METHOCARBAMOL 750 MG/1
750 TABLET, FILM COATED ORAL EVERY 6 HOURS PRN
Qty: 20 TABLET | Refills: 0 | Status: SHIPPED | OUTPATIENT
Start: 2021-08-24 | End: 2021-08-26 | Stop reason: SDUPTHER

## 2021-08-24 RX ORDER — HYDROCODONE BITARTRATE AND ACETAMINOPHEN 5; 300 MG/1; MG/1
1 TABLET ORAL EVERY 6 HOURS PRN
Qty: 112 TABLET | Refills: 0 | Status: SHIPPED | OUTPATIENT
Start: 2021-08-24 | End: 2021-11-29 | Stop reason: SDUPTHER

## 2021-08-24 RX ORDER — KETOROLAC TROMETHAMINE 30 MG/ML
30 INJECTION, SOLUTION INTRAMUSCULAR; INTRAVENOUS ONCE
Status: COMPLETED | OUTPATIENT
Start: 2021-08-24 | End: 2021-08-24

## 2021-08-24 RX ADMIN — KETOROLAC TROMETHAMINE 30 MG: 30 INJECTION, SOLUTION INTRAMUSCULAR; INTRAVENOUS at 14:26

## 2021-08-24 NOTE — TELEPHONE ENCOUNTER
Patient is having back pain and I scheduled him on Thursday and if you have any other suggestions he can be reached at [phone]    Thank you

## 2021-08-24 NOTE — PATIENT INSTRUCTIONS
Heat for 20-30 minutes every 2-3 hours  Follow up with PCP in 2-3 days  Proceed to  ER if symptoms worsen  Acute Low Back Pain   AMBULATORY CARE:   Acute low back pain  is sudden discomfort that lasts up to 6 weeks and makes activity difficult  Common symptoms include the following:   · Back stiffness or spasms    · Pain down the back or side of one leg    · Holding yourself in an unusual position or posture to decrease your back pain    · Not being able to find a sitting position that is comfortable    · Slow increase in your pain for 24 to 48 hours after you stress your back    · Tenderness on your lower back or severe pain when you move your back    Seek care immediately if:   · You have severe pain  · You have sudden stiffness and heaviness on both buttocks down to both legs  · You have numbness or weakness in one leg, or pain in both legs  · You have numbness in your genital area or across your lower back  · You cannot control your urine or bowel movements  Call your doctor if:   · You have a fever  · You have pain at night or when you rest     · Your pain does not get better with treatment  · You have pain that worsens when you cough or sneeze  · You suddenly feel something pop or snap in your back  · You have questions or concerns about your condition or care  The goal of treatment for acute low back pain  is to relieve your pain and help you be able to do your regular activities  Most people with acute lower back pain get better within 4 to 6 weeks  You may need any of the following:  · NSAIDs , such as ibuprofen, help decrease swelling, pain, and fever  This medicine is available with or without a doctor's order  NSAIDs can cause stomach bleeding or kidney problems in certain people  If you take blood thinner medicine, always ask your healthcare provider if NSAIDs are safe for you  Always read the medicine label and follow directions      · Acetaminophen  decreases pain and fever  It is available without a doctor's order  Ask how much to take and how often to take it  Follow directions  Read the labels of all other medicines you are using to see if they also contain acetaminophen, or ask your doctor or pharmacist  Acetaminophen can cause liver damage if not taken correctly  Do not use more than 4 grams (4,000 milligrams) total of acetaminophen in one day  · Muscle relaxers  decrease pain by relaxing the muscles in your lower spine  · Prescription pain medicine  may be given  Ask your healthcare provider how to take this medicine safely  Some prescription pain medicines contain acetaminophen  Do not take other medicines that contain acetaminophen without talking to your healthcare provider  Too much acetaminophen may cause liver damage  Prescription pain medicine may cause constipation  Ask your healthcare provider how to prevent or treat constipation  Manage your symptoms:   · Stay active  as much as you can without causing more pain  Bed rest could make your back pain worse  Start with some light exercises such as walking  Avoid heavy lifting until your pain is gone  Ask for more information about the activities or exercises that are right for you  · Apply heat  on your back for 20 to 30 minutes every 2 hours for as many days as directed  Heat helps decrease pain and muscle spasms  Alternate heat and ice  · Apply ice  on your back for 15 to 20 minutes every hour or as directed  Use an ice pack, or put crushed ice in a plastic bag  Cover it with a towel before you apply it to your skin  Ice helps prevent tissue damage and decreases swelling and pain  Prevent acute low back pain:   · Use proper body mechanics  ? Bend at the hips and knees when you  objects  Do not bend from the waist  Use your leg muscles as you lift the load  Do not use your back  Keep the object close to your chest as you lift it   Try not to twist or lift anything above your waist     ? Change your position often when you stand for long periods of time  Rest one foot on a small box or footrest, and then switch to the other foot often  ? Try not to sit for long periods of time  When you do, sit in a straight-backed chair with your feet flat on the floor  Never reach, pull, or push while you are sitting  · Do exercises that strengthen your back muscles  Warm up before you exercise  Ask your healthcare provider the best exercises for you  · Maintain a healthy weight  Ask your healthcare provider what a healthy weight is for you  Ask him or her to help you create a weight loss plan if you are overweight  Follow up with your doctor as directed:  Return for a follow-up visit if you still have pain after 1 to 3 weeks of treatment  You may need to visit an orthopedist if your back pain lasts longer than 12 weeks  Write down your questions so you remember to ask them during your visits  © [employer] 2021 Information is for End User's use only and may not be sold, redistributed or otherwise used for commercial purposes  All illustrations and images included in CareNotes® are the copyrighted property of [employer]  or [employer]   The above information is an  only  It is not intended as medical advice for individual conditions or treatments  Talk to your doctor, nurse or pharmacist before following any medical regimen to see if it is safe and effective for you

## 2021-08-24 NOTE — PROGRESS NOTES
[facility]        NAME: Oscar Leigh is a 61 y o  male  : 1960    MRN: [id number]  DATE: 2021  TIME: 2:23 PM    Assessment and Plan   Acute low back pain without sciatica, unspecified back pain laterality [M54 5]  1  Acute low back pain without sciatica, unspecified back pain laterality  ketorolac (TORADOL) injection 30 mg    methocarbamol (ROBAXIN) 750 mg tablet         Patient Instructions       Heat for 20-30 minutes every 2-3 hours  Follow up with PCP in 2-3 days  Proceed to  ER if symptoms worsen  Chief Complaint     Chief Complaint   Patient presents with    Back Pain     pt states has chronic pain, lower back has been pretty good for 1 yr but started with pain again last week, no known injury  Pt states taking Vicodin for pain but not helping, could not get in to see PCP and was told to be seen at urgent care         History of Present Illness       Back Pain (pt states has chronic pain, lower back has been pretty good for 1 yr but started with pain again last week, no known injury  Pt states taking Vicodin for pain but not helping, could not get in to see PCP and was told to be seen at urgent care)    Patient has a chronic history of low back pain for which he has seen Pain Management in the past   It has been over a year since he has seen pain management  This latest flare began about a week ago and slowly worsened over the past week  He is unable to take anti-inflammatories because of his diabetes and kidney issues  He did have some Vicodin at home so he has been taking that which has been allowing him to just barely get around  He has used muscle relaxants in the past but has not had any  Patient has an appointment with his PCP in 2 days  Back Pain        Review of Systems   Review of Systems   Constitutional: Negative  Respiratory: Negative  Cardiovascular: Negative  Musculoskeletal: Positive for back pain           Current Medications       Current Outpatient Medications:     acetaminophen (TYLENOL) 325 mg tablet, Take 650 mg by mouth every 6 (six) hours as needed for mild pain, Disp: , Rfl:     albuterol (PROVENTIL HFA,VENTOLIN HFA) 90 mcg/act inhaler, Inhale 90 puffs as needed, Disp: , Rfl:     amoxicillin (AMOXIL) 500 mg capsule, , Disp: , Rfl:     aspirin (ECOTRIN LOW STRENGTH) 81 mg EC tablet, aspirin 81 mg tablet,delayed release, Disp: , Rfl:     atorvastatin (LIPITOR) 40 mg tablet, TAKE 1 TABLET BY MOUTH EVERY DAY, Disp: 90 tablet, Rfl: 2    B-D UF III MINI PEN NEEDLES 31G X 5 MM MISC, INJECT AS DIRECTED 2 (TWO) TIMES A DAY USE AS DIRECTED, Disp: 100 each, Rfl: 5    clopidogrel (PLAVIX) 75 mg tablet, Take 1 tablet (75 mg total) by mouth daily, Disp: 90 tablet, Rfl: 3    fenofibrate (TRICOR) 145 mg tablet, TAKE 1 TABLET BY MOUTH EVERY DAY, Disp: 90 tablet, Rfl: 1    furosemide (LASIX) 40 mg tablet, Take 20 mg by mouth, Disp: , Rfl:     HYDROcodone-acetaminophen (Vicodin) 5-300 MG per tablet, Take 1 tablet by mouth every 6 (six) hours as needed for moderate painMax Daily Amount: 4 tablets, Disp: 112 tablet, Rfl: 0    insulin glargine (LANTUS) 100 units/mL subcutaneous injection, Inject 30 Units under the skin 4 (four) times a day Inject 2 times in the morning and 2 times in the night, Disp: 30 mL, Rfl: 3    Jardiance 25 MG TABS, TAKE 1 TABLET BY MOUTH EVERY DAY, Disp: 90 tablet, Rfl: 1    methocarbamol (ROBAXIN) 750 mg tablet, Take 1 tablet (750 mg total) by mouth every 6 (six) hours as needed for muscle spasms, Disp: 20 tablet, Rfl: 0    metoprolol tartrate (LOPRESSOR) 50 mg tablet, Take 25 mg by mouth 2 (two) times a day, Disp: , Rfl:     nitroglycerin (NITROSTAT) 0 3 mg SL tablet, Place under the tongue, Disp: , Rfl:     ONE TOUCH ULTRA TEST test strip, USE TO TEST 4 TIMES A DAY, Disp: 200 each, Rfl: 3    pantoprazole (PROTONIX) 40 mg tablet, TAKE 1 TABLET BY MOUTH EVERY DAY, Disp: 90 tablet, Rfl: 5    Current Facility-Administered Medications:     ketorolac (TORADOL) injection 30 mg, 30 mg, Intramuscular, Once, Fransisca Barley, DO    Current Allergies     Allergies as of 08/24/2021 - Reviewed 08/24/2021   Allergen Reaction Noted    Cefaclor Shortness Of Breath     Simvastatin Shortness Of Breath             The following portions of the patient's history were reviewed and updated as appropriate: allergies, current medications, past family history, past medical history, past social history, past surgical history and problem list      Past Medical History:   Diagnosis Date    Abscess of left thigh     [facility] infarction ([facility] )     Anesthesia     woke up during procedure    Anxiety     Arteriosclerotic cardiovascular disease     Asthma     Chest pain     Class 2 obesity with alveolar hypoventilation and body mass index (BMI) of 36 0 to 36 9 in [facility])     COPD (chronic obstructive pulmonary disease) ([place] [address] )     Coronary artery disease     Diabetes mellitus ([place] [address] )     Fatty liver     Gastric ulcer     GERD (gastroesophageal reflux disease)     Hyperlipidemia     Hypertension     Low back pain     Myocardial infarction ([place] [address] ) 6580,8499    Obstructive sleep apnea     no Cpap    Spondylosis of lumbar region without myelopathy or radiculopathy        Past Surgical History:   Procedure Laterality Date    CARPAL TUNNEL RELEASE Right     COLONOSCOPY      CORONARY ANGIOPLASTY WITH STENT PLACEMENT  2006, 2007    EGD      HEMORRHOID SURGERY      NEUROPLASTY / TRANSPOSITION MEDIAN NERVE AT CARPAL TUNNEL Right     UT SHLDR ARTHROSCOP,LYSE ADHESNS Left 6/10/2020    Procedure: SHOULDER ARTHROSCOPIC CAPSULAR RELEASE;  Surgeon: Andra Gomez MD;  Location: [facility];  Service: Orthopedics       Family History   Problem Relation Age of Onset    Alzheimer's disease Mother     Heart failure Mother     Dementia Mother     Heart attack Father     Other Father         Cardiac Disorder    Other Family         Back Pain  Hypertension Family     Stroke Family         Complications    Cancer Other          Medications have been verified  Objective   /70   Pulse 84   Temp (!) 97 °F (36 1 °C)   Resp 20   SpO2 97%   No LMP for male patient  Physical Exam     Physical Exam  Vitals and nursing note reviewed  Constitutional:       Appearance: Normal appearance  He is well-developed  Cardiovascular:      Rate and Rhythm: Normal rate and regular rhythm  Pulmonary:      Effort: Pulmonary effort is normal       Breath sounds: Normal breath sounds  Musculoskeletal:      Lumbar back: Spasms and tenderness present  Negative right straight leg raise test and negative left straight leg raise test       Comments:   Lumbar spine with flexion to approximately 30, unable to fully extend  Past 0  Pain to palpation of the paravertebral muscles bilaterally around T12 through L3  Neurological:      Mental Status: He is alert

## 2021-08-26 ENCOUNTER — OFFICE VISIT (OUTPATIENT)
Dept: INTERNAL MEDICINE CLINIC | Facility: CLINIC | Age: 61
End: 2021-08-26
Payer: COMMERCIAL

## 2021-08-26 VITALS
SYSTOLIC BLOOD PRESSURE: 130 MMHG | TEMPERATURE: 98.3 F | WEIGHT: 285.6 LBS | HEART RATE: 81 BPM | BODY MASS INDEX: 45.9 KG/M2 | DIASTOLIC BLOOD PRESSURE: 76 MMHG | RESPIRATION RATE: 14 BRPM | HEIGHT: 66 IN | OXYGEN SATURATION: 96 %

## 2021-08-26 DIAGNOSIS — M54.50 CHRONIC BILATERAL LOW BACK PAIN WITHOUT SCIATICA: Primary | ICD-10-CM

## 2021-08-26 DIAGNOSIS — Z79.4 TYPE 2 DIABETES MELLITUS WITHOUT COMPLICATION, WITH LONG-TERM CURRENT USE OF INSULIN (HCC): ICD-10-CM

## 2021-08-26 DIAGNOSIS — E11.9 TYPE 2 DIABETES MELLITUS WITHOUT COMPLICATION, WITH LONG-TERM CURRENT USE OF INSULIN (HCC): ICD-10-CM

## 2021-08-26 DIAGNOSIS — G89.29 CHRONIC BILATERAL LOW BACK PAIN WITHOUT SCIATICA: Primary | ICD-10-CM

## 2021-08-26 DIAGNOSIS — M54.50 ACUTE LOW BACK PAIN WITHOUT SCIATICA, UNSPECIFIED BACK PAIN LATERALITY: ICD-10-CM

## 2021-08-26 PROCEDURE — 99214 OFFICE O/P EST MOD 30 MIN: CPT | Performed by: NURSE PRACTITIONER

## 2021-08-26 PROCEDURE — 1036F TOBACCO NON-USER: CPT | Performed by: NURSE PRACTITIONER

## 2021-08-26 PROCEDURE — 3008F BODY MASS INDEX DOCD: CPT | Performed by: NURSE PRACTITIONER

## 2021-08-26 RX ORDER — METHOCARBAMOL 750 MG/1
750 TABLET, FILM COATED ORAL EVERY 6 HOURS PRN
Qty: 20 TABLET | Refills: 0 | Status: SHIPPED | OUTPATIENT
Start: 2021-08-26 | End: 2021-11-15 | Stop reason: ALTCHOICE

## 2021-08-26 RX ORDER — PREDNISONE 10 MG/1
TABLET ORAL
Qty: 21 TABLET | Refills: 0 | Status: SHIPPED | OUTPATIENT
Start: 2021-08-26 | End: 2021-11-15 | Stop reason: ALTCHOICE

## 2021-08-26 RX ORDER — MEDICAL SUPPLY, MISCELLANEOUS
EACH MISCELLANEOUS
COMMUNITY
Start: 2021-07-21 | End: 2021-08-26 | Stop reason: SDUPTHER

## 2021-08-26 RX ORDER — LOSARTAN POTASSIUM 25 MG/1
TABLET ORAL
COMMUNITY
Start: 2021-07-15

## 2021-08-26 RX ORDER — IBUPROFEN 600 MG/1
TABLET ORAL
COMMUNITY
End: 2021-11-15 | Stop reason: ALTCHOICE

## 2021-08-26 RX ORDER — ICOSAPENT ETHYL 1000 MG/1
CAPSULE ORAL
COMMUNITY
Start: 2021-07-15

## 2021-08-26 NOTE — ASSESSMENT & PLAN NOTE
Acute on chronic low back pain for about a week  Treated at urgent care with toradol and robaxin in addition to his prn vicodin  He is using the vicodin q8, robaxin q6 and still in severe pain  Exam is limited due to his pain and poor range of motion but he does have tenderness to palpation over the R low back  Will treat with a prednisone taper, pt advised to pay close attention to his sugars while on the prednisone  Can continue with prn vicodin as directed  Referred for evaluation with PT    He should call if sx worsen or persist

## 2021-08-26 NOTE — ASSESSMENT & PLAN NOTE
Diabetes is under good control on current treatment with insulin and jardiance  Pt aware that use of prednisone will cause a rise in blood sugar while on the medication and he knows how to adjust his inuslin in response to his sugar  He should be attentive to his intake of carbs while on the prednisone    Lab Results   Component Value Date    HGBA1C 5 7 (H) 06/28/2021

## 2021-08-26 NOTE — PROGRESS NOTES
Assessment/Plan:    Type 2 diabetes mellitus, with long-term current use of insulin ([facility])  Diabetes is under good control on current treatment with insulin and jardiance  Pt aware that use of prednisone will cause a rise in blood sugar while on the medication and he knows how to adjust his inuslin in response to his sugar  He should be attentive to his intake of carbs while on the prednisone  Lab Results   Component Value Date    HGBA1C 5 7 (H) 06/28/2021       Low back pain  Acute on chronic low back pain for about a week  Treated at urgent care with toradol and robaxin in addition to his prn vicodin  He is using the vicodin q8, robaxin q6 and still in severe pain  Exam is limited due to his pain and poor range of motion but he does have tenderness to palpation over the R low back  Will treat with a prednisone taper, pt advised to pay close attention to his sugars while on the prednisone  Can continue with prn vicodin as directed  Referred for evaluation with PT  He should call if sx worsen or persist          Diagnoses and all orders for this visit:    Chronic bilateral low back pain without sciatica  -     predniSONE 10 mg tablet; Take 4 tabs x 2 days, 3 tabs x 2 days, 2 tabs x 2 days, 1 tab x 2 days 1/2 tab x 2 days  Take with food  -     Ambulatory referral to Physical Therapy; Future    Acute low back pain without sciatica, unspecified back pain laterality  -     methocarbamol (ROBAXIN) 750 mg tablet; Take 1 tablet (750 mg total) by mouth every 6 (six) hours as needed for muscle spasms    Type 2 diabetes mellitus without complication, with long-term current use of insulin ([facility])    Other orders  -     Discontinue: [facility] Aspirin EC 81 MG EC tablet; TAKE 1 TABLET BY MOUTH EVERY DAY  -     ibuprofen (MOTRIN) 600 mg tablet; ibuprofen 600 mg tablet   TAKE 1 TABLET BY MOUTH 4 TIMES A DAY WITH FOOD  -     Icosapent Ethyl (Vascepa) 1 g CAPS; Vascepa 1 gram capsule   TAKE 2 G BY MOUTH 2 (TWO) TIMES A DAY    - losartan (COZAAR) 25 mg tablet; losartan 25 mg tablet   TAKE 1 TABLET BY MOUTH EVERY DAY          Subjective:      Patient ID: Shayne Valencia is a 61 y o  male  Pt is a 61 y o  y/o male who is seen today for evaluation of back pain  PMH includes DM, COPD, FREDY, HTN, CVD, lumbar radiculopathy, myofascial pain syndrome, sacroiliitis, lumbar spondylosisi, obsiety  He states his chronic back pain had been under good control for the past year but started last week with low back pain, R>L  Pain radiates to the R hip  Normally he uses vicodin as needed  He was seen at urgent care on Tuesday 8/24 because pain not controlled on oral meds  He was treated with a toradol injection and an rx for methocarbamol  He states the toradol injection provided about 24 hours of relief  He is currently using the Vicodin every 8 hours and robaxin q6 and this provides some relief  He uses icy hot as needed as well  Today he rates pain 8/10  Pain increases with sitting for too long, bending, twisting, changing position  Laying in bed is occasionally relieving  Denies loss of bowel or bladder, numbness, tingling, weakness  He states that in the past he has done PT and seen pain management and had epidural injections  Sugars have been under good control, says fasting sugars are usually around 110  The following portions of the patient's history were reviewed and updated as appropriate: allergies, current medications, past family history, past medical history, past social history, past surgical history and problem list     Review of Systems   Constitutional: Positive for activity change  Negative for appetite change, chills, fatigue and fever  Respiratory: Negative for shortness of breath  Cardiovascular: Positive for leg swelling  Negative for chest pain and palpitations  Genitourinary: Negative for difficulty urinating, dysuria, frequency and hematuria     Musculoskeletal: Positive for arthralgias, back pain and gait problem  Negative for joint swelling, myalgias and neck pain  Skin: Negative for color change, rash and wound  Neurological: Negative for dizziness, weakness, numbness and headaches  Objective:      /76   Pulse 81   Temp 98 3 °F (36 8 °C)   Resp 14   Ht 5' 6" (1 676 m)   Wt 130 kg (285 lb 9 6 oz)   SpO2 96%   BMI 46 10 kg/m²          Physical Exam  Vitals reviewed  Constitutional:       General: He is awake  He is not in acute distress  Appearance: Normal appearance  He is well-developed and well-groomed  He is obese  He is not ill-appearing  Cardiovascular:      Rate and Rhythm: Normal rate and regular rhythm  Pulses: Normal pulses  Heart sounds: Normal heart sounds  No murmur heard  Pulmonary:      Effort: Pulmonary effort is normal       Breath sounds: Normal breath sounds  Musculoskeletal:      Cervical back: Normal range of motion  Lumbar back: Spasms and tenderness present  No bony tenderness  Decreased range of motion  Right lower le+ Pitting Edema present  Left lower le+ Pitting Edema present  Comments: Tender to palpation of R upper lumbar region with poor range of motion  Ambulating slowly  Exam is limited by pain  Skin:     General: Skin is warm and dry  Findings: No erythema or rash  Neurological:      Mental Status: He is alert and oriented to person, place, and time  Motor: No abnormal muscle tone  Psychiatric:         Attention and Perception: Attention normal          Mood and Affect: Mood normal          Speech: Speech normal          Behavior: Behavior normal  Behavior is cooperative  Thought Content:  Thought content normal          Judgment: Judgment normal

## 2021-08-27 DIAGNOSIS — E78.00 PURE HYPERCHOLESTEROLEMIA: ICD-10-CM

## 2021-08-27 RX ORDER — FENOFIBRATE 145 MG/1
TABLET, COATED ORAL
Qty: 90 TABLET | Refills: 1 | Status: SHIPPED | OUTPATIENT
Start: 2021-08-27 | End: 2022-02-21

## 2021-09-01 ENCOUNTER — EVALUATION (OUTPATIENT)
Dept: PHYSICAL THERAPY | Facility: REHABILITATION | Age: 61
End: 2021-09-01
Payer: COMMERCIAL

## 2021-09-01 DIAGNOSIS — M54.50 CHRONIC BILATERAL LOW BACK PAIN WITHOUT SCIATICA: Primary | ICD-10-CM

## 2021-09-01 DIAGNOSIS — G89.29 CHRONIC BILATERAL LOW BACK PAIN WITHOUT SCIATICA: Primary | ICD-10-CM

## 2021-09-01 PROCEDURE — 97161 PT EVAL LOW COMPLEX 20 MIN: CPT | Performed by: PHYSICAL THERAPIST

## 2021-09-01 PROCEDURE — 97110 THERAPEUTIC EXERCISES: CPT | Performed by: PHYSICAL THERAPIST

## 2021-09-01 NOTE — PROGRESS NOTES
PT Evaluation     Today's date: 2021  Patient name: Soo Malloy  : 1960  MRN: [id number]  Referring provider: KIMBERLEE Lanier  Dx:   Encounter Diagnosis     ICD-10-CM    1  Chronic bilateral low back pain without sciatica  M54 5 Ambulatory referral to Physical Therapy    G89 29 PT plan of care cert/re-cert       Start Time: 1430  Stop Time: 1515  Total time in clinic (min): 45 minutes    Assessment/Plan     Soo Malloy is a 61 y o  male who was referred to physical therapy for management of acute on chronic low back pain   Primary impairments include poor lumbar mobility,   Consequently, patient has difficulty completing ADLs including washing dishes, dressing, sit to stand transitions  Wyona Ar would benefit from skilled intervention to address all deficits and improve functional capability  Patient is a good candidate for therapy, pending compliance with HEP and 2x weekly participation  Thank you for the referral and please do not hesitate to contact me with any questions or concerns regarding Libans care! Plan  Frequency: 2x per week   Duration in visits: 16  Duration in weeks: 8   Therapeutic exercise/activity, neuromuscular reeducation, manual therapy, and modalities  Patient understands and agrees to plan of care  Goals  Short Term--4 weeks  1  Report of intermittent pain with 2 point decrease in pain at its worst    2  25% increase in lumbar active range of motion  3  1/2 point increase in dorsiflexion/hip flexion MMT strength  Long Term--By Discharge  1  Patient will achieve expected FOTO score  2  Patient will be able to wash his dishes without exacerbation of low back pain  3  Patient will stand without restriction  Patient's Goal: Decrease pain  Subjective  History   Date of Onset: 10 years of chronic pain with acute exacerbation 2 weeks ago Description: Patient reports onset of low back pain after doing some heavy yardwork    He went to a [facility] soon after where he was provided an injection of Torodol that provided very temporary relief  He was also prescribed Robaxin that partially alleviated sx  Last week he went to his family practice and was given a prescription for Prednisone, which provided significant relief but has felt like sx may be worsening as he begins to taper the steroid  Symptoms  Constant R > L sided low back pain that extends into the buttock that he describes as achy but can become sharp when exacerbated  Patient denies any numbness/paresthesia  Aggravating factors include coughing/sneezing, getting up/sitting down, bending forward (I e  washing the dishes), standing for prolonged periods of time  Feels best lying down  Pain at best: 7  Pain at worst: [address] lives independently  Patient is retired  Objective     GAIT: Slow with decreased trunk movement    Lumbar  % of normal   Flex  25% w/ P! And aberrant movement   Extn  50%   SB Left 50%   SB Right 50% P!   ROT Left 75%   ROT Right 75%   Unable to perform repetitive flexion// Prone lying slightly decreased pain but press-ups peripheralized sx  MMT    Hip       L       R   Flex  4+ 4-   Extn  3- 3-   Abd  3- 3-                   MMT    Knee         L        R   Flex  5 5   Extn  5 5                MMT    Ankle       L        R   PF 5 5   DF  5 4-   EHL 5 5   Inv  5 5   Ever  5 5     Posture: Right shift (hips to the left)   Palpation: (+) TTP and hypomobility lumbar spine  Dermatome: (pinprick- L/R):  intact     Slump test: L=  NEG    R=  NEG     Straight leg raise:   L=  NEG    R=  NEG          Precautions: Extensive PMH (please see chart for full list) but pertinent history includes CHF, hypertension, GERD, DM, and anxiety         Manuals 9/1            Prone lumbar PAs  *           R shift correction  *                                     Neuro Re-Ed             TVA             TVA+hip ABD             TVA+hip ADD TVA+march                          HEP-core exercises  *                        Ther Ex             Prone lying 5' HEP 5x day On wedge?            Pt education  Log roll, supported sitting, periph/central principle           NuStep/bike                                                                                           Ther Activity                                       Gait Training                                       Modalities

## 2021-09-08 ENCOUNTER — OFFICE VISIT (OUTPATIENT)
Dept: PHYSICAL THERAPY | Facility: REHABILITATION | Age: 61
End: 2021-09-08
Payer: COMMERCIAL

## 2021-09-08 DIAGNOSIS — M54.50 CHRONIC BILATERAL LOW BACK PAIN WITHOUT SCIATICA: Primary | ICD-10-CM

## 2021-09-08 DIAGNOSIS — G89.29 CHRONIC BILATERAL LOW BACK PAIN WITHOUT SCIATICA: Primary | ICD-10-CM

## 2021-09-08 PROCEDURE — 97110 THERAPEUTIC EXERCISES: CPT

## 2021-09-08 PROCEDURE — 97112 NEUROMUSCULAR REEDUCATION: CPT

## 2021-09-08 NOTE — PROGRESS NOTES
Daily Note     Today's date: 2021  Patient name: Oksana Guan  : 1960  MRN: [id number]  Referring provider: KIMBERLEE Wells  Dx:   Encounter Diagnosis     ICD-10-CM    1  Chronic bilateral low back pain without sciatica  M54 5     G89 29                   Subjective: Pt reported increased pain and soreness, especially with sit to stand and prolonged standing  He also noted he ambulates with antalgic gait  Objective: See treatment diary below      Assessment: Tolerated treatment fair  Patient demonstrated fatigue post treatment and exhibited good technique with therapeutic exercises  VC's needed for correct technique throughout initiation of session  Discomfort noted with exercises but able to perform to his tolerance  Monitor symptoms NV  Plan: Continue per plan of care  Precautions: Extensive PMH (please see chart for full list) but pertinent history includes CHF, hypertension, GERD, DM, and anxiety  Manuals            Prone lumbar Pas    L4-5 gr  3  TP 5'           R shift correction                                       Neuro Re-Ed             TVA  5"x2'           TVA+hip ABD  gtb 5"x20           TVA+hip ADD  5"x20           TVA+march and ext  10 ea             pball rollouts  10"x10 flex   10"x5 R+L           HEP-core exercises                          Ther Ex             Prone lying 5' HEP 5x day 2'           Pt education  Log roll, supported sitting, periph/central principle           NuStep/bike  nustep L3x10'                                                                                         Ther Activity                                       Gait Training                                       Modalities

## 2021-09-14 ENCOUNTER — OFFICE VISIT (OUTPATIENT)
Dept: PHYSICAL THERAPY | Facility: REHABILITATION | Age: 61
End: 2021-09-14
Payer: COMMERCIAL

## 2021-09-14 DIAGNOSIS — G89.29 CHRONIC BILATERAL LOW BACK PAIN WITHOUT SCIATICA: Primary | ICD-10-CM

## 2021-09-14 DIAGNOSIS — M54.50 CHRONIC BILATERAL LOW BACK PAIN WITHOUT SCIATICA: Primary | ICD-10-CM

## 2021-09-14 PROCEDURE — 97112 NEUROMUSCULAR REEDUCATION: CPT

## 2021-09-14 PROCEDURE — 97110 THERAPEUTIC EXERCISES: CPT

## 2021-09-14 NOTE — PROGRESS NOTES
Daily Note     Today's date: 2021  Patient name: Bela Hamilton  : 1960  MRN: [id number]  Referring provider: KIMBERLEE Arriaga  Dx:   Encounter Diagnosis     ICD-10-CM    1  Chronic bilateral low back pain without sciatica  M54 5     G89 29                   Subjective: Pt reported no changes in symptoms LV  Pt noted discomfort with sitting and standing; especially if he is sitting or standing for prolonged periods of time  Objective: See treatment diary below      Assessment: Tolerated treatment fair  Patient demonstrated fatigue post treatment and exhibited good technique with therapeutic exercises  VC's needed for correct technique throughout session  Discomfort with performance  Monitor NV  Plan: Continue per plan of care  Precautions: Extensive PMH (please see chart for full list) but pertinent history includes CHF, hypertension, GERD, DM, and anxiety  Manuals           Prone lumbar Pas    L4-5 gr  3  TP 5' np          R shift correction                                       Neuro Re-Ed             TVA  5"x2' 5"x2'          TVA+hip ABD  gtb 5"x20 gtb 5"x20          TVA+hip ADD  5"x20 5"x20          TVA+march and ext  10 ea  10 ea  pball rollouts  10"x10 flex   10"x5 R+L 10"x10 flex  10"x5 R+L   HEP-core exercises                          Ther Ex             Prone lying 5' HEP 5x day 2'           Pt education  Log roll, supported sitting, periph/central principle           NuStep/bike  nustep L3x10' Bike x10'          Std SLR x3   x15 ea  Mini squat             Std ham curl   10 ea  Std marching alt   10 ea            Sit to stand with biodex foam   2x5                       Ther Activity                                       Gait Training                                       Modalities

## 2021-09-16 ENCOUNTER — OFFICE VISIT (OUTPATIENT)
Dept: PHYSICAL THERAPY | Facility: REHABILITATION | Age: 61
End: 2021-09-16
Payer: COMMERCIAL

## 2021-09-16 DIAGNOSIS — M54.50 CHRONIC BILATERAL LOW BACK PAIN WITHOUT SCIATICA: Primary | ICD-10-CM

## 2021-09-16 DIAGNOSIS — G89.29 CHRONIC BILATERAL LOW BACK PAIN WITHOUT SCIATICA: Primary | ICD-10-CM

## 2021-09-16 PROCEDURE — 97110 THERAPEUTIC EXERCISES: CPT

## 2021-09-16 PROCEDURE — 97140 MANUAL THERAPY 1/> REGIONS: CPT

## 2021-09-16 PROCEDURE — 97112 NEUROMUSCULAR REEDUCATION: CPT

## 2021-09-16 NOTE — PROGRESS NOTES
Daily Note     Today's date: 2021  Patient name: Eric Tai  : 1960  MRN: [id number]  Referring provider: KIMBERLEE Chase  Dx:   Encounter Diagnosis     ICD-10-CM    1  Chronic bilateral low back pain without sciatica  M54 5     G89 29                    Subjective: Pt reports pain in low back as a 5/10       Objective: See treatment diary below      Assessment: Tolerated treatment well  He is given verbal and cues on proper reps/form with exercises completed this session  He requires intermittent rest breaks and cues to ensure proper breathing throughout session  He reports no increase in sx with exercises completed this session  Patient would benefit from continued PT      Plan: Continue per plan of care  Precautions: Extensive PMH (please see chart for full list) but pertinent history includes CHF, hypertension, GERD, DM, and anxiety  Manuals          Prone lumbar Pas    L4-5 gr  3  TP 5' np L4-5 Grade 2-3  10'         R shift correction                                       Neuro Re-Ed             TVA  5"x2' 5"x2' 5" x2         TVA+hip ABD  gtb 5"x20 gtb 5"x20 gtb 5"x20         TVA+hip ADD  5"x20 5"x20 5"x20         TVA+march and ext  10 ea  10 ea  10 ea         pball rollouts  10"x10 flex   10"x5 R+L 10"x10 flex  10"x5 R+L  [id number] 10"x5 R+L         HEP-core exercises                          Ther Ex             Prone lying 5' HEP 5x day 2'           Pt education  Log roll, supported sitting, periph/central principle           NuStep/bike  nustep L3x10' Bike x10' Nustep L3x10'          Std SLR x3   x15 ea  x15         Mini squat             Std ham curl   10 ea  20 ea         Std marching alt   10 ea   10 ea         Sit to stand with biodex foam   2x5 2x5          Glute sets    2x10          Supine LTR    2x10          Ther Activity                                       Gait Training                                       Modalities

## 2021-09-21 ENCOUNTER — OFFICE VISIT (OUTPATIENT)
Dept: PHYSICAL THERAPY | Facility: REHABILITATION | Age: 61
End: 2021-09-21
Payer: COMMERCIAL

## 2021-09-21 DIAGNOSIS — M54.50 CHRONIC BILATERAL LOW BACK PAIN WITHOUT SCIATICA: Primary | ICD-10-CM

## 2021-09-21 DIAGNOSIS — G89.29 CHRONIC BILATERAL LOW BACK PAIN WITHOUT SCIATICA: Primary | ICD-10-CM

## 2021-09-21 PROCEDURE — 97110 THERAPEUTIC EXERCISES: CPT

## 2021-09-21 PROCEDURE — 97112 NEUROMUSCULAR REEDUCATION: CPT

## 2021-09-21 NOTE — PROGRESS NOTES
Daily Note     Today's date: 2021  Patient name: Nancie Min  : 1960  MRN: [id number]  Referring provider: KIMBERLEE Plata  Dx:   Encounter Diagnosis     ICD-10-CM    1  Chronic bilateral low back pain without sciatica  M54 5     G89 29                   Subjective: Pt reported feeling some discomfort today due to performing yard work yesterday  Objective: See treatment diary below      Assessment: Tolerated treatment well  Patient demonstrated fatigue post treatment and exhibited good technique with therapeutic exercises  VC's needed for correct technique throughout session  Held manuals; monitor NV  Feeling a little better post session  Plan: Continue per plan of care  Precautions: Extensive PMH (please see chart for full list) but pertinent history includes CHF, hypertension, GERD, DM, and anxiety  Manuals        Prone lumbar Pas    L4-5 gr  3  TP 5' np L4-5 Grade 2-3  10' np       R shift correction                                    Neuro Re-Ed            TVA  5"x2' 5"x2' 5" x2 5"x2'       TVA+hip ABD  gtb 5"x20 gtb 5"x20 gtb 5"x20 gtb 5"x20       TVA+hip ADD  5"x20 5"x20 5"x20 5"x20       TVA+march and ext  10 ea  10 ea  10 ea 10 ea  pball rollouts  10"x10 flex   10"x5 R+L 10"x10 flex  10"x5 R+L  10"[id number] 10"x5 R+L 10"x10 flex  10"x5 R+L       HEP-core exercises                        Ther Ex            Prone lying 5' HEP 5x day 2'          Pt education  Log roll, supported sitting, periph/central principle          NuStep/bike  nustep L3x10' Bike x10' Nustep L3x10'  nustep L5x10'       Std SLR x3   x15 ea  x15 15 ea  Mini squat            Std ham curl   10 ea  20 ea 20 ea  Std marching alt   10 ea  10 ea 10 ea         Sit to stand with biodex foam   2x5 2x5  2x5       Glute sets    2x10  5"x10       Supine LTR    2x10  hold       Ther Activity                                    Gait Training Modalities

## 2021-09-23 ENCOUNTER — OFFICE VISIT (OUTPATIENT)
Dept: PHYSICAL THERAPY | Facility: REHABILITATION | Age: 61
End: 2021-09-23
Payer: COMMERCIAL

## 2021-09-23 DIAGNOSIS — G89.29 CHRONIC BILATERAL LOW BACK PAIN WITHOUT SCIATICA: Primary | ICD-10-CM

## 2021-09-23 DIAGNOSIS — M54.50 CHRONIC BILATERAL LOW BACK PAIN WITHOUT SCIATICA: Primary | ICD-10-CM

## 2021-09-23 PROCEDURE — 97110 THERAPEUTIC EXERCISES: CPT | Performed by: PHYSICAL THERAPIST

## 2021-09-23 PROCEDURE — 97112 NEUROMUSCULAR REEDUCATION: CPT | Performed by: PHYSICAL THERAPIST

## 2021-09-23 NOTE — PROGRESS NOTES
Daily Note     Today's date: 2021  Patient name: Ousmane Ayala  : 1960  MRN: [id number]  Referring provider: KIMBERLEE Huynh  Dx:   Encounter Diagnosis     ICD-10-CM    1  Chronic bilateral low back pain without sciatica  M54 5     G89 29                   Subjective: Pt reports pain with the rainy weather today  Objective: See treatment diary below      Assessment: Tolerated treatment well  No increase in pain with TE performance  VC's required for correct technique  Patient would benefit from continued PT      Plan: Continue per plan of care  Precautions: Extensive PMH (please see chart for full list) but pertinent history includes CHF, hypertension, GERD, DM, and anxiety  Manuals       Prone lumbar Pas    L4-5 gr  3  TP 5' np L4-5 Grade 2-3  10' np L4-5 gr 3 TP 5'      R shift correction                                    Neuro Re-Ed            TVA  5"x2' 5"x2' 5" x2 5"x2' 5"x2'      TVA+hip ABD  gtb 5"x20 gtb 5"x20 gtb 5"x20 gtb 5"x20 gtb 5"x20      TVA+hip ADD  5"x20 5"x20 5"x20 5"x20 5"x20      TVA+march and ext  10 ea  10 ea  10 ea 10 ea  15ea      pball rollouts  10"x10 flex   10"x5 R+L 10"x10 flex  10"x5 R+L  10"[id number] 10"x5 R+L 10"x10 flex  10"x5 R+L np      HEP-core exercises            TA w/LPD      [id number]# 2x10      TA w/multifidus press      christy 9# x10ea      Ther Ex            Prone lying 5' HEP 5x day 2'          Pt education  Log roll, supported sitting, periph/central principle          NuStep/bike  nustep L3x10' Bike x10' Nustep L3x10'  nustep L5x10' NS L3x10'      Std SLR x3   x15 ea  x15 15 ea  15ea      Mini squat            Std ham curl   10 ea  20 ea 20 ea  np      Std marching alt   10 ea  10 ea 10 ea   15ea      Sit to stand with biodex foam   2x5 2x5  2x5 2x5      Glute sets    2x10  5"x10 5"x15      Supine LTR    2x10  hold       Ther Activity                                    Gait Training Modalities

## 2021-09-28 ENCOUNTER — OFFICE VISIT (OUTPATIENT)
Dept: PHYSICAL THERAPY | Facility: REHABILITATION | Age: 61
End: 2021-09-28
Payer: COMMERCIAL

## 2021-09-28 DIAGNOSIS — G89.29 CHRONIC BILATERAL LOW BACK PAIN WITHOUT SCIATICA: Primary | ICD-10-CM

## 2021-09-28 DIAGNOSIS — M54.50 CHRONIC BILATERAL LOW BACK PAIN WITHOUT SCIATICA: Primary | ICD-10-CM

## 2021-09-28 PROCEDURE — 97110 THERAPEUTIC EXERCISES: CPT

## 2021-09-28 PROCEDURE — 97112 NEUROMUSCULAR REEDUCATION: CPT

## 2021-09-28 NOTE — PROGRESS NOTES
Daily Note     Today's date: 2021  Patient name: Eric Tai  : 1960  MRN: [id number]  Referring provider: KIMBERLEE Chase  Dx:   Encounter Diagnosis     ICD-10-CM    1  Chronic bilateral low back pain without sciatica  M54 5     G89 29                   Subjective: Pt reported increased pain due to overdoing yardwork and household chores yesterday  Objective: See treatment diary below      Assessment: Tolerated treatment fair  Patient demonstrated fatigue post treatment and exhibited good technique with therapeutic exercises  VC's needed for correct technique throughout session  Discomfort noted throughout but able to perform to his tolerance  Some relief with manuals  Monitor NV  Plan: Continue per plan of care  Precautions: Extensive PMH (please see chart for full list) but pertinent history includes CHF, hypertension, GERD, DM, and anxiety  Manuals      Prone lumbar Pas    L4-5 gr  3  TP 5' np L4-5 Grade 2-3  10' np L4-5 gr 3 TP 5' L4-5 gr 3 MM 5'         R shift correction                                    Neuro Re-Ed            TVA  5"x2' 5"x2' 5" x2 5"x2' 5"x2' 5"x2'     TVA+hip ABD  gtb 5"x20 gtb 5"x20 gtb 5"x20 gtb 5"x20 gtb 5"x20 gtb 5"x20     TVA+hip ADD  5"x20 5"x20 5"x20 5"x20 5"x20 5"x20     TVA+march and ext  10 ea  10 ea  10 ea 10 ea  15ea 15 ea  pball rollouts  10"x10 flex   10"x5 R+L 10"x10 flex  10"x5 R+L  10"[id number] 10"x5 R+L 10"x10 flex  10"x5 R+L np np     HEP-core exercises            TA w/LPD      [id number]# 2x10 christy 12# 2x10     TA w/multifidus press      christy 9# Cleatis Gould 9#  3"x10 ea  Ther Ex            Prone lying 5' HEP 5x day 2'          Pt education  Log roll, supported sitting, periph/central principle          NuStep/bike  nustep L3x10' Bike x10' Nustep L3x10'  nustep L5x10' NS L3x10' NS L4x10'     Std SLR x3   x15 ea  x15 15 ea  15ea 15 ea  Mini squat            Std ham curl   10 ea  20 ea 20 ea  np      Std marching alt   10 ea  10 ea 10 ea  15ea 15 ea       Sit to stand with biodex foam   2x5 2x5  2x5 2x5 2x5     Glute sets    2x10  5"x10 5"x15 5"x15     Supine LTR    2x10  hold       Ther Activity                                    Gait Training                                    Modalities

## 2021-09-30 ENCOUNTER — OFFICE VISIT (OUTPATIENT)
Dept: PHYSICAL THERAPY | Facility: REHABILITATION | Age: 61
End: 2021-09-30
Payer: COMMERCIAL

## 2021-09-30 DIAGNOSIS — E11.9 TYPE 2 DIABETES MELLITUS WITHOUT COMPLICATION, WITH LONG-TERM CURRENT USE OF INSULIN (HCC): ICD-10-CM

## 2021-09-30 DIAGNOSIS — G89.29 CHRONIC BILATERAL LOW BACK PAIN WITHOUT SCIATICA: Primary | ICD-10-CM

## 2021-09-30 DIAGNOSIS — M54.50 CHRONIC BILATERAL LOW BACK PAIN WITHOUT SCIATICA: Primary | ICD-10-CM

## 2021-09-30 DIAGNOSIS — Z79.4 TYPE 2 DIABETES MELLITUS WITHOUT COMPLICATION, WITH LONG-TERM CURRENT USE OF INSULIN (HCC): ICD-10-CM

## 2021-09-30 PROCEDURE — 97112 NEUROMUSCULAR REEDUCATION: CPT | Performed by: PHYSICAL THERAPIST

## 2021-09-30 PROCEDURE — 97110 THERAPEUTIC EXERCISES: CPT | Performed by: PHYSICAL THERAPIST

## 2021-09-30 RX ORDER — INSULIN GLARGINE 100 [IU]/ML
31 INJECTION, SOLUTION SUBCUTANEOUS
Qty: 170 ML | Refills: 3 | Status: SHIPPED | OUTPATIENT
Start: 2021-09-30 | End: 2021-09-30

## 2021-09-30 RX ORDER — INSULIN GLARGINE 100 [IU]/ML
31 INJECTION, SOLUTION SUBCUTANEOUS
Qty: 170 ML | Refills: 3 | Status: SHIPPED | OUTPATIENT
Start: 2021-09-30 | End: 2021-10-01

## 2021-09-30 NOTE — TELEPHONE ENCOUNTER
Patient was recently on a steroid and it has been making his sugars go crazy  Which means he has needed to increase his dosage   He is asking if we can send a new script so he can get his lantus because he is all out

## 2021-09-30 NOTE — PROGRESS NOTES
Daily Note     Today's date: 2021  Patient name: Valeria Pelaez  : 1960  MRN: [id number]  Referring provider: KIMBERLEE Weiss  Dx:   Encounter Diagnosis     ICD-10-CM    1  Chronic bilateral low back pain without sciatica  M54 5     G89 29                   Subjective: Pt reports less pain than Tuesday  Objective: See treatment diary below      Assessment: Tolerated treatment well  Minimal cueing required for correct technique  No increase in pain reported post session  Patient would benefit from continued PT      Plan: Progress treatment as tolerated  Precautions: Extensive PMH (please see chart for full list) but pertinent history includes CHF, hypertension, GERD, DM, and anxiety  Manuals     Prone lumbar Pas    L4-5 gr  3  TP 5' np L4-5 Grade 2-3  10' np L4-5 gr 3 TP 5' L4-5 gr 3 MM 5'     L4-5 gr 3 TP 5'    R shift correction                                    Neuro Re-Ed            TVA  5"x2' 5"x2' 5" x2 5"x2' 5"x2' 5"x2' 5"x2'    TVA+hip ABD  gtb 5"x20 gtb 5"x20 gtb 5"x20 gtb 5"x20 gtb 5"x20 gtb 5"x20 btb 3"x20    TVA+hip ADD  5"x20 5"x20 5"x20 5"x20 5"x20 5"x20 5"x20    TVA+march and ext  10 ea  10 ea  10 ea 10 ea  15ea 15 ea  x20ea    pball rollouts  10"x10 flex   10"x5 R+L 10"x10 flex  10"x5 R+L  10"[id number] 10"x5 R+L 10"x10 flex  10"x5 R+L np np np    HEP-core exercises            TA w/LPD      [id number]# 2x10 christy 12# 2x10 btb 2x10    TA w/multifidus press      christy 9# Monica Band 9#  3"x10 ea  btb 3"x15ea    Ther Ex            Prone lying 5' HEP 5x day 2'          Pt education  Log roll, supported sitting, periph/central principle          NuStep/bike  nustep L3x10' Bike x10' Nustep L3x10'  nustep L5x10' NS L3x10' NS L4x10' NS L4x10'    Std SLR x3   x15 ea  x15 15 ea  15ea 15 ea  2x10ea    Mini squat            Std ham curl   10 ea  20 ea 20 ea  np      Std marching alt   10 ea  10 ea 10 ea   15ea 15 ea  w/pball crush x15ea    Sit to stand with biodex foam   2x5 2x5  2x5 2x5 2x5 2x10    Glute sets    2x10  5"x10 5"x15 5"x15 5"x20    Supine LTR    2x10  hold       Ther Activity                                    Gait Training                                    Modalities

## 2021-10-01 RX ORDER — INSULIN GLARGINE 100 [IU]/ML
31 INJECTION, SOLUTION SUBCUTANEOUS
Qty: 170 ML | Refills: 3 | Status: SHIPPED | OUTPATIENT
Start: 2021-10-01 | End: 2022-07-21 | Stop reason: CLARIF

## 2021-10-04 ENCOUNTER — OFFICE VISIT (OUTPATIENT)
Dept: PHYSICAL THERAPY | Facility: REHABILITATION | Age: 61
End: 2021-10-04
Payer: COMMERCIAL

## 2021-10-04 DIAGNOSIS — M54.50 CHRONIC BILATERAL LOW BACK PAIN WITHOUT SCIATICA: Primary | ICD-10-CM

## 2021-10-04 DIAGNOSIS — G89.29 CHRONIC BILATERAL LOW BACK PAIN WITHOUT SCIATICA: Primary | ICD-10-CM

## 2021-10-04 PROCEDURE — 97112 NEUROMUSCULAR REEDUCATION: CPT | Performed by: PHYSICAL THERAPIST

## 2021-10-04 PROCEDURE — 97110 THERAPEUTIC EXERCISES: CPT | Performed by: PHYSICAL THERAPIST

## 2021-10-05 ENCOUNTER — APPOINTMENT (OUTPATIENT)
Dept: PHYSICAL THERAPY | Facility: REHABILITATION | Age: 61
End: 2021-10-05
Payer: COMMERCIAL

## 2021-10-07 ENCOUNTER — OFFICE VISIT (OUTPATIENT)
Dept: PHYSICAL THERAPY | Facility: REHABILITATION | Age: 61
End: 2021-10-07
Payer: COMMERCIAL

## 2021-10-07 DIAGNOSIS — M54.50 CHRONIC BILATERAL LOW BACK PAIN WITHOUT SCIATICA: Primary | ICD-10-CM

## 2021-10-07 DIAGNOSIS — G89.29 CHRONIC BILATERAL LOW BACK PAIN WITHOUT SCIATICA: Primary | ICD-10-CM

## 2021-10-07 PROCEDURE — 97112 NEUROMUSCULAR REEDUCATION: CPT | Performed by: PHYSICAL THERAPIST

## 2021-10-07 PROCEDURE — 97110 THERAPEUTIC EXERCISES: CPT | Performed by: PHYSICAL THERAPIST

## 2021-10-13 ENCOUNTER — OFFICE VISIT (OUTPATIENT)
Dept: PHYSICAL THERAPY | Facility: REHABILITATION | Age: 61
End: 2021-10-13
Payer: COMMERCIAL

## 2021-10-13 DIAGNOSIS — G89.29 CHRONIC BILATERAL LOW BACK PAIN WITHOUT SCIATICA: Primary | ICD-10-CM

## 2021-10-13 DIAGNOSIS — M54.50 CHRONIC BILATERAL LOW BACK PAIN WITHOUT SCIATICA: Primary | ICD-10-CM

## 2021-10-13 PROCEDURE — 97110 THERAPEUTIC EXERCISES: CPT

## 2021-10-13 PROCEDURE — 97112 NEUROMUSCULAR REEDUCATION: CPT

## 2021-10-15 ENCOUNTER — OFFICE VISIT (OUTPATIENT)
Dept: PHYSICAL THERAPY | Facility: REHABILITATION | Age: 61
End: 2021-10-15
Payer: COMMERCIAL

## 2021-10-15 DIAGNOSIS — G89.29 CHRONIC BILATERAL LOW BACK PAIN WITHOUT SCIATICA: Primary | ICD-10-CM

## 2021-10-15 DIAGNOSIS — M54.50 CHRONIC BILATERAL LOW BACK PAIN WITHOUT SCIATICA: Primary | ICD-10-CM

## 2021-10-15 PROCEDURE — 97112 NEUROMUSCULAR REEDUCATION: CPT

## 2021-10-15 PROCEDURE — 97110 THERAPEUTIC EXERCISES: CPT

## 2021-10-16 ENCOUNTER — IMMUNIZATIONS (OUTPATIENT)
Dept: FAMILY MEDICINE CLINIC | Facility: HOSPITAL | Age: 61
End: 2021-10-16

## 2021-10-16 DIAGNOSIS — Z23 ENCOUNTER FOR IMMUNIZATION: Primary | ICD-10-CM

## 2021-10-16 PROCEDURE — 0001A SARS-COV-2 / COVID-19 MRNA VACCINE (PFIZER-BIONTECH) 30 MCG: CPT

## 2021-10-16 PROCEDURE — 91300 SARS-COV-2 / COVID-19 MRNA VACCINE (PFIZER-BIONTECH) 30 MCG: CPT

## 2021-10-19 ENCOUNTER — OFFICE VISIT (OUTPATIENT)
Dept: PHYSICAL THERAPY | Facility: REHABILITATION | Age: 61
End: 2021-10-19
Payer: COMMERCIAL

## 2021-10-19 DIAGNOSIS — M54.50 CHRONIC BILATERAL LOW BACK PAIN WITHOUT SCIATICA: Primary | ICD-10-CM

## 2021-10-19 DIAGNOSIS — G89.29 CHRONIC BILATERAL LOW BACK PAIN WITHOUT SCIATICA: Primary | ICD-10-CM

## 2021-10-19 PROCEDURE — 97110 THERAPEUTIC EXERCISES: CPT

## 2021-10-19 PROCEDURE — 97112 NEUROMUSCULAR REEDUCATION: CPT

## 2021-10-21 ENCOUNTER — EVALUATION (OUTPATIENT)
Dept: PHYSICAL THERAPY | Facility: REHABILITATION | Age: 61
End: 2021-10-21
Payer: COMMERCIAL

## 2021-10-21 DIAGNOSIS — M54.50 CHRONIC BILATERAL LOW BACK PAIN WITHOUT SCIATICA: Primary | ICD-10-CM

## 2021-10-21 DIAGNOSIS — G89.29 CHRONIC BILATERAL LOW BACK PAIN WITHOUT SCIATICA: Primary | ICD-10-CM

## 2021-10-21 PROCEDURE — 97110 THERAPEUTIC EXERCISES: CPT | Performed by: PHYSICAL THERAPIST

## 2021-10-26 ENCOUNTER — APPOINTMENT (OUTPATIENT)
Dept: PHYSICAL THERAPY | Facility: REHABILITATION | Age: 61
End: 2021-10-26
Payer: COMMERCIAL

## 2021-10-28 ENCOUNTER — APPOINTMENT (OUTPATIENT)
Dept: PHYSICAL THERAPY | Facility: REHABILITATION | Age: 61
End: 2021-10-28
Payer: COMMERCIAL

## 2021-10-28 ENCOUNTER — VBI (OUTPATIENT)
Dept: ADMINISTRATIVE | Facility: OTHER | Age: 61
End: 2021-10-28

## 2021-11-10 ENCOUNTER — APPOINTMENT (OUTPATIENT)
Dept: LAB | Age: 61
End: 2021-11-10
Payer: COMMERCIAL

## 2021-11-10 DIAGNOSIS — Z79.4 TYPE 2 DIABETES MELLITUS WITHOUT COMPLICATION, WITH LONG-TERM CURRENT USE OF INSULIN (HCC): ICD-10-CM

## 2021-11-10 DIAGNOSIS — E11.9 TYPE 2 DIABETES MELLITUS WITHOUT COMPLICATION, WITH LONG-TERM CURRENT USE OF INSULIN (HCC): ICD-10-CM

## 2021-11-10 DIAGNOSIS — Z12.5 PROSTATE CANCER SCREENING: ICD-10-CM

## 2021-11-10 DIAGNOSIS — Z00.00 HEALTHCARE MAINTENANCE: ICD-10-CM

## 2021-11-10 DIAGNOSIS — I10 BENIGN ESSENTIAL HYPERTENSION: ICD-10-CM

## 2021-11-10 LAB
ALBUMIN SERPL BCP-MCNC: 3.5 G/DL (ref 3.5–5)
ALP SERPL-CCNC: 52 U/L (ref 46–116)
ALT SERPL W P-5'-P-CCNC: 39 U/L (ref 12–78)
ANION GAP SERPL CALCULATED.3IONS-SCNC: 11 MMOL/L (ref 4–13)
AST SERPL W P-5'-P-CCNC: 25 U/L (ref 5–45)
BILIRUB SERPL-MCNC: 0.71 MG/DL (ref 0.2–1)
BUN SERPL-MCNC: 24 MG/DL (ref 5–25)
CALCIUM SERPL-MCNC: 9.3 MG/DL (ref 8.3–10.1)
CHLORIDE SERPL-SCNC: 108 MMOL/L (ref 100–108)
CHOLEST SERPL-MCNC: 129 MG/DL (ref 50–200)
CO2 SERPL-SCNC: 26 MMOL/L (ref 21–32)
CREAT SERPL-MCNC: 1.5 MG/DL (ref 0.6–1.3)
CREAT UR-MCNC: 192 MG/DL
EST. AVERAGE GLUCOSE BLD GHB EST-MCNC: 128 MG/DL
GFR SERPL CREATININE-BSD FRML MDRD: 50 ML/MIN/1.73SQ M
GLUCOSE P FAST SERPL-MCNC: 70 MG/DL (ref 65–99)
HBA1C MFR BLD: 6.1 %
HDLC SERPL-MCNC: 20 MG/DL
LDLC SERPL CALC-MCNC: 66 MG/DL (ref 0–100)
MICROALBUMIN UR-MCNC: 10.7 MG/L (ref 0–20)
MICROALBUMIN/CREAT 24H UR: 6 MG/G CREATININE (ref 0–30)
NONHDLC SERPL-MCNC: 109 MG/DL
POTASSIUM SERPL-SCNC: 3.8 MMOL/L (ref 3.5–5.3)
PROT SERPL-MCNC: 6.9 G/DL (ref 6.4–8.2)
PSA SERPL-MCNC: 0.5 NG/ML (ref 0–4)
SODIUM SERPL-SCNC: 145 MMOL/L (ref 136–145)
TRIGL SERPL-MCNC: 213 MG/DL

## 2021-11-10 PROCEDURE — 3061F NEG MICROALBUMINURIA REV: CPT | Performed by: INTERNAL MEDICINE

## 2021-11-10 PROCEDURE — 83036 HEMOGLOBIN GLYCOSYLATED A1C: CPT

## 2021-11-10 PROCEDURE — G0103 PSA SCREENING: HCPCS

## 2021-11-10 PROCEDURE — 80053 COMPREHEN METABOLIC PANEL: CPT

## 2021-11-10 PROCEDURE — 3044F HG A1C LEVEL LT 7.0%: CPT | Performed by: INTERNAL MEDICINE

## 2021-11-10 PROCEDURE — 82043 UR ALBUMIN QUANTITATIVE: CPT | Performed by: INTERNAL MEDICINE

## 2021-11-10 PROCEDURE — 36415 COLL VENOUS BLD VENIPUNCTURE: CPT

## 2021-11-10 PROCEDURE — 80061 LIPID PANEL: CPT

## 2021-11-10 PROCEDURE — 82570 ASSAY OF URINE CREATININE: CPT | Performed by: INTERNAL MEDICINE

## 2021-11-15 ENCOUNTER — OFFICE VISIT (OUTPATIENT)
Dept: INTERNAL MEDICINE CLINIC | Facility: CLINIC | Age: 61
End: 2021-11-15
Payer: COMMERCIAL

## 2021-11-15 VITALS
DIASTOLIC BLOOD PRESSURE: 60 MMHG | HEART RATE: 82 BPM | BODY MASS INDEX: 45.8 KG/M2 | SYSTOLIC BLOOD PRESSURE: 134 MMHG | HEIGHT: 66 IN | WEIGHT: 285 LBS | OXYGEN SATURATION: 96 % | TEMPERATURE: 97 F

## 2021-11-15 DIAGNOSIS — I25.10 ARTERIOSCLEROTIC CARDIOVASCULAR DISEASE: ICD-10-CM

## 2021-11-15 DIAGNOSIS — G47.33 OBSTRUCTIVE SLEEP APNEA: ICD-10-CM

## 2021-11-15 DIAGNOSIS — I50.23 ACUTE ON CHRONIC SYSTOLIC CONGESTIVE HEART FAILURE (HCC): ICD-10-CM

## 2021-11-15 DIAGNOSIS — Z11.4 SCREENING FOR HIV (HUMAN IMMUNODEFICIENCY VIRUS): ICD-10-CM

## 2021-11-15 DIAGNOSIS — Z11.59 NEED FOR HEPATITIS C SCREENING TEST: ICD-10-CM

## 2021-11-15 DIAGNOSIS — Z23 ENCOUNTER FOR IMMUNIZATION: Primary | ICD-10-CM

## 2021-11-15 DIAGNOSIS — J43.1 PANLOBULAR EMPHYSEMA (HCC): ICD-10-CM

## 2021-11-15 DIAGNOSIS — E11.9 TYPE 2 DIABETES MELLITUS WITHOUT COMPLICATION, WITH LONG-TERM CURRENT USE OF INSULIN (HCC): ICD-10-CM

## 2021-11-15 DIAGNOSIS — E66.01 MORBID OBESITY WITH BODY MASS INDEX OF 45.0-49.9 IN ADULT (HCC): ICD-10-CM

## 2021-11-15 DIAGNOSIS — E66.01 MORBID OBESITY WITH BMI OF 45.0-49.9, ADULT (HCC): ICD-10-CM

## 2021-11-15 DIAGNOSIS — Z79.4 TYPE 2 DIABETES MELLITUS WITHOUT COMPLICATION, WITH LONG-TERM CURRENT USE OF INSULIN (HCC): ICD-10-CM

## 2021-11-15 DIAGNOSIS — M54.50 ACUTE LOW BACK PAIN WITHOUT SCIATICA, UNSPECIFIED BACK PAIN LATERALITY: ICD-10-CM

## 2021-11-15 DIAGNOSIS — I10 BENIGN ESSENTIAL HYPERTENSION: ICD-10-CM

## 2021-11-15 PROCEDURE — 3075F SYST BP GE 130 - 139MM HG: CPT | Performed by: INTERNAL MEDICINE

## 2021-11-15 PROCEDURE — 1036F TOBACCO NON-USER: CPT | Performed by: INTERNAL MEDICINE

## 2021-11-15 PROCEDURE — 99214 OFFICE O/P EST MOD 30 MIN: CPT | Performed by: INTERNAL MEDICINE

## 2021-11-15 PROCEDURE — 3078F DIAST BP <80 MM HG: CPT | Performed by: INTERNAL MEDICINE

## 2021-11-15 PROCEDURE — 90682 RIV4 VACC RECOMBINANT DNA IM: CPT | Performed by: INTERNAL MEDICINE

## 2021-11-15 PROCEDURE — 3008F BODY MASS INDEX DOCD: CPT | Performed by: INTERNAL MEDICINE

## 2021-11-15 PROCEDURE — 90471 IMMUNIZATION ADMIN: CPT | Performed by: INTERNAL MEDICINE

## 2021-11-15 RX ORDER — ASPIRIN 81 MG/1
TABLET ORAL
COMMUNITY
Start: 2021-10-18 | End: 2022-04-04 | Stop reason: SDUPTHER

## 2021-11-18 DIAGNOSIS — E11.59 TYPE 2 DIABETES MELLITUS WITH OTHER CIRCULATORY COMPLICATION, WITH LONG-TERM CURRENT USE OF INSULIN (HCC): ICD-10-CM

## 2021-11-18 DIAGNOSIS — Z79.4 TYPE 2 DIABETES MELLITUS WITH OTHER CIRCULATORY COMPLICATION, WITH LONG-TERM CURRENT USE OF INSULIN (HCC): ICD-10-CM

## 2021-11-18 RX ORDER — EMPAGLIFLOZIN 25 MG/1
TABLET, FILM COATED ORAL
Qty: 90 TABLET | Refills: 1 | Status: SHIPPED | OUTPATIENT
Start: 2021-11-18 | End: 2022-05-21

## 2021-11-29 DIAGNOSIS — M54.50 CHRONIC BILATERAL LOW BACK PAIN WITHOUT SCIATICA: ICD-10-CM

## 2021-11-29 DIAGNOSIS — G89.29 CHRONIC BILATERAL LOW BACK PAIN WITHOUT SCIATICA: ICD-10-CM

## 2021-11-29 RX ORDER — HYDROCODONE BITARTRATE AND ACETAMINOPHEN 5; 300 MG/1; MG/1
1 TABLET ORAL EVERY 6 HOURS PRN
Qty: 112 TABLET | Refills: 0 | Status: SHIPPED | OUTPATIENT
Start: 2021-11-29 | End: 2022-03-04 | Stop reason: SDUPTHER

## 2022-01-25 ENCOUNTER — VBI (OUTPATIENT)
Dept: ADMINISTRATIVE | Facility: OTHER | Age: 62
End: 2022-01-25

## 2022-02-11 DIAGNOSIS — Z79.4 TYPE 2 DIABETES MELLITUS WITH OTHER CIRCULATORY COMPLICATION, WITH LONG-TERM CURRENT USE OF INSULIN (HCC): ICD-10-CM

## 2022-02-11 DIAGNOSIS — E11.59 TYPE 2 DIABETES MELLITUS WITH OTHER CIRCULATORY COMPLICATION, WITH LONG-TERM CURRENT USE OF INSULIN (HCC): ICD-10-CM

## 2022-02-11 RX ORDER — PEN NEEDLE, DIABETIC 31 GX5/16"
NEEDLE, DISPOSABLE MISCELLANEOUS 2 TIMES DAILY
Qty: 100 EACH | Refills: 0 | Status: SHIPPED | OUTPATIENT
Start: 2022-02-11 | End: 2022-04-14

## 2022-02-20 DIAGNOSIS — E78.00 PURE HYPERCHOLESTEROLEMIA: ICD-10-CM

## 2022-02-21 RX ORDER — FENOFIBRATE 145 MG/1
TABLET, COATED ORAL
Qty: 90 TABLET | Refills: 1 | Status: SHIPPED | OUTPATIENT
Start: 2022-02-21 | End: 2022-08-04

## 2022-02-27 DIAGNOSIS — E78.01 FAMILIAL HYPERCHOLESTEROLEMIA: ICD-10-CM

## 2022-02-27 RX ORDER — ATORVASTATIN CALCIUM 40 MG/1
TABLET, FILM COATED ORAL
Qty: 90 TABLET | Refills: 2 | Status: SHIPPED | OUTPATIENT
Start: 2022-02-27

## 2022-03-04 DIAGNOSIS — M54.50 CHRONIC BILATERAL LOW BACK PAIN WITHOUT SCIATICA: ICD-10-CM

## 2022-03-04 DIAGNOSIS — G89.29 CHRONIC BILATERAL LOW BACK PAIN WITHOUT SCIATICA: ICD-10-CM

## 2022-03-07 RX ORDER — HYDROCODONE BITARTRATE AND ACETAMINOPHEN 5; 300 MG/1; MG/1
1 TABLET ORAL EVERY 6 HOURS PRN
Qty: 112 TABLET | Refills: 0 | Status: SHIPPED | OUTPATIENT
Start: 2022-03-07 | End: 2022-06-28 | Stop reason: SDUPTHER

## 2022-03-09 ENCOUNTER — APPOINTMENT (OUTPATIENT)
Dept: LAB | Age: 62
End: 2022-03-09
Payer: COMMERCIAL

## 2022-03-09 DIAGNOSIS — Z11.59 NEED FOR HEPATITIS C SCREENING TEST: ICD-10-CM

## 2022-03-09 DIAGNOSIS — I10 BENIGN ESSENTIAL HYPERTENSION: ICD-10-CM

## 2022-03-09 DIAGNOSIS — E11.9 TYPE 2 DIABETES MELLITUS WITHOUT COMPLICATION, WITH LONG-TERM CURRENT USE OF INSULIN (HCC): ICD-10-CM

## 2022-03-09 DIAGNOSIS — Z11.4 SCREENING FOR HIV (HUMAN IMMUNODEFICIENCY VIRUS): ICD-10-CM

## 2022-03-09 DIAGNOSIS — Z79.4 TYPE 2 DIABETES MELLITUS WITHOUT COMPLICATION, WITH LONG-TERM CURRENT USE OF INSULIN (HCC): ICD-10-CM

## 2022-03-09 LAB
ANION GAP SERPL CALCULATED.3IONS-SCNC: 3 MMOL/L (ref 4–13)
BUN SERPL-MCNC: 24 MG/DL (ref 5–25)
CALCIUM SERPL-MCNC: 9.4 MG/DL (ref 8.3–10.1)
CHLORIDE SERPL-SCNC: 110 MMOL/L (ref 100–108)
CO2 SERPL-SCNC: 28 MMOL/L (ref 21–32)
CREAT SERPL-MCNC: 1.45 MG/DL (ref 0.6–1.3)
EST. AVERAGE GLUCOSE BLD GHB EST-MCNC: 126 MG/DL
GFR SERPL CREATININE-BSD FRML MDRD: 51 ML/MIN/1.73SQ M
GLUCOSE P FAST SERPL-MCNC: 76 MG/DL (ref 65–99)
HBA1C MFR BLD: 6 %
HCV AB SER QL: NORMAL
POTASSIUM SERPL-SCNC: 3.8 MMOL/L (ref 3.5–5.3)
SODIUM SERPL-SCNC: 141 MMOL/L (ref 136–145)

## 2022-03-09 PROCEDURE — 83036 HEMOGLOBIN GLYCOSYLATED A1C: CPT

## 2022-03-09 PROCEDURE — 80048 BASIC METABOLIC PNL TOTAL CA: CPT

## 2022-03-09 PROCEDURE — 3044F HG A1C LEVEL LT 7.0%: CPT | Performed by: INTERNAL MEDICINE

## 2022-03-09 PROCEDURE — 36415 COLL VENOUS BLD VENIPUNCTURE: CPT

## 2022-03-09 PROCEDURE — 87389 HIV-1 AG W/HIV-1&-2 AB AG IA: CPT

## 2022-03-09 PROCEDURE — 86803 HEPATITIS C AB TEST: CPT

## 2022-03-10 LAB — HIV 1+2 AB+HIV1 P24 AG SERPL QL IA: NORMAL

## 2022-03-16 ENCOUNTER — OFFICE VISIT (OUTPATIENT)
Dept: INTERNAL MEDICINE CLINIC | Facility: CLINIC | Age: 62
End: 2022-03-16
Payer: COMMERCIAL

## 2022-03-16 VITALS
TEMPERATURE: 97.9 F | HEART RATE: 80 BPM | SYSTOLIC BLOOD PRESSURE: 130 MMHG | HEIGHT: 66 IN | DIASTOLIC BLOOD PRESSURE: 60 MMHG | WEIGHT: 294 LBS | OXYGEN SATURATION: 98 % | BODY MASS INDEX: 47.25 KG/M2

## 2022-03-16 DIAGNOSIS — Z79.4 TYPE 2 DIABETES MELLITUS WITHOUT COMPLICATION, WITH LONG-TERM CURRENT USE OF INSULIN (HCC): Primary | ICD-10-CM

## 2022-03-16 DIAGNOSIS — E11.9 TYPE 2 DIABETES MELLITUS WITHOUT COMPLICATION, WITH LONG-TERM CURRENT USE OF INSULIN (HCC): Primary | ICD-10-CM

## 2022-03-16 DIAGNOSIS — J43.1 PANLOBULAR EMPHYSEMA (HCC): ICD-10-CM

## 2022-03-16 DIAGNOSIS — G47.33 OBSTRUCTIVE SLEEP APNEA: ICD-10-CM

## 2022-03-16 DIAGNOSIS — E66.01 MORBID OBESITY WITH BMI OF 45.0-49.9, ADULT (HCC): ICD-10-CM

## 2022-03-16 DIAGNOSIS — I25.10 ARTERIOSCLEROTIC CARDIOVASCULAR DISEASE: ICD-10-CM

## 2022-03-16 DIAGNOSIS — M54.16 LUMBAR RADICULOPATHY: ICD-10-CM

## 2022-03-16 DIAGNOSIS — Z00.00 HEALTHCARE MAINTENANCE: ICD-10-CM

## 2022-03-16 DIAGNOSIS — I10 BENIGN ESSENTIAL HYPERTENSION: ICD-10-CM

## 2022-03-16 PROCEDURE — 3008F BODY MASS INDEX DOCD: CPT | Performed by: INTERNAL MEDICINE

## 2022-03-16 PROCEDURE — 99214 OFFICE O/P EST MOD 30 MIN: CPT | Performed by: INTERNAL MEDICINE

## 2022-03-16 PROCEDURE — 3078F DIAST BP <80 MM HG: CPT | Performed by: INTERNAL MEDICINE

## 2022-03-16 PROCEDURE — 3075F SYST BP GE 130 - 139MM HG: CPT | Performed by: INTERNAL MEDICINE

## 2022-03-16 PROCEDURE — 1036F TOBACCO NON-USER: CPT | Performed by: INTERNAL MEDICINE

## 2022-03-16 NOTE — PROGRESS NOTES
Assessment/Plan:    Benign essential hypertension  Patient has longstanding history of hypertension  As noted patient's blood pressure showing adequate control the with present treatment  Patient will continue present medication and surveillance  Check on his blood pressure, renal function with his next visit  Chronic obstructive pulmonary disease (HCC)  No recent exacerbation of his chronic obstructive pulmonary disease  He is using his inhalers as directed by his pulmonologist   He knows to call us immediately if any acute exacerbation, upper respiratory tract infections  Arteriosclerotic cardiovascular disease  The patient does have a history of atherosclerotic cardiovascular disease  We continued to monitor all parameters including his blood pressure, cholesterol which have been well controlled  Also importantly he has adequate control of his diabetes  He does have a follow-up visit to be seen with his cardiologist in the near future    Lumbar radiculopathy  The patient recently had exacerbation of his low back pain only this time he did have some sciatica on the left side  States that he is slowly improving  No weakness to lower extremities  Patient has extremely decreased range of motion actively and passively to lumbar spine and she is deconditioned  Did offer the patient a consult evaluation by physical therapy but he is refusing  We intermittently takes hydrocodone 1/2 pill of 5 /300 as needed but his medical plan is not covering this medication  May have to pay for this out pocket  Type 2 diabetes mellitus, with long-term current use of insulin ([facility])  Patient's sugar continues to show excellent control with present treatment  Did just receive a notice from his insurance company that they will not be covering his Lantus but we are checking to see if they will cover an alternative such as Basaglar    This medication would be equivalent and will waiting for feedback from his prescription plan   Lab Results   Component Value Date    HGBA1C 6 0 (H) 03/09/2022       Healthcare maintenance  Patient will be due for Medicare wellness visit when he returns to the office in 4 months  Was given a slip for complete labs to be performed prior to visit  Was told in the interim if any new medical problems or concerns to please call  Obstructive sleep apnea  Compliant with his CPAP device       Diagnoses and all orders for this visit:    Type 2 diabetes mellitus without complication, with long-term current use of insulin (HCC)  -     Hemoglobin A1C; Future  -     Microalbumin / creatinine urine ratio  -     TSH, 3rd generation with Free T4 reflex; Future    Benign essential hypertension  -     TSH, 3rd generation with Free T4 reflex; Future    Panlobular emphysema ([address] )    Healthcare maintenance  -     Comprehensive metabolic panel; Future  -     CBC and differential; Future  -     Lipid panel; Future  -     UA (URINE) with reflex to Scope; Future  -     PSA, Total Screen; Future  -     Occult Blood, Fecal Immunochemical; Future    Arteriosclerotic cardiovascular disease  -     TSH, 3rd generation with Free T4 reflex; Future    Lumbar radiculopathy    Morbid obesity with BMI of 45 0-49 9, adult (HCC)  -     TSH, 3rd generation with Free T4 reflex; Future    Obstructive sleep apnea          Subjective:      Patient ID: Anthony Garibay is a 64 y o  male  Patient is a 57-year-old male history of multiple medical problems as outlined previously who is here today for follow-up  He did have labs performed prior to the visit today we did discuss the results specifically looking at blood sugars control  Patient states that he did have an episode of some indigestion after eating some spicy mustard but this now has subsided  Also recent aggravation of his low back pain on the left with this time having some radiculopathy, sciatica on the left    He admits that he is not watching his diet as closely as should he has gained some weight  Also relates he did not take his water pill today so far      The following portions of the patient's history were reviewed and updated as appropriate:   He  has a past medical history of Abscess of left thigh, Acute myocardial infarction [facility]), Anesthesia, Anxiety, Arteriosclerotic cardiovascular disease, Asthma, Chest pain, COPD (chronic obstructive pulmonary disease) ([facility] ), Coronary artery disease, Diabetes mellitus ([facility] [facility] ), Fatty liver, Gastric ulcer, GERD (gastroesophageal reflux disease), Hyperlipidemia, Hypertension, Low back pain, Myocardial infarction ([facility] [address] ) (1189,[id number]), Obstructive sleep apnea, and Spondylosis of lumbar region without myelopathy or radiculopathy  He   Patient Active Problem List    Diagnosis Date Noted    Preoperative clearance 06/23/2021    Acute on chronic systolic congestive heart failure ([facility] [address] ) 05/04/2021    Diabetes mellitus ([facility] [address] )     Lesion of nose 11/13/2020    Type 2 diabetes mellitus, with long-term current use of insulin [facility] ) 07/06/2020    Obstructive sleep apnea 07/06/2020    Aftercare following surgery of the musculoskeletal system 06/15/2020    Impingement syndrome of left shoulder 01/14/2020    Adhesive capsulitis of left shoulder 01/14/2020    Chronic left shoulder pain 08/21/2019    Healthcare maintenance 04/10/2019    Morbid obesity with body mass index of 45 0-49 9 in adult [facility]) 11/29/2018    Low back pain 10/22/2018    Dysuria 07/24/2018    Lumbar radiculopathy 12/29/2017    Myofascial pain syndrome 12/29/2017    Sacroiliitis ([facility] ) 12/29/2017    Chest pain 08/02/2016    Spondylosis of lumbar region without myelopathy or radiculopathy 04/15/2014    Benign essential hypertension 10/16/2012    Arteriosclerotic cardiovascular disease 06/14/2012    Chronic obstructive pulmonary disease ([facility] ) 06/14/2012     He  has a past surgical history that includes Coronary angioplasty with stent (2006, 2007);  Neuroplasty / transposition median nerve at carpal tunnel (Right); EGD; Carpal tunnel release (Right); Colonoscopy; Hemorrhoid surgery; and pr shldr arthroscop,lyse adhesns (Left, 6/10/2020)  His family history includes Alzheimer's disease in his mother; Cancer in his other; Dementia in his mother; Heart attack in his father; Heart failure in his mother; Hypertension in his family; Other in his family and father; Stroke in his family  He  reports that he quit smoking about 8 years ago  He smoked 0 00 packs per day for 0 00 years  He has never used smokeless tobacco  He reports that he does not drink alcohol and does not use drugs  Current Outpatient Medications   Medication Sig Dispense Refill    acetaminophen (TYLENOL) 325 mg tablet Take 650 mg by mouth every 6 (six) hours as needed for mild pain      albuterol (PROVENTIL HFA,VENTOLIN HFA) 90 mcg/act inhaler Inhale 90 puffs as needed        aspirin (ECOTRIN LOW STRENGTH) 81 mg EC tablet       atorvastatin (LIPITOR) 40 mg tablet TAKE 1 TABLET BY MOUTH EVERY DAY 90 tablet 2    clopidogrel (PLAVIX) 75 mg tablet Take 1 tablet (75 mg total) by mouth daily 90 tablet 3    fenofibrate (TRICOR) 145 mg tablet TAKE 1 TABLET BY MOUTH EVERY DAY 90 tablet 1    furosemide (LASIX) 40 mg tablet Take 20 mg by mouth      HYDROcodone-acetaminophen (Vicodin) 5-300 MG per tablet Take 1 tablet by mouth every 6 (six) hours as needed for moderate pain Max Daily Amount: 4 tablets 112 tablet 0    Icosapent Ethyl (Vascepa) 1 g CAPS Vascepa 1 gram capsule   TAKE 2 G BY MOUTH 2 (TWO) TIMES A DAY        insulin glargine (LANTUS) 100 units/mL subcutaneous injection Inject 31 Units under the skin 6 (six) times a day Inject 3 times in the morning and 3 times in the night 170 mL 3    Insulin Pen Needle (B-D UF III MINI PEN NEEDLES) 31G X 5 MM MISC Inject as directed 2 (two) times a day Use as directed 100 each 0    Jardiance 25 MG TABS TAKE 1 TABLET BY MOUTH EVERY DAY 90 tablet 1    losartan (COZAAR) 25 mg tablet losartan 25 mg tablet   TAKE 1 TABLET BY MOUTH EVERY DAY      metoprolol tartrate (LOPRESSOR) 50 mg tablet Take 25 mg by mouth 2 (two) times a day      nitroglycerin (NITROSTAT) 0 3 mg SL tablet Place under the tongue      pantoprazole (PROTONIX) 40 mg tablet TAKE 1 TABLET BY MOUTH EVERY DAY 90 tablet 5     No current facility-administered medications for this visit  Current Outpatient Medications on File Prior to Visit   Medication Sig    acetaminophen (TYLENOL) 325 mg tablet Take 650 mg by mouth every 6 (six) hours as needed for mild pain    albuterol (PROVENTIL HFA,VENTOLIN HFA) 90 mcg/act inhaler Inhale 90 puffs as needed      aspirin (ECOTRIN LOW STRENGTH) 81 mg EC tablet     atorvastatin (LIPITOR) 40 mg tablet TAKE 1 TABLET BY MOUTH EVERY DAY    clopidogrel (PLAVIX) 75 mg tablet Take 1 tablet (75 mg total) by mouth daily    fenofibrate (TRICOR) 145 mg tablet TAKE 1 TABLET BY MOUTH EVERY DAY    furosemide (LASIX) 40 mg tablet Take 20 mg by mouth    HYDROcodone-acetaminophen (Vicodin) 5-300 MG per tablet Take 1 tablet by mouth every 6 (six) hours as needed for moderate pain Max Daily Amount: 4 tablets    Icosapent Ethyl (Vascepa) 1 g CAPS Vascepa 1 gram capsule   TAKE 2 G BY MOUTH 2 (TWO) TIMES A DAY      insulin glargine (LANTUS) 100 units/mL subcutaneous injection Inject 31 Units under the skin 6 (six) times a day Inject 3 times in the morning and 3 times in the night    Insulin Pen Needle (B-D UF III MINI PEN NEEDLES) 31G X 5 MM MISC Inject as directed 2 (two) times a day Use as directed    Jardiance 25 MG TABS TAKE 1 TABLET BY MOUTH EVERY DAY    losartan (COZAAR) 25 mg tablet losartan 25 mg tablet   TAKE 1 TABLET BY MOUTH EVERY DAY    metoprolol tartrate (LOPRESSOR) 50 mg tablet Take 25 mg by mouth 2 (two) times a day    nitroglycerin (NITROSTAT) 0 3 mg SL tablet Place under the tongue    pantoprazole (PROTONIX) 40 mg tablet TAKE 1 TABLET BY MOUTH EVERY DAY    [DISCONTINUED] aspirin (ECOTRIN LOW STRENGTH) 81 mg EC tablet aspirin 81 mg tablet,delayed release    [DISCONTINUED] ONE TOUCH ULTRA TEST test strip USE TO TEST 4 TIMES A DAY     No current facility-administered medications on file prior to visit  He is allergic to cefaclor and simvastatin       Review of Systems   Constitutional: Positive for activity change (Some recent reduction in activity level secondary to back pain)  Negative for appetite change, chills, diaphoresis, fatigue, fever and unexpected weight change  HENT: Negative  Eyes: Negative  Respiratory: Negative  Cardiovascular: Negative  Gastrointestinal: Negative  Endocrine: Negative  Genitourinary: Negative  Musculoskeletal: Positive for arthralgias and back pain  Negative for gait problem, joint swelling, myalgias, neck pain and neck stiffness  Skin: Negative  Allergic/Immunologic: Negative  Neurological: Negative  Hematological: Negative  Psychiatric/Behavioral: Negative  Objective:      /60   Pulse 80   Temp 97 9 °F (36 6 °C)   Ht 5' 6" (1 676 m)   Wt 133 kg (294 lb)   SpO2 98%   BMI 47 45 kg/m²          Physical Exam  Vitals and nursing note reviewed  Constitutional:       General: He is not in acute distress  Appearance: Normal appearance  He is not ill-appearing, toxic-appearing or diaphoretic  Comments: Morbidly obese articulate 70-year-old male who is awake alert, difficulties secondary to back pain which is chronic with gait  HENT:      Head: Normocephalic and atraumatic  Right Ear: Tympanic membrane, ear canal and external ear normal  There is no impacted cerumen  Left Ear: Tympanic membrane, ear canal and external ear normal  There is no impacted cerumen  Nose: Nose normal  No congestion or rhinorrhea  Mouth/Throat:      Mouth: Mucous membranes are moist       Pharynx: Oropharynx is clear  No oropharyngeal exudate or posterior oropharyngeal erythema     Eyes:      General: No scleral icterus  Right eye: No discharge  Left eye: No discharge  Extraocular Movements: Extraocular movements intact  Conjunctiva/sclera: Conjunctivae normal       Pupils: Pupils are equal, round, and reactive to light  Neck:      Vascular: No carotid bruit  Cardiovascular:      Rate and Rhythm: Normal rate and regular rhythm  Heart sounds: Normal heart sounds  No murmur heard  No friction rub  No gallop  Comments: Difficult to palpate peripheral pulses lower extremities secondary to obesity  Pulmonary:      Effort: Pulmonary effort is normal  No respiratory distress  Breath sounds: Normal breath sounds  No stridor  No wheezing, rhonchi or rales  Chest:      Chest wall: No tenderness  Abdominal:      General: Bowel sounds are normal  There is no distension  Palpations: Abdomen is soft  There is no mass  Tenderness: There is no abdominal tenderness  There is no right CVA tenderness, left CVA tenderness, guarding or rebound  Hernia: No hernia is present  Comments: Obese   Genitourinary:     Rectum: Guaiac result negative  Musculoskeletal:         General: Tenderness and deformity present  No swelling or signs of injury  Cervical back: Normal range of motion and neck supple  No rigidity or tenderness  Right lower leg: Edema (Trace chronic edema bilaterally lower extremities) present  Left lower leg: Edema present  Comments: Significantly decreased range of motion both actively and passively to the lumbar spine  Some mild pain to the area the SI joint on the left with rotation to the right  Some flattening to his lumbar curve  Lymphadenopathy:      Cervical: No cervical adenopathy  Skin:     General: Skin is warm and dry  Coloration: Skin is not jaundiced or pale  Findings: No bruising, erythema, lesion or rash  Neurological:      Mental Status: He is alert and oriented to person, place, and time   Mental status is at baseline  Cranial Nerves: No cranial nerve deficit  Sensory: No sensory deficit  Motor: No weakness  Coordination: Coordination normal       Gait: Gait normal       Deep Tendon Reflexes: Reflexes abnormal ( absent patella reflexes bilaterally)  Psychiatric:         Mood and Affect: Mood normal          Behavior: Behavior normal          Thought Content:  Thought content normal          Judgment: Judgment normal

## 2022-03-16 NOTE — ASSESSMENT & PLAN NOTE
The patient recently had exacerbation of his low back pain only this time he did have some sciatica on the left side  States that he is slowly improving  No weakness to lower extremities  Patient has extremely decreased range of motion actively and passively to lumbar spine and she is deconditioned  Did offer the patient a consult evaluation by physical therapy but he is refusing  We intermittently takes hydrocodone 1/2 pill of 5 /300 as needed but his medical plan is not covering this medication  May have to pay for this out pocket

## 2022-03-16 NOTE — ASSESSMENT & PLAN NOTE
The patient does have a history of atherosclerotic cardiovascular disease  We continued to monitor all parameters including his blood pressure, cholesterol which have been well controlled  Also importantly he has adequate control of his diabetes    He does have a follow-up visit to be seen with his cardiologist in the near future

## 2022-03-16 NOTE — ASSESSMENT & PLAN NOTE
Patient will be due for Medicare wellness visit when he returns to the office in 4 months  Was given a slip for complete labs to be performed prior to visit  Was told in the interim if any new medical problems or concerns to please call

## 2022-03-16 NOTE — ASSESSMENT & PLAN NOTE
Patient's sugar continues to show excellent control with present treatment  Did just receive a notice from his insurance company that they will not be covering his Lantus but we are checking to see if they will cover an alternative such as Basaglar  This medication would be equivalent and will waiting for feedback from his prescription plan    Lab Results   Component Value Date    HGBA1C 6 0 (H) 03/09/2022

## 2022-03-16 NOTE — ASSESSMENT & PLAN NOTE
Patient has longstanding history of hypertension  As noted patient's blood pressure showing adequate control the with present treatment  Patient will continue present medication and surveillance  Check on his blood pressure, renal function with his next visit

## 2022-03-16 NOTE — PROGRESS NOTES
Diabetic Foot Exam    Right Foot/Ankle   Right Foot Inspection  Skin Exam: skin normal and skin intact  No dry skin, no warmth, no callus, no erythema, no maceration, no abnormal color, no pre-ulcer, no ulcer and no callus  Toe Exam: swelling (Chronic edema to the feet and toes bilaterally trace to +1)  No tenderness, erythema and  no right toe deformity    Sensory   Vibration: intact  Proprioception: intact  Monofilament testing: intact    Vascular  Capillary refills: < 3 seconds  The right DP pulse is 1+  The right PT pulse is 1+  Left Foot/Ankle  Left Foot Inspection  Skin Exam: skin normal and skin intact  No dry skin, no warmth, no erythema, no maceration, normal color, no pre-ulcer, no ulcer and no callus  Toe Exam: ROM and strength within normal limits and swelling  No tenderness, no erythema and no left toe deformity  Sensory   Vibration: intact  Proprioception: intact  Monofilament testing: intact    Vascular  Capillary refills: < 3 seconds  The left DP pulse is 1+  The left PT pulse is 1+       Assign Risk Category  No deformity present  Loss of protective sensation  Weak pulses  Risk: 2

## 2022-03-25 ENCOUNTER — PATIENT MESSAGE (OUTPATIENT)
Dept: INTERNAL MEDICINE CLINIC | Facility: CLINIC | Age: 62
End: 2022-03-25

## 2022-03-25 DIAGNOSIS — E11.9 TYPE 2 DIABETES MELLITUS WITHOUT COMPLICATION, WITH LONG-TERM CURRENT USE OF INSULIN (HCC): ICD-10-CM

## 2022-03-25 DIAGNOSIS — Z79.4 TYPE 2 DIABETES MELLITUS WITHOUT COMPLICATION, WITH LONG-TERM CURRENT USE OF INSULIN (HCC): ICD-10-CM

## 2022-03-31 RX ORDER — INSULIN GLARGINE 100 [IU]/ML
31 INJECTION, SOLUTION SUBCUTANEOUS
Qty: 170 ML | Refills: 3 | Status: SHIPPED | OUTPATIENT
Start: 2022-03-31

## 2022-03-31 NOTE — TELEPHONE ENCOUNTER
After discussion with Dr Hima Calvo and patient we will be switching patient from lantus to semglee  Patient informed and okay trying it

## 2022-04-03 ENCOUNTER — PATIENT MESSAGE (OUTPATIENT)
Dept: INTERNAL MEDICINE CLINIC | Facility: CLINIC | Age: 62
End: 2022-04-03

## 2022-04-03 DIAGNOSIS — I25.10 ARTERIOSCLEROTIC CARDIOVASCULAR DISEASE: Primary | ICD-10-CM

## 2022-04-03 DIAGNOSIS — I50.23 ACUTE ON CHRONIC SYSTOLIC CONGESTIVE HEART FAILURE (HCC): ICD-10-CM

## 2022-04-04 RX ORDER — ASPIRIN 81 MG/1
81 TABLET ORAL DAILY
Qty: 90 TABLET | Refills: 0 | Status: SHIPPED | OUTPATIENT
Start: 2022-04-04 | End: 2022-06-30

## 2022-04-05 ENCOUNTER — TELEPHONE (OUTPATIENT)
Dept: INTERNAL MEDICINE CLINIC | Facility: CLINIC | Age: 62
End: 2022-04-05

## 2022-04-05 NOTE — TELEPHONE ENCOUNTER
[employer] called about prior auth for lantus solostar and they said they faxed paperwork over    This is FYI

## 2022-04-13 DIAGNOSIS — E11.59 TYPE 2 DIABETES MELLITUS WITH OTHER CIRCULATORY COMPLICATION, WITH LONG-TERM CURRENT USE OF INSULIN (HCC): ICD-10-CM

## 2022-04-13 DIAGNOSIS — Z79.4 TYPE 2 DIABETES MELLITUS WITH OTHER CIRCULATORY COMPLICATION, WITH LONG-TERM CURRENT USE OF INSULIN (HCC): ICD-10-CM

## 2022-04-14 RX ORDER — CLOPIDOGREL BISULFATE 75 MG/1
TABLET ORAL
Qty: 90 TABLET | Refills: 3 | Status: SHIPPED | OUTPATIENT
Start: 2022-04-14

## 2022-04-14 RX ORDER — PEN NEEDLE, DIABETIC 31 GX5/16"
NEEDLE, DISPOSABLE MISCELLANEOUS 2 TIMES DAILY
Qty: 100 EACH | Refills: 0 | Status: SHIPPED | OUTPATIENT
Start: 2022-04-14 | End: 2022-06-12

## 2022-05-20 DIAGNOSIS — Z79.4 TYPE 2 DIABETES MELLITUS WITH OTHER CIRCULATORY COMPLICATION, WITH LONG-TERM CURRENT USE OF INSULIN (HCC): ICD-10-CM

## 2022-05-20 DIAGNOSIS — E11.59 TYPE 2 DIABETES MELLITUS WITH OTHER CIRCULATORY COMPLICATION, WITH LONG-TERM CURRENT USE OF INSULIN (HCC): ICD-10-CM

## 2022-05-21 RX ORDER — EMPAGLIFLOZIN 25 MG/1
TABLET, FILM COATED ORAL
Qty: 90 TABLET | Refills: 1 | Status: SHIPPED | OUTPATIENT
Start: 2022-05-21

## 2022-05-25 ENCOUNTER — PATIENT MESSAGE (OUTPATIENT)
Dept: INTERNAL MEDICINE CLINIC | Facility: CLINIC | Age: 62
End: 2022-05-25

## 2022-05-25 DIAGNOSIS — J43.1 PANLOBULAR EMPHYSEMA (HCC): Primary | ICD-10-CM

## 2022-05-26 RX ORDER — ALBUTEROL SULFATE 90 UG/1
2 AEROSOL, METERED RESPIRATORY (INHALATION) AS NEEDED
Qty: 18 G | Refills: 1 | Status: SHIPPED | OUTPATIENT
Start: 2022-05-26

## 2022-05-27 ENCOUNTER — OFFICE VISIT (OUTPATIENT)
Dept: SLEEP CENTER | Facility: CLINIC | Age: 62
End: 2022-05-27
Payer: COMMERCIAL

## 2022-05-27 VITALS — SYSTOLIC BLOOD PRESSURE: 138 MMHG | HEART RATE: 68 BPM | OXYGEN SATURATION: 97 % | DIASTOLIC BLOOD PRESSURE: 60 MMHG

## 2022-05-27 DIAGNOSIS — G47.33 OSA (OBSTRUCTIVE SLEEP APNEA): Primary | ICD-10-CM

## 2022-05-27 PROCEDURE — 3075F SYST BP GE 130 - 139MM HG: CPT | Performed by: INTERNAL MEDICINE

## 2022-05-27 PROCEDURE — 99213 OFFICE O/P EST LOW 20 MIN: CPT | Performed by: INTERNAL MEDICINE

## 2022-05-27 RX ORDER — NITROGLYCERIN 0.4 MG/1
TABLET SUBLINGUAL
COMMUNITY
Start: 2022-04-16

## 2022-05-27 RX ORDER — INSULIN GLARGINE-YFGN 100 [IU]/ML
INJECTION, SOLUTION SUBCUTANEOUS
COMMUNITY
Start: 2022-04-05

## 2022-05-27 NOTE — PROGRESS NOTES
Progress Note - [facility]   Kayce Olivares :1960 MRN: [id number]      Reason for Visit:  64 y o male here for annual follow-up    Assessment:  Doing well on current therapy of BiPAP 18/14 cm for moderate FREDY (ALEKSEY = 23 5)  The patient uses a full face mask  Plan:  Continue same    Follow up: One year    History of Present Illness:  History of FREDY on PAP therapy  Fully compliant and deriving benefit      Review of Systems      Genitourinary none   Cardiology palpitations/fluttering feeling in the chest and ankle/leg swelling   Gastrointestinal none   Neurology none   Constitutional claustrophobia and weight change   Integumentary none   Psychiatry none   Musculoskeletal joint pain, muscle aches, back pain, sciatica and leg cramps   Pulmonary shortness of breath with activity   ENT none   Endocrine none   Hematological none         I have reviewed and updated the review of systems as necessary      Historical Information    Past Medical History:   Past Medical History:   Diagnosis Date    Abscess of left thigh     Acute myocardial infarction ([facility] [address] )     Anesthesia     woke up during procedure    Anxiety     Arteriosclerotic cardiovascular disease     Asthma     Chest pain     COPD (chronic obstructive pulmonary disease) ([facility] [address] [facility]     Coronary artery disease     Diabetes mellitus ([facility] [address] [facility]     Fatty liver     Gastric ulcer     GERD (gastroesophageal reflux disease)     Hyperlipidemia     Hypertension     Low back pain     Myocardial infarction ([facility] [address] ) 2281,0758    Obstructive sleep apnea     no Cpap    Spondylosis of lumbar region without myelopathy or radiculopathy          Past Surgical History:   Past Surgical History:   Procedure Laterality Date    CARPAL TUNNEL RELEASE Right     COLONOSCOPY      CORONARY ANGIOPLASTY WITH STENT PLACEMENT  ,     EGD      HEMORRHOID SURGERY      NEUROPLASTY / TRANSPOSITION MEDIAN NERVE AT CARPAL TUNNEL Right     MO SHLDR ARTHROSCOP,LYSE ADHESNS Left 6/10/2020    Procedure: SHOULDER ARTHROSCOPIC CAPSULAR RELEASE;  Surgeon: Sally Gambino MD;  Location: [facility];  Service: Orthopedics       Social History:   Social History     Socioeconomic History    Marital status: Single     Spouse name: Not on file    Number of children: Not on file    Years of education: Not on file    Highest education level: Not on file   Occupational History    Not on file   Tobacco Use    Smoking status: Former Smoker     Packs/day: 0 00     Years: 0 00     Pack years: 0 00     Quit date: 2013     Years since quittin 9    Smokeless tobacco: Never Used   Vaping Use    Vaping Use: Never used   Substance and Sexual Activity    Alcohol use: No    Drug use: No    Sexual activity: Not Currently   Other Topics Concern    Not on file   Social History Narrative    Not on file     Social Determinants of Health     Financial Resource Strain: Not on file   Food Insecurity: Not on file   Transportation Needs: Not on file   Physical Activity: Not on file   Stress: Not on file   Social Connections: Not on file   Intimate Partner Violence: Not on file   Housing Stability: Not on file       Family History:   Family History   Problem Relation Age of Onset    Alzheimer's disease Mother     Heart failure Mother     Dementia Mother     Heart attack Father     Other Father         Cardiac Disorder    Other Family         Back Pain    Hypertension Family     Stroke Family         Complications    Cancer Other        Medications/Allergies:      Current Outpatient Medications:     acetaminophen (TYLENOL) 325 mg tablet, Take 650 mg by mouth every 6 (six) hours as needed for mild pain, Disp: , Rfl:     albuterol (PROVENTIL HFA,VENTOLIN HFA) 90 mcg/act inhaler, Inhale 2 puffs as needed for shortness of breath, Disp: 18 g, Rfl: 1    aspirin (ECOTRIN LOW STRENGTH) 81 mg EC tablet, Take 1 tablet (81 mg total) by mouth daily, Disp: 90 tablet, Rfl: 0    atorvastatin (LIPITOR) 40 mg tablet, TAKE 1 TABLET BY MOUTH EVERY DAY, Disp: 90 tablet, Rfl: 2    B-D UF III MINI PEN NEEDLES 31G X 5 MM MISC, INJECT AS DIRECTED 2 (TWO) TIMES A DAY USE AS DIRECTED, Disp: 100 each, Rfl: 0    clopidogrel (PLAVIX) 75 mg tablet, TAKE 1 TABLET BY MOUTH EVERY DAY, Disp: 90 tablet, Rfl: 3    fenofibrate (TRICOR) 145 mg tablet, TAKE 1 TABLET BY MOUTH EVERY DAY, Disp: 90 tablet, Rfl: 1    furosemide (LASIX) 40 mg tablet, Take 20 mg by mouth, Disp: , Rfl:     HYDROcodone-acetaminophen (Vicodin) 5-300 MG per tablet, Take 1 tablet by mouth every 6 (six) hours as needed for moderate pain Max Daily Amount: 4 tablets, Disp: 112 tablet, Rfl: 0    Icosapent Ethyl 1 g CAPS, Vascepa 1 gram capsule  TAKE 2 G BY MOUTH 2 (TWO) TIMES A DAY , Disp: , Rfl:     insulin glargine (LANTUS) 100 units/mL subcutaneous injection, Inject 31 Units under the skin 6 (six) times a day Inject 3 times in the morning and 3 times in the night, Disp: 170 mL, Rfl: 3    insulin glargine (Semglee) 100 units/mL subcutaneous injection, Inject 31 Units under the skin 6 (six) times a day Inject 3 times in the morning and 3 times in the night, Disp: 170 mL, Rfl: 3    Jardiance 25 MG TABS, TAKE 1 TABLET BY MOUTH EVERY DAY, Disp: 90 tablet, Rfl: 1    losartan (COZAAR) 25 mg tablet, losartan 25 mg tablet  TAKE 1 TABLET BY MOUTH EVERY DAY, Disp: , Rfl:     metoprolol tartrate (LOPRESSOR) 50 mg tablet, Take 25 mg by mouth 2 (two) times a day, Disp: , Rfl:     nitroglycerin (NITROSTAT) 0 4 mg SL tablet, PLEASE SEE ATTACHED FOR DETAILED DIRECTIONS, Disp: , Rfl:     pantoprazole (PROTONIX) 40 mg tablet, TAKE 1 TABLET BY MOUTH EVERY DAY, Disp: 90 tablet, Rfl: 5    Semglee, yfgn, 100 UNIT/ML SOPN, PLEASE SEE ATTACHED FOR DETAILED DIRECTIONS, Disp: , Rfl:           Objective      Vital Signs:   Vitals:    05/27/22 1246   BP: 138/60   Pulse: 68   SpO2: 97%     [place] Sleepiness Scale:  Total score: 6        Physical Exam:    General: Alert, appropriate, cooperative, overweight    Head: NC/AT    Skin: Warm, dry    Neuro: No motor abnormalities, cranial nerves appear intact    Extremity: No clubbing, cyanosis      DME Provider: [employer]        Counseling / Coordination of Care   I have spent 10 minutes with the patient today in which greater than 50% of this time was spent in counseling/coordination of care regarding: equipment and compliance  Board Certified Sleep Specialist    Portions of the record may have been created with voice recognition software  Occasional wrong word or "sound a like" substitutions may have occurred due to the inherent limitations of voice recognition software  Read the chart carefully and recognize, using context, where substitutions have occurred

## 2022-06-08 ENCOUNTER — TELEPHONE (OUTPATIENT)
Dept: SLEEP CENTER | Facility: CLINIC | Age: 62
End: 2022-06-08

## 2022-06-08 ENCOUNTER — VBI (OUTPATIENT)
Dept: ADMINISTRATIVE | Facility: OTHER | Age: 62
End: 2022-06-08

## 2022-06-12 DIAGNOSIS — E11.59 TYPE 2 DIABETES MELLITUS WITH OTHER CIRCULATORY COMPLICATION, WITH LONG-TERM CURRENT USE OF INSULIN (HCC): ICD-10-CM

## 2022-06-12 DIAGNOSIS — Z79.4 TYPE 2 DIABETES MELLITUS WITH OTHER CIRCULATORY COMPLICATION, WITH LONG-TERM CURRENT USE OF INSULIN (HCC): ICD-10-CM

## 2022-06-12 RX ORDER — PEN NEEDLE, DIABETIC 31 GX5/16"
NEEDLE, DISPOSABLE MISCELLANEOUS 2 TIMES DAILY
Qty: 100 EACH | Refills: 0 | Status: SHIPPED | OUTPATIENT
Start: 2022-06-12

## 2022-06-28 DIAGNOSIS — M54.50 CHRONIC BILATERAL LOW BACK PAIN WITHOUT SCIATICA: ICD-10-CM

## 2022-06-28 DIAGNOSIS — G89.29 CHRONIC BILATERAL LOW BACK PAIN WITHOUT SCIATICA: ICD-10-CM

## 2022-06-28 RX ORDER — HYDROCODONE BITARTRATE AND ACETAMINOPHEN 5; 300 MG/1; MG/1
1 TABLET ORAL EVERY 6 HOURS PRN
Qty: 112 TABLET | Refills: 0 | Status: SHIPPED | OUTPATIENT
Start: 2022-06-28

## 2022-06-30 DIAGNOSIS — I25.10 ARTERIOSCLEROTIC CARDIOVASCULAR DISEASE: ICD-10-CM

## 2022-06-30 RX ORDER — ASPIRIN 81 MG/1
TABLET ORAL
Qty: 90 TABLET | Refills: 0 | Status: SHIPPED | OUTPATIENT
Start: 2022-06-30

## 2022-07-08 DIAGNOSIS — K21.9 GASTROESOPHAGEAL REFLUX DISEASE WITHOUT ESOPHAGITIS: ICD-10-CM

## 2022-07-08 RX ORDER — PANTOPRAZOLE SODIUM 40 MG/1
TABLET, DELAYED RELEASE ORAL
Qty: 90 TABLET | Refills: 5 | Status: SHIPPED | OUTPATIENT
Start: 2022-07-08

## 2022-07-12 ENCOUNTER — APPOINTMENT (OUTPATIENT)
Dept: LAB | Age: 62
End: 2022-07-12
Payer: COMMERCIAL

## 2022-07-12 DIAGNOSIS — E66.01 MORBID OBESITY WITH BMI OF 45.0-49.9, ADULT (HCC): ICD-10-CM

## 2022-07-12 DIAGNOSIS — E11.9 TYPE 2 DIABETES MELLITUS WITHOUT COMPLICATION, WITH LONG-TERM CURRENT USE OF INSULIN (HCC): ICD-10-CM

## 2022-07-12 DIAGNOSIS — Z00.00 HEALTHCARE MAINTENANCE: ICD-10-CM

## 2022-07-12 DIAGNOSIS — I25.10 ARTERIOSCLEROTIC CARDIOVASCULAR DISEASE: ICD-10-CM

## 2022-07-12 DIAGNOSIS — Z79.4 TYPE 2 DIABETES MELLITUS WITHOUT COMPLICATION, WITH LONG-TERM CURRENT USE OF INSULIN (HCC): ICD-10-CM

## 2022-07-12 DIAGNOSIS — I10 BENIGN ESSENTIAL HYPERTENSION: ICD-10-CM

## 2022-07-12 LAB
ALBUMIN SERPL BCP-MCNC: 3.5 G/DL (ref 3.5–5)
ALP SERPL-CCNC: 60 U/L (ref 46–116)
ALT SERPL W P-5'-P-CCNC: 42 U/L (ref 12–78)
ANION GAP SERPL CALCULATED.3IONS-SCNC: 3 MMOL/L (ref 4–13)
AST SERPL W P-5'-P-CCNC: 33 U/L (ref 5–45)
BACTERIA UR QL AUTO: NORMAL /HPF
BASOPHILS # BLD AUTO: 0.05 THOUSANDS/ΜL (ref 0–0.1)
BASOPHILS NFR BLD AUTO: 1 % (ref 0–1)
BILIRUB SERPL-MCNC: 0.74 MG/DL (ref 0.2–1)
BILIRUB UR QL STRIP: NEGATIVE
BUN SERPL-MCNC: 24 MG/DL (ref 5–25)
CALCIUM SERPL-MCNC: 9.2 MG/DL (ref 8.3–10.1)
CHLORIDE SERPL-SCNC: 109 MMOL/L (ref 100–108)
CHOLEST SERPL-MCNC: 105 MG/DL
CLARITY UR: CLEAR
CO2 SERPL-SCNC: 26 MMOL/L (ref 21–32)
COLOR UR: ABNORMAL
CREAT SERPL-MCNC: 1.51 MG/DL (ref 0.6–1.3)
CREAT UR-MCNC: 122 MG/DL
EOSINOPHIL # BLD AUTO: 0.23 THOUSAND/ΜL (ref 0–0.61)
EOSINOPHIL NFR BLD AUTO: 3 % (ref 0–6)
ERYTHROCYTE [DISTWIDTH] IN BLOOD BY AUTOMATED COUNT: 14.4 % (ref 11.6–15.1)
EST. AVERAGE GLUCOSE BLD GHB EST-MCNC: 117 MG/DL
GFR SERPL CREATININE-BSD FRML MDRD: 49 ML/MIN/1.73SQ M
GLUCOSE P FAST SERPL-MCNC: 87 MG/DL (ref 65–99)
GLUCOSE UR STRIP-MCNC: ABNORMAL MG/DL
HBA1C MFR BLD: 5.7 %
HCT VFR BLD AUTO: 44 % (ref 36.5–49.3)
HDLC SERPL-MCNC: 21 MG/DL
HGB BLD-MCNC: 13.8 G/DL (ref 12–17)
HGB UR QL STRIP.AUTO: NEGATIVE
IMM GRANULOCYTES # BLD AUTO: 0.06 THOUSAND/UL (ref 0–0.2)
IMM GRANULOCYTES NFR BLD AUTO: 1 % (ref 0–2)
KETONES UR STRIP-MCNC: NEGATIVE MG/DL
LDLC SERPL CALC-MCNC: 33 MG/DL (ref 0–100)
LEUKOCYTE ESTERASE UR QL STRIP: ABNORMAL
LYMPHOCYTES # BLD AUTO: 1.59 THOUSANDS/ΜL (ref 0.6–4.47)
LYMPHOCYTES NFR BLD AUTO: 23 % (ref 14–44)
MCH RBC QN AUTO: 29.4 PG (ref 26.8–34.3)
MCHC RBC AUTO-ENTMCNC: 31.4 G/DL (ref 31.4–37.4)
MCV RBC AUTO: 94 FL (ref 82–98)
MICROALBUMIN UR-MCNC: 12 MG/L (ref 0–20)
MICROALBUMIN/CREAT 24H UR: 10 MG/G CREATININE (ref 0–30)
MONOCYTES # BLD AUTO: 0.69 THOUSAND/ΜL (ref 0.17–1.22)
MONOCYTES NFR BLD AUTO: 10 % (ref 4–12)
NEUTROPHILS # BLD AUTO: 4.25 THOUSANDS/ΜL (ref 1.85–7.62)
NEUTS SEG NFR BLD AUTO: 62 % (ref 43–75)
NITRITE UR QL STRIP: NEGATIVE
NON-SQ EPI CELLS URNS QL MICRO: NORMAL /HPF
NONHDLC SERPL-MCNC: 84 MG/DL
NRBC BLD AUTO-RTO: 0 /100 WBCS
PH UR STRIP.AUTO: 6 [PH]
PLATELET # BLD AUTO: 248 THOUSANDS/UL (ref 149–390)
PMV BLD AUTO: 10.7 FL (ref 8.9–12.7)
POTASSIUM SERPL-SCNC: 3.8 MMOL/L (ref 3.5–5.3)
PROT SERPL-MCNC: 7.1 G/DL (ref 6.4–8.2)
PROT UR STRIP-MCNC: NEGATIVE MG/DL
PSA SERPL-MCNC: 0.7 NG/ML (ref 0–4)
RBC # BLD AUTO: 4.7 MILLION/UL (ref 3.88–5.62)
RBC #/AREA URNS AUTO: NORMAL /HPF
SODIUM SERPL-SCNC: 138 MMOL/L (ref 136–145)
SP GR UR STRIP.AUTO: 1.01 (ref 1–1.03)
T4 FREE SERPL-MCNC: 1.02 NG/DL (ref 0.76–1.46)
TRIGL SERPL-MCNC: 255 MG/DL
TSH SERPL DL<=0.05 MIU/L-ACNC: 4.58 UIU/ML (ref 0.45–4.5)
UROBILINOGEN UR STRIP-ACNC: <2 MG/DL
WBC # BLD AUTO: 6.87 THOUSAND/UL (ref 4.31–10.16)
WBC #/AREA URNS AUTO: NORMAL /HPF

## 2022-07-12 PROCEDURE — 36415 COLL VENOUS BLD VENIPUNCTURE: CPT

## 2022-07-12 PROCEDURE — G0103 PSA SCREENING: HCPCS

## 2022-07-12 PROCEDURE — 80061 LIPID PANEL: CPT

## 2022-07-12 PROCEDURE — 80053 COMPREHEN METABOLIC PANEL: CPT

## 2022-07-12 PROCEDURE — 84439 ASSAY OF FREE THYROXINE: CPT

## 2022-07-12 PROCEDURE — 3061F NEG MICROALBUMINURIA REV: CPT | Performed by: INTERNAL MEDICINE

## 2022-07-12 PROCEDURE — 81001 URINALYSIS AUTO W/SCOPE: CPT

## 2022-07-12 PROCEDURE — 85025 COMPLETE CBC W/AUTO DIFF WBC: CPT

## 2022-07-12 PROCEDURE — 82043 UR ALBUMIN QUANTITATIVE: CPT | Performed by: INTERNAL MEDICINE

## 2022-07-12 PROCEDURE — 3044F HG A1C LEVEL LT 7.0%: CPT | Performed by: INTERNAL MEDICINE

## 2022-07-12 PROCEDURE — 83036 HEMOGLOBIN GLYCOSYLATED A1C: CPT

## 2022-07-12 PROCEDURE — 82570 ASSAY OF URINE CREATININE: CPT | Performed by: INTERNAL MEDICINE

## 2022-07-12 PROCEDURE — 84443 ASSAY THYROID STIM HORMONE: CPT

## 2022-07-14 ENCOUNTER — RA CDI HCC (OUTPATIENT)
Dept: OTHER | Facility: HOSPITAL | Age: 62
End: 2022-07-14

## 2022-07-14 NOTE — PROGRESS NOTES
[facility]  coding opportunities          Chart Reviewed number of suggestions sent to Provider: 1   I11 0    Patients Insurance        Commercial Insurance: [employer]

## 2022-07-21 ENCOUNTER — OFFICE VISIT (OUTPATIENT)
Dept: INTERNAL MEDICINE CLINIC | Facility: CLINIC | Age: 62
End: 2022-07-21
Payer: COMMERCIAL

## 2022-07-21 VITALS
DIASTOLIC BLOOD PRESSURE: 60 MMHG | HEIGHT: 66 IN | OXYGEN SATURATION: 94 % | TEMPERATURE: 98.1 F | BODY MASS INDEX: 47.25 KG/M2 | SYSTOLIC BLOOD PRESSURE: 124 MMHG | HEART RATE: 79 BPM | WEIGHT: 294 LBS

## 2022-07-21 DIAGNOSIS — E66.01 MORBID OBESITY WITH BMI OF 45.0-49.9, ADULT (HCC): ICD-10-CM

## 2022-07-21 DIAGNOSIS — G47.33 OBSTRUCTIVE SLEEP APNEA: ICD-10-CM

## 2022-07-21 DIAGNOSIS — M47.816 SPONDYLOSIS OF LUMBAR REGION WITHOUT MYELOPATHY OR RADICULOPATHY: ICD-10-CM

## 2022-07-21 DIAGNOSIS — Z79.4 TYPE 2 DIABETES MELLITUS WITHOUT COMPLICATION, WITH LONG-TERM CURRENT USE OF INSULIN (HCC): ICD-10-CM

## 2022-07-21 DIAGNOSIS — I25.10 ARTERIOSCLEROTIC CARDIOVASCULAR DISEASE: ICD-10-CM

## 2022-07-21 DIAGNOSIS — I10 BENIGN ESSENTIAL HYPERTENSION: ICD-10-CM

## 2022-07-21 DIAGNOSIS — E11.9 TYPE 2 DIABETES MELLITUS WITHOUT COMPLICATION, WITH LONG-TERM CURRENT USE OF INSULIN (HCC): ICD-10-CM

## 2022-07-21 DIAGNOSIS — J43.1 PANLOBULAR EMPHYSEMA (HCC): ICD-10-CM

## 2022-07-21 DIAGNOSIS — Z00.00 HEALTHCARE MAINTENANCE: Primary | ICD-10-CM

## 2022-07-21 DIAGNOSIS — E66.01 MORBID OBESITY WITH BODY MASS INDEX OF 45.0-49.9 IN ADULT (HCC): ICD-10-CM

## 2022-07-21 PROCEDURE — 3078F DIAST BP <80 MM HG: CPT | Performed by: INTERNAL MEDICINE

## 2022-07-21 PROCEDURE — 3725F SCREEN DEPRESSION PERFORMED: CPT | Performed by: INTERNAL MEDICINE

## 2022-07-21 PROCEDURE — 3074F SYST BP LT 130 MM HG: CPT | Performed by: INTERNAL MEDICINE

## 2022-07-21 PROCEDURE — 99396 PREV VISIT EST AGE 40-64: CPT | Performed by: INTERNAL MEDICINE

## 2022-07-21 NOTE — ASSESSMENT & PLAN NOTE
Patient's blood pressure is stable  He has a slight decline in his renal function but he is still stage III with his chronic kidney disease  We will continue present medication and surveillance

## 2022-07-21 NOTE — ASSESSMENT & PLAN NOTE
Patient's blood sugar continues to show adequate control with present treatment  Patient is compliant with watching his blood sugar readings and making adjustment as needed to his insulin dose    Patient will continue present medications surveillance for renal disease, foot exam was performed today and again in reinforce the importance of making an appointment to be seen by ophthalmology for routine eye exam  Lab Results   Component Value Date    HGBA1C 5 7 (H) 07/12/2022

## 2022-07-21 NOTE — ASSESSMENT & PLAN NOTE
Patient does have a history of chronic obstructive pulmonary disease  History of tobacco abuse in the past   Patient relates again as previously noted that the warmer weather does cause some problems with his breathing especially if it is very hot and humid    No severe exacerbations recently and he continues to use his inhalers as prescribed

## 2022-07-21 NOTE — ASSESSMENT & PLAN NOTE
With the patient's BMI he is considered morbidly obese  as previously we discussed the importance of watching his caloric intake and looking at the ways that he can reduce his calories on a daily basis in order to help with weight loss  Patient is extremely limited with this exercise capacity because of his chronic low back pain and he was told the only way to lose weight at this point would be to reduce his caloric intake to 2000 calories per day  Patient understands but he has not been compliant in the past and weight is stable

## 2022-07-21 NOTE — PROGRESS NOTES
Diabetic Foot Exam    Patient's shoes and socks removed  Right Foot/Ankle   Right Foot Inspection  Skin Exam: skin normal and skin intact  No dry skin, no warmth, no callus, no erythema, no maceration, no abnormal color, no pre-ulcer, no ulcer and no callus  Toe Exam: ROM and strength within normal limits and swelling (Significant +1 edema bilateral lower extremities)  No tenderness, erythema and  no right toe deformity    Sensory   Vibration: intact  Proprioception: intact  Monofilament testing: intact    Vascular  Capillary refills: < 3 seconds  The right DP pulse is 1+  The right PT pulse is 1+  Left Foot/Ankle  Left Foot Inspection  Skin Exam: skin normal and skin intact  No dry skin, no warmth, no erythema, no maceration, normal color, no pre-ulcer, no ulcer and no callus  Toe Exam: ROM and strength within normal limits and swelling  No tenderness, no erythema and no left toe deformity  Sensory   Vibration: intact  Proprioception: intact  Monofilament testing: intact    Vascular  Capillary refills: < 3 seconds  The left DP pulse is 1+  The left PT pulse is 1+       Assign Risk Category  Deformity present  No loss of protective sensation  No weak pulses  Risk: 1

## 2022-07-21 NOTE — ASSESSMENT & PLAN NOTE
Is seeing his cardiologist every 6 months  Denies any chest pain or pressure no increasing shortness of breath with exertion  Patient remains on medication as previously and states he cannot remember the last time he took a nitroglycerin    Will continue present medication and surveillance

## 2022-07-21 NOTE — ASSESSMENT & PLAN NOTE
Patient is a 28-year-old male history of medical problems as outlined previously who is being seen today for repeat physical   He did have extensive lab testing performed prior to the visit today we did discuss the results of length with patient  Patient states in general doing about the same with no specific complaints or concerns  He states that his breathing can be up problem but not severely so during the warmer weather  He continues to follow-up with his pulmonologist, cardiologist and recently had an EGD and colonoscopy performed  Did undergo full physical exam today in the office which was difficult positioning the patient with his chronic back pain and discomfort  Patient has no new findings on exam today  Will continue present treatment and surveillance check a fasting blood sugar hemoglobin A1c with his next visit    Reminded the patient again of the importance of getting routine eye exam performed

## 2022-07-21 NOTE — ASSESSMENT & PLAN NOTE
Chronic low back pain and discomfort  Patient does have difficulties with maneuvers especially with exam   Patient continues to take narcotic analgesics as needed    Has not benefitted from physical therapy or pain management

## 2022-07-21 NOTE — PATIENT INSTRUCTIONS
Weight Management   AMBULATORY CARE:   Why it is important to manage your weight:  Being overweight increases your risk of health conditions such as heart disease, high blood pressure, type 2 diabetes, and certain types of cancer  It can also increase your risk for osteoarthritis, sleep apnea, and other respiratory problems  Aim for a slow, steady weight loss  Even a small amount of weight loss can lower your risk of health problems  Risks of being overweight:  Extra weight can cause many health problems, including the following:  · Diabetes (high blood sugar level)    · High blood pressure or high cholesterol    · Heart disease    · Stroke    · Gallbladder or liver disease    · Cancer of the colon, breast, prostate, liver, or kidney    · Sleep apnea    · Arthritis or gout    Screening  is done to check for health conditions before you have signs or symptoms  If you are 28to 79years old, your blood sugar level may be checked every 3 years for signs of prediabetes or diabetes  Your healthcare provider will check your blood pressure at each visit  High blood pressure can lead to a stroke or other problems  Your provider may check for signs of heart disease, cancer, or other health problems  How to lose weight safely:  A safe and healthy way to lose weight is to eat fewer calories and get regular exercise  · You can lose up about 1 pound a week by decreasing the number of calories you eat by 500 calories each day  You can decrease calories by eating smaller portion sizes or by cutting out high-calorie foods  Read labels to find out how many calories are in the foods you eat  · You can also burn calories with exercise such as walking, swimming, or biking  You will be more likely to keep weight off if you make these changes part of your lifestyle  Exercise at least 30 minutes per day on most days of the week   You can also fit in more physical activity by taking the stairs instead of the elevator or parking farther away from stores  Ask your healthcare provider about the best exercise plan for you  Healthy meal plan for weight management:  A healthy meal plan includes a variety of foods, contains fewer calories, and helps you stay healthy  A healthy meal plan includes the following:     · Eat whole-grain foods more often  A healthy meal plan should contain fiber  Fiber is the part of grains, fruits, and vegetables that is not broken down by your body  Whole-grain foods are healthy and provide extra fiber in your diet  Some examples of whole-grain foods are whole-wheat breads and pastas, oatmeal, brown rice, and bulgur  · Eat a variety of vegetables every day  Include dark, leafy greens such as spinach, kale, rosalba greens, and mustard greens  Eat yellow and orange vegetables such as carrots, sweet potatoes, and winter squash  · Eat a variety of fruits every day  Choose fresh or canned fruit (canned in its own juice or light syrup) instead of juice  Fruit juice has very little or no fiber  · Eat low-fat dairy foods  Drink fat-free (skim) milk or 1% milk  Eat fat-free yogurt and low-fat cottage cheese  Try low-fat cheeses such as mozzarella and other reduced-fat cheeses  · Choose meat and other protein foods that are low in fat  Choose beans or other legumes such as split peas or lentils  Choose fish, skinless poultry (chicken or turkey), or lean cuts of red meat (beef or pork)  Before you cook meat or poultry, cut off any visible fat  · Use less fat and oil  Try baking foods instead of frying them  Add less fat, such as margarine, sour cream, regular salad dressing and mayonnaise to foods  Eat fewer high-fat foods  Some examples of high-fat foods include french fries, doughnuts, ice cream, and cakes  · Eat fewer sweets  Limit foods and drinks that are high in sugar  This includes candy, cookies, regular soda, and sweetened drinks  Ways to decrease calories:   · Eat smaller portions  ? Use a small plate with smaller servings  ? Do not eat second helpings  ? When you eat at a restaurant, ask for a box and place half of your meal in the box before you eat  ? Share an entrée with someone else  · Replace high-calorie snacks with healthy, low-calorie snacks  ? Choose fresh fruit, vegetables, fat-free rice cakes, or air-popped popcorn instead of potato chips, nuts, or chocolate  ? Choose water or calorie-free drinks instead of soda or sweetened drinks  · Do not shop for groceries when you are hungry  You may be more likely to make unhealthy food choices  Take a grocery list of healthy foods and shop after you have eaten  · Eat regular meals  Do not skip meals  Skipping meals can lead to overeating later in the day  This can make it harder for you to lose weight  Eat a healthy snack in place of a meal if you do not have time to eat a regular meal  Talk with a dietitian to help you create a meal plan and schedule that is right for you  Other things to consider as you try to lose weight:   · Be aware of situations that may give you the urge to overeat, such as eating while watching television  Find ways to avoid these situations  For example, read a book, go for a walk, or do crafts  · Meet with a weight loss support group or friends who are also trying to lose weight  This may help you stay motivated to continue working on your weight loss goals  © Copyright [employer] 2022 Information is for End User's use only and may not be sold, redistributed or otherwise used for commercial purposes  All illustrations and images included in CareNotes® are the copyrighted property of A D A M , Inc  or [facility] Corina Warner   The above information is an  only  It is not intended as medical advice for individual conditions or treatments  Talk to your doctor, nurse or pharmacist before following any medical regimen to see if it is safe and effective for you      Low Fat Diet   AMBULATORY CARE:   A low-fat diet  is an eating plan that is low in total fat, unhealthy fat, and cholesterol  You may need to follow a low-fat diet if you have trouble digesting or absorbing fat  You may also need to follow this diet if you have high cholesterol  You can also lower your cholesterol by increasing the amount of fiber in your diet  Soluble fiber is a type of fiber that helps to decrease cholesterol levels  Different types of fat in food:   · Limit unhealthy fats  A diet that is high in cholesterol, saturated fat, and trans fat may cause unhealthy cholesterol levels  Unhealthy cholesterol levels increase your risk of heart disease  ? Cholesterol:  Limit intake of cholesterol to less than 200 mg per day  Cholesterol is found in meat, eggs, and dairy  ? Saturated fat:  Limit saturated fat to less than 7% of your total daily calories  Ask your dietitian how many calories you need each day  Saturated fat is found in butter, cheese, ice cream, whole milk, and palm oil  Saturated fat is also found in meat, such as beef, pork, chicken skin, and processed meats  Processed meats include sausage, hot dogs, and bologna  ? Trans fat:  Avoid trans fat as much as possible  Trans fat is used in fried and baked foods  Foods that say trans fat free on the label may still have up to 0 5 grams of trans fat per serving  · Include healthy fats  Replace foods that are high in saturated and trans fat with foods high in healthy fats  This may help to decrease high cholesterol levels  ? Monounsaturated fats: These are found in avocados, nuts, and vegetable oils, such as olive, canola, and sunflower oil  ? Polyunsaturated fats: These can be found in vegetable oils, such as soybean or corn oil  Omega-3 fats can help to decrease the risk of heart disease  Omega-3 fats are found in fish, such as salmon, herring, trout, and tuna   Omega-3 fats can also be found in plant foods, such as walnuts, flaxseed, soybeans, and canola oil  Foods to limit or avoid:   · Grains:      ? Snacks that are made with partially hydrogenated oils, such as chips, regular crackers, and butter-flavored popcorn    ? High-fat baked goods, such as biscuits, croissants, doughnuts, pies, cookies, and pastries    · Dairy:      ? Whole milk, 2% milk, and yogurt and ice cream made with whole milk    ? Half and half creamer, heavy cream, and whipping cream    ? Cheese, cream cheese, and sour cream    · Meats and proteins:      ? High-fat cuts of meat (T-bone steak, regular hamburger, and ribs)    ? Fried meat, poultry (turkey and chicken), and fish    ? Poultry (chicken and turkey) with skin    ? Cold cuts (salami or bologna), hot dogs, phan, and sausage    ? Whole eggs and egg yolks    · Vegetables and fruits with added fat:      ? Fried vegetables or vegetables in butter or high-fat sauces, such as cream or cheese sauces    ? Fried fruit or fruit served with butter or cream    · Fats:      ? Butter, stick margarine, and shortening    ? Coconut, palm oil, and palm kernel oil    Foods to include:   · Grains:      ? Whole-grain breads, cereals, pasta, and brown rice    ? Low-fat crackers and pretzels    · Vegetables and fruits:      ? Fresh, frozen, or canned vegetables (no salt or low-sodium)    ? Fresh, frozen, dried, or canned fruit (canned in light syrup or fruit juice)    ? Avocado    · Low-fat dairy products:      ? Nonfat (skim) or 1% milk    ? Nonfat or low-fat cheese, yogurt, and cottage cheese    · Meats and proteins:      ? Chicken or turkey with no skin    ? Baked or broiled fish    ? Lean beef and pork (loin, round, extra lean hamburger)    ? Beans and peas, unsalted nuts, soy products    ? Egg whites and substitutes    ? Seeds and nuts    · Fats:      ? Unsaturated oil, such as canola, olive, peanut, soybean, or sunflower oil    ? Soft or liquid margarine and vegetable oil spread    ?  Low-fat salad dressing    Other ways to decrease fat:   · Read food labels before you buy foods  Choose foods that have less than 30% of calories from fat  Choose low-fat or fat-free dairy products  Remember that fat free does not mean calorie free  These foods still contain calories, and too many calories can lead to weight gain  · Trim fat from meat and avoid fried food  Trim all visible fat from meat before you cook it  Remove the skin from poultry  Do not vaughn meat, fish, or poultry  Bake, roast, boil, or broil these foods instead  Avoid fried foods  Eat a baked potato instead of Western Kathy fries  Steam vegetables instead of sautéing them in butter  · Add less fat to foods  Use imitation phan bits on salads and baked potatoes instead of regular phan bits  Use fat-free or low-fat salad dressings instead of regular dressings  Use low-fat or nonfat butter-flavored topping instead of regular butter or margarine on popcorn and other foods  Ways to decrease fat in recipes:  Replace high-fat ingredients with low-fat or nonfat ones  This may cause baked goods to be drier than usual  You may need to use nonfat cooking spray on pans to prevent food from sticking  You also may need to change the amount of other ingredients, such as water, in the recipe  Try the following:  · Use low-fat or light margarine instead of regular margarine or shortening  · Use lean ground turkey breast or chicken, or lean ground beef (less than 5% fat) instead of hamburger  · Add 1 teaspoon of canola oil to 8 ounces of skim milk instead of using cream or half and half  · Use grated zucchini, carrots, or apples in breads instead of coconut  · Use blenderized, low-fat cottage cheese, plain tofu, or low-fat ricotta cheese instead of cream cheese  · Use 1 egg white and 1 teaspoon of canola oil, or use ¼ cup (2 ounces) of fat-free egg substitute instead of a whole egg       · Replace half of the oil that is called for in a recipe with applesauce when you bake  Use 3 tablespoons of cocoa powder and 1 tablespoon of canola oil instead of a square of baking chocolate  How to increase fiber:  Eat enough high-fiber foods to get 20 to 30 grams of fiber every day  Slowly increase your fiber intake to avoid stomach cramps, gas, and other problems  · Eat 3 ounces of whole-grain foods each day  An ounce is about 1 slice of bread  Eat whole-grain breads, such as whole-wheat bread  Whole wheat, whole-wheat flour, or other whole grains should be listed as the first ingredient on the food label  Replace white flour with whole-grain flour or use half of each in recipes  Whole-grain flour is heavier than white flour, so you may have to add more yeast or baking powder  · Eat a high-fiber cereal for breakfast   Oatmeal is a good source of soluble fiber  Look for cereals that have bran or fiber in the name  Choose whole-grain products, such as brown rice, barley, and whole-wheat pasta  · Eat more beans, peas, and lentils  For example, add beans to soups or salads  Eat at least 5 cups of fruits and vegetables each day  Eat fruits and vegetables with the peel because the peel is high in fiber  © Copyright [employer] 2022 Information is for End User's use only and may not be sold, redistributed or otherwise used for commercial purposes  All illustrations and images included in CareNotes® are the copyrighted property of A [employer] A M , Inc  or [address] [place], [place] [address]s[place]   The above information is an  only  It is not intended as medical advice for individual conditions or treatments  Talk to your doctor, nurse or pharmacist before following any medical regimen to see if it is safe and effective for you  Heart Healthy Diet   AMBULATORY CARE:   A heart healthy diet  is an eating plan low in unhealthy fats and sodium (salt)  The plan is high in healthy fats and fiber   A heart healthy diet helps improve your cholesterol levels and lowers your risk for heart disease and stroke  A dietitian will teach you how to read and understand food labels  Heart healthy diet guidelines to follow:   · Choose foods that contain healthy fats  ? Unsaturated fats  include monounsaturated and polyunsaturated fats  Unsaturated fat is found in foods such as soybean, canola, olive, corn, and safflower oils  It is also found in soft tub margarine that is made with liquid vegetable oil  ? Omega-3 fat  is found in certain fish, such as salmon, tuna, and trout, and in walnuts and flaxseed  Eat fish high in omega-3 fats at least 2 times a week  · Get 20 to 30 grams of fiber each day  Fruits, vegetables, whole-grain foods, and legumes (cooked beans) are good sources of fiber  · Limit or do not have unhealthy fats  ? Cholesterol  is found in animal foods, such as eggs and lobster, and in dairy products made from whole milk  Limit cholesterol to less than 200 mg each day  ? Saturated fat  is found in meats, such as phan and hamburger  It is also found in chicken or turkey skin, whole milk, and butter  Limit saturated fat to less than 7% of your total daily calories  ? Trans fat  is found in packaged foods, such as potato chips and cookies  It is also in hard margarine, some fried foods, and shortening  Do not eat foods that contain trans fats  · Limit sodium as directed  You may be told to limit sodium to 2,000 to 2,300 mg each day  Choose low-sodium or no-salt-added foods  Add little or no salt to food you prepare  Use herbs and spices in place of salt  Include the following in your heart healthy plan:  Ask your dietitian or healthcare provider how many servings to have from each of the following food groups:  · Grains:      ? Whole-wheat breads, cereals, and pastas, and brown rice    ? Low-fat, low-sodium crackers and chips    · Vegetables:      ? Broccoli, green beans, green peas, and spinach    ? Collards, kale, and lima beans    ?  Carrots, sweet potatoes, tomatoes, and peppers    ? Canned vegetables with no salt added    · Fruits:      ? Bananas, peaches, pears, and pineapple    ? Grapes, raisins, and dates    ? Oranges, tangerines, grapefruit, orange juice, and grapefruit juice    ? Apricots, mangoes, melons, and papaya    ? Raspberries and strawberries    ? Canned fruit with no added sugar    · Low-fat dairy:      ? Nonfat (skim) milk, 1% milk, and low-fat almond, cashew, or soy milks fortified with calcium    ? Low-fat cheese, regular or frozen yogurt, and cottage cheese    · Meats and proteins:      ? Lean cuts of beef and pork (loin, leg, round), skinless chicken and turkey    ? Legumes, soy products, egg whites, or nuts    Limit or do not include the following in your heart healthy plan:   · Unhealthy fats and oils:      ? Whole or 2% milk, cream cheese, sour cream, or cheese    ? High-fat cuts of beef (T-bone steaks, ribs), chicken or turkey with skin, and organ meats such as liver    ? Butter, stick margarine, shortening, and cooking oils such as coconut or palm oil    · Foods and liquids high in sodium:      ? Packaged foods, such as frozen dinners, cookies, macaroni and cheese, and cereals with more than 300 mg of sodium per serving    ? Vegetables with added sodium, such as instant potatoes, vegetables with added sauces, or regular canned vegetables    ? Cured or smoked meats, such as hot dogs, phan, and sausage    ? High-sodium ketchup, barbecue sauce, salad dressing, pickles, olives, soy sauce, or miso    · Foods and liquids high in sugar:      ? Candy, cake, cookies, pies, or doughnuts    ? Soft drinks (soda), sports drinks, or sweetened tea    ? Canned or dry mixes for cakes, soups, sauces, or gravies    Other healthy heart guidelines:   · Do not smoke  Nicotine and other chemicals in cigarettes and cigars can cause lung and heart damage  Ask your healthcare provider for information if you currently smoke and need help to quit   E-cigarettes or smokeless tobacco still contain nicotine  Talk to your healthcare provider before you use these products  · Limit or do not drink alcohol as directed  Alcohol can damage your heart and raise your blood pressure  Your healthcare provider may give you specific daily and weekly limits  The general recommended limit is 1 drink a day for women 21 or older and for men 72 or older  Do not have more than 3 drinks in a day or 7 in a week  The recommended limit is 2 drinks a day for men 24to 59years of age  Do not have more than 4 drinks in a day or 14 in a week  A drink of alcohol is 12 ounces of beer, 5 ounces of wine, or 1½ ounces of liquor  · Exercise regularly  Exercise can help you maintain a healthy weight and improve your blood pressure and cholesterol levels  Regular exercise can also decrease your risk for heart problems  Ask your healthcare provider about the best exercise plan for you  Do not start an exercise program without asking your healthcare provider  Follow up with your doctor or cardiologist as directed:  Write down your questions so you remember to ask them during your visits  © Copyright [employer] 2022 Information is for End User's use only and may not be sold, redistributed or otherwise used for commercial purposes  All illustrations and images included in CareNotes® are the copyrighted property of [employer]  or [address] [place] [address]  The above information is an  only  It is not intended as medical advice for individual conditions or treatments  Talk to your doctor, nurse or pharmacist before following any medical regimen to see if it is safe and effective for you  Calorie Counting Diet   WHAT YOU NEED TO KNOW:   What is a calorie counting diet? It is a meal plan based on counting calories each day to reach a healthy body weight  You will need to eat fewer calories if you are trying to lose weight   Weight loss may decrease your risk for certain health problems or improve your health if you have health problems  Some of these health problems include heart disease, high blood pressure, and diabetes  What foods should I avoid? Your dietitian will tell you if you need to avoid certain foods based on your body weight and health condition  You may need to avoid high-fat foods if you are at risk for or have heart disease  You may need to eat fewer foods from the breads and starches food group if you have diabetes  How many calories are in foods? The following is a list of foods and drinks with the approximate number of calories in each  Check the food label to find the exact number of calories  A dietitian can tell you how many calories you should have from each food group each day  · Carbohydrate:      ? ½ of a 3-inch bagel, 1 slice of bread, or ½ of a hamburger bun or hot dog bun (80)    ? 1 (8-inch) flour tortilla or ½ cup of cooked rice (100)    ? 1 (6-inch) corn tortilla (80)    ? 1 (6-inch) pancake or 1 cup of bran flakes cereal (110)    ? ½ cup of cooked cereal (80)    ? ½ cup of cooked pasta (85)    ? 1 ounce of pretzels (100)    ? 3 cups of air-popped popcorn without butter or oil (80)    · Dairy:      ? 1 cup of skim or 1% milk (90)    ? 1 cup of 2% milk (120)    ? 1 cup of whole milk (160)    ? 1 cup of 2% chocolate milk (220)    ? 1 ounce of low-fat cheese with 3 grams of fat per ounce (70)    ? 1 ounce of cheddar cheese (114)    ? ½ cup of 1% fat cottage cheese (80)    ? 1 cup of plain or sugar-free, fat-free yogurt (90)    · Protein foods:      ? 3 ounces of fish (not breaded or fried) (95)    ? 3 ounces of breaded, fried fish (195)    ? ¾ cup of tuna canned in water (105)    ? 3 ounces of chicken breast without skin (105)    ? 1 fried chicken breast with skin (350)    ? ¼ cup of fat free egg substitute (40)    ? 1 large egg (75)    ? 3 ounces of lean beef or pork (165)    ? 3 ounces of fried pork chop or ham (185)    ?  ½ cup of cooked dried beans, such as kidney, nava, lentils, or navy (115)    ? 3 ounces of bologna or lunch meat (225)    ? 2 links of breakfast sausage (140)    · Vegetables:      ? ½ cup of sliced mushrooms (10)    ? 1 cup of salad greens, such as lettuce, spinach, or moon (15)    ? ½ cup of steamed asparagus (20)    ? ½ cup of cooked summer squash, zucchini squash, or green or wax beans (25)    ? 1 cup of broccoli or cauliflower florets, or 1 medium tomato (25)    ? 1 large raw carrot or ½ cup of cooked carrots (40)    ? ? of a medium cucumber or 1 stalk of celery (5)    ? 1 small baked potato (160)    ? 1 cup of breaded, fried vegetables (230)    · Fruit:      ? 1 (6-inch) banana (55)     ? ½ of a 4-inch grapefruit (55)    ? 15 grapes (60)    ? 1 medium orange or apple (70)    ? 1 large peach (65)    ? 1 cup of fresh pineapple chunks (75)    ? 1 cup of melon cubes (50)    ? 1¼ cups of whole strawberries (45)    ? ½ cup of fruit canned in juice (55)    ? ½ cup of fruit canned in heavy syrup (110)    ? ? cup of raisins (130)    ? ½ cup of unsweetened fruit juice (60)    ? ½ cup of grape, cranberry, or prune juice (90)    · Fat:      ? 10 peanuts or 2 teaspoons of peanut butter (55)    ? 2 tablespoons of avocado or 1 tablespoon of regular salad dressing (45)    ? 2 slices of phan (90)    ? 1 teaspoon of oil, such as safflower, canola, corn, or olive oil (45)    ? 2 teaspoons of low-fat margarine, or 1 tablespoon of low-fat mayonnaise (50)    ? 1 teaspoon of regular margarine (40)    ? 1 tablespoon of regular mayonnaise (135)    ? 1 tablespoon of cream cheese or 2 tablespoons of low-fat cream cheese (45)    ? 2 tablespoons of vegetable shortening (215)    · Dessert and sweets:      ? 8 animal crackers or 5 vanilla wafers (80)    ? 1 frozen fruit juice bar (80)    ? ½ cup of ice milk or low-fat frozen yogurt (90)    ? ½ cup of sherbet or sorbet (125)    ? ½ cup of sugar-free pudding or custard (60)    ? ½ cup of ice cream (140)    ?  ½ cup of pudding or custard (175)    ? 1 (2-inch) square chocolate brownie (185)    · Combination foods:      ? Bean burrito made with an 8-inch tortilla, without cheese (275)    ? Chicken breast sandwich with lettuce and tomato (325)    ? 1 cup of chicken noodle soup (60)    ? 1 beef taco (175)    ? Regular hamburger with lettuce and tomato (310)    ? Regular cheeseburger with lettuce and tomato (410)     ? ¼ of a 12-inch cheese pizza (280)    ? Fried fish sandwich with lettuce and tomato (425)    ? Hot dog and bun (275)    ? 1½ cups of macaroni and cheese (310)    ? Taco salad with a fried tortilla shell (870)    · Low-calorie foods:      ? 1 tablespoon of ketchup or 1 tablespoon of fat free sour cream (15)    ? 1 teaspoon of mustard (5)    ? ¼ cup of salsa (20)    ? 1 large dill pickle (15)    ? 1 tablespoon of fat free salad dressing (10)    ? 2 teaspoons of low-sugar, light jam or jelly, or 1 tablespoon of sugar-free syrup (15)    ? 1 sugar-free popsicle (15)    ? 1 cup of club soda, seltzer water, or diet soda (0)    CARE AGREEMENT:   You have the right to help plan your care  Discuss treatment options with your healthcare provider to decide what care you want to receive  You always have the right to refuse treatment  The above information is an  only  It is not intended as medical advice for individual conditions or treatments  Talk to your doctor, nurse or pharmacist before following any medical regimen to see if it is safe and effective for you  © Copyright [employer] 2022 Information is for End User's use only and may not be sold, redistributed or otherwise used for commercial purposes   All illustrations and images included in CareNotes® are the copyrighted property of [employer]  or [address] [place] [address] [facility]

## 2022-07-21 NOTE — PROGRESS NOTES
Assessment/Plan:    Healthcare maintenance  Patient is a 70-year-old male history of medical problems as outlined previously who is being seen today for repeat physical   He did have extensive lab testing performed prior to the visit today we did discuss the results of length with patient  Patient states in general doing about the same with no specific complaints or concerns  He states that his breathing can be up problem but not severely so during the warmer weather  He continues to follow-up with his pulmonologist, cardiologist and recently had an EGD and colonoscopy performed  Did undergo full physical exam today in the office which was difficult positioning the patient with his chronic back pain and discomfort  Patient has no new findings on exam today  Will continue present treatment and surveillance check a fasting blood sugar hemoglobin A1c with his next visit  Reminded the patient again of the importance of getting routine eye exam performed    Benign essential hypertension  Patient's blood pressure is stable  He has a slight decline in his renal function but he is still stage III with his chronic kidney disease  We will continue present medication and surveillance  Chronic obstructive pulmonary disease (HCC)  Patient does have a history of chronic obstructive pulmonary disease  History of tobacco abuse in the past   Patient relates again as previously noted that the warmer weather does cause some problems with his breathing especially if it is very hot and humid  No severe exacerbations recently and he continues to use his inhalers as prescribed    Arteriosclerotic cardiovascular disease  Is seeing his cardiologist every 6 months  Denies any chest pain or pressure no increasing shortness of breath with exertion  Patient remains on medication as previously and states he cannot remember the last time he took a nitroglycerin    Will continue present medication and surveillance    Obstructive sleep apnea  A compliant with CPAP  Again no recent respiratory difficulties or problems  Type 2 diabetes mellitus, with long-term current use of insulin (HCC)  Patient's blood sugar continues to show adequate control with present treatment  Patient is compliant with watching his blood sugar readings and making adjustment as needed to his insulin dose  Patient will continue present medications surveillance for renal disease, foot exam was performed today and again in reinforce the importance of making an appointment to be seen by ophthalmology for routine eye exam  Lab Results   Component Value Date    HGBA1C 5 7 (H) 07/12/2022       Spondylosis of lumbar region without myelopathy or radiculopathy  Chronic low back pain and discomfort  Patient does have difficulties with maneuvers especially with exam   Patient continues to take narcotic analgesics as needed  Has not benefitted from physical therapy or pain management    Morbid obesity with body mass index of 45 0-49 9 in [facility])  With the patient's BMI he is considered morbidly obese  as previously we discussed the importance of watching his caloric intake and looking at the ways that he can reduce his calories on a daily basis in order to help with weight loss  Patient is extremely limited with this exercise capacity because of his chronic low back pain and he was told the only way to lose weight at this point would be to reduce his caloric intake to 2000 calories per day  Patient understands but he has not been compliant in the past and weight is stable  Diagnoses and all orders for this visit:    Healthcare maintenance    Type 2 diabetes mellitus without complication, with long-term current use of insulin (HCC)  -     Hemoglobin A1C; Future    Panlobular emphysema (HCC)    Arteriosclerotic cardiovascular disease    Obstructive sleep apnea    Benign essential hypertension  -     Basic metabolic panel;  Future    Spondylosis of lumbar region without myelopathy or radiculopathy    Morbid obesity with body mass index of 45 0-49 9 in adult [facility])          Subjective:      Patient ID: Anthony Garibay is a 64 y o  male  Pleasant articulate morbidly obese 19-year-old male who is awake alert in no acute distress oriented x3 who is here today for repeat physical   States he has been doing about the same physically and offers no new complaints or concerns  Recently had lab testing performed for the exam today and we did discuss the results of length with the patient  The following portions of the patient's history were reviewed and updated as appropriate:   He  has a past medical history of Abscess of left thigh, Acute myocardial infarction [facility]), Anesthesia, Anxiety, Arteriosclerotic cardiovascular disease, Asthma, Chest pain, COPD (chronic obstructive pulmonary disease) ([facility] [address] ), Coronary artery disease, Diabetes mellitus ([address] ), Fatty liver, Gastric ulcer, GERD (gastroesophageal reflux disease), Hyperlipidemia, Hypertension, Low back pain, Myocardial infarction ([facility] [address] ) (4080,1021), Obstructive sleep apnea, and Spondylosis of lumbar region without myelopathy or radiculopathy    He   Patient Active Problem List    Diagnosis Date Noted    Preoperative clearance 06/23/2021    Acute on chronic systolic congestive heart failure ([facility] [address] ) 05/04/2021    Diabetes mellitus ([address] )     Lesion of nose 11/13/2020    Type 2 diabetes mellitus, with long-term current use of insulin ([address] ) 07/06/2020    Obstructive sleep apnea 07/06/2020    Aftercare following surgery of the musculoskeletal system 06/15/2020    Impingement syndrome of left shoulder 01/14/2020    Adhesive capsulitis of left shoulder 01/14/2020    Chronic left shoulder pain 08/21/2019    Healthcare maintenance 04/10/2019    Morbid obesity with body mass index of 45 0-49 9 in [facility]) 11/29/2018    Low back pain 10/22/2018    Dysuria 07/24/2018    Lumbar radiculopathy 12/29/2017    Myofascial pain syndrome 12/29/2017    [facility]roili[facility] ) 12/29/2017    Chest pain 08/02/2016    Spondylosis of lumbar region without myelopathy or radiculopathy 04/15/2014    Benign essential hypertension 10/16/2012    Arteriosclerotic cardiovascular disease 06/14/2012    Chronic obstructive pulmonary disease ([facility] ) 06/14/2012     He  has a past surgical history that includes Coronary angioplasty with stent (2006, 2007); Neuroplasty / transposition median nerve at carpal tunnel (Right); EGD; Carpal tunnel release (Right); Colonoscopy; Hemorrhoid surgery; and pr shldr arthroscop,lyse adhesns (Left, 6/10/2020)  His family history includes Alzheimer's disease in his mother; Cancer in his other; Dementia in his mother; Heart attack in his father; Heart failure in his mother; Hypertension in his family; Other in his family and father; Stroke in his family  He  reports that he quit smoking about 9 years ago  He smoked 0 00 packs per day for 0 00 years  He has never used smokeless tobacco  He reports that he does not drink alcohol and does not use drugs    Current Outpatient Medications   Medication Sig Dispense Refill    acetaminophen (TYLENOL) 325 mg tablet Take 650 mg by mouth every 6 (six) hours as needed for mild pain      albuterol (PROVENTIL HFA,VENTOLIN HFA) 90 mcg/act inhaler Inhale 2 puffs as needed for shortness of breath 18 g 1    aspirin (ECOTRIN LOW STRENGTH) 81 mg EC tablet TAKE 1 TABLET BY MOUTH EVERY DAY 90 tablet 0    atorvastatin (LIPITOR) 40 mg tablet TAKE 1 TABLET BY MOUTH EVERY DAY 90 tablet 2    B-D UF III MINI PEN NEEDLES 31G X 5 MM MISC INJECT AS DIRECTED 2 (TWO) TIMES A DAY USE AS DIRECTED 100 each 0    clopidogrel (PLAVIX) 75 mg tablet TAKE 1 TABLET BY MOUTH EVERY DAY 90 tablet 3    fenofibrate (TRICOR) 145 mg tablet TAKE 1 TABLET BY MOUTH EVERY DAY 90 tablet 1    HYDROcodone-acetaminophen (Vicodin) 5-300 MG per tablet Take 1 tablet by mouth every 6 (six) hours as needed for moderate pain Max Daily Amount: 4 tablets 112 tablet 0    Icosapent Ethyl 1 g CAPS Vascepa 1 gram capsule   TAKE 2 G BY MOUTH 2 (TWO) TIMES A DAY   insulin glargine (Semglee) 100 units/mL subcutaneous injection Inject 31 Units under the skin 6 (six) times a day Inject 3 times in the morning and 3 times in the night 170 mL 3    Jardiance 25 MG TABS TAKE 1 TABLET BY MOUTH EVERY DAY 90 tablet 1    losartan (COZAAR) 25 mg tablet losartan 25 mg tablet   TAKE 1 TABLET BY MOUTH EVERY DAY      metoprolol tartrate (LOPRESSOR) 50 mg tablet Take 25 mg by mouth 2 (two) times a day      nitroglycerin (NITROSTAT) 0 4 mg SL tablet PLEASE SEE ATTACHED FOR DETAILED DIRECTIONS      pantoprazole (PROTONIX) 40 mg tablet TAKE 1 TABLET BY MOUTH EVERY DAY 90 tablet 5    Semglee, yfgn, 100 UNIT/ML SOPN PLEASE SEE ATTACHED FOR DETAILED DIRECTIONS       No current facility-administered medications for this visit  Current Outpatient Medications on File Prior to Visit   Medication Sig    acetaminophen (TYLENOL) 325 mg tablet Take 650 mg by mouth every 6 (six) hours as needed for mild pain    albuterol (PROVENTIL HFA,VENTOLIN HFA) 90 mcg/act inhaler Inhale 2 puffs as needed for shortness of breath    aspirin (ECOTRIN LOW STRENGTH) 81 mg EC tablet TAKE 1 TABLET BY MOUTH EVERY DAY    atorvastatin (LIPITOR) 40 mg tablet TAKE 1 TABLET BY MOUTH EVERY DAY    B-D UF III MINI PEN NEEDLES 31G X 5 MM MISC INJECT AS DIRECTED 2 (TWO) TIMES A DAY USE AS DIRECTED    clopidogrel (PLAVIX) 75 mg tablet TAKE 1 TABLET BY MOUTH EVERY DAY    fenofibrate (TRICOR) 145 mg tablet TAKE 1 TABLET BY MOUTH EVERY DAY    HYDROcodone-acetaminophen (Vicodin) 5-300 MG per tablet Take 1 tablet by mouth every 6 (six) hours as needed for moderate pain Max Daily Amount: 4 tablets    Icosapent Ethyl 1 g CAPS Vascepa 1 gram capsule   TAKE 2 G BY MOUTH 2 (TWO) TIMES A DAY      insulin glargine (Semglee) 100 units/mL subcutaneous injection Inject 31 Units under the skin 6 (six) times a day Inject 3 times in the morning and 3 times in the night    Jardiance 25 MG TABS TAKE 1 TABLET BY MOUTH EVERY DAY    losartan (COZAAR) 25 mg tablet losartan 25 mg tablet   TAKE 1 TABLET BY MOUTH EVERY DAY    metoprolol tartrate (LOPRESSOR) 50 mg tablet Take 25 mg by mouth 2 (two) times a day    nitroglycerin (NITROSTAT) 0 4 mg SL tablet PLEASE SEE ATTACHED FOR DETAILED DIRECTIONS    pantoprazole (PROTONIX) 40 mg tablet TAKE 1 TABLET BY MOUTH EVERY DAY    ricco Stewart, 100 UNIT/ML SOPN PLEASE SEE ATTACHED FOR DETAILED DIRECTIONS    [DISCONTINUED] insulin glargine (LANTUS) 100 units/mL subcutaneous injection Inject 31 Units under the skin 6 (six) times a day Inject 3 times in the morning and 3 times in the night     No current facility-administered medications on file prior to visit  He is allergic to cefaclor and simvastatin       Review of Systems   Constitutional: Negative  HENT: Negative  Eyes: Negative  Respiratory: Negative  Cardiovascular: Positive for leg swelling (Chronic edema bilateral lower extremities without change)  Negative for chest pain and palpitations  Gastrointestinal: Negative  Endocrine: Negative  Genitourinary: Negative  Musculoskeletal: Positive for back pain  Negative for arthralgias, gait problem, joint swelling, myalgias, neck pain and neck stiffness  Skin: Negative  Allergic/Immunologic: Negative  Neurological: Negative  Hematological: Negative  Psychiatric/Behavioral: Negative  Objective:      /60   Pulse 79   Temp 98 1 °F (36 7 °C)   Ht 5' 6" (1 676 m)   Wt 133 kg (294 lb)   SpO2 94%   BMI 47 45 kg/m²          Physical Exam  Vitals and nursing note reviewed  Constitutional:       General: He is not in acute distress  Appearance: Normal appearance  He is obese  He is not ill-appearing, toxic-appearing or diaphoretic        Comments: Morbidly obese 25-year-old male who is awake alert in no acute distress and oriented x3   HENT:      Head: Normocephalic and atraumatic  Right Ear: Tympanic membrane, ear canal and external ear normal  There is no impacted cerumen  Left Ear: Tympanic membrane, ear canal and external ear normal  There is no impacted cerumen  Nose: Nose normal  No congestion or rhinorrhea  Mouth/Throat:      Mouth: Mucous membranes are moist       Pharynx: Oropharynx is clear  No oropharyngeal exudate or posterior oropharyngeal erythema  Eyes:      General: No scleral icterus  Right eye: No discharge  Left eye: No discharge  Extraocular Movements: Extraocular movements intact  Conjunctiva/sclera: Conjunctivae normal       Pupils: Pupils are equal, round, and reactive to light  Neck:      Vascular: No carotid bruit  Cardiovascular:      Rate and Rhythm: Normal rate and regular rhythm  Heart sounds: No murmur heard  No friction rub  No gallop  Pulmonary:      Effort: Pulmonary effort is normal  No respiratory distress  Breath sounds: Normal breath sounds  No stridor  No wheezing, rhonchi or rales  Chest:      Chest wall: No tenderness  Abdominal:      General: Bowel sounds are normal  There is no distension  Palpations: Abdomen is soft  There is no mass  Tenderness: There is no abdominal tenderness  There is no right CVA tenderness, left CVA tenderness, guarding or rebound  Hernia: No hernia is present  Comments: Morbidly obese   Genitourinary:     Penis: Normal        Testes: Normal       Rectum: Normal       Comments: Because of patient's obesity unable to complete digital rectal exam to check patient's prostate  Recent colonoscopy  Musculoskeletal:         General: Tenderness ( pain with movement to the lumbar spine with all maneuvers and transfer) and deformity present  No swelling or signs of injury  Cervical back: Normal range of motion and neck supple  No rigidity or tenderness        Right lower leg: Edema (Plus one edema bilateral lower extremities  Pitting) present  Left lower leg: Edema present  Comments: Severely restricted range of motion with all maneuvers passively and actively to the lumbar spine  Lymphadenopathy:      Cervical: No cervical adenopathy  Skin:     General: Skin is warm and dry  Coloration: Skin is not jaundiced or pale  Findings: No bruising, erythema, lesion or rash  Neurological:      Mental Status: He is alert and oriented to person, place, and time  Mental status is at baseline  Cranial Nerves: No cranial nerve deficit  Sensory: No sensory deficit  Motor: No weakness  Coordination: Coordination normal       Gait: Gait normal       Deep Tendon Reflexes: Reflexes abnormal ( absent patella and Achilles tendon reflexes bilaterally)  Psychiatric:         Mood and Affect: Mood normal          Behavior: Behavior normal          Thought Content: Thought content normal          Judgment: Judgment normal          BMI Counseling: Body mass index is 47 45 kg/m²  The BMI is above normal  Nutrition recommendations include reducing portion sizes, decreasing overall calorie intake, 3-5 servings of fruits/vegetables daily, consuming healthier snacks, moderation in carbohydrate intake, increasing intake of lean protein, reducing intake of saturated fat and trans fat and reducing intake of cholesterol  Exercise recommendations include exercising 3-5 times per week

## 2022-08-04 DIAGNOSIS — E78.00 PURE HYPERCHOLESTEROLEMIA: ICD-10-CM

## 2022-08-04 RX ORDER — FENOFIBRATE 145 MG/1
TABLET, COATED ORAL
Qty: 90 TABLET | Refills: 1 | Status: SHIPPED | OUTPATIENT
Start: 2022-08-04

## 2022-09-23 DIAGNOSIS — Z79.4 TYPE 2 DIABETES MELLITUS WITH OTHER CIRCULATORY COMPLICATION, WITH LONG-TERM CURRENT USE OF INSULIN (HCC): ICD-10-CM

## 2022-09-23 DIAGNOSIS — E11.59 TYPE 2 DIABETES MELLITUS WITH OTHER CIRCULATORY COMPLICATION, WITH LONG-TERM CURRENT USE OF INSULIN (HCC): ICD-10-CM

## 2022-09-23 RX ORDER — PEN NEEDLE, DIABETIC 31 GX5/16"
NEEDLE, DISPOSABLE MISCELLANEOUS 2 TIMES DAILY
Qty: 100 EACH | Refills: 0 | Status: SHIPPED | OUTPATIENT
Start: 2022-09-23

## 2022-09-25 DIAGNOSIS — I25.10 ARTERIOSCLEROTIC CARDIOVASCULAR DISEASE: ICD-10-CM

## 2022-09-25 RX ORDER — PROPYLENE GLYCOL/PEG 400 0.3 %-0.4%
DROPS OPHTHALMIC (EYE)
Qty: 90 TABLET | Refills: 0 | Status: SHIPPED | OUTPATIENT
Start: 2022-09-25

## 2022-10-05 DIAGNOSIS — G89.29 CHRONIC BILATERAL LOW BACK PAIN WITHOUT SCIATICA: ICD-10-CM

## 2022-10-05 DIAGNOSIS — M54.50 CHRONIC BILATERAL LOW BACK PAIN WITHOUT SCIATICA: ICD-10-CM

## 2022-10-05 RX ORDER — HYDROCODONE BITARTRATE AND ACETAMINOPHEN 5; 300 MG/1; MG/1
1 TABLET ORAL EVERY 6 HOURS PRN
Qty: 112 TABLET | Refills: 0 | Status: SHIPPED | OUTPATIENT
Start: 2022-10-05

## 2022-10-18 ENCOUNTER — VBI (OUTPATIENT)
Dept: ADMINISTRATIVE | Facility: OTHER | Age: 62
End: 2022-10-18

## 2022-10-20 ENCOUNTER — VBI (OUTPATIENT)
Dept: ADMINISTRATIVE | Facility: OTHER | Age: 62
End: 2022-10-20

## 2022-11-16 DIAGNOSIS — E11.59 TYPE 2 DIABETES MELLITUS WITH OTHER CIRCULATORY COMPLICATION, WITH LONG-TERM CURRENT USE OF INSULIN (HCC): ICD-10-CM

## 2022-11-16 DIAGNOSIS — Z79.4 TYPE 2 DIABETES MELLITUS WITH OTHER CIRCULATORY COMPLICATION, WITH LONG-TERM CURRENT USE OF INSULIN (HCC): ICD-10-CM

## 2022-11-17 RX ORDER — PEN NEEDLE, DIABETIC 31 GX5/16"
NEEDLE, DISPOSABLE MISCELLANEOUS 2 TIMES DAILY
Qty: 100 EACH | Refills: 0 | Status: SHIPPED | OUTPATIENT
Start: 2022-11-17

## 2022-11-18 DIAGNOSIS — Z79.4 TYPE 2 DIABETES MELLITUS WITH OTHER CIRCULATORY COMPLICATION, WITH LONG-TERM CURRENT USE OF INSULIN (HCC): ICD-10-CM

## 2022-11-18 DIAGNOSIS — E11.59 TYPE 2 DIABETES MELLITUS WITH OTHER CIRCULATORY COMPLICATION, WITH LONG-TERM CURRENT USE OF INSULIN (HCC): ICD-10-CM

## 2022-11-18 RX ORDER — EMPAGLIFLOZIN 25 MG/1
TABLET, FILM COATED ORAL
Qty: 90 TABLET | Refills: 1 | Status: SHIPPED | OUTPATIENT
Start: 2022-11-18

## 2022-11-23 ENCOUNTER — APPOINTMENT (OUTPATIENT)
Dept: LAB | Age: 62
End: 2022-11-23

## 2022-11-23 DIAGNOSIS — Z79.4 TYPE 2 DIABETES MELLITUS WITHOUT COMPLICATION, WITH LONG-TERM CURRENT USE OF INSULIN (HCC): ICD-10-CM

## 2022-11-23 DIAGNOSIS — I10 BENIGN ESSENTIAL HYPERTENSION: ICD-10-CM

## 2022-11-23 DIAGNOSIS — E11.9 TYPE 2 DIABETES MELLITUS WITHOUT COMPLICATION, WITH LONG-TERM CURRENT USE OF INSULIN (HCC): ICD-10-CM

## 2022-11-23 LAB
ANION GAP SERPL CALCULATED.3IONS-SCNC: 5 MMOL/L (ref 4–13)
BUN SERPL-MCNC: 24 MG/DL (ref 5–25)
CALCIUM SERPL-MCNC: 9.4 MG/DL (ref 8.3–10.1)
CHLORIDE SERPL-SCNC: 107 MMOL/L (ref 96–108)
CO2 SERPL-SCNC: 26 MMOL/L (ref 21–32)
CREAT SERPL-MCNC: 1.33 MG/DL (ref 0.6–1.3)
EST. AVERAGE GLUCOSE BLD GHB EST-MCNC: 126 MG/DL
GFR SERPL CREATININE-BSD FRML MDRD: 56 ML/MIN/1.73SQ M
GLUCOSE P FAST SERPL-MCNC: 74 MG/DL (ref 65–99)
HBA1C MFR BLD: 6 %
POTASSIUM SERPL-SCNC: 4.1 MMOL/L (ref 3.5–5.3)
SODIUM SERPL-SCNC: 138 MMOL/L (ref 135–147)

## 2022-11-26 DIAGNOSIS — E78.01 FAMILIAL HYPERCHOLESTEROLEMIA: ICD-10-CM

## 2022-11-26 RX ORDER — ATORVASTATIN CALCIUM 40 MG/1
TABLET, FILM COATED ORAL
Qty: 90 TABLET | Refills: 2 | Status: SHIPPED | OUTPATIENT
Start: 2022-11-26

## 2022-12-01 ENCOUNTER — OFFICE VISIT (OUTPATIENT)
Dept: INTERNAL MEDICINE CLINIC | Facility: CLINIC | Age: 62
End: 2022-12-01

## 2022-12-01 VITALS
OXYGEN SATURATION: 96 % | SYSTOLIC BLOOD PRESSURE: 142 MMHG | HEIGHT: 66 IN | WEIGHT: 295 LBS | BODY MASS INDEX: 47.41 KG/M2 | TEMPERATURE: 98 F | DIASTOLIC BLOOD PRESSURE: 76 MMHG | HEART RATE: 79 BPM | RESPIRATION RATE: 19 BRPM

## 2022-12-01 DIAGNOSIS — J43.1 PANLOBULAR EMPHYSEMA (HCC): ICD-10-CM

## 2022-12-01 DIAGNOSIS — R80.9 TYPE 2 DIABETES MELLITUS WITH MICROALBUMINURIA, WITH LONG-TERM CURRENT USE OF INSULIN (HCC): ICD-10-CM

## 2022-12-01 DIAGNOSIS — M75.02 ADHESIVE CAPSULITIS OF BOTH SHOULDERS: ICD-10-CM

## 2022-12-01 DIAGNOSIS — I10 BENIGN ESSENTIAL HYPERTENSION: ICD-10-CM

## 2022-12-01 DIAGNOSIS — Z79.4 TYPE 2 DIABETES MELLITUS WITH MICROALBUMINURIA, WITH LONG-TERM CURRENT USE OF INSULIN (HCC): ICD-10-CM

## 2022-12-01 DIAGNOSIS — M47.816 SPONDYLOSIS OF LUMBAR REGION WITHOUT MYELOPATHY OR RADICULOPATHY: ICD-10-CM

## 2022-12-01 DIAGNOSIS — B95.8 STAPH SKIN INFECTION: Primary | ICD-10-CM

## 2022-12-01 DIAGNOSIS — I25.10 ARTERIOSCLEROTIC CARDIOVASCULAR DISEASE: ICD-10-CM

## 2022-12-01 DIAGNOSIS — M54.16 LUMBAR RADICULOPATHY: ICD-10-CM

## 2022-12-01 DIAGNOSIS — L08.9 STAPH SKIN INFECTION: Primary | ICD-10-CM

## 2022-12-01 DIAGNOSIS — M75.01 ADHESIVE CAPSULITIS OF BOTH SHOULDERS: ICD-10-CM

## 2022-12-01 DIAGNOSIS — E11.29 TYPE 2 DIABETES MELLITUS WITH MICROALBUMINURIA, WITH LONG-TERM CURRENT USE OF INSULIN (HCC): ICD-10-CM

## 2022-12-01 DIAGNOSIS — Z23 FLU VACCINE NEED: ICD-10-CM

## 2022-12-01 RX ORDER — DOXYCYCLINE HYCLATE 100 MG/1
100 CAPSULE ORAL EVERY 12 HOURS SCHEDULED
Qty: 20 CAPSULE | Refills: 0 | Status: SHIPPED | OUTPATIENT
Start: 2022-12-01 | End: 2022-12-11

## 2022-12-01 NOTE — ASSESSMENT & PLAN NOTE
A complaining of skin rash anterior and posterior chest wall  Pruritic  Patient on evaluation has extremely dry skin in numerous hyperkeratotic lesions seborrhea dermatitis and some pustules  Patient also has a pustule with lesion to the right lower extremity calf medially  Looks like chronic staph infection to the skin surfaces  We placed a consult evaluation to be seen by dermatology and patient was placed on doxycycline 100 mg twice a day for 10 days to see if this helps subside this acute infection  Because of his very dry skin he was told to changes body soap from [employer] to [place] or another name non detergent soap

## 2022-12-01 NOTE — ASSESSMENT & PLAN NOTE
Patient has chronic low back pain a and discomfort  Occasionally needs to take narcotic analgesics to help with his discomfort  States he did have a recent episode that he feels like he pulled something out in his back but only had some discomfort for a day or 2  He is complaining and we did verify he has significant restriction with range of motion to his lumbar spine both actively and passively  Decreased straight leg raising    Again as previously we offered him re-evaluation by physical therapy

## 2022-12-01 NOTE — ASSESSMENT & PLAN NOTE
Still having intermittent difficulties with low back pain and difficulty  Pain with movement  Patient has significant decreased range of motion both actively and passively lumbar spine with some discomfort  No increasing weakness to lower extremities  Decreased straight leg raising    The we did discuss with the patient going back for re-evaluation by physical therapy but he states he will forego this at this time and call if worsening symptoms or difficulties

## 2022-12-01 NOTE — ASSESSMENT & PLAN NOTE
Patient states he is having still chronic pain bilaterally now in both shoulders  States that now more difficulty on the right  Patient has severely restricted range of motion and evidence of adhesive capsulitis bilaterally  No decreased muscle tone and strength  Did offer the patient an appointment be seen by Orthopedics but again he defers this at this time    Will call if worsening symptoms and the size that he does want to see Orthopedics for evaluation

## 2022-12-01 NOTE — ASSESSMENT & PLAN NOTE
No exacerbations of his COPD recently  Will receive influenza vaccine today in the office  Will be up-to-date with his COVID virus vaccines  Knows to call immediately if any upper respiratory tract symptoms or difficulties

## 2022-12-01 NOTE — ASSESSMENT & PLAN NOTE
Patient did have labs performed prior to the visit today and his sugar continues to show adequate control with present treatment  Patient will continue watching his diet and surveillance  Continues to make adjustment a with his insulin dose to keep him under control  Is up-to-date but diabetic foot exam, eye exam   Check a fasting blood sugar hemoglobin A1c with his next visit 4 months    Lab Results   Component Value Date    HGBA1C 6 0 (H) 11/23/2022

## 2022-12-01 NOTE — PROGRESS NOTES
Name: Jake Ulloa      : 1960      MRN: [id number]  Encounter Provider: Zack Brennan DO  Encounter Date: 2022   Encounter department: [facility] INTERNAL MEDICINE    Assessment & Plan     1  Staph skin infection  Assessment & Plan:  A complaining of skin rash anterior and posterior chest wall  Pruritic  Patient on evaluation has extremely dry skin in numerous hyperkeratotic lesions seborrhea dermatitis and some pustules  Patient also has a pustule with lesion to the right lower extremity calf medially  Looks like chronic staph infection to the skin surfaces  We placed a consult evaluation to be seen by dermatology and patient was placed on doxycycline 100 mg twice a day for 10 days to see if this helps subside this acute infection  Because of his very dry skin he was told to changes body soap from Wakie/Budist to Dialogfeed or another name non detergent soap  Orders:  -     doxycycline hyclate (VIBRAMYCIN) 100 mg capsule; Take 1 capsule (100 mg total) by mouth every 12 (twelve) hours for 10 days  -     Ambulatory Referral to Dermatology; Future    2  Flu vaccine need  -     influenza vaccine, quadrivalent, recombinant, PF, 0 5 mL, for patients 18 yr+ (FLUBLOK)    3  Benign essential hypertension  Assessment & Plan:  Patient's blood pressure is slightly elevated from his baseline today  Patient will continue present medication and we will continue also to monitor his renal function  Make adjustment to medication is needed in the future  Orders:  -     Basic metabolic panel; Future    4  Panlobular emphysema ([facility] [address] )  Assessment & Plan:  No exacerbations of his COPD recently  Will receive influenza vaccine today in the office  Will be up-to-date with his COVID virus vaccines  Knows to call immediately if any upper respiratory tract symptoms or difficulties        5  Type 2 diabetes mellitus with microalbuminuria, with long-term current use of insulin [facility])  Assessment & Plan:  Patient did have labs performed prior to the visit today and his sugar continues to show adequate control with present treatment  Patient will continue watching his diet and surveillance  Continues to make adjustment a with his insulin dose to keep him under control  Is up-to-date but diabetic foot exam, eye exam   Check a fasting blood sugar hemoglobin A1c with his next visit 4 months  Lab Results   Component Value Date    HGBA1C 6 0 (H) 11/23/2022       Orders:  -     Hemoglobin A1C; Future    6  Lumbar radiculopathy  Assessment & Plan:  Still having intermittent difficulties with low back pain and difficulty  Pain with movement  Patient has significant decreased range of motion both actively and passively lumbar spine with some discomfort  No increasing weakness to lower extremities  Decreased straight leg raising  The we did discuss with the patient going back for re-evaluation by physical therapy but he states he will forego this at this time and call if worsening symptoms or difficulties      7  Arteriosclerotic cardiovascular disease  Assessment & Plan:  Denies any chest pain or pressure no increasing shortness of breath  We continue to monitor all variables associated with his coronary artery disease including his blood pressure cholesterol  Patient continues disease cardiologist twice a year      6  Adhesive capsulitis of both shoulders  Assessment & Plan:  Patient states he is having still chronic pain bilaterally now in both shoulders  States that now more difficulty on the right  Patient has severely restricted range of motion and evidence of adhesive capsulitis bilaterally  No decreased muscle tone and strength  Did offer the patient an appointment be seen by Orthopedics but again he defers this at this time  Will call if worsening symptoms and the size that he does want to see Orthopedics for evaluation      9   Spondylosis of lumbar region without myelopathy or radiculopathy  Assessment & Plan:  Patient has chronic low back pain a and discomfort  Occasionally needs to take narcotic analgesics to help with his discomfort  States he did have a recent episode that he feels like he pulled something out in his back but only had some discomfort for a day or 2  He is complaining and we did verify he has significant restriction with range of motion to his lumbar spine both actively and passively  Decreased straight leg raising  Again as previously we offered him re-evaluation by physical therapy           Subjective     60-year-old male history multiple medical problems as outlined previously who is here today for routine follow-up  He did have labs performed prior visit today we did discuss the results  Patient relates that he is having some problems with increased arthritic symptoms in both shoulders low back  Also developed a pruritic rash both to his anterior and posterior chest wall  Review of Systems   Constitutional: Negative  States that he still is extremely restricted with his activity level but no change from previously   HENT: Negative  Eyes: Negative  Respiratory: Negative  Cardiovascular: Positive for leg swelling (Chronic edema bilateral lower extremities without increase)  Negative for chest pain and palpitations  Gastrointestinal: Negative  Endocrine: Negative  Genitourinary: Negative  Musculoskeletal: Positive for arthralgias and back pain  Negative for gait problem, joint swelling, myalgias, neck pain and neck stiffness  Skin: Positive for rash ( pruritic rash anterior posterior chest wall)  Negative for color change, pallor and wound  Allergic/Immunologic: Negative  Neurological: Negative  Hematological: Negative  Psychiatric/Behavioral: Negative          Past Medical History:   Diagnosis Date   • Abscess of left thigh    • Acute myocardial infarction [facility])    • Anesthesia     woke up during procedure   • Anxiety    • Arteriosclerotic cardiovascular disease    • Arthritis    • Asthma    • Chest pain    • COPD (chronic obstructive pulmonary disease) (HCC)    • Coronary artery disease    • Diabetes mellitus ([facility] [address] )    • Fatty liver    • Gastric ulcer    • GERD (gastroesophageal reflux disease)    • Hyperlipidemia    • Hypertension    • Low back pain    • Myocardial infarction [facility]) [id number]   • Obesity    • Obstructive sleep apnea     no Cpap   • Spondylosis of lumbar region without myelopathy or radiculopathy      Past Surgical History:   Procedure Laterality Date   • CARPAL TUNNEL RELEASE Right    • COLONOSCOPY     • CORONARY ANGIOPLASTY WITH STENT PLACEMENT  ,    • EGD     • HEMORRHOID SURGERY     • NEUROPLASTY / TRANSPOSITION MEDIAN NERVE AT CARPAL TUNNEL Right    • AK SHLDR ARTHROSCOP,LYSE ADHESNS Left 6/10/2020    Procedure: SHOULDER ARTHROSCOPIC CAPSULAR RELEASE;  Surgeon: Caron Rachel MD;  Location: [facility]  MAIN [facility];  Service: Orthopedics     Family History   Problem Relation Age of Onset   • Alzheimer's disease Mother    • Heart failure Mother    • Dementia Mother    • Heart attack Father    • Other Father         Cardiac Disorder   • Heart disease Father    • Other Family         Back Pain   • Hypertension Family    • Stroke Family         Complications   • Cancer Other      Social History     Socioeconomic History   • Marital status: Single     Spouse name: None   • Number of children: None   • Years of education: None   • Highest education level: None   Occupational History   • None   Tobacco Use   • Smoking status: Former     Packs/day: 1 50     Years: 38 00     Pack years: 57 00     Types: Cigarettes     Start date: 1976     Quit date: 2014     Years since quittin 9   • Smokeless tobacco: Never   • Tobacco comments:     tobacco use not continuous- quit for 2  4 yrs long periods      Vaping Use   • Vaping Use: Never used   Substance and Sexual Activity   • Alcohol use: No   • Drug use: No   • Sexual activity: Not Currently   Other Topics Concern   • None   Social History Narrative   • None     Social Determinants of Health     Financial Resource Strain: Not on file   Food Insecurity: Not on file   Transportation Needs: Not on file   Physical Activity: Not on file   Stress: Not on file   Social Connections: Not on file   Intimate Partner Violence: Not on file   Housing Stability: Not on file     Current Outpatient Medications on File Prior to Visit   Medication Sig   • acetaminophen (TYLENOL) 325 mg tablet Take 650 mg by mouth every 6 (six) hours as needed for mild pain   • albuterol (PROVENTIL HFA,VENTOLIN HFA) 90 mcg/act inhaler Inhale 2 puffs as needed for shortness of breath   • atorvastatin (LIPITOR) 40 mg tablet TAKE 1 TABLET BY MOUTH EVERY DAY   • clopidogrel (PLAVIX) 75 mg tablet TAKE 1 TABLET BY MOUTH EVERY DAY   • [facility] Aspirin Low Strength 81 MG EC tablet TAKE 1 TABLET BY MOUTH EVERY DAY   • fenofibrate (TRICOR) 145 mg tablet TAKE 1 TABLET BY MOUTH EVERY DAY   • HYDROcodone-acetaminophen (Vicodin) 5-300 MG per tablet Take 1 tablet by mouth every 6 (six) hours as needed for moderate pain Max Daily Amount: 4 tablets   • Icosapent Ethyl 1 g CAPS Vascepa 1 gram capsule   TAKE 2 G BY MOUTH 2 (TWO) TIMES A DAY     • insulin glargine (Semglee) 100 units/mL subcutaneous injection Inject 31 Units under the skin 6 (six) times a day Inject 3 times in the morning and 3 times in the night   • Insulin Pen Needle (B-D UF III MINI PEN NEEDLES) 31G X 5 MM MISC Inject as directed 2 (two) times a day Use as directed   • Jardiance 25 MG TABS TAKE 1 TABLET BY MOUTH EVERY DAY   • losartan (COZAAR) 25 mg tablet losartan 25 mg tablet   TAKE 1 TABLET BY MOUTH EVERY DAY   • metoprolol tartrate (LOPRESSOR) 50 mg tablet Take 25 mg by mouth 2 (two) times a day   • nitroglycerin (NITROSTAT) 0 4 mg SL tablet PLEASE SEE ATTACHED FOR DETAILED DIRECTIONS   • pantoprazole (PROTONIX) 40 mg tablet TAKE 1 TABLET BY MOUTH EVERY DAY   • Semglee, yfgn, 100 UNIT/ML SOPN PLEASE SEE ATTACHED FOR DETAILED DIRECTIONS     Allergies   Allergen Reactions   • Cefaclor Shortness Of Breath     Other reaction(s): Respiratory Distress   • Simvastatin Shortness Of Breath     Other reaction(s): Respiratory Distress     Immunization History   Administered Date(s) Administered   • COVID-19 PFIZER VACCINE 0 3 ML IM 03/19/2021, 04/10/2021, 10/16/2021   • Influenza Quadrivalent, 6-35 Months IM 10/17/2016, 10/24/2017   • Influenza, recombinant, quadrivalent,injectable, preservative free 12/06/2018, 12/23/2019, 11/13/2020, 11/15/2021       Objective     /76 (BP Location: Left arm, Patient Position: Sitting, Cuff Size: Adult)   Pulse 79   Temp 98 °F (36 7 °C) (Tympanic)   Resp 19   Ht 5' 6" (1 676 m)   Wt 134 kg (295 lb)   SpO2 96%   BMI 47 61 kg/m²     Physical Exam  Vitals and nursing note reviewed  Constitutional:       General: He is not in acute distress  Appearance: Normal appearance  He is obese  He is not ill-appearing, toxic-appearing or diaphoretic  Comments: Articulate pleasant morbidly obese 69-year-old male who is awake alert in no acute distress  A waddling gait   HENT:      Head: Normocephalic and atraumatic  Right Ear: Tympanic membrane, ear canal and external ear normal  There is no impacted cerumen  Left Ear: Tympanic membrane, ear canal and external ear normal  There is no impacted cerumen  Nose: Nose normal  No congestion or rhinorrhea  Mouth/Throat:      Mouth: Mucous membranes are moist       Pharynx: Oropharynx is clear  No oropharyngeal exudate or posterior oropharyngeal erythema  Eyes:      General: No scleral icterus  Right eye: No discharge  Left eye: No discharge  Extraocular Movements: Extraocular movements intact  Conjunctiva/sclera: Conjunctivae normal       Pupils: Pupils are equal, round, and reactive to light  Neck:      Vascular: No carotid bruit     Cardiovascular:      Rate and Rhythm: Normal rate and regular rhythm  Pulses: Normal pulses  Heart sounds: Normal heart sounds  No murmur heard  No friction rub  No gallop  Pulmonary:      Effort: Pulmonary effort is normal  No respiratory distress  Breath sounds: Normal breath sounds  No stridor  No wheezing, rhonchi or rales  Chest:      Chest wall: No tenderness  Abdominal:      General: Bowel sounds are normal  There is no distension  Palpations: Abdomen is soft  There is no mass  Tenderness: There is no abdominal tenderness  There is no right CVA tenderness, left CVA tenderness, guarding or rebound  Hernia: No hernia is present  Comments: Morbidly obese   Musculoskeletal:         General: Deformity present  Cervical back: Normal range of motion and neck supple  No rigidity or tenderness  Right lower leg: Edema present  Left lower leg: Edema present  Comments: Patient does have decreased movement adhesive capsulitis to both shoulders with some mild discomfort with movement  Restriction of movement  Also severe restriction to his lumbar spine with movement both passively and actively  Decreased straight leg raising bilaterally  Patient has a +1 edema bilateral lower extremities which is chronic  Lymphadenopathy:      Cervical: No cervical adenopathy  Skin:     General: Skin is warm and dry  Coloration: Skin is not jaundiced  Findings: Erythema, lesion and rash present  No bruising  Comments: Patient evaluation of his chest and back shows multiple small pustules consistent with  staph infection, some pustules underneath the redundant skin of his right breast   A small area of cellulitis, staph infection to his right calf medially  Diffuse areas of actinic care as ptosis anterior posteriorly skin surfaces  of his chest   Very dry skin   Neurological:      Mental Status: He is alert and oriented to person, place, and time  Mental status is at baseline  Cranial Nerves: No cranial nerve deficit  Sensory: No sensory deficit  Motor: No weakness  Coordination: Coordination normal       Gait: Gait abnormal       Deep Tendon Reflexes: Reflexes abnormal (Very slow patella and Achilles tendon reflexes bilaterally)  Psychiatric:         Mood and Affect: Mood normal          Thought Content:  Thought content normal          Judgment: Judgment normal        Nicholas Mnodragon DO

## 2022-12-01 NOTE — ASSESSMENT & PLAN NOTE
Patient's blood pressure is slightly elevated from his baseline today  Patient will continue present medication and we will continue also to monitor his renal function  Make adjustment to medication is needed in the future

## 2022-12-01 NOTE — ASSESSMENT & PLAN NOTE
Denies any chest pain or pressure no increasing shortness of breath  We continue to monitor all variables associated with his coronary artery disease including his blood pressure cholesterol    Patient continues disease cardiologist twice a year

## 2022-12-09 ENCOUNTER — TELEPHONE (OUTPATIENT)
Dept: INTERNAL MEDICINE CLINIC | Facility: CLINIC | Age: 62
End: 2022-12-09

## 2022-12-09 NOTE — TELEPHONE ENCOUNTER
Pt called just to let you know that he seems to be doing better  The issue is still there but he sees improvement  I gave him derms phone number and told him to call them directly since they have not reached out to him yet

## 2022-12-14 DIAGNOSIS — I25.10 ARTERIOSCLEROTIC CARDIOVASCULAR DISEASE: ICD-10-CM

## 2022-12-14 RX ORDER — ASPIRIN 81 MG/1
TABLET, COATED ORAL
Qty: 90 TABLET | Refills: 0 | Status: SHIPPED | OUTPATIENT
Start: 2022-12-14

## 2022-12-28 ENCOUNTER — VBI (OUTPATIENT)
Dept: ADMINISTRATIVE | Facility: OTHER | Age: 62
End: 2022-12-28

## 2023-01-17 DIAGNOSIS — M54.50 CHRONIC BILATERAL LOW BACK PAIN WITHOUT SCIATICA: ICD-10-CM

## 2023-01-17 DIAGNOSIS — G89.29 CHRONIC BILATERAL LOW BACK PAIN WITHOUT SCIATICA: ICD-10-CM

## 2023-01-18 RX ORDER — HYDROCODONE BITARTRATE AND ACETAMINOPHEN 5; 300 MG/1; MG/1
1 TABLET ORAL EVERY 6 HOURS PRN
Qty: 112 TABLET | Refills: 0 | Status: SHIPPED | OUTPATIENT
Start: 2023-01-18

## 2023-01-24 DIAGNOSIS — E11.59 TYPE 2 DIABETES MELLITUS WITH OTHER CIRCULATORY COMPLICATION, WITH LONG-TERM CURRENT USE OF INSULIN (HCC): ICD-10-CM

## 2023-01-24 DIAGNOSIS — Z79.4 TYPE 2 DIABETES MELLITUS WITH OTHER CIRCULATORY COMPLICATION, WITH LONG-TERM CURRENT USE OF INSULIN (HCC): ICD-10-CM

## 2023-01-24 RX ORDER — PEN NEEDLE, DIABETIC 31 GX5/16"
NEEDLE, DISPOSABLE MISCELLANEOUS 2 TIMES DAILY
Qty: 100 EACH | Refills: 0 | Status: SHIPPED | OUTPATIENT
Start: 2023-01-24

## 2023-02-03 DIAGNOSIS — E78.00 PURE HYPERCHOLESTEROLEMIA: ICD-10-CM

## 2023-02-03 RX ORDER — FENOFIBRATE 145 MG/1
TABLET, COATED ORAL
Qty: 90 TABLET | Refills: 1 | Status: SHIPPED | OUTPATIENT
Start: 2023-02-03

## 2023-02-13 ENCOUNTER — VBI (OUTPATIENT)
Dept: ADMINISTRATIVE | Facility: OTHER | Age: 63
End: 2023-02-13

## 2023-02-16 ENCOUNTER — VBI (OUTPATIENT)
Dept: ADMINISTRATIVE | Facility: OTHER | Age: 63
End: 2023-02-16

## 2023-02-17 ENCOUNTER — TELEPHONE (OUTPATIENT)
Dept: ADMINISTRATIVE | Facility: OTHER | Age: 63
End: 2023-02-17

## 2023-03-14 DIAGNOSIS — E11.59 TYPE 2 DIABETES MELLITUS WITH OTHER CIRCULATORY COMPLICATION, WITH LONG-TERM CURRENT USE OF INSULIN (HCC): ICD-10-CM

## 2023-03-14 DIAGNOSIS — Z79.4 TYPE 2 DIABETES MELLITUS WITH OTHER CIRCULATORY COMPLICATION, WITH LONG-TERM CURRENT USE OF INSULIN (HCC): ICD-10-CM

## 2023-03-14 RX ORDER — FLURBIPROFEN SODIUM 0.3 MG/ML
SOLUTION/ DROPS OPHTHALMIC 2 TIMES DAILY
Qty: 100 EACH | Refills: 0 | Status: SHIPPED | OUTPATIENT
Start: 2023-03-14

## 2023-03-28 ENCOUNTER — APPOINTMENT (OUTPATIENT)
Dept: LAB | Age: 63
End: 2023-03-28

## 2023-03-28 DIAGNOSIS — R80.9 TYPE 2 DIABETES MELLITUS WITH MICROALBUMINURIA, WITH LONG-TERM CURRENT USE OF INSULIN (HCC): ICD-10-CM

## 2023-03-28 DIAGNOSIS — I10 BENIGN ESSENTIAL HYPERTENSION: ICD-10-CM

## 2023-03-28 DIAGNOSIS — Z79.4 TYPE 2 DIABETES MELLITUS WITH MICROALBUMINURIA, WITH LONG-TERM CURRENT USE OF INSULIN (HCC): ICD-10-CM

## 2023-03-28 DIAGNOSIS — E11.29 TYPE 2 DIABETES MELLITUS WITH MICROALBUMINURIA, WITH LONG-TERM CURRENT USE OF INSULIN (HCC): ICD-10-CM

## 2023-03-28 LAB
ANION GAP SERPL CALCULATED.3IONS-SCNC: 7 MMOL/L (ref 4–13)
BUN SERPL-MCNC: 26 MG/DL (ref 5–25)
CALCIUM SERPL-MCNC: 9.2 MG/DL (ref 8.3–10.1)
CHLORIDE SERPL-SCNC: 114 MMOL/L (ref 96–108)
CO2 SERPL-SCNC: 19 MMOL/L (ref 21–32)
CREAT SERPL-MCNC: 1.39 MG/DL (ref 0.6–1.3)
GFR SERPL CREATININE-BSD FRML MDRD: 53 ML/MIN/1.73SQ M
GLUCOSE P FAST SERPL-MCNC: 87 MG/DL (ref 65–99)
POTASSIUM SERPL-SCNC: 4.5 MMOL/L (ref 3.5–5.3)
SODIUM SERPL-SCNC: 140 MMOL/L (ref 135–147)

## 2023-03-29 LAB
EST. AVERAGE GLUCOSE BLD GHB EST-MCNC: 123 MG/DL
HBA1C MFR BLD: 5.9 %

## 2023-03-30 DIAGNOSIS — Z79.4 TYPE 2 DIABETES MELLITUS WITHOUT COMPLICATION, WITH LONG-TERM CURRENT USE OF INSULIN (HCC): ICD-10-CM

## 2023-03-30 DIAGNOSIS — E11.9 TYPE 2 DIABETES MELLITUS WITHOUT COMPLICATION, WITH LONG-TERM CURRENT USE OF INSULIN (HCC): ICD-10-CM

## 2023-03-30 RX ORDER — INSULIN GLARGINE-YFGN 100 [IU]/ML
INJECTION, SOLUTION SUBCUTANEOUS
Qty: 180 ML | Refills: 3 | Status: SHIPPED | OUTPATIENT
Start: 2023-03-30 | End: 2023-06-28

## 2023-04-06 ENCOUNTER — OFFICE VISIT (OUTPATIENT)
Dept: INTERNAL MEDICINE CLINIC | Facility: CLINIC | Age: 63
End: 2023-04-06

## 2023-04-06 VITALS
HEIGHT: 66 IN | RESPIRATION RATE: 20 BRPM | BODY MASS INDEX: 48.05 KG/M2 | TEMPERATURE: 98.5 F | DIASTOLIC BLOOD PRESSURE: 62 MMHG | SYSTOLIC BLOOD PRESSURE: 138 MMHG | OXYGEN SATURATION: 98 % | HEART RATE: 77 BPM | WEIGHT: 299 LBS

## 2023-04-06 DIAGNOSIS — R80.9 TYPE 2 DIABETES MELLITUS WITH MICROALBUMINURIA, WITH LONG-TERM CURRENT USE OF INSULIN (HCC): ICD-10-CM

## 2023-04-06 DIAGNOSIS — Z79.4 TYPE 2 DIABETES MELLITUS WITH MICROALBUMINURIA, WITH LONG-TERM CURRENT USE OF INSULIN (HCC): ICD-10-CM

## 2023-04-06 DIAGNOSIS — Z00.00 HEALTHCARE MAINTENANCE: ICD-10-CM

## 2023-04-06 DIAGNOSIS — Z12.5 PROSTATE CANCER SCREENING: ICD-10-CM

## 2023-04-06 DIAGNOSIS — M47.816 SPONDYLOSIS OF LUMBAR REGION WITHOUT MYELOPATHY OR RADICULOPATHY: ICD-10-CM

## 2023-04-06 DIAGNOSIS — J43.1 PANLOBULAR EMPHYSEMA (HCC): ICD-10-CM

## 2023-04-06 DIAGNOSIS — I10 BENIGN ESSENTIAL HYPERTENSION: ICD-10-CM

## 2023-04-06 DIAGNOSIS — E11.29 TYPE 2 DIABETES MELLITUS WITH MICROALBUMINURIA, WITH LONG-TERM CURRENT USE OF INSULIN (HCC): ICD-10-CM

## 2023-04-06 DIAGNOSIS — I25.10 ARTERIOSCLEROTIC CARDIOVASCULAR DISEASE: ICD-10-CM

## 2023-04-06 DIAGNOSIS — E66.01 MORBID OBESITY WITH BODY MASS INDEX OF 45.0-49.9 IN ADULT (HCC): Primary | ICD-10-CM

## 2023-04-06 NOTE — ASSESSMENT & PLAN NOTE
History of atherosclerotic cardiovascular disease  Patient continues to follow-up with cardiologist and he is on medication as previously  Patient denies any chest pain  Has chronic shortness of breath with exertion which is unchanged  They continue with his cardiac surveillance

## 2023-04-06 NOTE — ASSESSMENT & PLAN NOTE
Patient will be due for repeat yearly physical when he returns to the office in 4 months  He was given a slip for complete labs to be performed prior to that visit  As previously in the interim patient was told if any new medical problems or concerns to please call

## 2023-04-06 NOTE — ASSESSMENT & PLAN NOTE
Relates that since the new year he has had some increasing difficulty with low back pain  At this time he is taking his hydrocodone one half a pill twice a day on a daily basis in order to help him with this discomfort  We did discuss with the patient modalities that have been used in the past in order to help him with his pain and he states he has had no relief with different treatments including physical therapy and pain management

## 2023-04-06 NOTE — ASSESSMENT & PLAN NOTE
History of chronic obstructive pulmonary disease  Had a history of cigarette smoking in the past   No longer smoking  No acute exacerbations of his lung disease  He knows to immediately call the office for evaluation acutely if any evidence of increasing shortness of breath cough wheezing    Also if any upper respiratory tract symptoms otherwise including cold or bronchitis

## 2023-04-06 NOTE — PROGRESS NOTES
Name: Jeannine Barber      : 1960      MRN: [id number]  Encounter Provider: Martha Spring DO  Encounter Date: 2023   Encounter department: [address]     1  Morbid obesity with body mass index of 45 0-49 9 in adult [facility])  Assessment & Plan: With [facility] He is considered morbidly obese  We discussed with the patient as previously and again today about evaluation by bariatrics  We feel this is critical for his health and welfare  As previously patient is hesitant but he was told with his multiple medical problems and progression of his low back pain it is imperative that he has some type of treatment in order to help him lose weight    We have placed an order for the patient to be seen and evaluated by bariatrics    Orders:  -     Ambulatory Referral to Weight Management; Future  -     TSH, 3rd generation with Free T4 reflex; Future    2  Benign essential hypertension  Assessment & Plan:  Patient does have a longstanding history of hypertension  As noted patient's blood pressure remains under control and patient is compliant with his medication  He will continue with present surveillance and we will make adjustment of medication as needed in the future and continue to monitor his kidney function which again is stable  Orders:  -     TSH, 3rd generation with Free T4 reflex; Future    3  Panlobular emphysema ([address] )  Assessment & Plan:  History of chronic obstructive pulmonary disease  Had a history of cigarette smoking in the past   No longer smoking  No acute exacerbations of his lung disease  He knows to immediately call the office for evaluation acutely if any evidence of increasing shortness of breath cough wheezing  Also if any upper respiratory tract symptoms otherwise including cold or bronchitis      4  Arteriosclerotic cardiovascular disease  Assessment & Plan:  History of atherosclerotic cardiovascular disease    Patient continues to follow-up with cardiologist and he is on medication as previously  Patient denies any chest pain  Has chronic shortness of breath with exertion which is unchanged  They continue with his cardiac surveillance  Orders:  -     TSH, 3rd generation with Free T4 reflex; Future    5  Type 2 diabetes mellitus with microalbuminuria, with long-term current use of insulin ([facility])  Assessment & Plan:  As noted patient's blood sugar control is stable  Patient relates that when he began to have an increasing discomfort in his low back his sugars did go up somewhat but he did make adjustments with his insulin dose  He has been extremely good about following his blood sugar readings and making adjustments with medication  He will continue with his insulin dose and oral medications  He is up-to-date with foot exam and eye exam  Lab Results   Component Value Date    HGBA1C 5 9 (H) 03/28/2023       Orders:  -     Hemoglobin A1C; Future  -     Microalbumin / creatinine urine ratio    6  Healthcare maintenance  Assessment & Plan:  Patient will be due for repeat yearly physical when he returns to the office in 4 months  He was given a slip for complete labs to be performed prior to that visit  As previously in the interim patient was told if any new medical problems or concerns to please call  Orders:  -     Comprehensive metabolic panel; Future  -     CBC and differential; Future  -     Lipid panel; Future  -     UA (URINE) with reflex to Scope; Future; Expected date: 04/06/2023  -     Hemoglobin A1C; Future  -     Microalbumin / creatinine urine ratio  -     TSH, 3rd generation with Free T4 reflex; Future  -     PSA, Total Screen; Future  -     Occult Blood, Fecal Immunochemical; Future    7  Spondylosis of lumbar region without myelopathy or radiculopathy  Assessment & Plan:  Relates that since the new year he has had some increasing difficulty with low back pain    At this time he is taking his hydrocodone one half a pill twice a day on a daily basis in order to help him with this discomfort  We did discuss with the patient modalities that have been used in the past in order to help him with his pain and he states he has had no relief with different treatments including physical therapy and pain management  8  Prostate cancer screening  -     PSA, Total Screen; Future           Subjective     Patient is a 41-year-old male history of multiple medical problems who is here today for routine follow-up  He did have labs performed prior to the visit today and we did discuss the results at length with the patient and he is already reviewed these online  Patient states that he has had some increase with his low back pain and discomfort in difficulties with ambulation since the beginning of the new year  Along with this patient has also had a need to make some adjustment with his insulin dose  Review of Systems   Constitutional: Positive for activity change  Negative for appetite change, chills, diaphoresis, fatigue, fever and unexpected weight change  Has had to reduce his activity level significantly because of his back pain   HENT: Negative  Eyes: Negative  Respiratory: Positive for shortness of breath  Negative for apnea, cough, choking, chest tightness, wheezing and stridor  Chronic shortness of breath with exertion but no increase   Cardiovascular: Positive for leg swelling  Negative for chest pain and palpitations  No increased edema bilateral lower extremities   Gastrointestinal: Negative  Endocrine: Negative  Genitourinary: Negative  Musculoskeletal: Positive for arthralgias, back pain and gait problem  Negative for joint swelling, myalgias, neck pain and neck stiffness  Skin: Negative  Has an appointment to be seen in the near future reevaluation by dermatology for his multiple skin difficulties  Allergic/Immunologic: Negative      Neurological: Negative for dizziness, tremors, seizures, syncope, facial asymmetry, speech difficulty, weakness, light-headedness, numbness and headaches  Hematological: Negative  Psychiatric/Behavioral: Negative          Past Medical History:   Diagnosis Date   • Abscess of left thigh    • Acute myocardial infarction [facility])    • Anesthesia     woke up during procedure   • Anxiety    • Arteriosclerotic cardiovascular disease    • Arthritis    • Asthma    • Chest pain    • COPD (chronic obstructive pulmonary disease) ([facility] )    • Coronary artery disease    • Diabetes mellitus ([facility] )    • Fatty liver    • Gastric ulcer    • GERD (gastroesophageal reflux disease)    • Hyperlipidemia    • Hypertension    • Low back pain    • Myocardial infarction [facility]) [id number]   • Obesity    • Obstructive sleep apnea     no Cpap   • Spondylosis of lumbar region without myelopathy or radiculopathy      Past Surgical History:   Procedure Laterality Date   • CARPAL TUNNEL RELEASE Right    • COLONOSCOPY     • CORONARY ANGIOPLASTY WITH STENT PLACEMENT  2006, 2007   • EGD     • HEMORRHOID SURGERY     • NEUROPLASTY / TRANSPOSITION MEDIAN NERVE AT CARPAL TUNNEL Right    • CT SURGICAL ARTHROSCOPY SHOULDER W/LSS&RESCJ ADS Left 6/10/2020    Procedure: SHOULDER ARTHROSCOPIC CAPSULAR RELEASE;  Surgeon: Jacinto Cheng MD;  Location: [facility];  Service: Orthopedics     Family History   Problem Relation Age of Onset   • Alzheimer's disease Mother    • Heart failure Mother    • Dementia Mother    • Heart attack Father    • Other Father         Cardiac Disorder   • Heart disease Father    • Other Family         Back Pain   • Hypertension Family    • Stroke Family         Complications   • Cancer Other      Social History     Socioeconomic History   • Marital status: Single     Spouse name: None   • Number of children: None   • Years of education: None   • Highest education level: None   Occupational History   • None   Tobacco Use   • Smoking status: Former     Packs/day: 1 50 Years: 38 00     Pack years: 57 00     Types: Cigarettes     Start date: 1976     Quit date: 2014     Years since quittin 2   • Smokeless tobacco: Never   • Tobacco comments:     tobacco use not continuous- quit for 2  4 yrs long periods   Vaping Use   • Vaping Use: Never used   Substance and Sexual Activity   • Alcohol use: No   • Drug use: No   • Sexual activity: Not Currently   Other Topics Concern   • None   Social History Narrative   • None     Social Determinants of Health     Financial Resource Strain: Not on file   Food Insecurity: Not on file   Transportation Needs: Not on file   Physical Activity: Not on file   Stress: Not on file   Social Connections: Not on file   Intimate Partner Violence: Not on file   Housing Stability: Not on file     Current Outpatient Medications on File Prior to Visit   Medication Sig   • acetaminophen (TYLENOL) 325 mg tablet Take 650 mg by mouth every 6 (six) hours as needed for mild pain   • albuterol (PROVENTIL HFA,VENTOLIN HFA) 90 mcg/act inhaler Inhale 2 puffs as needed for shortness of breath   • Aspirin Low Dose 81 MG EC tablet TAKE 1 TABLET BY MOUTH EVERY DAY   • atorvastatin (LIPITOR) 40 mg tablet TAKE 1 TABLET BY MOUTH EVERY DAY   • clopidogrel (PLAVIX) 75 mg tablet TAKE 1 TABLET BY MOUTH EVERY DAY   • fenofibrate (TRICOR) 145 mg tablet TAKE 1 TABLET BY MOUTH EVERY DAY   • HYDROcodone-acetaminophen (Vicodin) 5-300 MG per tablet Take 1 tablet by mouth every 6 (six) hours as needed for moderate pain Max Daily Amount: 4 tablets   • Icosapent Ethyl 1 g CAPS Vascepa 1 gram capsule   TAKE 2 G BY MOUTH 2 (TWO) TIMES A DAY     • Insulin Pen Needle (B-D UF III MINI PEN NEEDLES) 31G X 5 MM MISC Inject as directed 2 (two) times a day Use as directed   • Jardiance 25 MG TABS TAKE 1 TABLET BY MOUTH EVERY DAY   • losartan (COZAAR) 25 mg tablet losartan 25 mg tablet   TAKE 1 TABLET BY MOUTH EVERY DAY   • metoprolol tartrate (LOPRESSOR) 50 mg tablet Take 25 mg by mouth 2 "(two) times a day   • nitroglycerin (NITROSTAT) 0 4 mg SL tablet PLEASE SEE ATTACHED FOR DETAILED DIRECTIONS   • pantoprazole (PROTONIX) 40 mg tablet TAKE 1 TABLET BY MOUTH EVERY DAY   • Semglee, yfgn, 100 UNIT/ML SOPN PLEASE SEE ATTACHED FOR DETAILED DIRECTIONS   • Semglee, yfgn, 100 UNIT/ML SOPN INJECT 31 UNITS UNDER THE SKIN 6 (SIX) TIMES A DAY INJECT 3 TIMES IN THE MORNING AND 3 TIMES IN THE NIGHT     Allergies   Allergen Reactions   • Cefaclor Shortness Of Breath     Other reaction(s): Respiratory Distress   • Simvastatin Shortness Of Breath     Other reaction(s): Respiratory Distress     Immunization History   Administered Date(s) Administered   • COVID-19 PFIZER VACCINE 0 3 ML IM 03/19/2021, 04/10/2021, 10/16/2021   • Influenza Quadrivalent, 6-35 Months IM 10/17/2016, 10/24/2017   • Influenza, recombinant, quadrivalent,injectable, preservative free 12/06/2018, 12/23/2019, 11/13/2020, 11/15/2021, 12/01/2022       Objective     /62 (BP Location: Left arm, Patient Position: Sitting, Cuff Size: Adult)   Pulse 77   Temp 98 5 °F (36 9 °C) (Tympanic)   Resp 20   Ht 5' 6\" (1 676 m)   Wt 136 kg (299 lb)   SpO2 98%   BMI 48 26 kg/m²     Physical Exam  Vitals and nursing note reviewed  Constitutional:       General: He is not in acute distress  Appearance: Normal appearance  He is obese  He is not ill-appearing, toxic-appearing or diaphoretic  Comments: Patient is a 70-year-old male who is awake alert in no acute distress oriented x3, morbidly obese, antalgic shuffling gait, difficulty with transfer to the exam table  HENT:      Head: Normocephalic and atraumatic  Right Ear: Tympanic membrane, ear canal and external ear normal  There is no impacted cerumen  Left Ear: Tympanic membrane, ear canal and external ear normal  There is no impacted cerumen  Nose: Nose normal  No congestion or rhinorrhea        Mouth/Throat:      Mouth: Mucous membranes are moist       Pharynx: Oropharynx " is clear  No oropharyngeal exudate or posterior oropharyngeal erythema  Eyes:      General: No scleral icterus  Right eye: No discharge  Left eye: No discharge  Extraocular Movements: Extraocular movements intact  Conjunctiva/sclera: Conjunctivae normal       Pupils: Pupils are equal, round, and reactive to light  Neck:      Vascular: No carotid bruit  Cardiovascular:      Rate and Rhythm: Normal rate and regular rhythm  Pulses: Normal pulses  Heart sounds: Normal heart sounds  No murmur heard  No friction rub  No gallop  Pulmonary:      Effort: Pulmonary effort is normal  No respiratory distress  Breath sounds: Normal breath sounds  No stridor  No wheezing, rhonchi or rales  Chest:      Chest wall: No tenderness  Abdominal:      General: Bowel sounds are normal  There is no distension  Palpations: Abdomen is soft  There is no mass  Tenderness: There is no abdominal tenderness  There is no right CVA tenderness, left CVA tenderness, guarding or rebound  Hernia: No hernia is present  Comments: Morbidly obese   Musculoskeletal:         General: Deformity present  No swelling, tenderness or signs of injury  Cervical back: Normal range of motion and neck supple  No rigidity or tenderness  Right lower leg: Edema present  Left lower leg: Edema present  Comments: Patient does have decreased movement adhesive capsulitis to both shoulders with some mild discomfort with movement  Restriction of movement  Also severe restriction to his lumbar spine with movement both passively and actively  Decreased straight leg raising bilaterally  Patient has a +1 edema bilateral lower extremities which is chronic  Lymphadenopathy:      Cervical: No cervical adenopathy  Skin:     General: Skin is warm and dry  Coloration: Skin is not jaundiced or pale  Findings: Erythema, lesion and rash present  No bruising        Comments: Patient evaluation of his chest and back shows multiple small pustules consistent with  staph infection, some pustules underneath the redundant skin of his right breast   Despite treatment, staph infection to his right calf medially  Diffuse areas of actinic care as ptosis anterior posteriorly skin surfaces  of his chest   Very dry skin   Neurological:      Mental Status: He is alert and oriented to person, place, and time  Mental status is at baseline  Cranial Nerves: No cranial nerve deficit  Sensory: No sensory deficit  Motor: No weakness  Coordination: Coordination normal       Gait: Gait abnormal       Deep Tendon Reflexes: Reflexes abnormal (Very slow patella and Achilles tendon reflexes bilaterally)  Psychiatric:         Mood and Affect: Mood normal          Behavior: Behavior normal          Thought Content:  Thought content normal          Judgment: Judgment normal        Elio Leahy DO

## 2023-04-06 NOTE — ASSESSMENT & PLAN NOTE
With the [facility] 26 He is considered morbidly obese  We discussed with the patient as previously and again today about evaluation by bariatrics  We feel this is critical for his health and welfare    As previously patient is hesitant but he was told with his multiple medical problems and progression of his low back pain it is imperative that he has some type of treatment in order to help him lose weight    We have placed an order for the patient to be seen and evaluated by bariatrics

## 2023-04-06 NOTE — ASSESSMENT & PLAN NOTE
As noted patient's blood sugar control is stable  Patient relates that when he began to have an increasing discomfort in his low back his sugars did go up somewhat but he did make adjustments with his insulin dose  He has been extremely good about following his blood sugar readings and making adjustments with medication  He will continue with his insulin dose and oral medications    He is up-to-date with foot exam and eye exam  Lab Results   Component Value Date    HGBA1C 5 9 (H) 03/28/2023

## 2023-04-06 NOTE — ASSESSMENT & PLAN NOTE
Patient does have a longstanding history of hypertension  As noted patient's blood pressure remains under control and patient is compliant with his medication  He will continue with present surveillance and we will make adjustment of medication as needed in the future and continue to monitor his kidney function which again is stable

## 2023-04-10 PROBLEM — L03.115 CELLULITIS OF RIGHT LOWER EXTREMITY: Status: ACTIVE | Noted: 2023-04-10

## 2023-04-29 DIAGNOSIS — G89.29 CHRONIC BILATERAL LOW BACK PAIN WITHOUT SCIATICA: ICD-10-CM

## 2023-04-29 DIAGNOSIS — M54.50 CHRONIC BILATERAL LOW BACK PAIN WITHOUT SCIATICA: ICD-10-CM

## 2023-05-01 DIAGNOSIS — L70.8 ACNEIFORM ERUPTION: Primary | ICD-10-CM

## 2023-05-01 DIAGNOSIS — L70.8 ACNEIFORM ERUPTION: ICD-10-CM

## 2023-05-01 RX ORDER — SODIUM SULFACETAMIDE AND SULFUR 100; 50 MG/G; MG/G
CREAM TOPICAL 2 TIMES DAILY
Qty: 57 G | Refills: 2 | Status: SHIPPED | OUTPATIENT
Start: 2023-05-01 | End: 2023-05-01

## 2023-05-01 RX ORDER — SULFACETAMIDE SODIUM 100 MG/ML
LOTION TOPICAL 2 TIMES DAILY
Qty: 118 ML | Refills: 0 | Status: SHIPPED | OUTPATIENT
Start: 2023-05-01

## 2023-05-01 RX ORDER — HYDROCODONE BITARTRATE AND ACETAMINOPHEN 5; 300 MG/1; MG/1
1 TABLET ORAL EVERY 6 HOURS PRN
Qty: 112 TABLET | Refills: 0 | Status: SHIPPED | OUTPATIENT
Start: 2023-05-01

## 2023-05-03 ENCOUNTER — TELEPHONE (OUTPATIENT)
Dept: INTERNAL MEDICINE CLINIC | Facility: CLINIC | Age: 63
End: 2023-05-03

## 2023-05-03 DIAGNOSIS — Z79.4 TYPE 2 DIABETES MELLITUS WITH OTHER CIRCULATORY COMPLICATION, WITH LONG-TERM CURRENT USE OF INSULIN (HCC): ICD-10-CM

## 2023-05-03 DIAGNOSIS — E11.59 TYPE 2 DIABETES MELLITUS WITH OTHER CIRCULATORY COMPLICATION, WITH LONG-TERM CURRENT USE OF INSULIN (HCC): ICD-10-CM

## 2023-05-03 DIAGNOSIS — B95.8 STAPH SKIN INFECTION: Primary | ICD-10-CM

## 2023-05-03 DIAGNOSIS — L08.9 STAPH SKIN INFECTION: Primary | ICD-10-CM

## 2023-05-03 RX ORDER — FLURBIPROFEN SODIUM 0.3 MG/ML
SOLUTION/ DROPS OPHTHALMIC 2 TIMES DAILY
Qty: 100 EACH | Refills: 0 | Status: SHIPPED | OUTPATIENT
Start: 2023-05-03

## 2023-05-03 RX ORDER — DOXYCYCLINE HYCLATE 100 MG/1
100 CAPSULE ORAL EVERY 12 HOURS SCHEDULED
Qty: 20 CAPSULE | Refills: 0 | Status: SHIPPED | OUTPATIENT
Start: 2023-05-03 | End: 2023-05-13

## 2023-05-03 NOTE — ASSESSMENT & PLAN NOTE
Patient was placed recently on doxycycline for a skin infection  States was doing better but now has return of symptoms and some inflammation  We will restart the doxycycline but was told really needs to be seen and reevaluated and possible culture taken    The prescription was sent to the pharmacy and patient was urged to call us and keep in touch as to his condition

## 2023-05-04 ENCOUNTER — TELEPHONE (OUTPATIENT)
Dept: INTERNAL MEDICINE CLINIC | Facility: CLINIC | Age: 63
End: 2023-05-04

## 2023-05-17 ENCOUNTER — TELEPHONE (OUTPATIENT)
Dept: OTHER | Facility: OTHER | Age: 63
End: 2023-05-17

## 2023-05-18 NOTE — TELEPHONE ENCOUNTER
Pt called stating the sore on his leg is expanding some and it is sore as well as around it is sore  He said he is getting like satellite spots

## 2023-05-22 ENCOUNTER — TELEPHONE (OUTPATIENT)
Dept: INTERNAL MEDICINE CLINIC | Facility: CLINIC | Age: 63
End: 2023-05-22

## 2023-05-22 NOTE — TELEPHONE ENCOUNTER
Pt called about the sores on his legs that are not healing  He has seen you in the past for this and   He did do doxycycline but it helps then comes back when med stops  His leg now is swollen from his knee down to his foot  It feels heavy and quite painful  He did call dr Andres Bellamy twice but has not heard back

## 2023-05-23 ENCOUNTER — OFFICE VISIT (OUTPATIENT)
Dept: INTERNAL MEDICINE CLINIC | Facility: CLINIC | Age: 63
End: 2023-05-23

## 2023-05-23 VITALS
DIASTOLIC BLOOD PRESSURE: 76 MMHG | HEIGHT: 66 IN | WEIGHT: 310.2 LBS | SYSTOLIC BLOOD PRESSURE: 144 MMHG | TEMPERATURE: 98.9 F | OXYGEN SATURATION: 98 % | HEART RATE: 77 BPM | BODY MASS INDEX: 49.85 KG/M2

## 2023-05-23 DIAGNOSIS — L03.115 CELLULITIS OF RIGHT LOWER EXTREMITY: Primary | ICD-10-CM

## 2023-05-23 DIAGNOSIS — I10 BENIGN ESSENTIAL HYPERTENSION: ICD-10-CM

## 2023-05-23 PROBLEM — F11.20 CONTINUOUS OPIOID DEPENDENCE (HCC): Status: ACTIVE | Noted: 2023-05-23

## 2023-05-23 RX ORDER — DOXYCYCLINE HYCLATE 100 MG/1
100 CAPSULE ORAL EVERY 12 HOURS SCHEDULED
Qty: 20 CAPSULE | Refills: 3 | Status: SHIPPED | OUTPATIENT
Start: 2023-05-23 | End: 2023-06-02

## 2023-05-23 NOTE — PROGRESS NOTES
Name: Vinay Ghosh      : 1960      MRN: [id number]  Encounter Provider: Grace Fallon DO  Encounter Date: 2023   [facility]    Assessment & Plan     1  Cellulitis of right lower extremity  Assessment & Plan:  Lesion to his right lower extremity has progressed and enlarged  He continues to be uncomfortable for the patient  Originally with doxycycline he did have dramatic improvement but his second dose of the medication has not been helpful  He is here today to obtain a culture of the wound site so that we can have evaluation as to the best treatment  He was seen by dermatology and placed on Aquaphor and states that that this has not helped  He has been trying to get back into see the dermatologist again for repeat evaluation but has not had a return call  We will place a second consult for the patient to be seen as soon as possible by his dermatologist to make decisions as to what would be the next step as far as treatment is concerned  Again we did have a culture performed of the wound site and we will make changes with treatment as needed  We have difficulty with the patient being severely allergic to Bactrim and having difficulties with Keflex in the past   Do not feel comfortable giving him penicillin  Orders:  -     doxycycline hyclate (VIBRAMYCIN) 100 mg capsule; Take 1 capsule (100 mg total) by mouth every 12 (twelve) hours for 10 days  -     Wound culture and Gram stain; Future  -     Wound culture and Gram stain; Future  -     Wound culture and Gram stain; Future  -     Wound culture and Gram stain  -     Ambulatory Referral to Dermatology; Future    2  Benign essential hypertension  Assessment & Plan:  Blood pressure is variable and stable  Subjective      Patient is a 41-year-old male history of multiple medical problems who is here today for reevaluation wound site to his right lower extremity medially    States that the wound has been getting larger and treatment as per his dermatologist has not been helpful  Review of Systems   Constitutional: Negative  HENT: Negative  Eyes: Negative  Respiratory: Negative  Cardiovascular: Negative  Gastrointestinal: Negative  Endocrine: Negative  Genitourinary: Negative  Musculoskeletal: Negative  Skin: Positive for wound  Negative for color change, pallor and rash  Allergic/Immunologic: Negative  Neurological: Negative  Hematological: Negative  Psychiatric/Behavioral: Negative  Current Outpatient Medications on File Prior to Visit   Medication Sig   • acetaminophen (TYLENOL) 325 mg tablet Take 650 mg by mouth every 6 (six) hours as needed for mild pain   • albuterol (PROVENTIL HFA,VENTOLIN HFA) 90 mcg/act inhaler Inhale 2 puffs as needed for shortness of breath   • Aspirin Low Dose 81 MG EC tablet TAKE 1 TABLET BY MOUTH EVERY DAY   • atorvastatin (LIPITOR) 40 mg tablet TAKE 1 TABLET BY MOUTH EVERY DAY   • clopidogrel (PLAVIX) 75 mg tablet Take 1 tablet (75 mg total) by mouth daily   • fenofibrate (TRICOR) 145 mg tablet TAKE 1 TABLET BY MOUTH EVERY DAY   • HYDROcodone-acetaminophen (Vicodin) 5-300 MG per tablet Take 1 tablet by mouth every 6 (six) hours as needed for moderate pain Max Daily Amount: 4 tablets   • Icosapent Ethyl 1 g CAPS Vascepa 1 gram capsule   TAKE 2 G BY MOUTH 2 (TWO) TIMES A DAY     • Insulin Pen Needle (B-D UF III MINI PEN NEEDLES) 31G X 5 MM MISC Inject as directed 2 (two) times a day Use as directed   • Jardiance 25 MG TABS TAKE 1 TABLET BY MOUTH EVERY DAY   • losartan (COZAAR) 25 mg tablet losartan 25 mg tablet   TAKE 1 TABLET BY MOUTH EVERY DAY   • metoprolol tartrate (LOPRESSOR) 50 mg tablet Take 25 mg by mouth 2 (two) times a day   • nitroglycerin (NITROSTAT) 0 4 mg SL tablet PLEASE SEE ATTACHED FOR DETAILED DIRECTIONS   • pantoprazole (PROTONIX) 40 mg tablet TAKE 1 TABLET BY MOUTH EVERY DAY   • Semglee, premagn, 100 "UNIT/ML SOPN PLEASE SEE ATTACHED FOR DETAILED DIRECTIONS   • Pat yfgn, 100 UNIT/ML SOPN INJECT 31 UNITS UNDER THE SKIN 6 (SIX) TIMES A DAY INJECT 3 TIMES IN THE MORNING AND 3 TIMES IN THE NIGHT   • Sulfacetamide Sodium, Acne, 10 % LOTN Apply topically 2 (two) times a day       Objective     /76 (BP Location: Right arm, Patient Position: Sitting)   Pulse 77   Temp 98 9 °F (37 2 °C) (Tympanic)   Ht 5' 6\" (1 676 m)   Wt (!) 141 kg (310 lb 3 2 oz)   SpO2 98%   BMI 50 07 kg/m²     Physical Exam  Vitals and nursing note reviewed  Constitutional:       General: He is not in acute distress  Appearance: Normal appearance  He is obese  He is not ill-appearing, toxic-appearing or diaphoretic  Comments: Pleasant obese 58-year-old male who is awake alert   HENT:      Head: Normocephalic and atraumatic  Cardiovascular:      Rate and Rhythm: Normal rate and regular rhythm  Pulmonary:      Effort: Pulmonary effort is normal       Comments: Slightly decreased breath sounds anteriorly and posteriorly but no rales rhonchi or wheezes  Skin:     Findings: Lesion present  Comments: Patient's lesion to his right lower extremity is enlarged in size and now is approximately 6 x 4 cm in size  Has a slightly jagged border  No purulent discharge  Patient states the lesion can be painful  Again unsure of specific etiology  A smaller lesion for the lower portion of the main wound was slightly open and culture was taken and sent to the lab   Neurological:      Mental Status: He is alert and oriented to person, place, and time  Mental status is at baseline  Psychiatric:         Mood and Affect: Mood normal          Behavior: Behavior normal          Thought Content:  Thought content normal          Judgment: Judgment normal        Kristie Connor,   "

## 2023-05-23 NOTE — ASSESSMENT & PLAN NOTE
Lesion to his right lower extremity has progressed and enlarged  He continues to be uncomfortable for the patient  Originally with doxycycline he did have dramatic improvement but his second dose of the medication has not been helpful  He is here today to obtain a culture of the wound site so that we can have evaluation as to the best treatment  He was seen by dermatology and placed on Aquaphor and states that that this has not helped  He has been trying to get back into see the dermatologist again for repeat evaluation but has not had a return call  We will place a second consult for the patient to be seen as soon as possible by his dermatologist to make decisions as to what would be the next step as far as treatment is concerned  Again we did have a culture performed of the wound site and we will make changes with treatment as needed  We have difficulty with the patient being severely allergic to Bactrim and having difficulties with Keflex in the past   Do not feel comfortable giving him penicillin

## 2023-05-25 LAB
BACTERIA WND AEROBE CULT: NORMAL
GRAM STN SPEC: NORMAL

## 2023-05-27 DIAGNOSIS — Z79.4 TYPE 2 DIABETES MELLITUS WITH OTHER CIRCULATORY COMPLICATION, WITH LONG-TERM CURRENT USE OF INSULIN (HCC): ICD-10-CM

## 2023-05-27 DIAGNOSIS — E11.59 TYPE 2 DIABETES MELLITUS WITH OTHER CIRCULATORY COMPLICATION, WITH LONG-TERM CURRENT USE OF INSULIN (HCC): ICD-10-CM

## 2023-05-29 RX ORDER — EMPAGLIFLOZIN 25 MG/1
TABLET, FILM COATED ORAL
Qty: 90 TABLET | Refills: 1 | Status: SHIPPED | OUTPATIENT
Start: 2023-05-29

## 2023-06-06 ENCOUNTER — TELEPHONE (OUTPATIENT)
Dept: INTERNAL MEDICINE CLINIC | Facility: CLINIC | Age: 63
End: 2023-06-06

## 2023-06-06 NOTE — TELEPHONE ENCOUNTER
Hi, this is Mancia Bear, I'm calling about my leg and wondering what can be done with this or what the game plan is  I've continued to try to reach [facility] dermatology and have continued to have no luck  This is also starting to throw my back out on the other side  I mean, it hasn't the sore in my leg has spread some, not radically, but it's definitely not getting any better and it's getting to be a bit worrisome for me  If you could get back to me and let me know if what I can possibly do, I would very much appreciate it  Thank you very much for your time and have a good day

## 2023-06-07 NOTE — TELEPHONE ENCOUNTER
I did call the patient back yesterday and today  I have left a message  We will try to talk to the patient tomorrow and help him with his medical problems

## 2023-06-08 ENCOUNTER — OFFICE VISIT (OUTPATIENT)
Dept: DERMATOLOGY | Facility: CLINIC | Age: 63
End: 2023-06-08

## 2023-06-08 VITALS — WEIGHT: 302 LBS | TEMPERATURE: 98.7 F | BODY MASS INDEX: 48.53 KG/M2 | HEIGHT: 66 IN

## 2023-06-08 DIAGNOSIS — R21 RASH: Primary | ICD-10-CM

## 2023-06-08 PROCEDURE — 88305 TISSUE EXAM BY PATHOLOGIST: CPT | Performed by: STUDENT IN AN ORGANIZED HEALTH CARE EDUCATION/TRAINING PROGRAM

## 2023-06-08 PROCEDURE — 87206 SMEAR FLUORESCENT/ACID STAI: CPT | Performed by: STUDENT IN AN ORGANIZED HEALTH CARE EDUCATION/TRAINING PROGRAM

## 2023-06-08 PROCEDURE — 88312 SPECIAL STAINS GROUP 1: CPT | Performed by: STUDENT IN AN ORGANIZED HEALTH CARE EDUCATION/TRAINING PROGRAM

## 2023-06-08 PROCEDURE — 87070 CULTURE OTHR SPECIMN AEROBIC: CPT | Performed by: STUDENT IN AN ORGANIZED HEALTH CARE EDUCATION/TRAINING PROGRAM

## 2023-06-08 PROCEDURE — 87205 SMEAR GRAM STAIN: CPT | Performed by: STUDENT IN AN ORGANIZED HEALTH CARE EDUCATION/TRAINING PROGRAM

## 2023-06-08 PROCEDURE — 87116 MYCOBACTERIA CULTURE: CPT | Performed by: STUDENT IN AN ORGANIZED HEALTH CARE EDUCATION/TRAINING PROGRAM

## 2023-06-08 PROCEDURE — 87102 FUNGUS ISOLATION CULTURE: CPT | Performed by: STUDENT IN AN ORGANIZED HEALTH CARE EDUCATION/TRAINING PROGRAM

## 2023-06-08 PROCEDURE — 88313 SPECIAL STAINS GROUP 2: CPT | Performed by: STUDENT IN AN ORGANIZED HEALTH CARE EDUCATION/TRAINING PROGRAM

## 2023-06-08 RX ORDER — FUROSEMIDE 40 MG/1
TABLET ORAL
COMMUNITY

## 2023-06-08 NOTE — TELEPHONE ENCOUNTER
Finally was able to get a hold of the patient today  Patient was called last night by dermatology and has appointment to be seen Tuesday for evaluation  I have curiosity about the diagnosis and/or treatment plan

## 2023-06-08 NOTE — PROGRESS NOTES
"[facility] Note     Patient Name: Cary Barajas  Encounter Date: 6/8/23     Have you been cared for by a [place] [id number] Dermatologist in the last 3 years and, if so, which description applies to you? Yes  I have been here within the last 3 years, and my medical history has NOT changed since that time  I am MALE/not capable of bearing children  REVIEW OF SYSTEMS:  Have you recently had or currently have any of the following? · No changes in my recent health  PAST MEDICAL HISTORY:  Have you personally ever had or currently have any of the following? If \"YES,\" then please provide more detail  · No changes in my medical history  FAMILY HISTORY:  Any \"first degree relatives\" (parent, brother, sister, or child) with the following? • No changes in my family's known health  PATIENT EXPERIENCE:    • Do you want the Dermatologist to perform a COMPLETE skin exam today including a clinical examination under the \"bra and underwear\" areas? NO  • If necessary, do we have your permission to call and leave a detailed message on your Preferred Phone number that includes your specific medical information?   Yes      Allergies   Allergen Reactions   • Bactrim [Sulfamethoxazole-Trimethoprim] Edema   • Cefaclor Shortness Of Breath     Other reaction(s): Respiratory Distress   • Simvastatin Shortness Of Breath     Other reaction(s): Respiratory Distress      Current Outpatient Medications:   •  acetaminophen (TYLENOL) 325 mg tablet, Take 650 mg by mouth every 6 (six) hours as needed for mild pain, Disp: , Rfl:   •  albuterol (PROVENTIL HFA,VENTOLIN HFA) 90 mcg/act inhaler, Inhale 2 puffs as needed for shortness of breath, Disp: 18 g, Rfl: 1  •  Aspirin Low Dose 81 MG EC tablet, TAKE 1 TABLET BY MOUTH EVERY DAY, Disp: 90 tablet, Rfl: 0  •  atorvastatin (LIPITOR) 40 mg tablet, TAKE 1 TABLET BY MOUTH EVERY DAY, Disp: 90 tablet, Rfl: 2  •  clopidogrel (PLAVIX) 75 mg tablet, Take 1 tablet (75 mg total) by " mouth daily, Disp: 90 tablet, Rfl: 0  •  fenofibrate (TRICOR) 145 mg tablet, TAKE 1 TABLET BY MOUTH EVERY DAY, Disp: 90 tablet, Rfl: 1  •  furosemide (LASIX) 40 mg tablet, , Disp: , Rfl:   •  HYDROcodone-acetaminophen (Vicodin) 5-300 MG per tablet, Take 1 tablet by mouth every 6 (six) hours as needed for moderate pain Max Daily Amount: 4 tablets, Disp: 112 tablet, Rfl: 0  •  Icosapent Ethyl 1 g CAPS, Vascepa 1 gram capsule  TAKE 2 G BY MOUTH 2 (TWO) TIMES A DAY , Disp: , Rfl:   •  Insulin Pen Needle (B-D UF III MINI PEN NEEDLES) 31G X 5 MM MISC, Inject as directed 2 (two) times a day Use as directed, Disp: 100 each, Rfl: 0  •  Jardiance 25 MG TABS, TAKE 1 TABLET BY MOUTH EVERY DAY, Disp: 90 tablet, Rfl: 1  •  losartan (COZAAR) 25 mg tablet, losartan 25 mg tablet  TAKE 1 TABLET BY MOUTH EVERY DAY, Disp: , Rfl:   •  metoprolol tartrate (LOPRESSOR) 50 mg tablet, Take 25 mg by mouth 2 (two) times a day, Disp: , Rfl:   •  nitroglycerin (NITROSTAT) 0 4 mg SL tablet, PLEASE SEE ATTACHED FOR DETAILED DIRECTIONS, Disp: , Rfl:   •  pantoprazole (PROTONIX) 40 mg tablet, TAKE 1 TABLET BY MOUTH EVERY DAY, Disp: 90 tablet, Rfl: 5  •  Semglee, yfgn, 100 UNIT/ML SOPN, PLEASE SEE ATTACHED FOR DETAILED DIRECTIONS, Disp: , Rfl:   •  Semglee, yfgn, 100 UNIT/ML SOPN, INJECT 31 UNITS UNDER THE SKIN 6 (SIX) TIMES A DAY INJECT 3 TIMES IN THE MORNING AND 3 TIMES IN THE NIGHT, Disp: 180 mL, Rfl: 3  •  Sulfacetamide Sodium, Acne, 10 % LOTN, Apply topically 2 (two) times a day, Disp: 118 mL, Rfl: 0          • Whom besides the patient is providing clinical information about today's encounter?   o NO ADDITIONAL HISTORIAN (patient alone provided history)  o Patient here for follow up of skin lesion on the right lower leg  Patient saw Dr Brittany Merrill was told to treat with Aqua four ointment  Skin lesion has gotten bigger more painful patient also reports itching  Prescribed doxycycline in the past first course helped  History of Edema   Culture taken results negative  Physical Exam and Assessment/Plan by Diagnosis:    RASH    Physical Exam:  • (Anatomic Location); (Size and Morphological Description); (Differential Diagnosis):  o A: Right medial lower leg; 15 cm red plaque with induration; (DDX): Lipodermatosclerosis versus Majocchi Granuloma versus atypical mycobacterial infection for H& E   o B: Right medial lower leg; 15 cm red plaque with induration; (DDX): Lipodermatosclerosis versus Majocchi Granuloma versus atypical mycobacterial infection for Fungal tissue  o   • Pertinent Positives:  • Pertinent Negatives: Additional History of Present Condition:  See above    Assessment and Plan:  Based on a thorough discussion of this condition and the management approach to it (including a comprehensive discussion of the known risks, side effects and potential benefits of treatment), the patient (family) agrees to implement the following specific plan:  • Recommend punch biopsy of the site to get clear diagnosis  • Will decide treatment options once biopsy results are finalized  A: PROCEDURE NOTE:  PUNCH BIOPSY      Performing Physician: Dr Beavers    Anatomic Location; Clinical Description with size (cm); Pre-Op Diagnosis:   •  A: Right medial lower leg; 15 cm red plaque with induration; (DDX): Lipodermatosclerosis versus Majocchi Granuloma versus atypical mycobacterial infection for H& E  Anesthesia: 3:1 1% xylocaine with epi and 1-100,000 buffered      Topical anesthesia: None       Indications: To indicate diagnosis and management plan  Procedure Details     Patient informed of the risks (including bleeding,scaring and infection) and benefits of the procedure explained  Verbal and written informed consent obtained  The area was prepped and draped in the usual fashion  Anesthesia was obtained with 1% lidocaine with epinephrine   The skin was then stretched perpendicular to the skin tension lines and a punch biopsy to an appropriate sampling depth was obtained with a 4 mm punch with a forceps and iris scissors  Hemostasis was obtained with Gelfoam x 0 sutures  Complications:  None      Specimen has been sent for review by Dermatopathology  Plan:  1  Instructed to keep the wound dry and covered for 24-48h and clean thereafter  2  Warning signs of infection were reviewed  3  Recommended that the patient use acetaminophen as needed for pain    Standard post-procedure care has been explained and has been included in written form within the patient's copy of Informed Consent  B: PROCEDURE NOTE:  PUNCH BIOPSY      Performing Physician: Dr Beavers    Anatomic Location; Clinical Description with size (cm); Pre-Op Diagnosis:   •  B: Right medial lower leg; 15 cm red plaque with induration; (DDX): Lipodermatosclerosis versus Majocchi Granuloma versus atypical mycobacterial infection for Fungal tissue  Anesthesia: 3:1 1% xylocaine with epi and 1-100,000 buffered      Topical anesthesia: None       Indications: To indicate diagnosis and management plan  Procedure Details     Patient informed of the risks (including bleeding,scaring and infection) and benefits of the procedure explained  Verbal and written informed consent obtained  The area was prepped and draped in the usual fashion  Anesthesia was obtained with 1% lidocaine with epinephrine  The skin was then stretched perpendicular to the skin tension lines and a punch biopsy to an appropriate sampling depth was obtained with a 4 mm punch with a forceps and iris scissors  Hemostasis was obtained with Gelfoam x 0 sutures  Complications:  None      Specimen has been sent for review by Dermatopathology  Plan:  1  Instructed to keep the wound dry and covered for 24-48h and clean thereafter  2  Warning signs of infection were reviewed      3  Recommended that the patient use acetaminophen as needed for pain        Standard post-procedure care has been explained and has been included in written form within the patient's copy of Informed Consent        Scribe Attestation    I,:  Unique Lund am acting as a scribe while in the presence of the attending physician :       I,:  Ash Reyes MD personally performed the services described in this documentation    as scribed in my presence :

## 2023-06-09 ENCOUNTER — DOCUMENTATION (OUTPATIENT)
Dept: DERMATOLOGY | Facility: CLINIC | Age: 63
End: 2023-06-09

## 2023-06-09 DIAGNOSIS — R21 RASH: Primary | ICD-10-CM

## 2023-06-09 LAB — RHODAMINE-AURAMINE STN SPEC: NORMAL

## 2023-06-11 LAB
BACTERIA TISS AEROBE CULT: NORMAL
GRAM STN SPEC: NORMAL

## 2023-06-12 LAB — FUNGUS SPEC CULT: NORMAL

## 2023-06-13 LAB
MYCOBACTERIUM SPEC CULT: NORMAL
RHODAMINE-AURAMINE STN SPEC: NORMAL

## 2023-06-13 PROCEDURE — 88313 SPECIAL STAINS GROUP 2: CPT | Performed by: STUDENT IN AN ORGANIZED HEALTH CARE EDUCATION/TRAINING PROGRAM

## 2023-06-13 PROCEDURE — 88305 TISSUE EXAM BY PATHOLOGIST: CPT | Performed by: STUDENT IN AN ORGANIZED HEALTH CARE EDUCATION/TRAINING PROGRAM

## 2023-06-13 PROCEDURE — 88312 SPECIAL STAINS GROUP 1: CPT | Performed by: STUDENT IN AN ORGANIZED HEALTH CARE EDUCATION/TRAINING PROGRAM

## 2023-06-19 DIAGNOSIS — M54.50 CHRONIC BILATERAL LOW BACK PAIN WITHOUT SCIATICA: ICD-10-CM

## 2023-06-19 DIAGNOSIS — G89.29 CHRONIC BILATERAL LOW BACK PAIN WITHOUT SCIATICA: ICD-10-CM

## 2023-06-19 LAB — FUNGUS SPEC CULT: NORMAL

## 2023-06-19 RX ORDER — HYDROCODONE BITARTRATE AND ACETAMINOPHEN 5; 300 MG/1; MG/1
1 TABLET ORAL EVERY 6 HOURS PRN
Qty: 112 TABLET | Refills: 0 | Status: SHIPPED | OUTPATIENT
Start: 2023-06-19

## 2023-06-20 LAB
MYCOBACTERIUM SPEC CULT: NORMAL
RHODAMINE-AURAMINE STN SPEC: NORMAL

## 2023-06-22 DIAGNOSIS — E11.59 TYPE 2 DIABETES MELLITUS WITH OTHER CIRCULATORY COMPLICATION, WITH LONG-TERM CURRENT USE OF INSULIN (HCC): ICD-10-CM

## 2023-06-22 DIAGNOSIS — Z79.4 TYPE 2 DIABETES MELLITUS WITH OTHER CIRCULATORY COMPLICATION, WITH LONG-TERM CURRENT USE OF INSULIN (HCC): ICD-10-CM

## 2023-06-22 RX ORDER — FLURBIPROFEN SODIUM 0.3 MG/ML
SOLUTION/ DROPS OPHTHALMIC 2 TIMES DAILY
Qty: 100 EACH | Refills: 0 | Status: SHIPPED | OUTPATIENT
Start: 2023-06-22

## 2023-06-26 LAB — FUNGUS SPEC CULT: NORMAL

## 2023-06-27 LAB
MYCOBACTERIUM SPEC CULT: NORMAL
RHODAMINE-AURAMINE STN SPEC: NORMAL

## 2023-06-29 DIAGNOSIS — I10 BENIGN ESSENTIAL HYPERTENSION: Primary | ICD-10-CM

## 2023-06-29 RX ORDER — LOSARTAN POTASSIUM 25 MG/1
25 TABLET ORAL DAILY
Qty: 90 TABLET | Refills: 3 | Status: SHIPPED | OUTPATIENT
Start: 2023-06-29 | End: 2023-09-27

## 2023-07-03 LAB — FUNGUS SPEC CULT: NORMAL

## 2023-07-05 LAB
MYCOBACTERIUM SPEC CULT: NORMAL
RHODAMINE-AURAMINE STN SPEC: NORMAL

## 2023-07-10 LAB — FUNGUS SPEC CULT: NORMAL

## 2023-07-11 LAB
MYCOBACTERIUM SPEC CULT: NORMAL
RHODAMINE-AURAMINE STN SPEC: NORMAL

## 2023-07-12 DIAGNOSIS — Z79.4 TYPE 2 DIABETES MELLITUS WITH OTHER CIRCULATORY COMPLICATION, WITH LONG-TERM CURRENT USE OF INSULIN (HCC): ICD-10-CM

## 2023-07-12 DIAGNOSIS — E11.59 TYPE 2 DIABETES MELLITUS WITH OTHER CIRCULATORY COMPLICATION, WITH LONG-TERM CURRENT USE OF INSULIN (HCC): ICD-10-CM

## 2023-07-12 RX ORDER — CLOPIDOGREL BISULFATE 75 MG/1
75 TABLET ORAL DAILY
Qty: 90 TABLET | Refills: 0 | Status: SHIPPED | OUTPATIENT
Start: 2023-07-12

## 2023-07-17 LAB
MYCOBACTERIUM SPEC CULT: NORMAL
RHODAMINE-AURAMINE STN SPEC: NORMAL

## 2023-07-18 DIAGNOSIS — I25.10 ARTERIOSCLEROTIC CARDIOVASCULAR DISEASE: ICD-10-CM

## 2023-07-18 RX ORDER — ASPIRIN 81 MG/1
TABLET, COATED ORAL
Qty: 30 TABLET | Refills: 2 | Status: SHIPPED | OUTPATIENT
Start: 2023-07-18

## 2023-07-25 LAB
MYCOBACTERIUM SPEC CULT: NORMAL
RHODAMINE-AURAMINE STN SPEC: NORMAL

## 2023-07-29 DIAGNOSIS — E78.00 PURE HYPERCHOLESTEROLEMIA: ICD-10-CM

## 2023-07-29 RX ORDER — FENOFIBRATE 145 MG/1
TABLET, COATED ORAL
Qty: 90 TABLET | Refills: 1 | Status: SHIPPED | OUTPATIENT
Start: 2023-07-29

## 2023-08-01 ENCOUNTER — APPOINTMENT (OUTPATIENT)
Dept: LAB | Age: 63
End: 2023-08-01
Payer: COMMERCIAL

## 2023-08-01 DIAGNOSIS — I25.10 ARTERIOSCLEROTIC CARDIOVASCULAR DISEASE: ICD-10-CM

## 2023-08-01 DIAGNOSIS — Z00.00 HEALTHCARE MAINTENANCE: ICD-10-CM

## 2023-08-01 DIAGNOSIS — E11.29 TYPE 2 DIABETES MELLITUS WITH MICROALBUMINURIA, WITH LONG-TERM CURRENT USE OF INSULIN (HCC): ICD-10-CM

## 2023-08-01 DIAGNOSIS — Z79.4 TYPE 2 DIABETES MELLITUS WITH MICROALBUMINURIA, WITH LONG-TERM CURRENT USE OF INSULIN (HCC): ICD-10-CM

## 2023-08-01 DIAGNOSIS — I10 BENIGN ESSENTIAL HYPERTENSION: ICD-10-CM

## 2023-08-01 DIAGNOSIS — E66.01 MORBID OBESITY WITH BODY MASS INDEX OF 45.0-49.9 IN ADULT (HCC): ICD-10-CM

## 2023-08-01 DIAGNOSIS — R80.9 TYPE 2 DIABETES MELLITUS WITH MICROALBUMINURIA, WITH LONG-TERM CURRENT USE OF INSULIN (HCC): ICD-10-CM

## 2023-08-01 DIAGNOSIS — Z12.5 PROSTATE CANCER SCREENING: ICD-10-CM

## 2023-08-01 LAB
ALBUMIN SERPL BCP-MCNC: 3.8 G/DL (ref 3.5–5)
ALP SERPL-CCNC: 59 U/L (ref 46–116)
ALT SERPL W P-5'-P-CCNC: 31 U/L (ref 12–78)
ANION GAP SERPL CALCULATED.3IONS-SCNC: 4 MMOL/L
AST SERPL W P-5'-P-CCNC: 27 U/L (ref 5–45)
BACTERIA UR QL AUTO: ABNORMAL /HPF
BASOPHILS # BLD AUTO: 0.05 THOUSANDS/ÂΜL (ref 0–0.1)
BASOPHILS NFR BLD AUTO: 1 % (ref 0–1)
BILIRUB SERPL-MCNC: 0.47 MG/DL (ref 0.2–1)
BILIRUB UR QL STRIP: NEGATIVE
BUN SERPL-MCNC: 28 MG/DL (ref 5–25)
CALCIUM SERPL-MCNC: 9.3 MG/DL (ref 8.3–10.1)
CHLORIDE SERPL-SCNC: 108 MMOL/L (ref 96–108)
CHOLEST SERPL-MCNC: 124 MG/DL
CLARITY UR: CLEAR
CO2 SERPL-SCNC: 27 MMOL/L (ref 21–32)
COLOR UR: ABNORMAL
CREAT SERPL-MCNC: 1.54 MG/DL (ref 0.6–1.3)
CREAT UR-MCNC: 199 MG/DL
EOSINOPHIL # BLD AUTO: 0.21 THOUSAND/ÂΜL (ref 0–0.61)
EOSINOPHIL NFR BLD AUTO: 3 % (ref 0–6)
ERYTHROCYTE [DISTWIDTH] IN BLOOD BY AUTOMATED COUNT: 14.1 % (ref 11.6–15.1)
EST. AVERAGE GLUCOSE BLD GHB EST-MCNC: 131 MG/DL
GFR SERPL CREATININE-BSD FRML MDRD: 47 ML/MIN/1.73SQ M
GLUCOSE P FAST SERPL-MCNC: 64 MG/DL (ref 65–99)
GLUCOSE UR STRIP-MCNC: ABNORMAL MG/DL
GRAN CASTS #/AREA URNS LPF: ABNORMAL /[LPF]
HBA1C MFR BLD: 6.2 %
HCT VFR BLD AUTO: 40.4 % (ref 36.5–49.3)
HDLC SERPL-MCNC: 16 MG/DL
HEMOCCULT STL QL IA: NEGATIVE
HGB BLD-MCNC: 13.3 G/DL (ref 12–17)
HGB UR QL STRIP.AUTO: NEGATIVE
IMM GRANULOCYTES # BLD AUTO: 0.04 THOUSAND/UL (ref 0–0.2)
IMM GRANULOCYTES NFR BLD AUTO: 1 % (ref 0–2)
KETONES UR STRIP-MCNC: NEGATIVE MG/DL
LDLC SERPL CALC-MCNC: 62 MG/DL (ref 0–100)
LEUKOCYTE ESTERASE UR QL STRIP: NEGATIVE
LYMPHOCYTES # BLD AUTO: 1.64 THOUSANDS/ÂΜL (ref 0.6–4.47)
LYMPHOCYTES NFR BLD AUTO: 23 % (ref 14–44)
MCH RBC QN AUTO: 30.9 PG (ref 26.8–34.3)
MCHC RBC AUTO-ENTMCNC: 32.9 G/DL (ref 31.4–37.4)
MCV RBC AUTO: 94 FL (ref 82–98)
MICROALBUMIN UR-MCNC: 59.5 MG/L (ref 0–20)
MICROALBUMIN/CREAT 24H UR: 30 MG/G CREATININE (ref 0–30)
MONOCYTES # BLD AUTO: 0.86 THOUSAND/ÂΜL (ref 0.17–1.22)
MONOCYTES NFR BLD AUTO: 12 % (ref 4–12)
NEUTROPHILS # BLD AUTO: 4.44 THOUSANDS/ÂΜL (ref 1.85–7.62)
NEUTS SEG NFR BLD AUTO: 60 % (ref 43–75)
NITRITE UR QL STRIP: NEGATIVE
NON-SQ EPI CELLS URNS QL MICRO: ABNORMAL /HPF
NONHDLC SERPL-MCNC: 108 MG/DL
NRBC BLD AUTO-RTO: 0 /100 WBCS
PH UR STRIP.AUTO: 6 [PH]
PLATELET # BLD AUTO: 253 THOUSANDS/UL (ref 149–390)
PMV BLD AUTO: 10.8 FL (ref 8.9–12.7)
POTASSIUM SERPL-SCNC: 4 MMOL/L (ref 3.5–5.3)
PROT SERPL-MCNC: 7.2 G/DL (ref 6.4–8.4)
PROT UR STRIP-MCNC: NEGATIVE MG/DL
PSA SERPL-MCNC: 0.55 NG/ML (ref 0–4)
RBC # BLD AUTO: 4.31 MILLION/UL (ref 3.88–5.62)
RBC #/AREA URNS AUTO: ABNORMAL /HPF
SODIUM SERPL-SCNC: 139 MMOL/L (ref 135–147)
SP GR UR STRIP.AUTO: 1.02 (ref 1–1.03)
T4 FREE SERPL-MCNC: 0.81 NG/DL (ref 0.61–1.12)
TRIGL SERPL-MCNC: 229 MG/DL
TSH SERPL DL<=0.05 MIU/L-ACNC: 5.26 UIU/ML (ref 0.45–4.5)
UROBILINOGEN UR STRIP-ACNC: <2 MG/DL
WBC # BLD AUTO: 7.24 THOUSAND/UL (ref 4.31–10.16)
WBC #/AREA URNS AUTO: ABNORMAL /HPF

## 2023-08-01 PROCEDURE — 80053 COMPREHEN METABOLIC PANEL: CPT

## 2023-08-01 PROCEDURE — 85025 COMPLETE CBC W/AUTO DIFF WBC: CPT

## 2023-08-01 PROCEDURE — 36415 COLL VENOUS BLD VENIPUNCTURE: CPT

## 2023-08-01 PROCEDURE — 80061 LIPID PANEL: CPT

## 2023-08-01 PROCEDURE — G0328 FECAL BLOOD SCRN IMMUNOASSAY: HCPCS

## 2023-08-01 PROCEDURE — 84443 ASSAY THYROID STIM HORMONE: CPT

## 2023-08-01 PROCEDURE — G0103 PSA SCREENING: HCPCS

## 2023-08-01 PROCEDURE — 84439 ASSAY OF FREE THYROXINE: CPT

## 2023-08-01 PROCEDURE — 83036 HEMOGLOBIN GLYCOSYLATED A1C: CPT

## 2023-08-01 PROCEDURE — 81001 URINALYSIS AUTO W/SCOPE: CPT

## 2023-08-07 DIAGNOSIS — G89.29 CHRONIC BILATERAL LOW BACK PAIN WITHOUT SCIATICA: ICD-10-CM

## 2023-08-07 DIAGNOSIS — M54.50 CHRONIC BILATERAL LOW BACK PAIN WITHOUT SCIATICA: ICD-10-CM

## 2023-08-07 RX ORDER — HYDROCODONE BITARTRATE AND ACETAMINOPHEN 5; 300 MG/1; MG/1
1 TABLET ORAL EVERY 6 HOURS PRN
Qty: 112 TABLET | Refills: 0 | Status: SHIPPED | OUTPATIENT
Start: 2023-08-07

## 2023-08-10 ENCOUNTER — OFFICE VISIT (OUTPATIENT)
Dept: INTERNAL MEDICINE CLINIC | Facility: CLINIC | Age: 63
End: 2023-08-10
Payer: COMMERCIAL

## 2023-08-10 VITALS
SYSTOLIC BLOOD PRESSURE: 132 MMHG | DIASTOLIC BLOOD PRESSURE: 66 MMHG | HEIGHT: 66 IN | HEART RATE: 74 BPM | BODY MASS INDEX: 49.34 KG/M2 | TEMPERATURE: 98.4 F | WEIGHT: 307 LBS | OXYGEN SATURATION: 96 %

## 2023-08-10 DIAGNOSIS — Z79.4 TYPE 2 DIABETES MELLITUS WITHOUT COMPLICATION, WITH LONG-TERM CURRENT USE OF INSULIN (HCC): ICD-10-CM

## 2023-08-10 DIAGNOSIS — M54.16 LUMBAR RADICULOPATHY: ICD-10-CM

## 2023-08-10 DIAGNOSIS — B95.8 STAPH SKIN INFECTION: Primary | ICD-10-CM

## 2023-08-10 DIAGNOSIS — I25.10 ARTERIOSCLEROTIC CARDIOVASCULAR DISEASE: ICD-10-CM

## 2023-08-10 DIAGNOSIS — I10 BENIGN ESSENTIAL HYPERTENSION: ICD-10-CM

## 2023-08-10 DIAGNOSIS — L08.9 STAPH SKIN INFECTION: Primary | ICD-10-CM

## 2023-08-10 DIAGNOSIS — M47.816 SPONDYLOSIS OF LUMBAR REGION WITHOUT MYELOPATHY OR RADICULOPATHY: ICD-10-CM

## 2023-08-10 DIAGNOSIS — E11.9 TYPE 2 DIABETES MELLITUS WITHOUT COMPLICATION, WITH LONG-TERM CURRENT USE OF INSULIN (HCC): ICD-10-CM

## 2023-08-10 DIAGNOSIS — Z00.00 HEALTHCARE MAINTENANCE: ICD-10-CM

## 2023-08-10 DIAGNOSIS — J43.1 PANLOBULAR EMPHYSEMA (HCC): ICD-10-CM

## 2023-08-10 PROCEDURE — 99396 PREV VISIT EST AGE 40-64: CPT | Performed by: INTERNAL MEDICINE

## 2023-08-10 NOTE — ASSESSMENT & PLAN NOTE
History of coronary artery disease MI in the past.  Will continue to monitor blood sugar cholesterol blood pressure. Patient has had no recurrence of disease. Told the importance of continuing to follow-up with cardiology.

## 2023-08-10 NOTE — ASSESSMENT & PLAN NOTE
Chronic back pain and discomfort which can be intermittent. Usually helps only with narcotic analgesics. The patient has been instructed to call if any changes. Decreased range of motion both actively and passively to the lumbar spine with movement.

## 2023-08-10 NOTE — ASSESSMENT & PLAN NOTE
Have a longstanding history now of diabetes mellitus type 2. Patient is on insulin and his sugars are well-controlled and he keeps a very close eye on his sugar readings and makes adjustment with insulin dose as needed. Check a fasting blood sugar hemoglobin A1c with his next visit.   Lab Results   Component Value Date    HGBA1C 6.2 (H) 08/01/2023

## 2023-08-10 NOTE — ASSESSMENT & PLAN NOTE
Patient is a 60-year-old male with a history of medical problems outlined previously who is here today for a general physical.  Patient did have labs performed prior to the visit today we did discuss the results. Patient states he is still having problems with the skin rash right lower extremity now progressing also on the left. Patient does have pain and open areas of skin. States previously had a purulent discharge but this is now resolved. Has been following up with dermatology but they have not given him a definitive diagnosis or treatment modality. Asking for second opinion and evaluation by wound management. She is still having chronic low back pain and now bilateral leg pain especially at night. His sugars are under control and he continues to monitor them closely at home. Patient's physical exam as noted. Will continue present medication and we will check a fasting blood sugar and hemoglobin A1c next visit. Again a second consult with dermatology, consult and evaluation by wound management.   Because of patient's discomfort and severe pain limited exam today in the office

## 2023-08-10 NOTE — ASSESSMENT & PLAN NOTE
History of chronic lung disease tobacco abuse in the past.  Patient denies any recent difficulties with his respiratory system. Continues to take his inhalers as needed.

## 2023-08-10 NOTE — PROGRESS NOTES
Name: Bozena Cerda      : 1960      MRN: [id number]  Encounter Provider: Mirela Burks DO  Encounter Date: 8/10/2023   Encounter department: [address] [place] [address] 190     1. Staph skin infection  Assessment & Plan:  Unsure of exact etiology of skin infection anterior right shin now progressing to his left leg also. Open area is painful to touch was placed on antibiotics by his general surgeon which did show some improvement with less purulent discharge but again has had progression of lesions. Placed a consult for second opinion. Will continue local treatment and evaluation by wound management. Orders:  -     Ambulatory Referral to Dermatology; Future  -     Ambulatory Referral to Wound Care; Future    2. Type 2 diabetes mellitus without complication, with long-term current use of insulin ([address])  Assessment & Plan:  Have a longstanding history now of diabetes mellitus type 2. Patient is on insulin and his sugars are well-controlled and he keeps a very close eye on his sugar readings and makes adjustment with insulin dose as needed. Check a fasting blood sugar hemoglobin A1c with his next visit. Lab Results   Component Value Date    HGBA1C 6.2 (H) 2023       Orders:  -     Hemoglobin A1C; Future    3. Healthcare maintenance  Assessment & Plan:  Patient is a 78-year-old male with a history of medical problems outlined previously who is here today for a general physical.  Patient did have labs performed prior to the visit today we did discuss the results. Patient states he is still having problems with the skin rash right lower extremity now progressing also on the left. Patient does have pain and open areas of skin. States previously had a purulent discharge but this is now resolved. Has been following up with dermatology but they have not given him a definitive diagnosis or treatment modality.   Asking for second opinion and evaluation by wound management. She is still having chronic low back pain and now bilateral leg pain especially at night. His sugars are under control and he continues to monitor them closely at home. Patient's physical exam as noted. Will continue present medication and we will check a fasting blood sugar and hemoglobin A1c next visit. Again a second consult with dermatology, consult and evaluation by wound management. Because of patient's discomfort and severe pain limited exam today in the office      4. Lumbar radiculopathy  Assessment & Plan:  Chronic back pain with radicular symptoms. Again patient has had a workup and evaluation by pain management also orthopedics. Numerous x-ray studies have been performed. No acute exacerbations recently      5. Benign essential hypertension  -     Basic metabolic panel; Future    6. Arteriosclerotic cardiovascular disease  Assessment & Plan:  History of coronary artery disease MI in the past.  Will continue to monitor blood sugar cholesterol blood pressure. Patient has had no recurrence of disease. Told the importance of continuing to follow-up with cardiology. 7. Panlobular emphysema ([address])  Assessment & Plan:  History of chronic lung disease tobacco abuse in the past.  Patient denies any recent difficulties with his respiratory system. Continues to take his inhalers as needed. 8. Spondylosis of lumbar region without myelopathy or radiculopathy  Assessment & Plan:  Chronic back pain and discomfort which can be intermittent. Usually helps only with narcotic analgesics. The patient has been instructed to call if any changes. Decreased range of motion both actively and passively to the lumbar spine with movement.              Subjective     Patient is a 70-year-old male with history of extensive medical problems outlined previously who is here today for a routine physical.  He did have extensive lab testing performed prior to the visit we did discuss the results at length with the patient. Patient states in general his biggest problem is his skin infection Veronica rash both calfs anteriorly bilaterally. Has been seen by dermatology and general surgery with no significant improvement with treatment. Patient states that the rash is painful and he is needing to take narcotic analgesics to help control with this discomfort    Review of Systems   Constitutional: Positive for activity change. Negative for appetite change, chills, diaphoresis, fatigue, fever and unexpected weight change. Patient is limited with activity level not only from his chronic back pain but now bilateral leg pain from skin lesions. States he is getting help from his brother   HENT: Negative. Eyes: Negative. Respiratory: Negative. Cardiovascular: Negative. Chronic edema bilateral lower extremities without change   Gastrointestinal: Negative. Endocrine: Negative. Genitourinary: Negative. Musculoskeletal: Negative. Chronic arthralgias and back pain nothing acute   Skin: Positive for wound. Negative for color change, pallor and rash. Allergic/Immunologic: Negative. Neurological: Negative. Hematological: Negative. Psychiatric/Behavioral: Negative.         Past Medical History:   Diagnosis Date   • Abscess of left thigh    • Acute myocardial infarction [facility])    • Anesthesia     woke up during procedure   • Anxiety    • Arteriosclerotic cardiovascular disease    • Arthritis    • Asthma    • Chest pain    • COPD (chronic obstructive pulmonary disease) (HCC)    • Coronary artery disease    • Diabetes mellitus ([address])    • Fatty liver    • Gastric ulcer    • GERD (gastroesophageal reflux disease)    • Hyperlipidemia    • Hypertension    • Low back pain    • Myocardial infarction [facility]) [id number]   • Obesity    • Obstructive sleep apnea     no Cpap   • Spondylosis of lumbar region without myelopathy or radiculopathy      Past Surgical History:   Procedure Laterality Date   • CARPAL TUNNEL RELEASE Right    • COLONOSCOPY     • CORONARY ANGIOPLASTY WITH STENT PLACEMENT  ,    • EGD     • HEMORRHOID SURGERY     • NEUROPLASTY / TRANSPOSITION MEDIAN NERVE AT CARPAL TUNNEL Right    • AR SURGICAL ARTHROSCOPY SHOULDER W/LSS&RESCJ ADS Left 6/10/2020    Procedure: SHOULDER ARTHROSCOPIC CAPSULAR RELEASE;  Surgeon: Walker Whitlock MD;  Location: [facility];  Service: Orthopedics     Family History   Problem Relation Age of Onset   • Alzheimer's disease Mother    • Heart failure Mother    • Dementia Mother    • Heart attack Father    • Other Father         Cardiac Disorder   • Heart disease Father    • Other Family         Back Pain   • Hypertension Family    • Stroke Family         Complications   • Cancer Other      Social History     Socioeconomic History   • Marital status: Single     Spouse name: None   • Number of children: None   • Years of education: None   • Highest education level: None   Occupational History   • None   Tobacco Use   • Smoking status: Former     Packs/day: 1.50     Years: 38.00     Total pack years: 57.00     Types: Cigarettes     Start date: 1976     Quit date: 2014     Years since quittin.6   • Smokeless tobacco: Never   • Tobacco comments:     tobacco use not continuous- quit for 2  4 yrs long periods .    Vaping Use   • Vaping Use: Never used   Substance and Sexual Activity   • Alcohol use: No   • Drug use: No   • Sexual activity: Not Currently   Other Topics Concern   • None   Social History Narrative   • None     Social Determinants of Health     Financial Resource Strain: Not on file   Food Insecurity: Not on file   Transportation Needs: Not on file   Physical Activity: Not on file   Stress: Not on file   Social Connections: Not on file   Intimate Partner Violence: Not on file   Housing Stability: Not on file     Current Outpatient Medications on File Prior to Visit   Medication Sig   • acetaminophen (TYLENOL) 325 mg tablet Take 650 mg by mouth every 6 (six) hours as needed for mild pain   • albuterol (PROVENTIL HFA,VENTOLIN HFA) 90 mcg/act inhaler Inhale 2 puffs as needed for shortness of breath   • Aspirin Low Dose 81 MG EC tablet TAKE 1 TABLET BY MOUTH EVERY DAY   • atorvastatin (LIPITOR) 40 mg tablet TAKE 1 TABLET BY MOUTH EVERY DAY   • clopidogrel (PLAVIX) 75 mg tablet Take 1 tablet (75 mg total) by mouth daily   • fenofibrate (TRICOR) 145 mg tablet TAKE 1 TABLET BY MOUTH EVERY DAY   • furosemide (LASIX) 40 mg tablet    • HYDROcodone-acetaminophen (Vicodin) 5-300 MG per tablet Take 1 tablet by mouth every 6 (six) hours as needed for moderate pain Max Daily Amount: 4 tablets   • Icosapent Ethyl 1 g CAPS Vascepa 1 gram capsule   TAKE 2 G BY MOUTH 2 (TWO) TIMES A DAY.    • Insulin Pen Needle (B-D UF III MINI PEN NEEDLES) 31G X 5 MM MISC Inject as directed 2 (two) times a day Use as directed   • Jardiance 25 MG TABS TAKE 1 TABLET BY MOUTH EVERY DAY   • losartan (COZAAR) 25 mg tablet Take 1 tablet (25 mg total) by mouth daily   • pantoprazole (PROTONIX) 40 mg tablet TAKE 1 TABLET BY MOUTH EVERY DAY   • Semglee, yfgn, 100 UNIT/ML SOPN PLEASE SEE ATTACHED FOR DETAILED DIRECTIONS   • Sulfacetamide Sodium, Acne, 10 % LOTN Apply topically 2 (two) times a day   • metoprolol tartrate (LOPRESSOR) 50 mg tablet Take 25 mg by mouth 2 (two) times a day   • nitroglycerin (NITROSTAT) 0.4 mg SL tablet PLEASE SEE ATTACHED FOR DETAILED DIRECTIONS     Allergies   Allergen Reactions   • Bactrim [Sulfamethoxazole-Trimethoprim] Edema   • Cefaclor Shortness Of Breath     Other reaction(s): Respiratory Distress   • Simvastatin Shortness Of Breath     Other reaction(s): Respiratory Distress     Immunization History   Administered Date(s) Administered   • COVID-19 PFIZER VACCINE 0.3 ML IM 03/19/2021, 04/10/2021, 10/16/2021   • Influenza Quadrivalent, 6-35 Months IM 10/17/2016, 10/24/2017   • Influenza, recombinant, quadrivalent,injectable, preservative free 12/06/2018, 12/23/2019, 11/13/2020, 11/15/2021, 12/01/2022       Objective     /66 (BP Location: Left arm, Patient Position: Sitting, Cuff Size: Large)   Pulse 74   Temp 98.4 °F (36.9 °C)   Ht 5' 6.14" (1.68 m)   Wt (!) 139 kg (307 lb)   SpO2 96%   BMI 49.34 kg/m²     Physical Exam  Vitals and nursing note reviewed. Constitutional:       General: He is not in acute distress. Appearance: Normal appearance. He is obese. He is not ill-appearing, toxic-appearing or diaphoretic. Comments: Obese 42-year-old male who is awake alert antalgic gait patient has a sterile dressing to his right lower extremity open lesions left lower extremity   HENT:      Head: Normocephalic and atraumatic. Right Ear: Tympanic membrane, ear canal and external ear normal. There is no impacted cerumen. Left Ear: Tympanic membrane, ear canal and external ear normal. There is no impacted cerumen. Nose: Nose normal. No congestion or rhinorrhea. Mouth/Throat:      Mouth: Mucous membranes are moist.      Pharynx: Oropharynx is clear. No oropharyngeal exudate or posterior oropharyngeal erythema. Comments: Severe crowding of lower airway  Eyes:      General: No scleral icterus. Right eye: No discharge. Left eye: No discharge. Extraocular Movements: Extraocular movements intact. Conjunctiva/sclera: Conjunctivae normal.      Pupils: Pupils are equal, round, and reactive to light. Neck:      Vascular: No carotid bruit. Comments: Thick neck with some decreased range of motion actively and passively, no pain with movement  Cardiovascular:      Rate and Rhythm: Normal rate and regular rhythm. Heart sounds: Normal heart sounds. No murmur heard. No friction rub. No gallop. Pulmonary:      Effort: Pulmonary effort is normal. No respiratory distress. Breath sounds: No stridor. No wheezing, rhonchi or rales.       Comments: Slightly decreased breath sounds anteriorly and posteriorly but no rales rhonchi or wheezes. Chest:      Chest wall: No tenderness. Abdominal:      General: Abdomen is flat. Bowel sounds are normal. There is no distension. Palpations: Abdomen is soft. There is no mass. Tenderness: There is no abdominal tenderness. There is no right CVA tenderness, left CVA tenderness, guarding or rebound. Hernia: No hernia is present. Comments: Morbidly obese   Musculoskeletal:         General: Deformity present. No swelling, tenderness or signs of injury. Cervical back: Neck supple. No rigidity or tenderness. Right lower leg: Edema present. Left lower leg: Edema present. Comments: Diffuse arthritic changes as previously noted with arthritic changes bilateral shoulders worse on the right than the left, flattening to his lumbar curve with decreased range of motion actively and passively. Patient has chronic edema +1 bilateral lower extremities   Lymphadenopathy:      Cervical: No cervical adenopathy. Skin:     General: Skin is warm and dry. Capillary Refill: Capillary refill takes less than 2 seconds. Coloration: Skin is not jaundiced or pale. Findings: Lesion present. No bruising, erythema or rash. Comments: Patient's lesion to his right lower extremity he relates has continued to enlarge. Now has 2 separate lesions to his left anterior shin which she states are similar in characteristics of lesion on the right. Again no improvement with treatment with dermatology. States less purulent exudate when placed on Cipro by his general surgeon   Neurological:      Mental Status: He is alert and oriented to person, place, and time. Mental status is at baseline. Cranial Nerves: No cranial nerve deficit. Sensory: Sensory deficit present. Motor: No weakness.       Coordination: Coordination normal.      Gait: Gait normal.      Deep Tendon Reflexes: Reflexes abnormal.      Comments: Absent patellar and Achilles tendon reflexes bilaterally slight paresthesia of bilateral lower extremities   Psychiatric:         Mood and Affect: Mood normal.         Behavior: Behavior normal.         Thought Content:  Thought content normal.         Judgment: Judgment normal.       Awilda Mcdaniel, DO

## 2023-08-10 NOTE — ASSESSMENT & PLAN NOTE
Chronic back pain with radicular symptoms. Again patient has had a workup and evaluation by pain management also orthopedics. Numerous x-ray studies have been performed.   No acute exacerbations recently

## 2023-08-10 NOTE — ASSESSMENT & PLAN NOTE
Unsure of exact etiology of skin infection anterior right shin now progressing to his left leg also. Open area is painful to touch was placed on antibiotics by his general surgeon which did show some improvement with less purulent discharge but again has had progression of lesions. Placed a consult for second opinion. Will continue local treatment and evaluation by wound management.

## 2023-08-14 DIAGNOSIS — E11.59 TYPE 2 DIABETES MELLITUS WITH OTHER CIRCULATORY COMPLICATION, WITH LONG-TERM CURRENT USE OF INSULIN (HCC): ICD-10-CM

## 2023-08-14 DIAGNOSIS — Z79.4 TYPE 2 DIABETES MELLITUS WITH OTHER CIRCULATORY COMPLICATION, WITH LONG-TERM CURRENT USE OF INSULIN (HCC): ICD-10-CM

## 2023-08-14 RX ORDER — FLURBIPROFEN SODIUM 0.3 MG/ML
SOLUTION/ DROPS OPHTHALMIC 2 TIMES DAILY
Qty: 100 EACH | Refills: 0 | Status: SHIPPED | OUTPATIENT
Start: 2023-08-14

## 2023-08-16 DIAGNOSIS — I25.10 ARTERIOSCLEROTIC CARDIOVASCULAR DISEASE: ICD-10-CM

## 2023-08-17 RX ORDER — ASPIRIN 81 MG/1
TABLET, COATED ORAL
Qty: 90 TABLET | Refills: 1 | Status: SHIPPED | OUTPATIENT
Start: 2023-08-17

## 2023-08-29 DIAGNOSIS — E78.01 FAMILIAL HYPERCHOLESTEROLEMIA: ICD-10-CM

## 2023-08-29 RX ORDER — ATORVASTATIN CALCIUM 40 MG/1
TABLET, FILM COATED ORAL
Qty: 90 TABLET | Refills: 2 | Status: SHIPPED | OUTPATIENT
Start: 2023-08-29

## 2023-09-05 ENCOUNTER — OFFICE VISIT (OUTPATIENT)
Dept: WOUND CARE | Facility: HOSPITAL | Age: 63
End: 2023-09-05
Payer: COMMERCIAL

## 2023-09-05 VITALS
RESPIRATION RATE: 20 BRPM | TEMPERATURE: 97.7 F | SYSTOLIC BLOOD PRESSURE: 157 MMHG | BODY MASS INDEX: 49.82 KG/M2 | HEART RATE: 65 BPM | DIASTOLIC BLOOD PRESSURE: 70 MMHG | WEIGHT: 310 LBS | HEIGHT: 66 IN

## 2023-09-05 DIAGNOSIS — I87.311 CHRONIC VENOUS HYPERTENSION (IDIOPATHIC) WITH ULCER OF RIGHT LOWER EXTREMITY (CODE) (HCC): ICD-10-CM

## 2023-09-05 DIAGNOSIS — I87.312 CHRONIC VENOUS HYPERTENSION (IDIOPATHIC) WITH ULCER OF LEFT LOWER EXTREMITY (CODE) (HCC): Primary | ICD-10-CM

## 2023-09-05 DIAGNOSIS — Z79.4 TYPE 2 DIABETES MELLITUS WITHOUT COMPLICATION, WITH LONG-TERM CURRENT USE OF INSULIN (HCC): ICD-10-CM

## 2023-09-05 DIAGNOSIS — L73.8 INFECTIOUS FOLLICULITIS: ICD-10-CM

## 2023-09-05 DIAGNOSIS — E66.01 MORBID OBESITY DUE TO EXCESS CALORIES (HCC): ICD-10-CM

## 2023-09-05 DIAGNOSIS — L97.911 NON-PRESSURE CHRONIC ULCER OF RIGHT LOWER LEG, LIMITED TO BREAKDOWN OF SKIN (HCC): ICD-10-CM

## 2023-09-05 DIAGNOSIS — E11.9 TYPE 2 DIABETES MELLITUS WITHOUT COMPLICATION, WITH LONG-TERM CURRENT USE OF INSULIN (HCC): ICD-10-CM

## 2023-09-05 DIAGNOSIS — B99.9 INFECTIOUS FOLLICULITIS: ICD-10-CM

## 2023-09-05 DIAGNOSIS — L97.921 NON-PRESSURE CHRONIC ULCER OF LEFT LOWER LEG, LIMITED TO BREAKDOWN OF SKIN (HCC): ICD-10-CM

## 2023-09-05 PROCEDURE — 97597 DBRDMT OPN WND 1ST 20 CM/<: CPT | Performed by: NURSE PRACTITIONER

## 2023-09-05 PROCEDURE — 99204 OFFICE O/P NEW MOD 45 MIN: CPT | Performed by: NURSE PRACTITIONER

## 2023-09-05 PROCEDURE — G0463 HOSPITAL OUTPT CLINIC VISIT: HCPCS | Performed by: NURSE PRACTITIONER

## 2023-09-05 PROCEDURE — 99213 OFFICE O/P EST LOW 20 MIN: CPT | Performed by: NURSE PRACTITIONER

## 2023-09-05 RX ORDER — LIDOCAINE 40 MG/G
CREAM TOPICAL ONCE
Status: COMPLETED | OUTPATIENT
Start: 2023-09-05 | End: 2023-09-05

## 2023-09-05 RX ADMIN — LIDOCAINE: 40 CREAM TOPICAL at 14:20

## 2023-09-05 NOTE — PATIENT INSTRUCTIONS
Orders Placed This Encounter   Procedures    Debridement     This order was created via procedure documentation    Debridement     This order was created via procedure documentation    Wound cleansing and dressings     Wound location right and left leg. Change dressing  Weekly at wound center. Sponge bathe only, do not shower at this time. Today at [facility]:    Wound cleansed with Hibiclens and water. Patted dry. [employer] to cover wounds. Covered with ABD  Secured with roll gauze then tape. This was applied today at the [facility]. Standing Status:   Future     Standing Expiration Date:   9/5/2024    Wound compression and edema control     Unna Boot compression wrap Instructions:      Keep compression wrap/wraps clean and dry. If wraps are too tight and you experience numbness/tingling, call the wound center. If after hours, remove wraps or proceed to nearest E.R. and call wound center in AM.  [facility] . Wrap will be changed weekly at [facility]. Avoid prolonged standing in one place. Elevate leg(s) above the level of the heart when sitting or as much as possible.      Standing Status:   Future     Standing Expiration Date:   9/5/2024

## 2023-09-05 NOTE — PROGRESS NOTES
Patient ID: Kelvin Massey is a 58 y.o. male Date of Birth 1960     Chief Complaint  Chief Complaint   Patient presents with   • New Patient Visit     Bilat LE wounds. Allergies  Bactrim [sulfamethoxazole-trimethoprim], Cefaclor, and Simvastatin    Assessment:     Diagnoses and all orders for this visit:    Chronic venous hypertension (idiopathic) with ulcer of left lower extremity (CODE) ([facility])  -     lidocaine (LMX) 4 % cream  -     Wound cleansing and dressings; Future  -     Wound compression and edema control; Future    Chronic venous hypertension (idiopathic) with ulcer of right lower extremity (CODE) ([facility])  -     Wound cleansing and dressings; Future  -     Wound compression and edema control; Future    Type 2 diabetes mellitus without complication, with long-term current use of insulin ([facility])    Non-pressure chronic ulcer of left lower leg, limited to breakdown of skin ([facility])  -     Wound cleansing and dressings; Future  -     Wound compression and edema control; Future    Non-pressure chronic ulcer of right lower leg, limited to breakdown of skin ([facility])  -     Wound cleansing and dressings; Future  -     Wound compression and edema control; Future    Infectious folliculitis  -     Wound cleansing and dressings; Future  -     Wound compression and edema control; Future    Morbid obesity due to excess calories ([address])    Other orders  -     Debridement  -     Debridement              Debridement   Wound 09/05/23 Venous Ulcer Leg Right; Anterior    Universal Protocol:  Consent: Written consent obtained. Consent given by: patient  Time out: Immediately prior to procedure a "time out" was called to verify the correct patient, procedure, equipment, support staff and site/side marked as required.   Timeout called at: 9/5/2023 2:58 PM.  Patient identity confirmed: verbally with patient      Performed by: NP  Debridement type: selective  Pain control: lidocaine 4%  Pre-debridement measurements  Length (cm): 13  Width (cm): 21  Depth (cm): 0.1  Surface Area (cm^2): 273  Volume (cm^3): 27.3    Post-debridement measurements  Length (cm): 13  Width (cm): 21  Depth (cm): 0.1  Percent debrided: 2%  Surface Area (cm^2): 273  Area debrided (cm^2): 5.46  Volume (cm^3): 27.3  Devitalized tissue debrided: biofilm, fibrin and slough  Instrument(s) utilized: curette  Bleeding: small  Hemostasis obtained with: pressure  Procedural pain (0-10): 0  Post-procedural pain: 0   Response to treatment: procedure was tolerated well    Debridement   Wound 09/05/23 Venous Ulcer Leg Left    Universal Protocol:  Consent: Written consent obtained. Consent given by: patient  Time out: Immediately prior to procedure a "time out" was called to verify the correct patient, procedure, equipment, support staff and site/side marked as required. Timeout called at: 9/5/2023 2:58 PM.  Patient identity confirmed: verbally with patient      Performed by: NP  Debridement type: selective  Pain control: lidocaine 4%  Pre-debridement measurements  Length (cm): 13  Width (cm): 5.2  Depth (cm): 0.1  Surface Area (cm^2): 67.6  Volume (cm^3): 6.76    Post-debridement measurements  Length (cm): 13  Width (cm): 5.2  Depth (cm): 0.1  Percent debrided: 5%  Surface Area (cm^2): 67.6  Area debrided (cm^2): 3.38  Volume (cm^3): 6.76  Devitalized tissue debrided: biofilm, fibrin and slough  Instrument(s) utilized: curette  Bleeding: small  Hemostasis obtained with: pressure  Procedural pain (0-10): 0  Post-procedural pain: 0   Response to treatment: procedure was tolerated well          Plan:  1. Initial visit. Wounds debrided. Patient has bilateral lower extremity wounds with mixed etiology. Wounds appear to be due to venous insufficiency due to chronic lower extremity edema as well as localized inflammation of skin secondary to chronic use of hydrogen peroxide and folliculitis as evidenced by his punch biopsy completed by dermatology on 6/8/2023.   Wounds not showing any active signs or symptoms of infection  2. ABIs completed in office today: 1.1 LLE and 1.03 RLE  3. We will have patient's bilateral lower extremities cleansed with Hibiclens today due to his diagnosis of folliculitis. Will have Opticell AG applied to wounds bilaterally covered with ABDs. Will apply Unna boots to his bilateral lower extremities for compression therapy. Counseled patient not to get dressings wet. 4. A1C results reviewed with the patient today. Patient maintaining tight glycemic control  5. Patient is to complete his oral antifungal as recommended by dermatology  6. Patient will follow-up in 1 week     Wound 09/05/23 Venous Ulcer Leg Right; Anterior (Active)   Wound Image Images linked 09/05/23 1403   Wound Description Epithelialization;Slough; Yellow;Granulation tissue 09/05/23 1417   Bridget-wound Assessment Dry 09/05/23 1417   Wound Length (cm) 13 cm 09/05/23 1417   Wound Width (cm) 21 cm 09/05/23 1417   Wound Depth (cm) 0.1 cm 09/05/23 1417   Wound Surface Area (cm^2) 273 cm^2 09/05/23 1417   Wound Volume (cm^3) 27.3 cm^3 09/05/23 1417   Calculated Wound Volume (cm^3) 27.3 cm^3 09/05/23 1417   Drainage Amount Moderate 09/05/23 1417   Drainage Description Serosanguineous; Serous 09/05/23 1417   Non-staged Wound Description Full thickness 09/05/23 1417   Dressing Status Intact 09/05/23 1417       Wound 09/05/23 Venous Ulcer Leg Left (Active)   Wound Image Images linked 09/05/23 1402   Wound Description Granulation tissue;Slough; Yellow;Pink 09/05/23 1417   Bridget-wound Assessment Intact; Pink 09/05/23 1417   Wound Length (cm) 13 cm 09/05/23 1417   Wound Width (cm) 5.2 cm 09/05/23 1417   Wound Depth (cm) 0.1 cm 09/05/23 1417   Wound Surface Area (cm^2) 67.6 cm^2 09/05/23 1417   Wound Volume (cm^3) 6.76 cm^3 09/05/23 1417   Calculated Wound Volume (cm^3) 6.76 cm^3 09/05/23 1417   Drainage Amount Moderate 09/05/23 1417   Drainage Description Serosanguineous 09/05/23 1417   Non-staged Wound Description Full thickness 09/05/23 1417   Dressing Status Intact 09/05/23 1417       Wound 09/05/23 Venous Ulcer Leg Right; Anterior (Active)   Date First Assessed/Time First Assessed: 09/05/23 1401   Present on Original Admission: Yes  Primary Wound Type: Venous Ulcer  Location: Leg  Wound Location Orientation: Right; Anterior       Wound 09/05/23 Venous Ulcer Leg Left (Active)   Date First Assessed/Time First Assessed: 09/05/23 1401   Present on Original Admission: Yes  Primary Wound Type: Venous Ulcer  Location: Leg  Wound Location Orientation: Left       [REMOVED] Wound 06/10/20 Shoulder Left (Removed)   Resolved Date: 09/05/23  Date First Assessed/Time First Assessed: 06/10/20 1051   Location: Shoulder  Wound Location Orientation: Left  Wound Description (Comments): ARM SLING  Incision's 1st Dressing: DRESSING MEPILEX AG BORDER 4 X 4 IN  Wound Outcom. .. Subjective:      . Patient 70-year-old male who presents to the [facility] wound center as a new patient for venous ulcers of his bilateral lower extremities. Patient reports his wounds have been present for approximately the past 9 to 12 months. Patient reports he has been seen by dermatology as well as his PCP for treatment and he has not had any improvement in his wounds. Patient reports he has been on multiple antibiotics and antifungal treatments without any improvement. Patient reports he has a history of bilateral lower extremity edema. Patient has been using Ace bandages for gentle compression therapy due to having difficulty applying compression stockings himself. Patient was last seen by dermatology on 6/8/2023. At that visit patient underwent a punch biopsy of his wounds which showed infectious folliculitis. Patient reports he is currently taking oral miconazole as recommended by dermatology. His wounds drain a moderate amount of drainage. He has been keeping his wounds covered with dry gauze changing his dressings daily.   Patient also reports he has been cleansing his wounds with hydrogen peroxide daily. Patient has a history of type 2 diabetes. Per patient's chart, his most recent A1c as of 8/1/2023 was 6.2. He denies any pain, fevers, or chills. The following portions of the patient's history were reviewed and updated as appropriate:   He  has a past medical history of Abscess of left thigh, Acute myocardial infarction [facility]), Anesthesia, Anxiety, Arteriosclerotic cardiovascular disease, Arthritis, Asthma, Chest pain, COPD (chronic obstructive pulmonary disease) ([address]), Coronary artery disease, Diabetes mellitus ([address]), Fatty liver, Gastric ulcer, GERD (gastroesophageal reflux disease), Hyperlipidemia, Hypertension, Low back pain, Myocardial infarction [facility]) ([id number]), Obesity, Obstructive sleep apnea, and Spondylosis of lumbar region without myelopathy or radiculopathy.   He   Patient Active Problem List    Diagnosis Date Noted   • Continuous opioid dependence ([address]) 05/23/2023   • Cellulitis of right lower extremity 04/10/2023   • Staph skin infection 12/01/2022   • Preoperative clearance 06/23/2021   • Acute on chronic systolic congestive heart failure ([address]) 05/04/2021   • Diabetes mellitus ([address])    • Lesion of nose 11/13/2020   • Type 2 diabetes mellitus, with long-term current use of insulin ([address]) 07/06/2020   • Obstructive sleep apnea 07/06/2020   • Aftercare following surgery of the musculoskeletal system 06/15/2020   • Impingement syndrome of left shoulder 01/14/2020   • Adhesive capsulitis of both shoulders 01/14/2020   • Chronic left shoulder pain 08/21/2019   • Healthcare maintenance 04/10/2019   • Morbid obesity with body mass index of 45.0-49.9 in adult [facility]) 11/29/2018   • Low back pain 10/22/2018   • Dysuria 07/24/2018   • Lumbar radiculopathy 12/29/2017   • Myofascial pain syndrome 12/29/2017   • Sacroiliitis ([address]) 12/29/2017   • Chest pain 08/02/2016   • Spondylosis of lumbar region without myelopathy or radiculopathy 04/15/2014   • Benign essential hypertension 10/16/2012   • Coronary artery disease 06/14/2012   • Chronic obstructive pulmonary disease ([address]) 06/14/2012     He  has a past surgical history that includes Coronary angioplasty with stent (2006, 2007); Neuroplasty / transposition median nerve at carpal tunnel (Right); EGD; Carpal tunnel release (Right); Colonoscopy; Hemorrhoid surgery; and pr surgical arthroscopy shoulder w/lss&rescj ads (Left, 6/10/2020). His family history includes Alzheimer's disease in his mother; Cancer in his other; Dementia in his mother; Heart attack in his father; Heart disease in his father; Heart failure in his mother; Hypertension in his family; Other in his family and father; Stroke in his family. He  reports that he quit smoking about 9 years ago. His smoking use included cigarettes. He started smoking about 47 years ago. He has a 57.00 pack-year smoking history. He has never used smokeless tobacco. He reports that he does not drink alcohol and does not use drugs. Current Outpatient Medications   Medication Sig Dispense Refill   • acetaminophen (TYLENOL) 325 mg tablet Take 650 mg by mouth every 6 (six) hours as needed for mild pain     • albuterol (PROVENTIL HFA,VENTOLIN HFA) 90 mcg/act inhaler Inhale 2 puffs as needed for shortness of breath 18 g 1   • Aspirin Low Dose 81 MG EC tablet TAKE 1 TABLET BY MOUTH EVERY DAY 90 tablet 1   • atorvastatin (LIPITOR) 40 mg tablet TAKE 1 TABLET BY MOUTH EVERY DAY 90 tablet 2   • clopidogrel (PLAVIX) 75 mg tablet Take 1 tablet (75 mg total) by mouth daily 90 tablet 0   • fenofibrate (TRICOR) 145 mg tablet TAKE 1 TABLET BY MOUTH EVERY DAY 90 tablet 1   • furosemide (LASIX) 40 mg tablet      • HYDROcodone-acetaminophen (Vicodin) 5-300 MG per tablet Take 1 tablet by mouth every 6 (six) hours as needed for moderate pain Max Daily Amount: 4 tablets 112 tablet 0   • Icosapent Ethyl 1 g CAPS Vascepa 1 gram capsule   TAKE 2 G BY MOUTH 2 (TWO) TIMES A DAY. • Insulin Pen Needle (B-D UF III MINI PEN NEEDLES) 31G X 5 MM MISC Inject as directed 2 (two) times a day Use as directed 100 each 0   • Jardiance 25 MG TABS TAKE 1 TABLET BY MOUTH EVERY DAY 90 tablet 1   • losartan (COZAAR) 25 mg tablet Take 1 tablet (25 mg total) by mouth daily 90 tablet 3   • metoprolol tartrate (LOPRESSOR) 50 mg tablet Take 25 mg by mouth 2 (two) times a day     • nitroglycerin (NITROSTAT) 0.4 mg SL tablet PLEASE SEE ATTACHED FOR DETAILED DIRECTIONS     • pantoprazole (PROTONIX) 40 mg tablet TAKE 1 TABLET BY MOUTH EVERY DAY 90 tablet 5   • Semglee, yfgn, 100 UNIT/ML SOPN PLEASE SEE ATTACHED FOR DETAILED DIRECTIONS     • Sulfacetamide Sodium, Acne, 10 % LOTN Apply topically 2 (two) times a day 118 mL 0     No current facility-administered medications for this visit. He is allergic to bactrim [sulfamethoxazole-trimethoprim], cefaclor, and simvastatin. .    Review of Systems   Constitutional: Negative. HENT: Negative for ear pain and hearing loss. Eyes: Negative for pain. Respiratory: Negative for chest tightness and shortness of breath. Cardiovascular: Positive for leg swelling. Negative for chest pain and palpitations. Gastrointestinal: Negative for diarrhea, nausea and vomiting. Genitourinary: Negative for dysuria. Musculoskeletal: Negative for gait problem. Skin: Positive for rash and wound. Neurological: Negative for tremors and weakness. Psychiatric/Behavioral: Negative for behavioral problems, confusion and suicidal ideas. Objective:       Wound 09/05/23 Venous Ulcer Leg Right; Anterior (Active)   Wound Image Images linked 09/05/23 1403   Wound Description Epithelialization;Slough; Yellow;Granulation tissue 09/05/23 1417   Bridget-wound Assessment Dry 09/05/23 1417   Wound Length (cm) 13 cm 09/05/23 1417   Wound Width (cm) 21 cm 09/05/23 1417   Wound Depth (cm) 0.1 cm 09/05/23 1417   Wound Surface Area (cm^2) 273 cm^2 09/05/23 1417   Wound Volume (cm^3) 27.3 cm^3 237   Calculated Wound Volume (cm^3) 27.3 cm^3 23 1417   Drainage Amount Moderate 23 141   Drainage Description Serosanguineous; Serous 23   Non-staged Wound Description Full thickness 23 141   Dressing Status Intact 23 1417       Wound 23 Venous Ulcer Leg Left (Active)   Wound Image Images linked 23 1402   Wound Description Granulation tissue;Slough; Yellow;Pink 23 141   Bridget-wound Assessment Intact; Pink 23 141   Wound Length (cm) 13 cm 23   Wound Width (cm) 5.2 cm 23   Wound Depth (cm) 0.1 cm 23   Wound Surface Area (cm^2) 67.6 cm^2 23   Wound Volume (cm^3) 6.76 cm^3 23 141   Calculated Wound Volume (cm^3) 6.76 cm^3 23 1417   Drainage Amount Moderate 23 141   Drainage Description Serosanguineous 23   Non-staged Wound Description Full thickness 23   Dressing Status Intact 23       /70   Pulse 65   Temp 97.7 °F (36.5 °C)   Resp 20   Ht 5' 6" (1.676 m)   Wt (!) 141 kg (310 lb)   BMI 50.04 kg/m²     Physical Exam  Vitals and nursing note reviewed. Constitutional:       General: He is not in acute distress. Appearance: Normal appearance. He is obese. HENT:      Head: Normocephalic and atraumatic. Eyes:      General:         Right eye: No discharge. Left eye: No discharge. Pulmonary:      Effort: Pulmonary effort is normal. No respiratory distress. Musculoskeletal:         General: Normal range of motion. Cervical back: Normal range of motion and neck supple. No rigidity. Right lower le+ Edema present. Left lower le+ Edema present. Skin:     General: Skin is warm and dry. Findings: Rash and wound present. Neurological:      General: No focal deficit present. Mental Status: He is alert and oriented to person, place, and time. Mental status is at baseline.    Psychiatric: Mood and Affect: Mood normal.         Behavior: Behavior normal.         Thought Content: Thought content normal.         Judgment: Judgment normal.                       Wound Instructions:  Orders Placed This Encounter   Procedures   • Debridement     This order was created via procedure documentation   • Debridement     This order was created via procedure documentation   • Wound cleansing and dressings     Wound location right and left leg. Change dressing  Weekly at wound center. Sponge bathe only, do not shower at this time. Today at [facility]:    Wound cleansed with Hibiclens and water. Patted dry. Applied Opticell AG to cover wounds. Covered with ABD  Secured with roll gauze then tape. This was applied today at the [facility]. Standing Status:   Future     Standing Expiration Date:   9/5/2024   • Wound compression and edema control     Unna Boot compression wrap Instructions:      Keep compression wrap/wraps clean and dry. If wraps are too tight and you experience numbness/tingling, call the wound center. If after hours, remove wraps or proceed to nearest E.R. and call wound center in AM.  [facility] . Wrap will be changed weekly at [facility]. Avoid prolonged standing in one place. Elevate leg(s) above the level of the heart when sitting or as much as possible. Standing Status:   Future     Standing Expiration Date:   9/5/2024        Diagnosis ICD-10-CM Associated Orders   1. Chronic venous hypertension (idiopathic) with ulcer of left lower extremity (CODE) ([facility])  I87.312 lidocaine (LMX) 4 % cream     Wound cleansing and dressings     Wound compression and edema control      2. Chronic venous hypertension (idiopathic) with ulcer of right lower extremity (CODE) ([facility])  I87.311 Wound cleansing and dressings     Wound compression and edema control      3. Type 2 diabetes mellitus without complication, with long-term current use of insulin ([facility])  E11.9     Z79.4       4.  Non-pressure chronic ulcer of left lower leg, limited to breakdown of skin ([facility])  L97.921 Wound cleansing and dressings     Wound compression and edema control      5. Non-pressure chronic ulcer of right lower leg, limited to breakdown of skin ([facility])  L97.911 Wound cleansing and dressings     Wound compression and edema control      6. Infectious folliculitis  D01.5 Wound cleansing and dressings    B99.9 Wound compression and edema control      7.  Morbid obesity due to excess calories ([facility])  E66.01

## 2023-09-11 ENCOUNTER — OFFICE VISIT (OUTPATIENT)
Dept: SLEEP CENTER | Facility: CLINIC | Age: 63
End: 2023-09-11
Payer: COMMERCIAL

## 2023-09-11 VITALS
HEIGHT: 66 IN | OXYGEN SATURATION: 97 % | WEIGHT: 311 LBS | BODY MASS INDEX: 49.98 KG/M2 | DIASTOLIC BLOOD PRESSURE: 78 MMHG | SYSTOLIC BLOOD PRESSURE: 152 MMHG | HEART RATE: 72 BPM

## 2023-09-11 DIAGNOSIS — G47.33 OSA (OBSTRUCTIVE SLEEP APNEA): Primary | ICD-10-CM

## 2023-09-11 PROCEDURE — 99213 OFFICE O/P EST LOW 20 MIN: CPT | Performed by: INTERNAL MEDICINE

## 2023-09-11 RX ORDER — ITRACONAZOLE 100 MG/1
CAPSULE ORAL
COMMUNITY
Start: 2023-07-15

## 2023-09-11 NOTE — PROGRESS NOTES
Sleep Medicine Outpatient Follow Up Note   Melody Saenz 58 y.o. male MRN: [id number]  9/11/2023      Reason for Consultation:    Chief Complaint   Patient presents with   • Sleep Apnea       Assessment/Plan:    1. FREDY (obstructive sleep apnea)  Assessment & Plan:  History of longstanding moderate FREDY with ALEKSEY 23.5. He is on BiPAP 18/14 cm H2O with full facemask. He is doing well with BiPAP and he is tolerating therapy quite well. BiPAP is helping him with excessive daytime sleepiness, sleep maintenance, sleep quality. Snoring/gasping during sleep/witnessed apneas have been controlled. Compliance data:  9/11/2023  100% use over past 30 days  Average use 8 hours and 51 minutes  BiPAP 18/14  No significant leaks  Refurbished DreamStation BiPAP  Residual AHI 0.4    No pressure changes needed. Resupply prescription provided  Follow-up in 1 year    Orders:  -     PAP DME Resupply/Reorder        Health Maintenance  Immunization History   Administered Date(s) Administered   • COVID-19 PFIZER VACCINE 0.3 ML IM 03/19/2021, 04/10/2021, 10/16/2021   • Influenza Quadrivalent, 6-35 Months IM 10/17/2016, 10/24/2017   • Influenza, recombinant, quadrivalent,injectable, preservative free 12/06/2018, 12/23/2019, 11/13/2020, 11/15/2021, 12/01/2022        Return in about 1 year (around 9/11/2024). History of Present Illness   HPI:  Melody Saezn is a 58 y.o. male who has a past medical history of DM2, lumbar radiculopathy, HTN, arteriosclerotic CV, panlobular emphysema,     BiPAP 18/14cm H2O, moderate FREDY ALEKSEY=23.5 with full face mask. Hhe was last seen by Dr. Carmela Billings in 5/27/22. He is with [employer] [facility]. He has a refurbished DreamStation BIPAP. Sleeping longer with BIPAP. Sleeping about 9 hours a sleep. Sleeps from 3am to 11am due to prior shift work. Retired now. 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 No issues with sleep initiation. Sleep is comfortable. Occasional afternoon nap. Mask type: full face  Skin irritation from the mask: no  Pain or discomfort: no  Feeling of claustrophobia: no  Aerophagia: no  Pressure intolerance: no  Nasal congestion or rhinorrhea: nasal congestion is better with CPAP  Nasal and oral dryness: not significant  Morning headache: no  Acid reflux symptoms: no  Snoring with PAP: no  Parasomnias: no  Naps: no    Using PAP every night/day: yes  Using PAP the entire duration of sleep: yes  Benefiting from PAP: yes    [place]: 6    Historical Information   Past Medical History:   Diagnosis Date   • Abscess of left thigh    • Acute myocardial infarction [facility])    • Anesthesia     woke up during procedure   • Anxiety    • Arteriosclerotic cardiovascular disease    • Arthritis    • Asthma    • Chest pain    • COPD (chronic obstructive pulmonary disease) ([facility])    • Coronary artery disease    • Diabetes mellitus (H[facility])    • Fatty liver    • Gastric ulcer    • GERD (gastroesophageal reflux disease)    • Hyperlipidemia    • Hypertension    • Low back pain    • Myocardial infarction [facility]) [id number]   • Obesity    • Obstructive sleep apnea     no Cpap   • Spondylosis of lumbar region without myelopathy or radiculopathy      Past Surgical History:   Procedure Laterality Date   • CARPAL TUNNEL RELEASE Right    • COLONOSCOPY     • CORONARY ANGIOPLASTY WITH STENT PLACEMENT  2006, 2007   • EGD     • HEMORRHOID SURGERY     • NEUROPLASTY / TRANSPOSITION MEDIAN NERVE AT CARPAL TUNNEL Right    • MT SURGICAL ARTHROSCOPY SHOULDER W/LSS&RESCJ ADS Left 6/10/2020    Procedure: SHOULDER ARTHROSCOPIC CAPSULAR RELEASE;  Surgeon: Erick Fofana MD;  Location[facility] MAIN OR;  Service: Orthopedics     Family History   Problem Relation Age of Onset   • Alzheimer's disease Mother    • Heart failure Mother    • Dementia Mother    • Heart attack Father    • Other Father         Cardiac Disorder   • Heart disease Father    • Other Family         Back Pain   • Hypertension Family    • Stroke Family Complications   • Cancer Other          Meds/Allergies     Current Outpatient Medications:   •  itraconazole (SPORANOX) 100 mg capsule, PLEASE SEE ATTACHED FOR DETAILED DIRECTIONS, Disp: , Rfl:   •  acetaminophen (TYLENOL) 325 mg tablet, Take 650 mg by mouth every 6 (six) hours as needed for mild pain, Disp: , Rfl:   •  albuterol (PROVENTIL HFA,VENTOLIN HFA) 90 mcg/act inhaler, Inhale 2 puffs as needed for shortness of breath, Disp: 18 g, Rfl: 1  •  Aspirin Low Dose 81 MG EC tablet, TAKE 1 TABLET BY MOUTH EVERY DAY, Disp: 90 tablet, Rfl: 1  •  atorvastatin (LIPITOR) 40 mg tablet, TAKE 1 TABLET BY MOUTH EVERY DAY, Disp: 90 tablet, Rfl: 2  •  clopidogrel (PLAVIX) 75 mg tablet, Take 1 tablet (75 mg total) by mouth daily, Disp: 90 tablet, Rfl: 0  •  fenofibrate (TRICOR) 145 mg tablet, TAKE 1 TABLET BY MOUTH EVERY DAY, Disp: 90 tablet, Rfl: 1  •  furosemide (LASIX) 40 mg tablet, , Disp: , Rfl:   •  HYDROcodone-acetaminophen (Vicodin) 5-300 MG per tablet, Take 1 tablet by mouth every 6 (six) hours as needed for moderate pain Max Daily Amount: 4 tablets, Disp: 112 tablet, Rfl: 0  •  Icosapent Ethyl 1 g CAPS, Vascepa 1 gram capsule  TAKE 2 G BY MOUTH 2 (TWO) TIMES A DAY., Disp: , Rfl:   •  Insulin Pen Needle (B-D UF III MINI PEN NEEDLES) 31G X 5 MM MISC, Inject as directed 2 (two) times a day Use as directed, Disp: 100 each, Rfl: 0  •  Jardiance 25 MG TABS, TAKE 1 TABLET BY MOUTH EVERY DAY, Disp: 90 tablet, Rfl: 1  •  losartan (COZAAR) 25 mg tablet, Take 1 tablet (25 mg total) by mouth daily, Disp: 90 tablet, Rfl: 3  •  metoprolol tartrate (LOPRESSOR) 50 mg tablet, Take 25 mg by mouth 2 (two) times a day, Disp: , Rfl:   •  nitroglycerin (NITROSTAT) 0.4 mg SL tablet, PLEASE SEE ATTACHED FOR DETAILED DIRECTIONS, Disp: , Rfl:   •  pantoprazole (PROTONIX) 40 mg tablet, TAKE 1 TABLET BY MOUTH EVERY DAY, Disp: 90 tablet, Rfl: 5  •  Semglee, yfgn, 100 UNIT/ML SOPN, PLEASE SEE ATTACHED FOR DETAILED DIRECTIONS, Disp: , Rfl: •  Sulfacetamide Sodium, Acne, 10 % LOTN, Apply topically 2 (two) times a day, Disp: 118 mL, Rfl: 0  Allergies   Allergen Reactions   • Bactrim [Sulfamethoxazole-Trimethoprim] Edema   • Cefaclor Shortness Of Breath     Other reaction(s): Respiratory Distress   • Simvastatin Shortness Of Breath     Other reaction(s): Respiratory Distress       Vitals: Blood pressure 152/78, pulse 72, height 5' 6" (1.676 m), weight (!) 141 kg (311 lb), SpO2 97 %. Body mass index is 50.2 kg/m². Oxygen Therapy  SpO2: 97 %    Physical Exam  Vitals and nursing note reviewed. Constitutional:       General: He is not in acute distress. Appearance: Normal appearance. He is well-developed. He is not ill-appearing, toxic-appearing or diaphoretic. HENT:      Head: Normocephalic and atraumatic. Mouth/Throat:      Comments: Mallampati 4  Eyes:      General: No scleral icterus. Extraocular Movements: Extraocular movements intact. Conjunctiva/sclera: Conjunctivae normal.   Cardiovascular:      Rate and Rhythm: Normal rate and regular rhythm. Pulmonary:      Effort: Pulmonary effort is normal. No respiratory distress. Abdominal:      Tenderness: There is no guarding. Musculoskeletal:         General: No swelling. Cervical back: Normal range of motion and neck supple. Comments: Lower extremities are wrapped in Ace wrap   Skin:     General: Skin is warm and dry. Neurological:      General: No focal deficit present. Mental Status: He is alert. Mental status is at baseline. Psychiatric:         Mood and Affect: Mood normal.             Labs: I have personally reviewed pertinent lab results.     ABG: No results found for: "PHART", "IRF3JBT", "PO2ART", "FTR4WKU", "T4EZSVIW", "BEART", "SOURCE",   BNP: No results found for: "BNP",   CBC:  Lab Results   Component Value Date    WBC 7.24 08/01/2023    HGB 13.3 08/01/2023    HCT 40.4 08/01/2023    MCV 94 08/01/2023     08/01/2023    EOSPCT 3 08/01/2023 EOSABS 0.21 08/01/2023    NEUTOPHILPCT 60 08/01/2023    LYMPHOPCT 23 08/01/2023   ,   CMP:   Lab Results   Component Value Date    SODIUM 139 08/01/2023    K 4.0 08/01/2023     08/01/2023    CO2 27 08/01/2023    ANIONGAP 8 05/27/2015    BUN 28 (H) 08/01/2023    CREATININE 1.54 (H) 08/01/2023    GLUCOSE 81 05/27/2015    CALCIUM 9.3 08/01/2023    AST 27 08/01/2023    ALT 31 08/01/2023    ALKPHOS 59 08/01/2023    PROT 7.1 05/27/2015    BILITOT 0.49 05/27/2015    EGFR 47 08/01/2023   ,   PT/INR: No results found for: "PT", "INR",   Ferrtin: No components found for: "FERRTIN",  Magensium: No results found for: "MAGNESIUM",      Imaging and other studies: I have personally reviewed pertinent reports. and I have personally reviewed pertinent films in PACS      Sleep Study:  Prior sleep study ALEKSEY 25    Compliance data:  9/11/2023  100% use over past 30 days  Average use 8 hours and 51 minutes  BiPAP 18/14  No significant leaks  Refurbished DreamStation BiPAP  Residual AHI 0.4      Ramon Hu MD  Pulmonary, Critical Care and Sleep Medicine  [facility]     Portions of the record may have been created with voice recognition software. Occasional wrong word or "sound a like" substitutions may have occurred due to the inherent limitations of voice recognition software. Please read the chart carefully and recognize, using context, where substitutions have occurred.

## 2023-09-11 NOTE — ASSESSMENT & PLAN NOTE
History of longstanding moderate FREDY with ALEKSEY 23.5. He is on BiPAP 18/14 cm H2O with full facemask. He is doing well with BiPAP and he is tolerating therapy quite well. BiPAP is helping him with excessive daytime sleepiness, sleep maintenance, sleep quality. Snoring/gasping during sleep/witnessed apneas have been controlled. Compliance data:  9/11/2023  100% use over past 30 days  Average use 8 hours and 51 minutes  BiPAP 18/14  No significant leaks  Refurbished DreamStation BiPAP  Residual AHI 0.4    No pressure changes needed.   Resupply prescription provided  Follow-up in 1 year

## 2023-09-11 NOTE — PATIENT INSTRUCTIONS
CPAP MAINTENANCE AND MANAGEMENT    1. Continue use of CPAP equipment nightly - use any time you are laying down to rest, watch TV, etc to increase use and in case you fall asleep to prevent falling asleep without it  2. If air is too hot, turn down the tubing heating setting  3. If excessive dry mouth in the morning, turn up humidifier setting and be sure to only use distilled water in your humidifier. You can also turn down the tubing heating setting  4. Change your filter regularly and wash the non-disposable filter  5. Continue to clean your equipment, as discussed, using mild soap and water. You can use unscented baby wipe on the mask. 6.  Contact the [facility] with any questions or concerns prior to your next visit, as needed  7. Schedule visit for follow-up in 1 year. You should see your sleep physician at least once a year. HOW TO CLEAN YOUR AUTO CPAP MACHINE    Mask: Clean the cushion of your mask daily. Dish soap and warm water. (Usually eligible for mask cushions every 3 months)  2. Headgear: Once a week. I find when you wash it daily it eats the material of the Velcro faster.  (Usually eligible for new headgear every 6 months)  3. Heated Tubing: Clean the tube every 3-7 days. One drop of dish soap run warm water through the tube then hang it over your shower curtain and let it drip dry. (Usually eligible every 3 months)  4. Humidifier: Rinse out every 1-3 days. Dish soap warm water or , top shelf. (eligible every 6 months) **DISTILLED WATER ONLY**  5. Filters:  Dark blue (reusable for 6 months)- Rinse under warm water (no soap) sit and drip dry every 2-3 weeks. (eligible for a new one every 6 months)  Light Blue (disposable) throw away every 2-4 weeks depending on how dirty it looks. (eligible for 6 every 3 months)    Check with your insurance and durable medical equipment company regarding supply replacements.      Nursing Support:  When: Monday through Friday 7A-5PM except holidays  Where: Our direct line is [phone]. If you are having a true emergency please call 911. In the event that the line is busy or it is after hours please leave a voice message and we will return your call. Please speak clearly, leaving your full name, birth date, best number to reach you and the reason for your call. Medication refills: We will need the name of the medication, the dosage, the ordering provider, whether you get a 30 or 90 day refill, and the pharmacy name and address. Medications will be ordered by the provider only. Nurses cannot call in prescriptions. Please allow 7 days for medication refills. Physician requested updates: If your provider requested that you call with an update after starting medication, please be ready to provide us the medication and dosage, what time you take your medication, the time you attempt to fall asleep, time you fall asleep, when you wake up, and what time you get out of bed. Sleep Study Results: We will contact you with sleep study results and/or next steps after the physician has reviewed your testing. Usually one of our nurses will call to talk about the results within 1-2 weeks of testing.

## 2023-09-12 ENCOUNTER — TELEPHONE (OUTPATIENT)
Dept: SLEEP CENTER | Facility: CLINIC | Age: 63
End: 2023-09-12

## 2023-09-13 ENCOUNTER — OFFICE VISIT (OUTPATIENT)
Dept: WOUND CARE | Facility: HOSPITAL | Age: 63
End: 2023-09-13
Payer: COMMERCIAL

## 2023-09-13 VITALS
TEMPERATURE: 97.9 F | RESPIRATION RATE: 12 BRPM | HEART RATE: 78 BPM | DIASTOLIC BLOOD PRESSURE: 57 MMHG | SYSTOLIC BLOOD PRESSURE: 173 MMHG

## 2023-09-13 DIAGNOSIS — Z79.4 TYPE 2 DIABETES MELLITUS WITHOUT COMPLICATION, WITH LONG-TERM CURRENT USE OF INSULIN (HCC): ICD-10-CM

## 2023-09-13 DIAGNOSIS — L97.921 NON-PRESSURE CHRONIC ULCER OF LEFT LOWER LEG, LIMITED TO BREAKDOWN OF SKIN (HCC): ICD-10-CM

## 2023-09-13 DIAGNOSIS — I87.312 CHRONIC VENOUS HYPERTENSION (IDIOPATHIC) WITH ULCER OF LEFT LOWER EXTREMITY (CODE) (HCC): Primary | ICD-10-CM

## 2023-09-13 DIAGNOSIS — E11.9 TYPE 2 DIABETES MELLITUS WITHOUT COMPLICATION, WITH LONG-TERM CURRENT USE OF INSULIN (HCC): ICD-10-CM

## 2023-09-13 DIAGNOSIS — I87.311 CHRONIC VENOUS HYPERTENSION (IDIOPATHIC) WITH ULCER OF RIGHT LOWER EXTREMITY (CODE) (HCC): ICD-10-CM

## 2023-09-13 DIAGNOSIS — L97.911 NON-PRESSURE CHRONIC ULCER OF RIGHT LOWER LEG, LIMITED TO BREAKDOWN OF SKIN (HCC): ICD-10-CM

## 2023-09-13 DIAGNOSIS — E66.01 MORBID OBESITY DUE TO EXCESS CALORIES (HCC): ICD-10-CM

## 2023-09-13 LAB
DME PARACHUTE DELIVERY DATE ACTUAL: NORMAL
DME PARACHUTE DELIVERY DATE REQUESTED: NORMAL
DME PARACHUTE ITEM DESCRIPTION: NORMAL
DME PARACHUTE ORDER STATUS: NORMAL
DME PARACHUTE SUPPLIER NAME: NORMAL
DME PARACHUTE SUPPLIER PHONE: NORMAL

## 2023-09-13 PROCEDURE — 97598 DBRDMT OPN WND ADDL 20CM/<: CPT | Performed by: NURSE PRACTITIONER

## 2023-09-13 PROCEDURE — 97597 DBRDMT OPN WND 1ST 20 CM/<: CPT | Performed by: NURSE PRACTITIONER

## 2023-09-13 PROCEDURE — 29580 STRAPPING UNNA BOOT: CPT

## 2023-09-13 RX ORDER — LIDOCAINE 40 MG/G
CREAM TOPICAL ONCE
Status: COMPLETED | OUTPATIENT
Start: 2023-09-13 | End: 2023-09-13

## 2023-09-13 RX ADMIN — LIDOCAINE 1 APPLICATION: 40 CREAM TOPICAL at 10:03

## 2023-09-13 NOTE — PROGRESS NOTES
Patient ID: John Dawson is a 58 y.o. male Date of Birth 1960     Chief Complaint  Chief Complaint   Patient presents with   • Follow Up Wound Care Visit     BLE wound       Allergies  Bactrim [sulfamethoxazole-trimethoprim], Cefaclor, and Simvastatin    Assessment:     Diagnoses and all orders for this visit:    Chronic venous hypertension (idiopathic) with ulcer of left lower extremity (CODE) ([facility])  -     Wound cleansing and dressings; Future  -     Wound compression and edema control; Future  -     lidocaine (LMX) 4 % cream  -     Cast Application    Chronic venous hypertension (idiopathic) with ulcer of right lower extremity (CODE) ([facility])  -     Wound cleansing and dressings; Future  -     Wound compression and edema control; Future  -     lidocaine (LMX) 4 % cream    Non-pressure chronic ulcer of left lower leg, limited to breakdown of skin (HCC)    Non-pressure chronic ulcer of right lower leg, limited to breakdown of skin ([address])    Type 2 diabetes mellitus without complication, with long-term current use of insulin ([address])    Morbid obesity due to excess calories ([address])    Other orders  -     Cancel: Cast Application  -     Debridement              Debridement   Wound 09/05/23 Venous Ulcer Leg Right; Anterior    Universal Protocol:  Consent: Written consent obtained. Consent given by: patient  Time out: Immediately prior to procedure a "time out" was called to verify the correct patient, procedure, equipment, support staff and site/side marked as required.   Timeout called at: 9/13/2023 2:26 PM.  Patient identity confirmed: verbally with patient      Performed by: NP  Debridement type: selective  Pain control: lidocaine 4%  Pre-debridement measurements  Length (cm): 14.8  Width (cm): 18  Depth (cm): 0.1  Surface Area (cm^2): 266.4  Volume (cm^3): 26.64    Post-debridement measurements  Length (cm): 14.8  Width (cm): 18  Depth (cm): 0.1  Percent debrided: 40%  Surface Area (cm^2): 266.4  Area debrided (cm^2): 106.56  Volume (cm^3): 26.64  Devitalized tissue debrided: biofilm, fibrin and slough  Instrument(s) utilized: curette  Bleeding: small  Hemostasis obtained with: pressure  Procedural pain (0-10): 0  Post-procedural pain: 0   Response to treatment: procedure was tolerated well          Plan:  1. F/u visit. Right lower leg wound debrided. Left lower leg wound cleansed normal saline and gauze. Wounds measuring slightly smaller. We will change treatment to Dermagran gauze to bilateral lower leg wounds covered with dry gauze. Continue Unna boots bilaterally for compression. 2  A1C results reviewed with the patient today. Patient maintaining tight glycemic control  3. Patient will follow-up in 1 week     Wound 09/05/23 Venous Ulcer Leg Right; Anterior (Active)   Wound Image Images linked 09/13/23 0943   Wound Description Epithelialization;Granulation tissue;Brown;Eschar;Yellow 09/13/23 0948   Bridget-wound Assessment Intact 09/13/23 0948   Wound Length (cm) 14.8 cm 09/13/23 0948   Wound Width (cm) 18 cm 09/13/23 0948   Wound Depth (cm) 0.1 cm 09/13/23 0948   Wound Surface Area (cm^2) 266.4 cm^2 09/13/23 0948   Wound Volume (cm^3) 26.64 cm^3 09/13/23 0948   Calculated Wound Volume (cm^3) 26.64 cm^3 09/13/23 0948   Change in Wound Size % 2.42 09/13/23 0948   Drainage Amount Small 09/13/23 0948   Drainage Description Serosanguineous 09/13/23 0948   Non-staged Wound Description Full thickness 09/13/23 0948   Patient Tolerance Tolerated well 09/13/23 0948   Dressing Status Intact 09/13/23 0948       Wound 09/05/23 Venous Ulcer Leg Left (Active)   Wound Image Images linked 09/13/23 0941   Wound Description Epithelialization;Granulation tissue;Dry;Eschar;Brown 09/13/23 0946   Bridget-wound Assessment Intact 09/13/23 0946   Wound Length (cm) 11.8 cm (skin bridges) 09/13/23 0946   Wound Width (cm) 2.6 cm 09/13/23 0946   Wound Depth (cm) 0.1 cm 09/13/23 0946   Wound Surface Area (cm^2) 30.68 cm^2 09/13/23 0946   Wound Volume (cm^3) 3.068 cm^3 09/13/23 0946   Calculated Wound Volume (cm^3) 3.07 cm^3 09/13/23 0946   Change in Wound Size % 54.59 09/13/23 0946   Drainage Amount Small 09/13/23 0946   Drainage Description Serosanguineous 09/13/23 0946   Non-staged Wound Description Full thickness 09/13/23 0946   Patient Tolerance Tolerated well 09/13/23 0946   Dressing Status Intact 09/13/23 0946       Wound 09/05/23 Venous Ulcer Leg Right; Anterior (Active)   Date First Assessed/Time First Assessed: 09/05/23 1401   Present on Original Admission: Yes  Primary Wound Type: Venous Ulcer  Location: Leg  Wound Location Orientation: Right; Anterior       Wound 09/05/23 Venous Ulcer Leg Left (Active)   Date First Assessed/Time First Assessed: 09/05/23 1401   Present on Original Admission: Yes  Primary Wound Type: Venous Ulcer  Location: Leg  Wound Location Orientation: Left       [REMOVED] Wound 06/10/20 Shoulder Left (Removed)   Resolved Date: 09/05/23  Date First Assessed/Time First Assessed: 06/10/20 1051   Location: Shoulder  Wound Location Orientation: Left  Wound Description (Comments): ARM SLING  Incision's 1st Dressing: DRESSING MEPILEX AG BORDER 4 X 4 IN  Wound Outcom. .. Subjective:      . F/u visit for venous ulcers of his bilateral lower legs. No new complaints. He denies any pain, fevers, or chills. Tolerating Unna boots.       The following portions of the patient's history were reviewed and updated as appropriate:   He  has a past medical history of Abscess of left thigh, Acute myocardial infarction [facility]), Anesthesia, Anxiety, Arteriosclerotic cardiovascular disease, Arthritis, Asthma, Chest pain, COPD (chronic obstructive pulmonary disease) ([address]), Coronary artery disease, Diabetes mellitus ([address]), Fatty liver, Gastric ulcer, GERD (gastroesophageal reflux disease), Hyperlipidemia, Hypertension, Low back pain, Myocardial infarction [facility]) ([id number]), Obesity, Obstructive sleep apnea, and Spondylosis of lumbar region without myelopathy or radiculopathy. He   Patient Active Problem List    Diagnosis Date Noted   • FREDY (obstructive sleep apnea) 09/11/2023   • Continuous opioid dependence ([address]) 05/23/2023   • Cellulitis of right lower extremity 04/10/2023   • Staph skin infection 12/01/2022   • Preoperative clearance 06/23/2021   • Acute on chronic systolic congestive heart failure ([address]) 05/04/2021   • Diabetes mellitus ([address])    • Lesion of nose 11/13/2020   • Type 2 diabetes mellitus, with long-term current use of insulin ([address]) 07/06/2020   • Obstructive sleep apnea 07/06/2020   • Aftercare following surgery of the musculoskeletal system 06/15/2020   • Impingement syndrome of left shoulder 01/14/2020   • Adhesive capsulitis of both shoulders 01/14/2020   • Chronic left shoulder pain 08/21/2019   • Healthcare maintenance 04/10/2019   • Morbid obesity with body mass index of 45.0-49.9 in adult [facility]) 11/29/2018   • Low back pain 10/22/2018   • Dysuria 07/24/2018   • Lumbar radiculopathy 12/29/2017   • Myofascial pain syndrome 12/29/2017   • Sacroiliitis ([address]) 12/29/2017   • Chest pain 08/02/2016   • Spondylosis of lumbar region without myelopathy or radiculopathy 04/15/2014   • Benign essential hypertension 10/16/2012   • Coronary artery disease 06/14/2012   • Chronic obstructive pulmonary disease ([address]) 06/14/2012     He  has a past surgical history that includes Coronary angioplasty with stent (2006, 2007); Neuroplasty / transposition median nerve at carpal tunnel (Right); EGD; Carpal tunnel release (Right); Colonoscopy; Hemorrhoid surgery; and pr surgical arthroscopy shoulder w/lss&rescj ads (Left, 6/10/2020). His family history includes Alzheimer's disease in his mother; Cancer in his other; Dementia in his mother; Heart attack in his father; Heart disease in his father; Heart failure in his mother; Hypertension in his family; Other in his family and father; Stroke in his family.   He  reports that he quit smoking about 9 years ago. His smoking use included cigarettes. He started smoking about 47 years ago. He has a 57.00 pack-year smoking history. He has never used smokeless tobacco. He reports that he does not drink alcohol and does not use drugs. Current Outpatient Medications   Medication Sig Dispense Refill   • acetaminophen (TYLENOL) 325 mg tablet Take 650 mg by mouth every 6 (six) hours as needed for mild pain     • albuterol (PROVENTIL HFA,VENTOLIN HFA) 90 mcg/act inhaler Inhale 2 puffs as needed for shortness of breath 18 g 1   • Aspirin Low Dose 81 MG EC tablet TAKE 1 TABLET BY MOUTH EVERY DAY 90 tablet 1   • atorvastatin (LIPITOR) 40 mg tablet TAKE 1 TABLET BY MOUTH EVERY DAY 90 tablet 2   • clopidogrel (PLAVIX) 75 mg tablet Take 1 tablet (75 mg total) by mouth daily 90 tablet 0   • fenofibrate (TRICOR) 145 mg tablet TAKE 1 TABLET BY MOUTH EVERY DAY 90 tablet 1   • furosemide (LASIX) 40 mg tablet      • HYDROcodone-acetaminophen (Vicodin) 5-300 MG per tablet Take 1 tablet by mouth every 6 (six) hours as needed for moderate pain Max Daily Amount: 4 tablets 112 tablet 0   • Icosapent Ethyl 1 g CAPS Vascepa 1 gram capsule   TAKE 2 G BY MOUTH 2 (TWO) TIMES A DAY.      • Insulin Pen Needle (B-D UF III MINI PEN NEEDLES) 31G X 5 MM MISC Inject as directed 2 (two) times a day Use as directed 100 each 0   • itraconazole (SPORANOX) 100 mg capsule PLEASE SEE ATTACHED FOR DETAILED DIRECTIONS     • Jardiance 25 MG TABS TAKE 1 TABLET BY MOUTH EVERY DAY 90 tablet 1   • losartan (COZAAR) 25 mg tablet Take 1 tablet (25 mg total) by mouth daily 90 tablet 3   • metoprolol tartrate (LOPRESSOR) 50 mg tablet Take 25 mg by mouth 2 (two) times a day     • nitroglycerin (NITROSTAT) 0.4 mg SL tablet PLEASE SEE ATTACHED FOR DETAILED DIRECTIONS     • pantoprazole (PROTONIX) 40 mg tablet TAKE 1 TABLET BY MOUTH EVERY DAY 90 tablet 5   • Semglee, yfgn, 100 UNIT/ML SOPN PLEASE SEE ATTACHED FOR DETAILED DIRECTIONS     • Sulfacetamide Sodium, Acne, 10 % LOTN Apply topically 2 (two) times a day 118 mL 0     No current facility-administered medications for this visit. He is allergic to bactrim [sulfamethoxazole-trimethoprim], cefaclor, and simvastatin. .    Review of Systems   Constitutional: Negative. HENT: Negative for ear pain and hearing loss. Eyes: Negative for pain. Respiratory: Negative for chest tightness and shortness of breath. Cardiovascular: Positive for leg swelling. Negative for chest pain and palpitations. Gastrointestinal: Negative for diarrhea, nausea and vomiting. Genitourinary: Negative for dysuria. Musculoskeletal: Negative for gait problem. Skin: Positive for wound. Neurological: Negative for tremors and weakness. Psychiatric/Behavioral: Negative for behavioral problems, confusion and suicidal ideas. Objective:       Wound 09/05/23 Venous Ulcer Leg Right; Anterior (Active)   Wound Image Images linked 09/13/23 0943   Wound Description Epithelialization;Granulation tissue;Brown;Eschar;Yellow 09/13/23 0948   Bridget-wound Assessment Intact 09/13/23 0948   Wound Length (cm) 14.8 cm 09/13/23 0948   Wound Width (cm) 18 cm 09/13/23 0948   Wound Depth (cm) 0.1 cm 09/13/23 0948   Wound Surface Area (cm^2) 266.4 cm^2 09/13/23 0948   Wound Volume (cm^3) 26.64 cm^3 09/13/23 0948   Calculated Wound Volume (cm^3) 26.64 cm^3 09/13/23 0948   Change in Wound Size % 2.42 09/13/23 0948   Drainage Amount Small 09/13/23 0948   Drainage Description Serosanguineous 09/13/23 0948   Non-staged Wound Description Full thickness 09/13/23 0948   Patient Tolerance Tolerated well 09/13/23 0948   Dressing Status Intact 09/13/23 0948       Wound 09/05/23 Venous Ulcer Leg Left (Active)   Wound Image Images linked 09/13/23 0941   Wound Description Epithelialization;Granulation tissue;Dry;Eschar;Brown 09/13/23 0946   Bridget-wound Assessment Intact 09/13/23 0946   Wound Length (cm) 11.8 cm (skin bridges) 09/13/23 0946   Wound Width (cm) 2.6 cm 09/13/23 0946 Wound Depth (cm) 0.1 cm 09/13/23 0946   Wound Surface Area (cm^2) 30.68 cm^2 09/13/23 0946   Wound Volume (cm^3) 3.068 cm^3 09/13/23 0946   Calculated Wound Volume (cm^3) 3.07 cm^3 09/13/23 0946   Change in Wound Size % 54.59 09/13/23 0946   Drainage Amount Small 09/13/23 0946   Drainage Description Serosanguineous 09/13/23 0946   Non-staged Wound Description Full thickness 09/13/23 0946   Patient Tolerance Tolerated well 09/13/23 0946   Dressing Status Intact 09/13/23 0946       BP (!) 173/57   Pulse 78   Temp 97.9 °F (36.6 °C)   Resp 12                     Wound Instructions:  Orders Placed This Encounter   Procedures   • Wound cleansing and dressings     Wound location: Right lower leg    Change dressing  Weekly at wound center. Sponge bathe only, do not shower at this time. Wound cleansed with Hibiclens and water. Patted dry. Applied Opticell Ag to cover wounds. Covered with gauze  Secured with roll gauze and tape. Applied Unna boot on Right lower leg       Wound location: Left lower leg    Change dressing  Weekly at wound center. Sponge bathe only, do not shower at this time. Wound cleansed with mild soap and water. Patted dry. Applied Dermagran cut to fit to cover wounds. Covered with gauze  Secured with roll gauze and tape. Applied Unna boot on Left lower leg      This was applied today at the [facility]. Standing Status:   Future     Standing Expiration Date:   9/13/2024   • Wound compression and edema control     Unna Boot compression wrap Instructions:       Keep compression wrap/wraps clean and dry. If wraps are too tight and you experience numbness/tingling, call the wound center. If after hours, remove wraps or proceed to nearest E.R. and call wound center in AM.  [facility] .     Wrap will be changed weekly at [facility].    Avoid prolonged standing in one place.     Elevate leg(s) above the level of the heart when sitting or as much as possible.      Standing Status:   Future Standing Expiration Date:   4/77/7836   • Cast Application     This order was created via procedure documentation   • Debridement     This order was created via procedure documentation        Diagnosis ICD-10-CM Associated Orders   1. Chronic venous hypertension (idiopathic) with ulcer of left lower extremity (CODE) ([facility])  I87.312 Wound cleansing and dressings     Wound compression and edema control     lidocaine (LMX) 4 % cream     Cast Application      2. Chronic venous hypertension (idiopathic) with ulcer of right lower extremity (CODE) ([facility])  I87.311 Wound cleansing and dressings     Wound compression and edema control     lidocaine (LMX) 4 % cream      3. Non-pressure chronic ulcer of left lower leg, limited to breakdown of skin ([address])  L97.921       4. Non-pressure chronic ulcer of right lower leg, limited to breakdown of skin ([address])  L97.911       5. Type 2 diabetes mellitus without complication, with long-term current use of insulin ([facility])  E11.9     Z79.4       6.  Morbid obesity due to excess calories ([facility])  E66.01

## 2023-09-13 NOTE — PROGRESS NOTES
Cast Application    Date/Time: 9/13/2023 9:45 AM    Performed by: Leonidas Ashton RN  Authorized by: KIMBERLEE Hall  Universal Protocol:  Consent: Verbal consent obtained. Consent given by: patient  Patient understanding: patient states understanding of the procedure being performed  Patient identity confirmed: verbally with patient      Pre-procedure details:     Sensation:  Normal  Procedure details:     Laterality:  Left    Location:  Leg    Leg:  L lower legCast type:  Unna boot      Supplies:  2 layer wrap  Post-procedure details:     Pain:  Improved    Sensation:  Normal    Patient tolerance of procedure: Tolerated well, no immediate complications  Comments:      UNNA BOOT wrap procedure     Before application, JOSIANE and/or TBI determined to be adequate for healing and application of compression. Lower extremity washed prior to application of compression wrap. With the foot in dorsiflexed position, Unna boot was applied as per physician orders without complications or complaint of pain. The procedure was tolerated well. Toes warm & pink post application. Patient provided education & reinforced to observe toes for any discoloration, swelling or tingling and instructed when to report to the  [address] or to remove compression. Wound care as per providers orders, patient tolerated well.

## 2023-09-13 NOTE — PATIENT INSTRUCTIONS
Orders Placed This Encounter   Procedures    Wound cleansing and dressings     Wound location: Right lower leg    Change dressing  Weekly at wound center. Sponge bathe only, do not shower at this time. Wound cleansed with Hibiclens and water. Patted dry. Applied Opticell Ag to cover wounds. Covered with gauze  Secured with roll gauze and tape. Applied Unna boot on Right lower leg       Wound location: Left lower leg    Change dressing  Weekly at wound center. Sponge bathe only, do not shower at this time. Wound cleansed with mild soap and water. Patted dry. Applied Dermagran cut to fit to cover wounds. Covered with gauze  Secured with roll gauze and tape. Applied Unna boot on Left lower leg      This was applied today at the [facility]. Standing Status:   Future     Standing Expiration Date:   9/13/2024    Wound compression and edema control     Unna Boot compression wrap Instructions:       Keep compression wrap/wraps clean and dry. If wraps are too tight and you experience numbness/tingling, call the wound center. If after hours, remove wraps or proceed to nearest E.R. and call wound center in AM.  [facility] . Wrap will be changed weekly at [facility]. Avoid prolonged standing in one place. Elevate leg(s) above the level of the heart when sitting or as much as possible.      Standing Status:   Future     Standing Expiration Date:   7/12/5524    Cast Application     This order was created via procedure documentation

## 2023-09-15 DIAGNOSIS — G89.29 CHRONIC BILATERAL LOW BACK PAIN WITHOUT SCIATICA: ICD-10-CM

## 2023-09-15 DIAGNOSIS — M54.50 CHRONIC BILATERAL LOW BACK PAIN WITHOUT SCIATICA: ICD-10-CM

## 2023-09-19 RX ORDER — HYDROCODONE BITARTRATE AND ACETAMINOPHEN 5; 300 MG/1; MG/1
1 TABLET ORAL EVERY 6 HOURS PRN
Qty: 112 TABLET | Refills: 0 | Status: SHIPPED | OUTPATIENT
Start: 2023-09-19

## 2023-09-20 ENCOUNTER — OFFICE VISIT (OUTPATIENT)
Dept: WOUND CARE | Facility: HOSPITAL | Age: 63
End: 2023-09-20
Payer: COMMERCIAL

## 2023-09-20 VITALS
TEMPERATURE: 97.4 F | HEART RATE: 72 BPM | DIASTOLIC BLOOD PRESSURE: 83 MMHG | SYSTOLIC BLOOD PRESSURE: 184 MMHG | RESPIRATION RATE: 12 BRPM

## 2023-09-20 DIAGNOSIS — E66.01 MORBID OBESITY DUE TO EXCESS CALORIES (HCC): ICD-10-CM

## 2023-09-20 DIAGNOSIS — I87.312 CHRONIC VENOUS HYPERTENSION (IDIOPATHIC) WITH ULCER OF LEFT LOWER EXTREMITY (CODE) (HCC): Primary | ICD-10-CM

## 2023-09-20 DIAGNOSIS — I87.311 CHRONIC VENOUS HYPERTENSION (IDIOPATHIC) WITH ULCER OF RIGHT LOWER EXTREMITY (CODE) (HCC): ICD-10-CM

## 2023-09-20 DIAGNOSIS — L97.911 NON-PRESSURE CHRONIC ULCER OF RIGHT LOWER LEG, LIMITED TO BREAKDOWN OF SKIN (HCC): ICD-10-CM

## 2023-09-20 DIAGNOSIS — L97.921 NON-PRESSURE CHRONIC ULCER OF LEFT LOWER LEG, LIMITED TO BREAKDOWN OF SKIN (HCC): ICD-10-CM

## 2023-09-20 DIAGNOSIS — Z79.4 TYPE 2 DIABETES MELLITUS WITHOUT COMPLICATION, WITH LONG-TERM CURRENT USE OF INSULIN (HCC): ICD-10-CM

## 2023-09-20 DIAGNOSIS — E11.9 TYPE 2 DIABETES MELLITUS WITHOUT COMPLICATION, WITH LONG-TERM CURRENT USE OF INSULIN (HCC): ICD-10-CM

## 2023-09-20 PROCEDURE — 97598 DBRDMT OPN WND ADDL 20CM/<: CPT | Performed by: NURSE PRACTITIONER

## 2023-09-20 PROCEDURE — 97597 DBRDMT OPN WND 1ST 20 CM/<: CPT | Performed by: NURSE PRACTITIONER

## 2023-09-20 RX ORDER — LIDOCAINE 40 MG/G
CREAM TOPICAL ONCE
Status: COMPLETED | OUTPATIENT
Start: 2023-09-20 | End: 2023-09-20

## 2023-09-20 RX ADMIN — LIDOCAINE 1 APPLICATION: 40 CREAM TOPICAL at 09:47

## 2023-09-20 NOTE — PROGRESS NOTES
Patient ID: Darcy Goff is a 58 y.o. male Date of Birth 1960     Chief Complaint  Chief Complaint   Patient presents with   • Follow Up Wound Care Visit     BLE wound       Allergies  Bactrim [sulfamethoxazole-trimethoprim], Cefaclor, and Simvastatin    Assessment:     Diagnoses and all orders for this visit:    Chronic venous hypertension (idiopathic) with ulcer of left lower extremity (CODE) (formerly [facility])  -     lidocaine (LMX) 4 % cream  -     Cancel: Wound cleansing and dressings; Future  -     Wound compression and edema control; Future  -     Wound cleansing and dressings; Future    Chronic venous hypertension (idiopathic) with ulcer of right lower extremity (CODE) (HCC)  -     lidocaine (LMX) 4 % cream  -     Cancel: Wound cleansing and dressings; Future  -     Wound compression and edema control; Future  -     Wound cleansing and dressings; Future  -     Debridement    Non-pressure chronic ulcer of left lower leg, limited to breakdown of skin (HCC)    Non-pressure chronic ulcer of right lower leg, limited to breakdown of skin ([address])  -     Debridement    Type 2 diabetes mellitus without complication, with long-term current use of insulin ([address])    Morbid obesity due to excess calories ([address])    Other orders  -     Debridement              Debridement   Wound 09/05/23 Venous Ulcer Leg Right; Anterior    Universal Protocol:  Consent: Written consent obtained. Consent given by: patient  Time out: Immediately prior to procedure a "time out" was called to verify the correct patient, procedure, equipment, support staff and site/side marked as required.   Timeout called at: 9/20/2023 12:34 PM.  Patient identity confirmed: verbally with patient      Performed by: NP  Debridement type: selective  Pain control: lidocaine 4%  Pre-debridement measurements  Length (cm): 14  Width (cm): 19  Depth (cm): 0.1  Surface Area (cm^2): 266  Volume (cm^3): 26.6    Post-debridement measurements  Length (cm): 14  Width (cm): 19  Depth (cm): 0.1  Percent debrided: 25%  Surface Area (cm^2): 266  Area debrided (cm^2): 66.5  Volume (cm^3): 26.6  Devitalized tissue debrided: biofilm, fibrin and slough  Instrument(s) utilized: curette  Bleeding: small  Hemostasis obtained with: pressure  Procedural pain (0-10): 0  Post-procedural pain: 0   Response to treatment: procedure was tolerated well    Debridement   Wound 09/05/23 Venous Ulcer Leg Left    Universal Protocol:  Consent: Written consent obtained. Consent given by: patient  Time out: Immediately prior to procedure a "time out" was called to verify the correct patient, procedure, equipment, support staff and site/side marked as required. Timeout called at: 9/20/2023 12:35 PM.  Patient identity confirmed: verbally with patient      Performed by: NP  Debridement type: selective  Pain control: lidocaine 4%  Pre-debridement measurements  Length (cm): 10  Width (cm): 3.2  Depth (cm): 0.1  Surface Area (cm^2): 32  Volume (cm^3): 3.2    Post-debridement measurements  Length (cm): 10  Width (cm): 3.2  Depth (cm): 0.1  Percent debrided: 50%  Surface Area (cm^2): 32  Area debrided (cm^2): 16  Volume (cm^3): 3.2  Devitalized tissue debrided: biofilm, fibrin and slough  Instrument(s) utilized: curette  Bleeding: small  Hemostasis obtained with: pressure  Procedural pain (0-10): 0  Post-procedural pain: 0   Response to treatment: procedure was tolerated well          Plan:  1. F/u visit. Wounds debrided. Wounds measuring relatively the same. We will have bilateral lower extremities cleansed with Hibiclens. We will also have betamethasone applied to periwound's bilaterally. We will continue to have Opticell applied to his right lower leg and Dermagran applied to his left lower leg wounds. Continue compression with Unna boots. 2.  A1C results reviewed with the patient today. Patient maintaining tight glycemic control  3.   Patient will follow-up in 1 week     Wound 09/05/23 Venous Ulcer Leg Right; Anterior (Active)   Wound Image Images linked 09/20/23 0941   Wound Description Epithelialization;Dry;Eschar;Brown;Pink 09/20/23 0944   Bridget-wound Assessment Dry;Scaly 09/20/23 0944   Wound Length (cm) 14 cm 09/20/23 0944   Wound Width (cm) 19 cm 09/20/23 0944   Wound Depth (cm) 0.1 cm 09/20/23 0944   Wound Surface Area (cm^2) 266 cm^2 09/20/23 0944   Wound Volume (cm^3) 26.6 cm^3 09/20/23 0944   Calculated Wound Volume (cm^3) 26.6 cm^3 09/20/23 0944   Change in Wound Size % 2.56 09/20/23 0944   Drainage Amount Moderate 09/20/23 0944   Drainage Description Serosanguineous 09/20/23 0944   Non-staged Wound Description Full thickness 09/20/23 0944   Patient Tolerance Tolerated well 09/20/23 0944   Dressing Status Intact 09/20/23 0944       Wound 09/05/23 Venous Ulcer Leg Left (Active)   Wound Image Images linked 09/20/23 0942   Wound Description Epithelialization;Dry;Pink 09/20/23 0943   Bridget-wound Assessment Intact;Scaly 09/20/23 0943   Wound Length (cm) 10 cm 09/20/23 0943   Wound Width (cm) 3.2 cm 09/20/23 0943   Wound Depth (cm) 0.1 cm 09/20/23 0943   Wound Surface Area (cm^2) 32 cm^2 09/20/23 0943   Wound Volume (cm^3) 3.2 cm^3 09/20/23 0943   Calculated Wound Volume (cm^3) 3.2 cm^3 09/20/23 0943   Change in Wound Size % 52.66 09/20/23 0943   Drainage Amount Small 09/20/23 0943   Drainage Description Serosanguineous 09/20/23 0943   Non-staged Wound Description Full thickness 09/20/23 0943   Patient Tolerance Tolerated well 09/20/23 0943   Dressing Status Intact 09/20/23 0943       Wound 09/05/23 Venous Ulcer Leg Right; Anterior (Active)   Date First Assessed/Time First Assessed: 09/05/23 1401   Present on Original Admission: Yes  Primary Wound Type: Venous Ulcer  Location: Leg  Wound Location Orientation: Right; Anterior       Wound 09/05/23 Venous Ulcer Leg Left (Active)   Date First Assessed/Time First Assessed: 09/05/23 1401   Present on Original Admission: Yes  Primary Wound Type: Venous Ulcer  Location: Leg  Wound Location Orientation: Left       [REMOVED] Wound 06/10/20 Shoulder Left (Removed)   Resolved Date: 09/05/23  Date First Assessed/Time First Assessed: 06/10/20 1051   Location: Shoulder  Wound Location Orientation: Left  Wound Description (Comments): ARM SLING  Incision's 1st Dressing: DRESSING MEPILEX AG BORDER 4 X 4 IN  Wound Outcom. .. Subjective:      . F/u visit for venous ulcers of his bilateral lower legs. RN staff report that patient's wounds have a mild odor. Patient also reports increased pruritus and periwound area of his wounds bilaterally. He denies any pain, fevers, or chills. Tolerating Unna boots. The following portions of the patient's history were reviewed and updated as appropriate:   He  has a past medical history of Abscess of left thigh, Acute myocardial infarction [facility]), Anesthesia, Anxiety, Arteriosclerotic cardiovascular disease, Arthritis, Asthma, Chest pain, COPD (chronic obstructive pulmonary disease) ([address]), Coronary artery disease, Diabetes mellitus ([address]), Fatty liver, Gastric ulcer, GERD (gastroesophageal reflux disease), Hyperlipidemia, Hypertension, Low back pain, Myocardial infarction [facility]) ([phone]), Obesity, Obstructive sleep apnea, and Spondylosis of lumbar region without myelopathy or radiculopathy.   He   Patient Active Problem List    Diagnosis Date Noted   • FREDY (obstructive sleep apnea) 09/11/2023   • Continuous opioid dependence ([address]) 05/23/2023   • Cellulitis of right lower extremity 04/10/2023   • Staph skin infection 12/01/2022   • Preoperative clearance 06/23/2021   • Acute on chronic systolic congestive heart failure ([address]) 05/04/2021   • Diabetes mellitus ([address])    • Lesion of nose 11/13/2020   • Type 2 diabetes mellitus, with long-term current use of insulin ([address]) 07/06/2020   • Obstructive sleep apnea 07/06/2020   • Aftercare following surgery of the musculoskeletal system 06/15/2020   • Impingement syndrome of left shoulder 01/14/2020   • Adhesive capsulitis of both shoulders 01/14/2020   • Chronic left shoulder pain 08/21/2019   • Healthcare maintenance 04/10/2019   • Morbid obesity with body mass index of 45.0-49.9 in adult [facility]) 11/29/2018   • Low back pain 10/22/2018   • Dysuria 07/24/2018   • Lumbar radiculopathy 12/29/2017   • Myofascial pain syndrome 12/29/2017   • Sacroiliitis ([address]) 12/29/2017   • Chest pain 08/02/2016   • Spondylosis of lumbar region without myelopathy or radiculopathy 04/15/2014   • Benign essential hypertension 10/16/2012   • Coronary artery disease 06/14/2012   • Chronic obstructive pulmonary disease ([address]) 06/14/2012     He  has a past surgical history that includes Coronary angioplasty with stent (2006, 2007); Neuroplasty / transposition median nerve at carpal tunnel (Right); EGD; Carpal tunnel release (Right); Colonoscopy; Hemorrhoid surgery; and pr surgical arthroscopy shoulder w/lss&rescj ads (Left, 6/10/2020). His family history includes Alzheimer's disease in his mother; Cancer in his other; Dementia in his mother; Heart attack in his father; Heart disease in his father; Heart failure in his mother; Hypertension in his family; Other in his family and father; Stroke in his family. He  reports that he quit smoking about 9 years ago. His smoking use included cigarettes. He started smoking about 47 years ago. He has a 57.00 pack-year smoking history. He has never used smokeless tobacco. He reports that he does not drink alcohol and does not use drugs.   Current Outpatient Medications   Medication Sig Dispense Refill   • acetaminophen (TYLENOL) 325 mg tablet Take 650 mg by mouth every 6 (six) hours as needed for mild pain     • albuterol (PROVENTIL HFA,VENTOLIN HFA) 90 mcg/act inhaler Inhale 2 puffs as needed for shortness of breath 18 g 1   • Aspirin Low Dose 81 MG EC tablet TAKE 1 TABLET BY MOUTH EVERY DAY 90 tablet 1   • atorvastatin (LIPITOR) 40 mg tablet TAKE 1 TABLET BY MOUTH EVERY DAY 90 tablet 2   • clopidogrel (PLAVIX) 75 mg tablet Take 1 tablet (75 mg total) by mouth daily 90 tablet 0   • fenofibrate (TRICOR) 145 mg tablet TAKE 1 TABLET BY MOUTH EVERY DAY 90 tablet 1   • furosemide (LASIX) 40 mg tablet      • HYDROcodone-acetaminophen (Vicodin) 5-300 MG per tablet Take 1 tablet by mouth every 6 (six) hours as needed for moderate pain Max Daily Amount: 4 tablets 112 tablet 0   • Icosapent Ethyl 1 g CAPS Vascepa 1 gram capsule   TAKE 2 G BY MOUTH 2 (TWO) TIMES A DAY. • Insulin Pen Needle (B-D UF III MINI PEN NEEDLES) 31G X 5 MM MISC Inject as directed 2 (two) times a day Use as directed 100 each 0   • itraconazole (SPORANOX) 100 mg capsule PLEASE SEE ATTACHED FOR DETAILED DIRECTIONS     • Jardiance 25 MG TABS TAKE 1 TABLET BY MOUTH EVERY DAY 90 tablet 1   • losartan (COZAAR) 25 mg tablet Take 1 tablet (25 mg total) by mouth daily 90 tablet 3   • metoprolol tartrate (LOPRESSOR) 50 mg tablet Take 25 mg by mouth 2 (two) times a day     • nitroglycerin (NITROSTAT) 0.4 mg SL tablet PLEASE SEE ATTACHED FOR DETAILED DIRECTIONS     • pantoprazole (PROTONIX) 40 mg tablet TAKE 1 TABLET BY MOUTH EVERY DAY 90 tablet 5   • Semglee, yfgn, 100 UNIT/ML SOPN PLEASE SEE ATTACHED FOR DETAILED DIRECTIONS     • Sulfacetamide Sodium, Acne, 10 % LOTN Apply topically 2 (two) times a day 118 mL 0     No current facility-administered medications for this visit. He is allergic to bactrim [sulfamethoxazole-trimethoprim], cefaclor, and simvastatin. .    Review of Systems   Constitutional: Negative. HENT: Negative for ear pain and hearing loss. Eyes: Negative for pain. Respiratory: Negative for chest tightness and shortness of breath. Cardiovascular: Positive for leg swelling. Negative for chest pain and palpitations. Gastrointestinal: Negative for diarrhea, nausea and vomiting. Genitourinary: Negative for dysuria. Musculoskeletal: Negative for gait problem. Skin: Positive for wound.    Neurological: Negative for tremors and weakness. Psychiatric/Behavioral: Negative for behavioral problems, confusion and suicidal ideas. Objective:       Wound 09/05/23 Venous Ulcer Leg Right; Anterior (Active)   Wound Image Images linked 09/20/23 0941   Wound Description Epithelialization;Dry;Eschar;Brown;Pink 09/20/23 0944   Bridget-wound Assessment Dry;Scaly 09/20/23 0944   Wound Length (cm) 14 cm 09/20/23 0944   Wound Width (cm) 19 cm 09/20/23 0944   Wound Depth (cm) 0.1 cm 09/20/23 0944   Wound Surface Area (cm^2) 266 cm^2 09/20/23 0944   Wound Volume (cm^3) 26.6 cm^3 09/20/23 0944   Calculated Wound Volume (cm^3) 26.6 cm^3 09/20/23 0944   Change in Wound Size % 2.56 09/20/23 0944   Drainage Amount Moderate 09/20/23 0944   Drainage Description Serosanguineous 09/20/23 0944   Non-staged Wound Description Full thickness 09/20/23 0944   Patient Tolerance Tolerated well 09/20/23 0944   Dressing Status Intact 09/20/23 0944       Wound 09/05/23 Venous Ulcer Leg Left (Active)   Wound Image Images linked 09/20/23 0942   Wound Description Epithelialization;Dry;Pink 09/20/23 0943   Bridget-wound Assessment Intact;Scaly 09/20/23 0943   Wound Length (cm) 10 cm 09/20/23 0943   Wound Width (cm) 3.2 cm 09/20/23 0943   Wound Depth (cm) 0.1 cm 09/20/23 0943   Wound Surface Area (cm^2) 32 cm^2 09/20/23 0943   Wound Volume (cm^3) 3.2 cm^3 09/20/23 0943   Calculated Wound Volume (cm^3) 3.2 cm^3 09/20/23 0943   Change in Wound Size % 52.66 09/20/23 0943   Drainage Amount Small 09/20/23 0943   Drainage Description Serosanguineous 09/20/23 0943   Non-staged Wound Description Full thickness 09/20/23 0943   Patient Tolerance Tolerated well 09/20/23 0943   Dressing Status Intact 09/20/23 0943       BP (!) 184/83   Pulse 72   Temp (!) 97.4 °F (36.3 °C)   Resp 12                 Wound Instructions:  Orders Placed This Encounter   Procedures   • Wound compression and edema control     Unna Boot compression wrap Instructions:       Keep compression wrap/wraps clean and dry. If wraps are too tight and you experience numbness/tingling, call the wound center. If after hours, remove wraps or proceed to nearest E.R. and call wound center in AM.  [facility] .     Wrap will be changed weekly at [facility].    Avoid prolonged standing in one place.     Elevate leg(s) above the level of the heart when sitting or as much as possible. Standing Status:   Future     Standing Expiration Date:   9/20/2024   • Wound cleansing and dressings     Wound location: Right lower leg    Change dressing  Weekly at wound center. Sponge bathe only, do not shower at this time. Wound cleansed with Hibiclens and water. Patted dry. Applied Betamethasone to arlet wound  Applied Opticell Ag to cover wounds. Covered with gauze  Applied Unna boot on Right lower leg        Wound location: Left lower leg    Change dressing  Weekly at wound center. Sponge bathe only, do not shower at this time. Wound cleansed with Hibiclens and water. Patted dry. Applied Betamethasone to arlet wound  Applied Dermagran cut to fit to cover wounds. Covered with gauze  Applied Unna boot on Left lower leg     This was applied today at the [facility]. Standing Status:   Future     Standing Expiration Date:   9/20/2024   • Debridement     This order was created via procedure documentation   • Debridement     This order was created via procedure documentation        Diagnosis ICD-10-CM Associated Orders   1. Chronic venous hypertension (idiopathic) with ulcer of left lower extremity (CODE) ([facility])  I87.312 lidocaine (LMX) 4 % cream     Wound compression and edema control     Wound cleansing and dressings      2. Chronic venous hypertension (idiopathic) with ulcer of right lower extremity (CODE) ([facility])  I87.311 lidocaine (LMX) 4 % cream     Wound compression and edema control     Wound cleansing and dressings     Debridement      3. Non-pressure chronic ulcer of left lower leg, limited to breakdown of skin ([address])  L97.921       4. Non-pressure chronic ulcer of right lower leg, limited to breakdown of skin ([address])  L97.911 Debridement      5. Type 2 diabetes mellitus without complication, with long-term current use of insulin ([facility])  E11.9     Z79.4       6.  Morbid obesity due to excess calories ([facility])  E66.01

## 2023-09-20 NOTE — PATIENT INSTRUCTIONS
Orders Placed This Encounter   Procedures    Wound compression and edema control     Unna Boot compression wrap Instructions:       Keep compression wrap/wraps clean and dry. If wraps are too tight and you experience numbness/tingling, call the wound center. If after hours, remove wraps or proceed to nearest E.R. and call wound center in AM.  [facility] . Wrap will be changed weekly at [facility]. Avoid prolonged standing in one place. Elevate leg(s) above the level of the heart when sitting or as much as possible. Standing Status:   Future     Standing Expiration Date:   9/20/2024    Wound cleansing and dressings     Wound location: Right lower leg    Change dressing  Weekly at wound center. Sponge bathe only, do not shower at this time. Wound cleansed with Hibiclens and water. Patted dry. Applied Betamethasone to arlet wound  Applied Opticell Ag to cover wounds. Covered with gauze  Applied Unna boot on Right lower leg        Wound location: Left lower leg    Change dressing  Weekly at wound center. Sponge bathe only, do not shower at this time. Wound cleansed with Hibiclens and water. Patted dry. Applied Betamethasone to arlet wound  Applied Dermagran cut to fit to cover wounds. Covered with gauze  Applied Unna boot on Left lower leg     This was applied today at the [facility].      Standing Status:   Future     Standing Expiration Date:   9/20/2024

## 2023-09-27 ENCOUNTER — OFFICE VISIT (OUTPATIENT)
Dept: WOUND CARE | Facility: HOSPITAL | Age: 63
End: 2023-09-27
Payer: COMMERCIAL

## 2023-09-27 VITALS
DIASTOLIC BLOOD PRESSURE: 72 MMHG | SYSTOLIC BLOOD PRESSURE: 159 MMHG | RESPIRATION RATE: 18 BRPM | HEART RATE: 70 BPM | TEMPERATURE: 97.6 F

## 2023-09-27 DIAGNOSIS — I87.312 CHRONIC VENOUS HYPERTENSION (IDIOPATHIC) WITH ULCER OF LEFT LOWER EXTREMITY (CODE) (HCC): Primary | ICD-10-CM

## 2023-09-27 DIAGNOSIS — L97.911 NON-PRESSURE CHRONIC ULCER OF RIGHT LOWER LEG, LIMITED TO BREAKDOWN OF SKIN (HCC): ICD-10-CM

## 2023-09-27 DIAGNOSIS — I87.311 CHRONIC VENOUS HYPERTENSION (IDIOPATHIC) WITH ULCER OF RIGHT LOWER EXTREMITY (CODE) (HCC): ICD-10-CM

## 2023-09-27 DIAGNOSIS — L97.921 NON-PRESSURE CHRONIC ULCER OF LEFT LOWER LEG, LIMITED TO BREAKDOWN OF SKIN (HCC): ICD-10-CM

## 2023-09-27 PROCEDURE — 29580 STRAPPING UNNA BOOT: CPT

## 2023-09-27 PROCEDURE — 99213 OFFICE O/P EST LOW 20 MIN: CPT | Performed by: FAMILY MEDICINE

## 2023-09-27 NOTE — PATIENT INSTRUCTIONS
Orders Placed This Encounter   Procedures    Wound cleansing and dressings     Wound location: Right lower leg  Change dressing Weekly at wound center. Sponge bathe only, do not shower at this time. Wound cleansed with Hibiclens and water. Patted dry. Applied Betamethasone to arlet wound  Applied Opticell Ag to cover wounds. Covered with gauze    Wound location: Left lower leg  Change dressing Weekly at wound center. Sponge bathe only, do not shower at this time. Wound cleansed with Hibiclens and water. Patted dry. Applied Betamethasone to arlet wound  Applied Dermagran cut to fit to cover wounds. Covered with gauze    This was applied today at the [facility]. Standing Status:   Future     Standing Expiration Date:   9/27/2024    Wound compression and edema control     Unna Boot compression wrap Instructions:  Keep compression wrap/wraps clean and dry. If wraps are too tight and you experience numbness/tingling,  call the wound center. If after hours, remove wraps or proceed to nearest E.R. and call wound center in AM.  [place] will be changed weekly at [facility]. Avoid prolonged standing in one place. Elevate leg(s) above the level of the heart when sitting or as much as possible. This was applied today.      Standing Status:   Future     Standing Expiration Date:   9/27/2024

## 2023-09-27 NOTE — PROGRESS NOTES
Patient ID: Arleen Cogan is a 58 y.o. male Date of Birth 1960     Chief Complaint  Chief Complaint   Patient presents with   • Follow Up Wound Care Visit     BLE wounds       Allergies  Bactrim [sulfamethoxazole-trimethoprim], Cefaclor, and Simvastatin    Assessment:    No problem-specific Assessment & Plan notes found for this encounter. Diagnoses and all orders for this visit:    Chronic venous hypertension (idiopathic) with ulcer of left lower extremity (CODE) ([facility])  -     Wound cleansing and dressings; Future  -     Wound compression and edema control; Future    Chronic venous hypertension (idiopathic) with ulcer of right lower extremity (CODE) ([facility])  -     Wound cleansing and dressings; Future  -     Wound compression and edema control; Future    Non-pressure chronic ulcer of left lower leg, limited to breakdown of skin (HCC)  -     Wound cleansing and dressings; Future  -     Wound compression and edema control; Future    Non-pressure chronic ulcer of right lower leg, limited to breakdown of skin (HCC)  -     Wound cleansing and dressings; Future  -     Wound compression and edema control; Future              Procedures    Plan:   Wounds are improved  Continue wound management as below  Continue compression with Unna boots bilaterally  Keep legs elevated whenever seated and avoid prolonged standing  Follow-up in 1 week or call sooner with questions or concerns    Wound 09/05/23 Venous Ulcer Leg Right; Anterior (Active)   Wound Image Images linked 09/27/23 1052   Wound Description Epithelialization;Dry;Eschar;Brown;Pink 09/27/23 1100   Bridget-wound Assessment Dry;Scaly;Edema 09/27/23 1100   Wound Length (cm) 20 cm 09/27/23 1100   Wound Width (cm) 15 cm 09/27/23 1100   Wound Depth (cm) 0.1 cm 09/27/23 1100   Wound Surface Area (cm^2) 300 cm^2 09/27/23 1100   Wound Volume (cm^3) 30 cm^3 09/27/23 1100   Calculated Wound Volume (cm^3) 30 cm^3 09/27/23 1100   Change in Wound Size % -9.89 09/27/23 1100 Drainage Amount Moderate 09/27/23 1100   Drainage Description Serosanguineous 09/27/23 1100   Non-staged Wound Description Full thickness 09/27/23 1100   Dressing Status Intact 09/27/23 1100       Wound 09/05/23 Venous Ulcer Leg Left (Active)   Wound Image Images linked 09/27/23 1052   Wound Description Epithelialization;Dry;Pink 09/27/23 1100   Bridget-wound Assessment Intact;Scaly;Edema 09/27/23 1100   Wound Length (cm) 8.2 cm 09/27/23 1100   Wound Width (cm) 4.5 cm 09/27/23 1100   Wound Depth (cm) 0.1 cm 09/27/23 1100   Wound Surface Area (cm^2) 36.9 cm^2 09/27/23 1100   Wound Volume (cm^3) 3.69 cm^3 09/27/23 1100   Calculated Wound Volume (cm^3) 3.69 cm^3 09/27/23 1100   Change in Wound Size % 45.41 09/27/23 1100   Drainage Amount Small 09/27/23 1100   Drainage Description Serosanguineous 09/27/23 1100   Non-staged Wound Description Full thickness 09/27/23 1100   Dressing Status Intact 09/27/23 1100       Wound 09/05/23 Venous Ulcer Leg Right; Anterior (Active)   Date First Assessed/Time First Assessed: 09/05/23 1401   Present on Original Admission: Yes  Primary Wound Type: Venous Ulcer  Location: Leg  Wound Location Orientation: Right; Anterior       Wound 09/05/23 Venous Ulcer Leg Left (Active)   Date First Assessed/Time First Assessed: 09/05/23 1401   Present on Original Admission: Yes  Primary Wound Type: Venous Ulcer  Location: Leg  Wound Location Orientation: Left       [REMOVED] Wound 06/10/20 Shoulder Left (Removed)   Resolved Date: 09/05/23  Date First Assessed/Time First Assessed: 06/10/20 1051   Location: Shoulder  Wound Location Orientation: Left  Wound Description (Comments): ARM SLING  Incision's 1st Dressing: DRESSING MEPILEX AG BORDER 4 X 4 IN  Wound Outcom. .. Subjective:      . Patient presents for followup of bilateral LE venous ulcers. No increased pain or drainage. Has been using dermagran on the LLE and Opticell Ag on the RLE. Unnaboots have been in place bilaterally.         The following portions of the patient's history were reviewed and updated as appropriate:   He  has a past medical history of Abscess of left thigh, Acute myocardial infarction [facility]), Anesthesia, Anxiety, Arteriosclerotic cardiovascular disease, Arthritis, Asthma, Chest pain, COPD (chronic obstructive pulmonary disease) ([address]), Coronary artery disease, Diabetes mellitus ([address]), Fatty liver, Gastric ulcer, GERD (gastroesophageal reflux disease), Hyperlipidemia, Hypertension, Low back pain, Myocardial infarction [facility]) ([id number]), Obesity, Obstructive sleep apnea, and Spondylosis of lumbar region without myelopathy or radiculopathy.   He   Patient Active Problem List    Diagnosis Date Noted   • FREDY (obstructive sleep apnea) 09/11/2023   • Continuous opioid dependence ([address]) 05/23/2023   • Cellulitis of right lower extremity 04/10/2023   • Staph skin infection 12/01/2022   • Preoperative clearance 06/23/2021   • Acute on chronic systolic congestive heart failure ([address]) 05/04/2021   • Diabetes mellitus ([address])    • Lesion of nose 11/13/2020   • Type 2 diabetes mellitus, with long-term current use of insulin ([address]) 07/06/2020   • Obstructive sleep apnea 07/06/2020   • Aftercare following surgery of the musculoskeletal system 06/15/2020   • Impingement syndrome of left shoulder 01/14/2020   • Adhesive capsulitis of both shoulders 01/14/2020   • Chronic left shoulder pain 08/21/2019   • Healthcare maintenance 04/10/2019   • Morbid obesity with body mass index of 45.0-49.9 in adult [facility]) 11/29/2018   • Low back pain 10/22/2018   • Dysuria 07/24/2018   • Lumbar radiculopathy 12/29/2017   • Myofascial pain syndrome 12/29/2017   • Sacroiliitis ([address]) 12/29/2017   • Chest pain 08/02/2016   • Spondylosis of lumbar region without myelopathy or radiculopathy 04/15/2014   • Benign essential hypertension 10/16/2012   • Coronary artery disease 06/14/2012   • Chronic obstructive pulmonary disease ([address]) 06/14/2012     He  reports that he quit smoking about 9 years ago. His smoking use included cigarettes. He started smoking about 47 years ago. He has a 57.00 pack-year smoking history. He has never used smokeless tobacco. He reports that he does not drink alcohol and does not use drugs. Current Outpatient Medications   Medication Sig Dispense Refill   • acetaminophen (TYLENOL) 325 mg tablet Take 650 mg by mouth every 6 (six) hours as needed for mild pain     • albuterol (PROVENTIL HFA,VENTOLIN HFA) 90 mcg/act inhaler Inhale 2 puffs as needed for shortness of breath 18 g 1   • Aspirin Low Dose 81 MG EC tablet TAKE 1 TABLET BY MOUTH EVERY DAY 90 tablet 1   • atorvastatin (LIPITOR) 40 mg tablet TAKE 1 TABLET BY MOUTH EVERY DAY 90 tablet 2   • clopidogrel (PLAVIX) 75 mg tablet Take 1 tablet (75 mg total) by mouth daily 90 tablet 0   • fenofibrate (TRICOR) 145 mg tablet TAKE 1 TABLET BY MOUTH EVERY DAY 90 tablet 1   • furosemide (LASIX) 40 mg tablet      • HYDROcodone-acetaminophen (Vicodin) 5-300 MG per tablet Take 1 tablet by mouth every 6 (six) hours as needed for moderate pain Max Daily Amount: 4 tablets 112 tablet 0   • Icosapent Ethyl 1 g CAPS Vascepa 1 gram capsule   TAKE 2 G BY MOUTH 2 (TWO) TIMES A DAY.      • Insulin Pen Needle (B-D UF III MINI PEN NEEDLES) 31G X 5 MM MISC Inject as directed 2 (two) times a day Use as directed 100 each 0   • itraconazole (SPORANOX) 100 mg capsule PLEASE SEE ATTACHED FOR DETAILED DIRECTIONS     • Jardiance 25 MG TABS TAKE 1 TABLET BY MOUTH EVERY DAY 90 tablet 1   • losartan (COZAAR) 25 mg tablet Take 1 tablet (25 mg total) by mouth daily 90 tablet 3   • metoprolol tartrate (LOPRESSOR) 50 mg tablet Take 25 mg by mouth 2 (two) times a day     • nitroglycerin (NITROSTAT) 0.4 mg SL tablet PLEASE SEE ATTACHED FOR DETAILED DIRECTIONS     • pantoprazole (PROTONIX) 40 mg tablet TAKE 1 TABLET BY MOUTH EVERY DAY 90 tablet 5   • Semglee, yfgn, 100 UNIT/ML SOPN PLEASE SEE ATTACHED FOR DETAILED DIRECTIONS     • Sulfacetamide Sodium, Acne, 10 % LOTN Apply topically 2 (two) times a day 118 mL 0     No current facility-administered medications for this visit. He is allergic to bactrim [sulfamethoxazole-trimethoprim], cefaclor, and simvastatin. .    Review of Systems   Constitutional: Negative for chills and fever. HENT: Negative for congestion and sneezing. Respiratory: Negative for cough. Cardiovascular: Positive for leg swelling. Skin: Positive for wound. Psychiatric/Behavioral: Negative for agitation. Objective:       Wound 09/05/23 Venous Ulcer Leg Right; Anterior (Active)   Wound Image Images linked 09/27/23 1052   Wound Description Epithelialization;Dry;Eschar;Brown;Pink 09/27/23 1100   Bridget-wound Assessment Dry;Scaly;Edema 09/27/23 1100   Wound Length (cm) 20 cm 09/27/23 1100   Wound Width (cm) 15 cm 09/27/23 1100   Wound Depth (cm) 0.1 cm 09/27/23 1100   Wound Surface Area (cm^2) 300 cm^2 09/27/23 1100   Wound Volume (cm^3) 30 cm^3 09/27/23 1100   Calculated Wound Volume (cm^3) 30 cm^3 09/27/23 1100   Change in Wound Size % -9.89 09/27/23 1100   Drainage Amount Moderate 09/27/23 1100   Drainage Description Serosanguineous 09/27/23 1100   Non-staged Wound Description Full thickness 09/27/23 1100   Dressing Status Intact 09/27/23 1100       Wound 09/05/23 Venous Ulcer Leg Left (Active)   Wound Image Images linked 09/27/23 1052   Wound Description Epithelialization;Dry;Pink 09/27/23 1100   Bridget-wound Assessment Intact;Scaly;Edema 09/27/23 1100   Wound Length (cm) 8.2 cm 09/27/23 1100   Wound Width (cm) 4.5 cm 09/27/23 1100   Wound Depth (cm) 0.1 cm 09/27/23 1100   Wound Surface Area (cm^2) 36.9 cm^2 09/27/23 1100   Wound Volume (cm^3) 3.69 cm^3 09/27/23 1100   Calculated Wound Volume (cm^3) 3.69 cm^3 09/27/23 1100   Change in Wound Size % 45.41 09/27/23 1100   Drainage Amount Small 09/27/23 1100   Drainage Description Serosanguineous 09/27/23 1100   Non-staged Wound Description Full thickness 09/27/23 1100 Dressing Status Intact 09/27/23 1100       /72   Pulse 70   Temp 97.6 °F (36.4 °C)   Resp 18     Physical Exam  Vitals reviewed. Constitutional:       General: He is not in acute distress. Appearance: Normal appearance. He is morbidly obese. He is not ill-appearing, toxic-appearing or diaphoretic. HENT:      Head: Normocephalic and atraumatic. Right Ear: External ear normal.      Left Ear: External ear normal.   Eyes:      Conjunctiva/sclera: Conjunctivae normal.   Pulmonary:      Effort: Pulmonary effort is normal. No respiratory distress. Musculoskeletal:      Cervical back: Neck supple. Right lower leg: Edema present. Left lower leg: Edema present. Skin:     Comments: See wound assessment   Neurological:      Mental Status: He is alert. Psychiatric:         Mood and Affect: Mood normal.         Behavior: Behavior normal.           Wound 09/05/23 Venous Ulcer Leg Right; Anterior (Active)   Wound Image    09/27/23 1052   Wound Description Epithelialization;Dry;Eschar;Brown;Pink 09/27/23 1100   Bridget-wound Assessment Dry;Scaly;Edema 09/27/23 1100   Wound Length (cm) 20 cm 09/27/23 1100   Wound Width (cm) 15 cm 09/27/23 1100   Wound Depth (cm) 0.1 cm 09/27/23 1100   Wound Surface Area (cm^2) 300 cm^2 09/27/23 1100   Wound Volume (cm^3) 30 cm^3 09/27/23 1100   Calculated Wound Volume (cm^3) 30 cm^3 09/27/23 1100   Change in Wound Size % -9.89 09/27/23 1100   Drainage Amount Moderate 09/27/23 1100   Drainage Description Serosanguineous 09/27/23 1100   Non-staged Wound Description Full thickness 09/27/23 1100   Patient Tolerance Tolerated well 09/20/23 0944   Dressing Status Intact 09/27/23 1100       Wound 09/05/23 Venous Ulcer Leg Left (Active)   Wound Image   09/27/23 1052   Wound Description Epithelialization;Dry;Pink 09/27/23 1100   Bridget-wound Assessment Intact;Scaly;Edema 09/27/23 1100   Wound Length (cm) 8.2 cm 09/27/23 1100   Wound Width (cm) 4.5 cm 09/27/23 1100   Wound Depth (cm) 0.1 cm 09/27/23 1100   Wound Surface Area (cm^2) 36.9 cm^2 09/27/23 1100   Wound Volume (cm^3) 3.69 cm^3 09/27/23 1100   Calculated Wound Volume (cm^3) 3.69 cm^3 09/27/23 1100   Change in Wound Size % 45.41 09/27/23 1100   Drainage Amount Small 09/27/23 1100   Drainage Description Serosanguineous 09/27/23 1100   Non-staged Wound Description Full thickness 09/27/23 1100   Patient Tolerance Tolerated well 09/20/23 0943   Dressing Status Intact 09/27/23 1100                         Wound Instructions:  Orders Placed This Encounter   Procedures   • Wound cleansing and dressings     Wound location: Right lower leg  Change dressing Weekly at wound center. Sponge bathe only, do not shower at this time. Wound cleansed with Hibiclens and water. Patted dry. Applied Betamethasone to arlet wound  Applied Opticell Ag to cover wounds. Covered with gauze    Wound location: Left lower leg  Change dressing Weekly at wound center. Sponge bathe only, do not shower at this time. Wound cleansed with Hibiclens and water. Patted dry. Applied Betamethasone to arlet wound  Applied Dermagran cut to fit to cover wounds. Covered with gauze    This was applied today at the [facility]. Standing Status:   Future     Standing Expiration Date:   9/27/2024   • Wound compression and edema control     Unna Boot compression wrap Instructions:  Keep compression wrap/wraps clean and dry. If wraps are too tight and you experience numbness/tingling,  call the wound center. If after hours, remove wraps or proceed to nearest E.R. and call wound center in AM.  Kamini Radha will be changed weekly at [facility]. Avoid prolonged standing in one place. Elevate leg(s) above the level of the heart when sitting or as much as possible. This was applied today. Standing Status:   Future     Standing Expiration Date:   9/27/2024        Diagnosis ICD-10-CM Associated Orders   1.  Chronic venous hypertension (idiopathic) with ulcer of left lower extremity (CODE) ([facility])  I87.312 Wound cleansing and dressings     Wound compression and edema control      2. Chronic venous hypertension (idiopathic) with ulcer of right lower extremity (CODE) ([facility])  I87.311 Wound cleansing and dressings     Wound compression and edema control      3. Non-pressure chronic ulcer of left lower leg, limited to breakdown of skin ([facility])  L97.921 Wound cleansing and dressings     Wound compression and edema control      4.  Non-pressure chronic ulcer of right lower leg, limited to breakdown of skin ([facility])  L97.911 Wound cleansing and dressings     Wound compression and edema control

## 2023-10-03 NOTE — PROGRESS NOTES
Cast Application    Date/Time: 9/27/2023 10:45 AM    Performed by: Dinorah Montes RN  Authorized by: DO Cathleen Obrien Protocol:  Consent: Verbal consent obtained. Risks and benefits: risks, benefits and alternatives were discussed  Consent given by: patient  Patient understanding: patient states understanding of the procedure being performed  Patient identity confirmed: verbally with patient      Pre-procedure details:     Sensation:  Normal  Procedure details:     Laterality:  Bilateral    Location:  Leg    Leg:  L lower leg and R lower legCast type:  Unna boot    Post-procedure details:     Pain:  Improved    Sensation:  Normal    Patient tolerance of procedure: Tolerated well, no immediate complications  Comments:      UNNA BOOT wrap procedure     Before application, JOSIANE and/or TBI determined to be adequate for healing and application of compression. Lower extremity washed prior to application of compression wrap. With the foot in dorsiflexed position, Unna boot was applied as per physician orders without complications or complaint of pain.  The procedure was tolerated well. Toes warm & pink post application.  Patient provided education & reinforced to observe toes for any discoloration, swelling or tingling and instructed when to report to the  [address] or to remove compression. Wound care as per providers orders, patient tolerated well.

## 2023-10-04 ENCOUNTER — OFFICE VISIT (OUTPATIENT)
Dept: WOUND CARE | Facility: HOSPITAL | Age: 63
End: 2023-10-04
Payer: COMMERCIAL

## 2023-10-04 VITALS
HEART RATE: 76 BPM | RESPIRATION RATE: 20 BRPM | DIASTOLIC BLOOD PRESSURE: 70 MMHG | TEMPERATURE: 96 F | SYSTOLIC BLOOD PRESSURE: 164 MMHG

## 2023-10-04 DIAGNOSIS — L97.911 NON-PRESSURE CHRONIC ULCER OF RIGHT LOWER LEG, LIMITED TO BREAKDOWN OF SKIN (HCC): ICD-10-CM

## 2023-10-04 DIAGNOSIS — L97.921 NON-PRESSURE CHRONIC ULCER OF LEFT LOWER LEG, LIMITED TO BREAKDOWN OF SKIN (HCC): ICD-10-CM

## 2023-10-04 DIAGNOSIS — I87.312 CHRONIC VENOUS HYPERTENSION (IDIOPATHIC) WITH ULCER OF LEFT LOWER EXTREMITY (CODE) (HCC): Primary | ICD-10-CM

## 2023-10-04 DIAGNOSIS — I87.311 CHRONIC VENOUS HYPERTENSION (IDIOPATHIC) WITH ULCER OF RIGHT LOWER EXTREMITY (CODE) (HCC): ICD-10-CM

## 2023-10-04 PROCEDURE — 29580 STRAPPING UNNA BOOT: CPT

## 2023-10-04 PROCEDURE — 99213 OFFICE O/P EST LOW 20 MIN: CPT | Performed by: FAMILY MEDICINE

## 2023-10-04 RX ORDER — EZETIMIBE 10 MG/1
10 TABLET ORAL DAILY
COMMUNITY
Start: 2023-10-04

## 2023-10-04 RX ORDER — LIDOCAINE 40 MG/G
CREAM TOPICAL ONCE
Status: COMPLETED | OUTPATIENT
Start: 2023-10-04 | End: 2023-10-04

## 2023-10-04 RX ADMIN — LIDOCAINE: 40 CREAM TOPICAL at 09:45

## 2023-10-04 NOTE — PROGRESS NOTES
Patient ID: Tushar Armstrong is a 58 y.o. male Date of Birth 1960     Chief Complaint  Chief Complaint   Patient presents with   • Follow Up Wound Care Visit     Bilat LE wounds with unnaboot       Allergies  Bactrim [sulfamethoxazole-trimethoprim], Cefaclor, and Simvastatin    Assessment:    No problem-specific Assessment & Plan notes found for this encounter. Diagnoses and all orders for this visit:    Chronic venous hypertension (idiopathic) with ulcer of left lower extremity (CODE) (HCC)  -     lidocaine (LMX) 4 % cream  -     Wound cleansing and dressings; Future  -     Wound compression and edema control; Future  -     Cast Application    Chronic venous hypertension (idiopathic) with ulcer of right lower extremity (CODE) (HCC)  -     lidocaine (LMX) 4 % cream  -     Wound cleansing and dressings; Future  -     Wound compression and edema control; Future  -     Cast Application    Non-pressure chronic ulcer of left lower leg, limited to breakdown of skin (HCC)  -     lidocaine (LMX) 4 % cream  -     Wound cleansing and dressings; Future  -     Wound compression and edema control; Future  -     Cast Application    Non-pressure chronic ulcer of right lower leg, limited to breakdown of skin (HCC)  -     lidocaine (LMX) 4 % cream  -     Wound cleansing and dressings; Future  -     Wound compression and edema control; Future  -     Cast Application    Other orders  -     ezetimibe (ZETIA) 10 mg tablet; Take 10 mg by mouth daily              Procedures    Plan:  Wounds are improved  No clinical signs of infection  No debridement today  Continue wound management as below  Continue compression with Unna boots  Discussed the importance of long-term edema control once these wounds are healed.   Patient does not typically wear any type of compression  CircAid's ordered today  Keep legs elevated whenever seated and avoid prolonged standing  Follow-up in 1 week or call sooner with questions or concerns    Wound 09/05/23 Venous Ulcer Leg Right; Anterior (Active)   Wound Image Images linked 10/04/23 0929   Wound Description Epithelialization;Dry;Eschar;Brown;Pink 10/04/23 0938   Bridget-wound Assessment Dry;Scaly;Edema 10/04/23 0938   Wound Length (cm) 11.8 cm 10/04/23 0938   Wound Width (cm) 17.5 cm 10/04/23 0938   Wound Depth (cm) 0.1 cm 10/04/23 0938   Wound Surface Area (cm^2) 206.5 cm^2 10/04/23 0938   Wound Volume (cm^3) 20.65 cm^3 10/04/23 0938   Calculated Wound Volume (cm^3) 20.65 cm^3 10/04/23 0938   Change in Wound Size % 24.36 10/04/23 0938   Drainage Amount Small 10/04/23 0938   Drainage Description Serosanguineous 10/04/23 0938   Non-staged Wound Description Full thickness 10/04/23 0938   Dressing Status Intact 10/04/23 0938       Wound 09/05/23 Venous Ulcer Leg Left (Active)   Wound Image Images linked 10/04/23 0929   Wound Description Epithelialization;Dry;Pink;Brown;Eschar 10/04/23 0939   Bridget-wound Assessment Intact;Scaly;Edema 10/04/23 0939   Wound Length (cm) 10.5 cm 10/04/23 0939   Wound Width (cm) 4 cm 10/04/23 0939   Wound Depth (cm) 0.1 cm 10/04/23 0939   Wound Surface Area (cm^2) 42 cm^2 10/04/23 0939   Wound Volume (cm^3) 4.2 cm^3 10/04/23 0939   Calculated Wound Volume (cm^3) 4.2 cm^3 10/04/23 0939   Change in Wound Size % 37.87 10/04/23 0939   Drainage Amount Small 10/04/23 0939   Drainage Description Serosanguineous 10/04/23 0939   Non-staged Wound Description Full thickness 10/04/23 0939   Dressing Status Intact 10/04/23 0939       Wound 09/05/23 Venous Ulcer Leg Right; Anterior (Active)   Date First Assessed/Time First Assessed: 09/05/23 1401   Present on Original Admission: Yes  Primary Wound Type: Venous Ulcer  Location: Leg  Wound Location Orientation: Right; Anterior       Wound 09/05/23 Venous Ulcer Leg Left (Active)   Date First Assessed/Time First Assessed: 09/05/23 1401   Present on Original Admission: Yes  Primary Wound Type: Venous Ulcer  Location: Leg  Wound Location Orientation: Left [REMOVED] Wound 06/10/20 Shoulder Left (Removed)   Resolved Date: 09/05/23  Date First Assessed/Time First Assessed: 06/10/20 1051   Location: Shoulder  Wound Location Orientation: Left  Wound Description (Comments): ARM SLING  Incision's 1st Dressing: DRESSING MEPILEX AG BORDER 4 X 4 IN  Wound Outcom. .. Subjective:      . Patient presents for follow-up of bilateral lower extremity venous ulcers. No increased pain or drainage. Has had Dermagran on the left lower extremity wound and Opticell Ag on the right lower extremity wound. Has had Unna boots in place bilaterally for compression. The following portions of the patient's history were reviewed and updated as appropriate: He  has a past medical history of Abscess of left thigh, Acute myocardial infarction [facility]), Anesthesia, Anxiety, Arteriosclerotic cardiovascular disease, Arthritis, Asthma, Chest pain, COPD (chronic obstructive pulmonary disease) ([address]), Coronary artery disease, Diabetes mellitus ([address]), Fatty liver, Gastric ulcer, GERD (gastroesophageal reflux disease), Hyperlipidemia, Hypertension, Low back pain, Myocardial infarction [facility]) ([id number]), Obesity, Obstructive sleep apnea, and Spondylosis of lumbar region without myelopathy or radiculopathy.   He   Patient Active Problem List    Diagnosis Date Noted   • FREDY (obstructive sleep apnea) 09/11/2023   • Continuous opioid dependence ([address]) 05/23/2023   • Cellulitis of right lower extremity 04/10/2023   • Staph skin infection 12/01/2022   • Preoperative clearance 06/23/2021   • Acute on chronic systolic congestive heart failure ([address]) 05/04/2021   • Diabetes mellitus ([address])    • Lesion of nose 11/13/2020   • Type 2 diabetes mellitus, with long-term current use of insulin ([address]) 07/06/2020   • Obstructive sleep apnea 07/06/2020   • Aftercare following surgery of the musculoskeletal system 06/15/2020   • Impingement syndrome of left shoulder 01/14/2020   • Adhesive capsulitis of both shoulders 01/14/2020   • Chronic left shoulder pain 08/21/2019   • Healthcare maintenance 04/10/2019   • Morbid obesity with body mass index of 45.0-49.9 in adult [facility]) 11/29/2018   • Low back pain 10/22/2018   • Dysuria 07/24/2018   • Lumbar radiculopathy 12/29/2017   • Myofascial pain syndrome 12/29/2017   • Sacroiliitis ([address]) 12/29/2017   • Chest pain 08/02/2016   • Spondylosis of lumbar region without myelopathy or radiculopathy 04/15/2014   • Benign essential hypertension 10/16/2012   • Coronary artery disease 06/14/2012   • Chronic obstructive pulmonary disease ([address]) 06/14/2012     He  reports that he quit smoking about 9 years ago. His smoking use included cigarettes. He started smoking about 47 years ago. He has a 57.00 pack-year smoking history. He has never used smokeless tobacco. He reports that he does not drink alcohol and does not use drugs. Current Outpatient Medications   Medication Sig Dispense Refill   • ezetimibe (ZETIA) 10 mg tablet Take 10 mg by mouth daily     • acetaminophen (TYLENOL) 325 mg tablet Take 650 mg by mouth every 6 (six) hours as needed for mild pain     • albuterol (PROVENTIL HFA,VENTOLIN HFA) 90 mcg/act inhaler Inhale 2 puffs as needed for shortness of breath 18 g 1   • Aspirin Low Dose 81 MG EC tablet TAKE 1 TABLET BY MOUTH EVERY DAY 90 tablet 1   • atorvastatin (LIPITOR) 40 mg tablet TAKE 1 TABLET BY MOUTH EVERY DAY 90 tablet 2   • clopidogrel (PLAVIX) 75 mg tablet Take 1 tablet (75 mg total) by mouth daily 90 tablet 0   • fenofibrate (TRICOR) 145 mg tablet TAKE 1 TABLET BY MOUTH EVERY DAY 90 tablet 1   • furosemide (LASIX) 40 mg tablet      • HYDROcodone-acetaminophen (Vicodin) 5-300 MG per tablet Take 1 tablet by mouth every 6 (six) hours as needed for moderate pain Max Daily Amount: 4 tablets 112 tablet 0   • Icosapent Ethyl 1 g CAPS Vascepa 1 gram capsule   TAKE 2 G BY MOUTH 2 (TWO) TIMES A DAY.      • Insulin Pen Needle (B-D UF III MINI PEN NEEDLES) 31G X 5 MM MISC Inject as directed 2 (two) times a day Use as directed 100 each 0   • itraconazole (SPORANOX) 100 mg capsule PLEASE SEE ATTACHED FOR DETAILED DIRECTIONS     • Jardiance 25 MG TABS TAKE 1 TABLET BY MOUTH EVERY DAY 90 tablet 1   • losartan (COZAAR) 25 mg tablet Take 1 tablet (25 mg total) by mouth daily 90 tablet 3   • metoprolol tartrate (LOPRESSOR) 50 mg tablet Take 25 mg by mouth 2 (two) times a day     • nitroglycerin (NITROSTAT) 0.4 mg SL tablet PLEASE SEE ATTACHED FOR DETAILED DIRECTIONS     • pantoprazole (PROTONIX) 40 mg tablet TAKE 1 TABLET BY MOUTH EVERY DAY 90 tablet 5   • Semglee, yfgn, 100 UNIT/ML SOPN PLEASE SEE ATTACHED FOR DETAILED DIRECTIONS     • Sulfacetamide Sodium, Acne, 10 % LOTN Apply topically 2 (two) times a day 118 mL 0     No current facility-administered medications for this visit. He is allergic to bactrim [sulfamethoxazole-trimethoprim], cefaclor, and simvastatin. .    Review of Systems   Constitutional: Negative for chills and fever. HENT: Negative for congestion and sneezing. Respiratory: Negative for cough. Cardiovascular: Positive for leg swelling. Skin: Positive for wound. Psychiatric/Behavioral: Negative for agitation. Objective:       Wound 09/05/23 Venous Ulcer Leg Right; Anterior (Active)   Wound Image Images linked 10/04/23 0929   Wound Description Epithelialization;Dry;Eschar;Brown;Pink 10/04/23 0938   Bridget-wound Assessment Dry;Scaly;Edema 10/04/23 0938   Wound Length (cm) 11.8 cm 10/04/23 0938   Wound Width (cm) 17.5 cm 10/04/23 0938   Wound Depth (cm) 0.1 cm 10/04/23 0938   Wound Surface Area (cm^2) 206.5 cm^2 10/04/23 0938   Wound Volume (cm^3) 20.65 cm^3 10/04/23 0938   Calculated Wound Volume (cm^3) 20.65 cm^3 10/04/23 0938   Change in Wound Size % 24.36 10/04/23 0938   Drainage Amount Small 10/04/23 0938   Drainage Description Serosanguineous 10/04/23 0938   Non-staged Wound Description Full thickness 10/04/23 0938   Dressing Status Intact 10/04/23 9978 Wound 09/05/23 Venous Ulcer Leg Left (Active)   Wound Image Images linked 10/04/23 0929   Wound Description Epithelialization;Dry;Pink;Brown;Eschar 10/04/23 0939   Bridget-wound Assessment Intact;Scaly;Edema 10/04/23 0939   Wound Length (cm) 10.5 cm 10/04/23 0939   Wound Width (cm) 4 cm 10/04/23 0939   Wound Depth (cm) 0.1 cm 10/04/23 0939   Wound Surface Area (cm^2) 42 cm^2 10/04/23 0939   Wound Volume (cm^3) 4.2 cm^3 10/04/23 0939   Calculated Wound Volume (cm^3) 4.2 cm^3 10/04/23 0939   Change in Wound Size % 37.87 10/04/23 0939   Drainage Amount Small 10/04/23 0939   Drainage Description Serosanguineous 10/04/23 0939   Non-staged Wound Description Full thickness 10/04/23 0939   Dressing Status Intact 10/04/23 0939       /70   Pulse 76   Temp (!) 96 °F (35.6 °C)   Resp 20     Physical Exam  Vitals reviewed. Constitutional:       General: He is not in acute distress. Appearance: Normal appearance. He is obese. He is not ill-appearing, toxic-appearing or diaphoretic. HENT:      Head: Normocephalic and atraumatic. Right Ear: External ear normal.      Left Ear: External ear normal.   Eyes:      Conjunctiva/sclera: Conjunctivae normal.   Pulmonary:      Effort: No respiratory distress. Musculoskeletal:      Cervical back: Neck supple. Right lower leg: Edema present. Left lower leg: Edema present. Skin:     Comments: See wound assessment   Neurological:      Mental Status: He is alert. Psychiatric:         Mood and Affect: Mood normal.         Behavior: Behavior normal.           Wound 09/05/23 Venous Ulcer Leg Right; Anterior (Active)   Wound Image   10/04/23 0929   Wound Description Epithelialization;Dry;Eschar;Brown;Pink 10/04/23 0938   Bridget-wound Assessment Dry;Scaly;Edema 10/04/23 0938   Wound Length (cm) 11.8 cm 10/04/23 0938   Wound Width (cm) 17.5 cm 10/04/23 0938   Wound Depth (cm) 0.1 cm 10/04/23 0938   Wound Surface Area (cm^2) 206.5 cm^2 10/04/23 0938   Wound Volume (cm^3) 20.65 cm^3 10/04/23 0938   Calculated Wound Volume (cm^3) 20.65 cm^3 10/04/23 0938   Change in Wound Size % 24.36 10/04/23 0938   Drainage Amount Small 10/04/23 0938   Drainage Description Serosanguineous 10/04/23 0938   Non-staged Wound Description Full thickness 10/04/23 0938   Patient Tolerance Tolerated well 09/20/23 0944   Dressing Status Intact 10/04/23 0938       Wound 09/05/23 Venous Ulcer Leg Left (Active)   Wound Image   10/04/23 0929   Wound Description Epithelialization;Dry;Pink;Brown;Eschar 10/04/23 0939   Arlet-wound Assessment Intact;Scaly;Edema 10/04/23 0939   Wound Length (cm) 10.5 cm 10/04/23 0939   Wound Width (cm) 4 cm 10/04/23 0939   Wound Depth (cm) 0.1 cm 10/04/23 0939   Wound Surface Area (cm^2) 42 cm^2 10/04/23 0939   Wound Volume (cm^3) 4.2 cm^3 10/04/23 0939   Calculated Wound Volume (cm^3) 4.2 cm^3 10/04/23 0939   Change in Wound Size % 37.87 10/04/23 0939   Drainage Amount Small 10/04/23 0939   Drainage Description Serosanguineous 10/04/23 0939   Non-staged Wound Description Full thickness 10/04/23 0939   Patient Tolerance Tolerated well 09/20/23 0943   Dressing Status Intact 10/04/23 0939                         Wound Instructions:  Orders Placed This Encounter   Procedures   • Wound cleansing and dressings     Wound location: Right lower leg  Change dressing Weekly at wound center. Sponge bathe only, do not shower at this time. Wound cleansed with Hibiclens and water. Patted dry. Applied Betamethasone to arlet wound  Applied Opticell Ag to cover wounds. Covered with gauze     Wound location: Left lower leg  Change dressing Weekly at wound center. Sponge bathe only, do not shower at this time. Wound cleansed with Hibiclens and water. Patted dry. Applied Betamethasone to arlet wound  Applied Dermagran cut to fit to cover wounds. Covered with gauze     This was applied today at the [facility].             Standing Status:   Future     Standing Expiration Date:   10/4/2024   • Wound compression and edema control     Unna Boot compression wrap Instructions: to both legs  Keep compression wrap/wraps clean and dry. If wraps are too tight and you experience numbness/tingling,  call the wound center. If after hours, remove wraps or proceed to nearest E.R. and call wound center in AM.  Kavita Eng will be changed weekly at [facility]. Avoid prolonged standing in one place. Elevate leg(s) above the level of the heart when sitting or as much as possible. This was applied today. Your legs were measured today and Circaids were ordered for you. You will get a call from [employer] in regards to Circaids. [employer] ph      Standing Status:   Future     Standing Expiration Date:   72/2/0558   • Cast Application     This order was created via procedure documentation        Diagnosis ICD-10-CM Associated Orders   1. Chronic venous hypertension (idiopathic) with ulcer of left lower extremity (CODE) ([facility])  I87.312 lidocaine (LMX) 4 % cream     Wound cleansing and dressings     Wound compression and edema control     Cast Application      2. Chronic venous hypertension (idiopathic) with ulcer of right lower extremity (CODE) ([facility])  I87.311 lidocaine (LMX) 4 % cream     Wound cleansing and dressings     Wound compression and edema control     Cast Application      3. Non-pressure chronic ulcer of left lower leg, limited to breakdown of skin ([facility])  L97.921 lidocaine (LMX) 4 % cream     Wound cleansing and dressings     Wound compression and edema control     Cast Application      4.  Non-pressure chronic ulcer of right lower leg, limited to breakdown of skin ([facility])  L97.911 lidocaine (LMX) 4 % cream     Wound cleansing and dressings     Wound compression and edema control     Cast Application

## 2023-10-04 NOTE — PATIENT INSTRUCTIONS
Orders Placed This Encounter   Procedures    Wound cleansing and dressings     Wound location: Right lower leg  Change dressing Weekly at wound center. Sponge bathe only, do not shower at this time. Wound cleansed with Hibiclens and water. Patted dry. Applied Betamethasone to arlet wound  Applied Opticell Ag to cover wounds. Covered with gauze     Wound location: Left lower leg  Change dressing Weekly at wound center. Sponge bathe only, do not shower at this time. Wound cleansed with Hibiclens and water. Patted dry. Applied Betamethasone to arlet wound  Applied Dermagran cut to fit to cover wounds. Covered with gauze     This was applied today at the [facility]. Standing Status:   Future     Standing Expiration Date:   10/4/2024    Wound compression and edema control     Unna Boot compression wrap Instructions: to both legs  Keep compression wrap/wraps clean and dry. If wraps are too tight and you experience numbness/tingling,  call the wound center. If after hours, remove wraps or proceed to nearest E.R. and call wound center in AM.  Matt David will be changed weekly at [facility]. Avoid prolonged standing in one place. Elevate leg(s) above the level of the heart when sitting or as much as possible. This was applied today. Your legs were measured today and Circaids were ordered for you. You will get a call from [employer] in regards to Circaids.     [employer] ph      Standing Status:   Future     Standing Expiration Date:   10/4/2024

## 2023-10-04 NOTE — PROGRESS NOTES
Cast Application    Date/Time: 10/4/2023 9:00 AM    Performed by: Abbey Kothari RN  Authorized by: Derek Sewell DO  Universal Protocol:  Consent: Verbal consent obtained. Risks and benefits: risks, benefits and alternatives were discussed  Consent given by: patient  Patient understanding: patient states understanding of the procedure being performed  Patient identity confirmed: verbally with patient      Pre-procedure details:     Sensation:  Normal  Procedure details:     Laterality:  Bilateral    Location:  Leg    Leg:  L lower leg and R lower legCast type:  Unna boot      Supplies:  2 layer wrap  Post-procedure details:     Pain:  Improved    Sensation:  Normal    Patient tolerance of procedure: Tolerated well, no immediate complications  Comments:      UNNA BOOT wrap procedure     Before application, JOSIANE and/or TBI determined to be adequate for healing and application of compression. Lower extremity washed prior to application of compression wrap. With the foot in dorsiflexed position, Unna boot was applied as per physician orders without complications or complaint of pain.  The procedure was tolerated well. Toes warm & pink post application.  Patient provided education & reinforced to observe toes for any discoloration, swelling or tingling and instructed when to report to the  [address] or to remove compression. Wound care as per providers orders, patient tolerated well.

## 2023-10-05 ENCOUNTER — VBI (OUTPATIENT)
Dept: ADMINISTRATIVE | Facility: OTHER | Age: 63
End: 2023-10-05

## 2023-10-05 DIAGNOSIS — Z79.4 TYPE 2 DIABETES MELLITUS WITH OTHER CIRCULATORY COMPLICATION, WITH LONG-TERM CURRENT USE OF INSULIN (HCC): ICD-10-CM

## 2023-10-05 DIAGNOSIS — E11.59 TYPE 2 DIABETES MELLITUS WITH OTHER CIRCULATORY COMPLICATION, WITH LONG-TERM CURRENT USE OF INSULIN (HCC): ICD-10-CM

## 2023-10-05 RX ORDER — FLURBIPROFEN SODIUM 0.3 MG/ML
SOLUTION/ DROPS OPHTHALMIC 2 TIMES DAILY
Qty: 100 EACH | Refills: 0 | Status: SHIPPED | OUTPATIENT
Start: 2023-10-05

## 2023-10-09 DIAGNOSIS — E11.59 TYPE 2 DIABETES MELLITUS WITH OTHER CIRCULATORY COMPLICATION, WITH LONG-TERM CURRENT USE OF INSULIN (HCC): ICD-10-CM

## 2023-10-09 DIAGNOSIS — Z79.4 TYPE 2 DIABETES MELLITUS WITH OTHER CIRCULATORY COMPLICATION, WITH LONG-TERM CURRENT USE OF INSULIN (HCC): ICD-10-CM

## 2023-10-09 RX ORDER — CLOPIDOGREL BISULFATE 75 MG/1
75 TABLET ORAL DAILY
Qty: 90 TABLET | Refills: 0 | Status: SHIPPED | OUTPATIENT
Start: 2023-10-09

## 2023-10-11 ENCOUNTER — OFFICE VISIT (OUTPATIENT)
Dept: WOUND CARE | Facility: HOSPITAL | Age: 63
End: 2023-10-11
Payer: COMMERCIAL

## 2023-10-11 VITALS
TEMPERATURE: 98.1 F | SYSTOLIC BLOOD PRESSURE: 154 MMHG | HEART RATE: 66 BPM | DIASTOLIC BLOOD PRESSURE: 72 MMHG | RESPIRATION RATE: 16 BRPM

## 2023-10-11 DIAGNOSIS — I87.312 CHRONIC VENOUS HYPERTENSION (IDIOPATHIC) WITH ULCER OF LEFT LOWER EXTREMITY (CODE) (HCC): Primary | ICD-10-CM

## 2023-10-11 DIAGNOSIS — I87.311 CHRONIC VENOUS HYPERTENSION (IDIOPATHIC) WITH ULCER OF RIGHT LOWER EXTREMITY (CODE) (HCC): ICD-10-CM

## 2023-10-11 DIAGNOSIS — L97.911 NON-PRESSURE CHRONIC ULCER OF RIGHT LOWER LEG, LIMITED TO BREAKDOWN OF SKIN (HCC): ICD-10-CM

## 2023-10-11 DIAGNOSIS — L97.921 NON-PRESSURE CHRONIC ULCER OF LEFT LOWER LEG, LIMITED TO BREAKDOWN OF SKIN (HCC): ICD-10-CM

## 2023-10-11 PROCEDURE — 99213 OFFICE O/P EST LOW 20 MIN: CPT | Performed by: FAMILY MEDICINE

## 2023-10-11 PROCEDURE — 29580 STRAPPING UNNA BOOT: CPT

## 2023-10-11 NOTE — PROGRESS NOTES
Cast Application    Date/Time: 10/11/2023 8:45 AM    Performed by: Shane Zepeda RN  Authorized by: DO Cathleen Valenzuela Protocol:  Consent: Verbal consent obtained. Risks and benefits: risks, benefits and alternatives were discussed  Consent given by: patient  Patient understanding: patient states understanding of the procedure being performed  Patient identity confirmed: verbally with patient    Pre-procedure details:     Sensation:  Normal  Procedure details:     Laterality:  Bilateral    Location:  Leg    Leg:  L lower leg and R lower legCast type:  Unna boot      Post-procedure details:     Pain:  Improved    Sensation:  Normal    Patient tolerance of procedure: Tolerated well, no immediate complications  Comments:      UNNA BOOT wrap procedure     Before application, JOSIANE and/or TBI determined to be adequate for healing and application of compression. Lower extremity washed prior to application of compression wrap. With the foot in dorsiflexed position, Unna boot was applied as per physician orders without complications or complaint of pain. The procedure was tolerated well. Toes warm & pink post application. Patient provided education & reinforced to observe toes for any discoloration, swelling or tingling and instructed when to report to the  [address] or to remove compression. Wound care as per providers orders, patient tolerated well.

## 2023-10-11 NOTE — PATIENT INSTRUCTIONS
Orders Placed This Encounter   Procedures    Wound cleansing and dressings     Wound location: BLE   Change dressing Weekly at wound center. You may shower with cast cover or sponge bathe, do not allow dressings to get wet. At wound center today:  Legs washed with hibiclens, rinsed and patted dry. Clotrimazole and betamethasone applied to affected area  Unfolded Dermagran gauze applied     Standing Status:   Future     Standing Expiration Date:   10/11/2024    Wound compression and edema control     Wound compression and edema control   Unna Boot compression wraps to BLE  Keep compression wrap/wraps clean and dry. If wraps are too tight and you experience numbness/tingling, call the wound center. If after hours, remove wraps or proceed to nearest E.R. and call wound center in AM.   Tiana Marroquin will be changed weekly at [facility]. Avoid prolonged standing in one place. Elevate leg(s) above the level of the heart when sitting or as much as possible. This was applied today.      Standing Status:   Future     Standing Expiration Date:   10/11/2024

## 2023-10-11 NOTE — PROGRESS NOTES
Patient ID: Hakeem Estrada is a 58 y.o. male Date of Birth 1960     Chief Complaint  Chief Complaint   Patient presents with    Follow Up Wound Care Visit     BLE wounds       Allergies  Bactrim [sulfamethoxazole-trimethoprim], Cefaclor, and Simvastatin    Assessment:    No problem-specific Assessment & Plan notes found for this encounter. Diagnoses and all orders for this visit:    Chronic venous hypertension (idiopathic) with ulcer of left lower extremity (CODE) ([facility])  -     Wound cleansing and dressings; Future  -     Wound compression and edema control; Future  -     Cast Application    Chronic venous hypertension (idiopathic) with ulcer of right lower extremity (CODE) ([facility])  -     Wound cleansing and dressings; Future  -     Wound compression and edema control; Future  -     Cast Application    Non-pressure chronic ulcer of left lower leg, limited to breakdown of skin (HCC)  -     Wound cleansing and dressings; Future  -     Wound compression and edema control; Future  -     Cast Application    Non-pressure chronic ulcer of right lower leg, limited to breakdown of skin (HCC)  -     Wound cleansing and dressings; Future  -     Wound compression and edema control; Future  -     Cast Application              Procedures    Plan:  Overall improved  Right lower extremity appears more dry  Change wound management on the right lower extremity to Dermagran and continue Durga Mongolian on the left lower extremity as well. See details below  Continue compression with Unna boots bilaterally  Keep legs elevated whenever seated and avoid prolonged standing  Follow-up in 1 week or call sooner with questions or concerns    Wound 09/05/23 Venous Ulcer Leg Right; Anterior (Active)   Wound Image Images linked 10/11/23 0803   Wound Description Epithelialization;Brown;Pink 10/11/23 1009   Bridget-wound Assessment Dry;Scaly;Edema 10/11/23 1009   Wound Length (cm) 9 cm 10/11/23 1009   Wound Width (cm) 20 cm 10/11/23 1009   Wound Depth (cm) 0.1 cm 10/11/23 1009   Wound Surface Area (cm^2) 180 cm^2 10/11/23 1009   Wound Volume (cm^3) 18 cm^3 10/11/23 1009   Calculated Wound Volume (cm^3) 18 cm^3 10/11/23 1009   Change in Wound Size % 34.07 10/11/23 1009   Drainage Amount Small 10/11/23 1009   Drainage Description Serosanguineous 10/11/23 1009   Non-staged Wound Description Full thickness 10/11/23 1009   Dressing Status Intact 10/11/23 1009       Wound 09/05/23 Venous Ulcer Leg Left (Active)   Wound Image Images linked 10/11/23 0900   Wound Description Epithelialization;Pink;Brown 10/11/23 1010   Bridget-wound Assessment Intact;Scaly;Edema 10/11/23 1010   Wound Length (cm) 11 cm 10/11/23 1010   Wound Width (cm) 5 cm 10/11/23 1010   Wound Depth (cm) 0.1 cm 10/11/23 1010   Wound Surface Area (cm^2) 55 cm^2 10/11/23 1010   Wound Volume (cm^3) 5.5 cm^3 10/11/23 1010   Calculated Wound Volume (cm^3) 5.5 cm^3 10/11/23 1010   Change in Wound Size % 18.64 10/11/23 1010   Drainage Amount Scant 10/11/23 1010   Drainage Description Serosanguineous 10/11/23 1010   Non-staged Wound Description Full thickness 10/11/23 1010   Dressing Status Intact 10/11/23 1010       Wound 09/05/23 Venous Ulcer Leg Right; Anterior (Active)   Date First Assessed/Time First Assessed: 09/05/23 1401   Present on Original Admission: Yes  Primary Wound Type: Venous Ulcer  Location: Leg  Wound Location Orientation: Right; Anterior       Wound 09/05/23 Venous Ulcer Leg Left (Active)   Date First Assessed/Time First Assessed: 09/05/23 1401   Present on Original Admission: Yes  Primary Wound Type: Venous Ulcer  Location: Leg  Wound Location Orientation: Left       [REMOVED] Wound 06/10/20 Shoulder Left (Removed)   Resolved Date: 09/05/23  Date First Assessed/Time First Assessed: 06/10/20 1051   Location: Shoulder  Wound Location Orientation: Left  Wound Description (Comments): ARM SLING  Incision's 1st Dressing: DRESSING MEPILEX AG BORDER 4 X 4 IN  Wound Outcom. ..        Subjective: .    Patient presents for follow-up of bilateral lower extremity venous ulcers. No increased pain or drainage. Has been using Dermagran on the left lower extremity and Opticell Ag on the right lower extremity. Using Unna boots bilaterally for compression. Patient reports that he just completed a course of oral antifungal, prescribed by dermatology. The following portions of the patient's history were reviewed and updated as appropriate: He  has a past medical history of Abscess of left thigh, Acute myocardial infarction [facility]), Anesthesia, Anxiety, Arteriosclerotic cardiovascular disease, Arthritis, Asthma, Chest pain, COPD (chronic obstructive pulmonary disease) ([address]), Coronary artery disease, Diabetes mellitus ([address]), Fatty liver, Gastric ulcer, GERD (gastroesophageal reflux disease), Hyperlipidemia, Hypertension, Low back pain, Myocardial infarction [facility]) ([phone]), Obesity, Obstructive sleep apnea, and Spondylosis of lumbar region without myelopathy or radiculopathy. He  reports that he quit smoking about 9 years ago. His smoking use included cigarettes. He started smoking about 47 years ago. He has a 57.00 pack-year smoking history. He has never used smokeless tobacco. He reports that he does not drink alcohol and does not use drugs. He is allergic to bactrim [sulfamethoxazole-trimethoprim], cefaclor, and simvastatin. .    Review of Systems   Constitutional:  Negative for chills and fever. HENT:  Negative for congestion and sneezing. Respiratory:  Negative for cough. Cardiovascular:  Positive for leg swelling. Skin:  Positive for wound. Psychiatric/Behavioral:  Negative for agitation. Objective:       Wound 09/05/23 Venous Ulcer Leg Right; Anterior (Active)   Wound Image Images linked 10/11/23 6599   Wound Description Epithelialization;Brown;Pink 10/11/23 1009   Bridget-wound Assessment Dry;Scaly;Edema 10/11/23 1009   Wound Length (cm) 9 cm 10/11/23 1009   Wound Width (cm) 20 cm 10/11/23 1009   Wound Depth (cm) 0.1 cm 10/11/23 1009   Wound Surface Area (cm^2) 180 cm^2 10/11/23 1009   Wound Volume (cm^3) 18 cm^3 10/11/23 1009   Calculated Wound Volume (cm^3) 18 cm^3 10/11/23 1009   Change in Wound Size % 34.07 10/11/23 1009   Drainage Amount Small 10/11/23 1009   Drainage Description Serosanguineous 10/11/23 1009   Non-staged Wound Description Full thickness 10/11/23 1009   Dressing Status Intact 10/11/23 1009       Wound 09/05/23 Venous Ulcer Leg Left (Active)   Wound Image Images linked 10/11/23 0900   Wound Description Epithelialization;Pink;Brown 10/11/23 1010   Bridget-wound Assessment Intact;Scaly;Edema 10/11/23 1010   Wound Length (cm) 11 cm 10/11/23 1010   Wound Width (cm) 5 cm 10/11/23 1010   Wound Depth (cm) 0.1 cm 10/11/23 1010   Wound Surface Area (cm^2) 55 cm^2 10/11/23 1010   Wound Volume (cm^3) 5.5 cm^3 10/11/23 1010   Calculated Wound Volume (cm^3) 5.5 cm^3 10/11/23 1010   Change in Wound Size % 18.64 10/11/23 1010   Drainage Amount Scant 10/11/23 1010   Drainage Description Serosanguineous 10/11/23 1010   Non-staged Wound Description Full thickness 10/11/23 1010   Dressing Status Intact 10/11/23 1010       /72   Pulse 66   Temp 98.1 °F (36.7 °C)   Resp 16     Physical Exam  Constitutional:       General: He is not in acute distress. Appearance: Normal appearance. He is obese. He is not ill-appearing, toxic-appearing or diaphoretic. HENT:      Head: Normocephalic and atraumatic. Right Ear: External ear normal.      Left Ear: External ear normal.   Eyes:      Conjunctiva/sclera: Conjunctivae normal.   Pulmonary:      Effort: Pulmonary effort is normal. No respiratory distress. Musculoskeletal:      Cervical back: Neck supple. Right lower leg: Edema present. Left lower leg: Edema present. Skin:     Comments: See wound assessment   Neurological:      Mental Status: He is alert.    Psychiatric:         Mood and Affect: Mood normal.         Behavior: Behavior normal.           Wound 09/05/23 Venous Ulcer Leg Right; Anterior (Active)   Wound Image     10/11/23 0859   Wound Description Epithelialization;Brown;Pink 10/11/23 1009   Bridget-wound Assessment Dry;Scaly;Edema 10/11/23 1009   Wound Length (cm) 9 cm 10/11/23 1009   Wound Width (cm) 20 cm 10/11/23 1009   Wound Depth (cm) 0.1 cm 10/11/23 1009   Wound Surface Area (cm^2) 180 cm^2 10/11/23 1009   Wound Volume (cm^3) 18 cm^3 10/11/23 1009   Calculated Wound Volume (cm^3) 18 cm^3 10/11/23 1009   Change in Wound Size % 34.07 10/11/23 1009   Drainage Amount Small 10/11/23 1009   Drainage Description Serosanguineous 10/11/23 1009   Non-staged Wound Description Full thickness 10/11/23 1009   Patient Tolerance Tolerated well 09/20/23 0944   Dressing Status Intact 10/11/23 1009       Wound 09/05/23 Venous Ulcer Leg Left (Active)   Wound Image   10/11/23 0900   Wound Description Epithelialization;Pink;Brown 10/11/23 1010   Bridget-wound Assessment Intact;Scaly;Edema 10/11/23 1010   Wound Length (cm) 11 cm 10/11/23 1010   Wound Width (cm) 5 cm 10/11/23 1010   Wound Depth (cm) 0.1 cm 10/11/23 1010   Wound Surface Area (cm^2) 55 cm^2 10/11/23 1010   Wound Volume (cm^3) 5.5 cm^3 10/11/23 1010   Calculated Wound Volume (cm^3) 5.5 cm^3 10/11/23 1010   Change in Wound Size % 18.64 10/11/23 1010   Drainage Amount Scant 10/11/23 1010   Drainage Description Serosanguineous 10/11/23 1010   Non-staged Wound Description Full thickness 10/11/23 1010   Patient Tolerance Tolerated well 09/20/23 0943   Dressing Status Intact 10/11/23 1010                         Wound Instructions:  Orders Placed This Encounter   Procedures    Wound cleansing and dressings     Wound location: BLE   Change dressing Weekly at wound center. You may shower with cast cover or sponge bathe, do not allow dressings to get wet. At wound center today:  Legs washed with hibiclens, rinsed and patted dry.   Clotrimazole and betamethasone applied to affected area  Unfolded Dermagran gauze applied     Standing Status:   Future     Standing Expiration Date:   10/11/2024    Wound compression and edema control     Wound compression and edema control   Unna Boot compression wraps to BLE  Keep compression wrap/wraps clean and dry. If wraps are too tight and you experience numbness/tingling, call the wound center. If after hours, remove wraps or proceed to nearest E.R. and call wound center in AM.   Star Gentleman will be changed weekly at [facility]. Avoid prolonged standing in one place. Elevate leg(s) above the level of the heart when sitting or as much as possible. This was applied today. Standing Status:   Future     Standing Expiration Date:   86/44/3576    Cast Application     This order was created via procedure documentation        Diagnosis ICD-10-CM Associated Orders   1. Chronic venous hypertension (idiopathic) with ulcer of left lower extremity (CODE) ([facility])  I87.312 Wound cleansing and dressings     Wound compression and edema control     Cast Application      2. Chronic venous hypertension (idiopathic) with ulcer of right lower extremity (CODE) ([facility])  I87.311 Wound cleansing and dressings     Wound compression and edema control     Cast Application      3. Non-pressure chronic ulcer of left lower leg, limited to breakdown of skin ([facility])  L97.921 Wound cleansing and dressings     Wound compression and edema control     Cast Application      4.  Non-pressure chronic ulcer of right lower leg, limited to breakdown of skin ([facility])  L97.911 Wound cleansing and dressings     Wound compression and edema control     Cast Application

## 2023-10-18 ENCOUNTER — OFFICE VISIT (OUTPATIENT)
Dept: WOUND CARE | Facility: HOSPITAL | Age: 63
End: 2023-10-18
Payer: COMMERCIAL

## 2023-10-18 VITALS
HEART RATE: 71 BPM | RESPIRATION RATE: 14 BRPM | SYSTOLIC BLOOD PRESSURE: 152 MMHG | TEMPERATURE: 97.6 F | DIASTOLIC BLOOD PRESSURE: 66 MMHG

## 2023-10-18 DIAGNOSIS — I87.311 CHRONIC VENOUS HYPERTENSION (IDIOPATHIC) WITH ULCER OF RIGHT LOWER EXTREMITY (CODE) (HCC): ICD-10-CM

## 2023-10-18 DIAGNOSIS — I87.312 CHRONIC VENOUS HYPERTENSION (IDIOPATHIC) WITH ULCER OF LEFT LOWER EXTREMITY (CODE) (HCC): Primary | ICD-10-CM

## 2023-10-18 PROCEDURE — 29580 STRAPPING UNNA BOOT: CPT

## 2023-10-18 RX ORDER — LIDOCAINE 40 MG/G
CREAM TOPICAL ONCE
Status: COMPLETED | OUTPATIENT
Start: 2023-10-18 | End: 2023-10-18

## 2023-10-18 RX ADMIN — LIDOCAINE 1 APPLICATION: 40 CREAM TOPICAL at 10:13

## 2023-10-18 NOTE — PROGRESS NOTES
Patient ID: Pñea Paz is a 58 y.o. male Date of Birth 1960     Chief Complaint  Chief Complaint   Patient presents with    Follow Up Wound Care Visit     BLE wound       Allergies  Bactrim [sulfamethoxazole-trimethoprim], Cefaclor, and Simvastatin    Assessment:    No problem-specific Assessment & Plan notes found for this encounter. Diagnoses and all orders for this visit:    Chronic venous hypertension (idiopathic) with ulcer of left lower extremity (CODE) ([facility])  -     lidocaine (LMX) 4 % cream  -     Wound cleansing and dressings; Future  -     Wound compression and edema control; Future  -     Cast Application    Chronic venous hypertension (idiopathic) with ulcer of right lower extremity (CODE) ([facility])  -     lidocaine (LMX) 4 % cream  -     Wound cleansing and dressings; Future  -     Wound compression and edema control; Future  -     Cast Application              Procedures    Plan:  Wounds are improved  Mechanically debrided with dry gauze  Continue wound management with Dermagran, see wound orders below  Continue compression with Unna boots  Keep legs elevated whenever seated and avoid prolonged standing  Follow-up in 1 week or call sooner with questions or concerns    Wound 09/05/23 Venous Ulcer Leg Right; Anterior (Active)   Wound Image Images linked 10/18/23 0954   Wound Description Pink;Eschar;Black; Epithelialization 10/18/23 0959   Bridget-wound Assessment Pink 10/18/23 0959   Wound Length (cm) 15 cm 10/18/23 0959   Wound Width (cm) 26 cm 10/18/23 0959   Wound Depth (cm) 0.1 cm 10/18/23 0959   Wound Surface Area (cm^2) 390 cm^2 10/18/23 0959   Wound Volume (cm^3) 39 cm^3 10/18/23 0959   Calculated Wound Volume (cm^3) 39 cm^3 10/18/23 0959   Change in Wound Size % -42.86 10/18/23 0959   Drainage Amount Moderate 10/18/23 0959   Drainage Description Serosanguineous 10/18/23 0959   Non-staged Wound Description Full thickness 10/18/23 0959   Patient Tolerance Tolerated well 10/18/23 0959 Dressing Status Intact 10/18/23 0959       Wound 09/05/23 Venous Ulcer Leg Left (Active)   Wound Image Images linked 10/18/23 0954   Wound Description Pink;Epithelialization 10/18/23 1000   Bridget-wound Assessment Dry;[place] 10/18/23 1000   Wound Length (cm) 10 cm 10/18/23 1000   Wound Width (cm) 4 cm 10/18/23 1000   Wound Depth (cm) 0.1 cm 10/18/23 1000   Wound Surface Area (cm^2) 40 cm^2 10/18/23 1000   Wound Volume (cm^3) 4 cm^3 10/18/23 1000   Calculated Wound Volume (cm^3) 4 cm^3 10/18/23 1000   Change in Wound Size % 40.83 10/18/23 1000   Drainage Amount Small 10/18/23 1000   Drainage Description Serosanguineous 10/18/23 1000   Non-staged Wound Description Full thickness 10/18/23 1000   Patient Tolerance Tolerated well 10/18/23 1000   Dressing Status Intact 10/18/23 1000       Wound 09/05/23 Venous Ulcer Leg Right; Anterior (Active)   Date First Assessed/Time First Assessed: 09/05/23 1401   Present on Original Admission: Yes  Primary Wound Type: Venous Ulcer  Location: Leg  Wound Location Orientation: Right; Anterior       Wound 09/05/23 Venous Ulcer Leg Left (Active)   Date First Assessed/Time First Assessed: 09/05/23 1401   Present on Original Admission: Yes  Primary Wound Type: Venous Ulcer  Location: Leg  Wound Location Orientation: Left       [REMOVED] Wound 06/10/20 Shoulder Left (Removed)   Resolved Date: 09/05/23  Date First Assessed/Time First Assessed: 06/10/20 1051   Location: Shoulder  Wound Location Orientation: Left  Wound Description (Comments): ARM SLING  Incision's 1st Dressing: DRESSING MEPILEX AG BORDER 4 X 4 IN  Wound Outcom. .. Subjective:      . Patient presents for follow-up of bilateral lower extremity venous ulcers. No increased pain or drainage. Patient does complain of occasional pain in the lower extremities that comes on at random. He denies any numbness and tingling in the toes or pain distally. Has had Dermagran on the wounds and Unna boots for compression.         The following portions of the patient's history were reviewed and updated as appropriate: He  has a past medical history of Abscess of left thigh, Acute myocardial infarction [facility]), Anesthesia, Anxiety, Arteriosclerotic cardiovascular disease, Arthritis, Asthma, Chest pain, COPD (chronic obstructive pulmonary disease) ([address]), Coronary artery disease, Diabetes mellitus ([address]), Fatty liver, Gastric ulcer, GERD (gastroesophageal reflux disease), Hyperlipidemia, Hypertension, Low back pain, Myocardial infarction [facility]) ([id number]), Obesity, Obstructive sleep apnea, and Spondylosis of lumbar region without myelopathy or radiculopathy. He   Patient Active Problem List    Diagnosis Date Noted    FREDY (obstructive sleep apnea) 09/11/2023    Continuous opioid dependence ([address]) 05/23/2023    Cellulitis of right lower extremity 04/10/2023    Staph skin infection 12/01/2022    Preoperative clearance 06/23/2021    Acute on chronic systolic congestive heart failure ([address]) 05/04/2021    Diabetes mellitus ([address])     Lesion of nose 11/13/2020    Type 2 diabetes mellitus, with long-term current use of insulin ([address]) 07/06/2020    Obstructive sleep apnea 07/06/2020    Aftercare following surgery of the musculoskeletal system 06/15/2020    Impingement syndrome of left shoulder 01/14/2020    Adhesive capsulitis of both shoulders 01/14/2020    Chronic left shoulder pain 08/21/2019    Healthcare maintenance 04/10/2019    Morbid obesity with body mass index of 45.0-49.9 in adult [facility]) 11/29/2018    Low back pain 10/22/2018    Dysuria 07/24/2018    Lumbar radiculopathy 12/29/2017    Myofascial pain syndrome 12/29/2017    Sacroiliitis ([address]) 12/29/2017    Chest pain 08/02/2016    Spondylosis of lumbar region without myelopathy or radiculopathy 04/15/2014    Benign essential hypertension 10/16/2012    Coronary artery disease 06/14/2012    Chronic obstructive pulmonary disease ([address]) 06/14/2012     He  reports that he quit smoking about 9 years ago.  His smoking use included cigarettes. He started smoking about 47 years ago. He has a 57.00 pack-year smoking history. He has never used smokeless tobacco. He reports that he does not drink alcohol and does not use drugs. Current Outpatient Medications   Medication Sig Dispense Refill    acetaminophen (TYLENOL) 325 mg tablet Take 650 mg by mouth every 6 (six) hours as needed for mild pain      albuterol (PROVENTIL HFA,VENTOLIN HFA) 90 mcg/act inhaler Inhale 2 puffs as needed for shortness of breath 18 g 1    Aspirin Low Dose 81 MG EC tablet TAKE 1 TABLET BY MOUTH EVERY DAY 90 tablet 1    atorvastatin (LIPITOR) 40 mg tablet TAKE 1 TABLET BY MOUTH EVERY DAY 90 tablet 2    clopidogrel (PLAVIX) 75 mg tablet TAKE 1 TABLET BY MOUTH EVERY DAY 90 tablet 0    ezetimibe (ZETIA) 10 mg tablet Take 10 mg by mouth daily      fenofibrate (TRICOR) 145 mg tablet TAKE 1 TABLET BY MOUTH EVERY DAY 90 tablet 1    furosemide (LASIX) 40 mg tablet       HYDROcodone-acetaminophen (Vicodin) 5-300 MG per tablet Take 1 tablet by mouth every 6 (six) hours as needed for moderate pain Max Daily Amount: 4 tablets 112 tablet 0    Icosapent Ethyl 1 g CAPS Vascepa 1 gram capsule   TAKE 2 G BY MOUTH 2 (TWO) TIMES A DAY.       Insulin Pen Needle (B-D UF III MINI PEN NEEDLES) 31G X 5 MM MISC Inject as directed 2 (two) times a day Use as directed 100 each 0    itraconazole (SPORANOX) 100 mg capsule PLEASE SEE ATTACHED FOR DETAILED DIRECTIONS      Jardiance 25 MG TABS TAKE 1 TABLET BY MOUTH EVERY DAY 90 tablet 1    losartan (COZAAR) 25 mg tablet Take 1 tablet (25 mg total) by mouth daily 90 tablet 3    metoprolol tartrate (LOPRESSOR) 50 mg tablet Take 25 mg by mouth 2 (two) times a day      nitroglycerin (NITROSTAT) 0.4 mg SL tablet PLEASE SEE ATTACHED FOR DETAILED DIRECTIONS      pantoprazole (PROTONIX) 40 mg tablet TAKE 1 TABLET BY MOUTH EVERY DAY 90 tablet 5    Semglee, yfgn, 100 UNIT/ML SOPN PLEASE SEE ATTACHED FOR DETAILED DIRECTIONS      Sulfacetamide Sodium, Acne, 10 % LOTN Apply topically 2 (two) times a day 118 mL 0     No current facility-administered medications for this visit. He is allergic to bactrim [sulfamethoxazole-trimethoprim], cefaclor, and simvastatin. .    Review of Systems   Constitutional:  Negative for chills and fever. HENT:  Negative for congestion and sneezing. Respiratory:  Negative for cough. Cardiovascular:  Positive for leg swelling. Skin:  Positive for wound. Psychiatric/Behavioral:  Negative for agitation. Objective:       Wound 09/05/23 Venous Ulcer Leg Right; Anterior (Active)   Wound Image Images linked 10/18/23 0954   Wound Description Pink;Eschar;Black; Epithelialization 10/18/23 0959   Bridget-wound Assessment Pink 10/18/23 0959   Wound Length (cm) 15 cm 10/18/23 0959   Wound Width (cm) 26 cm 10/18/23 0959   Wound Depth (cm) 0.1 cm 10/18/23 0959   Wound Surface Area (cm^2) 390 cm^2 10/18/23 0959   Wound Volume (cm^3) 39 cm^3 10/18/23 0959   Calculated Wound Volume (cm^3) 39 cm^3 10/18/23 0959   Change in Wound Size % -42.86 10/18/23 0959   Drainage Amount Moderate 10/18/23 0959   Drainage Description Serosanguineous 10/18/23 0959   Non-staged Wound Description Full thickness 10/18/23 0959   Patient Tolerance Tolerated well 10/18/23 0959   Dressing Status Intact 10/18/23 0959       Wound 09/05/23 Venous Ulcer Leg Left (Active)   Wound Image Images linked 10/18/23 0954   Wound Description Pink;Epithelialization 10/18/23 1000   Bridget-wound Assessment Dry;[place] 10/18/23 1000   Wound Length (cm) 10 cm 10/18/23 1000   Wound Width (cm) 4 cm 10/18/23 1000   Wound Depth (cm) 0.1 cm 10/18/23 1000   Wound Surface Area (cm^2) 40 cm^2 10/18/23 1000   Wound Volume (cm^3) 4 cm^3 10/18/23 1000   Calculated Wound Volume (cm^3) 4 cm^3 10/18/23 1000   Change in Wound Size % 40.83 10/18/23 1000   Drainage Amount Small 10/18/23 1000   Drainage Description Serosanguineous 10/18/23 1000   Non-staged Wound Description Full thickness 10/18/23 1000   Patient Tolerance Tolerated well 10/18/23 1000   Dressing Status Intact 10/18/23 1000       /66   Pulse 71   Temp 97.6 °F (36.4 °C)   Resp 14     Physical Exam  Vitals reviewed. Constitutional:       General: He is not in acute distress. Appearance: Normal appearance. He is obese. He is not ill-appearing, toxic-appearing or diaphoretic. HENT:      Head: Normocephalic and atraumatic. Right Ear: External ear normal.      Left Ear: External ear normal.   Eyes:      Conjunctiva/sclera: Conjunctivae normal.   Pulmonary:      Effort: Pulmonary effort is normal. No respiratory distress. Musculoskeletal:      Cervical back: Neck supple. Right lower leg: Edema present. Left lower leg: Edema present. Skin:     Comments: See wound assessment   Neurological:      Mental Status: He is alert. Psychiatric:         Mood and Affect: Mood normal.         Behavior: Behavior normal.         Wound 09/05/23 Venous Ulcer Leg Right; Anterior (Active)   Wound Image     10/18/23 0954   Wound Description Pink;Eschar;Black; Epithelialization 10/18/23 0959   Bridget-wound Assessment Pink 10/18/23 0959   Wound Length (cm) 15 cm 10/18/23 0959   Wound Width (cm) 26 cm 10/18/23 0959   Wound Depth (cm) 0.1 cm 10/18/23 0959   Wound Surface Area (cm^2) 390 cm^2 10/18/23 0959   Wound Volume (cm^3) 39 cm^3 10/18/23 0959   Calculated Wound Volume (cm^3) 39 cm^3 10/18/23 0959   Change in Wound Size % -42.86 10/18/23 0959   Drainage Amount Moderate 10/18/23 0959   Drainage Description Serosanguineous 10/18/23 0959   Non-staged Wound Description Full thickness 10/18/23 0959   Patient Tolerance Tolerated well 10/18/23 0959   Dressing Status Intact 10/18/23 0959       Wound 09/05/23 Venous Ulcer Leg Left (Active)   Wound Image   10/18/23 0954   Wound Description Pink;Epithelialization 10/18/23 1000   Bridget-wound Assessment Dry;[place] 10/18/23 1000   Wound Length (cm) 10 cm 10/18/23 1000   Wound Width (cm) 4 cm 10/18/23 1000   Wound Depth (cm) 0.1 cm 10/18/23 1000   Wound Surface Area (cm^2) 40 cm^2 10/18/23 1000   Wound Volume (cm^3) 4 cm^3 10/18/23 1000   Calculated Wound Volume (cm^3) 4 cm^3 10/18/23 1000   Change in Wound Size % 40.83 10/18/23 1000   Drainage Amount Small 10/18/23 1000   Drainage Description Serosanguineous 10/18/23 1000   Non-staged Wound Description Full thickness 10/18/23 1000   Patient Tolerance Tolerated well 10/18/23 1000   Dressing Status Intact 10/18/23 1000                           Wound Instructions:  Orders Placed This Encounter   Procedures    Wound cleansing and dressings     Wound location: Right lower leg and Left lower leg  Change dressing Weekly at wound center. You may shower with cast cover or sponge bathe, do not allow dressings to get wet. At wound center today:  Legs washed with hibiclens, rinsed and patted dry. Clotrimazole and betamethasone applied to affected area  Applied single layer Dermagran gauze   Covered with gauze  Applied Unna boot on both lower legs    Treatment was done as above today at wound care center     Standing Status:   Future     Standing Expiration Date:   10/18/2024    Wound compression and edema control     Unna Boot compression wraps to BLE  Keep compression wrap/wraps clean and dry. If wraps are too tight and you experience numbness/tingling, call the wound center. If after hours, remove wraps or proceed to nearest E.R. and call wound center in AM.   [place] will be changed weekly at [facility]. Avoid prolonged standing in one place. Elevate leg(s) above the level of the heart when sitting or as much as possible. Standing Status:   Future     Standing Expiration Date:   68/34/1159    Cast Application     This order was created via procedure documentation        Diagnosis ICD-10-CM Associated Orders   1.  Chronic venous hypertension (idiopathic) with ulcer of left lower extremity (CODE) ([facility])  I87.312 lidocaine (LMX) 4 % cream     Wound cleansing and dressings     Wound compression and edema control     Cast Application      2.  Chronic venous hypertension (idiopathic) with ulcer of right lower extremity (CODE) ([facility])  I87.311 lidocaine (LMX) 4 % cream     Wound cleansing and dressings     Wound compression and edema control     Cast Application

## 2023-10-18 NOTE — PROGRESS NOTES
Cast Application    Date/Time: 10/18/2023 9:45 AM    Performed by: Tiera Renae RN  Authorized by: Sherrill Cunningham DO  Universal Protocol:  Consent: Verbal consent obtained. Consent given by: patient  Patient understanding: patient states understanding of the procedure being performed  Patient identity confirmed: verbally with patient    Pre-procedure details:     Sensation:  Normal  Procedure details:     Laterality:  Bilateral    Location:  Leg    Leg:  L lower leg and R lower legCast type:  Unna boot      Post-procedure details:     Pain:  Improved    Sensation:  Normal    Patient tolerance of procedure: Tolerated well, no immediate complications  Comments:      UNNA BOOT wrap procedure     Before application, JOSIANE and/or TBI determined to be adequate for healing and application of compression. Lower extremity washed prior to application of compression wrap. With the foot in dorsiflexed position, Unna boot was applied as per physician orders without complications or complaint of pain. The procedure was tolerated well. Toes warm & pink post application. Patient provided education & reinforced to observe toes for any discoloration, swelling or tingling and instructed when to report to the  [address] or to remove compression. Wound care as per providers orders, patient tolerated well.

## 2023-10-25 ENCOUNTER — OFFICE VISIT (OUTPATIENT)
Dept: WOUND CARE | Facility: HOSPITAL | Age: 63
End: 2023-10-25
Payer: COMMERCIAL

## 2023-10-25 VITALS
HEART RATE: 68 BPM | SYSTOLIC BLOOD PRESSURE: 169 MMHG | RESPIRATION RATE: 16 BRPM | DIASTOLIC BLOOD PRESSURE: 81 MMHG | TEMPERATURE: 96.6 F

## 2023-10-25 DIAGNOSIS — I87.311 CHRONIC VENOUS HYPERTENSION (IDIOPATHIC) WITH ULCER OF RIGHT LOWER EXTREMITY (CODE) (HCC): ICD-10-CM

## 2023-10-25 DIAGNOSIS — L97.911 NON-PRESSURE CHRONIC ULCER OF RIGHT LOWER LEG, LIMITED TO BREAKDOWN OF SKIN (HCC): ICD-10-CM

## 2023-10-25 DIAGNOSIS — L97.921 NON-PRESSURE CHRONIC ULCER OF LEFT LOWER LEG, LIMITED TO BREAKDOWN OF SKIN (HCC): ICD-10-CM

## 2023-10-25 DIAGNOSIS — I87.312 CHRONIC VENOUS HYPERTENSION (IDIOPATHIC) WITH ULCER OF LEFT LOWER EXTREMITY (CODE) (HCC): Primary | ICD-10-CM

## 2023-10-25 PROCEDURE — 87205 SMEAR GRAM STAIN: CPT | Performed by: FAMILY MEDICINE

## 2023-10-25 PROCEDURE — 99213 OFFICE O/P EST LOW 20 MIN: CPT | Performed by: FAMILY MEDICINE

## 2023-10-25 PROCEDURE — G0463 HOSPITAL OUTPT CLINIC VISIT: HCPCS | Performed by: FAMILY MEDICINE

## 2023-10-25 PROCEDURE — 87070 CULTURE OTHR SPECIMN AEROBIC: CPT | Performed by: FAMILY MEDICINE

## 2023-10-25 NOTE — PROGRESS NOTES
Patient ID: Arminda Ann is a 58 y.o. male Date of Birth 1960     Chief Complaint  Chief Complaint   Patient presents with    Follow Up Wound Care Visit     BLE wounds       Allergies  Bactrim [sulfamethoxazole-trimethoprim], Cefaclor, and Simvastatin    Assessment:    No problem-specific Assessment & Plan notes found for this encounter. Diagnoses and all orders for this visit:    Chronic venous hypertension (idiopathic) with ulcer of left lower extremity (CODE) ([facility])  -     Wound cleansing and dressings; Future  -     Wound compression and edema control; Future  -     Wound miscellaneous orders; Future  -     mupirocin (BACTROBAN) 2 % ointment; Apply topically daily  -     Wound culture and Gram stain    Chronic venous hypertension (idiopathic) with ulcer of right lower extremity (CODE) ([facility])  -     Wound cleansing and dressings; Future  -     Wound compression and edema control; Future  -     Wound miscellaneous orders; Future  -     mupirocin (BACTROBAN) 2 % ointment; Apply topically daily  -     Wound culture and Gram stain    Non-pressure chronic ulcer of left lower leg, limited to breakdown of skin ([facility]CC)  -     Wound cleansing and dressings; Future  -     Wound compression and edema control; Future  -     Wound miscellaneous orders; Future    Non-pressure chronic ulcer of right lower leg, limited to breakdown of skin ([facility])  -     Wound cleansing and dressings; Future  -     Wound compression and edema control; Future  -     Wound miscellaneous orders; Future              Procedures    Plan:  Bilateral lower extremity wounds are essentially unchanged but there appears to be more scattered pustules on the right lower extremity  1 pustule unroofed today and culture obtained  Change wound management to mupirocin which was sent to the pharmacy today. Also discussed use of Hibiclens at home every other day.   See wound orders below  Change compression to CircAid  Keep legs elevated whenever seated and avoid prolonged standing  Follow-up in 1 week or call sooner with questions or concerns    Wound 09/05/23 Venous Ulcer Leg Right; Anterior (Active)   Date First Assessed/Time First Assessed: 09/05/23 1401   Present on Original Admission: Yes  Primary Wound Type: Venous Ulcer  Location: Leg  Wound Location Orientation: Right; Anterior       Wound 09/05/23 Venous Ulcer Leg Left (Active)   Date First Assessed/Time First Assessed: 09/05/23 1401   Present on Original Admission: Yes  Primary Wound Type: Venous Ulcer  Location: Leg  Wound Location Orientation: Left       [REMOVED] Wound 06/10/20 Shoulder Left (Removed)   Resolved Date: 09/05/23  Date First Assessed/Time First Assessed: 06/10/20 1051   Location: Shoulder  Wound Location Orientation: Left  Wound Description (Comments): ARM SLING  Incision's 1st Dressing: DRESSING MEPILEX AG BORDER 4 X 4 IN  Wound Outcom. .. Subjective:      . Patient presents for follow-up of bilateral lower extremity venous ulcers. No increased pain or drainage. Has been using Dermagran on the wounds and Unna boots for compression        Review of Systems   Constitutional:  Negative for chills and fever. HENT:  Negative for congestion and sneezing. Respiratory:  Negative for cough. Cardiovascular:  Positive for leg swelling. Skin:  Positive for wound. Psychiatric/Behavioral:  Negative for agitation. Objective:            /81   Pulse 68   Temp (!) 96.6 °F (35.9 °C)   Resp 16     Physical Exam  Vitals reviewed. Constitutional:       General: He is not in acute distress. Appearance: Normal appearance. He is obese. He is not ill-appearing, toxic-appearing or diaphoretic. HENT:      Head: Normocephalic and atraumatic. Right Ear: External ear normal.      Left Ear: External ear normal.   Eyes:      Conjunctiva/sclera: Conjunctivae normal.   Pulmonary:      Effort: Pulmonary effort is normal. No respiratory distress.    Musculoskeletal: Cervical back: Neck supple. Right lower leg: Edema present. Left lower leg: Edema present. Skin:     Comments: See wound assessment   Neurological:      Mental Status: He is alert. Psychiatric:         Mood and Affect: Mood normal.         Behavior: Behavior normal.           Wound 09/05/23 Venous Ulcer Leg Right; Anterior (Active)   Wound Image     10/18/23 0954   Wound Description Pink;Eschar;Black; Epithelialization 10/18/23 0959   Bridget-wound Assessment Pink 10/18/23 0959   Wound Length (cm) 15 cm 10/18/23 0959   Wound Width (cm) 26 cm 10/18/23 0959   Wound Depth (cm) 0.1 cm 10/18/23 0959   Wound Surface Area (cm^2) 390 cm^2 10/18/23 0959   Wound Volume (cm^3) 39 cm^3 10/18/23 0959   Calculated Wound Volume (cm^3) 39 cm^3 10/18/23 0959   Change in Wound Size % -42.86 10/18/23 0959   Drainage Amount Moderate 10/18/23 0959   Drainage Description Serosanguineous 10/18/23 0959   Non-staged Wound Description Full thickness 10/18/23 0959   Patient Tolerance Tolerated well 10/18/23 0959   Dressing Status Intact 10/18/23 0959       Wound 09/05/23 Venous Ulcer Leg Left (Active)   Wound Image   10/18/23 0954   Wound Description Pink;Epithelialization 10/18/23 1000   Bridget-wound Assessment Dry;[place] 10/18/23 1000   Wound Length (cm) 10 cm 10/18/23 1000   Wound Width (cm) 4 cm 10/18/23 1000   Wound Depth (cm) 0.1 cm 10/18/23 1000   Wound Surface Area (cm^2) 40 cm^2 10/18/23 1000   Wound Volume (cm^3) 4 cm^3 10/18/23 1000   Calculated Wound Volume (cm^3) 4 cm^3 10/18/23 1000   Change in Wound Size % 40.83 10/18/23 1000   Drainage Amount Small 10/18/23 1000   Drainage Description Serosanguineous 10/18/23 1000   Non-staged Wound Description Full thickness 10/18/23 1000   Patient Tolerance Tolerated well 10/18/23 1000   Dressing Status Intact 10/18/23 1000                         Wound Instructions:  Orders Placed This Encounter   Procedures    Wound cleansing and dressings     Wound location right and left legs. Change dressing every day. You may remove the dressing and shower. Do not leave wound open to air, apply new dressing immediately. Cleanse the wounds with hibiclens aka chlorhexadine, pat dry. Apply Mupirocin to the wound (prescribed to [facility] pharmacy)  Cover with gauze/ABD   Secure with roll gauze and tape. This was applied [facility] at the [facility]. Standing Status:   Future     Standing Expiration Date:   10/25/2024    Wound compression and edema control     Apply Circaid devices as instructed first thing qAM & remove qHS. Avoid prolonged standing in one place. Elevate leg(s) above the level of the heart when sitting or as much as possible. [id number] [facility] [address] applied today. Standing Status:   Future     Standing Expiration Date:   10/25/2024    Wound miscellaneous orders     Standing Status:   Future     Standing Expiration Date:   10/25/2024    Wound culture and Gram stain     Order Specific Question:   Release to patient through Mychart     Answer:   Immediate        Diagnosis ICD-10-CM Associated Orders   1. Chronic venous hypertension (idiopathic) with ulcer of left lower extremity (CODE) ([facility])  I87.312 Wound cleansing and dressings     Wound compression and edema control     Wound miscellaneous orders     mupirocin (BACTROBAN) 2 % ointment     Wound culture and Gram stain      2. Chronic venous hypertension (idiopathic) with ulcer of right lower extremity (CODE) ([facility])  I87.311 Wound cleansing and dressings     Wound compression and edema control     Wound miscellaneous orders     mupirocin (BACTROBAN) 2 % ointment     Wound culture and Gram stain      3. Non-pressure chronic ulcer of left lower leg, limited to breakdown of skin ([facility])  L97.921 Wound cleansing and dressings     Wound compression and edema control     Wound miscellaneous orders      4.  Non-pressure chronic ulcer of right lower leg, limited to breakdown of skin ([facility])  L97.911 Wound cleansing and dressings     Wound compression and edema control     Wound miscellaneous orders

## 2023-10-25 NOTE — PATIENT INSTRUCTIONS
Orders Placed This Encounter   Procedures    Wound cleansing and dressings     Wound location right and left legs. Change dressing every day. You may remove the dressing and shower. Do not leave wound open to air, apply new dressing immediately. Cleanse the wounds with hibiclens aka chlorhexadine, pat dry. Apply Mupirocin to the wound (prescribed to [facility] pharmacy)  Cover with gauze/ABD   Secure with roll gauze and tape. This was applied today at the [facility]. Standing Status:   Future     Standing Expiration Date:   10/25/2024    Wound compression and edema control     Apply Circaid devices as instructed first thing qAM & remove qHS. Avoid prolonged standing in one place. Elevate leg(s) above the level of the heart when sitting or as much as possible. [id number] [facility] F applied today.      Standing Status:   Future     Standing Expiration Date:   10/25/2024    Wound miscellaneous orders     Standing Status:   Future     Standing Expiration Date:   10/25/2024

## 2023-10-28 LAB
BACTERIA WND AEROBE CULT: NO GROWTH
GRAM STN SPEC: NORMAL

## 2023-11-01 ENCOUNTER — OFFICE VISIT (OUTPATIENT)
Dept: WOUND CARE | Facility: HOSPITAL | Age: 63
End: 2023-11-01
Payer: COMMERCIAL

## 2023-11-01 VITALS
DIASTOLIC BLOOD PRESSURE: 60 MMHG | HEART RATE: 74 BPM | SYSTOLIC BLOOD PRESSURE: 120 MMHG | TEMPERATURE: 97.9 F | RESPIRATION RATE: 16 BRPM

## 2023-11-01 DIAGNOSIS — I87.311 CHRONIC VENOUS HYPERTENSION (IDIOPATHIC) WITH ULCER OF RIGHT LOWER EXTREMITY (CODE) (HCC): ICD-10-CM

## 2023-11-01 DIAGNOSIS — I87.312 CHRONIC VENOUS HYPERTENSION (IDIOPATHIC) WITH ULCER OF LEFT LOWER EXTREMITY (CODE) (HCC): Primary | ICD-10-CM

## 2023-11-01 PROCEDURE — G0463 HOSPITAL OUTPT CLINIC VISIT: HCPCS | Performed by: FAMILY MEDICINE

## 2023-11-01 PROCEDURE — 99213 OFFICE O/P EST LOW 20 MIN: CPT | Performed by: FAMILY MEDICINE

## 2023-11-01 NOTE — PROGRESS NOTES
Patient ID: Arleen Cogan is a 58 y.o. male Date of Birth 1960     Chief Complaint  Chief Complaint   Patient presents with    Follow Up Wound Care Visit     Bilat LE       Allergies  Bactrim [sulfamethoxazole-trimethoprim], Cefaclor, and Simvastatin    Assessment:    No problem-specific Assessment & Plan notes found for this encounter. Diagnoses and all orders for this visit:    Chronic venous hypertension (idiopathic) with ulcer of left lower extremity (CODE) ([facility])  -     Wound cleansing and dressings; Future  -     Wound compression and edema control; Future    Chronic venous hypertension (idiopathic) with ulcer of right lower extremity (CODE) ([facility])  -     Wound cleansing and dressings; Future  -     Wound compression and edema control; Future              Procedures    Plan:  Overall much improved bilaterally  Continue wound management with mupirocin, stop use of the Hibiclens, see wound orders below  Continue compression with CircAid's  Keep legs elevated whenever seated and avoid prolonged standing  Follow-up in 1 week or call sooner with questions or concerns    Wound 09/05/23 Venous Ulcer Leg Right; Anterior (Active)   Wound Image Images linked 11/01/23 1136   Wound Description Pink;Eschar;Black; Epithelialization;Granulation tissue 11/01/23 1213   Bridget-wound Assessment Pink;Dry 11/01/23 1213   Wound Length (cm) 3 cm 11/01/23 1213   Wound Width (cm) 14 cm 11/01/23 1213   Wound Depth (cm) 0.1 cm 11/01/23 1213   Wound Surface Area (cm^2) 42 cm^2 11/01/23 1213   Wound Volume (cm^3) 4.2 cm^3 11/01/23 1213   Calculated Wound Volume (cm^3) 4.2 cm^3 11/01/23 1213   Change in Wound Size % 84.62 11/01/23 1213   Drainage Amount Small 11/01/23 1213   Drainage Description Serosanguineous 11/01/23 1213   Non-staged Wound Description Full thickness 11/01/23 1213   Dressing Status Intact 11/01/23 1213       Wound 09/05/23 Venous Ulcer Leg Left (Active)   Wound Image Images linked 11/01/23 1136   Wound Description Pink;Epithelialization;Granulation tissue;Black;Eschar 11/01/23 1214   Bridget-wound Assessment Dry;Pink 11/01/23 1214   Wound Length (cm) 3.6 cm 11/01/23 1214   Wound Width (cm) 8 cm 11/01/23 1214   Wound Depth (cm) 0.1 cm 11/01/23 1214   Wound Surface Area (cm^2) 28.8 cm^2 11/01/23 1214   Wound Volume (cm^3) 2.88 cm^3 11/01/23 1214   Calculated Wound Volume (cm^3) 2.88 cm^3 11/01/23 1214   Change in Wound Size % 57.4 11/01/23 1214   Drainage Amount Small 11/01/23 1214   Drainage Description Serosanguineous 11/01/23 1214   Non-staged Wound Description Full thickness 11/01/23 1214   Dressing Status Intact 11/01/23 1214       Wound 09/05/23 Venous Ulcer Leg Right; Anterior (Active)   Date First Assessed/Time First Assessed: 09/05/23 1401   Present on Original Admission: Yes  Primary Wound Type: Venous Ulcer  Location: Leg  Wound Location Orientation: Right; Anterior       Wound 09/05/23 Venous Ulcer Leg Left (Active)   Date First Assessed/Time First Assessed: 09/05/23 1401   Present on Original Admission: Yes  Primary Wound Type: Venous Ulcer  Location: Leg  Wound Location Orientation: Left       [REMOVED] Wound 06/10/20 Shoulder Left (Removed)   Resolved Date: 09/05/23  Date First Assessed/Time First Assessed: 06/10/20 1051   Location: Shoulder  Wound Location Orientation: Left  Wound Description (Comments): ARM SLING  Incision's 1st Dressing: DRESSING MEPILEX AG BORDER 4 X 4 IN  Wound Outcom. .. Subjective:      . Patient presents for follow-up of bilateral lower extremity venous ulcers. No increased pain or drainage. Has been using mupirocin on the wounds and CircAid's for compression. He has been cleansing with Hibiclens 3 times in the past week as instructed.         The following portions of the patient's history were reviewed and updated as appropriate: He  has a past medical history of Abscess of left thigh, Acute myocardial infarction [facility]), Anesthesia, Anxiety, Arteriosclerotic cardiovascular disease, Arthritis, Asthma, Chest pain, COPD (chronic obstructive pulmonary disease) ([address]), Coronary artery disease, Diabetes mellitus ([address]), Fatty liver, Gastric ulcer, GERD (gastroesophageal reflux disease), Hyperlipidemia, Hypertension, Low back pain, Myocardial infarction [facility]) ([id number]), Obesity, Obstructive sleep apnea, and Spondylosis of lumbar region without myelopathy or radiculopathy. He   Patient Active Problem List    Diagnosis Date Noted    FREDY (obstructive sleep apnea) 09/11/2023    Continuous opioid dependence ([address]) 05/23/2023    Cellulitis of right lower extremity 04/10/2023    Staph skin infection 12/01/2022    Preoperative clearance 06/23/2021    Acute on chronic systolic congestive heart failure ([address]) 05/04/2021    Diabetes mellitus ([address])     Lesion of nose 11/13/2020    Type 2 diabetes mellitus, with long-term current use of insulin ([address]) 07/06/2020    Obstructive sleep apnea 07/06/2020    Aftercare following surgery of the musculoskeletal system 06/15/2020    Impingement syndrome of left shoulder 01/14/2020    Adhesive capsulitis of both shoulders 01/14/2020    Chronic left shoulder pain 08/21/2019    Healthcare maintenance 04/10/2019    Morbid obesity with body mass index of 45.0-49.9 in adult [facility]) 11/29/2018    Low back pain 10/22/2018    Dysuria 07/24/2018    Lumbar radiculopathy 12/29/2017    Myofascial pain syndrome 12/29/2017    Sacroiliitis ([address]) 12/29/2017    Chest pain 08/02/2016    Spondylosis of lumbar region without myelopathy or radiculopathy 04/15/2014    Benign essential hypertension 10/16/2012    Coronary artery disease 06/14/2012    Chronic obstructive pulmonary disease ([address]) 06/14/2012     He  reports that he quit smoking about 9 years ago. His smoking use included cigarettes. He started smoking about 47 years ago. He has a 57.00 pack-year smoking history.  He has never used smokeless tobacco. He reports that he does not drink alcohol and does not use drugs.  Current Outpatient Medications   Medication Sig Dispense Refill    acetaminophen (TYLENOL) 325 mg tablet Take 650 mg by mouth every 6 (six) hours as needed for mild pain      albuterol (PROVENTIL HFA,VENTOLIN HFA) 90 mcg/act inhaler Inhale 2 puffs as needed for shortness of breath 18 g 1    Aspirin Low Dose 81 MG EC tablet TAKE 1 TABLET BY MOUTH EVERY DAY 90 tablet 1    atorvastatin (LIPITOR) 40 mg tablet TAKE 1 TABLET BY MOUTH EVERY DAY 90 tablet 2    clopidogrel (PLAVIX) 75 mg tablet TAKE 1 TABLET BY MOUTH EVERY DAY 90 tablet 0    ezetimibe (ZETIA) 10 mg tablet Take 10 mg by mouth daily      fenofibrate (TRICOR) 145 mg tablet TAKE 1 TABLET BY MOUTH EVERY DAY 90 tablet 1    furosemide (LASIX) 40 mg tablet       HYDROcodone-acetaminophen (Vicodin) 5-300 MG per tablet Take 1 tablet by mouth every 6 (six) hours as needed for moderate pain Max Daily Amount: 4 tablets 112 tablet 0    Icosapent Ethyl 1 g CAPS Vascepa 1 gram capsule   TAKE 2 G BY MOUTH 2 (TWO) TIMES A DAY. Insulin Pen Needle (B-D UF III MINI PEN NEEDLES) 31G X 5 MM MISC Inject as directed 2 (two) times a day Use as directed 100 each 0    itraconazole (SPORANOX) 100 mg capsule PLEASE SEE ATTACHED FOR DETAILED DIRECTIONS      Jardiance 25 MG TABS TAKE 1 TABLET BY MOUTH EVERY DAY 90 tablet 1    losartan (COZAAR) 25 mg tablet Take 1 tablet (25 mg total) by mouth daily 90 tablet 3    metoprolol tartrate (LOPRESSOR) 50 mg tablet Take 25 mg by mouth 2 (two) times a day      mupirocin (BACTROBAN) 2 % ointment Apply topically daily 30 g 2    nitroglycerin (NITROSTAT) 0.4 mg SL tablet PLEASE SEE ATTACHED FOR DETAILED DIRECTIONS      pantoprazole (PROTONIX) 40 mg tablet TAKE 1 TABLET BY MOUTH EVERY DAY 90 tablet 5    Semglee, yfgn, 100 UNIT/ML SOPN PLEASE SEE ATTACHED FOR DETAILED DIRECTIONS      Sulfacetamide Sodium, Acne, 10 % LOTN Apply topically 2 (two) times a day 118 mL 0     No current facility-administered medications for this visit.      He is allergic to bactrim [sulfamethoxazole-trimethoprim], cefaclor, and simvastatin. .    Review of Systems   Constitutional:  Negative for chills and fever. HENT:  Negative for congestion and sneezing. Respiratory:  Negative for cough. Cardiovascular:  Positive for leg swelling. Skin:  Positive for wound. Psychiatric/Behavioral:  Negative for agitation. Objective:       Wound 09/05/23 Venous Ulcer Leg Right; Anterior (Active)   Wound Image Images linked 11/01/23 1136   Wound Description Pink;Eschar;Black; Epithelialization;Granulation tissue 11/01/23 1213   Bridget-wound Assessment Pink;Dry 11/01/23 1213   Wound Length (cm) 3 cm 11/01/23 1213   Wound Width (cm) 14 cm 11/01/23 1213   Wound Depth (cm) 0.1 cm 11/01/23 1213   Wound Surface Area (cm^2) 42 cm^2 11/01/23 1213   Wound Volume (cm^3) 4.2 cm^3 11/01/23 1213   Calculated Wound Volume (cm^3) 4.2 cm^3 11/01/23 1213   Change in Wound Size % 84.62 11/01/23 1213   Drainage Amount Small 11/01/23 1213   Drainage Description Serosanguineous 11/01/23 1213   Non-staged Wound Description Full thickness 11/01/23 1213   Dressing Status Intact 11/01/23 1213       Wound 09/05/23 Venous Ulcer Leg Left (Active)   Wound Image Images linked 11/01/23 1136   Wound Description Pink;Epithelialization;Granulation tissue;Black;Eschar 11/01/23 1214   Bridget-wound Assessment Dry;Pink 11/01/23 1214   Wound Length (cm) 3.6 cm 11/01/23 1214   Wound Width (cm) 8 cm 11/01/23 1214   Wound Depth (cm) 0.1 cm 11/01/23 1214   Wound Surface Area (cm^2) 28.8 cm^2 11/01/23 1214   Wound Volume (cm^3) 2.88 cm^3 11/01/23 1214   Calculated Wound Volume (cm^3) 2.88 cm^3 11/01/23 1214   Change in Wound Size % 57.4 11/01/23 1214   Drainage Amount Small 11/01/23 1214   Drainage Description Serosanguineous 11/01/23 1214   Non-staged Wound Description Full thickness 11/01/23 1214   Dressing Status Intact 11/01/23 1214       /60   Pulse 74   Temp 97.9 °F (36.6 °C)   Resp 16     Physical Exam  Vitals reviewed. Constitutional:       General: He is not in acute distress. Appearance: Normal appearance. He is obese. He is not ill-appearing, toxic-appearing or diaphoretic. HENT:      Head: Normocephalic and atraumatic. Right Ear: External ear normal.      Left Ear: External ear normal.   Eyes:      Conjunctiva/sclera: Conjunctivae normal.   Pulmonary:      Effort: Pulmonary effort is normal. No respiratory distress. Musculoskeletal:      Cervical back: Neck supple. Right lower leg: Edema present. Left lower leg: Edema present. Skin:     Comments: See wound assessment   Neurological:      Mental Status: He is alert. Psychiatric:         Mood and Affect: Mood normal.         Behavior: Behavior normal.           Wound 09/05/23 Venous Ulcer Leg Right; Anterior (Active)   Wound Image    11/01/23 1136   Wound Description Pink;Eschar;Black; Epithelialization;Granulation tissue 11/01/23 1213   Bridget-wound Assessment Pink;Dry 11/01/23 1213   Wound Length (cm) 3 cm 11/01/23 1213   Wound Width (cm) 14 cm 11/01/23 1213   Wound Depth (cm) 0.1 cm 11/01/23 1213   Wound Surface Area (cm^2) 42 cm^2 11/01/23 1213   Wound Volume (cm^3) 4.2 cm^3 11/01/23 1213   Calculated Wound Volume (cm^3) 4.2 cm^3 11/01/23 1213   Change in Wound Size % 84.62 11/01/23 1213   Drainage Amount Small 11/01/23 1213   Drainage Description Serosanguineous 11/01/23 1213   Non-staged Wound Description Full thickness 11/01/23 1213   Patient Tolerance Tolerated well 10/18/23 0959   Dressing Status Intact 11/01/23 1213       Wound 09/05/23 Venous Ulcer Leg Left (Active)   Wound Image   11/01/23 1136   Wound Description Pink;Epithelialization;Granulation tissue;Black;Eschar 11/01/23 1214   Bridget-wound Assessment Dry;Pink 11/01/23 1214   Wound Length (cm) 3.6 cm 11/01/23 1214   Wound Width (cm) 8 cm 11/01/23 1214   Wound Depth (cm) 0.1 cm 11/01/23 1214   Wound Surface Area (cm^2) 28.8 cm^2 11/01/23 1214   Wound Volume (cm^3) 2.88 cm^3 11/01/23 1214   Calculated Wound Volume (cm^3) 2.88 cm^3 11/01/23 1214   Change in Wound Size % 57.4 11/01/23 1214   Drainage Amount Small 11/01/23 1214   Drainage Description Serosanguineous 11/01/23 1214   Non-staged Wound Description Full thickness 11/01/23 1214   Patient Tolerance Tolerated well 10/18/23 1000   Dressing Status Intact 11/01/23 1214                         Wound Instructions:  Orders Placed This Encounter   Procedures    Wound cleansing and dressings     Wound location right and left legs. Change dressing every day. You may remove the dressing and shower. Do not leave wound open to air, apply new dressing immediately. Cleanse the wounds with mild soap and water, pat dry. STOP the Hibiclens. Apply Mupirocin to the wounds. Cover with gauze/ABD   Secure with roll gauze and tape. This was applied today at the [facility]. Standing Status:   Future     Standing Expiration Date:   11/1/2024    Wound compression and edema control     Apply Circaid devices as instructed first thing qAM & remove qHS. Avoid prolonged standing in one place. Elevate leg(s) above the level of the heart when sitting or as much as possible. Standing Status:   Future     Standing Expiration Date:   11/1/2024        Diagnosis ICD-10-CM Associated Orders   1. Chronic venous hypertension (idiopathic) with ulcer of left lower extremity (CODE) ([facility])  I87.312 Wound cleansing and dressings     Wound compression and edema control      2.  Chronic venous hypertension (idiopathic) with ulcer of right lower extremity (CODE) ([facility])  I87.311 Wound cleansing and dressings     Wound compression and edema control

## 2023-11-01 NOTE — PATIENT INSTRUCTIONS
Orders Placed This Encounter   Procedures    Wound cleansing and dressings     Wound location right and left legs. Change dressing every day. You may remove the dressing and shower. Do not leave wound open to air, apply new dressing immediately. Cleanse the wounds with mild soap and water, pat dry. STOP the Hibiclens. Apply Mupirocin to the wounds. Cover with gauze/ABD   Secure with roll gauze and tape. This was applied today at the [facility]. Standing Status:   Future     Standing Expiration Date:   11/1/2024    Wound compression and edema control     Apply Circaid devices as instructed first thing qAM & remove qHS. Avoid prolonged standing in one place. Elevate leg(s) above the level of the heart when sitting or as much as possible.      Standing Status:   Future     Standing Expiration Date:   11/1/2024

## 2023-11-03 DIAGNOSIS — M54.50 CHRONIC BILATERAL LOW BACK PAIN WITHOUT SCIATICA: ICD-10-CM

## 2023-11-03 DIAGNOSIS — G89.29 CHRONIC BILATERAL LOW BACK PAIN WITHOUT SCIATICA: ICD-10-CM

## 2023-11-03 RX ORDER — HYDROCODONE BITARTRATE AND ACETAMINOPHEN 5; 300 MG/1; MG/1
1 TABLET ORAL EVERY 6 HOURS PRN
Qty: 112 TABLET | Refills: 0 | Status: SHIPPED | OUTPATIENT
Start: 2023-11-03

## 2023-11-06 ENCOUNTER — TELEPHONE (OUTPATIENT)
Dept: INTERNAL MEDICINE CLINIC | Facility: CLINIC | Age: 63
End: 2023-11-06

## 2023-11-08 ENCOUNTER — OFFICE VISIT (OUTPATIENT)
Dept: WOUND CARE | Facility: HOSPITAL | Age: 63
End: 2023-11-08
Payer: COMMERCIAL

## 2023-11-08 VITALS
RESPIRATION RATE: 16 BRPM | HEART RATE: 88 BPM | TEMPERATURE: 97.6 F | DIASTOLIC BLOOD PRESSURE: 62 MMHG | SYSTOLIC BLOOD PRESSURE: 141 MMHG

## 2023-11-08 DIAGNOSIS — I87.312 CHRONIC VENOUS HYPERTENSION (IDIOPATHIC) WITH ULCER OF LEFT LOWER EXTREMITY (CODE) (HCC): Primary | ICD-10-CM

## 2023-11-08 DIAGNOSIS — I87.311 CHRONIC VENOUS HYPERTENSION (IDIOPATHIC) WITH ULCER OF RIGHT LOWER EXTREMITY (CODE) (HCC): ICD-10-CM

## 2023-11-08 DIAGNOSIS — L97.911 NON-PRESSURE CHRONIC ULCER OF RIGHT LOWER LEG, LIMITED TO BREAKDOWN OF SKIN (HCC): ICD-10-CM

## 2023-11-08 DIAGNOSIS — L97.921 NON-PRESSURE CHRONIC ULCER OF LEFT LOWER LEG, LIMITED TO BREAKDOWN OF SKIN (HCC): ICD-10-CM

## 2023-11-08 PROCEDURE — 99212 OFFICE O/P EST SF 10 MIN: CPT | Performed by: FAMILY MEDICINE

## 2023-11-08 PROCEDURE — G0463 HOSPITAL OUTPT CLINIC VISIT: HCPCS | Performed by: FAMILY MEDICINE

## 2023-11-08 PROCEDURE — 99214 OFFICE O/P EST MOD 30 MIN: CPT | Performed by: FAMILY MEDICINE

## 2023-11-08 NOTE — PROGRESS NOTES
Patient ID: Peña Paz is a 58 y.o. male Date of Birth 1960     Chief Complaint  Chief Complaint   Patient presents with    Follow Up Wound Care Visit     BLE wounds       Allergies  Bactrim [sulfamethoxazole-trimethoprim], Cefaclor, and Simvastatin    Assessment:    No problem-specific Assessment & Plan notes found for this encounter. Diagnoses and all orders for this visit:    Chronic venous hypertension (idiopathic) with ulcer of left lower extremity (CODE) ([facility])  -     Wound cleansing and dressings; Future  -     Wound compression and edema control; Future    Chronic venous hypertension (idiopathic) with ulcer of right lower extremity (CODE) ([facility])  -     Wound cleansing and dressings; Future  -     Wound compression and edema control; Future    Non-pressure chronic ulcer of left lower leg, limited to breakdown of skin (HCC)  -     Wound cleansing and dressings; Future  -     Wound compression and edema control; Future    Non-pressure chronic ulcer of right lower leg, limited to breakdown of skin (HCC)  -     Wound cleansing and dressings; Future  -     Wound compression and edema control; Future              Procedures    Plan:   Wounds are improved  Continue wound management with mupirocin, see wound orders below  Weekly cleanse with Hibiclens  Continue compression with Circaids  Keep legs elevated whenever seated and avoid prolonged standing  Followup in 2 weeks or call sooner with questions/concerns    Wound 09/05/23 Venous Ulcer Leg Right; Anterior (Active)   Wound Image Images linked 11/08/23 1114   Wound Description Pink;Epithelialization 11/08/23 1117   Bridget-wound Assessment Intact;Edema 11/08/23 1117   Wound Length (cm) 18 cm 11/08/23 1117   Wound Width (cm) 26 cm 11/08/23 1117   Wound Depth (cm) 0.1 cm 11/08/23 1117   Wound Surface Area (cm^2) 468 cm^2 11/08/23 1117   Wound Volume (cm^3) 46.8 cm^3 11/08/23 1117   Calculated Wound Volume (cm^3) 46.8 cm^3 11/08/23 1117   Change in Wound Size % -71.43 11/08/23 1117   Drainage Amount Scant 11/08/23 1117   Drainage Description Serosanguineous 11/08/23 1117   Non-staged Wound Description Full thickness 11/08/23 1117   Dressing Status Intact 11/08/23 1117       Wound 09/05/23 Venous Ulcer Leg Left (Active)   Wound Image Images linked 11/08/23 1114   Wound Description Pink;Epithelialization 11/08/23 1118   Bridget-wound Assessment Intact;Edema 11/08/23 1118   Wound Length (cm) 14 cm 11/08/23 1118   Wound Width (cm) 17 cm 11/08/23 1118   Wound Depth (cm) 0.1 cm 11/08/23 1118   Wound Surface Area (cm^2) 238 cm^2 11/08/23 1118   Wound Volume (cm^3) 23.8 cm^3 11/08/23 1118   Calculated Wound Volume (cm^3) 23.8 cm^3 11/08/23 1118   Change in Wound Size % -252.07 11/08/23 1118   Drainage Amount Scant 11/08/23 1118   Drainage Description Serosanguineous 11/08/23 1118   Non-staged Wound Description Full thickness 11/08/23 1118   Dressing Status Intact 11/08/23 1118       Wound 09/05/23 Venous Ulcer Leg Right; Anterior (Active)   Date First Assessed/Time First Assessed: 09/05/23 1401   Present on Original Admission: Yes  Primary Wound Type: Venous Ulcer  Location: Leg  Wound Location Orientation: Right; Anterior       Wound 09/05/23 Venous Ulcer Leg Left (Active)   Date First Assessed/Time First Assessed: 09/05/23 1401   Present on Original Admission: Yes  Primary Wound Type: Venous Ulcer  Location: Leg  Wound Location Orientation: Left       [REMOVED] Wound 06/10/20 Shoulder Left (Removed)   Resolved Date: 09/05/23  Date First Assessed/Time First Assessed: 06/10/20 1051   Location: Shoulder  Wound Location Orientation: Left  Wound Description (Comments): ARM SLING  Incision's 1st Dressing: DRESSING MEPILEX AG BORDER 4 X 4 IN  Wound Outcom. .. Subjective:      . Patient presents for followup of bilateral LE venous ulcers. Has been using mupirocin on the open areas and circaids for compression. No increased pain or drainage.          The following portions of the patient's history were reviewed and updated as appropriate: He  has a past medical history of Abscess of left thigh, Acute myocardial infarction [facility]), Anesthesia, Anxiety, Arteriosclerotic cardiovascular disease, Arthritis, Asthma, Chest pain, COPD (chronic obstructive pulmonary disease) ([address]), Coronary artery disease, Diabetes mellitus ([address]), Fatty liver, Gastric ulcer, GERD (gastroesophageal reflux disease), Hyperlipidemia, Hypertension, Low back pain, Myocardial infarction [facility]) ([id number]), Obesity, Obstructive sleep apnea, and Spondylosis of lumbar region without myelopathy or radiculopathy. He   Patient Active Problem List    Diagnosis Date Noted    FREDY (obstructive sleep apnea) 09/11/2023    Continuous opioid dependence ([address]) 05/23/2023    Cellulitis of right lower extremity 04/10/2023    Staph skin infection 12/01/2022    Preoperative clearance 06/23/2021    Acute on chronic systolic congestive heart failure ([address]) 05/04/2021    Diabetes mellitus ([address])     Lesion of nose 11/13/2020    Type 2 diabetes mellitus, with long-term current use of insulin ([address]) 07/06/2020    Obstructive sleep apnea 07/06/2020    Aftercare following surgery of the musculoskeletal system 06/15/2020    Impingement syndrome of left shoulder 01/14/2020    Adhesive capsulitis of both shoulders 01/14/2020    Chronic left shoulder pain 08/21/2019    Healthcare maintenance 04/10/2019    Morbid obesity with body mass index of 45.0-49.9 in adult [facility]) 11/29/2018    Low back pain 10/22/2018    Dysuria 07/24/2018    Lumbar radiculopathy 12/29/2017    Myofascial pain syndrome 12/29/2017    Sacroiliitis ([address]) 12/29/2017    Chest pain 08/02/2016    Spondylosis of lumbar region without myelopathy or radiculopathy 04/15/2014    Benign essential hypertension 10/16/2012    Coronary artery disease 06/14/2012    Chronic obstructive pulmonary disease ([address]) 06/14/2012     He  reports that he quit smoking about 9 years ago.  His smoking use included cigarettes. He started smoking about 47 years ago. He has a 57.00 pack-year smoking history. He has never used smokeless tobacco. He reports that he does not drink alcohol and does not use drugs. Current Outpatient Medications   Medication Sig Dispense Refill    acetaminophen (TYLENOL) 325 mg tablet Take 650 mg by mouth every 6 (six) hours as needed for mild pain      albuterol (PROVENTIL HFA,VENTOLIN HFA) 90 mcg/act inhaler Inhale 2 puffs as needed for shortness of breath 18 g 1    Aspirin Low Dose 81 MG EC tablet TAKE 1 TABLET BY MOUTH EVERY DAY 90 tablet 1    atorvastatin (LIPITOR) 40 mg tablet TAKE 1 TABLET BY MOUTH EVERY DAY 90 tablet 2    clopidogrel (PLAVIX) 75 mg tablet TAKE 1 TABLET BY MOUTH EVERY DAY 90 tablet 0    ezetimibe (ZETIA) 10 mg tablet Take 10 mg by mouth daily      fenofibrate (TRICOR) 145 mg tablet TAKE 1 TABLET BY MOUTH EVERY DAY 90 tablet 1    furosemide (LASIX) 40 mg tablet       HYDROcodone-acetaminophen (Vicodin) 5-300 MG per tablet Take 1 tablet by mouth every 6 (six) hours as needed for moderate pain Max Daily Amount: 4 tablets 112 tablet 0    Icosapent Ethyl 1 g CAPS Vascepa 1 gram capsule   TAKE 2 G BY MOUTH 2 (TWO) TIMES A DAY.       Insulin Pen Needle (B-D UF III MINI PEN NEEDLES) 31G X 5 MM MISC Inject as directed 2 (two) times a day Use as directed 100 each 0    itraconazole (SPORANOX) 100 mg capsule PLEASE SEE ATTACHED FOR DETAILED DIRECTIONS      Jardiance 25 MG TABS TAKE 1 TABLET BY MOUTH EVERY DAY 90 tablet 1    losartan (COZAAR) 25 mg tablet Take 1 tablet (25 mg total) by mouth daily 90 tablet 3    metoprolol tartrate (LOPRESSOR) 50 mg tablet Take 25 mg by mouth 2 (two) times a day      mupirocin (BACTROBAN) 2 % ointment Apply topically daily 30 g 2    nitroglycerin (NITROSTAT) 0.4 mg SL tablet PLEASE SEE ATTACHED FOR DETAILED DIRECTIONS      pantoprazole (PROTONIX) 40 mg tablet TAKE 1 TABLET BY MOUTH EVERY DAY 90 tablet 5    Semglee, yfgn, 100 UNIT/ML SOPN PLEASE SEE ATTACHED FOR DETAILED DIRECTIONS      Sulfacetamide Sodium, Acne, 10 % LOTN Apply topically 2 (two) times a day 118 mL 0     No current facility-administered medications for this visit. He is allergic to bactrim [sulfamethoxazole-trimethoprim], cefaclor, and simvastatin. .    Review of Systems   Constitutional:  Negative for chills and fever. HENT:  Negative for congestion and sneezing. Respiratory:  Negative for cough. Cardiovascular:  Positive for leg swelling. Skin:  Positive for wound. Psychiatric/Behavioral:  Negative for agitation. Objective:       Wound 09/05/23 Venous Ulcer Leg Right; Anterior (Active)   Wound Image Images linked 11/08/23 1114   Wound Description Pink;Epithelialization 11/08/23 1117   Bridget-wound Assessment Intact;Edema 11/08/23 1117   Wound Length (cm) 18 cm 11/08/23 1117   Wound Width (cm) 26 cm 11/08/23 1117   Wound Depth (cm) 0.1 cm 11/08/23 1117   Wound Surface Area (cm^2) 468 cm^2 11/08/23 1117   Wound Volume (cm^3) 46.8 cm^3 11/08/23 1117   Calculated Wound Volume (cm^3) 46.8 cm^3 11/08/23 1117   Change in Wound Size % -71.43 11/08/23 1117   Drainage Amount Scant 11/08/23 1117   Drainage Description Serosanguineous 11/08/23 1117   Non-staged Wound Description Full thickness 11/08/23 1117   Dressing Status Intact 11/08/23 1117       Wound 09/05/23 Venous Ulcer Leg Left (Active)   Wound Image Images linked 11/08/23 1114   Wound Description Pink;Epithelialization 11/08/23 1118   Bridget-wound Assessment Intact;Edema 11/08/23 1118   Wound Length (cm) 14 cm 11/08/23 1118   Wound Width (cm) 17 cm 11/08/23 1118   Wound Depth (cm) 0.1 cm 11/08/23 1118   Wound Surface Area (cm^2) 238 cm^2 11/08/23 1118   Wound Volume (cm^3) 23.8 cm^3 11/08/23 1118   Calculated Wound Volume (cm^3) 23.8 cm^3 11/08/23 1118   Change in Wound Size % -252.07 11/08/23 1118   Drainage Amount Scant 11/08/23 1118   Drainage Description Serosanguineous 11/08/23 1118   Non-staged Wound Description Full thickness 11/08/23 1118   Dressing Status Intact 11/08/23 1118       /62   Pulse 88   Temp 97.6 °F (36.4 °C)   Resp 16     Physical Exam  Vitals reviewed. Constitutional:       General: He is not in acute distress. Appearance: Normal appearance. He is obese. He is not ill-appearing, toxic-appearing or diaphoretic. HENT:      Head: Normocephalic and atraumatic. Right Ear: External ear normal.      Left Ear: External ear normal.   Eyes:      Conjunctiva/sclera: Conjunctivae normal.   Pulmonary:      Effort: Pulmonary effort is normal. No respiratory distress. Musculoskeletal:      Cervical back: Neck supple. Right lower leg: Edema present. Left lower leg: Edema present. Skin:     Comments: See wound assessment   Neurological:      Mental Status: He is alert. Psychiatric:         Mood and Affect: Mood normal.         Behavior: Behavior normal.           Wound 09/05/23 Venous Ulcer Leg Right; Anterior (Active)   Wound Image     11/08/23 1114   Wound Description Pink;Epithelialization 11/08/23 1117   Bridget-wound Assessment Intact;Edema 11/08/23 1117   Wound Length (cm) 18 cm 11/08/23 1117   Wound Width (cm) 26 cm 11/08/23 1117   Wound Depth (cm) 0.1 cm 11/08/23 1117   Wound Surface Area (cm^2) 468 cm^2 11/08/23 1117   Wound Volume (cm^3) 46.8 cm^3 11/08/23 1117   Calculated Wound Volume (cm^3) 46.8 cm^3 11/08/23 1117   Change in Wound Size % -71.43 11/08/23 1117   Drainage Amount Scant 11/08/23 1117   Drainage Description Serosanguineous 11/08/23 1117   Non-staged Wound Description Full thickness 11/08/23 1117   Patient Tolerance Tolerated well 10/18/23 0959   Dressing Status Intact 11/08/23 1117       Wound 09/05/23 Venous Ulcer Leg Left (Active)   Wound Image     11/08/23 1114   Wound Description Pink;Epithelialization 11/08/23 1118   Bridget-wound Assessment Intact;Edema 11/08/23 1118   Wound Length (cm) 14 cm 11/08/23 1118   Wound Width (cm) 17 cm 11/08/23 1118   Wound Depth (cm) 0.1 cm 11/08/23 1118   Wound Surface Area (cm^2) 238 cm^2 11/08/23 1118   Wound Volume (cm^3) 23.8 cm^3 11/08/23 1118   Calculated Wound Volume (cm^3) 23.8 cm^3 11/08/23 1118   Change in Wound Size % -252.07 11/08/23 1118   Drainage Amount Scant 11/08/23 1118   Drainage Description Serosanguineous 11/08/23 1118   Non-staged Wound Description Full thickness 11/08/23 1118   Patient Tolerance Tolerated well 10/18/23 1000   Dressing Status Intact 11/08/23 1118                         Wound Instructions:  Orders Placed This Encounter   Procedures    Wound cleansing and dressings     Wound location right and left legs. Change dressing every day. You may remove the dressing and shower. Do not leave wound open to air, apply new dressing immediately. Cleanse with Hibiclens one time per week, other days use your regular soap. Apply Mupirocin to the wounds. Cover with gauze/ABD   Secure with roll gauze and tape. This was applied today at the [facility]. Standing Status:   Future     Standing Expiration Date:   11/8/2024    Wound compression and edema control     Apply Circaid devices as instructed first thing qAM & remove qHS. Avoid prolonged standing in one place. Elevate leg(s) above the level of the heart when sitting or as much as possible. Standing Status:   Future     Standing Expiration Date:   11/8/2024        Diagnosis ICD-10-CM Associated Orders   1. Chronic venous hypertension (idiopathic) with ulcer of left lower extremity (CODE) ([facility])  I87.312 Wound cleansing and dressings     Wound compression and edema control      2. Chronic venous hypertension (idiopathic) with ulcer of right lower extremity (CODE) ([facility])  I87.311 Wound cleansing and dressings     Wound compression and edema control      3. Non-pressure chronic ulcer of left lower leg, limited to breakdown of skin ([facility])  L97.921 Wound cleansing and dressings     Wound compression and edema control      4.  Non-pressure chronic ulcer of right lower leg, limited to breakdown of skin ([facility])  L97.911 Wound cleansing and dressings     Wound compression and edema control

## 2023-11-08 NOTE — PATIENT INSTRUCTIONS
Orders Placed This Encounter   Procedures    Wound cleansing and dressings     Wound location right and left legs. Change dressing every day. You may remove the dressing and shower. Do not leave wound open to air, apply new dressing immediately. Cleanse with Hibiclens one time per week, other days use your regular soap. Apply Mupirocin to the wounds. Cover with gauze/ABD   Secure with roll gauze and tape. This was applied today at the [facility]. Standing Status:   Future     Standing Expiration Date:   11/8/2024    Wound compression and edema control     Apply Circaid devices as instructed first thing qAM & remove qHS. Avoid prolonged standing in one place. Elevate leg(s) above the level of the heart when sitting or as much as possible.      Standing Status:   Future     Standing Expiration Date:   11/8/2024

## 2023-11-21 ENCOUNTER — APPOINTMENT (OUTPATIENT)
Dept: LAB | Age: 63
End: 2023-11-21
Payer: COMMERCIAL

## 2023-11-21 DIAGNOSIS — E11.9 TYPE 2 DIABETES MELLITUS WITHOUT COMPLICATION, WITH LONG-TERM CURRENT USE OF INSULIN (HCC): ICD-10-CM

## 2023-11-21 DIAGNOSIS — Z79.4 TYPE 2 DIABETES MELLITUS WITHOUT COMPLICATION, WITH LONG-TERM CURRENT USE OF INSULIN (HCC): ICD-10-CM

## 2023-11-21 DIAGNOSIS — I10 BENIGN ESSENTIAL HYPERTENSION: ICD-10-CM

## 2023-11-21 DIAGNOSIS — E78.2 MIXED HYPERLIPIDEMIA: ICD-10-CM

## 2023-11-21 LAB
ANION GAP SERPL CALCULATED.3IONS-SCNC: 11 MMOL/L
BUN SERPL-MCNC: 22 MG/DL (ref 5–25)
CALCIUM SERPL-MCNC: 9.2 MG/DL (ref 8.4–10.2)
CHLORIDE SERPL-SCNC: 104 MMOL/L (ref 96–108)
CHOLEST SERPL-MCNC: 97 MG/DL
CO2 SERPL-SCNC: 24 MMOL/L (ref 21–32)
CREAT SERPL-MCNC: 1.48 MG/DL (ref 0.6–1.3)
EST. AVERAGE GLUCOSE BLD GHB EST-MCNC: 123 MG/DL
GFR SERPL CREATININE-BSD FRML MDRD: 49 ML/MIN/1.73SQ M
GLUCOSE P FAST SERPL-MCNC: 76 MG/DL (ref 65–99)
HBA1C MFR BLD: 5.9 %
HDLC SERPL-MCNC: 19 MG/DL
LDLC SERPL CALC-MCNC: 41 MG/DL (ref 0–100)
NONHDLC SERPL-MCNC: 78 MG/DL
POTASSIUM SERPL-SCNC: 4.5 MMOL/L (ref 3.5–5.3)
SODIUM SERPL-SCNC: 139 MMOL/L (ref 135–147)
TRIGL SERPL-MCNC: 187 MG/DL

## 2023-11-21 PROCEDURE — 83036 HEMOGLOBIN GLYCOSYLATED A1C: CPT

## 2023-11-21 PROCEDURE — 36415 COLL VENOUS BLD VENIPUNCTURE: CPT

## 2023-11-21 PROCEDURE — 80061 LIPID PANEL: CPT

## 2023-11-21 PROCEDURE — 80048 BASIC METABOLIC PNL TOTAL CA: CPT

## 2023-11-22 ENCOUNTER — OFFICE VISIT (OUTPATIENT)
Dept: WOUND CARE | Facility: HOSPITAL | Age: 63
End: 2023-11-22
Payer: COMMERCIAL

## 2023-11-22 VITALS
HEART RATE: 67 BPM | SYSTOLIC BLOOD PRESSURE: 166 MMHG | TEMPERATURE: 98.6 F | DIASTOLIC BLOOD PRESSURE: 75 MMHG | RESPIRATION RATE: 18 BRPM

## 2023-11-22 DIAGNOSIS — L97.921 NON-PRESSURE CHRONIC ULCER OF LEFT LOWER LEG, LIMITED TO BREAKDOWN OF SKIN (HCC): ICD-10-CM

## 2023-11-22 DIAGNOSIS — I87.311 CHRONIC VENOUS HYPERTENSION (IDIOPATHIC) WITH ULCER OF RIGHT LOWER EXTREMITY (CODE) (HCC): ICD-10-CM

## 2023-11-22 DIAGNOSIS — L97.911 NON-PRESSURE CHRONIC ULCER OF RIGHT LOWER LEG, LIMITED TO BREAKDOWN OF SKIN (HCC): ICD-10-CM

## 2023-11-22 DIAGNOSIS — I87.312 CHRONIC VENOUS HYPERTENSION (IDIOPATHIC) WITH ULCER OF LEFT LOWER EXTREMITY (CODE) (HCC): Primary | ICD-10-CM

## 2023-11-22 PROCEDURE — 99214 OFFICE O/P EST MOD 30 MIN: CPT | Performed by: FAMILY MEDICINE

## 2023-11-22 PROCEDURE — 99212 OFFICE O/P EST SF 10 MIN: CPT | Performed by: FAMILY MEDICINE

## 2023-11-22 PROCEDURE — G0463 HOSPITAL OUTPT CLINIC VISIT: HCPCS | Performed by: FAMILY MEDICINE

## 2023-11-22 NOTE — PROGRESS NOTES
Patient ID: Judith Alford is a 61 y.o. male Date of Birth 1960     Chief Complaint  Chief Complaint   Patient presents with    Follow Up Wound Care Visit     BLE wounds       Allergies  Bactrim [sulfamethoxazole-trimethoprim], Cefaclor, and Simvastatin    Assessment:    No problem-specific Assessment & Plan notes found for this encounter. Diagnoses and all orders for this visit:    Chronic venous hypertension (idiopathic) with ulcer of left lower extremity (CODE) (Formerly [facility])  -     Wound cleansing and dressings; Future  -     Wound compression and edema control; Future    Chronic venous hypertension (idiopathic) with ulcer of right lower extremity (CODE) (HCC)  -     Wound cleansing and dressings; Future  -     Wound compression and edema control; Future    Non-pressure chronic ulcer of left lower leg, limited to breakdown of skin (HCC)  -     Wound cleansing and dressings; Future  -     Wound compression and edema control; Future    Non-pressure chronic ulcer of right lower leg, limited to breakdown of skin (HCC)  -     Wound cleansing and dressings; Future  -     Wound compression and edema control; Future              Procedures    Plan:    Wounds are improved  Continue wound management with mupirocin, see wound orders below  Weekly cleanse with Hibiclens  Continue compression with Circaids  Keep legs elevated whenever seated and avoid prolonged standing  Followup in 2 weeks or call sooner with questions/concerns    Wound 09/05/23 Venous Ulcer Leg Right; Anterior (Active)   Wound Image Images linked 11/22/23 0926   Wound Description Pink;Brown;Epithelialization 11/22/23 1017   Bridget-wound Assessment Intact;Edema 11/22/23 1017   Wound Length (cm) 17.5 cm 11/22/23 1017   Wound Width (cm) 17 cm 11/22/23 1017   Wound Depth (cm) 0.1 cm 11/22/23 1017   Wound Surface Area (cm^2) 297.5 cm^2 11/22/23 1017   Wound Volume (cm^3) 29.75 cm^3 11/22/23 1017   Calculated Wound Volume (cm^3) 29.75 cm^3 11/22/23 1017 Change in Wound Size % -8.97 11/22/23 1017   Drainage Amount Scant 11/22/23 1017   Drainage Description Sanguineous 11/22/23 1017   Non-staged Wound Description Full thickness 11/22/23 1017   Dressing Status Intact 11/22/23 1017       Wound 09/05/23 Venous Ulcer Leg Left (Active)   Wound Image Images linked 11/22/23 0926   Wound Description Pink;Epithelialization 11/22/23 1017   Bridget-wound Assessment Intact;Edema 11/22/23 1017   Wound Length (cm) 14 cm 11/22/23 1017   Wound Width (cm) 14 cm 11/22/23 1017   Wound Depth (cm) 0.1 cm 11/22/23 1017   Wound Surface Area (cm^2) 196 cm^2 11/22/23 1017   Wound Volume (cm^3) 19.6 cm^3 11/22/23 1017   Calculated Wound Volume (cm^3) 19.6 cm^3 11/22/23 1017   Change in Wound Size % -189.94 11/22/23 1017   Drainage Amount Scant 11/22/23 1017   Drainage Description Serosanguineous 11/22/23 1017   Non-staged Wound Description Full thickness 11/22/23 1017   Dressing Status Intact 11/22/23 1017       Wound 09/05/23 Venous Ulcer Leg Right; Anterior (Active)   Date First Assessed/Time First Assessed: 09/05/23 1401   Present on Original Admission: Yes  Primary Wound Type: Venous Ulcer  Location: Leg  Wound Location Orientation: Right; Anterior       Wound 09/05/23 Venous Ulcer Leg Left (Active)   Date First Assessed/Time First Assessed: 09/05/23 1401   Present on Original Admission: Yes  Primary Wound Type: Venous Ulcer  Location: Leg  Wound Location Orientation: Left       [REMOVED] Wound 06/10/20 Shoulder Left (Removed)   Resolved Date: 09/05/23  Date First Assessed/Time First Assessed: 06/10/20 1051   Location: Shoulder  Wound Location Orientation: Left  Wound Description (Comments): ARM SLING  Incision's 1st Dressing: DRESSING MEPILEX AG BORDER 4 X 4 IN  Wound Outcom. .. Subjective:      . Patient presents for followup of bilateral LE venous ulcers. Has been using mupirocin on the open areas and circaids for compression. No increased pain or drainage.          The following portions of the patient's history were reviewed and updated as appropriate: He  has a past medical history of Abscess of left thigh, Acute myocardial infarction [facility]), Anesthesia, Anxiety, Arteriosclerotic cardiovascular disease, Arthritis, Asthma, Chest pain, COPD (chronic obstructive pulmonary disease) ([address]), Coronary artery disease, Diabetes mellitus ([address]), Fatty liver, Gastric ulcer, GERD (gastroesophageal reflux disease), Hyperlipidemia, Hypertension, Low back pain, Myocardial infarction [facility]) ([id number]), Obesity, Obstructive sleep apnea, and Spondylosis of lumbar region without myelopathy or radiculopathy. He   Patient Active Problem List    Diagnosis Date Noted    FREDY (obstructive sleep apnea) 09/11/2023    Continuous opioid dependence ([address]) 05/23/2023    Cellulitis of right lower extremity 04/10/2023    Staph skin infection 12/01/2022    Preoperative clearance 06/23/2021    Acute on chronic systolic congestive heart failure ([address]) 05/04/2021    Diabetes mellitus ([address])     Lesion of nose 11/13/2020    Type 2 diabetes mellitus, with long-term current use of insulin ([address]) 07/06/2020    Obstructive sleep apnea 07/06/2020    Aftercare following surgery of the musculoskeletal system 06/15/2020    Impingement syndrome of left shoulder 01/14/2020    Adhesive capsulitis of both shoulders 01/14/2020    Chronic left shoulder pain 08/21/2019    Healthcare maintenance 04/10/2019    Morbid obesity with body mass index of 45.0-49.9 in adult [facility]) 11/29/2018    Low back pain 10/22/2018    Dysuria 07/24/2018    Lumbar radiculopathy 12/29/2017    Myofascial pain syndrome 12/29/2017    Sacroiliitis ([address]) 12/29/2017    Chest pain 08/02/2016    Spondylosis of lumbar region without myelopathy or radiculopathy 04/15/2014    Benign essential hypertension 10/16/2012    Coronary artery disease 06/14/2012    Chronic obstructive pulmonary disease ([address]) 06/14/2012     He  reports that he quit smoking about 9 years ago.  His smoking use included cigarettes. He started smoking about 47 years ago. He has a 57.00 pack-year smoking history. He has never used smokeless tobacco. He reports that he does not drink alcohol and does not use drugs. Current Outpatient Medications   Medication Sig Dispense Refill    acetaminophen (TYLENOL) 325 mg tablet Take 650 mg by mouth every 6 (six) hours as needed for mild pain      albuterol (PROVENTIL HFA,VENTOLIN HFA) 90 mcg/act inhaler Inhale 2 puffs as needed for shortness of breath 18 g 1    Aspirin Low Dose 81 MG EC tablet TAKE 1 TABLET BY MOUTH EVERY DAY 90 tablet 1    atorvastatin (LIPITOR) 40 mg tablet TAKE 1 TABLET BY MOUTH EVERY DAY 90 tablet 2    clopidogrel (PLAVIX) 75 mg tablet TAKE 1 TABLET BY MOUTH EVERY DAY 90 tablet 0    ezetimibe (ZETIA) 10 mg tablet Take 10 mg by mouth daily      fenofibrate (TRICOR) 145 mg tablet TAKE 1 TABLET BY MOUTH EVERY DAY 90 tablet 1    furosemide (LASIX) 40 mg tablet       HYDROcodone-acetaminophen (Vicodin) 5-300 MG per tablet Take 1 tablet by mouth every 6 (six) hours as needed for moderate pain Max Daily Amount: 4 tablets 112 tablet 0    Icosapent Ethyl 1 g CAPS Vascepa 1 gram capsule   TAKE 2 G BY MOUTH 2 (TWO) TIMES A DAY.       Insulin Pen Needle (B-D UF III MINI PEN NEEDLES) 31G X 5 MM MISC Inject as directed 2 (two) times a day Use as directed 100 each 0    itraconazole (SPORANOX) 100 mg capsule PLEASE SEE ATTACHED FOR DETAILED DIRECTIONS      Jardiance 25 MG TABS TAKE 1 TABLET BY MOUTH EVERY DAY 90 tablet 1    losartan (COZAAR) 25 mg tablet Take 1 tablet (25 mg total) by mouth daily 90 tablet 3    metoprolol tartrate (LOPRESSOR) 50 mg tablet Take 25 mg by mouth 2 (two) times a day      mupirocin (BACTROBAN) 2 % ointment Apply topically daily 30 g 2    nitroglycerin (NITROSTAT) 0.4 mg SL tablet PLEASE SEE ATTACHED FOR DETAILED DIRECTIONS      pantoprazole (PROTONIX) 40 mg tablet TAKE 1 TABLET BY MOUTH EVERY DAY 90 tablet 5    Semglee, yfgn, 100 UNIT/ML SOPN PLEASE SEE ATTACHED FOR DETAILED DIRECTIONS      Sulfacetamide Sodium, Acne, 10 % LOTN Apply topically 2 (two) times a day 118 mL 0     No current facility-administered medications for this visit. He is allergic to bactrim [sulfamethoxazole-trimethoprim], cefaclor, and simvastatin. .    Review of Systems   Constitutional:  Negative for chills and fever. HENT:  Negative for congestion and sneezing. Respiratory:  Negative for cough. Cardiovascular:  Positive for leg swelling. Skin:  Positive for wound. Psychiatric/Behavioral:  Negative for agitation. Objective:       Wound 09/05/23 Venous Ulcer Leg Right; Anterior (Active)   Wound Image Images linked 11/22/23 0926   Wound Description Pink;Brown;Epithelialization 11/22/23 1017   Bridget-wound Assessment Intact;Edema 11/22/23 1017   Wound Length (cm) 17.5 cm 11/22/23 1017   Wound Width (cm) 17 cm 11/22/23 1017   Wound Depth (cm) 0.1 cm 11/22/23 1017   Wound Surface Area (cm^2) 297.5 cm^2 11/22/23 1017   Wound Volume (cm^3) 29.75 cm^3 11/22/23 1017   Calculated Wound Volume (cm^3) 29.75 cm^3 11/22/23 1017   Change in Wound Size % -8.97 11/22/23 1017   Drainage Amount Scant 11/22/23 1017   Drainage Description Sanguineous 11/22/23 1017   Non-staged Wound Description Full thickness 11/22/23 1017   Dressing Status Intact 11/22/23 1017       Wound 09/05/23 Venous Ulcer Leg Left (Active)   Wound Image Images linked 11/22/23 0926   Wound Description Pink;Epithelialization 11/22/23 1017   Bridget-wound Assessment Intact;Edema 11/22/23 1017   Wound Length (cm) 14 cm 11/22/23 1017   Wound Width (cm) 14 cm 11/22/23 1017   Wound Depth (cm) 0.1 cm 11/22/23 1017   Wound Surface Area (cm^2) 196 cm^2 11/22/23 1017   Wound Volume (cm^3) 19.6 cm^3 11/22/23 1017   Calculated Wound Volume (cm^3) 19.6 cm^3 11/22/23 1017   Change in Wound Size % -189.94 11/22/23 1017   Drainage Amount Scant 11/22/23 1017   Drainage Description Serosanguineous 11/22/23 1017   Non-staged Wound Description Full thickness 11/22/23 1017   Dressing Status Intact 11/22/23 1017       /75   Pulse 67   Temp 98.6 °F (37 °C)   Resp 18     Physical Exam  Constitutional:       General: He is not in acute distress. Appearance: Normal appearance. He is obese. He is not ill-appearing, toxic-appearing or diaphoretic. HENT:      Head: Normocephalic and atraumatic. Right Ear: External ear normal.      Left Ear: External ear normal.   Eyes:      Conjunctiva/sclera: Conjunctivae normal.   Pulmonary:      Effort: Pulmonary effort is normal. No respiratory distress. Musculoskeletal:      Cervical back: Neck supple. Right lower leg: Edema present. Left lower leg: Edema present. Skin:     Comments: See wound assessment, few scattered pustules noted on the right lower extremity   Neurological:      Mental Status: He is alert. Psychiatric:         Mood and Affect: Mood normal.         Behavior: Behavior normal.           Wound 09/05/23 Venous Ulcer Leg Right; Anterior (Active)   Wound Image     11/22/23 0926   Wound Description Pink;Brown;Epithelialization 11/22/23 1017   Bridget-wound Assessment Intact;Edema 11/22/23 1017   Wound Length (cm) 17.5 cm 11/22/23 1017   Wound Width (cm) 17 cm 11/22/23 1017   Wound Depth (cm) 0.1 cm 11/22/23 1017   Wound Surface Area (cm^2) 297.5 cm^2 11/22/23 1017   Wound Volume (cm^3) 29.75 cm^3 11/22/23 1017   Calculated Wound Volume (cm^3) 29.75 cm^3 11/22/23 1017   Change in Wound Size % -8.97 11/22/23 1017   Drainage Amount Scant 11/22/23 1017   Drainage Description Sanguineous 11/22/23 1017   Non-staged Wound Description Full thickness 11/22/23 1017   Patient Tolerance Tolerated well 10/18/23 0959   Dressing Status Intact 11/22/23 1017       Wound 09/05/23 Venous Ulcer Leg Left (Active)   Wound Image    11/22/23 0926   Wound Description Pink;Epithelialization 11/22/23 1017   Bridget-wound Assessment Intact;Edema 11/22/23 1017   Wound Length (cm) 14 cm 11/22/23 1017 Wound Width (cm) 14 cm 11/22/23 1017   Wound Depth (cm) 0.1 cm 11/22/23 1017   Wound Surface Area (cm^2) 196 cm^2 11/22/23 1017   Wound Volume (cm^3) 19.6 cm^3 11/22/23 1017   Calculated Wound Volume (cm^3) 19.6 cm^3 11/22/23 1017   Change in Wound Size % -189.94 11/22/23 1017   Drainage Amount Scant 11/22/23 1017   Drainage Description Serosanguineous 11/22/23 1017   Non-staged Wound Description Full thickness 11/22/23 1017   Patient Tolerance Tolerated well 10/18/23 1000   Dressing Status Intact 11/22/23 1017                         Wound Instructions:  Orders Placed This Encounter   Procedures    Wound cleansing and dressings     Wound location right and left legs. Change dressing every day. You may remove the dressing and shower. Do not leave wound open  to air, apply new dressing immediately. Cleanse with Hibiclens one time per week, other days use your  regular soap. Apply Mupirocin to the wounds. Cover with gauze/ABD  Secure with roll gauze and tape. This was applied today at the [facility]. Standing Status:   Future     Standing Expiration Date:   11/22/2024    Wound compression and edema control     Apply Circaid devices as instructed first thing qAM & remove qHS. Avoid prolonged standing in one place. Elevate leg(s) above the level of the heart when sitting or as much  as possible. Standing Status:   Future     Standing Expiration Date:   11/22/2024        Diagnosis ICD-10-CM Associated Orders   1. Chronic venous hypertension (idiopathic) with ulcer of left lower extremity (CODE) ([facility])  I87.312 Wound cleansing and dressings     Wound compression and edema control      2. Chronic venous hypertension (idiopathic) with ulcer of right lower extremity (CODE) ([facility])  I87.311 Wound cleansing and dressings     Wound compression and edema control      3.  Non-pressure chronic ulcer of left lower leg, limited to breakdown of skin ([facility])  L97.921 Wound cleansing and dressings     Wound compression and edema control      4.  Non-pressure chronic ulcer of right lower leg, limited to breakdown of skin ([facility])  L97.910 Wound cleansing and dressings     Wound compression and edema control

## 2023-11-22 NOTE — PATIENT INSTRUCTIONS
Orders Placed This Encounter   Procedures    Wound cleansing and dressings     Wound location right and left legs. Change dressing every day. You may remove the dressing and shower. Do not leave wound open  to air, apply new dressing immediately. Cleanse with Hibiclens one time per week, other days use your  regular soap. Apply Mupirocin to the wounds. Cover with gauze/ABD  Secure with roll gauze and tape. This was applied today at the [facility]. Standing Status:   Future     Standing Expiration Date:   11/22/2024    Wound compression and edema control     Apply Circaid devices as instructed first thing qAM & remove qHS. Avoid prolonged standing in one place. Elevate leg(s) above the level of the heart when sitting or as much  as possible.      Standing Status:   Future     Standing Expiration Date:   11/22/2024

## 2023-11-25 DIAGNOSIS — E11.59 TYPE 2 DIABETES MELLITUS WITH OTHER CIRCULATORY COMPLICATION, WITH LONG-TERM CURRENT USE OF INSULIN (HCC): ICD-10-CM

## 2023-11-25 DIAGNOSIS — Z79.4 TYPE 2 DIABETES MELLITUS WITH OTHER CIRCULATORY COMPLICATION, WITH LONG-TERM CURRENT USE OF INSULIN (HCC): ICD-10-CM

## 2023-11-26 RX ORDER — EMPAGLIFLOZIN 25 MG/1
TABLET, FILM COATED ORAL
Qty: 90 TABLET | Refills: 1 | Status: SHIPPED | OUTPATIENT
Start: 2023-11-26

## 2023-11-27 RX ORDER — FLURBIPROFEN SODIUM 0.3 MG/ML
SOLUTION/ DROPS OPHTHALMIC 2 TIMES DAILY
Qty: 100 EACH | Refills: 0 | Status: SHIPPED | OUTPATIENT
Start: 2023-11-27

## 2023-11-29 ENCOUNTER — RA CDI HCC (OUTPATIENT)
Dept: OTHER | Facility: HOSPITAL | Age: 63
End: 2023-11-29

## 2023-11-29 NOTE — PROGRESS NOTES
[address] [facility] coding opportunities          Chart Reviewed number of suggestions sent to Provider: 2  E11.22  I13.0     Patients Insurance        Commercial Insurance: [employer]

## 2023-12-06 ENCOUNTER — OFFICE VISIT (OUTPATIENT)
Dept: WOUND CARE | Facility: HOSPITAL | Age: 63
End: 2023-12-06
Payer: COMMERCIAL

## 2023-12-06 VITALS
DIASTOLIC BLOOD PRESSURE: 72 MMHG | TEMPERATURE: 98.5 F | HEART RATE: 74 BPM | RESPIRATION RATE: 16 BRPM | SYSTOLIC BLOOD PRESSURE: 171 MMHG

## 2023-12-06 DIAGNOSIS — I87.311 CHRONIC VENOUS HYPERTENSION (IDIOPATHIC) WITH ULCER OF RIGHT LOWER EXTREMITY (CODE) (HCC): ICD-10-CM

## 2023-12-06 DIAGNOSIS — L97.921 NON-PRESSURE CHRONIC ULCER OF LEFT LOWER LEG, LIMITED TO BREAKDOWN OF SKIN (HCC): ICD-10-CM

## 2023-12-06 DIAGNOSIS — Z79.4 TYPE 2 DIABETES MELLITUS WITHOUT COMPLICATION, WITH LONG-TERM CURRENT USE OF INSULIN (HCC): ICD-10-CM

## 2023-12-06 DIAGNOSIS — E11.9 TYPE 2 DIABETES MELLITUS WITHOUT COMPLICATION, WITH LONG-TERM CURRENT USE OF INSULIN (HCC): ICD-10-CM

## 2023-12-06 DIAGNOSIS — E66.01 MORBID OBESITY DUE TO EXCESS CALORIES (HCC): ICD-10-CM

## 2023-12-06 DIAGNOSIS — L97.911 NON-PRESSURE CHRONIC ULCER OF RIGHT LOWER LEG, LIMITED TO BREAKDOWN OF SKIN (HCC): ICD-10-CM

## 2023-12-06 DIAGNOSIS — I87.312 CHRONIC VENOUS HYPERTENSION (IDIOPATHIC) WITH ULCER OF LEFT LOWER EXTREMITY (CODE) (HCC): Primary | ICD-10-CM

## 2023-12-06 PROCEDURE — 99213 OFFICE O/P EST LOW 20 MIN: CPT | Performed by: NURSE PRACTITIONER

## 2023-12-06 PROCEDURE — G0463 HOSPITAL OUTPT CLINIC VISIT: HCPCS | Performed by: NURSE PRACTITIONER

## 2023-12-06 NOTE — PROGRESS NOTES
Patient ID: Janet Acosta is a 61 y.o. male Date of Birth 1960     Chief Complaint  Chief Complaint   Patient presents with    Follow Up Wound Care Visit     Bilat LE wounds       Allergies  Bactrim [sulfamethoxazole-trimethoprim], Cefaclor, and Simvastatin    Assessment:     Diagnoses and all orders for this visit:    Chronic venous hypertension (idiopathic) with ulcer of left lower extremity (CODE) ([facility])  -     Wound cleansing and dressings; Future  -     Wound compression and edema control; Future    Chronic venous hypertension (idiopathic) with ulcer of right lower extremity (CODE) ([facility])  -     Wound cleansing and dressings; Future  -     Wound compression and edema control; Future    Non-pressure chronic ulcer of left lower leg, limited to breakdown of skin (H[facility])    Non-pressure chronic ulcer of right lower leg, limited to breakdown of skin ([address])    Type 2 diabetes mellitus without complication, with long-term current use of insulin ([address])    Morbid obesity due to excess calories ([address])            Plan:  1. F/u visit. Wounds cleansed normal saline and gauze. Wounds improving and measuring smaller and continue current plan of care. 2.  A1C results reviewed with the patient today. Patient maintaining tight glycemic control  3. Patient will follow-up in 2 weeks     Wound 09/05/23 Venous Ulcer Leg Right; Anterior (Active)   Wound Image Images linked 12/06/23 1152   Wound Description Pink;Brown;Epithelialization 12/06/23 1201   Bridget-wound Assessment Intact;Edema 12/06/23 1201   Wound Length (cm) 9 cm 12/06/23 1201   Wound Width (cm) 6.3 cm 12/06/23 1201   Wound Depth (cm) 0.1 cm 12/06/23 1201   Wound Surface Area (cm^2) 56.7 cm^2 12/06/23 1201   Wound Volume (cm^3) 5.67 cm^3 12/06/23 1201   Calculated Wound Volume (cm^3) 5.67 cm^3 12/06/23 1201   Change in Wound Size % 79.23 12/06/23 1201   Drainage Amount Small 12/06/23 1201   Drainage Description Bloody; Serosanguineous 12/06/23 1201   Non-staged Wound Description Full thickness 12/06/23 1201   Dressing Status Intact 12/06/23 1201       Wound 09/05/23 Venous Ulcer Leg Left (Active)   Wound Image Images linked 12/06/23 1152   Wound Description Pink;Epithelialization; Beefy red 12/06/23 1201   Bridget-wound Assessment Intact;Edema 12/06/23 1201   Wound Length (cm) 11.3 cm 12/06/23 1201   Wound Width (cm) 5.8 cm 12/06/23 1201   Wound Depth (cm) 0.1 cm 12/06/23 1201   Wound Surface Area (cm^2) 65.54 cm^2 12/06/23 1201   Wound Volume (cm^3) 6. 554 cm^3 12/06/23 1201   Calculated Wound Volume (cm^3) 6.55 cm^3 12/06/23 1201   Change in Wound Size % 3.11 12/06/23 1201   Drainage Amount Small 12/06/23 1201   Drainage Description Bloody; Serosanguineous 12/06/23 1201   Non-staged Wound Description Full thickness 12/06/23 1201   Dressing Status Intact 12/06/23 1201       Wound 09/05/23 Venous Ulcer Leg Right; Anterior (Active)   Date First Assessed/Time First Assessed: 09/05/23 1401   Present on Original Admission: Yes  Primary Wound Type: Venous Ulcer  Location: Leg  Wound Location Orientation: Right; Anterior       Wound 09/05/23 Venous Ulcer Leg Left (Active)   Date First Assessed/Time First Assessed: 09/05/23 1401   Present on Original Admission: Yes  Primary Wound Type: Venous Ulcer  Location: Leg  Wound Location Orientation: Left       [REMOVED] Wound 06/10/20 Shoulder Left (Removed)   Resolved Date: 09/05/23  Date First Assessed/Time First Assessed: 06/10/20 1051   Location: Shoulder  Wound Location Orientation: Left  Wound Description (Comments): ARM SLING  Incision's 1st Dressing: DRESSING MEPILEX AG BORDER 4 X 4 IN  Wound Outcom. .. Subjective:      . F/u visit for venous ulcers of bilateral lower legs. No new complaints. He denies any pain, fevers, or chills.         The following portions of the patient's history were reviewed and updated as appropriate: He  has a past medical history of Abscess of left thigh, Acute myocardial infarction [facility]), Anesthesia, Anxiety, Arteriosclerotic cardiovascular disease, Arthritis, Asthma, Chest pain, COPD (chronic obstructive pulmonary disease) ([address]), Coronary artery disease, Diabetes mellitus ([address]), Fatty liver, Gastric ulcer, GERD (gastroesophageal reflux disease), Hyperlipidemia, Hypertension, Low back pain, Myocardial infarction [facility]) ([id number]), Obesity, Obstructive sleep apnea, and Spondylosis of lumbar region without myelopathy or radiculopathy. He   Patient Active Problem List    Diagnosis Date Noted    FREDY (obstructive sleep apnea) 09/11/2023    Continuous opioid dependence ([address]) 05/23/2023    Cellulitis of right lower extremity 04/10/2023    Staph skin infection 12/01/2022    Preoperative clearance 06/23/2021    Acute on chronic systolic congestive heart failure ([address]) 05/04/2021    Diabetes mellitus ([address])     Lesion of nose 11/13/2020    Type 2 diabetes mellitus, with long-term current use of insulin ([address]) 07/06/2020    Obstructive sleep apnea 07/06/2020    Aftercare following surgery of the musculoskeletal system 06/15/2020    Impingement syndrome of left shoulder 01/14/2020    Adhesive capsulitis of both shoulders 01/14/2020    Chronic left shoulder pain 08/21/2019    Healthcare maintenance 04/10/2019    Morbid obesity with body mass index of 45.0-49.9 in adult [facility]) 11/29/2018    Low back pain 10/22/2018    Dysuria 07/24/2018    Lumbar radiculopathy 12/29/2017    Myofascial pain syndrome 12/29/2017    Sacroiliitis ([address]) 12/29/2017    Chest pain 08/02/2016    Spondylosis of lumbar region without myelopathy or radiculopathy 04/15/2014    Benign essential hypertension 10/16/2012    Coronary artery disease 06/14/2012    Chronic obstructive pulmonary disease ([address]) 06/14/2012     He  has a past surgical history that includes Coronary angioplasty with stent (2006, 2007); Neuroplasty / transposition median nerve at carpal tunnel (Right); EGD; Carpal tunnel release (Right); Colonoscopy;  Hemorrhoid surgery; and pr surgical arthroscopy shoulder w/lss&rescj ads (Left, 6/10/2020). His family history includes Alzheimer's disease in his mother; Cancer in his other; Dementia in his mother; Heart attack in his father; Heart disease in his father; Heart failure in his mother; Hypertension in his family; Other in his family and father; Stroke in his family. He  reports that he quit smoking about 9 years ago. His smoking use included cigarettes. He started smoking about 47 years ago. He has a 57.00 pack-year smoking history. He has never used smokeless tobacco. He reports that he does not drink alcohol and does not use drugs. Current Outpatient Medications   Medication Sig Dispense Refill    acetaminophen (TYLENOL) 325 mg tablet Take 650 mg by mouth every 6 (six) hours as needed for mild pain      albuterol (PROVENTIL HFA,VENTOLIN HFA) 90 mcg/act inhaler Inhale 2 puffs as needed for shortness of breath 18 g 1    Aspirin Low Dose 81 MG EC tablet TAKE 1 TABLET BY MOUTH EVERY DAY 90 tablet 1    atorvastatin (LIPITOR) 40 mg tablet TAKE 1 TABLET BY MOUTH EVERY DAY 90 tablet 2    clopidogrel (PLAVIX) 75 mg tablet TAKE 1 TABLET BY MOUTH EVERY DAY 90 tablet 0    ezetimibe (ZETIA) 10 mg tablet Take 10 mg by mouth daily      fenofibrate (TRICOR) 145 mg tablet TAKE 1 TABLET BY MOUTH EVERY DAY 90 tablet 1    furosemide (LASIX) 40 mg tablet       HYDROcodone-acetaminophen (Vicodin) 5-300 MG per tablet Take 1 tablet by mouth every 6 (six) hours as needed for moderate pain Max Daily Amount: 4 tablets 112 tablet 0    Icosapent Ethyl 1 g CAPS Vascepa 1 gram capsule   TAKE 2 G BY MOUTH 2 (TWO) TIMES A DAY.       Insulin Pen Needle (B-D UF III MINI PEN NEEDLES) 31G X 5 MM MISC Inject as directed 2 (two) times a day Use as directed 100 each 0    itraconazole (SPORANOX) 100 mg capsule PLEASE SEE ATTACHED FOR DETAILED DIRECTIONS      Jardiance 25 MG TABS TAKE 1 TABLET BY MOUTH EVERY DAY 90 tablet 1    losartan (COZAAR) 25 mg tablet Take 1 tablet (25 mg total) by mouth daily 90 tablet 3    metoprolol tartrate (LOPRESSOR) 50 mg tablet Take 25 mg by mouth 2 (two) times a day      mupirocin (BACTROBAN) 2 % ointment Apply topically daily 30 g 2    nitroglycerin (NITROSTAT) 0.4 mg SL tablet PLEASE SEE ATTACHED FOR DETAILED DIRECTIONS      pantoprazole (PROTONIX) 40 mg tablet TAKE 1 TABLET BY MOUTH EVERY DAY 90 tablet 5    Semglee, yfgn, 100 UNIT/ML SOPN PLEASE SEE ATTACHED FOR DETAILED DIRECTIONS      Sulfacetamide Sodium, Acne, 10 % LOTN Apply topically 2 (two) times a day 118 mL 0     No current facility-administered medications for this visit. He is allergic to bactrim [sulfamethoxazole-trimethoprim], cefaclor, and simvastatin. .    Review of Systems   Constitutional: Negative. HENT:  Negative for ear pain and hearing loss. Eyes:  Negative for pain. Respiratory:  Negative for chest tightness and shortness of breath. Cardiovascular:  Positive for leg swelling. Negative for chest pain and palpitations. Gastrointestinal:  Negative for diarrhea, nausea and vomiting. Genitourinary:  Negative for dysuria. Musculoskeletal:  Negative for gait problem. Skin:  Positive for wound. Neurological:  Negative for tremors and weakness. Psychiatric/Behavioral:  Negative for behavioral problems, confusion and suicidal ideas. Objective:       Wound 09/05/23 Venous Ulcer Leg Right; Anterior (Active)   Wound Image Images linked 12/06/23 1152   Wound Description Pink;Brown;Epithelialization 12/06/23 1201   Bridget-wound Assessment Intact;Edema 12/06/23 1201   Wound Length (cm) 9 cm 12/06/23 1201   Wound Width (cm) 6.3 cm 12/06/23 1201   Wound Depth (cm) 0.1 cm 12/06/23 1201   Wound Surface Area (cm^2) 56.7 cm^2 12/06/23 1201   Wound Volume (cm^3) 5.67 cm^3 12/06/23 1201   Calculated Wound Volume (cm^3) 5.67 cm^3 12/06/23 1201   Change in Wound Size % 79.23 12/06/23 1201   Drainage Amount Small 12/06/23 1201   Drainage Description Bloody; Serosanguineous 12/06/23 1201 Non-staged Wound Description Full thickness 12/06/23 1201   Dressing Status Intact 12/06/23 1201       Wound 09/05/23 Venous Ulcer Leg Left (Active)   Wound Image Images linked 12/06/23 1152   Wound Description Pink;Epithelialization; Beefy red 12/06/23 1201   Bridget-wound Assessment Intact;Edema 12/06/23 1201   Wound Length (cm) 11.3 cm 12/06/23 1201   Wound Width (cm) 5.8 cm 12/06/23 1201   Wound Depth (cm) 0.1 cm 12/06/23 1201   Wound Surface Area (cm^2) 65.54 cm^2 12/06/23 1201   Wound Volume (cm^3) 6. 554 cm^3 12/06/23 1201   Calculated Wound Volume (cm^3) 6.55 cm^3 12/06/23 1201   Change in Wound Size % 3.11 12/06/23 1201   Drainage Amount Small 12/06/23 1201   Drainage Description Bloody; Serosanguineous 12/06/23 1201   Non-staged Wound Description Full thickness 12/06/23 1201   Dressing Status Intact 12/06/23 1201       BP (!) 171/72   Pulse 74   Temp 98.5 °F (36.9 °C)   Resp 16     Physical Exam  Vitals and nursing note reviewed. Constitutional:       General: He is not in acute distress. Appearance: Normal appearance. He is obese. HENT:      Head: Normocephalic and atraumatic. Eyes:      General:         Right eye: No discharge. Left eye: No discharge. Pulmonary:      Effort: Pulmonary effort is normal. No respiratory distress. Musculoskeletal:         General: Normal range of motion. Cervical back: Normal range of motion and neck supple. No rigidity. Right lower leg: Edema present. Left lower leg: Edema present. Comments: Mild lower extremity edema bilaterally   Skin:     General: Skin is warm and dry. Findings: Wound present. No erythema. Neurological:      General: No focal deficit present. Mental Status: He is alert and oriented to person, place, and time. Mental status is at baseline. Psychiatric:         Mood and Affect: Mood normal.         Behavior: Behavior normal.         Thought Content:  Thought content normal.         Judgment: Judgment normal.                     Wound Instructions:  Orders Placed This Encounter   Procedures    Wound cleansing and dressings     Wound location right and left legs. Change dressing every day. You may remove the dressing and shower. Do not leave wound open  to air, apply new dressing immediately. Cleanse with Hibiclens one time per week, other days use your  regular soap. Apply Mupirocin to the wounds. Cover with gauze/ABD  Secure with roll gauze and tape. This was applied [facility] at the [facility]. Standing Status:   Future     Standing Expiration Date:   12/6/2024    Wound compression and edema control     Wound compression and edema control      Apply Circaid devices as instructed first thing qAM & remove qHS. Avoid prolonged standing in one place. Elevate leg(s) above the level of the heart when sitting or as much     Standing Status:   Future     Standing Expiration Date:   12/6/2024        Diagnosis ICD-10-CM Associated Orders   1. Chronic venous hypertension (idiopathic) with ulcer of left lower extremity (CODE) ([facility])  I87.312 Wound cleansing and dressings     Wound compression and edema control      2. Chronic venous hypertension (idiopathic) with ulcer of right lower extremity (CODE) ([facility])  I87.311 Wound cleansing and dressings     Wound compression and edema control      3. Non-pressure chronic ulcer of left lower leg, limited to breakdown of skin ([address])  L97.921       4. Non-pressure chronic ulcer of right lower leg, limited to breakdown of skin ([address])  L97.911       5. Type 2 diabetes mellitus without complication, with long-term current use of insulin ([facility])  E11.9     Z79.4       6.  Morbid obesity due to excess calories ([facility])  E66.01

## 2023-12-06 NOTE — PATIENT INSTRUCTIONS
Orders Placed This Encounter   Procedures    Wound cleansing and dressings     Wound location right and left legs. Change dressing every day. You may remove the dressing and shower. Do not leave wound open  to air, apply new dressing immediately. Cleanse with Hibiclens one time per week, other days use your  regular soap. Apply Mupirocin to the wounds. Cover with gauze/ABD  Secure with roll gauze and tape. This was applied today at the [facility]. Standing Status:   Future     Standing Expiration Date:   12/6/2024    Wound compression and edema control     Wound compression and edema control      Apply Circaid devices as instructed first thing qAM & remove qHS. Avoid prolonged standing in one place.   Elevate leg(s) above the level of the heart when sitting or as much     Standing Status:   Future     Standing Expiration Date:   12/6/2024

## 2023-12-11 ENCOUNTER — OFFICE VISIT (OUTPATIENT)
Dept: INTERNAL MEDICINE CLINIC | Facility: CLINIC | Age: 63
End: 2023-12-11
Payer: COMMERCIAL

## 2023-12-11 VITALS
OXYGEN SATURATION: 97 % | HEIGHT: 66 IN | DIASTOLIC BLOOD PRESSURE: 68 MMHG | BODY MASS INDEX: 48.21 KG/M2 | SYSTOLIC BLOOD PRESSURE: 140 MMHG | TEMPERATURE: 98.2 F | HEART RATE: 74 BPM | WEIGHT: 300 LBS

## 2023-12-11 DIAGNOSIS — E66.01 MORBID OBESITY WITH BODY MASS INDEX OF 45.0-49.9 IN ADULT (HCC): ICD-10-CM

## 2023-12-11 DIAGNOSIS — Z23 ENCOUNTER FOR IMMUNIZATION: ICD-10-CM

## 2023-12-11 DIAGNOSIS — E11.9 TYPE 2 DIABETES MELLITUS WITHOUT COMPLICATION, WITH LONG-TERM CURRENT USE OF INSULIN (HCC): Primary | ICD-10-CM

## 2023-12-11 DIAGNOSIS — B07.9 VERRUCA VULGARIS: ICD-10-CM

## 2023-12-11 DIAGNOSIS — I10 BENIGN ESSENTIAL HYPERTENSION: ICD-10-CM

## 2023-12-11 DIAGNOSIS — Z79.4 TYPE 2 DIABETES MELLITUS WITHOUT COMPLICATION, WITH LONG-TERM CURRENT USE OF INSULIN (HCC): Primary | ICD-10-CM

## 2023-12-11 DIAGNOSIS — L08.9 STAPH SKIN INFECTION: ICD-10-CM

## 2023-12-11 DIAGNOSIS — J43.1 PANLOBULAR EMPHYSEMA (HCC): ICD-10-CM

## 2023-12-11 DIAGNOSIS — G47.33 OSA (OBSTRUCTIVE SLEEP APNEA): ICD-10-CM

## 2023-12-11 DIAGNOSIS — M54.16 LUMBAR RADICULOPATHY: ICD-10-CM

## 2023-12-11 DIAGNOSIS — I25.10 CORONARY ARTERY DISEASE INVOLVING NATIVE HEART, UNSPECIFIED VESSEL OR LESION TYPE, UNSPECIFIED WHETHER ANGINA PRESENT: ICD-10-CM

## 2023-12-11 DIAGNOSIS — B95.8 STAPH SKIN INFECTION: ICD-10-CM

## 2023-12-11 PROCEDURE — 90686 IIV4 VACC NO PRSV 0.5 ML IM: CPT

## 2023-12-11 PROCEDURE — 90471 IMMUNIZATION ADMIN: CPT

## 2023-12-11 PROCEDURE — 99214 OFFICE O/P EST MOD 30 MIN: CPT | Performed by: INTERNAL MEDICINE

## 2023-12-11 NOTE — ASSESSMENT & PLAN NOTE
Stable from cardiac standpoint. Again we wish to modify any risk factors including keeping his cholesterol under control, diabetes, hypertension.   Patient states he does have a very strong family history of coronary artery disease and he will continue to follow-up with his cardiologist.  Recent cholesterol profile shows a very low cholesterol but again also low HDL

## 2023-12-11 NOTE — PROGRESS NOTES
Name: Bruce Robles      : 1960      MRN: [id number]  Encounter Provider: Stacey Hoskins DO  Encounter Date: 2023   Encounter department: [facility] INTERNAL MEDICINE    Assessment & Plan     1. Type 2 diabetes mellitus without complication, with long-term current use of insulin ([address])  Assessment & Plan:  Patient continues to show excellent control of his blood sugar readings with stable hemoglobin A1c. We reminded the patient again the importance of following up with his ophthalmologist and podiatrist.  Check a fasting blood sugar hemoglobin A1c with his next visit. Patient continues to make adjustments with his insulin dose in order to keep his sugar under control. Lab Results   Component Value Date    HGBA1C 5.9 (H) 2023       Orders:  -     Basic metabolic panel; Future  -     Hemoglobin A1C; Future    2. Morbid obesity with body mass index of 45.0-49.9 in adult [facility])  Assessment & Plan: Morbid obesity with BMI 48.2. Patient states that he is not truly watched his caloric intake and is extremely disabled as far as any type of exercise program is concerned. We did discuss with the patient seeing bariatrics not necessarily for surgery but whether there are any other modalities that might be available to him in order to help with weight loss. Patient states he will consider this. 3. Lumbar radiculopathy  Assessment & Plan:  History of chronic low back pain. With acute exacerbations patient is dependent on narcotic analgesics and we continue to refill prescriptions    With his underlying medical problems and is not a candidate for nonsteroidal anti-inflammatories. We feel a major component of his chronic pain syndrome may be helped if he would work harder for weight loss      4. Coronary artery disease involving native heart, unspecified vessel or lesion type, unspecified whether angina present  Assessment & Plan:  Stable from cardiac standpoint.   Again we wish to modify any risk factors including keeping his cholesterol under control, diabetes, hypertension. Patient states he does have a very strong family history of coronary artery disease and he will continue to follow-up with his cardiologist.  Recent cholesterol profile shows a very low cholesterol but again also low HDL      5. Benign essential hypertension  Assessment & Plan:  Blood pressure is controlled. Patient will continue present medication and surveillance. Orders:  -     Basic metabolic panel; Future    6. Verruca vulgaris  Assessment & Plan:  Patient is complaining of a lesion to his back midline area of approximately T12-L1 1. Patient relates that the lesion began bleeding the other day and the bleeding was severe. On evaluation patient has appearance of irritated verruca's midline. Patient will have an appointment to be seen by dermatologist next week for evaluation      7. Staph skin infection  Assessment & Plan:  Chronic lesions to lower extremities. Again to see dermatology getting a second opinion as to these lesions. He was told that this is a chronic staph infection may be exacerbated to with his chronic edema to lower extremities      8. FREDY (obstructive sleep apnea)  Assessment & Plan:  Is compliant with CPAP      9. Panlobular emphysema ([address])  Assessment & Plan:  From a respiratory standpoint patient states he has been stable. Again he has some concerns with multiple viruses that are going around. Will receive influenza vaccine today in the office. Knows to call immediately if any acute exacerbations             Subjective     Patient is a 80-year-old male history of extensive medical problems outlined previously who is here today for a routine follow-up. Patient did have labs performed prior to the visit today and we did discuss results.   Patient states in general doing relatively well and biggest complaint is a lesion to his lower back midline that was bleeding over the weekend. Review of Systems   Constitutional: Negative. Negative for appetite change, chills, diaphoresis, fatigue, fever and unexpected weight change. Patient is limited with activity level not only from his chronic back pain but now bilateral leg pain from skin lesions. States he is getting help from his brother   HENT: Negative. Eyes: Negative. Respiratory: Negative. Cardiovascular: Negative. Chronic edema bilateral lower extremities without change   Gastrointestinal: Negative. Endocrine: Negative. Genitourinary: Negative. Musculoskeletal: Negative. Chronic arthralgias and back pain nothing acute   Skin:  Positive for wound. Negative for color change, pallor and rash. Allergic/Immunologic: Negative. Neurological: Negative. Hematological: Negative. Psychiatric/Behavioral: Negative.        Past Medical History:   Diagnosis Date   • Abscess of left thigh    • Acute myocardial infarction [facility])    • Anesthesia     woke up during procedure   • Anxiety    • Arteriosclerotic cardiovascular disease    • Arthritis    • Asthma    • Chest pain    • COPD (chronic obstructive pulmonary disease) ([facility])    • Coronary artery disease    • Diabetes mellitus ([address])    • Fatty liver    • Gastric ulcer    • GERD (gastroesophageal reflux disease)    • Hyperlipidemia    • Hypertension    • Low back pain    • Myocardial infarction [facility]) [id number]   • Obesity    • Obstructive sleep apnea     no Cpap   • Spondylosis of lumbar region without myelopathy or radiculopathy      Past Surgical History:   Procedure Laterality Date   • CARPAL TUNNEL RELEASE Right    • COLONOSCOPY     • CORONARY ANGIOPLASTY WITH STENT PLACEMENT  2006, 2007   • EGD     • HEMORRHOID SURGERY     • NEUROPLASTY / TRANSPOSITION MEDIAN NERVE AT CARPAL TUNNEL Right    • RI SURGICAL ARTHROSCOPY SHOULDER W/LSS&RESCJ ADS Left 6/10/2020    Procedure: SHOULDER ARTHROSCOPIC CAPSULAR RELEASE;  Surgeon: Christa Lincoln MD; Location: [facility]  MAIN OR;  Service: Orthopedics     Family History   Problem Relation Age of Onset   • Alzheimer's disease Mother    • Heart failure Mother    • Dementia Mother    • Heart attack Father    • Other Father         Cardiac Disorder   • Heart disease Father    • Other Family         Back Pain   • Hypertension Family    • Stroke Family         Complications   • Cancer Other      Social History     Socioeconomic History   • Marital status: Single     Spouse name: None   • Number of children: None   • Years of education: None   • Highest education level: None   Occupational History   • None   Tobacco Use   • Smoking status: Former     Packs/day: 1.50     Years: 38.00     Total pack years: 57.00     Types: Cigarettes     Start date: 1976     Quit date: 2014     Years since quittin.9   • Smokeless tobacco: Never   • Tobacco comments:     tobacco use not continuous- quit for 2  4 yrs long periods .    Vaping Use   • Vaping Use: Never used   Substance and Sexual Activity   • Alcohol use: No   • Drug use: No   • Sexual activity: Not Currently   Other Topics Concern   • None   Social History Narrative   • None     Social Determinants of Health     Financial Resource Strain: Not on file   Food Insecurity: Not on file   Transportation Needs: Not on file   Physical Activity: Not on file   Stress: Not on file   Social Connections: Not on file   Intimate Partner Violence: Not on file   Housing Stability: Not on file     Current Outpatient Medications on File Prior to Visit   Medication Sig   • acetaminophen (TYLENOL) 325 mg tablet Take 650 mg by mouth every 6 (six) hours as needed for mild pain   • albuterol (PROVENTIL HFA,VENTOLIN HFA) 90 mcg/act inhaler Inhale 2 puffs as needed for shortness of breath   • Aspirin Low Dose 81 MG EC tablet TAKE 1 TABLET BY MOUTH EVERY DAY   • atorvastatin (LIPITOR) 40 mg tablet TAKE 1 TABLET BY MOUTH EVERY DAY   • clopidogrel (PLAVIX) 75 mg tablet TAKE 1 TABLET BY MOUTH EVERY DAY   • ezetimibe (ZETIA) 10 mg tablet Take 10 mg by mouth daily   • fenofibrate (TRICOR) 145 mg tablet TAKE 1 TABLET BY MOUTH EVERY DAY   • furosemide (LASIX) 40 mg tablet    • HYDROcodone-acetaminophen (Vicodin) 5-300 MG per tablet Take 1 tablet by mouth every 6 (six) hours as needed for moderate pain Max Daily Amount: 4 tablets   • Insulin Pen Needle (B-D UF III MINI PEN NEEDLES) 31G X 5 MM MISC Inject as directed 2 (two) times a day Use as directed   • Jardiance 25 MG TABS TAKE 1 TABLET BY MOUTH EVERY DAY   • losartan (COZAAR) 25 mg tablet Take 1 tablet (25 mg total) by mouth daily   • metoprolol tartrate (LOPRESSOR) 50 mg tablet Take 25 mg by mouth 2 (two) times a day   • mupirocin (BACTROBAN) 2 % ointment Apply topically daily   • nitroglycerin (NITROSTAT) 0.4 mg SL tablet PLEASE SEE ATTACHED FOR DETAILED DIRECTIONS   • pantoprazole (PROTONIX) 40 mg tablet TAKE 1 TABLET BY MOUTH EVERY DAY   • Semglee, yfgn, 100 UNIT/ML SOPN PLEASE SEE ATTACHED FOR DETAILED DIRECTIONS   • Sulfacetamide Sodium, Acne, 10 % LOTN Apply topically 2 (two) times a day   • Icosapent Ethyl 1 g CAPS Vascepa 1 gram capsule   TAKE 2 G BY MOUTH 2 (TWO) TIMES A DAY.    • itraconazole (SPORANOX) 100 mg capsule PLEASE SEE ATTACHED FOR DETAILED DIRECTIONS     Allergies   Allergen Reactions   • Bactrim [Sulfamethoxazole-Trimethoprim] Edema   • Cefaclor Shortness Of Breath     Other reaction(s): Respiratory Distress   • Simvastatin Shortness Of Breath     Other reaction(s): Respiratory Distress     Immunization History   Administered Date(s) Administered   • COVID-19 PFIZER VACCINE 0.3 ML IM 03/19/2021, 04/10/2021, 10/16/2021   • Influenza Quadrivalent, 6-35 Months IM 10/17/2016, 10/24/2017   • Influenza, recombinant, quadrivalent,injectable, preservative free 12/06/2018, 12/23/2019, 11/13/2020, 11/15/2021, 12/01/2022       Objective     /68 (BP Location: Left arm, Patient Position: Sitting, Cuff Size: Large)   Pulse 74   Temp 98.2 °F (36.8 °C)   Ht 5' 6" (1.676 m)   Wt 136 kg (300 lb)   SpO2 97%   BMI 48.42 kg/m²     Physical Exam  Vitals and nursing note reviewed. Constitutional:       General: He is not in acute distress. Appearance: Normal appearance. He is obese. He is not ill-appearing, toxic-appearing or diaphoretic. Comments: Obese 80-year-old male who is awake alert antalgic gait patient has a sterile dressing to his right lower extremity open lesions left lower extremity   HENT:      Head: Normocephalic and atraumatic. Right Ear: Tympanic membrane, ear canal and external ear normal. There is no impacted cerumen. Left Ear: Tympanic membrane, ear canal and external ear normal. There is no impacted cerumen. Nose: Nose normal. No congestion or rhinorrhea. Mouth/Throat:      Mouth: Mucous membranes are moist.      Pharynx: Oropharynx is clear. No oropharyngeal exudate or posterior oropharyngeal erythema. Comments: Severe crowding of lower airway  Eyes:      General: No scleral icterus. Right eye: No discharge. Left eye: No discharge. Extraocular Movements: Extraocular movements intact. Conjunctiva/sclera: Conjunctivae normal.      Pupils: Pupils are equal, round, and reactive to light. Neck:      Vascular: No carotid bruit. Comments: Thick neck with some decreased range of motion actively and passively, no pain with movement  Cardiovascular:      Rate and Rhythm: Normal rate and regular rhythm. Heart sounds: Normal heart sounds. No murmur heard. No friction rub. No gallop. Pulmonary:      Effort: Pulmonary effort is normal. No respiratory distress. Breath sounds: No stridor. No wheezing, rhonchi or rales. Comments: Slightly decreased breath sounds anteriorly and posteriorly but no rales rhonchi or wheezes. Chest:      Chest wall: No tenderness. Abdominal:      General: Abdomen is flat. Bowel sounds are normal. There is no distension. Palpations: Abdomen is soft. There is no mass. Tenderness: There is no abdominal tenderness. There is no right CVA tenderness, left CVA tenderness, guarding or rebound. Hernia: No hernia is present. Comments: Morbidly obese   Musculoskeletal:         General: Deformity present. No swelling, tenderness or signs of injury. Cervical back: Neck supple. No rigidity or tenderness. Right lower leg: Edema present. Left lower leg: Edema present. Comments: Diffuse arthritic changes as previously noted with arthritic changes bilateral shoulders worse on the right than the left, flattening to his lumbar curve with decreased range of motion actively and passively. Patient has chronic edema +1 bilateral lower extremities. Patient has been using compressive stockings which she states have been helping. Lymphadenopathy:      Cervical: No cervical adenopathy. Skin:     General: Skin is warm and dry. Capillary Refill: Capillary refill takes less than 2 seconds. Coloration: Skin is not jaundiced or pale. Findings: Lesion present. No bruising, erythema or rash. Comments: Lesion midline to his back    T12-L1. Appears to be bleeding and has dried blood around its circumference which is circular approximately 1 cm in circumference. Looks like a wart, verrucous. To see dermatology next week   Neurological:      Mental Status: He is alert and oriented to person, place, and time. Mental status is at baseline. Cranial Nerves: No cranial nerve deficit. Sensory: Sensory deficit present. Motor: No weakness.       Coordination: Coordination normal.      Gait: Gait normal.      Deep Tendon Reflexes: Reflexes abnormal.      Comments: Absent patellar and Achilles tendon reflexes bilaterally slight paresthesia of bilateral lower extremities, no new changes   Psychiatric:         Mood and Affect: Mood normal.         Behavior: Behavior normal.         Thought Content: Thought content normal.         Judgment: Judgment normal.   Renetta Erp, DO

## 2023-12-11 NOTE — ASSESSMENT & PLAN NOTE
Patient is complaining of a lesion to his back midline area of approximately T12-L1 1. Patient relates that the lesion began bleeding the other day and the bleeding was severe. On evaluation patient has appearance of irritated verruca's midline.   Patient will have an appointment to be seen by dermatologist next week for evaluation

## 2023-12-11 NOTE — ASSESSMENT & PLAN NOTE
From a respiratory standpoint patient states he has been stable. Again he has some concerns with multiple viruses that are going around. Will receive influenza vaccine today in the office.   Knows to call immediately if any acute exacerbations

## 2023-12-11 NOTE — ASSESSMENT & PLAN NOTE
Chronic lesions to lower extremities. Again to see dermatology getting a second opinion as to these lesions.   He was told that this is a chronic staph infection may be exacerbated to with his chronic edema to lower extremities

## 2023-12-11 NOTE — ASSESSMENT & PLAN NOTE
Patient continues to show excellent control of his blood sugar readings with stable hemoglobin A1c. We reminded the patient again the importance of following up with his ophthalmologist and podiatrist.  Check a fasting blood sugar hemoglobin A1c with his next visit. Patient continues to make adjustments with his insulin dose in order to keep his sugar under control.   Lab Results   Component Value Date    HGBA1C 5.9 (H) 11/21/2023

## 2023-12-11 NOTE — ASSESSMENT & PLAN NOTE
Morbid obesity with BMI 48.2. Patient states that he is not truly watched his caloric intake and is extremely disabled as far as any type of exercise program is concerned. We did discuss with the patient seeing bariatrics not necessarily for surgery but whether there are any other modalities that might be available to him in order to help with weight loss. Patient states he will consider this.

## 2023-12-11 NOTE — ASSESSMENT & PLAN NOTE
History of chronic low back pain. With acute exacerbations patient is dependent on narcotic analgesics and we continue to refill prescriptions    With his underlying medical problems and is not a candidate for nonsteroidal anti-inflammatories.   We feel a major component of his chronic pain syndrome may be helped if he would work harder for weight loss

## 2023-12-20 ENCOUNTER — OFFICE VISIT (OUTPATIENT)
Dept: WOUND CARE | Facility: HOSPITAL | Age: 63
End: 2023-12-20
Payer: COMMERCIAL

## 2023-12-20 VITALS
RESPIRATION RATE: 12 BRPM | HEART RATE: 73 BPM | DIASTOLIC BLOOD PRESSURE: 74 MMHG | SYSTOLIC BLOOD PRESSURE: 164 MMHG | TEMPERATURE: 97.8 F

## 2023-12-20 DIAGNOSIS — L97.911 NON-PRESSURE CHRONIC ULCER OF RIGHT LOWER LEG, LIMITED TO BREAKDOWN OF SKIN (HCC): ICD-10-CM

## 2023-12-20 DIAGNOSIS — E66.01 MORBID OBESITY DUE TO EXCESS CALORIES (HCC): ICD-10-CM

## 2023-12-20 DIAGNOSIS — I87.311 CHRONIC VENOUS HYPERTENSION (IDIOPATHIC) WITH ULCER OF RIGHT LOWER EXTREMITY (CODE) (HCC): ICD-10-CM

## 2023-12-20 DIAGNOSIS — L97.921 NON-PRESSURE CHRONIC ULCER OF LEFT LOWER LEG, LIMITED TO BREAKDOWN OF SKIN (HCC): ICD-10-CM

## 2023-12-20 DIAGNOSIS — I87.312 CHRONIC VENOUS HYPERTENSION (IDIOPATHIC) WITH ULCER OF LEFT LOWER EXTREMITY (CODE) (HCC): Primary | ICD-10-CM

## 2023-12-20 DIAGNOSIS — E11.9 TYPE 2 DIABETES MELLITUS WITHOUT COMPLICATION, WITH LONG-TERM CURRENT USE OF INSULIN (HCC): ICD-10-CM

## 2023-12-20 DIAGNOSIS — Z79.4 TYPE 2 DIABETES MELLITUS WITHOUT COMPLICATION, WITH LONG-TERM CURRENT USE OF INSULIN (HCC): ICD-10-CM

## 2023-12-20 PROCEDURE — 99213 OFFICE O/P EST LOW 20 MIN: CPT | Performed by: NURSE PRACTITIONER

## 2023-12-20 PROCEDURE — G0463 HOSPITAL OUTPT CLINIC VISIT: HCPCS | Performed by: NURSE PRACTITIONER

## 2023-12-20 NOTE — PROGRESS NOTES
Patient ID: Liban Montes is a 63 y.o. male Date of Birth 1960     Chief Complaint  Chief Complaint   Patient presents with    Follow Up Wound Care Visit     BLE wound       Allergies  Bactrim [sulfamethoxazole-trimethoprim], Cefaclor, and Simvastatin    Assessment:     Diagnoses and all orders for this visit:    Chronic venous hypertension (idiopathic) with ulcer of left lower extremity (CODE) ([facility])  -     Wound cleansing and dressings; Future  -     Wound compression and edema control; Future    Chronic venous hypertension (idiopathic) with ulcer of right lower extremity (CODE) ([facility])  -     Wound cleansing and dressings; Future  -     Wound compression and edema control; Future    Non-pressure chronic ulcer of left lower leg, limited to breakdown of skin ([facility])    Non-pressure chronic ulcer of right lower leg, limited to breakdown of skin ([facility])    Type 2 diabetes mellitus without complication, with long-term current use of insulin ([facility])    Morbid obesity due to excess calories ([facility])          Plan:  1.  F/u visit.  Wounds cleansed normal saline and gauze.  Wounds improving and measuring smaller with multiple scattered pinpoint areas open.  Continue current plan of care.  2.  A1C results reviewed with the patient today.  Patient maintaining tight glycemic control  3.  Patient will follow-up in 2 weeks     Wound 09/05/23 Venous Ulcer Leg Right;Anterior (Active)   Wound Image Images linked 12/20/23 1049   Wound Description Dry;Epithelialization 12/20/23 1053   Wound Length (cm) 16 cm 12/20/23 1053   Wound Width (cm) 17 cm 12/20/23 1053   Wound Depth (cm) 0.1 cm 12/20/23 1053   Wound Surface Area (cm^2) 272 cm^2 12/20/23 1053   Wound Volume (cm^3) 27.2 cm^3 12/20/23 1053   Calculated Wound Volume (cm^3) 27.2 cm^3 12/20/23 1053   Change in Wound Size % 0.37 12/20/23 1053   Drainage Amount Scant 12/20/23 1053   Drainage Description Serosanguineous 12/20/23 1053   Non-staged Wound Description Full thickness  12/20/23 1053   Patient Tolerance Tolerated well 12/20/23 1053   Dressing Status Intact 12/20/23 1053       Wound 09/05/23 Venous Ulcer Leg Left (Active)   Wound Image Images linked 12/20/23 1049   Wound Description Epithelialization;Dry 12/20/23 1053   Bridget-wound Assessment Intact 12/20/23 1053   Wound Length (cm) 10 cm 12/20/23 1053   Wound Width (cm) 15 cm 12/20/23 1053   Wound Depth (cm) 0.1 cm 12/20/23 1053   Wound Surface Area (cm^2) 150 cm^2 12/20/23 1053   Wound Volume (cm^3) 15 cm^3 12/20/23 1053   Calculated Wound Volume (cm^3) 15 cm^3 12/20/23 1053   Change in Wound Size % -121.89 12/20/23 1053   Drainage Amount Scant 12/20/23 1053   Drainage Description Serosanguineous 12/20/23 1053   Non-staged Wound Description Full thickness 12/20/23 1053   Patient Tolerance Tolerated well 12/20/23 1053   Dressing Status Intact 12/20/23 1053       Wound 09/05/23 Venous Ulcer Leg Right;Anterior (Active)   Date First Assessed/Time First Assessed: 09/05/23 1401   Present on Original Admission: Yes  Primary Wound Type: Venous Ulcer  Location: Leg  Wound Location Orientation: Right;Anterior       Wound 09/05/23 Venous Ulcer Leg Left (Active)   Date First Assessed/Time First Assessed: 09/05/23 1401   Present on Original Admission: Yes  Primary Wound Type: Venous Ulcer  Location: Leg  Wound Location Orientation: Left       [REMOVED] Wound 06/10/20 Shoulder Left (Removed)   Resolved Date: 09/05/23  Date First Assessed/Time First Assessed: 06/10/20 1051   Location: Shoulder  Wound Location Orientation: Left  Wound Description (Comments): ARM SLING  Incision's 1st Dressing: DRESSING MEPILEX AG BORDER 4 X 4 IN  Wound Outcom...       Subjective:      .    F/u visit for venous ulcers of bilateral lower legs.  No new complaints.  He denies any pain, fevers, or chills.        The following portions of the patient's history were reviewed and updated as appropriate: He  has a past medical history of Abscess of left thigh, Acute  myocardial infarction ([facility]), Anesthesia, Anxiety, Arteriosclerotic cardiovascular disease, Arthritis, Asthma, Chest pain, COPD (chronic obstructive pulmonary disease) ([facility]), Coronary artery disease, Diabetes mellitus ([facility]), Fatty liver, Gastric ulcer, GERD (gastroesophageal reflux disease), Hyperlipidemia, Hypertension, Low back pain, Myocardial infarction ([facility]) (2006,2007), Obesity, Obstructive sleep apnea, and Spondylosis of lumbar region without myelopathy or radiculopathy.  He   Patient Active Problem List    Diagnosis Date Noted    Verruca vulgaris 12/11/2023    FREDY (obstructive sleep apnea) 09/11/2023    Continuous opioid dependence ([facility]) 05/23/2023    Cellulitis of right lower extremity 04/10/2023    Staph skin infection 12/01/2022    Preoperative clearance 06/23/2021    Acute on chronic systolic congestive heart failure ([facility]) 05/04/2021    Diabetes mellitus ([facility])     Lesion of nose 11/13/2020    Type 2 diabetes mellitus, with long-term current use of insulin ([facility]) 07/06/2020    Obstructive sleep apnea 07/06/2020    Aftercare following surgery of the musculoskeletal system 06/15/2020    Impingement syndrome of left shoulder 01/14/2020    Adhesive capsulitis of both shoulders 01/14/2020    Chronic left shoulder pain 08/21/2019    Healthcare maintenance 04/10/2019    Morbid obesity with body mass index of 45.0-49.9 in adult ([facility]) 11/29/2018    Low back pain 10/22/2018    Dysuria 07/24/2018    Lumbar radiculopathy 12/29/2017    Myofascial pain syndrome 12/29/2017    Sacroiliitis ([facility]) 12/29/2017    Chest pain 08/02/2016    Spondylosis of lumbar region without myelopathy or radiculopathy 04/15/2014    Benign essential hypertension 10/16/2012    Coronary artery disease 06/14/2012    Chronic obstructive pulmonary disease ([facility]) 06/14/2012     He  has a past surgical history that includes Coronary angioplasty with stent (2006, 2007); Neuroplasty / transposition median nerve at carpal tunnel (Right); EGD; Carpal  tunnel release (Right); Colonoscopy; Hemorrhoid surgery; and pr surgical arthroscopy shoulder w/lss&rescj ads (Left, 6/10/2020).  His family history includes Alzheimer's disease in his mother; Cancer in his other; Dementia in his mother; Heart attack in his father; Heart disease in his father; Heart failure in his mother; Hypertension in his family; Other in his family and father; Stroke in his family.  He  reports that he quit smoking about 9 years ago. His smoking use included cigarettes. He started smoking about 48 years ago. He has a 57.0 pack-year smoking history. He has never used smokeless tobacco. He reports that he does not drink alcohol and does not use drugs.  Current Outpatient Medications   Medication Sig Dispense Refill    acetaminophen (TYLENOL) 325 mg tablet Take 650 mg by mouth every 6 (six) hours as needed for mild pain      albuterol (PROVENTIL HFA,VENTOLIN HFA) 90 mcg/act inhaler Inhale 2 puffs as needed for shortness of breath 18 g 1    Aspirin Low Dose 81 MG EC tablet TAKE 1 TABLET BY MOUTH EVERY DAY 90 tablet 1    atorvastatin (LIPITOR) 40 mg tablet TAKE 1 TABLET BY MOUTH EVERY DAY 90 tablet 2    clopidogrel (PLAVIX) 75 mg tablet TAKE 1 TABLET BY MOUTH EVERY DAY 90 tablet 0    ezetimibe (ZETIA) 10 mg tablet Take 10 mg by mouth daily      fenofibrate (TRICOR) 145 mg tablet TAKE 1 TABLET BY MOUTH EVERY DAY 90 tablet 1    furosemide (LASIX) 40 mg tablet       HYDROcodone-acetaminophen (Vicodin) 5-300 MG per tablet Take 1 tablet by mouth every 6 (six) hours as needed for moderate pain Max Daily Amount: 4 tablets 112 tablet 0    Icosapent Ethyl 1 g CAPS Vascepa 1 gram capsule   TAKE 2 G BY MOUTH 2 (TWO) TIMES A DAY.      Insulin Pen Needle (B-D UF III MINI PEN NEEDLES) 31G X 5 MM MISC Inject as directed 2 (two) times a day Use as directed 100 each 0    itraconazole (SPORANOX) 100 mg capsule PLEASE SEE ATTACHED FOR DETAILED DIRECTIONS      Jardiance 25 MG TABS TAKE 1 TABLET BY MOUTH EVERY DAY 90  tablet 1    losartan (COZAAR) 25 mg tablet Take 1 tablet (25 mg total) by mouth daily 90 tablet 3    metoprolol tartrate (LOPRESSOR) 50 mg tablet Take 25 mg by mouth 2 (two) times a day      mupirocin (BACTROBAN) 2 % ointment Apply topically daily 30 g 2    nitroglycerin (NITROSTAT) 0.4 mg SL tablet PLEASE SEE ATTACHED FOR DETAILED DIRECTIONS      pantoprazole (PROTONIX) 40 mg tablet TAKE 1 TABLET BY MOUTH EVERY DAY 90 tablet 5    Semglee, yfgn, 100 UNIT/ML SOPN PLEASE SEE ATTACHED FOR DETAILED DIRECTIONS      Sulfacetamide Sodium, Acne, 10 % LOTN Apply topically 2 (two) times a day 118 mL 0     No current facility-administered medications for this visit.     He is allergic to bactrim [sulfamethoxazole-trimethoprim], cefaclor, and simvastatin..    Review of Systems   Constitutional: Negative.    HENT:  Negative for ear pain and hearing loss.    Eyes:  Negative for pain.   Respiratory:  Negative for chest tightness and shortness of breath.    Cardiovascular:  Positive for leg swelling. Negative for chest pain and palpitations.   Gastrointestinal:  Negative for diarrhea, nausea and vomiting.   Genitourinary:  Negative for dysuria.   Musculoskeletal:  Negative for gait problem.   Skin:  Positive for wound.   Neurological:  Negative for tremors and weakness.   Psychiatric/Behavioral:  Negative for behavioral problems, confusion and suicidal ideas.          Objective:       Wound 09/05/23 Venous Ulcer Leg Right;Anterior (Active)   Wound Image Images linked 12/20/23 1049   Wound Description Dry;Epithelialization 12/20/23 1053   Wound Length (cm) 16 cm 12/20/23 1053   Wound Width (cm) 17 cm 12/20/23 1053   Wound Depth (cm) 0.1 cm 12/20/23 1053   Wound Surface Area (cm^2) 272 cm^2 12/20/23 1053   Wound Volume (cm^3) 27.2 cm^3 12/20/23 1053   Calculated Wound Volume (cm^3) 27.2 cm^3 12/20/23 1053   Change in Wound Size % 0.37 12/20/23 1053   Drainage Amount Scant 12/20/23 1053   Drainage Description Serosanguineous 12/20/23  1053   Non-staged Wound Description Full thickness 12/20/23 1053   Patient Tolerance Tolerated well 12/20/23 1053   Dressing Status Intact 12/20/23 1053       Wound 09/05/23 Venous Ulcer Leg Left (Active)   Wound Image Images linked 12/20/23 1049   Wound Description Epithelialization;Dry 12/20/23 1053   Bridget-wound Assessment Intact 12/20/23 1053   Wound Length (cm) 10 cm 12/20/23 1053   Wound Width (cm) 15 cm 12/20/23 1053   Wound Depth (cm) 0.1 cm 12/20/23 1053   Wound Surface Area (cm^2) 150 cm^2 12/20/23 1053   Wound Volume (cm^3) 15 cm^3 12/20/23 1053   Calculated Wound Volume (cm^3) 15 cm^3 12/20/23 1053   Change in Wound Size % -121.89 12/20/23 1053   Drainage Amount Scant 12/20/23 1053   Drainage Description Serosanguineous 12/20/23 1053   Non-staged Wound Description Full thickness 12/20/23 1053   Patient Tolerance Tolerated well 12/20/23 1053   Dressing Status Intact 12/20/23 1053       /74   Pulse 73   Temp 97.8 °F (36.6 °C)   Resp 12     Physical Exam  Vitals and nursing note reviewed.   Constitutional:       General: He is not in acute distress.     Appearance: Normal appearance. He is obese.   HENT:      Head: Normocephalic and atraumatic.   Eyes:      General:         Right eye: No discharge.         Left eye: No discharge.   Pulmonary:      Effort: Pulmonary effort is normal. No respiratory distress.   Musculoskeletal:         General: Normal range of motion.      Cervical back: Normal range of motion and neck supple. No rigidity.      Right lower leg: No edema.      Left lower leg: No edema.   Skin:     General: Skin is warm and dry.      Findings: Wound present. No erythema.          Neurological:      General: No focal deficit present.      Mental Status: He is alert and oriented to person, place, and time. Mental status is at baseline.   Psychiatric:         Mood and Affect: Mood normal.         Behavior: Behavior normal.         Thought Content: Thought content normal.         Judgment:  Judgment normal.                             Wound Instructions:  Orders Placed This Encounter   Procedures    Wound cleansing and dressings     Wound location right and left legs.  Change dressing every day.  You may remove the dressing and shower. Do not leave wound open  to air, apply new dressing immediately.  Cleanse with Hibiclens one time per week, other days use your  regular soap.  Apply Mupirocin to the wounds.  Cover with gauze/ABD  Secure with roll gauze and tape.  This was applied today at the [facility] [place].     Standing Status:   Future     Standing Expiration Date:   12/20/2024    Wound compression and edema control     Wound compression and edema control                          Apply Circaid devices as instructed first thing qAM & remove qHS.  Avoid prolonged standing in one place.  Elevate leg(s) above the level of the heart when sitting or as much     Standing Status:   Future     Standing Expiration Date:   12/20/2024        Diagnosis ICD-10-CM Associated Orders   1. Chronic venous hypertension (idiopathic) with ulcer of left lower extremity (CODE) ([facility])  I87.312 Wound cleansing and dressings     Wound compression and edema control      2. Chronic venous hypertension (idiopathic) with ulcer of right lower extremity (CODE) ([facility])  I87.311 Wound cleansing and dressings     Wound compression and edema control      3. Non-pressure chronic ulcer of left lower leg, limited to breakdown of skin ([facility])  L97.921       4. Non-pressure chronic ulcer of right lower leg, limited to breakdown of skin ([facility])  L97.911       5. Type 2 diabetes mellitus without complication, with long-term current use of insulin ([facility])  E11.9     Z79.4       6. Morbid obesity due to excess calories ([facility])  E66.01

## 2023-12-20 NOTE — PATIENT INSTRUCTIONS
Orders Placed This Encounter   Procedures    Wound cleansing and dressings     Wound location right and left legs.  Change dressing every day.  You may remove the dressing and shower. Do not leave wound open  to air, apply new dressing immediately.  Cleanse with Hibiclens one time per week, other days use your  regular soap.  Apply Mupirocin to the wounds.  Cover with gauze/ABD  Secure with roll gauze and tape.  This was applied today at the [facility].     Standing Status:   Future     Standing Expiration Date:   12/20/2024    Wound compression and edema control     Wound compression and edema control                          Apply Circaid devices as instructed first thing qAM & remove qHS.  Avoid prolonged standing in one place.  Elevate leg(s) above the level of the heart when sitting or as much     Standing Status:   Future     Standing Expiration Date:   12/20/2024

## 2023-12-24 DIAGNOSIS — G89.29 CHRONIC BILATERAL LOW BACK PAIN WITHOUT SCIATICA: ICD-10-CM

## 2023-12-24 DIAGNOSIS — M54.50 CHRONIC BILATERAL LOW BACK PAIN WITHOUT SCIATICA: ICD-10-CM

## 2023-12-26 RX ORDER — HYDROCODONE BITARTRATE AND ACETAMINOPHEN 5; 300 MG/1; MG/1
1 TABLET ORAL EVERY 6 HOURS PRN
Qty: 112 TABLET | Refills: 0 | Status: SHIPPED | OUTPATIENT
Start: 2023-12-26

## 2023-12-26 NOTE — TELEPHONE ENCOUNTER
Requested medication(s) are due for refill today: Yes  Patient has already received a courtesy refill: No  Other reason request has been forwarded to provider:    [phone]

## 2024-01-03 ENCOUNTER — OFFICE VISIT (OUTPATIENT)
Dept: WOUND CARE | Facility: HOSPITAL | Age: 64
End: 2024-01-03
Payer: COMMERCIAL

## 2024-01-03 DIAGNOSIS — I87.312 CHRONIC VENOUS HYPERTENSION (IDIOPATHIC) WITH ULCER OF LEFT LOWER EXTREMITY (CODE) (HCC): Primary | ICD-10-CM

## 2024-01-03 DIAGNOSIS — I87.311 CHRONIC VENOUS HYPERTENSION (IDIOPATHIC) WITH ULCER OF RIGHT LOWER EXTREMITY (CODE) (HCC): ICD-10-CM

## 2024-01-03 PROCEDURE — G0463 HOSPITAL OUTPT CLINIC VISIT: HCPCS | Performed by: FAMILY MEDICINE

## 2024-01-03 PROCEDURE — 99213 OFFICE O/P EST LOW 20 MIN: CPT | Performed by: FAMILY MEDICINE

## 2024-01-03 NOTE — PATIENT INSTRUCTIONS
Orders Placed This Encounter   Procedures    Wound cleansing and dressings     Wound location right and left legs.  Change dressing every day.  You may remove the dressing and shower. Do not leave wound open to air, apply new dressing immediately.  Cleanse with Hibiclens one time per week, other days use your regular soap.  Apply Mupirocin to the wounds.  Cover with gauze/ABD  Secure with roll gauze and tape.    This was applied today at the [facility].     Standing Status:   Future     Standing Expiration Date:   1/3/2025    Wound compression and edema control     Wound compression and edema control  Apply Circaid devices as instructed first thing qAM & remove qHS.  Avoid prolonged standing in one place.  Elevate leg(s) above the level of the heart when sitting or as much as possible.     Standing Status:   Future     Standing Expiration Date:   1/3/2025

## 2024-01-03 NOTE — PROGRESS NOTES
Patient ID: Liban Montes is a 63 y.o. male Date of Birth 1960     Chief Complaint  Chief Complaint   Patient presents with    Follow Up Wound Care Visit     Holly Guzman.        Allergies  Bactrim [sulfamethoxazole-trimethoprim], Cefaclor, and Simvastatin    Assessment:    No problem-specific Assessment & Plan notes found for this encounter.       Diagnoses and all orders for this visit:    Chronic venous hypertension (idiopathic) with ulcer of left lower extremity (CODE) ([facility])  -     Wound cleansing and dressings; Future  -     Wound compression and edema control; Future    Chronic venous hypertension (idiopathic) with ulcer of right lower extremity (CODE) ([facility])  -     Wound cleansing and dressings; Future  -     Wound compression and edema control; Future              Procedures    Plan:  Overall improved bilaterally  Continue wound management as below  Continue compression with CircAid's  Keep legs elevated whenever seated and avoid prolonged standing  Follow-up in 2 weeks or call sooner with questions or concerns    Wound 09/05/23 Venous Ulcer Leg Right;Anterior (Active)   Wound Image Images linked 01/03/24 1104   Wound Description Dry;Epithelialization;Brown;Eschar;Pink 01/03/24 1113   Bridget-wound Assessment Intact;Edema 01/03/24 1113   Wound Length (cm) 9 cm 01/03/24 1113   Wound Width (cm) 11.2 cm 01/03/24 1113   Wound Depth (cm) 0.1 cm 01/03/24 1113   Wound Surface Area (cm^2) 100.8 cm^2 01/03/24 1113   Wound Volume (cm^3) 10.08 cm^3 01/03/24 1113   Calculated Wound Volume (cm^3) 10.08 cm^3 01/03/24 1113   Change in Wound Size % 63.08 01/03/24 1113   Drainage Amount Scant 01/03/24 1113   Drainage Description Serosanguineous 01/03/24 1113   Non-staged Wound Description Full thickness 01/03/24 1113   Dressing Status Intact 01/03/24 1113       Wound 09/05/23 Venous Ulcer Leg Left (Active)   Wound Image Images linked 01/03/24 1104   Wound Description Epithelialization;Dry;Brown;Eschar;Pink 01/03/24  1112   Bridget-wound Assessment Intact 01/03/24 1112   Wound Length (cm) 5.2 cm 01/03/24 1112   Wound Width (cm) 1.6 cm 01/03/24 1112   Wound Depth (cm) 0.1 cm 01/03/24 1112   Wound Surface Area (cm^2) 8.32 cm^2 01/03/24 1112   Wound Volume (cm^3) 0.832 cm^3 01/03/24 1112   Calculated Wound Volume (cm^3) 0.83 cm^3 01/03/24 1112   Change in Wound Size % 87.72 01/03/24 1112   Drainage Amount Scant 01/03/24 1112   Drainage Description Serosanguineous 01/03/24 1112   Non-staged Wound Description Full thickness 01/03/24 1112   Dressing Status Intact 01/03/24 1112       Wound 09/05/23 Venous Ulcer Leg Right;Anterior (Active)   Date First Assessed/Time First Assessed: 09/05/23 1401   Present on Original Admission: Yes  Primary Wound Type: Venous Ulcer  Location: Leg  Wound Location Orientation: Right;Anterior       Wound 09/05/23 Venous Ulcer Leg Left (Active)   Date First Assessed/Time First Assessed: 09/05/23 1401   Present on Original Admission: Yes  Primary Wound Type: Venous Ulcer  Location: Leg  Wound Location Orientation: Left       [REMOVED] Wound 06/10/20 Shoulder Left (Removed)   Resolved Date: 09/05/23  Date First Assessed/Time First Assessed: 06/10/20 1051   Location: Shoulder  Wound Location Orientation: Left  Wound Description (Comments): ARM SLING  Incision's 1st Dressing: DRESSING MEPILEX AG BORDER 4 X 4 IN  Wound Outcom...       Subjective:      .    Patient presents for follow-up of bilateral lower extremity venous ulcers.  No increased pain or drainage.  Has been using mupirocin daily as well as cleansing with Hibiclens once a week.  Using CircAid's for compression        The following portions of the patient's history were reviewed and updated as appropriate: He  has a past medical history of Abscess of left thigh, Acute myocardial infarction (HCC), Anesthesia, Anxiety, Arteriosclerotic cardiovascular disease, Arthritis, Asthma, Chest pain, COPD (chronic obstructive pulmonary disease) (HCC), Coronary  artery disease, Diabetes mellitus ([facility]), Fatty liver, Gastric ulcer, GERD (gastroesophageal reflux disease), Hyperlipidemia, Hypertension, Low back pain, Myocardial infarction ([facility]) (2006,2007), Obesity, Obstructive sleep apnea, and Spondylosis of lumbar region without myelopathy or radiculopathy.  He   Patient Active Problem List    Diagnosis Date Noted    Verruca vulgaris 12/11/2023    FREDY (obstructive sleep apnea) 09/11/2023    Continuous opioid dependence ([facility]) 05/23/2023    Cellulitis of right lower extremity 04/10/2023    Staph skin infection 12/01/2022    Preoperative clearance 06/23/2021    Acute on chronic systolic congestive heart failure ([facility]) 05/04/2021    Diabetes mellitus ([facility])     Lesion of nose 11/13/2020    Type 2 diabetes mellitus, with long-term current use of insulin ([facility]) 07/06/2020    Obstructive sleep apnea 07/06/2020    Aftercare following surgery of the musculoskeletal system 06/15/2020    Impingement syndrome of left shoulder 01/14/2020    Adhesive capsulitis of both shoulders 01/14/2020    Chronic left shoulder pain 08/21/2019    Healthcare maintenance 04/10/2019    Morbid obesity with body mass index of 45.0-49.9 in adult ([facility]) 11/29/2018    Low back pain 10/22/2018    Dysuria 07/24/2018    Lumbar radiculopathy 12/29/2017    Myofascial pain syndrome 12/29/2017    Sacroiliitis ([facility]) 12/29/2017    Chest pain 08/02/2016    Spondylosis of lumbar region without myelopathy or radiculopathy 04/15/2014    Benign essential hypertension 10/16/2012    Coronary artery disease 06/14/2012    Chronic obstructive pulmonary disease ([facility]) 06/14/2012     He  reports that he quit smoking about 10 years ago. His smoking use included cigarettes. He started smoking about 48 years ago. He has a 57.0 pack-year smoking history. He has never used smokeless tobacco. He reports that he does not drink alcohol and does not use drugs.  Current Outpatient Medications   Medication Sig Dispense Refill    acetaminophen  (TYLENOL) 325 mg tablet Take 650 mg by mouth every 6 (six) hours as needed for mild pain      albuterol (PROVENTIL HFA,VENTOLIN HFA) 90 mcg/act inhaler Inhale 2 puffs as needed for shortness of breath 18 g 1    Aspirin Low Dose 81 MG EC tablet TAKE 1 TABLET BY MOUTH EVERY DAY 90 tablet 1    atorvastatin (LIPITOR) 40 mg tablet TAKE 1 TABLET BY MOUTH EVERY DAY 90 tablet 2    clopidogrel (PLAVIX) 75 mg tablet TAKE 1 TABLET BY MOUTH EVERY DAY 90 tablet 0    ezetimibe (ZETIA) 10 mg tablet Take 10 mg by mouth daily      fenofibrate (TRICOR) 145 mg tablet TAKE 1 TABLET BY MOUTH EVERY DAY 90 tablet 1    furosemide (LASIX) 40 mg tablet       HYDROcodone-acetaminophen (Vicodin) 5-300 MG per tablet Take 1 tablet by mouth every 6 (six) hours as needed for moderate pain Max Daily Amount: 4 tablets 112 tablet 0    Icosapent Ethyl 1 g CAPS Vascepa 1 gram capsule   TAKE 2 G BY MOUTH 2 (TWO) TIMES A DAY.      Insulin Pen Needle (B-D UF III MINI PEN NEEDLES) 31G X 5 MM MISC Inject as directed 2 (two) times a day Use as directed 100 each 0    itraconazole (SPORANOX) 100 mg capsule PLEASE SEE ATTACHED FOR DETAILED DIRECTIONS      Jardiance 25 MG TABS TAKE 1 TABLET BY MOUTH EVERY DAY 90 tablet 1    losartan (COZAAR) 25 mg tablet Take 1 tablet (25 mg total) by mouth daily 90 tablet 3    metoprolol tartrate (LOPRESSOR) 50 mg tablet Take 25 mg by mouth 2 (two) times a day      mupirocin (BACTROBAN) 2 % ointment Apply topically daily 30 g 2    nitroglycerin (NITROSTAT) 0.4 mg SL tablet PLEASE SEE ATTACHED FOR DETAILED DIRECTIONS      pantoprazole (PROTONIX) 40 mg tablet TAKE 1 TABLET BY MOUTH EVERY DAY 90 tablet 5    Semglee, yfgn, 100 UNIT/ML SOPN PLEASE SEE ATTACHED FOR DETAILED DIRECTIONS      Sulfacetamide Sodium, Acne, 10 % LOTN Apply topically 2 (two) times a day 118 mL 0     No current facility-administered medications for this visit.     He is allergic to bactrim [sulfamethoxazole-trimethoprim], cefaclor, and simvastatin..    Review  of Systems   Constitutional:  Negative for chills and fever.   HENT:  Negative for congestion and sneezing.    Respiratory:  Negative for cough.    Cardiovascular:  Positive for leg swelling.   Skin:  Positive for wound.   Psychiatric/Behavioral:  Negative for agitation.          Objective:       Wound 09/05/23 Venous Ulcer Leg Right;Anterior (Active)   Wound Image Images linked 01/03/24 1104   Wound Description Dry;Epithelialization;Brown;Eschar;Pink 01/03/24 1113   Bridget-wound Assessment Intact;Edema 01/03/24 1113   Wound Length (cm) 9 cm 01/03/24 1113   Wound Width (cm) 11.2 cm 01/03/24 1113   Wound Depth (cm) 0.1 cm 01/03/24 1113   Wound Surface Area (cm^2) 100.8 cm^2 01/03/24 1113   Wound Volume (cm^3) 10.08 cm^3 01/03/24 1113   Calculated Wound Volume (cm^3) 10.08 cm^3 01/03/24 1113   Change in Wound Size % 63.08 01/03/24 1113   Drainage Amount Scant 01/03/24 1113   Drainage Description Serosanguineous 01/03/24 1113   Non-staged Wound Description Full thickness 01/03/24 1113   Dressing Status Intact 01/03/24 1113       Wound 09/05/23 Venous Ulcer Leg Left (Active)   Wound Image Images linked 01/03/24 1104   Wound Description Epithelialization;Dry;Brown;Eschar;Pink 01/03/24 1112   Bridget-wound Assessment Intact 01/03/24 1112   Wound Length (cm) 5.2 cm 01/03/24 1112   Wound Width (cm) 1.6 cm 01/03/24 1112   Wound Depth (cm) 0.1 cm 01/03/24 1112   Wound Surface Area (cm^2) 8.32 cm^2 01/03/24 1112   Wound Volume (cm^3) 0.832 cm^3 01/03/24 1112   Calculated Wound Volume (cm^3) 0.83 cm^3 01/03/24 1112   Change in Wound Size % 87.72 01/03/24 1112   Drainage Amount Scant 01/03/24 1112   Drainage Description Serosanguineous 01/03/24 1112   Non-staged Wound Description Full thickness 01/03/24 1112   Dressing Status Intact 01/03/24 1112       There were no vitals taken for this visit.    Physical Exam  Constitutional:       General: He is not in acute distress.     Appearance: Normal appearance. He is obese. He is not  ill-appearing, toxic-appearing or diaphoretic.   HENT:      Head: Normocephalic and atraumatic.      Right Ear: External ear normal.      Left Ear: External ear normal.   Eyes:      Conjunctiva/sclera: Conjunctivae normal.   Pulmonary:      Effort: Pulmonary effort is normal. No respiratory distress.   Musculoskeletal:      Cervical back: Neck supple.      Right lower leg: Edema present.      Left lower leg: Edema present.   Skin:     Comments: See wound assessment   Neurological:      Mental Status: He is alert.   Psychiatric:         Mood and Affect: Mood normal.         Behavior: Behavior normal.       .          Wound Instructions:  Orders Placed This Encounter   Procedures    Wound cleansing and dressings     Wound location right and left legs.  Change dressing every day.  You may remove the dressing and shower. Do not leave wound open to air, apply new dressing immediately.  Cleanse with Hibiclens one time per week, other days use your regular soap.  Apply Mupirocin to the wounds.  Cover with gauze/ABD  Secure with roll gauze and tape.    This was applied today at the [facility].     Standing Status:   Future     Standing Expiration Date:   1/3/2025    Wound compression and edema control     Wound compression and edema control  Apply Circaid devices as instructed first thing qAM & remove qHS.  Avoid prolonged standing in one place.  Elevate leg(s) above the level of the heart when sitting or as much as possible.     Standing Status:   Future     Standing Expiration Date:   1/3/2025        Diagnosis ICD-10-CM Associated Orders   1. Chronic venous hypertension (idiopathic) with ulcer of left lower extremity (CODE) ([facility])  I87.312 Wound cleansing and dressings     Wound compression and edema control      2. Chronic venous hypertension (idiopathic) with ulcer of right lower extremity (CODE) ([facility])  I87.311 Wound cleansing and dressings     Wound compression and edema control

## 2024-01-17 ENCOUNTER — OFFICE VISIT (OUTPATIENT)
Dept: WOUND CARE | Facility: HOSPITAL | Age: 64
End: 2024-01-17
Payer: COMMERCIAL

## 2024-01-17 VITALS
HEART RATE: 73 BPM | SYSTOLIC BLOOD PRESSURE: 136 MMHG | RESPIRATION RATE: 12 BRPM | TEMPERATURE: 98.6 F | DIASTOLIC BLOOD PRESSURE: 63 MMHG

## 2024-01-17 DIAGNOSIS — Z79.4 TYPE 2 DIABETES MELLITUS WITH OTHER CIRCULATORY COMPLICATION, WITH LONG-TERM CURRENT USE OF INSULIN (HCC): ICD-10-CM

## 2024-01-17 DIAGNOSIS — E11.59 TYPE 2 DIABETES MELLITUS WITH OTHER CIRCULATORY COMPLICATION, WITH LONG-TERM CURRENT USE OF INSULIN (HCC): ICD-10-CM

## 2024-01-17 DIAGNOSIS — I87.312 CHRONIC VENOUS HYPERTENSION (IDIOPATHIC) WITH ULCER OF LEFT LOWER EXTREMITY (CODE) (HCC): Primary | ICD-10-CM

## 2024-01-17 DIAGNOSIS — I87.311 CHRONIC VENOUS HYPERTENSION (IDIOPATHIC) WITH ULCER OF RIGHT LOWER EXTREMITY (CODE) (HCC): ICD-10-CM

## 2024-01-17 PROCEDURE — 99213 OFFICE O/P EST LOW 20 MIN: CPT | Performed by: FAMILY MEDICINE

## 2024-01-17 PROCEDURE — G0463 HOSPITAL OUTPT CLINIC VISIT: HCPCS | Performed by: FAMILY MEDICINE

## 2024-01-17 RX ORDER — FLURBIPROFEN SODIUM 0.3 MG/ML
SOLUTION/ DROPS OPHTHALMIC 2 TIMES DAILY
Qty: 100 EACH | Refills: 0 | Status: SHIPPED | OUTPATIENT
Start: 2024-01-17

## 2024-01-17 RX ORDER — CLOPIDOGREL BISULFATE 75 MG/1
75 TABLET ORAL DAILY
Qty: 90 TABLET | Refills: 0 | Status: SHIPPED | OUTPATIENT
Start: 2024-01-17

## 2024-01-17 NOTE — PROGRESS NOTES
Patient ID: Liban Montes is a 63 y.o. male Date of Birth 1960     Chief Complaint  Chief Complaint   Patient presents with    Follow Up Wound Care Visit     BLE wound       Allergies  Bactrim [sulfamethoxazole-trimethoprim], Cefaclor, and Simvastatin    Assessment:    No problem-specific Assessment & Plan notes found for this encounter.       Diagnoses and all orders for this visit:    Chronic venous hypertension (idiopathic) with ulcer of left lower extremity (CODE) ([facility])  -     Wound cleansing and dressings; Future  -     Wound compression and edema control; Future    Chronic venous hypertension (idiopathic) with ulcer of right lower extremity (CODE) ([facility])  -     Wound cleansing and dressings; Future  -     Wound compression and edema control; Future              Procedures    Plan:  Overall much improved  Continue wound management as below  Continue compression with CircAid's  Keep legs elevated whenever seated and avoid prolonged standing  Follow-up in 2 weeks or call sooner with questions or concerns      Wound 09/05/23 Venous Ulcer Leg Right;Anterior (Active)   Wound Image Images linked 01/17/24 1117   Wound Description Beefy red 01/17/24 1119   Bridget-wound Assessment Intact 01/17/24 1119   Wound Length (cm) 0.2 cm 01/17/24 1119   Wound Width (cm) 0.2 cm 01/17/24 1119   Wound Depth (cm) 0.1 cm 01/17/24 1119   Wound Surface Area (cm^2) 0.04 cm^2 01/17/24 1119   Wound Volume (cm^3) 0.004 cm^3 01/17/24 1119   Calculated Wound Volume (cm^3) 0 cm^3 01/17/24 1119   Change in Wound Size % 100 01/17/24 1119   Drainage Amount Scant 01/17/24 1119   Drainage Description Serosanguineous 01/17/24 1119   Non-staged Wound Description Full thickness 01/17/24 1119   Patient Tolerance Tolerated well 01/17/24 1119   Dressing Status Intact 01/17/24 1119       Wound 09/05/23 Venous Ulcer Leg Left (Active)   Wound Image Images linked 01/17/24 1118   Wound Description Beefy red 01/17/24 1120   Bridget-wound Assessment Intact  01/17/24 1120   Wound Length (cm) 0.2 cm 01/17/24 1120   Wound Width (cm) 0.2 cm 01/17/24 1120   Wound Depth (cm) 0.1 cm 01/17/24 1120   Wound Surface Area (cm^2) 0.04 cm^2 01/17/24 1120   Wound Volume (cm^3) 0.004 cm^3 01/17/24 1120   Calculated Wound Volume (cm^3) 0 cm^3 01/17/24 1120   Change in Wound Size % 100 01/17/24 1120   Drainage Amount Scant 01/17/24 1120   Drainage Description Serosanguineous 01/17/24 1120   Non-staged Wound Description Full thickness 01/17/24 1120   Patient Tolerance Tolerated well 01/17/24 1120   Dressing Status Intact 01/17/24 1120       Wound 09/05/23 Venous Ulcer Leg Right;Anterior (Active)   Date First Assessed/Time First Assessed: 09/05/23 1401   Present on Original Admission: Yes  Primary Wound Type: Venous Ulcer  Location: Leg  Wound Location Orientation: Right;Anterior       Wound 09/05/23 Venous Ulcer Leg Left (Active)   Date First Assessed/Time First Assessed: 09/05/23 1401   Present on Original Admission: Yes  Primary Wound Type: Venous Ulcer  Location: Leg  Wound Location Orientation: Left       [REMOVED] Wound 06/10/20 Shoulder Left (Removed)   Resolved Date: 09/05/23  Date First Assessed/Time First Assessed: 06/10/20 1051   Location: Shoulder  Wound Location Orientation: Left  Wound Description (Comments): ARM SLING  Incision's 1st Dressing: DRESSING MEPILEX AG BORDER 4 X 4 IN  Wound Outcom...       Subjective:      .    Patient presents for follow-up of bilateral lower extremity venous ulcers.  No increased pain or drainage.  Has been using mupirocin on the wounds and using CircAid's for compression.        The following portions of the patient's history were reviewed and updated as appropriate: He  has a past medical history of Abscess of left thigh, Acute myocardial infarction (HCC), Anesthesia, Anxiety, Arteriosclerotic cardiovascular disease, Arthritis, Asthma, Chest pain, COPD (chronic obstructive pulmonary disease) (HCC), Coronary artery disease, Diabetes mellitus  ([facility]), Fatty liver, Gastric ulcer, GERD (gastroesophageal reflux disease), Hyperlipidemia, Hypertension, Low back pain, Myocardial infarction ([facility]) (2006,2007), Obesity, Obstructive sleep apnea, and Spondylosis of lumbar region without myelopathy or radiculopathy.  He   Patient Active Problem List    Diagnosis Date Noted    Verruca vulgaris 12/11/2023    FREDY (obstructive sleep apnea) 09/11/2023    Continuous opioid dependence ([facility]) 05/23/2023    Cellulitis of right lower extremity 04/10/2023    Staph skin infection 12/01/2022    Preoperative clearance 06/23/2021    Acute on chronic systolic congestive heart failure ([facility]) 05/04/2021    Diabetes mellitus ([facility])     Lesion of nose 11/13/2020    Type 2 diabetes mellitus, with long-term current use of insulin ([facility]) 07/06/2020    Obstructive sleep apnea 07/06/2020    Aftercare following surgery of the musculoskeletal system 06/15/2020    Impingement syndrome of left shoulder 01/14/2020    Adhesive capsulitis of both shoulders 01/14/2020    Chronic left shoulder pain 08/21/2019    Healthcare maintenance 04/10/2019    Morbid obesity with body mass index of 45.0-49.9 in adult ([facility]) 11/29/2018    Low back pain 10/22/2018    Dysuria 07/24/2018    Lumbar radiculopathy 12/29/2017    Myofascial pain syndrome 12/29/2017    Sacroiliitis ([facility]) 12/29/2017    Chest pain 08/02/2016    Spondylosis of lumbar region without myelopathy or radiculopathy 04/15/2014    Benign essential hypertension 10/16/2012    Coronary artery disease 06/14/2012    Chronic obstructive pulmonary disease ([facility]) 06/14/2012     He  reports that he quit smoking about 10 years ago. His smoking use included cigarettes. He started smoking about 48 years ago. He has a 57.0 pack-year smoking history. He has never used smokeless tobacco. He reports that he does not drink alcohol and does not use drugs.  Current Outpatient Medications   Medication Sig Dispense Refill    acetaminophen (TYLENOL) 325 mg tablet Take 650 mg by  mouth every 6 (six) hours as needed for mild pain      albuterol (PROVENTIL HFA,VENTOLIN HFA) 90 mcg/act inhaler Inhale 2 puffs as needed for shortness of breath 18 g 1    Aspirin Low Dose 81 MG EC tablet TAKE 1 TABLET BY MOUTH EVERY DAY 90 tablet 1    atorvastatin (LIPITOR) 40 mg tablet TAKE 1 TABLET BY MOUTH EVERY DAY 90 tablet 2    clopidogrel (PLAVIX) 75 mg tablet TAKE 1 TABLET BY MOUTH EVERY DAY 90 tablet 0    ezetimibe (ZETIA) 10 mg tablet Take 10 mg by mouth daily      fenofibrate (TRICOR) 145 mg tablet TAKE 1 TABLET BY MOUTH EVERY DAY 90 tablet 1    furosemide (LASIX) 40 mg tablet       HYDROcodone-acetaminophen (Vicodin) 5-300 MG per tablet Take 1 tablet by mouth every 6 (six) hours as needed for moderate pain Max Daily Amount: 4 tablets 112 tablet 0    Icosapent Ethyl 1 g CAPS Vascepa 1 gram capsule   TAKE 2 G BY MOUTH 2 (TWO) TIMES A DAY.      Insulin Pen Needle (B-D UF III MINI PEN NEEDLES) 31G X 5 MM MISC Inject as directed 2 (two) times a day Use as directed 100 each 0    itraconazole (SPORANOX) 100 mg capsule PLEASE SEE ATTACHED FOR DETAILED DIRECTIONS      Jardiance 25 MG TABS TAKE 1 TABLET BY MOUTH EVERY DAY 90 tablet 1    losartan (COZAAR) 25 mg tablet Take 1 tablet (25 mg total) by mouth daily 90 tablet 3    metoprolol tartrate (LOPRESSOR) 50 mg tablet Take 25 mg by mouth 2 (two) times a day      mupirocin (BACTROBAN) 2 % ointment Apply topically daily 30 g 2    nitroglycerin (NITROSTAT) 0.4 mg SL tablet PLEASE SEE ATTACHED FOR DETAILED DIRECTIONS      pantoprazole (PROTONIX) 40 mg tablet TAKE 1 TABLET BY MOUTH EVERY DAY 90 tablet 5    Semglee, yfgn, 100 UNIT/ML SOPN PLEASE SEE ATTACHED FOR DETAILED DIRECTIONS      Sulfacetamide Sodium, Acne, 10 % LOTN Apply topically 2 (two) times a day 118 mL 0     No current facility-administered medications for this visit.     He is allergic to bactrim [sulfamethoxazole-trimethoprim], cefaclor, and simvastatin..    Review of Systems   Constitutional:  Negative  for chills and fever.   HENT:  Negative for congestion and sneezing.    Respiratory:  Negative for cough.    Cardiovascular:  Positive for leg swelling.   Skin:  Positive for wound.   Psychiatric/Behavioral:  Negative for agitation.          Objective:       Wound 09/05/23 Venous Ulcer Leg Right;Anterior (Active)   Wound Image Images linked 01/17/24 1117   Wound Description Beefy red 01/17/24 1119   Bridget-wound Assessment Intact 01/17/24 1119   Wound Length (cm) 0.2 cm 01/17/24 1119   Wound Width (cm) 0.2 cm 01/17/24 1119   Wound Depth (cm) 0.1 cm 01/17/24 1119   Wound Surface Area (cm^2) 0.04 cm^2 01/17/24 1119   Wound Volume (cm^3) 0.004 cm^3 01/17/24 1119   Calculated Wound Volume (cm^3) 0 cm^3 01/17/24 1119   Change in Wound Size % 100 01/17/24 1119   Drainage Amount Scant 01/17/24 1119   Drainage Description Serosanguineous 01/17/24 1119   Non-staged Wound Description Full thickness 01/17/24 1119   Patient Tolerance Tolerated well 01/17/24 1119   Dressing Status Intact 01/17/24 1119       Wound 09/05/23 Venous Ulcer Leg Left (Active)   Wound Image Images linked 01/17/24 1118   Wound Description Beefy red 01/17/24 1120   Bridget-wound Assessment Intact 01/17/24 1120   Wound Length (cm) 0.2 cm 01/17/24 1120   Wound Width (cm) 0.2 cm 01/17/24 1120   Wound Depth (cm) 0.1 cm 01/17/24 1120   Wound Surface Area (cm^2) 0.04 cm^2 01/17/24 1120   Wound Volume (cm^3) 0.004 cm^3 01/17/24 1120   Calculated Wound Volume (cm^3) 0 cm^3 01/17/24 1120   Change in Wound Size % 100 01/17/24 1120   Drainage Amount Scant 01/17/24 1120   Drainage Description Serosanguineous 01/17/24 1120   Non-staged Wound Description Full thickness 01/17/24 1120   Patient Tolerance Tolerated well 01/17/24 1120   Dressing Status Intact 01/17/24 1120       /63   Pulse 73   Temp 98.6 °F (37 °C)   Resp 12     Physical Exam  Vitals reviewed.   Constitutional:       General: He is not in acute distress.     Appearance: Normal appearance. He is obese.  He is not ill-appearing, toxic-appearing or diaphoretic.   HENT:      Head: Normocephalic and atraumatic.      Right Ear: External ear normal.      Left Ear: External ear normal.   Eyes:      Conjunctiva/sclera: Conjunctivae normal.   Pulmonary:      Effort: Pulmonary effort is normal. No respiratory distress.   Musculoskeletal:      Cervical back: Neck supple.      Right lower leg: Edema present.      Left lower leg: Edema present.   Skin:     Comments: See wound assessment   Neurological:      Mental Status: He is alert.   Psychiatric:         Mood and Affect: Mood normal.         Behavior: Behavior normal.           Wound 09/05/23 Venous Ulcer Leg Right;Anterior (Active)   Wound Image   01/17/24 1117   Wound Description Beefy red 01/17/24 1119   Bridget-wound Assessment Intact 01/17/24 1119   Wound Length (cm) 0.2 cm 01/17/24 1119   Wound Width (cm) 0.2 cm 01/17/24 1119   Wound Depth (cm) 0.1 cm 01/17/24 1119   Wound Surface Area (cm^2) 0.04 cm^2 01/17/24 1119   Wound Volume (cm^3) 0.004 cm^3 01/17/24 1119   Calculated Wound Volume (cm^3) 0 cm^3 01/17/24 1119   Change in Wound Size % 100 01/17/24 1119   Drainage Amount Scant 01/17/24 1119   Drainage Description Serosanguineous 01/17/24 1119   Non-staged Wound Description Full thickness 01/17/24 1119   Patient Tolerance Tolerated well 01/17/24 1119   Dressing Status Intact 01/17/24 1119       Wound 09/05/23 Venous Ulcer Leg Left (Active)   Wound Image   01/17/24 1118   Wound Description Beefy red 01/17/24 1120   Bridget-wound Assessment Intact 01/17/24 1120   Wound Length (cm) 0.2 cm 01/17/24 1120   Wound Width (cm) 0.2 cm 01/17/24 1120   Wound Depth (cm) 0.1 cm 01/17/24 1120   Wound Surface Area (cm^2) 0.04 cm^2 01/17/24 1120   Wound Volume (cm^3) 0.004 cm^3 01/17/24 1120   Calculated Wound Volume (cm^3) 0 cm^3 01/17/24 1120   Change in Wound Size % 100 01/17/24 1120   Drainage Amount Scant 01/17/24 1120   Drainage Description Serosanguineous 01/17/24 1120   Non-staged  Wound Description Full thickness 01/17/24 1120   Patient Tolerance Tolerated well 01/17/24 1120   Dressing Status Intact 01/17/24 1120                         Wound Instructions:  Orders Placed This Encounter   Procedures    Wound cleansing and dressings     Wound location: Right lower leg and Left lower leg  Change dressing every day.  You may remove the dressing and shower. Do not leave wound open to air, apply new dressing immediately.  Cleanse with Hibiclens one time per week, other days use your regular soap.  Apply Mupirocin to the wounds.  Cover with gauze  Secure with roll gauze and tape.     This was applied today at [facility].     Standing Status:   Future     Standing Expiration Date:   1/17/2025    Wound compression and edema control     Wound compression and edema control  Apply Circaid devices as instructed first thing qAM & remove qHS.  Avoid prolonged standing in one place.  Elevate leg(s) above the level of the heart when sitting or as much as possible.     Standing Status:   Future     Standing Expiration Date:   1/17/2025        Diagnosis ICD-10-CM Associated Orders   1. Chronic venous hypertension (idiopathic) with ulcer of left lower extremity (CODE) ([facility])  I87.312 Wound cleansing and dressings     Wound compression and edema control      2. Chronic venous hypertension (idiopathic) with ulcer of right lower extremity (CODE) ([facility])  I87.311 Wound cleansing and dressings     Wound compression and edema control

## 2024-01-17 NOTE — PATIENT INSTRUCTIONS
Orders Placed This Encounter   Procedures    Wound cleansing and dressings     Wound location: Right lower leg and Left lower leg  Change dressing every day.  You may remove the dressing and shower. Do not leave wound open to air, apply new dressing immediately.  Cleanse with Hibiclens one time per week, other days use your regular soap.  Apply Mupirocin to the wounds.  Cover with gauze  Secure with roll gauze and tape.     This was applied today at the [facility].     Standing Status:   Future     Standing Expiration Date:   1/17/2025    Wound compression and edema control     Wound compression and edema control  Apply Circaid devices as instructed first thing qAM & remove qHS.  Avoid prolonged standing in one place.  Elevate leg(s) above the level of the heart when sitting or as much as possible.     Standing Status:   Future     Standing Expiration Date:   1/17/2025

## 2024-01-27 DIAGNOSIS — E78.00 PURE HYPERCHOLESTEROLEMIA: ICD-10-CM

## 2024-01-29 NOTE — TELEPHONE ENCOUNTER
Requested medication(s) are due for refill today: Yes  Patient has already received a courtesy refill: No  Other reason request has been forwarded to provider:

## 2024-02-07 ENCOUNTER — OFFICE VISIT (OUTPATIENT)
Dept: WOUND CARE | Facility: HOSPITAL | Age: 64
End: 2024-02-07
Payer: COMMERCIAL

## 2024-02-07 VITALS
RESPIRATION RATE: 16 BRPM | HEART RATE: 69 BPM | TEMPERATURE: 96.8 F | DIASTOLIC BLOOD PRESSURE: 77 MMHG | SYSTOLIC BLOOD PRESSURE: 172 MMHG

## 2024-02-07 DIAGNOSIS — I87.312 CHRONIC VENOUS HYPERTENSION (IDIOPATHIC) WITH ULCER OF LEFT LOWER EXTREMITY (CODE) (HCC): Primary | ICD-10-CM

## 2024-02-07 DIAGNOSIS — I87.311 CHRONIC VENOUS HYPERTENSION (IDIOPATHIC) WITH ULCER OF RIGHT LOWER EXTREMITY (CODE) (HCC): ICD-10-CM

## 2024-02-07 DIAGNOSIS — E78.00 PURE HYPERCHOLESTEROLEMIA: ICD-10-CM

## 2024-02-07 PROCEDURE — 99213 OFFICE O/P EST LOW 20 MIN: CPT | Performed by: FAMILY MEDICINE

## 2024-02-07 PROCEDURE — G0463 HOSPITAL OUTPT CLINIC VISIT: HCPCS | Performed by: FAMILY MEDICINE

## 2024-02-07 PROCEDURE — 99214 OFFICE O/P EST MOD 30 MIN: CPT | Performed by: FAMILY MEDICINE

## 2024-02-07 RX ORDER — DOXYCYCLINE HYCLATE 100 MG/1
100 CAPSULE ORAL EVERY 12 HOURS SCHEDULED
Qty: 42 CAPSULE | Refills: 0 | Status: SHIPPED | OUTPATIENT
Start: 2024-02-07 | End: 2024-02-28

## 2024-02-07 RX ORDER — FENOFIBRATE 145 MG/1
145 TABLET, COATED ORAL DAILY
Qty: 90 TABLET | Refills: 0 | Status: SHIPPED | OUTPATIENT
Start: 2024-02-07

## 2024-02-07 NOTE — PROGRESS NOTES
Patient ID: Liban Montes is a 63 y.o. male Date of Birth 1960     Chief Complaint  Chief Complaint   Patient presents with    Follow Up Wound Care Visit     Holly Guzman.  Patient using Circaid for compression.        Allergies  Bactrim [sulfamethoxazole-trimethoprim], Cefaclor, and Simvastatin    Assessment:    No problem-specific Assessment & Plan notes found for this encounter.       Diagnoses and all orders for this visit:    Chronic venous hypertension (idiopathic) with ulcer of left lower extremity (CODE) (HCC)  -     Wound cleansing and dressings; Future  -     Wound compression and edema control; Future  -     doxycycline hyclate (VIBRAMYCIN) 100 mg capsule; Take 1 capsule (100 mg total) by mouth every 12 (twelve) hours for 21 days  -     mupirocin (BACTROBAN) 2 % ointment; Apply topically daily    Chronic venous hypertension (idiopathic) with ulcer of right lower extremity (CODE) (HCC)  -     Wound cleansing and dressings; Future  -     Wound compression and edema control; Future  -     doxycycline hyclate (VIBRAMYCIN) 100 mg capsule; Take 1 capsule (100 mg total) by mouth every 12 (twelve) hours for 21 days  -     mupirocin (BACTROBAN) 2 % ointment; Apply topically daily              Procedures    Plan:  Overall improved but still noted to have a few scattered pustules on bilateral lower extremities  Continue wound management with mupirocin, see wound orders below  Continue compression with CircAid's and recommend washing the initial sock layer daily (patient reports that he has 4-5 pairs of the sock)  Trial of oral antibiotic as use of the topical mupirocin, although very helpful and has shown significant improvement, does not appear to be bring about complete resolution and healing.  Doxycycline sent to the pharmacy.  Has taken courses of 10 days at a time in the past and tolerated well as well as noting improvement with the antibiotic use.  Will try a longer course at this point  Follow-up  in 2 weeks or call sooner with questions or concerns    Wound 09/05/23 Venous Ulcer Leg Right;Anterior (Active)   Wound Image Images linked 02/07/24 0951   Wound Description Beefy red;Epithelialization 02/07/24 0900   Bridget-wound Assessment Intact 02/07/24 0900   Wound Length (cm) 0.1 cm 02/07/24 0900   Wound Width (cm) 0.1 cm 02/07/24 0900   Wound Depth (cm) 0.1 cm 02/07/24 0900   Wound Surface Area (cm^2) 0.01 cm^2 02/07/24 0900   Wound Volume (cm^3) 0.001 cm^3 02/07/24 0900   Calculated Wound Volume (cm^3) 0 cm^3 02/07/24 0900   Change in Wound Size % 100 02/07/24 0900   Drainage Amount Scant 02/07/24 0900   Drainage Description Serosanguineous 02/07/24 0900   Non-staged Wound Description Full thickness 02/07/24 0900   Patient Tolerance Tolerated well 02/07/24 0900   Dressing Status Intact 02/07/24 0900       Wound 09/05/23 Venous Ulcer Leg Left (Active)   Wound Image Images linked 02/07/24 0951   Wound Description Epithelialization 02/07/24 0900   Bridget-wound Assessment Intact 02/07/24 0900   Wound Length (cm) 0.1 cm 02/07/24 0900   Wound Width (cm) 0.1 cm 02/07/24 0900   Wound Depth (cm) 0.1 cm 02/07/24 0900   Wound Surface Area (cm^2) 0.01 cm^2 02/07/24 0900   Wound Volume (cm^3) 0.001 cm^3 02/07/24 0900   Calculated Wound Volume (cm^3) 0 cm^3 02/07/24 0900   Change in Wound Size % 100 02/07/24 0900   Drainage Amount Scant 02/07/24 0900   Drainage Description Serosanguineous 02/07/24 0900   Non-staged Wound Description Full thickness 02/07/24 0900   Dressing Status Intact 02/07/24 0900       Wound 09/05/23 Venous Ulcer Leg Right;Anterior (Active)   Date First Assessed/Time First Assessed: 09/05/23 1401   Present on Original Admission: Yes  Primary Wound Type: Venous Ulcer  Location: Leg  Wound Location Orientation: Right;Anterior       Wound 09/05/23 Venous Ulcer Leg Left (Active)   Date First Assessed/Time First Assessed: 09/05/23 1401   Present on Original Admission: Yes  Primary Wound Type: Venous Ulcer   Location: Leg  Wound Location Orientation: Left       [REMOVED] Wound 06/10/20 Shoulder Left (Removed)   Resolved Date: 09/05/23  Date First Assessed/Time First Assessed: 06/10/20 1051   Location: Shoulder  Wound Location Orientation: Left  Wound Description (Comments): ARM SLING  Incision's 1st Dressing: DRESSING MEPILEX AG BORDER 4 X 4 IN  Wound Outcom...       Subjective:      .    Patient presents for follow-up of bilateral lower extremity ulcers.  No increased pain or drainage.  Has been using topical mupirocin on the open areas.  Using CircAid's for compression.        The following portions of the patient's history were reviewed and updated as appropriate: He  has a past medical history of Abscess of left thigh, Acute myocardial infarction ([facility]), Anesthesia, Anxiety, Arteriosclerotic cardiovascular disease, Arthritis, Asthma, Chest pain, COPD (chronic obstructive pulmonary disease) ([facility]), Coronary artery disease, Diabetes mellitus ([facility]), Fatty liver, Gastric ulcer, GERD (gastroesophageal reflux disease), Hyperlipidemia, Hypertension, Low back pain, Myocardial infarction ([facility]) (2006,2007), Obesity, Obstructive sleep apnea, and Spondylosis of lumbar region without myelopathy or radiculopathy.  He   Patient Active Problem List    Diagnosis Date Noted    Verruca vulgaris 12/11/2023    FREDY (obstructive sleep apnea) 09/11/2023    Continuous opioid dependence ([facility]) 05/23/2023    Cellulitis of right lower extremity 04/10/2023    Staph skin infection 12/01/2022    Preoperative clearance 06/23/2021    Acute on chronic systolic congestive heart failure ([facility]) 05/04/2021    Diabetes mellitus ([facility])     Lesion of nose 11/13/2020    Type 2 diabetes mellitus, with long-term current use of insulin ([facility]) 07/06/2020    Obstructive sleep apnea 07/06/2020    Aftercare following surgery of the musculoskeletal system 06/15/2020    Impingement syndrome of left shoulder 01/14/2020    Adhesive capsulitis of both shoulders 01/14/2020     Chronic left shoulder pain 08/21/2019    Healthcare maintenance 04/10/2019    Morbid obesity with body mass index of 45.0-49.9 in adult (HCC) 11/29/2018    Low back pain 10/22/2018    Dysuria 07/24/2018    Lumbar radiculopathy 12/29/2017    Myofascial pain syndrome 12/29/2017    Sacroiliitis (HCC) 12/29/2017    Chest pain 08/02/2016    Spondylosis of lumbar region without myelopathy or radiculopathy 04/15/2014    Benign essential hypertension 10/16/2012    Coronary artery disease 06/14/2012    Chronic obstructive pulmonary disease (HCC) 06/14/2012     He  has a past surgical history that includes Coronary angioplasty with stent (2006, 2007); Neuroplasty / transposition median nerve at carpal tunnel (Right); EGD; Carpal tunnel release (Right); Colonoscopy; Hemorrhoid surgery; and pr surgical arthroscopy shoulder w/lss&rescj ads (Left, 6/10/2020).  He  reports that he quit smoking about 10 years ago. His smoking use included cigarettes. He started smoking about 48 years ago. He has a 57.0 pack-year smoking history. He has never used smokeless tobacco. He reports that he does not drink alcohol and does not use drugs.  Current Outpatient Medications   Medication Sig Dispense Refill    doxycycline hyclate (VIBRAMYCIN) 100 mg capsule Take 1 capsule (100 mg total) by mouth every 12 (twelve) hours for 21 days 42 capsule 0    mupirocin (BACTROBAN) 2 % ointment Apply topically daily 30 g 2    acetaminophen (TYLENOL) 325 mg tablet Take 650 mg by mouth every 6 (six) hours as needed for mild pain      albuterol (PROVENTIL HFA,VENTOLIN HFA) 90 mcg/act inhaler Inhale 2 puffs as needed for shortness of breath 18 g 1    Aspirin Low Dose 81 MG EC tablet TAKE 1 TABLET BY MOUTH EVERY DAY 90 tablet 1    atorvastatin (LIPITOR) 40 mg tablet TAKE 1 TABLET BY MOUTH EVERY DAY 90 tablet 2    clopidogrel (PLAVIX) 75 mg tablet TAKE 1 TABLET BY MOUTH EVERY DAY 90 tablet 0    ezetimibe (ZETIA) 10 mg tablet Take 10 mg by mouth daily       fenofibrate (TRICOR) 145 mg tablet TAKE 1 TABLET BY MOUTH EVERY DAY 90 tablet 1    furosemide (LASIX) 40 mg tablet       HYDROcodone-acetaminophen (Vicodin) 5-300 MG per tablet Take 1 tablet by mouth every 6 (six) hours as needed for moderate pain Max Daily Amount: 4 tablets 112 tablet 0    Icosapent Ethyl 1 g CAPS Vascepa 1 gram capsule   TAKE 2 G BY MOUTH 2 (TWO) TIMES A DAY.      Insulin Pen Needle (B-D UF III MINI PEN NEEDLES) 31G X 5 MM MISC Inject as directed 2 (two) times a day Use as directed 100 each 0    itraconazole (SPORANOX) 100 mg capsule PLEASE SEE ATTACHED FOR DETAILED DIRECTIONS      Jardiance 25 MG TABS TAKE 1 TABLET BY MOUTH EVERY DAY 90 tablet 1    losartan (COZAAR) 25 mg tablet Take 1 tablet (25 mg total) by mouth daily 90 tablet 3    metoprolol tartrate (LOPRESSOR) 50 mg tablet Take 25 mg by mouth 2 (two) times a day      nitroglycerin (NITROSTAT) 0.4 mg SL tablet PLEASE SEE ATTACHED FOR DETAILED DIRECTIONS      pantoprazole (PROTONIX) 40 mg tablet TAKE 1 TABLET BY MOUTH EVERY DAY 90 tablet 5    Semglee, yfgn, 100 UNIT/ML SOPN PLEASE SEE ATTACHED FOR DETAILED DIRECTIONS      Sulfacetamide Sodium, Acne, 10 % LOTN Apply topically 2 (two) times a day 118 mL 0     No current facility-administered medications for this visit.     He is allergic to bactrim [sulfamethoxazole-trimethoprim], cefaclor, and simvastatin..    Review of Systems   Constitutional:  Negative for chills and fever.   HENT:  Negative for congestion and sneezing.    Respiratory:  Negative for cough.    Cardiovascular:  Positive for leg swelling.   Skin:  Positive for wound.   Psychiatric/Behavioral:  Negative for agitation.          Objective:       Wound 09/05/23 Venous Ulcer Leg Right;Anterior (Active)   Wound Image Images linked 02/07/24 0951   Wound Description Beefy red;Epithelialization 02/07/24 0900   Bridget-wound Assessment Intact 02/07/24 0900   Wound Length (cm) 0.1 cm 02/07/24 0900   Wound Width (cm) 0.1 cm 02/07/24 0900    Wound Depth (cm) 0.1 cm 02/07/24 0900   Wound Surface Area (cm^2) 0.01 cm^2 02/07/24 0900   Wound Volume (cm^3) 0.001 cm^3 02/07/24 0900   Calculated Wound Volume (cm^3) 0 cm^3 02/07/24 0900   Change in Wound Size % 100 02/07/24 0900   Drainage Amount Scant 02/07/24 0900   Drainage Description Serosanguineous 02/07/24 0900   Non-staged Wound Description Full thickness 02/07/24 0900   Patient Tolerance Tolerated well 02/07/24 0900   Dressing Status Intact 02/07/24 0900       Wound 09/05/23 Venous Ulcer Leg Left (Active)   Wound Image Images linked 02/07/24 0951   Wound Description Epithelialization 02/07/24 0900   Bridget-wound Assessment Intact 02/07/24 0900   Wound Length (cm) 0.1 cm 02/07/24 0900   Wound Width (cm) 0.1 cm 02/07/24 0900   Wound Depth (cm) 0.1 cm 02/07/24 0900   Wound Surface Area (cm^2) 0.01 cm^2 02/07/24 0900   Wound Volume (cm^3) 0.001 cm^3 02/07/24 0900   Calculated Wound Volume (cm^3) 0 cm^3 02/07/24 0900   Change in Wound Size % 100 02/07/24 0900   Drainage Amount Scant 02/07/24 0900   Drainage Description Serosanguineous 02/07/24 0900   Non-staged Wound Description Full thickness 02/07/24 0900   Dressing Status Intact 02/07/24 0900       BP (!) 172/77   Pulse 69   Temp (!) 96.8 °F (36 °C)   Resp 16     Physical Exam  Constitutional:       General: He is not in acute distress.     Appearance: Normal appearance. He is obese. He is not ill-appearing, toxic-appearing or diaphoretic.   HENT:      Head: Normocephalic and atraumatic.      Right Ear: External ear normal.      Left Ear: External ear normal.   Eyes:      Conjunctiva/sclera: Conjunctivae normal.   Pulmonary:      Effort: Pulmonary effort is normal. No respiratory distress.   Musculoskeletal:      Cervical back: Neck supple.      Right lower leg: Edema present.      Left lower leg: Edema present.   Skin:     Comments: See wound assessment.  A few scattered pustules noted on bilateral lower extremities   Neurological:      Mental  Status: He is alert.   Psychiatric:         Mood and Affect: Mood normal.         Behavior: Behavior normal.           Wound 09/05/23 Venous Ulcer Leg Right;Anterior (Active)   Wound Image   02/07/24 0951   Wound Description Beefy red;Epithelialization 02/07/24 0900   Bridget-wound Assessment Intact 02/07/24 0900   Wound Length (cm) 0.1 cm 02/07/24 0900   Wound Width (cm) 0.1 cm 02/07/24 0900   Wound Depth (cm) 0.1 cm 02/07/24 0900   Wound Surface Area (cm^2) 0.01 cm^2 02/07/24 0900   Wound Volume (cm^3) 0.001 cm^3 02/07/24 0900   Calculated Wound Volume (cm^3) 0 cm^3 02/07/24 0900   Change in Wound Size % 100 02/07/24 0900   Drainage Amount Scant 02/07/24 0900   Drainage Description Serosanguineous 02/07/24 0900   Non-staged Wound Description Full thickness 02/07/24 0900   Patient Tolerance Tolerated well 02/07/24 0900   Dressing Status Intact 02/07/24 0900       Wound 09/05/23 Venous Ulcer Leg Left (Active)   Wound Image   02/07/24 0951   Wound Description Epithelialization 02/07/24 0900   Bridget-wound Assessment Intact 02/07/24 0900   Wound Length (cm) 0.1 cm 02/07/24 0900   Wound Width (cm) 0.1 cm 02/07/24 0900   Wound Depth (cm) 0.1 cm 02/07/24 0900   Wound Surface Area (cm^2) 0.01 cm^2 02/07/24 0900   Wound Volume (cm^3) 0.001 cm^3 02/07/24 0900   Calculated Wound Volume (cm^3) 0 cm^3 02/07/24 0900   Change in Wound Size % 100 02/07/24 0900   Drainage Amount Scant 02/07/24 0900   Drainage Description Serosanguineous 02/07/24 0900   Non-staged Wound Description Full thickness 02/07/24 0900   Patient Tolerance Tolerated well 01/17/24 1120   Dressing Status Intact 02/07/24 0900                         Wound Instructions:  Orders Placed This Encounter   Procedures    Wound cleansing and dressings     Wound location: Right lower leg and Left lower leg  Change dressing every day.  You may remove the dressing and shower. Do not leave wound open to air, apply new dressing immediately.  Cleanse with Hibiclens one time per  week, other days use your regular soap.  Apply Mupirocin to the wounds.  Cover with gauze  Secure with roll gauze and tape.     This [facility] applied [facility] at [facility].    Dr. Anderson will send a prescription for Doxycycline to your Pharmacy.  Please obtain ASAP and completed regime as directed by Dr. Anderson.     Standing Status:   Future     Standing Expiration Date:   2/7/2025    Wound compression and edema control     Wound compression and edema control  Apply Circaid devices as instructed first thing qAM & remove qHS.  Avoid prolonged standing in one place.  Elevate leg(s) above the level of the heart when sitting or as much as possible.    Please apply clean stockings every day.     Standing Status:   Future     Standing Expiration Date:   2/7/2025        Diagnosis ICD-10-CM Associated Orders   1. Chronic venous hypertension (idiopathic) with ulcer of left lower extremity (CODE) ([facility])  I87.312 Wound cleansing and dressings     Wound compression and edema control     doxycycline hyclate (VIBRAMYCIN) 100 mg capsule     mupirocin (BACTROBAN) 2 % ointment      2. Chronic venous hypertension (idiopathic) with ulcer of right lower extremity (CODE) ([facility])  I87.311 Wound cleansing and dressings     Wound compression and edema control     doxycycline hyclate (VIBRAMYCIN) 100 mg capsule     mupirocin (BACTROBAN) 2 % ointment

## 2024-02-07 NOTE — PATIENT INSTRUCTIONS
Orders Placed This Encounter   Procedures    Wound cleansing and dressings     Wound location: Right lower leg and Left lower leg  Change dressing every day.  You may remove the dressing and shower. Do not leave wound open to air, apply new dressing immediately.  Cleanse with Hibiclens one time per week, other days use your regular soap.  Apply Mupirocin to the wounds.  Cover with gauze  Secure with roll gauze and tape.     This was applied today at the [facility].    Dr. Anderson will send a prescription for Doxycycline to your Pharmacy.  Please obtain ASAP and completed regime as directed by Dr. Anderson.     Standing Status:   Future     Standing Expiration Date:   2/7/2025    Wound compression and edema control     Wound compression and edema control  Apply Circaid devices as instructed first thing qAM & remove qHS.  Avoid prolonged standing in one place.  Elevate leg(s) above the level of the heart when sitting or as much as possible.    Please apply clean stockings every day.     Standing Status:   Future     Standing Expiration Date:   2/7/2025

## 2024-02-14 DIAGNOSIS — M54.50 CHRONIC BILATERAL LOW BACK PAIN WITHOUT SCIATICA: ICD-10-CM

## 2024-02-14 DIAGNOSIS — G89.29 CHRONIC BILATERAL LOW BACK PAIN WITHOUT SCIATICA: ICD-10-CM

## 2024-02-14 RX ORDER — HYDROCODONE BITARTRATE AND ACETAMINOPHEN 5; 300 MG/1; MG/1
1 TABLET ORAL EVERY 6 HOURS PRN
Qty: 112 TABLET | Refills: 0 | Status: SHIPPED | OUTPATIENT
Start: 2024-02-14

## 2024-02-21 ENCOUNTER — OFFICE VISIT (OUTPATIENT)
Dept: WOUND CARE | Facility: HOSPITAL | Age: 64
End: 2024-02-21
Payer: COMMERCIAL

## 2024-02-21 VITALS
RESPIRATION RATE: 12 BRPM | TEMPERATURE: 98.4 F | DIASTOLIC BLOOD PRESSURE: 56 MMHG | HEART RATE: 76 BPM | SYSTOLIC BLOOD PRESSURE: 116 MMHG

## 2024-02-21 DIAGNOSIS — I87.312 CHRONIC VENOUS HYPERTENSION (IDIOPATHIC) WITH ULCER OF LEFT LOWER EXTREMITY (CODE) (HCC): Primary | ICD-10-CM

## 2024-02-21 DIAGNOSIS — I87.311 CHRONIC VENOUS HYPERTENSION (IDIOPATHIC) WITH ULCER OF RIGHT LOWER EXTREMITY (CODE) (HCC): ICD-10-CM

## 2024-02-21 PROBLEM — Z00.00 HEALTHCARE MAINTENANCE: Status: RESOLVED | Noted: 2019-04-10 | Resolved: 2024-02-21

## 2024-02-21 PROCEDURE — 99212 OFFICE O/P EST SF 10 MIN: CPT | Performed by: FAMILY MEDICINE

## 2024-02-21 PROCEDURE — 99213 OFFICE O/P EST LOW 20 MIN: CPT | Performed by: FAMILY MEDICINE

## 2024-02-21 PROCEDURE — G0463 HOSPITAL OUTPT CLINIC VISIT: HCPCS | Performed by: FAMILY MEDICINE

## 2024-02-21 NOTE — PATIENT INSTRUCTIONS
Orders Placed This Encounter   Procedures    Wound cleansing and dressings     Right lower leg and Left lower leg wounds are healed.    No needed dressing    Cleanse the legs with Hibiclens one time per week for one month, other days use your regular soap.  Apply gentle moisturizer to intact skin daily    Wear your compression device.       You are discharged from wound care center.     Call wound care center when you have open wound issue.     Standing Status:   Future     Standing Expiration Date:   2/21/2025    Wound compression and edema control     Wound compression and edema control  Apply Circaid devices as instructed first thing qAM & remove qHS.  Avoid prolonged standing in one place.  Elevate leg(s) above the level of the heart when sitting or as much as possible.     Please apply clean stockings every day.     Standing Status:   Future     Standing Expiration Date:   2/21/2025

## 2024-02-21 NOTE — PROGRESS NOTES
Patient ID: Liban Montes is a 63 y.o. male Date of Birth 1960     Chief Complaint  Chief Complaint   Patient presents with    Follow Up Wound Care Visit     BLE wound       Allergies  Bactrim [sulfamethoxazole-trimethoprim], Cefaclor, and Simvastatin    Assessment:    No problem-specific Assessment & Plan notes found for this encounter.       Diagnoses and all orders for this visit:    Chronic venous hypertension (idiopathic) with ulcer of left lower extremity (CODE) ([facility])  -     Wound cleansing and dressings; Future  -     Wound compression and edema control; Future    Chronic venous hypertension (idiopathic) with ulcer of right lower extremity (CODE) ([facility])  -     Wound cleansing and dressings; Future  -     Wound compression and edema control; Future              Procedures    Plan:  Wounds are closed  Lightly moisturize daily  Complete the course of antibiotics  Long-term compression discussed  Follow-up here in the wound management center as needed    Wound 09/05/23 Venous Ulcer Leg Right;Anterior (Active)   Wound Image Images linked 02/21/24 1057   Wound Description Dry;Epithelialization 02/21/24 1057   Bridget-wound Assessment Intact;Erythema 02/21/24 1057   Wound Length (cm) 0 cm 02/21/24 1057   Wound Width (cm) 0 cm 02/21/24 1057   Wound Depth (cm) 0 cm 02/21/24 1057   Wound Surface Area (cm^2) 0 cm^2 02/21/24 1057   Wound Volume (cm^3) 0 cm^3 02/21/24 1057   Calculated Wound Volume (cm^3) 0 cm^3 02/21/24 1057   Change in Wound Size % 100 02/21/24 1057   Drainage Amount None 02/21/24 1057   Non-staged Wound Description Not applicable 02/21/24 1057       Wound 09/05/23 Venous Ulcer Leg Left (Active)   Wound Image Images linked 02/21/24 1058   Wound Description Dry;Epithelialization 02/21/24 1058   Bridget-wound Assessment Intact 02/21/24 1058   Wound Length (cm) 0 cm 02/21/24 1058   Wound Width (cm) 0 cm 02/21/24 1058   Wound Depth (cm) 0 cm 02/21/24 1058   Wound Surface Area (cm^2) 0 cm^2 02/21/24 1058    Wound Volume (cm^3) 0 cm^3 02/21/24 1058   Calculated Wound Volume (cm^3) 0 cm^3 02/21/24 1058   Change in Wound Size % 100 02/21/24 1058   Drainage Amount None 02/21/24 1058   Non-staged Wound Description Not applicable 02/21/24 1058   Patient Tolerance Tolerated well 02/21/24 1058   Dressing Status Intact 02/21/24 1058       Wound 09/05/23 Venous Ulcer Leg Right;Anterior (Active)   Date First Assessed/Time First Assessed: 09/05/23 1401   Present on Original Admission: Yes  Primary Wound Type: Venous Ulcer  Location: Leg  Wound Location Orientation: Right;Anterior  Wound Outcome: Healed       Wound 09/05/23 Venous Ulcer Leg Left (Active)   Date First Assessed/Time First Assessed: 09/05/23 1401   Present on Original Admission: Yes  Primary Wound Type: Venous Ulcer  Location: Leg  Wound Location Orientation: Left  Wound Outcome: Healed       [REMOVED] Wound 06/10/20 Shoulder Left (Removed)   Resolved Date: 09/05/23  Date First Assessed/Time First Assessed: 06/10/20 1051   Location: Shoulder  Wound Location Orientation: Left  Wound Description (Comments): ARM SLING  Incision's 1st Dressing: DRESSING MEPILEX AG BORDER 4 X 4 IN  Wound Outcom...       Subjective:      .    Patient presents for follow-up of bilateral lower extremity venous ulcers.  No significant pain or drainage.  Has been applying mupirocin to any open areas and covering with dry gauze.  Has been using CircAid's for compression.  Patient has been cleansing with Hibiclens once a week as well as taking doxycycline.  He has 1 week remaining of the course.        The following portions of the patient's history were reviewed and updated as appropriate: He  has a past medical history of Abscess of left thigh, Acute myocardial infarction (HCC), Anesthesia, Anxiety, Arteriosclerotic cardiovascular disease, Arthritis, Asthma, Chest pain, COPD (chronic obstructive pulmonary disease) (HCC), Coronary artery disease, Diabetes mellitus (HCC), Fatty liver, Gastric  ulcer, GERD (gastroesophageal reflux disease), Hyperlipidemia, Hypertension, Low back pain, Myocardial infarction ([facility]) (2006,2007), Obesity, Obstructive sleep apnea, and Spondylosis of lumbar region without myelopathy or radiculopathy.  He   Patient Active Problem List    Diagnosis Date Noted    Verruca vulgaris 12/11/2023    FREDY (obstructive sleep apnea) 09/11/2023    Continuous opioid dependence ([facility]) 05/23/2023    Cellulitis of right lower extremity 04/10/2023    Staph skin infection 12/01/2022    Preoperative clearance 06/23/2021    Acute on chronic systolic congestive heart failure ([facility]) 05/04/2021    Diabetes mellitus ([facility])     Lesion of nose 11/13/2020    Type 2 diabetes mellitus, with long-term current use of insulin ([facility]) 07/06/2020    Obstructive sleep apnea 07/06/2020    Aftercare following surgery of the musculoskeletal system 06/15/2020    Impingement syndrome of left shoulder 01/14/2020    Adhesive capsulitis of both shoulders 01/14/2020    Chronic left shoulder pain 08/21/2019    Healthcare maintenance 04/10/2019    Morbid obesity with body mass index of 45.0-49.9 in adult ([facility]) 11/29/2018    Low back pain 10/22/2018    Dysuria 07/24/2018    Lumbar radiculopathy 12/29/2017    Myofascial pain syndrome 12/29/2017    Sacroiliitis ([facility]) 12/29/2017    Chest pain 08/02/2016    Spondylosis of lumbar region without myelopathy or radiculopathy 04/15/2014    Benign essential hypertension 10/16/2012    Coronary artery disease 06/14/2012    Chronic obstructive pulmonary disease ([facility]) 06/14/2012     He  reports that he quit smoking about 10 years ago. His smoking use included cigarettes. He started smoking about 48 years ago. He has a 57 pack-year smoking history. He has never used smokeless tobacco. He reports that he does not drink alcohol and does not use drugs.  He is allergic to bactrim [sulfamethoxazole-trimethoprim], cefaclor, and simvastatin..    Review of Systems   Constitutional:  Negative for chills  and fever.   HENT:  Negative for congestion and sneezing.    Respiratory:  Negative for cough.    Cardiovascular:  Positive for leg swelling.   Skin:  Positive for wound.   Psychiatric/Behavioral:  Negative for agitation.          Objective:       Wound 09/05/23 Venous Ulcer Leg Right;Anterior (Active)   Wound Image Images linked 02/21/24 1057   Wound Description Dry;Epithelialization 02/21/24 1057   Brigdet-wound Assessment Intact;Erythema 02/21/24 1057   Wound Length (cm) 0 cm 02/21/24 1057   Wound Width (cm) 0 cm 02/21/24 1057   Wound Depth (cm) 0 cm 02/21/24 1057   Wound Surface Area (cm^2) 0 cm^2 02/21/24 1057   Wound Volume (cm^3) 0 cm^3 02/21/24 1057   Calculated Wound Volume (cm^3) 0 cm^3 02/21/24 1057   Change in Wound Size % 100 02/21/24 1057   Drainage Amount None 02/21/24 1057   Non-staged Wound Description Not applicable 02/21/24 1057       Wound 09/05/23 Venous Ulcer Leg Left (Active)   Wound Image Images linked 02/21/24 1058   Wound Description Dry;Epithelialization 02/21/24 1058   Bridget-wound Assessment Intact 02/21/24 1058   Wound Length (cm) 0 cm 02/21/24 1058   Wound Width (cm) 0 cm 02/21/24 1058   Wound Depth (cm) 0 cm 02/21/24 1058   Wound Surface Area (cm^2) 0 cm^2 02/21/24 1058   Wound Volume (cm^3) 0 cm^3 02/21/24 1058   Calculated Wound Volume (cm^3) 0 cm^3 02/21/24 1058   Change in Wound Size % 100 02/21/24 1058   Drainage Amount None 02/21/24 1058   Non-staged Wound Description Not applicable 02/21/24 1058   Patient Tolerance Tolerated well 02/21/24 1058   Dressing Status Intact 02/21/24 1058       /56   Pulse 76   Temp 98.4 °F (36.9 °C)   Resp 12     Physical Exam  Constitutional:       General: He is not in acute distress.     Appearance: Normal appearance. He is obese. He is not ill-appearing, toxic-appearing or diaphoretic.   HENT:      Head: Normocephalic and atraumatic.      Right Ear: External ear normal.      Left Ear: External ear normal.   Eyes:      Conjunctiva/sclera:  Conjunctivae normal.   Pulmonary:      Effort: Pulmonary effort is normal. No respiratory distress.   Musculoskeletal:      Cervical back: Neck supple.      Right lower leg: Edema present.      Left lower leg: Edema present.   Skin:     Comments: See wound assessment, no open areas   Neurological:      Mental Status: He is alert.   Psychiatric:         Mood and Affect: Mood normal.         Behavior: Behavior normal.           Wound 09/05/23 Venous Ulcer Leg Right;Anterior (Active)   Wound Image   02/21/24 1057   Wound Description Dry;Epithelialization 02/21/24 1057   Bridget-wound Assessment Intact;Erythema 02/21/24 1057   Wound Length (cm) 0 cm 02/21/24 1057   Wound Width (cm) 0 cm 02/21/24 1057   Wound Depth (cm) 0 cm 02/21/24 1057   Wound Surface Area (cm^2) 0 cm^2 02/21/24 1057   Wound Volume (cm^3) 0 cm^3 02/21/24 1057   Calculated Wound Volume (cm^3) 0 cm^3 02/21/24 1057   Change in Wound Size % 100 02/21/24 1057   Drainage Amount None 02/21/24 1057   Drainage Description Serosanguineous 02/07/24 0900   Non-staged Wound Description Not applicable 02/21/24 1057   Patient Tolerance Tolerated well 02/07/24 0900   Dressing Status Intact 02/07/24 0900       Wound 09/05/23 Venous Ulcer Leg Left (Active)   Wound Image   02/21/24 1058   Wound Description Dry;Epithelialization 02/21/24 1058   Bridget-wound Assessment Intact 02/21/24 1058   Wound Length (cm) 0 cm 02/21/24 1058   Wound Width (cm) 0 cm 02/21/24 1058   Wound Depth (cm) 0 cm 02/21/24 1058   Wound Surface Area (cm^2) 0 cm^2 02/21/24 1058   Wound Volume (cm^3) 0 cm^3 02/21/24 1058   Calculated Wound Volume (cm^3) 0 cm^3 02/21/24 1058   Change in Wound Size % 100 02/21/24 1058   Drainage Amount None 02/21/24 1058   Drainage Description Serosanguineous 02/07/24 0900   Non-staged Wound Description Not applicable 02/21/24 1058   Patient Tolerance Tolerated well 02/21/24 1058   Dressing Status Intact 02/21/24 0744                         Wound Instructions:  Orders  Placed This Encounter   Procedures    Wound cleansing and dressings     Right lower leg and Left lower leg wounds are healed.    No needed dressing    Cleanse the legs with Hibiclens one time per week for one month, other days use your regular soap.  Apply gentle moisturizer to intact skin daily    Wear your compression device.       You are discharged from wound care center.     Call wound care center when you have open wound issue.     Standing Status:   Future     Standing Expiration Date:   2/21/2025    Wound compression and edema control     Wound compression and edema control  Apply Circaid devices as instructed first thing qAM & remove qHS.  Avoid prolonged standing in one place.  Elevate leg(s) above the level of the heart when sitting or as much as possible.     Please apply clean stockings every day.     Standing Status:   Future     Standing Expiration Date:   2/21/2025        Diagnosis ICD-10-CM Associated Orders   1. Chronic venous hypertension (idiopathic) with ulcer of left lower extremity (CODE) ([facility])  I87.312 Wound cleansing and dressings     Wound compression and edema control      2. Chronic venous hypertension (idiopathic) with ulcer of right lower extremity (CODE) ([facility])  I87.311 Wound cleansing and dressings     Wound compression and edema control

## 2024-02-25 DIAGNOSIS — I25.10 ARTERIOSCLEROTIC CARDIOVASCULAR DISEASE: ICD-10-CM

## 2024-02-26 RX ORDER — ASPIRIN 81 MG/1
TABLET, COATED ORAL
Qty: 30 TABLET | Refills: 5 | Status: SHIPPED | OUTPATIENT
Start: 2024-02-26

## 2024-03-11 DIAGNOSIS — E11.59 TYPE 2 DIABETES MELLITUS WITH OTHER CIRCULATORY COMPLICATION, WITH LONG-TERM CURRENT USE OF INSULIN (HCC): ICD-10-CM

## 2024-03-11 DIAGNOSIS — Z79.4 TYPE 2 DIABETES MELLITUS WITH OTHER CIRCULATORY COMPLICATION, WITH LONG-TERM CURRENT USE OF INSULIN (HCC): ICD-10-CM

## 2024-03-11 RX ORDER — FLURBIPROFEN SODIUM 0.3 MG/ML
SOLUTION/ DROPS OPHTHALMIC 2 TIMES DAILY
Qty: 100 EACH | Refills: 0 | Status: SHIPPED | OUTPATIENT
Start: 2024-03-11

## 2024-03-18 RX ORDER — FENOFIBRATE 145 MG/1
TABLET, COATED ORAL
Qty: 90 TABLET | Refills: 1 | OUTPATIENT
Start: 2024-03-18

## 2024-03-20 DIAGNOSIS — I25.10 ARTERIOSCLEROTIC CARDIOVASCULAR DISEASE: ICD-10-CM

## 2024-03-20 RX ORDER — SALICYLIC ACID 40 %
81 ADHESIVE PATCH, MEDICATED TOPICAL DAILY
Qty: 90 TABLET | Refills: 2 | Status: SHIPPED | OUTPATIENT
Start: 2024-03-20

## 2024-04-08 DIAGNOSIS — M54.50 CHRONIC BILATERAL LOW BACK PAIN WITHOUT SCIATICA: ICD-10-CM

## 2024-04-08 DIAGNOSIS — G89.29 CHRONIC BILATERAL LOW BACK PAIN WITHOUT SCIATICA: ICD-10-CM

## 2024-04-08 RX ORDER — HYDROCODONE BITARTRATE AND ACETAMINOPHEN 5; 300 MG/1; MG/1
1 TABLET ORAL EVERY 6 HOURS PRN
Qty: 112 TABLET | Refills: 0 | Status: SHIPPED | OUTPATIENT
Start: 2024-04-08

## 2024-04-14 DIAGNOSIS — E11.59 TYPE 2 DIABETES MELLITUS WITH OTHER CIRCULATORY COMPLICATION, WITH LONG-TERM CURRENT USE OF INSULIN (HCC): ICD-10-CM

## 2024-04-14 DIAGNOSIS — Z79.4 TYPE 2 DIABETES MELLITUS WITH OTHER CIRCULATORY COMPLICATION, WITH LONG-TERM CURRENT USE OF INSULIN (HCC): ICD-10-CM

## 2024-04-15 RX ORDER — CLOPIDOGREL BISULFATE 75 MG/1
75 TABLET ORAL DAILY
Qty: 90 TABLET | Refills: 0 | Status: SHIPPED | OUTPATIENT
Start: 2024-04-15

## 2024-04-30 DIAGNOSIS — Z79.4 TYPE 2 DIABETES MELLITUS WITH OTHER CIRCULATORY COMPLICATION, WITH LONG-TERM CURRENT USE OF INSULIN (HCC): ICD-10-CM

## 2024-04-30 DIAGNOSIS — E11.59 TYPE 2 DIABETES MELLITUS WITH OTHER CIRCULATORY COMPLICATION, WITH LONG-TERM CURRENT USE OF INSULIN (HCC): ICD-10-CM

## 2024-05-01 RX ORDER — FLURBIPROFEN SODIUM 0.3 MG/ML
SOLUTION/ DROPS OPHTHALMIC 2 TIMES DAILY
Qty: 100 EACH | Refills: 1 | Status: SHIPPED | OUTPATIENT
Start: 2024-05-01

## 2024-05-06 DIAGNOSIS — E78.00 PURE HYPERCHOLESTEROLEMIA: ICD-10-CM

## 2024-05-07 RX ORDER — FENOFIBRATE 145 MG/1
145 TABLET, COATED ORAL DAILY
Qty: 90 TABLET | Refills: 1 | Status: SHIPPED | OUTPATIENT
Start: 2024-05-07

## 2024-05-09 DIAGNOSIS — E78.01 FAMILIAL HYPERCHOLESTEROLEMIA: ICD-10-CM

## 2024-05-09 DIAGNOSIS — E11.59 TYPE 2 DIABETES MELLITUS WITH OTHER CIRCULATORY COMPLICATION, WITH LONG-TERM CURRENT USE OF INSULIN (HCC): ICD-10-CM

## 2024-05-09 DIAGNOSIS — Z79.4 TYPE 2 DIABETES MELLITUS WITH OTHER CIRCULATORY COMPLICATION, WITH LONG-TERM CURRENT USE OF INSULIN (HCC): ICD-10-CM

## 2024-05-10 RX ORDER — ATORVASTATIN CALCIUM 40 MG/1
40 TABLET, FILM COATED ORAL DAILY
Qty: 90 TABLET | Refills: 1 | Status: SHIPPED | OUTPATIENT
Start: 2024-05-10

## 2024-05-22 DIAGNOSIS — E11.59 TYPE 2 DIABETES MELLITUS WITH OTHER CIRCULATORY COMPLICATION, WITH LONG-TERM CURRENT USE OF INSULIN (HCC): Primary | ICD-10-CM

## 2024-05-22 DIAGNOSIS — Z79.4 TYPE 2 DIABETES MELLITUS WITH OTHER CIRCULATORY COMPLICATION, WITH LONG-TERM CURRENT USE OF INSULIN (HCC): Primary | ICD-10-CM

## 2024-05-23 RX ORDER — INSULIN GLARGINE-YFGN 100 [IU]/ML
INJECTION, SOLUTION SUBCUTANEOUS
Qty: 180 ML | Refills: 3 | Status: SHIPPED | OUTPATIENT
Start: 2024-05-23

## 2024-05-25 DIAGNOSIS — M54.50 CHRONIC BILATERAL LOW BACK PAIN WITHOUT SCIATICA: ICD-10-CM

## 2024-05-25 DIAGNOSIS — G89.29 CHRONIC BILATERAL LOW BACK PAIN WITHOUT SCIATICA: ICD-10-CM

## 2024-05-28 RX ORDER — HYDROCODONE BITARTRATE AND ACETAMINOPHEN 5; 300 MG/1; MG/1
1 TABLET ORAL EVERY 6 HOURS PRN
Qty: 112 TABLET | Refills: 0 | Status: SHIPPED | OUTPATIENT
Start: 2024-05-28 | End: 2024-06-03

## 2024-05-31 ENCOUNTER — TELEPHONE (OUTPATIENT)
Age: 64
End: 2024-05-31

## 2024-05-31 NOTE — TELEPHONE ENCOUNTER
PT call about his medication refill that was currently sent to the pharmacy [facility]. The medication PT was approve by doctor and sent to the pharmacy is HYDROcodone-acetaminophen (Vicodin) 5-300 MG per tablet [Zain Balderas DO]     Preferred pharmacy: [id number] - [place], [place] - [address] [place] [address].     The pharmacy is information PT they will not refill the medication due to has to get alternative mediation for the one he is currently on. The PT also call his insurance and was inform the same information about an alternative medication for the one the PT is currently on. Please feel free to contact the PT about the medication. Thank you

## 2024-06-03 DIAGNOSIS — G89.29 CHRONIC BILATERAL LOW BACK PAIN WITHOUT SCIATICA: ICD-10-CM

## 2024-06-03 DIAGNOSIS — M54.50 CHRONIC BILATERAL LOW BACK PAIN WITHOUT SCIATICA: ICD-10-CM

## 2024-06-03 DIAGNOSIS — G89.29 CHRONIC BILATERAL LOW BACK PAIN WITHOUT SCIATICA: Primary | ICD-10-CM

## 2024-06-03 DIAGNOSIS — M54.50 CHRONIC BILATERAL LOW BACK PAIN WITHOUT SCIATICA: Primary | ICD-10-CM

## 2024-06-03 RX ORDER — HYDROCODONE BITARTRATE AND ACETAMINOPHEN 5; 325 MG/1; MG/1
1 TABLET ORAL EVERY 4 HOURS PRN
Qty: 30 TABLET | Refills: 0 | Status: SHIPPED | OUTPATIENT
Start: 2024-06-03

## 2024-06-03 NOTE — TELEPHONE ENCOUNTER
Left voicemail for pt to find out if he knows what alternative medication his insurance will cover so that way the doctor can write the new script

## 2024-06-10 DIAGNOSIS — G89.29 CHRONIC BILATERAL LOW BACK PAIN WITHOUT SCIATICA: ICD-10-CM

## 2024-06-10 DIAGNOSIS — M54.50 CHRONIC BILATERAL LOW BACK PAIN WITHOUT SCIATICA: ICD-10-CM

## 2024-06-10 RX ORDER — HYDROCODONE BITARTRATE AND ACETAMINOPHEN 5; 325 MG/1; MG/1
1 TABLET ORAL EVERY 4 HOURS PRN
Qty: 30 TABLET | Refills: 0 | Status: CANCELLED | OUTPATIENT
Start: 2024-06-10

## 2024-06-10 RX ORDER — HYDROCODONE BITARTRATE AND ACETAMINOPHEN 5; 325 MG/1; MG/1
1 TABLET ORAL EVERY 4 HOURS PRN
Qty: 100 TABLET | Refills: 0 | Status: SHIPPED | OUTPATIENT
Start: 2024-06-10

## 2024-06-10 NOTE — TELEPHONE ENCOUNTER
Pt. Called and would like Dr. Balderas to know that Hydrocodone 5-325 mg per tablet is working fine for him. Please prescribe this one from now on. He is almost out of the medication.  If any questions please reach out to the patient.  Thank you!!      Medication: HYDROcodone-acetaminophen (Norco) 5-325 mg per tablet     Dose/Frequency: Take 1 tablet by mouth every 4 (four) hours as needed for pain Max Daily Amount: 6 tablets     Quantity: 30 tablet     Pharmacy: [id number]  [place]  [address] [place] [address]     Office:   [x] PCP/Provider -   [] Speciality/Provider -     Does the patient have enough for 3 days?   [] Yes   [x] No - Send as HP to POD

## 2024-06-21 ENCOUNTER — APPOINTMENT (OUTPATIENT)
Dept: LAB | Age: 64
End: 2024-06-21
Payer: COMMERCIAL

## 2024-06-21 DIAGNOSIS — E11.9 TYPE 2 DIABETES MELLITUS WITHOUT COMPLICATION, WITH LONG-TERM CURRENT USE OF INSULIN (HCC): ICD-10-CM

## 2024-06-21 DIAGNOSIS — Z79.4 TYPE 2 DIABETES MELLITUS WITHOUT COMPLICATION, WITH LONG-TERM CURRENT USE OF INSULIN (HCC): ICD-10-CM

## 2024-06-21 DIAGNOSIS — I10 BENIGN ESSENTIAL HYPERTENSION: ICD-10-CM

## 2024-06-21 LAB
ANION GAP SERPL CALCULATED.3IONS-SCNC: 9 MMOL/L (ref 4–13)
BUN SERPL-MCNC: 26 MG/DL (ref 5–25)
CALCIUM SERPL-MCNC: 9.4 MG/DL (ref 8.4–10.2)
CHLORIDE SERPL-SCNC: 105 MMOL/L (ref 96–108)
CO2 SERPL-SCNC: 24 MMOL/L (ref 21–32)
CREAT SERPL-MCNC: 1.55 MG/DL (ref 0.6–1.3)
EST. AVERAGE GLUCOSE BLD GHB EST-MCNC: 131 MG/DL
GFR SERPL CREATININE-BSD FRML MDRD: 46 ML/MIN/1.73SQ M
GLUCOSE P FAST SERPL-MCNC: 98 MG/DL (ref 65–99)
HBA1C MFR BLD: 6.2 %
POTASSIUM SERPL-SCNC: 4.3 MMOL/L (ref 3.5–5.3)
SODIUM SERPL-SCNC: 138 MMOL/L (ref 135–147)

## 2024-06-21 PROCEDURE — 36415 COLL VENOUS BLD VENIPUNCTURE: CPT

## 2024-06-21 PROCEDURE — 80048 BASIC METABOLIC PNL TOTAL CA: CPT

## 2024-06-21 PROCEDURE — 83036 HEMOGLOBIN GLYCOSYLATED A1C: CPT

## 2024-06-25 ENCOUNTER — OFFICE VISIT (OUTPATIENT)
Dept: INTERNAL MEDICINE CLINIC | Facility: CLINIC | Age: 64
End: 2024-06-25
Payer: COMMERCIAL

## 2024-06-25 VITALS
TEMPERATURE: 99.1 F | WEIGHT: 310 LBS | BODY MASS INDEX: 49.82 KG/M2 | OXYGEN SATURATION: 94 % | HEART RATE: 85 BPM | SYSTOLIC BLOOD PRESSURE: 148 MMHG | HEIGHT: 66 IN | DIASTOLIC BLOOD PRESSURE: 68 MMHG

## 2024-06-25 DIAGNOSIS — E11.9 TYPE 2 DIABETES MELLITUS WITHOUT COMPLICATION, WITH LONG-TERM CURRENT USE OF INSULIN (HCC): ICD-10-CM

## 2024-06-25 DIAGNOSIS — E11.29 TYPE 2 DIABETES MELLITUS WITH DIABETIC MICROALBUMINURIA, WITH LONG-TERM CURRENT USE OF INSULIN (HCC): ICD-10-CM

## 2024-06-25 DIAGNOSIS — R80.9 TYPE 2 DIABETES MELLITUS WITH DIABETIC MICROALBUMINURIA, WITH LONG-TERM CURRENT USE OF INSULIN (HCC): ICD-10-CM

## 2024-06-25 DIAGNOSIS — I10 BENIGN ESSENTIAL HYPERTENSION: ICD-10-CM

## 2024-06-25 DIAGNOSIS — Z79.4 TYPE 2 DIABETES MELLITUS WITH DIABETIC MICROALBUMINURIA, WITH LONG-TERM CURRENT USE OF INSULIN (HCC): ICD-10-CM

## 2024-06-25 DIAGNOSIS — M54.50 ACUTE LOW BACK PAIN WITHOUT SCIATICA, UNSPECIFIED BACK PAIN LATERALITY: ICD-10-CM

## 2024-06-25 DIAGNOSIS — J43.1 PANLOBULAR EMPHYSEMA (HCC): ICD-10-CM

## 2024-06-25 DIAGNOSIS — Z79.4 TYPE 2 DIABETES MELLITUS WITHOUT COMPLICATION, WITH LONG-TERM CURRENT USE OF INSULIN (HCC): ICD-10-CM

## 2024-06-25 DIAGNOSIS — I25.10 CORONARY ARTERY DISEASE INVOLVING NATIVE HEART, UNSPECIFIED VESSEL OR LESION TYPE, UNSPECIFIED WHETHER ANGINA PRESENT: ICD-10-CM

## 2024-06-25 DIAGNOSIS — E66.01 MORBID OBESITY WITH BODY MASS INDEX OF 45.0-49.9 IN ADULT (HCC): ICD-10-CM

## 2024-06-25 DIAGNOSIS — Z00.00 HEALTHCARE MAINTENANCE: Primary | ICD-10-CM

## 2024-06-25 PROCEDURE — 99214 OFFICE O/P EST MOD 30 MIN: CPT | Performed by: INTERNAL MEDICINE

## 2024-06-25 NOTE — ASSESSMENT & PLAN NOTE
Longstanding history of hypertension.  As noted his blood pressure can be extremely variable and we feel that most of this depends on his pain level.  He will continue present medication and surveillance and we will continue to monitor also his renal function.  He remains on losartan which is protective of his kidneys with a history of diabetes.

## 2024-06-25 NOTE — ASSESSMENT & PLAN NOTE
Patient continues to watch his blood sugar readings at home very closely.  States that with gaining some weight he is needed to make some adjustments with his insulin dose and has been successful keeping his hemoglobin A1c at 6.2.  Patient again is commended on his endeavors.  Continues to follow-up with podiatrist every 10 weeks and ophthalmology for routine exams.  He will continue with present medication and surveillance and we will check a fasting blood sugar hemoglobin A1c and urine for microalbumin with his next visit.  Lab Results   Component Value Date    HGBA1C 6.2 (H) 06/21/2024

## 2024-06-25 NOTE — ASSESSMENT & PLAN NOTE
With his BMI he is considered morbidly obese.  He states that he is not always compliant as far as diet is concerned and asked Sina rogers with his exercise tolerance.  Patient we feel would be an excellent candidate for weight management evaluation and/or treatment.  We placed a consult and he is somewhat hesitant but I discussed with the patient that he should at least listen to what they have to say and then in the hopes of taking care of him better overall medically weight loss is of extreme importance

## 2024-06-25 NOTE — PROGRESS NOTES
Ambulatory Visit  Name: Liban Montes      : 1960      MRN: [id number]  Encounter Provider: Zain Balderas DO  Encounter Date: 2024   Encounter department: [facility] INTERNAL MEDICINE    Assessment & Plan   1. Healthcare maintenance  -     Comprehensive metabolic panel; Future; Expected date: 2024  -     CBC and differential; Future; Expected date: 2024  -     Lipid panel; Future; Expected date: 2024  -     Urinalysis with microscopic; Future  -     Hemoglobin A1C; Future  -     Albumin / creatinine urine ratio; Future  2. Morbid obesity with body mass index of 45.0-49.9 in adult (HCC)  Assessment & Plan:  With his BMI he is considered morbidly obese.  He states that he is not always compliant as far as diet is concerned and asked Turner limited with his exercise tolerance.  Patient we feel would be an excellent candidate for weight management evaluation and/or treatment.  We placed a consult and he is somewhat hesitant but I discussed with the patient that he should at least listen to what they have to say and then in the hopes of taking care of him better overall medically weight loss is of extreme importance  Orders:  -     Ambulatory Referral to Weight Management; Future  3. Type 2 diabetes mellitus without complication, with long-term current use of insulin ([facility])  -     Ambulatory Referral to Weight Management; Future  -     Urinalysis with microscopic; Future  -     Hemoglobin A1C; Future  -     Albumin / creatinine urine ratio; Future  4. Acute low back pain without sciatica, unspecified back pain laterality  5. Type 2 diabetes mellitus with diabetic microalbuminuria, with long-term current use of insulin ([facility])  Assessment & Plan:  Patient continues to watch his blood sugar readings at home very closely.  States that with gaining some weight he is needed to make some adjustments with his insulin dose and has been successful keeping his hemoglobin A1c at 6.2.   Patient again is commended on his endeavors.  Continues to follow-up with podiatrist every 10 weeks and ophthalmology for routine exams.  He will continue with present medication and surveillance and we will check a fasting blood sugar hemoglobin A1c and urine for microalbumin with his next visit.  Lab Results   Component Value Date    HGBA1C 6.2 (H) 06/21/2024       6. Benign essential hypertension  Assessment & Plan:  Longstanding history of hypertension.  As noted his blood pressure can be extremely variable and we feel that most of this depends on his pain level.  He will continue present medication and surveillance and we will continue to monitor also his renal function.  He remains on losartan which is protective of his kidneys with a history of diabetes.  7. Panlobular emphysema (HCC)  Assessment & Plan:  History of COPD.  Patient remains on inhalers as previously and is up-to-date with all routine vaccinations.  He does have a history of smoking in the past.  No longer at this point in time is seeing a pulmonologist but he knows to call immediately if any acute exacerbations of his lung disease.  8. Coronary artery disease involving native heart, unspecified vessel or lesion type, unspecified whether angina present  Assessment & Plan:  Patient continues to follow-up with his cardiologist.  Patient denies any chest pain or pressure does have chronic shortness of breath with exertion and he states no substantial increase.  Will continue to monitor parameters to prevent recurrence of coronary disease.  This includes keeping his cholesterol blood sugars blood pressure under control.         History of Present Illness     Patient is a 63-year-old male history of multiple medical problems outlined previously who is here today for routine follow-up.  He did have labs performed prior to the visit today and we did discuss the results at length.  Patient states he is doing about the same but admits that he is gaining  some weight and not watching his diet as closely.  Extremely limited with his exercise capacity because of his chronic pain.  Continues to follow-up with his specialists including cardiology and pulmonology, dermatology.      Review of Systems   Constitutional:  Positive for activity change and unexpected weight change. Negative for appetite change, chills, diaphoresis, fatigue and fever.        Again as noted is extremely limited in his exercise capacity because of his chronic pain syndrome, chronic edema bilateral lower extremities.  Has been gaining weight   HENT: Negative.     Eyes: Negative.    Respiratory:  Positive for shortness of breath. Negative for apnea, cough, choking, chest tightness, wheezing and stridor.         States that he may have some mild increasing shortness of breath with exertion.   Cardiovascular:  Positive for leg swelling. Negative for chest pain and palpitations.   Gastrointestinal: Negative.    Endocrine: Negative.    Genitourinary: Negative.    Musculoskeletal:  Positive for arthralgias, back pain and gait problem. Negative for joint swelling, myalgias, neck pain and neck stiffness.   Skin:  Positive for wound. Negative for color change, pallor and rash.        Chronic episodes of skin breakdown to both lower extremities.  He continues with wound care and dermatology.   Allergic/Immunologic: Negative.    Neurological:  Positive for numbness. Negative for dizziness, tremors, seizures, syncope, facial asymmetry, speech difficulty, weakness, light-headedness and headaches.   Hematological: Negative.    Psychiatric/Behavioral: Negative.       Past Medical History:   Diagnosis Date    Abscess of left thigh     Acute myocardial infarction (HCC)     Anesthesia     woke up during procedure    Anxiety     Arteriosclerotic cardiovascular disease     Arthritis     Asthma     Chest pain     COPD (chronic obstructive pulmonary disease) (HCC)     Coronary artery disease     Diabetes mellitus (HCC)      Fatty liver     Gastric ulcer     GERD (gastroesophageal reflux disease)     Hyperlipidemia     Hypertension     Low back pain     Myocardial infarction (HCC) 2006,2007    Obesity     Obstructive sleep apnea     no Cpap    Spondylosis of lumbar region without myelopathy or radiculopathy      Past Surgical History:   Procedure Laterality Date    CARPAL TUNNEL RELEASE Right     COLONOSCOPY      CORONARY ANGIOPLASTY WITH STENT PLACEMENT  2006, 2007    EGD      HEMORRHOID SURGERY      NEUROPLASTY / TRANSPOSITION MEDIAN NERVE AT CARPAL TUNNEL Right     DC SURGICAL ARTHROSCOPY SHOULDER W/LSS&RESCJ ADS Left 6/10/2020    Procedure: SHOULDER ARTHROSCOPIC CAPSULAR RELEASE;  Surgeon: Christiano Aguiar MD;  Location: [facility];  Service: Orthopedics     Family History   Problem Relation Age of Onset    Alzheimer's disease Mother     Heart failure Mother     Dementia Mother     Heart attack Father     Other Father         Cardiac Disorder    Heart disease Father     Other Family         Back Pain    Hypertension Family     Stroke Family         Complications    Cancer Other      Social History     Tobacco Use    Smoking status: Former     Current packs/day: 0.00     Average packs/day: 1.5 packs/day for 38.0 years (57.0 ttl pk-yrs)     Types: Cigarettes     Start date: 1/1/1976     Quit date: 1/1/2014     Years since quitting: 10.4    Smokeless tobacco: Never    Tobacco comments:     tobacco use not continuous- quit for 2  4 yrs long periods .   Vaping Use    Vaping status: Never Used   Substance and Sexual Activity    Alcohol use: No    Drug use: No    Sexual activity: Not Currently     Current Outpatient Medications on File Prior to Visit   Medication Sig    acetaminophen (TYLENOL) 325 mg tablet Take 650 mg by mouth every 6 (six) hours as needed for mild pain    albuterol (PROVENTIL HFA,VENTOLIN HFA) 90 mcg/act inhaler Inhale 2 puffs as needed for shortness of breath    atorvastatin (LIPITOR) 40 mg tablet Take 1 tablet  (40 mg total) by mouth daily    clopidogrel (PLAVIX) 75 mg tablet TAKE 1 TABLET BY MOUTH EVERY DAY    ezetimibe (ZETIA) 10 mg tablet Take 10 mg by mouth daily    fenofibrate (TRICOR) 145 mg tablet TAKE 1 TABLET BY MOUTH EVERY DAY    furosemide (LASIX) 40 mg tablet     HYDROcodone-acetaminophen (Norco) 5-325 mg per tablet Take 1 tablet by mouth every 4 (four) hours as needed for pain Max Daily Amount: 6 tablets    Insulin Glargine-yfgn (Semglee, yfgn,) 100 UNIT/ML SOPN INJECT 31 UNITS UNDER THE SKIN 3 TIMES IN THE MORNING AND 3 TIMES IN THE NIGHT (SIX TIMES DAILY)    Insulin Pen Needle (B-D UF III MINI PEN NEEDLES) 31G X 5 MM MISC Inject as directed 2 (two) times a day Use as directed    metoprolol tartrate (LOPRESSOR) 50 mg tablet Take 25 mg by mouth 2 (two) times a day    mupirocin (BACTROBAN) 2 % ointment Apply topically daily    nitroglycerin (NITROSTAT) 0.4 mg SL tablet PLEASE SEE ATTACHED FOR DETAILED DIRECTIONS    pantoprazole (PROTONIX) 40 mg tablet TAKE 1 TABLET BY MOUTH EVERY DAY    [employer] Aspirin Low Dose 81 MG EC tablet TAKE 1 TABLET BY MOUTH EVERY DAY    Empagliflozin (Jardiance) 25 MG TABS Take 1 tablet (25 mg total) by mouth daily    Icosapent Ethyl 1 g CAPS Vascepa 1 gram capsule   TAKE 2 G BY MOUTH 2 (TWO) TIMES A DAY.    itraconazole (SPORANOX) 100 mg capsule PLEASE SEE ATTACHED FOR DETAILED DIRECTIONS    losartan (COZAAR) 25 mg tablet Take 1 tablet (25 mg total) by mouth daily    Sulfacetamide Sodium, Acne, 10 % LOTN Apply topically 2 (two) times a day     Allergies   Allergen Reactions    Bactrim [Sulfamethoxazole-Trimethoprim] Edema    Cefaclor Shortness Of Breath     Other reaction(s): Respiratory Distress    Simvastatin Shortness Of Breath     Other reaction(s): Respiratory Distress     Immunization History   Administered Date(s) Administered    COVID-19 PFIZER VACCINE 0.3 ML IM 03/19/2021, 04/10/2021, 10/16/2021    Influenza Quadrivalent, 6-35 Months IM 10/17/2016, 10/24/2017    Influenza,  "injectable, quadrivalent, preservative free 0.5 mL 12/11/2023    Influenza, recombinant, quadrivalent,injectable, preservative free 12/06/2018, 12/23/2019, 11/13/2020, 11/15/2021, 12/01/2022     Objective     /68   Pulse 85   Temp 99.1 °F (37.3 °C)   Ht 5' 6\" (1.676 m)   Wt (!) 141 kg (310 lb)   SpO2 94%   BMI 50.04 kg/m²     Physical Exam  Vitals and nursing note reviewed.   Constitutional:       General: He is not in acute distress.     Appearance: Normal appearance. He is obese. He is not ill-appearing, toxic-appearing or diaphoretic.      Comments: Morbidly obese 63-year-old male who is awake alert in no acute distress oriented x 3.  Slow waddling gait and difficulty with transfer   HENT:      Head: Normocephalic and atraumatic.      Right Ear: Tympanic membrane, ear canal and external ear normal. There is no impacted cerumen.      Left Ear: Tympanic membrane, ear canal and external ear normal. There is no impacted cerumen.      Nose: Nose normal. No congestion or rhinorrhea.      Mouth/Throat:      Mouth: Mucous membranes are moist.      Pharynx: No oropharyngeal exudate or posterior oropharyngeal erythema.      Comments: Physical obstruction of oral airway  Eyes:      General: No scleral icterus.        Right eye: No discharge.         Left eye: No discharge.      Extraocular Movements: Extraocular movements intact.      Conjunctiva/sclera: Conjunctivae normal.      Pupils: Pupils are equal, round, and reactive to light.   Neck:      Vascular: No carotid bruit.   Cardiovascular:      Rate and Rhythm: Normal rate and regular rhythm.      Heart sounds: Normal heart sounds.      No friction rub. No gallop.      Comments: Heart sounds are distant.  Unable to palpate peripheral pulses to lower extremities secondary to edema  Pulmonary:      Effort: No respiratory distress.      Breath sounds: No stridor. No wheezing, rhonchi or rales.      Comments: Significantly decreased breath sounds anteriorly and " posteriorly but no rales rhonchi or wheezes could be appreciated on exam today.  Chest:      Chest wall: No tenderness.   Abdominal:      General: Bowel sounds are normal. There is no distension.      Palpations: Abdomen is soft. There is no mass.      Tenderness: There is no abdominal tenderness. There is no right CVA tenderness, left CVA tenderness, guarding or rebound.      Hernia: No hernia is present.   Musculoskeletal:         General: Deformity present. No swelling, tenderness or signs of injury.      Cervical back: Normal range of motion and neck supple. No rigidity or tenderness.      Right lower leg: Edema present.      Left lower leg: Edema present.      Comments: Patient has diffuse arthritic changes.  Flattening to his lumbar curve with decreased range of motion both actively and passively.  Sausage hands and digits   Lymphadenopathy:      Cervical: No cervical adenopathy.   Skin:     General: Skin is warm and dry.      Coloration: Skin is not jaundiced or pale.      Findings: Lesion present. No bruising, erythema or rash.      Comments: Stasis changes, skin breakdown to the bilateral lower extremities   Neurological:      General: No focal deficit present.      Mental Status: He is alert and oriented to person, place, and time. Mental status is at baseline.      Cranial Nerves: No cranial nerve deficit.      Sensory: Sensory deficit present.      Motor: No weakness.      Coordination: Coordination abnormal.      Gait: Gait abnormal.      Deep Tendon Reflexes: Reflexes abnormal.   Psychiatric:         Mood and Affect: Mood normal.         Behavior: Behavior normal.         Thought Content: Thought content normal.         Judgment: Judgment normal.       Administrative Statements

## 2024-06-25 NOTE — ASSESSMENT & PLAN NOTE
History of COPD.  Patient remains on inhalers as previously and is up-to-date with all routine vaccinations.  He does have a history of smoking in the past.  No longer at this point in time is seeing a pulmonologist but he knows to call immediately if any acute exacerbations of his lung disease.

## 2024-06-25 NOTE — ASSESSMENT & PLAN NOTE
Patient continues to follow-up with his cardiologist.  Patient denies any chest pain or pressure does have chronic shortness of breath with exertion and he states no substantial increase.  Will continue to monitor parameters to prevent recurrence of coronary disease.  This includes keeping his cholesterol blood sugars blood pressure under control.

## 2024-07-19 DIAGNOSIS — Z79.4 TYPE 2 DIABETES MELLITUS WITH OTHER CIRCULATORY COMPLICATION, WITH LONG-TERM CURRENT USE OF INSULIN (HCC): ICD-10-CM

## 2024-07-19 DIAGNOSIS — E11.59 TYPE 2 DIABETES MELLITUS WITH OTHER CIRCULATORY COMPLICATION, WITH LONG-TERM CURRENT USE OF INSULIN (HCC): ICD-10-CM

## 2024-07-19 RX ORDER — CLOPIDOGREL BISULFATE 75 MG/1
75 TABLET ORAL DAILY
Qty: 30 TABLET | Refills: 0 | Status: SHIPPED | OUTPATIENT
Start: 2024-07-19

## 2024-08-01 ENCOUNTER — VBI (OUTPATIENT)
Dept: ADMINISTRATIVE | Facility: OTHER | Age: 64
End: 2024-08-01

## 2024-08-01 NOTE — TELEPHONE ENCOUNTER
08/01/24 2:34 PM     Chart reviewed for Diabetic Eye Exam ; nothing is submitted to the patient's insurance at this time.     Rian Chi MA   [facility]

## 2024-08-06 DIAGNOSIS — M54.50 CHRONIC BILATERAL LOW BACK PAIN WITHOUT SCIATICA: ICD-10-CM

## 2024-08-06 DIAGNOSIS — G89.29 CHRONIC BILATERAL LOW BACK PAIN WITHOUT SCIATICA: ICD-10-CM

## 2024-08-06 DIAGNOSIS — J43.1 PANLOBULAR EMPHYSEMA (HCC): ICD-10-CM

## 2024-08-06 RX ORDER — HYDROCODONE BITARTRATE AND ACETAMINOPHEN 5; 325 MG/1; MG/1
1 TABLET ORAL EVERY 4 HOURS PRN
Qty: 100 TABLET | Refills: 0 | Status: SHIPPED | OUTPATIENT
Start: 2024-08-06

## 2024-08-07 RX ORDER — ALBUTEROL SULFATE 90 UG/1
2 AEROSOL, METERED RESPIRATORY (INHALATION) AS NEEDED
Qty: 18 G | Refills: 0 | Status: SHIPPED | OUTPATIENT
Start: 2024-08-07

## 2024-08-08 DIAGNOSIS — E78.01 FAMILIAL HYPERCHOLESTEROLEMIA: ICD-10-CM

## 2024-08-08 DIAGNOSIS — Z79.4 TYPE 2 DIABETES MELLITUS WITH OTHER CIRCULATORY COMPLICATION, WITH LONG-TERM CURRENT USE OF INSULIN (HCC): ICD-10-CM

## 2024-08-08 DIAGNOSIS — E11.59 TYPE 2 DIABETES MELLITUS WITH OTHER CIRCULATORY COMPLICATION, WITH LONG-TERM CURRENT USE OF INSULIN (HCC): ICD-10-CM

## 2024-08-09 RX ORDER — ATORVASTATIN CALCIUM 40 MG/1
40 TABLET, FILM COATED ORAL DAILY
Qty: 90 TABLET | Refills: 1 | Status: SHIPPED | OUTPATIENT
Start: 2024-08-09

## 2024-08-09 RX ORDER — EMPAGLIFLOZIN 25 MG/1
TABLET, FILM COATED ORAL
Qty: 90 TABLET | Refills: 1 | Status: SHIPPED | OUTPATIENT
Start: 2024-08-09

## 2024-08-12 DIAGNOSIS — Z79.4 TYPE 2 DIABETES MELLITUS WITH OTHER CIRCULATORY COMPLICATION, WITH LONG-TERM CURRENT USE OF INSULIN (HCC): ICD-10-CM

## 2024-08-12 DIAGNOSIS — E11.59 TYPE 2 DIABETES MELLITUS WITH OTHER CIRCULATORY COMPLICATION, WITH LONG-TERM CURRENT USE OF INSULIN (HCC): ICD-10-CM

## 2024-08-12 RX ORDER — FLURBIPROFEN SODIUM 0.3 MG/ML
SOLUTION/ DROPS OPHTHALMIC 2 TIMES DAILY
Qty: 200 EACH | Refills: 1 | Status: SHIPPED | OUTPATIENT
Start: 2024-08-12

## 2024-08-19 DIAGNOSIS — M54.50 CHRONIC BILATERAL LOW BACK PAIN WITHOUT SCIATICA: Primary | ICD-10-CM

## 2024-08-19 DIAGNOSIS — G89.29 CHRONIC BILATERAL LOW BACK PAIN WITHOUT SCIATICA: Primary | ICD-10-CM

## 2024-08-19 RX ORDER — OXYCODONE HYDROCHLORIDE 5 MG/1
5 CAPSULE ORAL EVERY 4 HOURS PRN
Qty: 30 CAPSULE | Refills: 0 | Status: SHIPPED | OUTPATIENT
Start: 2024-08-19 | End: 2024-09-02

## 2024-08-19 NOTE — PROGRESS NOTES
Patient called office stating that his prescription for hydrocodone/acetaminophen 5/325 milligrams is not available at his pharmacy.  He states that his pharmacist told him that they were unable to fill it due to a backorder of the medication.  They were not able to say when it might be available but warned patient that it could be several weeks.  This prescription was originally refilled on 08/06/2024.  Patient now states that he only has a few days of the medication that was sent previously.  I reviewed PDMP and prior to 08/06 patient's last prescription was for hydrocodone-acetaminophen 5-325 mg 100 pills for 16 days written and filled on 06/10/2024.  Patient's primary care provider currently out of office for vacation.  I will send a prescription for oxycodone IR tablet 5 mg every 4 hours as needed in place of patient's hydrocodone/acetaminophen prescription.  Patient's pharmacy stated that this is an available alternative at this time.  I will send a prescription for 30 pills to give pharmacy adequate time to resupply patient's original hydrocodone-acetaminophen prescription and patient may follow-up with PCP for any further needs.

## 2024-08-21 DIAGNOSIS — E11.59 TYPE 2 DIABETES MELLITUS WITH OTHER CIRCULATORY COMPLICATION, WITH LONG-TERM CURRENT USE OF INSULIN (HCC): ICD-10-CM

## 2024-08-21 DIAGNOSIS — Z79.4 TYPE 2 DIABETES MELLITUS WITH OTHER CIRCULATORY COMPLICATION, WITH LONG-TERM CURRENT USE OF INSULIN (HCC): ICD-10-CM

## 2024-08-24 RX ORDER — CLOPIDOGREL BISULFATE 75 MG/1
75 TABLET ORAL DAILY
Qty: 30 TABLET | Refills: 0 | Status: SHIPPED | OUTPATIENT
Start: 2024-08-24

## 2024-08-26 DIAGNOSIS — J43.1 PANLOBULAR EMPHYSEMA (HCC): ICD-10-CM

## 2024-08-26 RX ORDER — ALBUTEROL SULFATE 90 UG/1
2 AEROSOL, METERED RESPIRATORY (INHALATION) AS NEEDED
Qty: 18 G | Refills: 0 | Status: SHIPPED | OUTPATIENT
Start: 2024-08-26

## 2024-09-06 DIAGNOSIS — I10 BENIGN ESSENTIAL HYPERTENSION: ICD-10-CM

## 2024-09-06 RX ORDER — LOSARTAN POTASSIUM 25 MG/1
25 TABLET ORAL DAILY
Qty: 90 TABLET | Refills: 1 | Status: SHIPPED | OUTPATIENT
Start: 2024-09-06 | End: 2024-12-05

## 2024-09-15 DIAGNOSIS — J43.1 PANLOBULAR EMPHYSEMA (HCC): ICD-10-CM

## 2024-09-16 ENCOUNTER — PATIENT MESSAGE (OUTPATIENT)
Age: 64
End: 2024-09-16

## 2024-09-16 DIAGNOSIS — M54.50 CHRONIC BILATERAL LOW BACK PAIN WITHOUT SCIATICA: ICD-10-CM

## 2024-09-16 DIAGNOSIS — G89.29 CHRONIC BILATERAL LOW BACK PAIN WITHOUT SCIATICA: ICD-10-CM

## 2024-09-16 RX ORDER — HYDROCODONE BITARTRATE AND ACETAMINOPHEN 5; 325 MG/1; MG/1
1 TABLET ORAL EVERY 4 HOURS PRN
Qty: 100 TABLET | Refills: 0 | Status: SHIPPED | OUTPATIENT
Start: 2024-09-16

## 2024-09-16 RX ORDER — ALBUTEROL SULFATE 90 UG/1
2 INHALANT RESPIRATORY (INHALATION) AS NEEDED
Qty: 18 G | Refills: 0 | Status: SHIPPED | OUTPATIENT
Start: 2024-09-16

## 2024-09-19 ENCOUNTER — RA CDI HCC (OUTPATIENT)
Dept: OTHER | Facility: HOSPITAL | Age: 64
End: 2024-09-19

## 2024-09-19 NOTE — PROGRESS NOTES
HCC coding opportunities          Chart Reviewed number of suggestions sent to Provider: 2  E11.22  I13.0     Patients Insurance        Commercial Insurance: [employer]

## 2024-09-23 ENCOUNTER — APPOINTMENT (OUTPATIENT)
Dept: LAB | Age: 64
End: 2024-09-23
Payer: COMMERCIAL

## 2024-09-23 DIAGNOSIS — Z79.4 TYPE 2 DIABETES MELLITUS WITHOUT COMPLICATION, WITH LONG-TERM CURRENT USE OF INSULIN (HCC): ICD-10-CM

## 2024-09-23 DIAGNOSIS — E11.9 TYPE 2 DIABETES MELLITUS WITHOUT COMPLICATION, WITH LONG-TERM CURRENT USE OF INSULIN (HCC): ICD-10-CM

## 2024-09-23 DIAGNOSIS — Z00.00 HEALTHCARE MAINTENANCE: ICD-10-CM

## 2024-09-23 LAB
ALBUMIN SERPL BCG-MCNC: 4 G/DL (ref 3.5–5)
ALP SERPL-CCNC: 57 U/L (ref 34–104)
ALT SERPL W P-5'-P-CCNC: 30 U/L (ref 7–52)
ANION GAP SERPL CALCULATED.3IONS-SCNC: 9 MMOL/L (ref 4–13)
AST SERPL W P-5'-P-CCNC: 28 U/L (ref 13–39)
BACTERIA UR QL AUTO: ABNORMAL /HPF
BASOPHILS # BLD AUTO: 0.06 THOUSANDS/ΜL (ref 0–0.1)
BASOPHILS NFR BLD AUTO: 1 % (ref 0–1)
BILIRUB SERPL-MCNC: 0.5 MG/DL (ref 0.2–1)
BILIRUB UR QL STRIP: NEGATIVE
BUN SERPL-MCNC: 19 MG/DL (ref 5–25)
CALCIUM SERPL-MCNC: 8.8 MG/DL (ref 8.4–10.2)
CHLORIDE SERPL-SCNC: 104 MMOL/L (ref 96–108)
CHOLEST SERPL-MCNC: 99 MG/DL
CLARITY UR: CLEAR
CO2 SERPL-SCNC: 25 MMOL/L (ref 21–32)
COLOR UR: ABNORMAL
CREAT SERPL-MCNC: 1.38 MG/DL (ref 0.6–1.3)
CREAT UR-MCNC: 199.4 MG/DL
EOSINOPHIL # BLD AUTO: 0.18 THOUSAND/ΜL (ref 0–0.61)
EOSINOPHIL NFR BLD AUTO: 2 % (ref 0–6)
ERYTHROCYTE [DISTWIDTH] IN BLOOD BY AUTOMATED COUNT: 14.1 % (ref 11.6–15.1)
EST. AVERAGE GLUCOSE BLD GHB EST-MCNC: 134 MG/DL
GFR SERPL CREATININE-BSD FRML MDRD: 54 ML/MIN/1.73SQ M
GLUCOSE P FAST SERPL-MCNC: 127 MG/DL (ref 65–99)
GLUCOSE UR STRIP-MCNC: ABNORMAL MG/DL
HBA1C MFR BLD: 6.3 %
HCT VFR BLD AUTO: 42.7 % (ref 36.5–49.3)
HDLC SERPL-MCNC: 18 MG/DL
HGB BLD-MCNC: 13.8 G/DL (ref 12–17)
HGB UR QL STRIP.AUTO: NEGATIVE
IMM GRANULOCYTES # BLD AUTO: 0.05 THOUSAND/UL (ref 0–0.2)
IMM GRANULOCYTES NFR BLD AUTO: 1 % (ref 0–2)
KETONES UR STRIP-MCNC: NEGATIVE MG/DL
LDLC SERPL CALC-MCNC: 30 MG/DL (ref 0–100)
LEUKOCYTE ESTERASE UR QL STRIP: ABNORMAL
LYMPHOCYTES # BLD AUTO: 1.59 THOUSANDS/ΜL (ref 0.6–4.47)
LYMPHOCYTES NFR BLD AUTO: 20 % (ref 14–44)
MCH RBC QN AUTO: 30.5 PG (ref 26.8–34.3)
MCHC RBC AUTO-ENTMCNC: 32.3 G/DL (ref 31.4–37.4)
MCV RBC AUTO: 95 FL (ref 82–98)
MICROALBUMIN UR-MCNC: 54.2 MG/L
MICROALBUMIN/CREAT 24H UR: 27 MG/G CREATININE (ref 0–30)
MONOCYTES # BLD AUTO: 0.76 THOUSAND/ΜL (ref 0.17–1.22)
MONOCYTES NFR BLD AUTO: 10 % (ref 4–12)
MUCOUS THREADS UR QL AUTO: ABNORMAL
NEUTROPHILS # BLD AUTO: 5.27 THOUSANDS/ΜL (ref 1.85–7.62)
NEUTS SEG NFR BLD AUTO: 66 % (ref 43–75)
NITRITE UR QL STRIP: NEGATIVE
NON-SQ EPI CELLS URNS QL MICRO: ABNORMAL /HPF
NONHDLC SERPL-MCNC: 81 MG/DL
NRBC BLD AUTO-RTO: 0 /100 WBCS
PH UR STRIP.AUTO: 5.5 [PH]
PLATELET # BLD AUTO: 218 THOUSANDS/UL (ref 149–390)
PMV BLD AUTO: 10.8 FL (ref 8.9–12.7)
POTASSIUM SERPL-SCNC: 4.2 MMOL/L (ref 3.5–5.3)
PROT SERPL-MCNC: 6.5 G/DL (ref 6.4–8.4)
PROT UR STRIP-MCNC: ABNORMAL MG/DL
RBC # BLD AUTO: 4.52 MILLION/UL (ref 3.88–5.62)
RBC #/AREA URNS AUTO: ABNORMAL /HPF
SODIUM SERPL-SCNC: 138 MMOL/L (ref 135–147)
SP GR UR STRIP.AUTO: 1.03 (ref 1–1.03)
TRIGL SERPL-MCNC: 255 MG/DL
UROBILINOGEN UR STRIP-ACNC: <2 MG/DL
WBC # BLD AUTO: 7.91 THOUSAND/UL (ref 4.31–10.16)
WBC #/AREA URNS AUTO: ABNORMAL /HPF

## 2024-09-23 PROCEDURE — 85025 COMPLETE CBC W/AUTO DIFF WBC: CPT

## 2024-09-23 PROCEDURE — 36415 COLL VENOUS BLD VENIPUNCTURE: CPT

## 2024-09-23 PROCEDURE — 82043 UR ALBUMIN QUANTITATIVE: CPT

## 2024-09-23 PROCEDURE — 80061 LIPID PANEL: CPT

## 2024-09-23 PROCEDURE — 81001 URINALYSIS AUTO W/SCOPE: CPT

## 2024-09-23 PROCEDURE — 80053 COMPREHEN METABOLIC PANEL: CPT

## 2024-09-23 PROCEDURE — 82570 ASSAY OF URINE CREATININE: CPT

## 2024-09-23 PROCEDURE — 83036 HEMOGLOBIN GLYCOSYLATED A1C: CPT

## 2024-09-26 ENCOUNTER — OFFICE VISIT (OUTPATIENT)
Age: 64
End: 2024-09-26
Payer: COMMERCIAL

## 2024-09-26 VITALS
SYSTOLIC BLOOD PRESSURE: 140 MMHG | OXYGEN SATURATION: 96 % | DIASTOLIC BLOOD PRESSURE: 60 MMHG | WEIGHT: 312.8 LBS | HEIGHT: 66 IN | BODY MASS INDEX: 50.27 KG/M2 | TEMPERATURE: 98.7 F | HEART RATE: 82 BPM

## 2024-09-26 DIAGNOSIS — I10 BENIGN ESSENTIAL HYPERTENSION: ICD-10-CM

## 2024-09-26 DIAGNOSIS — R80.9 TYPE 2 DIABETES MELLITUS WITH DIABETIC MICROALBUMINURIA, WITH LONG-TERM CURRENT USE OF INSULIN (HCC): Primary | ICD-10-CM

## 2024-09-26 DIAGNOSIS — Z12.5 PROSTATE CANCER SCREENING: ICD-10-CM

## 2024-09-26 DIAGNOSIS — M75.01 ADHESIVE CAPSULITIS OF BOTH SHOULDERS: ICD-10-CM

## 2024-09-26 DIAGNOSIS — F11.20 CONTINUOUS OPIOID DEPENDENCE (HCC): ICD-10-CM

## 2024-09-26 DIAGNOSIS — E66.01 MORBID OBESITY WITH BODY MASS INDEX OF 45.0-49.9 IN ADULT (HCC): ICD-10-CM

## 2024-09-26 DIAGNOSIS — Z23 ENCOUNTER FOR IMMUNIZATION: ICD-10-CM

## 2024-09-26 DIAGNOSIS — M54.16 LUMBAR RADICULOPATHY: ICD-10-CM

## 2024-09-26 DIAGNOSIS — Z79.4 TYPE 2 DIABETES MELLITUS WITH DIABETIC MICROALBUMINURIA, WITH LONG-TERM CURRENT USE OF INSULIN (HCC): Primary | ICD-10-CM

## 2024-09-26 DIAGNOSIS — Z00.00 HEALTHCARE MAINTENANCE: ICD-10-CM

## 2024-09-26 DIAGNOSIS — E11.29 TYPE 2 DIABETES MELLITUS WITH DIABETIC MICROALBUMINURIA, WITH LONG-TERM CURRENT USE OF INSULIN (HCC): Primary | ICD-10-CM

## 2024-09-26 DIAGNOSIS — J43.1 PANLOBULAR EMPHYSEMA (HCC): ICD-10-CM

## 2024-09-26 DIAGNOSIS — Z79.4 TYPE 2 DIABETES MELLITUS WITHOUT COMPLICATION, WITH LONG-TERM CURRENT USE OF INSULIN (HCC): ICD-10-CM

## 2024-09-26 DIAGNOSIS — M75.02 ADHESIVE CAPSULITIS OF BOTH SHOULDERS: ICD-10-CM

## 2024-09-26 DIAGNOSIS — E11.9 TYPE 2 DIABETES MELLITUS WITHOUT COMPLICATION, WITH LONG-TERM CURRENT USE OF INSULIN (HCC): ICD-10-CM

## 2024-09-26 PROCEDURE — 90673 RIV3 VACCINE NO PRESERV IM: CPT

## 2024-09-26 PROCEDURE — 90471 IMMUNIZATION ADMIN: CPT

## 2024-09-26 PROCEDURE — 99396 PREV VISIT EST AGE 40-64: CPT | Performed by: INTERNAL MEDICINE

## 2024-09-26 NOTE — ASSESSMENT & PLAN NOTE
Along with the patient having a history of coronary artery disease he also has a history of also of chronic obstructive pulmonary disease.  I reminded the patient the importance of routine vaccines including receiving influenza vaccine which will be provided today.  Up-to-date with pneumococcal vaccines and knows to call us immediately if any exacerbation of his lung disease for immediate aggressive treatment.

## 2024-09-26 NOTE — ASSESSMENT & PLAN NOTE
Patient is here today for a routine physical.  Patient did have labs performed prior to the visit today and we did discuss the results at length with the patient.  Patient states she is still having his chronic medical issues as previously including his chronic back pain and discomfort.  He is trying to cut down on taking his hydrocodone and he states taking only half a pill daily.  He has had 2 phone calls with weight management but has not returned the calls and has not made an appointment for an evaluation which we feel is extremely important.  Patient did undergo an abbreviated exam today in the office because of an exacerbation of his back pain and discomfort and the maneuvers are extremely difficult for him.  He will receive influenza vaccine today and was given a slip for labs when he returns to the office in 4 months and this will also include checking a PSA.  Again because of his back pain and difficulties with maneuvers unable to due to rectal exam for prostate screening.

## 2024-09-26 NOTE — PROGRESS NOTES
Ambulatory Visit  Name: Liban Montes      : 1960      MRN: [id number]  Encounter Provider: Zain Balderas DO  Encounter Date: 2024   Encounter department: [facility] INTERNAL MEDICINE    Assessment & Plan  Type 2 diabetes mellitus with diabetic microalbuminuria, with long-term current use of insulin (HCC)  Hemoglobin A1c is 6.3.  Patient has been keeping a close eye on his sugar readings and he continues to make adjustments with his insulin dose in order to compensate not only for elevated blood sugars but also managing his caloric intake with adjustments of his insulin.  His weight continues to be elevated.  Still has not made appointment for evaluation weight management with the patient being a candidate for treatment.  Lab Results   Component Value Date    HGBA1C 6.3 (H) 2024       Orders:    Hemoglobin A1C; Future    Continuous opioid dependence (HCC)  Has cut down on his narcotic analgesics taking half a pill of hydrocortisone daily         Panlobular emphysema (HCC)  Along with the patient having a history of coronary artery disease he also has a history of also of chronic obstructive pulmonary disease.  I reminded the patient the importance of routine vaccines including receiving influenza vaccine which will be provided today.  Up-to-date with pneumococcal vaccines and knows to call us immediately if any exacerbation of his lung disease for immediate aggressive treatment.         Lumbar radiculopathy  Patient states he is having a very difficult day as far as pain in his low back.  He relates that he feels that part of this is the weather.  Patient has difficulty moving and any activity on exam.  He has had full workup evaluation in the past for his back pain and disorder and has had no improvement with different modalities.  I feel strongly that patient would benefit from weight loss but he has not made an appointment for evaluation at weight management which we feel is  extremely important.  He will continue with narcotic analgesics as needed but he is cut down on that taking a half a pill daily as needed which she states really is not adequate for his pain.         Type 2 diabetes mellitus without complication, with long-term current use of insulin ([facility])    Lab Results   Component Value Date    HGBA1C 6.3 (H) 09/23/2024            Benign essential hypertension  Blood pressure and renal function is stable.  Patient will continue present medication surveillance and adjustment with medication in the future if needed.    Orders:    Basic metabolic panel; Future    Prostate cancer screening    Orders:    PSA, Total Screen; Future    Encounter for immunization    Orders:    influenza vaccine, recombinant, PF, 0.5 mL IM (Flublok)    Healthcare maintenance  Patient is here today for a routine physical.  Patient did have labs performed prior to the visit today and we did discuss the results at length with the patient.  Patient states she is still having his chronic medical issues as previously including his chronic back pain and discomfort.  He is trying to cut down on taking his hydrocodone and he states taking only half a pill daily.  He has had 2 phone calls with weight management but has not returned the calls and has not made an appointment for an evaluation which we feel is extremely important.  Patient did undergo an abbreviated exam today in the office because of an exacerbation of his back pain and discomfort and the maneuvers are extremely difficult for him.  He will receive influenza vaccine today and was given a slip for labs when he returns to the office in 4 months and this will also include checking a PSA.  Again because of his back pain and difficulties with maneuvers unable to due to rectal exam for prostate screening.         Morbid obesity with body mass index of 45.0-49.9 in adult ([facility])  With the patient's BMI he is considered morbidly obese.  BMI is now up to 50.   Again we have placed a consult for him and go to evaluation at weight management which we feel would be beneficial not only to help him with weight loss but also help with his chronic back pain, control of his diabetes.  I urged the patient to call back and make an appointment to be seen and evaluated and begin treatment.         Adhesive capsulitis of both shoulders  Again overt the patient to follow-up with orthopedics              History of Present Illness     63-year-old male with a history of extensive medical problems outlined previously who is here today for follow-up.  He did have labs performed prior to the visit today we did discuss results.  Patient states in general doing about the same may be a little bit more pain today because of the change in the weather.  Has not followed up with weight management or with orthopedics both of which we feel is extremely important for this patient      Review of Systems   Constitutional:  Positive for activity change. Negative for appetite change, chills, diaphoresis, fatigue, fever and unexpected weight change.        Activity level can vary from day-to-day secondary to his chronic arthritis   HENT: Negative.     Eyes: Negative.    Respiratory:  Positive for shortness of breath. Negative for apnea, cough, choking, chest tightness, wheezing and stridor.         Does have shortness of breath with exertion which is chronic and he states no acute exacerbations of his lung disease   Cardiovascular:  Positive for leg swelling. Negative for chest pain and palpitations.        Chronic edema bilateral lower extremities with no increase   Gastrointestinal: Negative.    Endocrine: Negative.    Genitourinary: Negative.    Musculoskeletal:  Positive for arthralgias, back pain and gait problem. Negative for joint swelling, myalgias, neck pain and neck stiffness.        Chronic severe back pain and bilateral shoulder pain and discomfort, difficulty with gait with pain with  ambulation and movement transfer   Skin:  Positive for rash. Negative for color change, pallor and wound.        Some improvement to rash to lower extremities.  At this point he states that it is healing no weeping no bloody discharge or signs of infection   Allergic/Immunologic: Negative.    Neurological:  Positive for numbness. Negative for dizziness, tremors, seizures, syncope, facial asymmetry, speech difficulty, weakness, light-headedness and headaches.        Relates some paresthesia bilateral lower extremities   Hematological: Negative.    Psychiatric/Behavioral: Negative.       Past Medical History:   Diagnosis Date    Abscess of left thigh     Acute myocardial infarction (HCC)     Anesthesia     woke up during procedure    Anxiety     Arteriosclerotic cardiovascular disease     Arthritis     Asthma     Chest pain     COPD (chronic obstructive pulmonary disease) (HCC)     Coronary artery disease     Diabetes mellitus (HCC)     Fatty liver     Gastric ulcer     GERD (gastroesophageal reflux disease)     Hyperlipidemia     Hypertension     Low back pain     Myocardial infarction (HCC) 2006,2007    Obesity     Obstructive sleep apnea     no Cpap    Spondylosis of lumbar region without myelopathy or radiculopathy      Past Surgical History:   Procedure Laterality Date    CARPAL TUNNEL RELEASE Right     COLONOSCOPY      CORONARY ANGIOPLASTY WITH STENT PLACEMENT  2006, 2007    EGD      HEMORRHOID SURGERY      NEUROPLASTY / TRANSPOSITION MEDIAN NERVE AT CARPAL TUNNEL Right     [place] SURGICAL ARTHROSCOPY SHOULDER W/LSS&RESCJ ADS Left 6/10/2020    Procedure: SHOULDER ARTHROSCOPIC CAPSULAR RELEASE;  Surgeon: Christiano Aguiar MD;  Location: [facility];  Service: Orthopedics     Family History   Problem Relation Age of Onset    Alzheimer's disease Mother     Heart failure Mother     Dementia Mother     Heart attack Father     Other Father         Cardiac Disorder    Heart disease Father     Other Family         Back  Pain    Hypertension Family     Stroke Family         Complications    Cancer Other      Social History     Tobacco Use    Smoking status: Former     Current packs/day: 0.00     Average packs/day: 1.5 packs/day for 38.0 years (57.0 ttl pk-yrs)     Types: Cigarettes     Start date: 1/1/1976     Quit date: 1/1/2014     Years since quitting: 10.7    Smokeless tobacco: Never    Tobacco comments:     tobacco use not continuous- quit for 2  4 yrs long periods .   Vaping Use    Vaping status: Never Used   Substance and Sexual Activity    Alcohol use: No    Drug use: No    Sexual activity: Not Currently     Current Outpatient Medications on File Prior to Visit   Medication Sig    acetaminophen (TYLENOL) 325 mg tablet Take 650 mg by mouth every 6 (six) hours as needed for mild pain    albuterol (PROVENTIL HFA,VENTOLIN HFA) 90 mcg/act inhaler INHALE 2 PUFFS AS NEEDED FOR SHORTNESS OF BREATH    atorvastatin (LIPITOR) 40 mg tablet TAKE 1 TABLET BY MOUTH EVERY DAY    clopidogrel (PLAVIX) 75 mg tablet TAKE 1 TABLET BY MOUTH EVERY DAY    [facility] Aspirin Low Dose 81 MG EC tablet TAKE 1 TABLET BY MOUTH EVERY DAY    ezetimibe (ZETIA) 10 mg tablet Take 10 mg by mouth daily    fenofibrate (TRICOR) 145 mg tablet TAKE 1 TABLET BY MOUTH EVERY DAY    furosemide (LASIX) 40 mg tablet     HYDROcodone-acetaminophen (Norco) 5-325 mg per tablet Take 1 tablet by mouth every 4 (four) hours as needed for pain Max Daily Amount: 6 tablets    Insulin Glargine-yfgn (Semglee, yfgn,) 100 UNIT/ML SOPN INJECT 31 UNITS UNDER THE SKIN 3 TIMES IN THE MORNING AND 3 TIMES IN THE NIGHT (SIX TIMES DAILY)    Insulin Pen Needle (B-D UF III MINI PEN NEEDLES) 31G X 5 MM MISC Inject as directed 2 (two) times a day Use as directed    Jardiance 25 MG TABS TAKE 1 TABLET (25 MG TOTAL) BY MOUTH DAILY.    losartan (COZAAR) 25 mg tablet TAKE 1 TABLET (25 MG TOTAL) BY MOUTH DAILY.    metoprolol tartrate (LOPRESSOR) 50 mg tablet Take 25 mg by mouth 2 (two) times a day    mupirocin  "(BACTROBAN) 2 % ointment Apply topically daily    nitroglycerin (NITROSTAT) 0.4 mg SL tablet PLEASE SEE ATTACHED FOR DETAILED DIRECTIONS    pantoprazole (PROTONIX) 40 mg tablet TAKE 1 TABLET BY MOUTH EVERY DAY    Icosapent Ethyl 1 g CAPS Vascepa 1 gram capsule   TAKE 2 G BY MOUTH 2 (TWO) TIMES A DAY.    itraconazole (SPORANOX) 100 mg capsule PLEASE SEE ATTACHED FOR DETAILED DIRECTIONS    Sulfacetamide Sodium, Acne, 10 % LOTN Apply topically 2 (two) times a day     Allergies   Allergen Reactions    Bactrim [Sulfamethoxazole-Trimethoprim] Edema    Cefaclor Shortness Of Breath     Other reaction(s): Respiratory Distress    Simvastatin Shortness Of Breath     Other reaction(s): Respiratory Distress     Immunization History   Administered Date(s) Administered    COVID-19 PFIZER VACCINE 0.3 ML IM 03/19/2021, 04/10/2021, 10/16/2021    Influenza Quadrivalent, 6-35 Months IM 10/17/2016, 10/24/2017    Influenza, injectable, quadrivalent, preservative free 0.5 mL 12/11/2023    Influenza, recombinant, quadrivalent,injectable, preservative free 12/06/2018, 12/23/2019, 11/13/2020, 11/15/2021, 12/01/2022     Objective     /60 (BP Location: Left arm, Patient Position: Sitting)   Pulse 82   Temp 98.7 °F (37.1 °C) (Tympanic)   Ht 5' 6\" (1.676 m)   Wt (!) 142 kg (312 lb 12.8 oz)   SpO2 96%   BMI 50.49 kg/m²     Physical Exam  Vitals and nursing note reviewed.   Constitutional:       General: He is not in acute distress.     Appearance: Normal appearance. He is obese. He is not ill-appearing, toxic-appearing or diaphoretic.      Comments: Morbidly obese 63-year-old male who is awake alert.  Antalgic gait difficulty severely with transfers secondary to back pain discomfort.   HENT:      Head: Normocephalic and atraumatic.      Right Ear: Tympanic membrane, ear canal and external ear normal. There is no impacted cerumen.      Left Ear: Tympanic membrane, ear canal and external ear normal. There is no impacted cerumen.      " Nose: Nose normal. No congestion or rhinorrhea.      Mouth/Throat:      Mouth: Mucous membranes are moist.      Comments: Obstruction of oral airway, history of sleep apnea  Eyes:      General: No scleral icterus.        Right eye: No discharge.         Left eye: No discharge.      Extraocular Movements: Extraocular movements intact.      Conjunctiva/sclera: Conjunctivae normal.      Pupils: Pupils are equal, round, and reactive to light.      Comments: Reminded the patient again the importance of routine eye exams.  To make appointment to see   Neck:      Vascular: No carotid bruit.      Comments: Thick short neck with normal range of motion without pain.  Cardiovascular:      Rate and Rhythm: Normal rate and regular rhythm.      Heart sounds: No murmur heard.     No friction rub. No gallop.      Comments: Heart sounds are distant.  Unable to obtain peripheral pulses bilateral lower extremities secondary to edema  Pulmonary:      Effort: Pulmonary effort is normal. No respiratory distress.      Breath sounds: No stridor. No wheezing, rhonchi or rales.      Comments: Significantly decreased breath sounds anteriorly and posteriorly but no rales rhonchi or wheezes could be auscultated on examination  Chest:      Chest wall: No tenderness.   Abdominal:      General: Abdomen is flat. Bowel sounds are normal. There is no distension.      Palpations: Abdomen is soft. There is no mass.      Tenderness: There is no abdominal tenderness. There is no right CVA tenderness, left CVA tenderness, guarding or rebound.      Hernia: No hernia is present.      Comments: Unable to have patient lay supine to do full examination.  Abdomen is soft obese nontender   Musculoskeletal:         General: Tenderness and deformity present. No signs of injury.      Cervical back: Normal range of motion and neck supple. No rigidity or tenderness.      Comments: Patient has decreased range of motion to both shoulder girdles, history of adhesive  capsulitis, flattening to his lumbar curve with severely decreased range of motion with all movements the lumbar spine either forward backward rotation and sidebending or forward and backward bending secondary to pain with movement.  Unable to fully assess straight leg raising..  +1 pitting edema bilateral lower extremities   Lymphadenopathy:      Cervical: No cervical adenopathy.   Skin:     General: Skin is warm and dry.      Coloration: Skin is not jaundiced or pale.      Findings: Rash present. No bruising, erythema or lesion.      Comments: Chronic rash bilateral lower extremities which at this point in time is healing with no open areas no signs of infection no bleeding or exudate.  Patient states they have been doing better.  Has not been back to see dermatology   Neurological:      Mental Status: He is alert and oriented to person, place, and time. Mental status is at baseline.      Cranial Nerves: No cranial nerve deficit.      Sensory: Sensory deficit present.      Motor: Weakness present.      Coordination: Coordination abnormal.      Gait: Gait abnormal.      Deep Tendon Reflexes: Reflexes abnormal.      Comments: Neuropathic changes bilateral lower extremities.  Abnormal waddling gait and pain to low back with any movement.  Absent patella and Achilles tendon reflexes bilaterally   Psychiatric:         Mood and Affect: Mood normal.         Behavior: Behavior normal.         Thought Content: Thought content normal.      Comments: Can question the patient's judgment with him not making appointment to be seen by weight management not following up with dermatology.  Continues to follow-up with cardiology.  Discussed again the importance of routine eye exams

## 2024-09-26 NOTE — PROGRESS NOTES
Sleep Medicine Outpatient Follow Up Note   Liban Montes 63 y.o. male MRN: [id number]  9/30/2024      Reason for Consultation:    Chief Complaint   Patient presents with    Sleep Apnea     Assessment/Plan:    1. FREDY (obstructive sleep apnea)  Assessment & Plan:  History of longstanding moderate FREDY with ALEKSEY 23.5.  CPAP was not adequate in controlling sleep apnea.  He is on BiPAP 18/14 cm H2O with full facemask.    BiPAP is helping him with excessive daytime sleepiness, sleep maintenance, sleep quality.  Snoring/gasping during sleep/witnessed apneas have been controlled.  He is fully compliant with BiPAP     Compliance data:  9/23/2024  100% use over the past 30 days  Average use 9 hours and 55 minutes  BiPAP 18/14  No significant leaks  Average AHI 0.6     No pressure changes needed.  Resupply prescription provided  Follow-up in 1 year  Orders:  -     PAP DME Resupply/Reorder      Health Maintenance  Immunization History   Administered Date(s) Administered    COVID-19 PFIZER VACCINE 0.3 ML IM 03/19/2021, 04/10/2021, 10/16/2021    Influenza Quadrivalent, 6-35 Months IM 10/17/2016, 10/24/2017    Influenza Recombinant Preservative Free Im 09/26/2024    Influenza, injectable, quadrivalent, preservative free 0.5 mL 12/11/2023    Influenza, recombinant, quadrivalent,injectable, preservative free 12/06/2018, 12/23/2019, 11/13/2020, 11/15/2021, 12/01/2022        Return in about 1 year (around 9/30/2025).    History of Present Illness   HPI:  Liban Montes is a 63 y.o. male who has a past medical history of DM2, lumbar radiculopathy, HTN, arteriosclerotic CV, panlobular emphysema, CAD, who is presenting for the  follow-up of moderate obstructive sleep apnea    9/30/24 -follow-up with me-doing very well with BiPAP.  Sleep quality is good.  No mechanical issues from the machine.  No major side effects from the use of BiPAP.  No excessive pressures, aerophagia, sinus infection, nosebleeds.  Taking Norco for back pain    9/11/  2023 - BiPAP 18/14cm H2O, moderate FREDY ALEKSEY=23.5 with full face mask. Hhsurjit was last seen by Dr. Johnston in 5/27/22.  He is with [facility].      He has a refurbished DreamStation BIPAP.  Sleeping longer with BIPAP.  Sleeping about 9 hours a sleep.      Sleeps from 3am to 11am due to prior shift work.  Retired now.  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     No issues with sleep initiation.  Sleep is comfortable.  Occasional afternoon nap.      Mask type: full face  Skin irritation from the mask: no  Pain or discomfort: no  Feeling of claustrophobia: no  Aerophagia: no  Pressure intolerance: no  Nasal congestion or rhinorrhea: nasal congestion is better with CPAP  Nasal and oral dryness: not significant  Morning headache: no  Acid reflux symptoms: no  Snoring with PAP: no  Parasomnias: no  Naps: no    Using PAP every night/day: yes  Using PAP the entire duration of sleep: yes  Benefiting from PAP: yes    [place]: 6    Historical Information   Past Medical History:   Diagnosis Date    Abscess of left thigh     Acute myocardial infarction (HCC)     Anesthesia     woke up during procedure    Anxiety     Arteriosclerotic cardiovascular disease     Arthritis     Asthma     Chest pain     COPD (chronic obstructive pulmonary disease) (HCC)     Coronary artery disease     Diabetes mellitus (HCC)     Fatty liver     Gastric ulcer     GERD (gastroesophageal reflux disease)     Hyperlipidemia     Hypertension     Low back pain     Myocardial infarction (HCC) 2006,2007    Obesity     Obstructive sleep apnea     no Cpap    Spondylosis of lumbar region without myelopathy or radiculopathy      Past Surgical History:   Procedure Laterality Date    CARPAL TUNNEL RELEASE Right     COLONOSCOPY      CORONARY ANGIOPLASTY WITH STENT PLACEMENT  2006, 2007    EGD      HEMORRHOID SURGERY      NEUROPLASTY / TRANSPOSITION MEDIAN NERVE AT CARPAL TUNNEL Right     [place] SURGICAL ARTHROSCOPY SHOULDER W/LSS&RESCJ ADS Left 6/10/2020     Procedure: SHOULDER ARTHROSCOPIC CAPSULAR RELEASE;  Surgeon: Christiano Aguiar MD;  Location: [facility];  Service: Orthopedics     Family History   Problem Relation Age of Onset    Alzheimer's disease Mother     Heart failure Mother     Dementia Mother     Heart attack Father     Other Father         Cardiac Disorder    Heart disease Father     Other Family         Back Pain    Hypertension Family     Stroke Family         Complications    Cancer Other          Meds/Allergies     Current Outpatient Medications:     acetaminophen (TYLENOL) 325 mg tablet, Take 650 mg by mouth every 6 (six) hours as needed for mild pain, Disp: , Rfl:     albuterol (PROVENTIL HFA,VENTOLIN HFA) 90 mcg/act inhaler, INHALE 2 PUFFS AS NEEDED FOR SHORTNESS OF BREATH, Disp: 18 g, Rfl: 0    atorvastatin (LIPITOR) 40 mg tablet, TAKE 1 TABLET BY MOUTH EVERY DAY, Disp: 90 tablet, Rfl: 1    clopidogrel (PLAVIX) 75 mg tablet, TAKE 1 TABLET BY MOUTH EVERY DAY, Disp: 30 tablet, Rfl: 0    [facility] Aspirin Low Dose 81 MG EC tablet, TAKE 1 TABLET BY MOUTH EVERY DAY, Disp: 90 tablet, Rfl: 2    ezetimibe (ZETIA) 10 mg tablet, Take 10 mg by mouth daily, Disp: , Rfl:     fenofibrate (TRICOR) 145 mg tablet, TAKE 1 TABLET BY MOUTH EVERY DAY, Disp: 90 tablet, Rfl: 1    furosemide (LASIX) 40 mg tablet, , Disp: , Rfl:     HYDROcodone-acetaminophen (Norco) 5-325 mg per tablet, Take 1 tablet by mouth every 4 (four) hours as needed for pain Max Daily Amount: 6 tablets, Disp: 100 tablet, Rfl: 0    Insulin Glargine-yfgn (Semglee, yfgn,) 100 UNIT/ML SOPN, INJECT 31 UNITS UNDER THE SKIN 3 TIMES IN THE MORNING AND 3 TIMES IN THE NIGHT (SIX TIMES DAILY), Disp: 180 mL, Rfl: 3    Insulin Pen Needle (B-D UF III MINI PEN NEEDLES) 31G X 5 MM MISC, Inject as directed 2 (two) times a day Use as directed, Disp: 200 each, Rfl: 1    Jardiance 25 MG TABS, TAKE 1 TABLET (25 MG TOTAL) BY MOUTH DAILY., Disp: 90 tablet, Rfl: 1    losartan (COZAAR) 25 mg tablet, TAKE 1 TABLET (25 MG  "TOTAL) BY MOUTH DAILY., Disp: 90 tablet, Rfl: 1    metoprolol tartrate (LOPRESSOR) 50 mg tablet, Take 25 mg by mouth 2 (two) times a day, Disp: , Rfl:     nitroglycerin (NITROSTAT) 0.4 mg SL tablet, PLEASE SEE ATTACHED FOR DETAILED DIRECTIONS, Disp: , Rfl:     pantoprazole (PROTONIX) 40 mg tablet, TAKE 1 TABLET BY MOUTH EVERY DAY, Disp: 90 tablet, Rfl: 5    Icosapent Ethyl 1 g CAPS, Vascepa 1 gram capsule  TAKE 2 G BY MOUTH 2 (TWO) TIMES A DAY., Disp: , Rfl:     itraconazole (SPORANOX) 100 mg capsule, PLEASE SEE ATTACHED FOR DETAILED DIRECTIONS, Disp: , Rfl:     mupirocin (BACTROBAN) 2 % ointment, Apply topically daily (Patient not taking: Reported on 9/30/2024), Disp: 30 g, Rfl: 2    Sulfacetamide Sodium, Acne, 10 % LOTN, Apply topically 2 (two) times a day, Disp: 118 mL, Rfl: 0  Allergies   Allergen Reactions    Bactrim [Sulfamethoxazole-Trimethoprim] Edema    Cefaclor Shortness Of Breath     Other reaction(s): Respiratory Distress    Simvastatin Shortness Of Breath     Other reaction(s): Respiratory Distress       Vitals: Blood pressure 120/90, height 5' 6\" (1.676 m), weight (!) 142 kg (313 lb). Body mass index is 50.52 kg/m².      Physical Exam  Vitals and nursing note reviewed.   Constitutional:       General: He is not in acute distress.     Appearance: Normal appearance. He is well-developed. He is not ill-appearing, toxic-appearing or diaphoretic.   HENT:      Head: Normocephalic and atraumatic.      Mouth/Throat:      Comments: Mallampati 4  Eyes:      General: No scleral icterus.     Extraocular Movements: Extraocular movements intact.      Conjunctiva/sclera: Conjunctivae normal.   Cardiovascular:      Rate and Rhythm: Normal rate and regular rhythm.   Pulmonary:      Effort: Pulmonary effort is normal. No respiratory distress.   Abdominal:      Tenderness: There is no guarding.   Musculoskeletal:         General: No swelling.      Cervical back: Normal range of motion and neck supple.      Comments: " "Lower extremities are wrapped in Ace wrap   Skin:     General: Skin is warm and dry.   Neurological:      General: No focal deficit present.      Mental Status: He is alert. Mental status is at baseline.   Psychiatric:         Mood and Affect: Mood normal.             Labs:   I have personally reviewed pertinent lab results.    ABG: No results found for: \"PHART\", \"YVB5OKK\", \"PO2ART\", \"OTI0OKH\", \"Z7UKZIZX\", \"BEART\", \"SOURCE\",   BNP: No results found for: \"BNP\",   CBC:  Lab Results   Component Value Date    WBC 7.91 09/23/2024    HGB 13.8 09/23/2024    HCT 42.7 09/23/2024    MCV 95 09/23/2024     09/23/2024    EOSPCT 2 09/23/2024    EOSABS 0.18 09/23/2024    NEUTOPHILPCT 66 09/23/2024    LYMPHOPCT 20 09/23/2024   ,   CMP:   Lab Results   Component Value Date    SODIUM 138 09/23/2024    K 4.2 09/23/2024     09/23/2024    CO2 25 09/23/2024    ANIONGAP 8 05/27/2015    BUN 19 09/23/2024    CREATININE 1.38 (H) 09/23/2024    GLUCOSE 81 05/27/2015    CALCIUM 8.8 09/23/2024    AST 28 09/23/2024    ALT 30 09/23/2024    ALKPHOS 57 09/23/2024    PROT 7.1 05/27/2015    BILITOT 0.49 05/27/2015    EGFR 54 09/23/2024   ,   PT/INR:   Lab Results   Component Value Date    PT 13.3 07/30/2018    INR 1.1 07/30/2018   ,   Ferrtin: No components found for: \"FERRTIN\",  Magensium: No results found for: \"MAGNESIUM\",      Imaging and other studies: I have personally reviewed pertinent reports.   and I have personally reviewed pertinent films in PACS      Sleep Study:  Prior sleep study ALEKSEY 25    Compliance data:  9/23/2024  100% use over the past 30 days  Average use 9 hours and 55 minutes  BiPAP 18/14  No significant leaks  Average AHI 0.6    9/11/2023  100% use over past 30 days  Average use 8 hours and 51 minutes  BiPAP 18/14  No significant leaks  Refurbished DreamStation BiPAP  Residual AHI 0.4      Zain Lee MD  Pulmonary, Critical Care and Sleep Medicine  [facility]     Portions of " "the record may have been created with voice recognition software. Occasional wrong word or \"sound a like\" substitutions may have occurred due to the inherent limitations of voice recognition software. Please read the chart carefully and recognize, using context, where substitutions have occurred.     "

## 2024-09-26 NOTE — ASSESSMENT & PLAN NOTE
Patient states he is having a very difficult day as far as pain in his low back.  He relates that he feels that part of this is the weather.  Patient has difficulty moving and any activity on exam.  He has had full workup evaluation in the past for his back pain and disorder and has had no improvement with different modalities.  I feel strongly that patient would benefit from weight loss but he has not made an appointment for evaluation at weight management which we feel is extremely important.  He will continue with narcotic analgesics as needed but he is cut down on that taking a half a pill daily as needed which she states really is not adequate for his pain.

## 2024-09-26 NOTE — ASSESSMENT & PLAN NOTE
Blood pressure and renal function is stable.  Patient will continue present medication surveillance and adjustment with medication in the future if needed.    Orders:    Basic metabolic panel; Future

## 2024-09-26 NOTE — ASSESSMENT & PLAN NOTE
With the patient's BMI he is considered morbidly obese.  BMI is now up to 50.  Again we have placed a consult for him and go to evaluation at weight management which we feel would be beneficial not only to help him with weight loss but also help with his chronic back pain, control of his diabetes.  I urged the patient to call back and make an appointment to be seen and evaluated and begin treatment.

## 2024-09-26 NOTE — ASSESSMENT & PLAN NOTE
Hemoglobin A1c is 6.3.  Patient has been keeping a close eye on his sugar readings and he continues to make adjustments with his insulin dose in order to compensate not only for elevated blood sugars but also managing his caloric intake with adjustments of his insulin.  His weight continues to be elevated.  Still has not made appointment for evaluation weight management with the patient being a candidate for treatment.  Lab Results   Component Value Date    HGBA1C 6.3 (H) 09/23/2024       Orders:    Hemoglobin A1C; Future

## 2024-09-30 ENCOUNTER — OFFICE VISIT (OUTPATIENT)
Dept: SLEEP CENTER | Facility: CLINIC | Age: 64
End: 2024-09-30
Payer: COMMERCIAL

## 2024-09-30 VITALS
SYSTOLIC BLOOD PRESSURE: 120 MMHG | HEIGHT: 66 IN | BODY MASS INDEX: 50.3 KG/M2 | WEIGHT: 313 LBS | DIASTOLIC BLOOD PRESSURE: 90 MMHG

## 2024-09-30 DIAGNOSIS — G47.33 OSA (OBSTRUCTIVE SLEEP APNEA): Primary | ICD-10-CM

## 2024-09-30 PROCEDURE — 99213 OFFICE O/P EST LOW 20 MIN: CPT | Performed by: INTERNAL MEDICINE

## 2024-09-30 NOTE — ASSESSMENT & PLAN NOTE
History of longstanding moderate FREDY with ALEKSEY 23.5.  CPAP was not adequate in controlling sleep apnea.  He is on BiPAP 18/14 cm H2O with full facemask.    BiPAP is helping him with excessive daytime sleepiness, sleep maintenance, sleep quality.  Snoring/gasping during sleep/witnessed apneas have been controlled.  He is fully compliant with BiPAP     Compliance data:  9/23/2024  100% use over the past 30 days  Average use 9 hours and 55 minutes  BiPAP 18/14  No significant leaks  Average AHI 0.6     No pressure changes needed.  Resupply prescription provided  Follow-up in 1 year

## 2024-09-30 NOTE — PROGRESS NOTES
Anesthesia Post-op Note    Patient: Chinmay Del Real  Procedure(s) Performed: ENDOSCOPIC ULTRASOUND (UPPER)  Anesthesia type: MAC    Vitals Value Taken Time   Temp 36 °C (96.8 °F) 09/07/23 1200   Pulse 65 09/07/23 1255   Resp 17 09/07/23 1255   SpO2 99 % 09/07/23 1255   /66 09/07/23 1255         Patient Location: Phase II  Post-op Vital Signs:stable  Level of Consciousness: participates in exam, oriented, awake, alert and return to baseline  Respiratory Status: spontaneous ventilation and room air  Cardiovascular blood pressure returned to baseline  Hydration: euvolemic  Pain Management: well controlled  Vomiting: none  Nausea: None  Airway Patency:patent  Post-op Assessment: awake, alert, appropriately conversant, or baseline, no complications, patient tolerated procedure well, no evidence of recall, dentition within defined limits, moving all extremities and No Corneal Abrasion      No notable events documented.   Midnight-after midnight -> 10-noon.    Norco   Rare inahler use  Quit smoking 12 years ago  No nosebleeds, sinus infecitons  No pressure in ears  No aerophagia.

## 2024-10-01 ENCOUNTER — TELEPHONE (OUTPATIENT)
Dept: SLEEP CENTER | Facility: CLINIC | Age: 64
End: 2024-10-01

## 2024-10-03 LAB

## 2024-10-16 DIAGNOSIS — Z79.4 TYPE 2 DIABETES MELLITUS WITH OTHER CIRCULATORY COMPLICATION, WITH LONG-TERM CURRENT USE OF INSULIN (HCC): ICD-10-CM

## 2024-10-16 DIAGNOSIS — E11.59 TYPE 2 DIABETES MELLITUS WITH OTHER CIRCULATORY COMPLICATION, WITH LONG-TERM CURRENT USE OF INSULIN (HCC): ICD-10-CM

## 2024-10-17 RX ORDER — CLOPIDOGREL BISULFATE 75 MG/1
75 TABLET ORAL DAILY
Qty: 90 TABLET | Refills: 1 | Status: SHIPPED | OUTPATIENT
Start: 2024-10-17

## 2024-10-18 DIAGNOSIS — J43.1 PANLOBULAR EMPHYSEMA (HCC): ICD-10-CM

## 2024-10-18 RX ORDER — ALBUTEROL SULFATE 90 UG/1
2 INHALANT RESPIRATORY (INHALATION) AS NEEDED
Qty: 18 G | Refills: 5 | Status: SHIPPED | OUTPATIENT
Start: 2024-10-18

## 2024-10-26 PROBLEM — Z00.00 HEALTHCARE MAINTENANCE: Status: RESOLVED | Noted: 2019-04-10 | Resolved: 2024-10-26

## 2024-11-02 DIAGNOSIS — E78.00 PURE HYPERCHOLESTEROLEMIA: ICD-10-CM

## 2024-11-04 RX ORDER — FENOFIBRATE 145 MG/1
145 TABLET, COATED ORAL DAILY
Qty: 90 TABLET | Refills: 1 | Status: SHIPPED | OUTPATIENT
Start: 2024-11-04

## 2024-11-14 DIAGNOSIS — G89.29 CHRONIC BILATERAL LOW BACK PAIN WITHOUT SCIATICA: ICD-10-CM

## 2024-11-14 DIAGNOSIS — M54.50 CHRONIC BILATERAL LOW BACK PAIN WITHOUT SCIATICA: ICD-10-CM

## 2024-11-15 RX ORDER — HYDROCODONE BITARTRATE AND ACETAMINOPHEN 5; 325 MG/1; MG/1
1 TABLET ORAL EVERY 4 HOURS PRN
Qty: 100 TABLET | Refills: 0 | Status: SHIPPED | OUTPATIENT
Start: 2024-11-15

## 2024-12-17 ENCOUNTER — VBI (OUTPATIENT)
Dept: ADMINISTRATIVE | Facility: OTHER | Age: 64
End: 2024-12-17

## 2024-12-17 NOTE — TELEPHONE ENCOUNTER
12/17/24 10:35 AM     Chart reviewed for Diabetic Eye Exam ; nothing is submitted to the patient's insurance at this time.     Rian Chi MA   [facility]

## 2024-12-27 DIAGNOSIS — I25.10 ARTERIOSCLEROTIC CARDIOVASCULAR DISEASE: ICD-10-CM

## 2024-12-27 RX ORDER — ASPIRIN 81 MG/1
81 TABLET, COATED ORAL DAILY
Qty: 30 TABLET | Refills: 5 | Status: SHIPPED | OUTPATIENT
Start: 2024-12-27

## 2024-12-29 DIAGNOSIS — G89.29 CHRONIC BILATERAL LOW BACK PAIN WITHOUT SCIATICA: ICD-10-CM

## 2024-12-29 DIAGNOSIS — M54.50 CHRONIC BILATERAL LOW BACK PAIN WITHOUT SCIATICA: ICD-10-CM

## 2024-12-30 RX ORDER — HYDROCODONE BITARTRATE AND ACETAMINOPHEN 5; 325 MG/1; MG/1
1 TABLET ORAL EVERY 4 HOURS PRN
Qty: 100 TABLET | Refills: 0 | Status: SHIPPED | OUTPATIENT
Start: 2024-12-30

## 2025-02-05 ENCOUNTER — APPOINTMENT (OUTPATIENT)
Dept: LAB | Age: 65
End: 2025-02-05
Payer: COMMERCIAL

## 2025-02-05 DIAGNOSIS — R80.9 TYPE 2 DIABETES MELLITUS WITH DIABETIC MICROALBUMINURIA, WITH LONG-TERM CURRENT USE OF INSULIN (HCC): ICD-10-CM

## 2025-02-05 DIAGNOSIS — Z12.5 PROSTATE CANCER SCREENING: ICD-10-CM

## 2025-02-05 DIAGNOSIS — Z79.4 TYPE 2 DIABETES MELLITUS WITH DIABETIC MICROALBUMINURIA, WITH LONG-TERM CURRENT USE OF INSULIN (HCC): ICD-10-CM

## 2025-02-05 DIAGNOSIS — E11.29 TYPE 2 DIABETES MELLITUS WITH DIABETIC MICROALBUMINURIA, WITH LONG-TERM CURRENT USE OF INSULIN (HCC): ICD-10-CM

## 2025-02-05 DIAGNOSIS — I10 BENIGN ESSENTIAL HYPERTENSION: ICD-10-CM

## 2025-02-05 LAB
ANION GAP SERPL CALCULATED.3IONS-SCNC: 10 MMOL/L (ref 4–13)
BUN SERPL-MCNC: 24 MG/DL (ref 5–25)
CALCIUM SERPL-MCNC: 9.3 MG/DL (ref 8.4–10.2)
CHLORIDE SERPL-SCNC: 105 MMOL/L (ref 96–108)
CO2 SERPL-SCNC: 24 MMOL/L (ref 21–32)
CREAT SERPL-MCNC: 1.35 MG/DL (ref 0.6–1.3)
EST. AVERAGE GLUCOSE BLD GHB EST-MCNC: 137 MG/DL
GFR SERPL CREATININE-BSD FRML MDRD: 55 ML/MIN/1.73SQ M
GLUCOSE P FAST SERPL-MCNC: 89 MG/DL (ref 65–99)
HBA1C MFR BLD: 6.4 %
POTASSIUM SERPL-SCNC: 4.4 MMOL/L (ref 3.5–5.3)
PSA SERPL-MCNC: 0.6 NG/ML (ref 0–4)
SODIUM SERPL-SCNC: 139 MMOL/L (ref 135–147)

## 2025-02-05 PROCEDURE — 36415 COLL VENOUS BLD VENIPUNCTURE: CPT

## 2025-02-05 PROCEDURE — 80048 BASIC METABOLIC PNL TOTAL CA: CPT

## 2025-02-05 PROCEDURE — 83036 HEMOGLOBIN GLYCOSYLATED A1C: CPT

## 2025-02-05 PROCEDURE — G0103 PSA SCREENING: HCPCS

## 2025-02-06 ENCOUNTER — OFFICE VISIT (OUTPATIENT)
Age: 65
End: 2025-02-06
Payer: COMMERCIAL

## 2025-02-06 VITALS
WEIGHT: 315 LBS | SYSTOLIC BLOOD PRESSURE: 140 MMHG | OXYGEN SATURATION: 98 % | TEMPERATURE: 97.4 F | HEART RATE: 72 BPM | DIASTOLIC BLOOD PRESSURE: 66 MMHG | RESPIRATION RATE: 18 BRPM | BODY MASS INDEX: 50.62 KG/M2 | HEIGHT: 66 IN

## 2025-02-06 DIAGNOSIS — J43.1 PANLOBULAR EMPHYSEMA (HCC): ICD-10-CM

## 2025-02-06 DIAGNOSIS — F11.20 CONTINUOUS OPIOID DEPENDENCE (HCC): ICD-10-CM

## 2025-02-06 DIAGNOSIS — M54.16 LUMBAR RADICULOPATHY: ICD-10-CM

## 2025-02-06 DIAGNOSIS — I25.10 CORONARY ARTERY DISEASE INVOLVING NATIVE HEART, UNSPECIFIED VESSEL OR LESION TYPE, UNSPECIFIED WHETHER ANGINA PRESENT: ICD-10-CM

## 2025-02-06 DIAGNOSIS — E11.9 TYPE 2 DIABETES MELLITUS WITHOUT COMPLICATION, WITH LONG-TERM CURRENT USE OF INSULIN (HCC): ICD-10-CM

## 2025-02-06 DIAGNOSIS — I10 BENIGN ESSENTIAL HYPERTENSION: ICD-10-CM

## 2025-02-06 DIAGNOSIS — Z79.4 TYPE 2 DIABETES MELLITUS WITHOUT COMPLICATION, WITH LONG-TERM CURRENT USE OF INSULIN (HCC): ICD-10-CM

## 2025-02-06 DIAGNOSIS — E66.01 MORBID OBESITY WITH BODY MASS INDEX OF 45.0-49.9 IN ADULT (HCC): Primary | ICD-10-CM

## 2025-02-06 PROCEDURE — 99214 OFFICE O/P EST MOD 30 MIN: CPT | Performed by: INTERNAL MEDICINE

## 2025-02-06 RX ORDER — CLINDAMYCIN HYDROCHLORIDE 300 MG/1
300 CAPSULE ORAL 3 TIMES DAILY
COMMUNITY
Start: 2025-01-28

## 2025-02-06 NOTE — ASSESSMENT & PLAN NOTE
we have placed a consult previously and again today for the patient to be seen by weight management.  Patient relates that that most at this facility is at least discussed medication with are in [place] and it is very difficult for him to drive there.  He was told to discuss this again with his [facility]'s program and see whether he can access treatments locally.  He agrees that this would be extremely important    Orders:    Ambulatory referral to Weight Management; Future

## 2025-02-06 NOTE — ASSESSMENT & PLAN NOTE
History of coronary artery disease with acute myocardial infarction in the past.  Patient did have intervention and he continues to follow-up with his cardiologist.  We again discussed the importance of watching his intake of fats and cholesterol with his diet and being compliant with medication.  Again trying to get the patient involved in some type of weight loss program that we will definitely be beneficial

## 2025-02-06 NOTE — ASSESSMENT & PLAN NOTE
History of chronic obstructive pulmonary disease.  He has been stable from a respiratory standpoint and has his inhalers to use as needed.  He knows to call immediately if any acute exacerbations for definitive treatment.

## 2025-02-06 NOTE — ASSESSMENT & PLAN NOTE
Continues on Vicodin intermittently for his back pain and discomfort.  Again patient relates that he has good and bad days.  We do feel strongly that it would be beneficial for the patient to work at weight loss and consult again close placed for the patient to be seen by weight management.

## 2025-02-06 NOTE — ASSESSMENT & PLAN NOTE
History of hypertension.  Blood pressure is under relatively good control today with present treatment but there is some variability.  Patient will continue present medication and we also will continue to monitor his renal function and make adjustments medication in the future if needed.    Orders:    Basic metabolic panel; Future    Hemoglobin A1C; Future

## 2025-02-06 NOTE — ASSESSMENT & PLAN NOTE
Patient does have a longstanding history of diabetes mellitus type 2 and remains on insulin along with oral medications.  Patient has continued to make adjustments with his insulin dose in order to keep his sugars under control and he has been able to avoid any episodes of hypoglycemia.  Has noted a hemoglobin A1c is still under control at 6.4 but it is slowly rising.  I reminded the patient again the importance of routine eye and foot exam.  He does have a podiatrist that he sees on a regular basis and does have an upcoming appointment to be seen and evaluated.  Insisted the patient make an appointment to be seen and reevaluation by his ophthalmologist.  Lab Results   Component Value Date    HGBA1C 6.4 (H) 02/05/2025       Orders:    Hemoglobin A1C; Future    Lipid panel; Future

## 2025-02-06 NOTE — ASSESSMENT & PLAN NOTE
Chronic back pain, radiculopathy.  Patient continues to take Vicodin as needed.  Today he is doing relatively well.  Since does have some stiffness with ambulation but no pain with movement.

## 2025-02-06 NOTE — PROGRESS NOTES
Name: Liban Montes      : 1960      MRN: [id number]  Encounter Provider: Zain Balderas DO  Encounter Date: 2025   Encounter department: [facility] INTERNAL MEDICINE    Assessment & Plan  Benign essential hypertension  History of hypertension.  Blood pressure is under relatively good control today with present treatment but there is some variability.  Patient will continue present medication and we also will continue to monitor his renal function and make adjustments medication in the future if needed.    Orders:  •  Basic metabolic panel; Future  •  Hemoglobin A1C; Future    Continuous opioid dependence (HCC)  Continues on Vicodin intermittently for his back pain and discomfort.  Again patient relates that he has good and bad days.  We do feel strongly that it would be beneficial for the patient to work at weight loss and consult again close placed for the patient to be seen by weight management.         Morbid obesity with body mass index of 45.0-49.9 in adult ([facility])   we have placed a consult previously and again today for the patient to be seen by weight management.  Patient relates that that most at this facility is at least discussed medication with are in [place] and it is very difficult for him to drive there.  He was told to discuss this again with his [facility] program and see whether he can access treatments locally.  He agrees that this would be extremely important    Orders:  •  Ambulatory referral to Weight Management; Future    Type 2 diabetes mellitus without complication, with long-term current use of insulin ([facility])  Patient does have a longstanding history of diabetes mellitus type 2 and remains on insulin along with oral medications.  Patient has continued to make adjustments with his insulin dose in order to keep his sugars under control and he has been able to avoid any episodes of hypoglycemia.  Has noted a hemoglobin A1c is still under control at 6.4 but it is  slowly rising.  I reminded the patient again the importance of routine eye and foot exam.  He does have a podiatrist that he sees on a regular basis and does have an upcoming appointment to be seen and evaluated.  Insisted the patient make an appointment to be seen and reevaluation by his ophthalmologist.  Lab Results   Component Value Date    HGBA1C 6.4 (H) 02/05/2025       Orders:  •  Hemoglobin A1C; Future  •  Lipid panel; Future    Panlobular emphysema (HCC)  History of chronic obstructive pulmonary disease.  He has been stable from a respiratory standpoint and has his inhalers to use as needed.  He knows to call immediately if any acute exacerbations for definitive treatment.         Lumbar radiculopathy  Chronic back pain, radiculopathy.  Patient continues to take Vicodin as needed.  Today he is doing relatively well.  Since does have some stiffness with ambulation but no pain with movement.         Coronary artery disease involving native heart, unspecified vessel or lesion type, unspecified whether angina present  History of coronary artery disease with acute myocardial infarction in the past.  Patient did have intervention and he continues to follow-up with his cardiologist.  We again discussed the importance of watching his intake of fats and cholesterol with his diet and being compliant with medication.  Again trying to get the patient involved in some type of weight loss program that we will definitely be beneficial              History of Present Illness     Patient is a 64-year-old male history of extensive medical problems outlined previously who is here today for a routine follow-up.  Patient states over the past few weeks doing relatively well with no new complaints.  He continues to make adjustments with his insulin dose in order to work on any type of elevation in the blood sugar readings.  Denies any hypoglycemia recently.  Review of Systems   Constitutional:  Positive for activity change and  unexpected weight change. Negative for appetite change, chills, diaphoresis, fatigue and fever.        Because of his chronic back pain his activity level can vary from day-to-day.  He tries to stay active but at times this is next to impossible because of his back pain.  With lack of activity continues to gain weight   HENT: Negative.     Eyes: Negative.    Respiratory: Negative.     Cardiovascular: Negative.         Chronic edema bilateral lower extremities without any   Gastrointestinal: Negative.    Endocrine: Negative.    Genitourinary: Negative.    Musculoskeletal:  Positive for back pain and gait problem. Negative for arthralgias, joint swelling, myalgias, neck pain and neck stiffness.   Skin: Negative.    Allergic/Immunologic: Negative.    Neurological:  Positive for weakness. Negative for dizziness, tremors, seizures, syncope, facial asymmetry, speech difficulty, light-headedness, numbness and headaches.   Hematological: Negative.    Psychiatric/Behavioral: Negative.     Past Medical History:   Diagnosis Date   • Abscess of left thigh    • Acute myocardial infarction (HCC)    • Anesthesia     woke up during procedure   • Anxiety    • Arteriosclerotic cardiovascular disease    • Arthritis    • Asthma    • Chest pain    • COPD (chronic obstructive pulmonary disease) (HCC)    • Coronary artery disease    • Diabetes mellitus (HCC)    • Fatty liver    • Gastric ulcer    • GERD (gastroesophageal reflux disease)    • Hyperlipidemia    • Hypertension    • Low back pain    • Myocardial infarction (HCC) 2006,2007   • Obesity    • Obstructive sleep apnea     no Cpap   • Spondylosis of lumbar region without myelopathy or radiculopathy      Past Surgical History:   Procedure Laterality Date   • CARPAL TUNNEL RELEASE Right    • COLONOSCOPY     • CORONARY ANGIOPLASTY WITH STENT PLACEMENT  2006, 2007   • EGD     • HEMORRHOID SURGERY     • NEUROPLASTY / TRANSPOSITION MEDIAN NERVE AT CARPAL TUNNEL Right    • ND SURGICAL  ARTHROSCOPY SHOULDER W/LSS&RESCJ ADS Left 6/10/2020    Procedure: SHOULDER ARTHROSCOPIC CAPSULAR RELEASE;  Surgeon: Christiano Aguiar MD;  Location: [facility];  Service: Orthopedics     Family History   Problem Relation Age of Onset   • Alzheimer's disease Mother    • Heart failure Mother    • Dementia Mother    • Heart attack Father    • Other Father         Cardiac Disorder   • Heart disease Father    • Other Family         Back Pain   • Hypertension Family    • Stroke Family         Complications   • Cancer Other      Social History     Tobacco Use   • Smoking status: Former     Current packs/day: 0.00     Average packs/day: 1.5 packs/day for 38.0 years (57.0 ttl pk-yrs)     Types: Cigarettes     Start date: 1976     Quit date: 2014     Years since quittin.1   • Smokeless tobacco: Never   • Tobacco comments:     tobacco use not continuous- quit for 2  4 yrs long periods .   Vaping Use   • Vaping status: Never Used   Substance and Sexual Activity   • Alcohol use: No   • Drug use: No   • Sexual activity: Not Currently     Current Outpatient Medications on File Prior to Visit   Medication Sig   • acetaminophen (TYLENOL) 325 mg tablet Take 650 mg by mouth every 6 (six) hours as needed for mild pain   • albuterol (PROVENTIL HFA,VENTOLIN HFA) 90 mcg/act inhaler INHALE 2 PUFFS AS NEEDED FOR SHORTNESS OF BREATH   • aspirin (Aspirin Low Dose) 81 mg EC tablet TAKE 1 TABLET BY MOUTH EVERY DAY   • atorvastatin (LIPITOR) 40 mg tablet TAKE 1 TABLET BY MOUTH EVERY DAY   • clindamycin (CLEOCIN) 300 MG capsule Take 300 mg by mouth 3 (three) times a day   • clopidogrel (PLAVIX) 75 mg tablet TAKE 1 TABLET BY MOUTH EVERY DAY   • ezetimibe (ZETIA) 10 mg tablet Take 10 mg by mouth daily   • fenofibrate (TRICOR) 145 mg tablet TAKE 1 TABLET BY MOUTH EVERY DAY   • furosemide (LASIX) 40 mg tablet  (Patient taking differently: Take 20 mg by mouth 3 (three) times a week)   • HYDROcodone-acetaminophen (Norco) 5-325 mg per  tablet Take 1 tablet by mouth every 4 (four) hours as needed for pain Max Daily Amount: 6 tablets   • Insulin Glargine-yfgn (Semglee, yfgn,) 100 UNIT/ML SOPN INJECT 31 UNITS UNDER THE SKIN 3 TIMES IN THE MORNING AND 3 TIMES IN THE NIGHT (SIX TIMES DAILY) (Patient taking differently: Inject 33 Units as directed 2 (two) times a day 132 units twice a day)   • Insulin Pen Needle (B-D UF III MINI PEN NEEDLES) 31G X 5 MM MISC Inject as directed 2 (two) times a day Use as directed   • Jardiance 25 MG TABS TAKE 1 TABLET (25 MG TOTAL) BY MOUTH DAILY.   • losartan (COZAAR) 25 mg tablet TAKE 1 TABLET (25 MG TOTAL) BY MOUTH DAILY.   • metoprolol tartrate (LOPRESSOR) 50 mg tablet Take 25 mg by mouth 2 (two) times a day   • nitroglycerin (NITROSTAT) 0.4 mg SL tablet PLEASE SEE ATTACHED FOR DETAILED DIRECTIONS   • pantoprazole (PROTONIX) 40 mg tablet TAKE 1 TABLET BY MOUTH EVERY DAY   • Icosapent Ethyl 1 g CAPS Vascepa 1 gram capsule   TAKE 2 G BY MOUTH 2 (TWO) TIMES A DAY.   • itraconazole (SPORANOX) 100 mg capsule PLEASE SEE ATTACHED FOR DETAILED DIRECTIONS   • mupirocin (BACTROBAN) 2 % ointment Apply topically daily (Patient not taking: Reported on 9/30/2024)   • Sulfacetamide Sodium, Acne, 10 % LOTN Apply topically 2 (two) times a day     Allergies   Allergen Reactions   • Bactrim [Sulfamethoxazole-Trimethoprim] Edema   • Cefaclor Shortness Of Breath     Other reaction(s): Respiratory Distress   • Simvastatin Shortness Of Breath     Other reaction(s): Respiratory Distress     Immunization History   Administered Date(s) Administered   • COVID-19 PFIZER VACCINE 0.3 ML IM 03/19/2021, 04/10/2021, 10/16/2021   • Influenza Quadrivalent, 6-35 Months IM 10/17/2016, 10/24/2017   • Influenza Recombinant Preservative Free Im 09/26/2024   • Influenza, injectable, quadrivalent, preservative free 0.5 mL 12/11/2023   • Influenza, recombinant, quadrivalent,injectable, preservative free 12/06/2018, 12/23/2019, 11/13/2020, 11/15/2021,  "12/01/2022     Objective   /66   Pulse 72   Temp (!) 97.4 °F (36.3 °C)   Resp 18   Ht 5' 6\" (1.676 m)   Wt (!) 145 kg (320 lb)   SpO2 98%   BMI 51.65 kg/m²     Physical Exam  Vitals and nursing note reviewed.   Constitutional:       General: He is not in acute distress.     Appearance: Normal appearance. He is obese. He is not ill-appearing, toxic-appearing or diaphoretic.      Comments: Morbidly obese 64-year-old male who is awake alert in no acute distress oriented x 3   HENT:      Head: Normocephalic and atraumatic.      Right Ear: Tympanic membrane, ear canal and external ear normal. There is no impacted cerumen.      Left Ear: Tympanic membrane, ear canal and external ear normal. There is no impacted cerumen.      Nose: Nose normal. No congestion or rhinorrhea.      Mouth/Throat:      Mouth: Mucous membranes are moist.      Pharynx: No oropharyngeal exudate or posterior oropharyngeal erythema.      Comments: Obstruction of oral airway, history of sleep apnea.  Unable to see posterior airway  Eyes:      General: No scleral icterus.        Right eye: No discharge.         Left eye: No discharge.      Extraocular Movements: Extraocular movements intact.      Conjunctiva/sclera: Conjunctivae normal.      Pupils: Pupils are equal, round, and reactive to light.      Comments: Reminded the patient again the importance of routine eye exams.  To make appointment to see   Neck:      Vascular: No carotid bruit.      Comments: Thick short neck with normal range of motion without pain.  Cardiovascular:      Rate and Rhythm: Normal rate and regular rhythm.      Heart sounds: No murmur heard.     No friction rub. No gallop.      Comments: Heart sounds are distant.  Unable to obtain peripheral pulses bilateral lower extremities secondary to edema  Pulmonary:      Effort: Pulmonary effort is normal. No respiratory distress.      Breath sounds: No stridor. No wheezing, rhonchi or rales.      Comments: Significantly " decreased breath sounds anteriorly and posteriorly but no rales rhonchi or wheezes could be auscultated on examination  Chest:      Chest wall: No tenderness.   Abdominal:      General: Abdomen is flat. Bowel sounds are normal. There is no distension.      Palpations: Abdomen is soft. There is no mass.      Tenderness: There is no abdominal tenderness. There is no right CVA tenderness, left CVA tenderness, guarding or rebound.      Hernia: No hernia is present.      Comments: Unable to have patient lay supine to do full examination.  Abdomen is soft obese nontender   Musculoskeletal:         General: Tenderness and deformity present. No swelling or signs of injury.      Cervical back: Normal range of motion and neck supple. No rigidity or tenderness.      Right lower leg: Edema present.      Left lower leg: Edema present.      Comments: Patient has decreased range of motion to both shoulder girdles, history of adhesive capsulitis, flattening to his lumbar curve with severely decreased range of motion with all movements the lumbar spine either forward backward rotation and sidebending or forward and backward bending secondary to pain with movement.  Unable to fully assess straight leg raising..  +1 pitting edema bilateral lower extremities   Lymphadenopathy:      Cervical: No cervical adenopathy.   Skin:     General: Skin is warm and dry.      Coloration: Skin is not jaundiced or pale.      Findings: Rash present. No bruising, erythema or lesion.      Comments: Chronic rash bilateral lower extremities which at this point in time is healing with no open areas no signs of infection no bleeding or exudate.  Patient states they have been doing better.  Has not been back to see dermatology   Neurological:      Mental Status: He is alert and oriented to person, place, and time. Mental status is at baseline.      Cranial Nerves: No cranial nerve deficit.      Sensory: Sensory deficit present.      Motor: Weakness present.       Coordination: Coordination abnormal.      Gait: Gait abnormal.      Deep Tendon Reflexes: Reflexes abnormal.      Comments: Neuropathic changes bilateral lower extremities.  Abnormal waddling gait and pain to low back with any movement.  Absent patella and Achilles tendon reflexes bilaterally   Psychiatric:         Mood and Affect: Mood normal.         Behavior: Behavior normal.         Thought Content: Thought content normal.         Judgment: Judgment normal.      Comments: Can question the patient's judgment with him not making appointment to be seen by weight management not following up with dermatology.  Continues to follow-up with cardiology.  Discussed again the importance of routine eye exams.  Again stressed the importance of trying to get into be seen by weight management and he states this may have to wait till the spring hurries more comfortable with driving longer distances

## 2025-02-09 DIAGNOSIS — G89.29 CHRONIC BILATERAL LOW BACK PAIN WITHOUT SCIATICA: ICD-10-CM

## 2025-02-09 DIAGNOSIS — M54.50 CHRONIC BILATERAL LOW BACK PAIN WITHOUT SCIATICA: ICD-10-CM

## 2025-02-10 RX ORDER — HYDROCODONE BITARTRATE AND ACETAMINOPHEN 5; 325 MG/1; MG/1
1 TABLET ORAL EVERY 4 HOURS PRN
Qty: 100 TABLET | Refills: 0 | Status: SHIPPED | OUTPATIENT
Start: 2025-02-10

## 2025-02-12 ENCOUNTER — TELEPHONE (OUTPATIENT)
Age: 65
End: 2025-02-12

## 2025-02-12 ENCOUNTER — OFFICE VISIT (OUTPATIENT)
Age: 65
End: 2025-02-12
Payer: COMMERCIAL

## 2025-02-12 VITALS
DIASTOLIC BLOOD PRESSURE: 82 MMHG | OXYGEN SATURATION: 97 % | WEIGHT: 315 LBS | BODY MASS INDEX: 50.62 KG/M2 | SYSTOLIC BLOOD PRESSURE: 150 MMHG | RESPIRATION RATE: 18 BRPM | HEART RATE: 70 BPM | TEMPERATURE: 98.5 F | HEIGHT: 66 IN

## 2025-02-12 DIAGNOSIS — E11.59 TYPE 2 DIABETES MELLITUS WITH OTHER CIRCULATORY COMPLICATION, WITH LONG-TERM CURRENT USE OF INSULIN (HCC): ICD-10-CM
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
## 2025-02-12 DIAGNOSIS — F11.20 CONTINUOUS OPIOID DEPENDENCE (HCC): ICD-10-CM

## 2025-02-12 DIAGNOSIS — E11.9 TYPE 2 DIABETES MELLITUS WITHOUT COMPLICATION, WITH LONG-TERM CURRENT USE OF INSULIN (HCC): ICD-10-CM

## 2025-02-12 DIAGNOSIS — Z79.4 TYPE 2 DIABETES MELLITUS WITH OTHER CIRCULATORY COMPLICATION, WITH LONG-TERM CURRENT USE OF INSULIN (HCC): ICD-10-CM

## 2025-02-12 DIAGNOSIS — M75.42 IMPINGEMENT SYNDROME OF LEFT SHOULDER: ICD-10-CM

## 2025-02-12 DIAGNOSIS — I10 BENIGN ESSENTIAL HYPERTENSION: ICD-10-CM

## 2025-02-12 DIAGNOSIS — M54.16 LUMBAR RADICULOPATHY: Primary | ICD-10-CM

## 2025-02-12 DIAGNOSIS — Z79.4 TYPE 2 DIABETES MELLITUS WITHOUT COMPLICATION, WITH LONG-TERM CURRENT USE OF INSULIN (HCC): ICD-10-CM
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
## 2025-02-12 PROCEDURE — 99214 OFFICE O/P EST MOD 30 MIN: CPT | Performed by: INTERNAL MEDICINE

## 2025-02-12 RX ORDER — METHYLPREDNISOLONE 4 MG/1
TABLET ORAL
Qty: 21 EACH | Refills: 0 | Status: SHIPPED | OUTPATIENT
Start: 2025-02-12

## 2025-02-12 RX ORDER — INSULIN GLARGINE-YFGN 100 [IU]/ML
33 INJECTION, SOLUTION SUBCUTANEOUS 2 TIMES DAILY
Qty: 180 ML | Refills: 5 | Status: SHIPPED | OUTPATIENT
Start: 2025-02-12 | End: 2025-02-12

## 2025-02-12 RX ORDER — INSULIN GLARGINE-YFGN 100 [IU]/ML
33 INJECTION, SOLUTION SUBCUTANEOUS 2 TIMES DAILY
Qty: 180 ML | Refills: 5 | Status: SHIPPED | OUTPATIENT
Start: 2025-02-12

## 2025-02-12 NOTE — TELEPHONE ENCOUNTER
Jameson from [facility] in [place] called to request clarification on Insulin Glargine-yfgn 100 UNIT/ML SOPN. He would like to know exactly how much the patient is injecting. He says the directions are different from the last script and just wants to make sure its written correctly. Office unavailable to assist. Please advise.

## 2025-02-12 NOTE — ASSESSMENT & PLAN NOTE
Chronic adhesive capsulitis left shoulder girdle.  Patient had acute injury and on examination patient has extremely limited range of motion and pain with movement to his left shoulder girdle.  X-rays in the emergency room showed no fracture.  Again he was told he can use the lidocaine to this area and he was placed on a tapering dose of oral steroids

## 2025-02-12 NOTE — ASSESSMENT & PLAN NOTE
Patient is being seen today for evaluation.  Patient did have an accidental fall at home when he slipped on the ice taking out his garbage.  States he then landed on his left side with injury aggravation of chronic problems with his left shoulder, increasing low back pain again on the left and left ankle discomfort.  He was seen evaluated in the emergency room and did have x-rays performed.  No acute fractures.  Was given Toradol as an analgesic in the emergency room which did help somewhat and he is here today for evaluation.  Patient is still having but he states his intractable pain mostly in his low back on the left-hand side with some radiation of discomfort down his left leg.  Denies any left leg weakness.  Pain with any movement and he is taking his Vicodin as prescribed stating that this does not seem to cause the pain.  And trying to consider ways in order to alleviate his pain and discomfort after examination we feel that he might benefit for an acute dosage of steroids and a Medrol Dosepak was sent to the pharmacy.  Along with this he was told he can use lidocaine patches or topical spray to his low back to help with his discomfort.  If not improving will need to be seen and reevaluated by pain management and I think this is an important consideration and a consult was sent.  He is handicapped somewhat and his ability to take care of himself at home and I suggested he talk to his brother to see whether he can come over and help him on a daily basis.  Will keep in touch with our office as to his condition and exam as noted to his back shoulder and ankle.

## 2025-02-12 NOTE — ASSESSMENT & PLAN NOTE
Lab Results   Component Value Date    HGBA1C 6.4 (H) 02/05/2025       Orders:    Insulin Glargine-yfgn (Semglee, yfgn,) 100 UNIT/ML SOPN; Inject 0.33 mL (33 Units total) as directed 2 (two) times a day 132 units twice a day

## 2025-02-12 NOTE — ASSESSMENT & PLAN NOTE
Patient does have a longstanding history of hypertension.  With initial evaluation today his systolic pressure was up to 170.  This did come down to an acceptable range of 50 when he was allowed to sit and relax.  He will continue present medication and surveillance.

## 2025-02-12 NOTE — ASSESSMENT & PLAN NOTE
Chronic low back pain which is aggravated by recent fall and injury.  Patient does have significant his discomfort to the SI joint on the left he states the pain is rating down his left leg with radiculopathy.  Patient denies any weakness in the leg but has difficulties chronically with ambulation and movement.  Again we are hoping that the Medrol Dosepak will help with his acute inflammation.  Continue oral narcotics    Orders:    methylPREDNISolone 4 MG tablet therapy pack; Use as directed on package

## 2025-02-12 NOTE — ASSESSMENT & PLAN NOTE
Chronic pain syndrome.  He remains and takes his as needed.  Has been taking more recently because of the acute injury.  Again I feel evaluation treatment and pain management may be indicated.

## 2025-02-12 NOTE — ASSESSMENT & PLAN NOTE
Patient does have a history of diabetes mellitus type 2.  Patient has been upping his dose of insulin now taking 132 units twice a day subcutaneously.  He states he needs a new prescription sent to the pharmacy with increased dosage increase number of pens to help him with his control sugar.  Last hemoglobin A1c was done approximately 1 week ago showing adequate control  Lab Results   Component Value Date    HGBA1C 6.4 (H) 02/05/2025

## 2025-02-12 NOTE — PROGRESS NOTES
Name: Liban Montes      : 1960      MRN: [id number]  Encounter Provider: Zain Balderas DO  Encounter Date: 2025   Encounter department: [facility]    Assessment & Plan  Lumbar radiculopathy  Chronic low back pain which is aggravated by recent fall and injury.  Patient does have significant his discomfort to the SI joint on the left he states the pain is rating down his left leg with radiculopathy.  Patient denies any weakness in the leg but has difficulties chronically with ambulation and movement.  Again we are hoping that the Medrol Dosepak will help with his acute inflammation.  Continue oral narcotics    Orders:  •  methylPREDNISolone 4 MG tablet therapy pack; Use as directed on package    Accidental fall, subsequent encounter  Patient is being seen today for evaluation.  Patient did have an accidental fall at home when he slipped on the ice taking out his garbage.  States he then landed on his left side with injury aggravation of chronic problems with his left shoulder, increasing low back pain again on the left and left ankle discomfort.  He was seen evaluated in the emergency room and did have x-rays performed.  No acute fractures.  Was given Toradol as an analgesic in the emergency room which did help somewhat and he is here today for evaluation.  Patient is still having but he states his intractable pain mostly in his low back on the left-hand side with some radiation of discomfort down his left leg.  Denies any left leg weakness.  Pain with any movement and he is taking his Vicodin as prescribed stating that this does not seem to cause the pain.  And trying to consider ways in order to alleviate his pain and discomfort after examination we feel that he might benefit for an acute dosage of steroids and a Medrol Dosepak was sent to the pharmacy.  Along with this he was told he can use lidocaine patches or topical spray to his low back to help with his  discomfort.  If not improving will need to be seen and reevaluated by pain management and I think this is an important consideration and a consult was sent.  He is handicapped somewhat and his ability to take care of himself at home and I suggested he talk to his brother to see whether he can come over and help him on a daily basis.  Will keep in touch with our office as to his condition and exam as noted to his back shoulder and ankle.         Benign essential hypertension  Patient does have a longstanding history of hypertension.  With initial evaluation today his systolic pressure was up to 170.  This did come down to an acceptable range of 50 when he was allowed to sit and relax.  He will continue present medication and surveillance.         Continuous opioid dependence (HCC)  Chronic pain syndrome.  He remains and takes his as needed.  Has been taking more recently because of the acute injury.  Again I feel evaluation treatment and pain management may be indicated.         Impingement syndrome of left shoulder  Chronic adhesive capsulitis left shoulder girdle.  Patient had acute injury and on examination patient has extremely limited range of motion and pain with movement to his left shoulder girdle.  X-rays in the emergency room showed no fracture.  Again he was told he can use the lidocaine to this area and he was placed on a tapering dose of oral steroids         Type 2 diabetes mellitus without complication, with long-term current use of insulin (HCC)  Patient does have a history of diabetes mellitus type 2.  Patient has been upping his dose of insulin now taking 132 units twice a day subcutaneously.  He states he needs a new prescription sent to the pharmacy with increased dosage increase number of pens to help him with his control sugar.  Last hemoglobin A1c was done approximately 1 week ago showing adequate control  Lab Results   Component Value Date    HGBA1C 6.4 (H) 02/05/2025            Type 2  "diabetes mellitus with other circulatory complication, with long-term current use of insulin ([facility])    Lab Results   Component Value Date    HGBA1C 6.4 (H) 02/05/2025       Orders:  •  Insulin Glargine-yfgn (Semglee, yfgn,) 100 UNIT/ML SOPN; Inject 0.33 mL (33 Units total) as directed 2 (two) times a day 132 units twice a day         History of Present Illness     Patient is a 64-year-old male history of medical problems outlined previously who is here today for acute evaluation.  Patient did have accidental fall at home while taking out the trash.  He slipped on the ice falling on his left shoulder hurting his low back and left ankle.  Was seen in the emergency room did have full evaluation x-rays performed showing no acute fractures but still having severe intractable pain and discomfort.  Patient is taking his Vicodin as previously prescribed but he states \"it is not cutting it\" pain is mostly in the low back on the left with some radiation of the pain down his left leg  Review of Systems   Constitutional:  Positive for activity change and fatigue. Negative for appetite change, chills, diaphoresis, fever and unexpected weight change.        Severely restricted in activity level secondary to his acute injury and pain.   HENT: Negative.     Eyes: Negative.    Respiratory: Negative.     Cardiovascular: Negative.    Gastrointestinal: Negative.    Endocrine: Negative.    Genitourinary: Negative.    Musculoskeletal:  Positive for arthralgias, back pain and gait problem. Negative for joint swelling, myalgias, neck pain and neck stiffness.   Skin: Negative.    Allergic/Immunologic: Negative.    Neurological:  Positive for weakness. Negative for dizziness, tremors, seizures, syncope, facial asymmetry, speech difficulty, light-headedness, numbness and headaches.   Hematological: Negative.    Psychiatric/Behavioral:  Positive for sleep disturbance. Negative for agitation, behavioral problems, confusion, decreased " concentration, dysphoric mood, hallucinations and self-injury. The patient is not nervous/anxious and is not hyperactive.    Past Medical History:   Diagnosis Date   • Abscess of left thigh    • Acute myocardial infarction (HCC)    • Anesthesia     woke up during procedure   • Anxiety    • Arteriosclerotic cardiovascular disease    • Arthritis    • Asthma    • Chest pain    • COPD (chronic obstructive pulmonary disease) (HCC)    • Coronary artery disease    • Diabetes mellitus (HCC)    • Fatty liver    • Gastric ulcer    • GERD (gastroesophageal reflux disease)    • Hyperlipidemia    • Hypertension    • Low back pain    • Myocardial infarction (HCC) ,   • Obesity    • Obstructive sleep apnea     no Cpap   • Spondylosis of lumbar region without myelopathy or radiculopathy      Past Surgical History:   Procedure Laterality Date   • CARPAL TUNNEL RELEASE Right    • COLONOSCOPY     • CORONARY ANGIOPLASTY WITH STENT PLACEMENT  ,    • EGD     • HEMORRHOID SURGERY     • NEUROPLASTY / TRANSPOSITION MEDIAN NERVE AT CARPAL TUNNEL Right    • SC SURGICAL ARTHROSCOPY SHOULDER W/LSS&RESCJ ADS Left 6/10/2020    Procedure: SHOULDER ARTHROSCOPIC CAPSULAR RELEASE;  Surgeon: Christiano Aguiar MD;  Location: [facility];  Service: Orthopedics     Family History   Problem Relation Age of Onset   • Alzheimer's disease Mother    • Heart failure Mother    • Dementia Mother    • Heart attack Father    • Other Father         Cardiac Disorder   • Heart disease Father    • Other Family         Back Pain   • Hypertension Family    • Stroke Family         Complications   • Cancer Other      Social History     Tobacco Use   • Smoking status: Former     Current packs/day: 0.00     Average packs/day: 1.5 packs/day for 38.0 years (57.0 ttl pk-yrs)     Types: Cigarettes     Start date: 1976     Quit date: 2014     Years since quittin.1   • Smokeless tobacco: Never   • Tobacco comments:     tobacco use not continuous-  quit for 2  4 yrs long periods .   Vaping Use   • Vaping status: Never Used   Substance and Sexual Activity   • Alcohol use: No   • Drug use: No   • Sexual activity: Not Currently     Current Outpatient Medications on File Prior to Visit   Medication Sig   • acetaminophen (TYLENOL) 325 mg tablet Take 650 mg by mouth every 6 (six) hours as needed for mild pain   • albuterol (PROVENTIL HFA,VENTOLIN HFA) 90 mcg/act inhaler INHALE 2 PUFFS AS NEEDED FOR SHORTNESS OF BREATH   • aspirin (Aspirin Low Dose) 81 mg EC tablet TAKE 1 TABLET BY MOUTH EVERY DAY   • atorvastatin (LIPITOR) 40 mg tablet TAKE 1 TABLET BY MOUTH EVERY DAY   • clopidogrel (PLAVIX) 75 mg tablet TAKE 1 TABLET BY MOUTH EVERY DAY   • ezetimibe (ZETIA) 10 mg tablet Take 10 mg by mouth daily   • fenofibrate (TRICOR) 145 mg tablet TAKE 1 TABLET BY MOUTH EVERY DAY   • furosemide (LASIX) 40 mg tablet  (Patient taking differently: Take 20 mg by mouth 3 (three) times a week)   • HYDROcodone-acetaminophen (Norco) 5-325 mg per tablet Take 1 tablet by mouth every 4 (four) hours as needed for pain Max Daily Amount: 6 tablets   • Insulin Pen Needle (B-D UF III MINI PEN NEEDLES) 31G X 5 MM MISC Inject as directed 2 (two) times a day Use as directed   • Jardiance 25 MG TABS TAKE 1 TABLET (25 MG TOTAL) BY MOUTH DAILY.   • metoprolol tartrate (LOPRESSOR) 50 mg tablet Take 25 mg by mouth 2 (two) times a day   • nitroglycerin (NITROSTAT) 0.4 mg SL tablet PLEASE SEE ATTACHED FOR DETAILED DIRECTIONS   • pantoprazole (PROTONIX) 40 mg tablet TAKE 1 TABLET BY MOUTH EVERY DAY   • [DISCONTINUED] Insulin Glargine-yfgn (Semglee, yfgn,) 100 UNIT/ML SOPN INJECT 31 UNITS UNDER THE SKIN 3 TIMES IN THE MORNING AND 3 TIMES IN THE NIGHT (SIX TIMES DAILY) (Patient taking differently: Inject 33 Units as directed 2 (two) times a day 132 units twice a day)   • clindamycin (CLEOCIN) 300 MG capsule Take 300 mg by mouth 3 (three) times a day   • Icosapent Ethyl 1 g CAPS Vascepa 1 gram capsule    "TAKE 2 G BY MOUTH 2 (TWO) TIMES A DAY.   • itraconazole (SPORANOX) 100 mg capsule PLEASE SEE ATTACHED FOR DETAILED DIRECTIONS   • losartan (COZAAR) 25 mg tablet TAKE 1 TABLET (25 MG TOTAL) BY MOUTH DAILY.   • mupirocin (BACTROBAN) 2 % ointment Apply topically daily (Patient not taking: Reported on 9/30/2024)   • Sulfacetamide Sodium, Acne, 10 % LOTN Apply topically 2 (two) times a day     Allergies   Allergen Reactions   • Bactrim [Sulfamethoxazole-Trimethoprim] Edema   • Cefaclor Shortness Of Breath     Other reaction(s): Respiratory Distress   • Simvastatin Shortness Of Breath     Other reaction(s): Respiratory Distress     Immunization History   Administered Date(s) Administered   • COVID-19 PFIZER VACCINE 0.3 ML IM 03/19/2021, 04/10/2021, 10/16/2021   • Influenza Quadrivalent, 6-35 Months IM 10/17/2016, 10/24/2017   • Influenza Recombinant Preservative Free Im 09/26/2024   • Influenza, injectable, quadrivalent, preservative free 0.5 mL 12/11/2023   • Influenza, recombinant, quadrivalent,injectable, preservative free 12/06/2018, 12/23/2019, 11/13/2020, 11/15/2021, 12/01/2022     Objective   /82   Pulse 70   Temp 98.5 °F (36.9 °C)   Resp 18   Ht 5' 6\" (1.676 m)   Wt (!) 145 kg (319 lb)   SpO2 97%   BMI 51.49 kg/m²     Physical Exam  Vitals and nursing note reviewed.   Constitutional:       General: He is in acute distress.      Appearance: He is obese. He is ill-appearing. He is not toxic-appearing or diaphoretic.      Comments: Morbidly obese 64-year-old male who is awake alert extremely uncomfortable difficult and painful movement and ambulation.   HENT:      Head: Normocephalic and atraumatic.      Right Ear: Tympanic membrane, ear canal and external ear normal. There is no impacted cerumen.      Left Ear: Tympanic membrane, ear canal and external ear normal. There is no impacted cerumen.      Nose: Nose normal. No congestion or rhinorrhea.      Mouth/Throat:      Mouth: Mucous membranes are moist. "      Pharynx: No oropharyngeal exudate or posterior oropharyngeal erythema.      Comments: As previously severe restriction to oral airway, thick tongue  Eyes:      General: No scleral icterus.        Right eye: No discharge.         Left eye: No discharge.      Extraocular Movements: Extraocular movements intact.      Conjunctiva/sclera: Conjunctivae normal.      Pupils: Pupils are equal, round, and reactive to light.   Neck:      Comments: Short thick neck with decreased range of motion actively and passively but no pain with movement  Cardiovascular:      Rate and Rhythm: Normal rate and regular rhythm.      Comments: Heart sounds distant no ectopy  Pulmonary:      Effort: Pulmonary effort is normal. No respiratory distress.      Breath sounds: No stridor. No wheezing, rhonchi or rales.      Comments: Decreased breath sounds anterior and posteriorly with no rales rhonchi or wheezes  Chest:      Chest wall: No tenderness.   Abdominal:      General: Bowel sounds are normal. There is no distension.      Palpations: Abdomen is soft. There is no mass.      Tenderness: There is no abdominal tenderness. There is no right CVA tenderness, left CVA tenderness, guarding or rebound.      Hernia: No hernia is present.      Comments: Morbidly obese   Musculoskeletal:         General: Tenderness, deformity and signs of injury present. No swelling.      Right lower leg: Edema present.      Left lower leg: Edema present.      Comments: Adhesive capsulitis to his left shoulder girdle with pain with movement.  No gross deformity.  Flattening to his lumbar, spine with tenderness to palpation to the area of the LS joint on the left with severely restricted range of motion actively and passively to lumbar spine with any movement and excruciating pain with movement.  States the pain does radiate down his left leg but no weakness.  Some discomfort but no increased swelling to his left ankle.  Patient is weightbearing difficulty.    Skin:     General: Skin is warm and dry.      Coloration: Skin is not jaundiced or pale.      Findings: No bruising, erythema, lesion or rash.   Neurological:      Mental Status: He is alert. Mental status is at baseline.   Psychiatric:         Behavior: Behavior normal.         Thought Content: Thought content normal.         Judgment: Judgment normal.      Comments: Moderately anxious mood and affect secondary to pain

## 2025-02-13 DIAGNOSIS — Z79.4 TYPE 2 DIABETES MELLITUS WITH OTHER CIRCULATORY COMPLICATION, WITH LONG-TERM CURRENT USE OF INSULIN (HCC): ICD-10-CM

## 2025-02-13 DIAGNOSIS — E11.59 TYPE 2 DIABETES MELLITUS WITH OTHER CIRCULATORY COMPLICATION, WITH LONG-TERM CURRENT USE OF INSULIN (HCC): ICD-10-CM

## 2025-02-20 ENCOUNTER — CONSULT (OUTPATIENT)
Dept: PAIN MEDICINE | Facility: CLINIC | Age: 65
End: 2025-02-20
Payer: COMMERCIAL

## 2025-02-20 VITALS — WEIGHT: 315 LBS | BODY MASS INDEX: 50.62 KG/M2 | HEIGHT: 66 IN

## 2025-02-20 DIAGNOSIS — M47.816 LUMBAR SPONDYLOSIS: Primary | ICD-10-CM

## 2025-02-20 DIAGNOSIS — M79.18 MYOFASCIAL PAIN: ICD-10-CM

## 2025-02-20 DIAGNOSIS — M54.16 LUMBAR RADICULOPATHY: ICD-10-CM

## 2025-02-20 PROCEDURE — 99204 OFFICE O/P NEW MOD 45 MIN: CPT | Performed by: ANESTHESIOLOGY

## 2025-02-20 RX ORDER — METHOCARBAMOL 750 MG/1
750 TABLET, FILM COATED ORAL EVERY 6 HOURS PRN
Qty: 90 TABLET | Refills: 1 | Status: SHIPPED | OUTPATIENT
Start: 2025-02-20

## 2025-02-20 NOTE — PROGRESS NOTES
Assessment  1. Lumbar spondylosis    2. Lumbar radiculopathy    3. Myofascial pain        Plan  64-year-old male with a history of CAD, CHF, COPD, FREDY, diabetes, lumbar spondylosis, last seen in July 2019, returning for interval consultation and referred by Dr. Balderas for acute on chronic lumbosacral back pain which will occasionally radiate into the left lower extremity.  He also has some right sided lower thoracic back pain which began on a slip and fall on ice February 10, 2025.  Patient was seen in the ER on the date of the fall.  CT of the thoracic and lumbar spine demonstrated mild thoracolumbar spondylosis with foraminal stenosis at L5-S1 impinging on bilateral L5 roots.  No fractures noted.  X-ray of the left ankle was negative for fracture.  Incidental calcaneal spurs noted.  He has not done any recent formal physical therapy since the fall.  He is currently taking Norco 5/325 mg every 4 hours as needed and Advil as needed with mild to moderate relief.  The patient does have a myofascial component as expected considering mechanism of injury.  No obvious contusions noted.  There may be a facet mediated component contributing to lower back pain.  Left lower extremity symptoms may be radicular in nature versus muscular.  Fortunately no fractures noted.    1.  I did offer the patient a left L5 TFESI, however he would like to hold off on interventional therapy at this time.  2.  Will order physical therapy  3.  Will prescribe a trial of methocarbamol 750 mg every 8 hours as needed as he has found some relief with muscle spasms with this in the past  4.  Patient will continue with Norco as prescribed by PCP  5.  Advised patient to clear NSAID use with cardiology considering he is on Plavix  6.  I will follow-up with the patient in 3 months or sooner if needed      My impressions and treatment recommendations were discussed in detail with the patient who verbalized understanding and had no further questions.   Discharge instructions were provided. I personally saw and examined the patient and I agree with the above discussed plan of care.    No orders of the defined types were placed in this encounter.    No orders of the defined types were placed in this encounter.      History of Present Illness    Liban Montes is a 64 y.o. male with a history of CAD, CHF, COPD, FREDY, diabetes, lumbar spondylosis, last seen in July 2019, returning for interval consultation and referred by Dr. Balderas for acute on chronic lumbosacral back pain which will occasionally radiate into the left lower extremity.  He also has some right sided lower thoracic back pain which began on a slip and fall on ice February 10, 2025.  Patient was seen in the ER on the date of the fall.  He denies any bladder or bowel incontinence or saddle anesthesia.  He denies any significant lower extremity weakness.  He denies any significant right lower extremity symptoms.  CT of the thoracic and lumbar spine demonstrated mild thoracolumbar spondylosis with foraminal stenosis at L5-S1 impinging on bilateral L5 roots.  No fractures noted.  X-ray of the left ankle was negative for fracture.  Incidental calcaneal spurs noted.  He has not done any recent formal physical therapy since the fall.  He is currently taking Norco 5/325 mg every 4 hours as needed and Advil as needed with mild to moderate relief.  The patient rates his pain a 7 out of 10 and the pain is nearly constant.  The pain is worse in the morning and at night.  The pain is described as burning, cramping, shooting, sharp, dull, and aching.  The pain is increased with standing, bending, sitting, walking, exercise, sneezing, and bowel movements.  He does find relief with relaxation and medication.    Other than as stated above, the patient denies any interval changes in medications, medical condition, mental condition, symptoms, or allergies since the last office visit.    I have personally reviewed and/or  updated the patient's past medical history, past surgical history, family history, social history, current medications, allergies, and vital signs today.     Review of Systems   Constitutional:  Positive for unexpected weight change. Negative for fever.   HENT:  Negative for trouble swallowing.    Eyes:  Negative for visual disturbance.   Respiratory:  Negative for shortness of breath and wheezing.    Cardiovascular:  Positive for leg swelling. Negative for chest pain and palpitations.   Gastrointestinal:  Negative for constipation, diarrhea, nausea and vomiting.   Endocrine: Negative for cold intolerance, heat intolerance and polydipsia.   Genitourinary:  Negative for difficulty urinating and frequency.   Musculoskeletal:  Positive for arthralgias and myalgias. Negative for gait problem and joint swelling.   Skin:  Positive for wound. Negative for rash.   Neurological:  Negative for dizziness, seizures, syncope, weakness and headaches.   Hematological:  Does not bruise/bleed easily.   Psychiatric/Behavioral:  Negative for dysphoric mood.    All other systems reviewed and are negative.      Patient Active Problem List   Diagnosis    Lumbar radiculopathy    Myofascial pain syndrome    Sacroiliitis ([facility])    Spondylosis of lumbar region without myelopathy or radiculopathy    Coronary artery disease    Chronic obstructive pulmonary disease ([facility])    Benign essential hypertension    Dysuria    Chest pain    Low back pain    Morbid obesity with body mass index of 45.0-49.9 in adult ([facility])    Chronic left shoulder pain    Impingement syndrome of left shoulder    Adhesive capsulitis of both shoulders    Aftercare following surgery of the musculoskeletal system    Type 2 diabetes mellitus, with long-term current use of insulin ([facility])    Obstructive sleep apnea    Lesion of nose    Acute on chronic systolic congestive heart failure ([facility])    Diabetes mellitus ([facility])    Preoperative clearance    Staph skin infection    Cellulitis  of right lower extremity    Continuous opioid dependence ([facility])    FREDY (obstructive sleep apnea)    Verruca vulgaris    Accidental fall       Past Medical History:   Diagnosis Date    Abscess of left thigh     Acute myocardial infarction (HCC)     Anesthesia     woke up during procedure    Anxiety     Arteriosclerotic cardiovascular disease     Arthritis     Asthma     Chest pain     COPD (chronic obstructive pulmonary disease) ([facility])     Coronary artery disease     Diabetes mellitus ([facility])     Fatty liver     Gastric ulcer     GERD (gastroesophageal reflux disease)     Hyperlipidemia     Hypertension     Low back pain     Myocardial infarction ([facility]CC) ,    Obesity     Obstructive sleep apnea     no Cpap    Spondylosis of lumbar region without myelopathy or radiculopathy        Past Surgical History:   Procedure Laterality Date    CARPAL TUNNEL RELEASE Right     COLONOSCOPY      CORONARY ANGIOPLASTY WITH STENT PLACEMENT  ,     EGD      HEMORRHOID SURGERY      NEUROPLASTY / TRANSPOSITION MEDIAN NERVE AT CARPAL TUNNEL Right     GA SURGICAL ARTHROSCOPY SHOULDER W/LSS&RESCJ ADS Left 6/10/2020    Procedure: SHOULDER ARTHROSCOPIC CAPSULAR RELEASE;  Surgeon: Christiano Aguiar MD;  Location: [facility];  Service: Orthopedics       Family History   Problem Relation Age of Onset    Alzheimer's disease Mother     Heart failure Mother     Dementia Mother     Heart attack Father     Other Father         Cardiac Disorder    Heart disease Father     Other Family         Back Pain    Hypertension Family     Stroke Family         Complications    Cancer Other        Social History     Occupational History    Not on file   Tobacco Use    Smoking status: Former     Current packs/day: 0.00     Average packs/day: 1.5 packs/day for 38.0 years (57.0 ttl pk-yrs)     Types: Cigarettes     Start date: 1976     Quit date: 2014     Years since quittin.1    Smokeless tobacco: Never    Tobacco comments:     tobacco use  not continuous- quit for 2  4 yrs long periods .   Vaping Use    Vaping status: Never Used   Substance and Sexual Activity    Alcohol use: No    Drug use: No    Sexual activity: Not Currently       Current Outpatient Medications on File Prior to Visit   Medication Sig    acetaminophen (TYLENOL) 325 mg tablet Take 650 mg by mouth every 6 (six) hours as needed for mild pain    albuterol (PROVENTIL HFA,VENTOLIN HFA) 90 mcg/act inhaler INHALE 2 PUFFS AS NEEDED FOR SHORTNESS OF BREATH    aspirin (Aspirin Low Dose) 81 mg EC tablet TAKE 1 TABLET BY MOUTH EVERY DAY    atorvastatin (LIPITOR) 40 mg tablet TAKE 1 TABLET BY MOUTH EVERY DAY    clindamycin (CLEOCIN) 300 MG capsule Take 300 mg by mouth 3 (three) times a day    clopidogrel (PLAVIX) 75 mg tablet TAKE 1 TABLET BY MOUTH EVERY DAY    ezetimibe (ZETIA) 10 mg tablet Take 10 mg by mouth daily    fenofibrate (TRICOR) 145 mg tablet TAKE 1 TABLET BY MOUTH EVERY DAY    furosemide (LASIX) 40 mg tablet  (Patient taking differently: Take 20 mg by mouth 3 (three) times a week)    HYDROcodone-acetaminophen (Norco) 5-325 mg per tablet Take 1 tablet by mouth every 4 (four) hours as needed for pain Max Daily Amount: 6 tablets    Icosapent Ethyl 1 g CAPS Vascepa 1 gram capsule   TAKE 2 G BY MOUTH 2 (TWO) TIMES A DAY.    Insulin Glargine-yfgn 100 UNIT/ML SOPN INJECT 0.33 ML (33 UNITS TOTAL) AS DIRECTED 2 (TWO) TIMES A  UNITS TWICE A DAY    Insulin Pen Needle (B-D UF III MINI PEN NEEDLES) 31G X 5 MM MISC Inject as directed 2 (two) times a day Use as directed    itraconazole (SPORANOX) 100 mg capsule PLEASE SEE ATTACHED FOR DETAILED DIRECTIONS    Jardiance 25 MG TABS TAKE 1 TABLET (25 MG TOTAL) BY MOUTH DAILY.    losartan (COZAAR) 25 mg tablet TAKE 1 TABLET (25 MG TOTAL) BY MOUTH DAILY.    methylPREDNISolone 4 MG tablet therapy pack Use as directed on package    metoprolol tartrate (LOPRESSOR) 50 mg tablet Take 25 mg by mouth 2 (two) times a day    mupirocin (BACTROBAN) 2 %  "ointment Apply topically daily (Patient not taking: Reported on 9/30/2024)    nitroglycerin (NITROSTAT) 0.4 mg SL tablet PLEASE SEE ATTACHED FOR DETAILED DIRECTIONS    pantoprazole (PROTONIX) 40 mg tablet TAKE 1 TABLET BY MOUTH EVERY DAY    Sulfacetamide Sodium, Acne, 10 % LOTN Apply topically 2 (two) times a day     No current facility-administered medications on file prior to visit.       Allergies   Allergen Reactions    Bactrim [Sulfamethoxazole-Trimethoprim] Edema    Cefaclor Shortness Of Breath     Other reaction(s): Respiratory Distress    Simvastatin Shortness Of Breath     Other reaction(s): Respiratory Distress       Physical Exam    Ht 5' 6\" (1.676 m)   Wt (!) 144 kg (318 lb)   BMI 51.33 kg/m²     Constitutional: normal, well developed, well nourished, alert, in no distress and non-toxic and no overt pain behavior.  Eyes: anicteric  HEENT: grossly intact  Neck: supple, symmetric, trachea midline and no masses   Pulmonary:even and unlabored  Cardiovascular:No edema or pitting edema present  Skin:Normal without rashes or lesions and well hydrated  Psychiatric:Mood and affect appropriate  Neurologic:Cranial Nerves II-XII grossly intact  Musculoskeletal:antalgic gait.  Right lower thoracic and bilateral lumbar paraspinal musculature diffusely tender to palpation.  No obvious contusions noted.  Bilateral SI joints nontender to palpation.  Bilateral patellar and Achilles reflexes were 1 out of 4 and symmetrical.  No clonus is noted bilaterally.  Bilateral lower extremity strength 5 out of 5 in all muscle groups.  Sensation intact to light touch in L3-S1 dermatomes bilaterally.  Negative seated straight leg raise bilaterally.    Imaging    Impression    Impression:  No acute fracture or dislocation of the thoracolumbar spine.    CT examination performed with dose lowering protocol in accordance with ALARA.          Workstation:[id number]  Narrative    History: Trauma.    Procedure: Data from the thoracic and " lumbar spine regions of a previous  enhanced body CT scan was acquired and displayed in coronal, sagittal, and axial  planes. CT examination performed with dose lowering protocol in accordance with  ALARA.    Comparison: None.    Findings:    Injury: No acute fracture or dislocation.  Alignment: Unremarkable.  Prevertebral soft tissues: Unremarkable.  Discs/Degenerative changes: Overall mild thoracolumbar spondylosis L5-S1 neural  foraminal stenoses impinging bilateral exiting L5 nerve roots.    The current study does not evaluate for ligamentous laxity or injury. Evaluation  of contents of the spinal canal is suboptimal. Follow trauma protocol.    Please see the dedicated same-day CT  report for findings in the chest, abdomen  and pelvis.    Exam End: 02/10/25  9:22 AM    Specimen Collected: 02/10/25  9:43 AM Last Resulted: 02/10/25 10:08 AM   Received From: [facility]  Result Received: 02/10/25  4:44 PM     Impression    Impression:  No acute fracture or dislocation of the thoracolumbar spine.    CT examination performed with dose lowering protocol in accordance with ALARA.          Workstation:[id number]  Narrative    History: Trauma.    Procedure: Data from the thoracic and lumbar spine regions of a previous  enhanced body CT scan was acquired and displayed in coronal, sagittal, and axial  planes. CT examination performed with dose lowering protocol in accordance with  ALARA.    Comparison: None.    Findings:    Injury: No acute fracture or dislocation.  Alignment: Unremarkable.  Prevertebral soft tissues: Unremarkable.  Discs/Degenerative changes: Overall mild thoracolumbar spondylosis L5-S1 neural  foraminal stenoses impinging bilateral exiting L5 nerve roots.    The current study does not evaluate for ligamentous laxity or injury. Evaluation  of contents of the spinal canal is suboptimal. Follow trauma protocol.    Please see the dedicated same-day CT  report for findings in the chest,  abdomen  and pelvis.    Exam End: 02/10/25  9:22 AM    Specimen Collected: 02/10/25  9:43 AM Last Resulted: 02/10/25 10:08 AM   Received From: [facility]  Result Received: 02/10/25  4:44 PM

## 2025-03-10 DIAGNOSIS — M54.50 CHRONIC BILATERAL LOW BACK PAIN WITHOUT SCIATICA: ICD-10-CM

## 2025-03-10 DIAGNOSIS — G89.29 CHRONIC BILATERAL LOW BACK PAIN WITHOUT SCIATICA: ICD-10-CM

## 2025-03-10 RX ORDER — HYDROCODONE BITARTRATE AND ACETAMINOPHEN 5; 325 MG/1; MG/1
1 TABLET ORAL EVERY 4 HOURS PRN
Qty: 100 TABLET | Refills: 0 | Status: SHIPPED | OUTPATIENT
Start: 2025-03-10

## 2025-03-12 DIAGNOSIS — E11.59 TYPE 2 DIABETES MELLITUS WITH OTHER CIRCULATORY COMPLICATION, WITH LONG-TERM CURRENT USE OF INSULIN (HCC): ICD-10-CM

## 2025-03-12 DIAGNOSIS — Z79.4 TYPE 2 DIABETES MELLITUS WITH OTHER CIRCULATORY COMPLICATION, WITH LONG-TERM CURRENT USE OF INSULIN (HCC): ICD-10-CM

## 2025-03-13 RX ORDER — FLURBIPROFEN SODIUM 0.3 MG/ML
SOLUTION/ DROPS OPHTHALMIC 2 TIMES DAILY
Qty: 200 EACH | Refills: 1 | Status: SHIPPED | OUTPATIENT
Start: 2025-03-13

## 2025-03-16 DIAGNOSIS — I10 BENIGN ESSENTIAL HYPERTENSION: ICD-10-CM

## 2025-03-16 RX ORDER — LOSARTAN POTASSIUM 25 MG/1
25 TABLET ORAL DAILY
Qty: 90 TABLET | Refills: 1 | Status: SHIPPED | OUTPATIENT
Start: 2025-03-16 | End: 2025-06-14

## 2025-03-20 ENCOUNTER — EVALUATION (OUTPATIENT)
Dept: PHYSICAL THERAPY | Facility: REHABILITATION | Age: 65
End: 2025-03-20
Payer: COMMERCIAL

## 2025-03-20 DIAGNOSIS — M47.816 LUMBAR SPONDYLOSIS: ICD-10-CM

## 2025-03-20 DIAGNOSIS — M54.16 LUMBAR RADICULOPATHY: ICD-10-CM

## 2025-03-20 DIAGNOSIS — M79.18 MYOFASCIAL PAIN: ICD-10-CM

## 2025-03-20 PROCEDURE — 97163 PT EVAL HIGH COMPLEX 45 MIN: CPT

## 2025-03-20 PROCEDURE — 97112 NEUROMUSCULAR REEDUCATION: CPT

## 2025-03-20 NOTE — PROGRESS NOTES
PT Evaluation     Today's date: 3/20/2025  Patient name: Lbian Montes  : 1960  MRN: [id number]  Referring provider: Tony Cabrera DO  Dx:   Encounter Diagnosis     ICD-10-CM    1. Lumbar radiculopathy  M54.16 Ambulatory referral to Physical Therapy      2. Lumbar spondylosis  M47.816 Ambulatory referral to Physical Therapy      3. Myofascial pain  M79.18 Ambulatory referral to Physical Therapy          Start Time: 1403  Stop Time: 1455  Total time in clinic (min): 52 minutes    Assessment  Impairments: abnormal coordination, abnormal gait, abnormal muscle firing, abnormal muscle tone, abnormal or restricted ROM, abnormal movement, activity intolerance, impaired balance, impaired physical strength, lacks appropriate home exercise program, pain with function, safety issue, weight-bearing intolerance, poor posture , poor body mechanics, unable to perform ADL, participation limitations, activity limitations and endurance  Symptom irritability: high    Assessment details: Liban Montes is a 64 y.o. male presenting [facility] evaluation with a chief complaint of bilateral low back pain with left radicular symptoms. Liban Montes symptoms have been present for approximately 1 month secondary to a fall on ice in February, and they present with limitations in functional mobility, core stability, activity tolerance, and demonstrates gait deviations. Vitals were assessed prior to today's examination and the BP assessment was elevated. Brock communicated that this is his average reading, and gave consent to continuing with today's examination. No red flags were present. ROM assessment revealed significant lumbar mobility deficits secondary to pain in end range during most testing conditions. There was no reproduction of radicular symptoms during l/s AROM testing. Strength assessment revealed decreased gross LLE strength . Upon special testing, the patients signs/symptoms and presentation are consistent with  lumbar radiculopathy with a potential SIJ dysfunction of the left innominate. Pt will benefit from skilled PT interventions to address these impairments and improve l/s functional mobility, gross LLE strength, core strength, gait deviations, independence with transfers, and QOL.    Understanding of Dx/Px/POC: good     Prognosis: good    Goals  Impairment Based Goals:  1. Decreased pain by 50% in 4 weeks.  2. Improve ROM to WFL by discharge.   3. Improve strength by 1/2 measure in 6 weeks.   4. Improve FOTO greater than predicted outcome score by discharge.        Functional Based Goals: To be met upon discharge  1. Patient will be independent with household ADLs.   2. Patient will be fully independent with HEP by discharge  3. Patient will be able to manage symptoms independently.  4. Pt will be able to perform ADLs, iADLS, and household duties with minimal restriction indicating return to PLOF.  5. Pt independent with rationale, technique and plan for performance of advanced HEP to ensure independent self-management of symptoms upon discharge.      Plan  Patient would benefit from: skilled physical therapy  Referral necessary: No  Planned modality interventions: electrical stimulation/Russian stimulation and TENS    Planned therapy interventions: abdominal trunk stabilization, activity modification, ADL training, balance/weight bearing training, coordination, gait training, home exercise program, therapeutic exercise, therapeutic activities, stretching, strengthening, postural training, patient/caregiver education, neuromuscular re-education, nerve gliding, motor coordination training, manual therapy, IASTM, joint mobilization, balance, body mechanics training, flexibility, functional ROM exercises, graded activity, IADL retraining, transfer training, self care and massage    Frequency: 1-2x week  Duration in weeks: 12  Plan of Care beginning date: 3/20/2025  Plan of Care expiration date: 6/12/2025  Treatment plan  discussed with: patient  Plan details: Patient was educated in HEP and Plan of Care.  All questions were answered to pt's satisfaction.           Subjective Evaluation    History of Present Illness  Date of onset: 2/10/2025  Mechanism of injury: trauma  Mechanism of injury: Liban Montes is presenting to [facility] with a cc of low back pain that they have been experiencing acutely for approximately 1 month. Pt states the NATALIIA was a fall on ice that occurred 2/10/25, which prompted a visit to the ED. The pain he experiences is located at the left PSIS and right lumbar paraspinals that radiates from his stomach. They have seen their PCP and were told they have lumbar radiculopathy. They have undergone imaging revealing the findings listed below. Because of this pain, they have not been able to perform functional ADLs including transfers from chair, picking items up off the floor, dressing, and he avoids stairs. They notes a change in balance secondary to pain, and has had 1 falls in the last year. Pt notes the presence of occasional radiating pain into the left lower extremity traveling as distal as the left knee. With this pain, they deny any changes in sleeping. Pt denies any changes in bowel/bladder function secondary to these symptoms. Pt has been taking the following medications for pain control: Hydrocodone, Robaxin, Acetaminophen, and found it to be mildly helpful. Since the injury, he has been ambulating using a SPC which is something he did not previously use for ambulation.            Recurrent probem    Quality of life: good    Patient Goals  Patient goals for therapy: decreased edema, decreased pain, improved balance, increased motion, increased strength and independence with ADLs/IADLs  Patient goal: To be able to do household activities easier  Pain  Current pain ratin  At best pain ratin  At worst pain rating: 10  Location: Low Back  Quality: sharp (stabbing)  Relieving factors: medications (sitting  "in recliner)  Progression: improved    Social Support  Steps to enter house: yes  2  Patient lives at: ranch home.  Lives with: alone    Employment status: not working (retired)    Diagnostic Tests  X-ray: abnormal    FCE comments: \"Overall mild thoracolumbar spondylosis L5-S1 neural   foraminal stenoses impinging bilateral exiting L5 nerve roots.\"          Objective    Vitals: BP- 146/78 mmHg taken on RUE/ HR - 69 bpm SP02: 97%     Postural Findings:     Head Position x Protracted   Neutral   Retracted   Scapular Position x Protracted   Neutral   Retracted   Thoracic Spine x  Inc Kyphosis  Neutral       Lumbar Spine x  Inc Lordosis   Neutral  Dec Lordosis   Pelvis   Anterior Tilt x Neutral   Posterior Tilt   Iliac Crest   L elevated x Neutral   R elevated   Lateral Shift   Right   Left x None      Strength and ROM evaluated B from a regional biomechanical perspective and values relevant to this episode recorded in tables below.     ROM:   Joint / Motion  Right: 3/20/2025  Left: 3/20/2025    Lumbar Flexion  50% P*     Lumbar Extension  15% P*     Lumbar Sidebending  10% P* 10%   Lumbar rotation 25% P* 50% P*   Hip ER  WFL WFL   Hip IR  WFL WFL   Hip Flex 90 deg 70 deg         Strength: MMT revealed the following findings.  Joint Motion Right: 3/20/2025 Left: 3/20/2025   Hip Flexion 4/5 4-/5 P*   Hip Abduction 4-/5 4-/5   Hip Adduction 4-/5 4-/5   Hip Extension Unable to assess due to an inability to achieve prone. Unable to assess due to an inability to achieve prone.   Knee Extension 4/5 3+/5   Knee Flexion 4/5 3+/5   Ankle Plantarflexion 4-/5 4-/5   Ankle Dorsiflexion 4-/5 4-/5         Repeated Movements:   NV     Light Touch Sensation:  RLE - 5/5  LLE - 5/5    Deep Tendon Reflexes:  Patellar Reflex - diminished  Ankle Reflex - normal  Vincent's - negative      Additional Assessments:  Pain with palpation noted:  Left PSIS and lumbar paraspinals.  Passive intervertebral motion: Unable to assess due to an inability " to achieve prone.  Supine to Sit: Unable to assess due to decreased supine tolerance.  Gait Assessment: SPC for ambulation, shortened stride length, decreased gait speed, antalgic gait.     Special Tests:  Lumbar Specific and Neural Tension                                                                            Test / Measure  3/20/2025   Straight Leg raise P on left, N on right   Crossed straight leg raise N b/l   Slump test N for tibial b/l, P for fib on left   Sural n (invert), tibial (elio) P sural on right          Hip Special Testing:  Test / Measure  3/20/2025   TC N b/l   FADIR N b/l   Hip Scour N b/l     Patient Education: Pt educated on proper gait mechanics for ambulating using a SPC, along with pathology, prognosis, and rehabilitation plan.       Precautions: CAD, HTN, acute on chronic systolic congestive heart failure, COPD, T2DM, Morbid Obesity    POC expires Unit limit Auth Expiration date PT/OT + Visit Limit?   6/12/2025  BOMN  30         Visit/Unit Tracking  AUTH Status:  Date 3/20/2025         [employer] Used 1          Remaining              Pertinent Findings:      Test / Measure  3/20/2025   FOTO (Predicted 42) 16   Decreased gross LLE strength Avg of 3+/5      Access Code: [email or web address]  URL: [email or web address]  Date: 03/20/2025  Prepared by: Norberto Love    Exercises  - Seated Transversus Abdominis Bracing  - 1 x daily - 7 x weekly - 3 sets - 10 reps - 3 hold  - Seated Pelvic Tilt  - 1 x daily - 7 x weekly - 2 sets - 10 reps  - Seated Sciatic Tensioner  - 1 x daily - 7 x weekly - 2 sets - 10 reps - 2 hold    Manuals 3/20            LE stretching             L/s Mobs             Central Gliders             MET Shotgun BM            Neuro Re-Ed             Patient Education 15'            TrA contractions             LTR             Paloff Presses             Standing Pball Crush             HS stretch                                       Ther Ex             NuStep              Abnere Bridges             Clamshells             Prone Hip ext             Leg Press             Titan LAQ/HS curl                                                    Ther Activity                                       Gait Training                                       Modalities

## 2025-03-26 ENCOUNTER — OFFICE VISIT (OUTPATIENT)
Dept: PHYSICAL THERAPY | Facility: REHABILITATION | Age: 65
End: 2025-03-26
Payer: COMMERCIAL

## 2025-03-26 DIAGNOSIS — M47.816 LUMBAR SPONDYLOSIS: ICD-10-CM

## 2025-03-26 DIAGNOSIS — M79.18 MYOFASCIAL PAIN: ICD-10-CM

## 2025-03-26 DIAGNOSIS — M54.16 LUMBAR RADICULOPATHY: Primary | ICD-10-CM

## 2025-03-26 PROCEDURE — 97112 NEUROMUSCULAR REEDUCATION: CPT

## 2025-03-26 PROCEDURE — 97110 THERAPEUTIC EXERCISES: CPT

## 2025-03-26 NOTE — PROGRESS NOTES
Daily Note     Today's date: 3/26/2025  Patient name: Liban Montes  : 1960  MRN: [id number]  Referring provider: Tony Cabrera DO  Dx:   Encounter Diagnosis     ICD-10-CM    1. Lumbar radiculopathy  M54.16       2. Lumbar spondylosis  M47.816       3. Myofascial pain  M79.18           Start Time: 1622  Stop Time: 1700  Total time in clinic (min): 38 minutes    Subjective: Pt reports feeling slightly less pain than the IE, and he has been consistently performing his HEP thus far.      Objective: See treatment diary below      Assessment: Pt tolerated today's session fair with an increase in pain noted during or after completion of some exercises. Table exercises were performed with the head of the bed to 45 deg to accommodate for decreased supine tolerance. Will monitor status NV and progress as appropriate. Pt will benefit from continued skilled PT services to address remaining impairments and improve QOL. Pt was with Norberto Love PT, DPT for the entirety of today's session.        Plan: Continue per plan of care.  Progress treatment as tolerated.       Precautions: CAD, HTN, acute on chronic systolic congestive heart failure, COPD, T2DM, Morbid Obesity    POC expires Unit limit Auth Expiration date PT/OT + Visit Limit?   2025  BOMN  30         Visit/Unit Tracking  AUTH Status:  Date 3/20/2025 3/26        [employer] Used 1 2         Remaining              Pertinent Findings:      Test / Measure  3/20/2025   FOTO (Predicted 42) 16   Decreased gross LLE strength Avg of 3+/5      Access Code: [email or web address]  URL: [email or web address]  Date: 2025  Prepared by: Norberto Love    Exercises  - Seated Transversus Abdominis Bracing  - 1 x daily - 7 x weekly - 3 sets - 10 reps - 3 hold  - Seated Pelvic Tilt  - 1 x daily - 7 x weekly - 2 sets - 10 reps  - Seated Sciatic Tensioner  - 1 x daily - 7 x weekly - 2 sets - 10 reps - 2 hold    Manuals 3/20 3/26           LE stretching             L/s  "Natasha             Central Gliders             MET Shotgun BM            Neuro Re-Ed             Patient Education 15'            TrA contractions  20x3\"           LTR  10x ea           Paloff Presses             Standing Pball Crush             HS stretch             Slump N Tensioner's  15x ea           Hip add isometric  2x10 3\" hold           Glute Isometric Squeeze  20x3\"           Ther Ex             NuStep  8' x L4           Glute Bridges  6x with partial range           Clamshells  3x10 Otb in supine           Seated knee flex/ext with TB  2x10 OTB           HR/TR  6           Leg Press             Titan LAQ/HS curl                                                    Ther Activity                                       Gait Training                                       Modalities                                                 "

## 2025-03-31 ENCOUNTER — APPOINTMENT (OUTPATIENT)
Dept: PHYSICAL THERAPY | Facility: REHABILITATION | Age: 65
End: 2025-03-31
Payer: COMMERCIAL

## 2025-04-02 ENCOUNTER — TRANSITIONAL CARE MANAGEMENT (OUTPATIENT)
Age: 65
End: 2025-04-02

## 2025-04-03 ENCOUNTER — OFFICE VISIT (OUTPATIENT)
Dept: PHYSICAL THERAPY | Facility: REHABILITATION | Age: 65
End: 2025-04-03
Payer: COMMERCIAL

## 2025-04-03 DIAGNOSIS — M54.16 LUMBAR RADICULOPATHY: Primary | ICD-10-CM

## 2025-04-03 DIAGNOSIS — M79.18 MYOFASCIAL PAIN: ICD-10-CM

## 2025-04-03 DIAGNOSIS — M47.816 LUMBAR SPONDYLOSIS: ICD-10-CM

## 2025-04-03 PROCEDURE — 97110 THERAPEUTIC EXERCISES: CPT

## 2025-04-03 PROCEDURE — 97112 NEUROMUSCULAR REEDUCATION: CPT

## 2025-04-03 NOTE — PROGRESS NOTES
Daily Note     Today's date: 4/3/2025  Patient name: Liban Montes  : 1960  MRN: [id number]  Referring provider: Tony Cabrera DO  Dx:   Encounter Diagnosis     ICD-10-CM    1. Lumbar radiculopathy  M54.16       2. Lumbar spondylosis  M47.816       3. Myofascial pain  M79.18                      Subjective: Pt states he was in hospital earlier this week due to back pain, was unable to move when he tried to get out of a chair at home.  Pt is feeling better today but still in notable pain.  Pt noted some relief at end of session, states that clamshells helped.      Objective: See treatment diary below      Assessment: Tolerated treatment well. Patient would benefit from continued PT.  Pt. able to complete all exercises with no increase in pain during or after session.  Some exercises not performed due to level of pain, but pt did note improvement by end of session.  Pt. 1:1 with PTA for entirety.      Plan: Continue per plan of care.      Precautions: CAD, HTN, acute on chronic systolic congestive heart failure, COPD, T2DM, Morbid Obesity    POC expires Unit limit Auth Expiration date PT/OT + Visit Limit?   2025  BOMN  30         Visit/Unit Tracking  AUTH Status:  Date 3/20/2025 3/26 4/3       [employer] Used 1 2 3        Remaining              Pertinent Findings:      Test / Measure  3/20/2025   FOTO (Predicted 42) 16   Decreased gross LLE strength Avg of 3+/5      Access Code: [email or web address]  URL: [email or web address]  Date: 2025  Prepared by: Norberto Love    Exercises  - Seated Transversus Abdominis Bracing  - 1 x daily - 7 x weekly - 3 sets - 10 reps - 3 hold  - Seated Pelvic Tilt  - 1 x daily - 7 x weekly - 2 sets - 10 reps  - Seated Sciatic Tensioner  - 1 x daily - 7 x weekly - 2 sets - 10 reps - 2 hold    Manuals 3/20 3/26 4/3          LE stretching             L/s Mobs             Central Gliders             MET Shotgun BM            Neuro Re-Ed             Patient Education 15  "           TrA contractions  20x3\" 20x3\"          LTR  10x ea 10x ea          Paloff Presses             Standing Pball Crush             HS stretch             Slump N Tensioner's  15x ea 10x ea b/l          Hip add isometric  2x10 3\" hold 2x10 3\"          Glute Isometric Squeeze  20x3\" 10x3\"          Ther Ex             NuStep  8' x L4 8' L5 LE only          Glute Bridges  6x with partial range np          Clamshells  3x10 Otb in supine 3x10 otb supine          Seated knee flex/ext with TB  2x10 OTB 2x10 ea b/l otb          HR/TR  6 10x HR          Leg Press             Titan LAQ/HS curl                                                    Ther Activity                                       Gait Training                                       Modalities                                                   "

## 2025-04-04 ENCOUNTER — OFFICE VISIT (OUTPATIENT)
Age: 65
End: 2025-04-04
Payer: COMMERCIAL

## 2025-04-04 VITALS
BODY MASS INDEX: 49.02 KG/M2 | HEIGHT: 66 IN | OXYGEN SATURATION: 97 % | RESPIRATION RATE: 16 BRPM | WEIGHT: 305 LBS | TEMPERATURE: 98 F | SYSTOLIC BLOOD PRESSURE: 120 MMHG | DIASTOLIC BLOOD PRESSURE: 60 MMHG | HEART RATE: 66 BPM

## 2025-04-04 DIAGNOSIS — J43.1 PANLOBULAR EMPHYSEMA (HCC): ICD-10-CM

## 2025-04-04 DIAGNOSIS — E11.9 TYPE 2 DIABETES MELLITUS WITHOUT COMPLICATION, WITH LONG-TERM CURRENT USE OF INSULIN (HCC): ICD-10-CM

## 2025-04-04 DIAGNOSIS — I25.10 CORONARY ARTERY DISEASE INVOLVING NATIVE HEART, UNSPECIFIED VESSEL OR LESION TYPE, UNSPECIFIED WHETHER ANGINA PRESENT: ICD-10-CM

## 2025-04-04 DIAGNOSIS — E66.01 MORBID OBESITY WITH BODY MASS INDEX OF 45.0-49.9 IN ADULT (HCC): Primary | ICD-10-CM

## 2025-04-04 DIAGNOSIS — M54.16 LUMBAR RADICULOPATHY: ICD-10-CM

## 2025-04-04 DIAGNOSIS — Z79.4 TYPE 2 DIABETES MELLITUS WITHOUT COMPLICATION, WITH LONG-TERM CURRENT USE OF INSULIN (HCC): ICD-10-CM

## 2025-04-04 PROCEDURE — 99495 TRANSJ CARE MGMT MOD F2F 14D: CPT | Performed by: INTERNAL MEDICINE

## 2025-04-04 RX ORDER — GABAPENTIN 300 MG/1
300 CAPSULE ORAL 3 TIMES DAILY
COMMUNITY

## 2025-04-04 NOTE — PROGRESS NOTES
Name: Liban Montes      : 1960      MRN: [id number]  Encounter Provider: Zain Balderas DO  Encounter Date: 2025   Encounter department: [facility]  .tcm  Assessment & Plan  Morbid obesity with body mass index of 45.0-49.9 in adult (HCC)  Again I have discussed with the patient the importance of weight loss.  We placed a consult for evaluation by weight management.  He states they did call but he was unable to return the call with him being disabled at this point.  He was told if they do not call within the next week to please notify our office and we will redirect them to hopefully get him in so that they can work on some type of program to help with his weight.           Lumbar radiculopathy  Patient is here today for transition of care visit.  Patient was recently admitted to the hospital with intractable low back pain on the left.  Patient does have a history of chronic low back pain and lumbar radiculopathy.  Patient at home had been on narcotic analgesics anti-inflammatories which had not helped.  During hospitalization patient was evaluated and placed on large doses of anti-inflammatories including prednisone, narcotic analgesics and muscle relaxants.  On discharge patient continued on steroids and an increased dose of Robaxin to 1000 mg 3 times a day.  He states he is doing somewhat better but still is having pain and disability.  No bowel or bladder dysfunction.  Some weakness with left leg and he is continuing to go to physical therapy which does help but only minimally.  Patient has been in this predicament in the past before and one of the major considerations that he feels extremely important with the patient is that we placed an order for him to be seen by weight management and he would benefit from significant weight loss with the patient being morbidly obese.  Patient will continue to keep us informed of his condition and hopefully will show some  improvement over the next few weeks.         Type 2 diabetes mellitus without complication, with long-term current use of insulin (HCC)  Patient now has a longstanding history of diabetes.  Patient is on insulin and he has been able to control his blood sugar readings with the patient constantly keeping an eye on blood sugar readings at home and make adjustments as far as his insulin dose is concerned.  Patient understands that with him having an increased dose of steroids that his sugars may go up and he will make adjustments.  To call if any questions or concerns.  He is up-to-date with routine screening but I reminded the patient again the importance of routine eye exams and we will make sure with his next visit with that we do perform another foot exam  Lab Results   Component Value Date    HGBA1C 6.4 (H) 02/05/2025            Coronary artery disease involving native heart, unspecified vessel or lesion type, unspecified whether angina present  Along with all his medical issues patient also has a history of coronary artery disease.  We continue to monitor parameters in order to prevent recurrence of disease.  Patient remains on Lipitor 40 mg daily Zetia 10 mg daily fenofibrate 145 mg daily.  Reminded the patient again the importance of diet.  We keep his blood pressure under control as well as his blood sugars.         Panlobular emphysema (HCC)  COPD, emphysema.  Patient does have a history of tobacco abuse in the past.  Will continue to monitor his respiratory status and he has had no exacerbations of his disease recently.  Has inhalers on hand as needed.  Again stressed the importance of calling immediately if any acute upper respiratory tract infections              History of Present Illness     Patient is a 64-year-old male history of medical problems outlined previously who is here today for transition of care visit.  Patient states he was sitting in a chair at home and he did twist when getting out of the  chair and had an episode of severe acute upon his chronic low back pain and discomfort.  Patient was then seen evaluated in the emergency room and because of difficulty controlling his pain was admitted to the hospital.  Patient states this is a common occurrence with him despite the medication that he is on.  He did need intensive treatment with pain medication, muscle relaxants, oral steroids which did give him some relief.  Patient is still disabled and they did make some changes with medication.  He is continuing to follow-up with physical therapy and has an appointment to be seen by his pain management physician in the near future  Review of Systems   Constitutional:  Positive for activity change and fatigue. Negative for appetite change, chills, diaphoresis, fever and unexpected weight change.        Severely handicapped with activity level and fatigue because of chronic pain and disrupted sleep   HENT: Negative.     Eyes: Negative.    Respiratory: Negative.     Cardiovascular: Negative.    Gastrointestinal: Negative.    Endocrine: Negative.    Genitourinary: Negative.    Musculoskeletal:  Positive for back pain and gait problem. Negative for arthralgias, joint swelling, myalgias, neck pain and neck stiffness.   Skin: Negative.    Allergic/Immunologic: Negative.    Neurological:  Positive for weakness. Negative for dizziness, tremors, seizures, syncope, facial asymmetry, speech difficulty, light-headedness, numbness and headaches.   Hematological: Negative.    Psychiatric/Behavioral: Negative.     Past Medical History:   Diagnosis Date   • Abscess of left thigh    • Acute myocardial infarction (HCC)    • Anesthesia     woke up during procedure   • Anxiety    • Arteriosclerotic cardiovascular disease    • Arthritis    • Asthma    • Chest pain    • COPD (chronic obstructive pulmonary disease) (HCC)    • Coronary artery disease    • Diabetes mellitus (HCC)    • Fatty liver    • Gastric ulcer    • GERD  (gastroesophageal reflux disease)    • Hyperlipidemia    • Hypertension    • Low back pain    • Myocardial infarction (HCC) ,   • Obesity    • Obstructive sleep apnea     no Cpap   • Spondylosis of lumbar region without myelopathy or radiculopathy      Past Surgical History:   Procedure Laterality Date   • CARPAL TUNNEL RELEASE Right    • COLONOSCOPY     • CORONARY ANGIOPLASTY WITH STENT PLACEMENT  ,    • EGD     • HEMORRHOID SURGERY     • NEUROPLASTY / TRANSPOSITION MEDIAN NERVE AT CARPAL TUNNEL Right    • DE SURGICAL ARTHROSCOPY SHOULDER W/LSS&RESCJ ADS Left 6/10/2020    Procedure: SHOULDER ARTHROSCOPIC CAPSULAR RELEASE;  Surgeon: Christiano Aguiar MD;  Location: [facility];  Service: Orthopedics     Family History   Problem Relation Age of Onset   • Alzheimer's disease Mother    • Heart failure Mother    • Dementia Mother    • Heart attack Father    • Other Father         Cardiac Disorder   • Heart disease Father    • Other Family         Back Pain   • Hypertension Family    • Stroke Family         Complications   • Cancer Other      Social History     Tobacco Use   • Smoking status: Former     Current packs/day: 0.00     Average packs/day: 1.5 packs/day for 38.0 years (57.0 ttl pk-yrs)     Types: Cigarettes     Start date: 1976     Quit date: 2014     Years since quittin.2   • Smokeless tobacco: Never   • Tobacco comments:     tobacco use not continuous- quit for 2  4 yrs long periods .   Vaping Use   • Vaping status: Never Used   Substance and Sexual Activity   • Alcohol use: No   • Drug use: No   • Sexual activity: Not Currently     Current Outpatient Medications on File Prior to Visit   Medication Sig   • acetaminophen (TYLENOL) 325 mg tablet Take 650 mg by mouth every 6 (six) hours as needed for mild pain   • albuterol (PROVENTIL HFA,VENTOLIN HFA) 90 mcg/act inhaler INHALE 2 PUFFS AS NEEDED FOR SHORTNESS OF BREATH   • aspirin (Aspirin Low Dose) 81 mg EC tablet TAKE 1 TABLET BY  MOUTH EVERY DAY   • atorvastatin (LIPITOR) 40 mg tablet TAKE 1 TABLET BY MOUTH EVERY DAY   • clopidogrel (PLAVIX) 75 mg tablet TAKE 1 TABLET BY MOUTH EVERY DAY   • ezetimibe (ZETIA) 10 mg tablet Take 10 mg by mouth daily   • fenofibrate (TRICOR) 145 mg tablet TAKE 1 TABLET BY MOUTH EVERY DAY   • furosemide (LASIX) 40 mg tablet    • gabapentin (NEURONTIN) 300 mg capsule Take 300 mg by mouth 3 (three) times a day   • HYDROcodone-acetaminophen (Norco) 5-325 mg per tablet Take 1 tablet by mouth every 4 (four) hours as needed for pain Max Daily Amount: 6 tablets   • Insulin Glargine-yfgn 100 UNIT/ML SOPN INJECT 0.33 ML (33 UNITS TOTAL) AS DIRECTED 2 (TWO) TIMES A  UNITS TWICE A DAY   • Insulin Pen Needle (B-D UF III MINI PEN NEEDLES) 31G X 5 MM MISC Inject as directed 2 (two) times a day Use as directed   • Jardiance 25 MG TABS TAKE 1 TABLET (25 MG TOTAL) BY MOUTH DAILY.   • losartan (COZAAR) 25 mg tablet TAKE 1 TABLET (25 MG TOTAL) BY MOUTH DAILY.   • methocarbamol (Robaxin-750) 750 mg tablet Take 1 tablet (750 mg total) by mouth every 6 (six) hours as needed for muscle spasms (Patient taking differently: Take 1,000 mg by mouth every 6 (six) hours as needed for muscle spasms)   • methylPREDNISolone 4 MG tablet therapy pack Use as directed on package   • metoprolol tartrate (LOPRESSOR) 50 mg tablet Take 25 mg by mouth 2 (two) times a day   • nitroglycerin (NITROSTAT) 0.4 mg SL tablet PLEASE SEE ATTACHED FOR DETAILED DIRECTIONS   • pantoprazole (PROTONIX) 40 mg tablet TAKE 1 TABLET BY MOUTH EVERY DAY   • clindamycin (CLEOCIN) 300 MG capsule Take 300 mg by mouth 3 (three) times a day   • Icosapent Ethyl 1 g CAPS Vascepa 1 gram capsule   TAKE 2 G BY MOUTH 2 (TWO) TIMES A DAY.   • itraconazole (SPORANOX) 100 mg capsule PLEASE SEE ATTACHED FOR DETAILED DIRECTIONS   • mupirocin (BACTROBAN) 2 % ointment Apply topically daily (Patient not taking: Reported on 9/30/2024)   • Sulfacetamide Sodium, Acne, 10 % LOTN Apply  "topically 2 (two) times a day     Allergies   Allergen Reactions   • Bactrim [Sulfamethoxazole-Trimethoprim] Edema   • Cefaclor Shortness Of Breath     Other reaction(s): Respiratory Distress   • Simvastatin Shortness Of Breath     Other reaction(s): Respiratory Distress     Immunization History   Administered Date(s) Administered   • COVID-19 PFIZER VACCINE 0.3 ML IM 03/19/2021, 04/10/2021, 10/16/2021   • Influenza Quadrivalent, 6-35 Months IM 10/17/2016, 10/24/2017   • Influenza Recombinant Preservative Free Im 09/26/2024   • Influenza, injectable, quadrivalent, preservative free 0.5 mL 12/11/2023   • Influenza, recombinant, quadrivalent,injectable, preservative free 12/06/2018, 12/23/2019, 11/13/2020, 11/15/2021, 12/01/2022     Objective   /60   Pulse 66   Temp 98 °F (36.7 °C)   Resp 16   Ht 5' 6\" (1.676 m)   Wt (!) 138 kg (305 lb)   SpO2 97%   BMI 49.23 kg/m²     Physical Exam  Vitals and nursing note reviewed.   Constitutional:       General: He is in acute distress.      Appearance: Normal appearance. He is obese. He is not ill-appearing, toxic-appearing or diaphoretic.      Comments: Morbidly obese articulate 64-year-old male who is awake alert.  Very slow antalgic gait favoring his left leg.  Uncomfortable   HENT:      Head: Normocephalic and atraumatic.      Right Ear: Tympanic membrane, ear canal and external ear normal. There is no impacted cerumen.      Left Ear: Tympanic membrane, ear canal and external ear normal. There is no impacted cerumen.      Nose: Nose normal. No congestion or rhinorrhea.      Mouth/Throat:      Mouth: Mucous membranes are moist.      Pharynx: No oropharyngeal exudate or posterior oropharyngeal erythema.      Comments: Severe obstruction of oral airway, thick tongue, history of sleep apnea and obstructive airway disease  Eyes:      General: No scleral icterus.        Right eye: No discharge.         Left eye: No discharge.      Extraocular Movements: Extraocular " movements intact.      Conjunctiva/sclera: Conjunctivae normal.      Pupils: Pupils are equal, round, and reactive to light.   Neck:      Vascular: No carotid bruit.      Comments: Thick short neck  Cardiovascular:      Rate and Rhythm: Normal rate and regular rhythm.      Heart sounds: Normal heart sounds. No murmur heard.     No friction rub. No gallop.   Pulmonary:      Effort: Pulmonary effort is normal. No respiratory distress.      Breath sounds: No stridor. No wheezing, rhonchi or rales.      Comments: Decreased breath sounds anteriorly and posteriorly some discomfort with low back pain with respiratory maneuvers.  No rales rhonchi or wheezes  Chest:      Chest wall: No tenderness.   Abdominal:      General: Abdomen is flat. Bowel sounds are normal. There is no distension.      Palpations: Abdomen is soft. There is no mass.      Tenderness: There is no abdominal tenderness. There is no right CVA tenderness, left CVA tenderness, guarding or rebound.      Hernia: No hernia is present.   Musculoskeletal:         General: Tenderness and deformity present. No swelling or signs of injury.      Cervical back: Normal range of motion and neck supple. No rigidity or tenderness.      Right lower leg: Edema present.      Left lower leg: Edema present.      Comments: Because of his back pain and discomfort we did have to limited maneuvers today.  Patient does have a decreased curvature through the lumbar spine and decreased range of motion both actively and passively and some chronic discomfort with any movement usually discomfort is going down his left leg he states.  Again is trying to limit his movement activity level to reduce and keep under control his pain..  +1 pitting edema bilateral lower extremities   Lymphadenopathy:      Cervical: No cervical adenopathy.   Skin:     General: Skin is warm and dry.      Coloration: Skin is not jaundiced or pale.      Findings: No bruising, erythema, lesion or rash.    Neurological:      Mental Status: He is alert and oriented to person, place, and time. Mental status is at baseline.      Cranial Nerves: No cranial nerve deficit.      Sensory: No sensory deficit.      Motor: Weakness present.      Coordination: Coordination normal.      Gait: Gait abnormal.      Deep Tendon Reflexes: Reflexes abnormal.   Psychiatric:         Mood and Affect: Mood normal.         Behavior: Behavior normal.         Thought Content: Thought content normal.         Judgment: Judgment normal.

## 2025-04-04 NOTE — ASSESSMENT & PLAN NOTE
Patient now has a longstanding history of diabetes.  Patient is on insulin and he has been able to control his blood sugar readings with the patient constantly keeping an eye on blood sugar readings at home and make adjustments as far as his insulin dose is concerned.  Patient understands that with him having an increased dose of steroids that his sugars may go up and he will make adjustments.  To call if any questions or concerns.  He is up-to-date with routine screening but I reminded the patient again the importance of routine eye exams and we will make sure with his next visit with that we do perform another foot exam  Lab Results   Component Value Date    HGBA1C 6.4 (H) 02/05/2025

## 2025-04-04 NOTE — ASSESSMENT & PLAN NOTE
COPD, emphysema.  Patient does have a history of tobacco abuse in the past.  Will continue to monitor his respiratory status and he has had no exacerbations of his disease recently.  Has inhalers on hand as needed.  Again stressed the importance of calling immediately if any acute upper respiratory tract infections

## 2025-04-04 NOTE — ASSESSMENT & PLAN NOTE
Along with all his medical issues patient also has a history of coronary artery disease.  We continue to monitor parameters in order to prevent recurrence of disease.  Patient remains on Lipitor 40 mg daily Zetia 10 mg daily fenofibrate 145 mg daily.  Reminded the patient again the importance of diet.  We keep his blood pressure under control as well as his blood sugars.

## 2025-04-04 NOTE — ASSESSMENT & PLAN NOTE
Again I have discussed with the patient the importance of weight loss.  We placed a consult for evaluation by weight management.  He states they did call but he was unable to return the call with him being disabled at this point.  He was told if they do not call within the next week to please notify our office and we will redirect them to hopefully get him in so that they can work on some type of program to help with his weight.

## 2025-04-07 ENCOUNTER — OFFICE VISIT (OUTPATIENT)
Dept: PHYSICAL THERAPY | Facility: REHABILITATION | Age: 65
End: 2025-04-07
Payer: COMMERCIAL

## 2025-04-07 DIAGNOSIS — M47.816 LUMBAR SPONDYLOSIS: ICD-10-CM

## 2025-04-07 DIAGNOSIS — M54.16 LUMBAR RADICULOPATHY: Primary | ICD-10-CM

## 2025-04-07 DIAGNOSIS — M79.18 MYOFASCIAL PAIN: ICD-10-CM

## 2025-04-07 PROCEDURE — 97112 NEUROMUSCULAR REEDUCATION: CPT

## 2025-04-07 PROCEDURE — 97140 MANUAL THERAPY 1/> REGIONS: CPT

## 2025-04-07 PROCEDURE — 97110 THERAPEUTIC EXERCISES: CPT

## 2025-04-07 NOTE — PROGRESS NOTES
Daily Note     Today's date: 2025  Patient name: Liban Montes  : 1960  MRN: [id number]  Referring provider: Tony Cabrera DO  Dx:   Encounter Diagnosis     ICD-10-CM    1. Lumbar radiculopathy  M54.16       2. Lumbar spondylosis  M47.816       3. Myofascial pain  M79.18           Start Time: 1400  Stop Time: 1445  Total time in clinic (min): 45 minutes    Subjective: Pt states he is moving better overall at the start of today's session, however, he notes increased low back pain. He states the radicular symptoms have been slowly decreasing overall.      Objective: See treatment diary below    Repeated Mvmt Assessment:  Seated L/s Flex: Centralization of sx and decreased sx intensity.      Assessment: Pt tolerated today's session well with no increase in pain noted during or after completion of exercises. Upon completion of repeated mvmt assessment, he responded well to repeated l/s flexion demonstrating centralization of sx. Will monitor status NV and progress as appropriate. Pt will benefit from continued skilled PT services to address remaining impairments and improve QOL. Pt was with Norberto Love PT, DPT for the entirety of today's session.        Plan: Continue per plan of care.      Precautions: CAD, HTN, acute on chronic systolic congestive heart failure, COPD, T2DM, Morbid Obesity    POC expires Unit limit Auth Expiration date PT/OT + Visit Limit?   2025  BOMN  30         Visit/Unit Tracking  AUTH Status:  Date 3/20/2025 3/26 4/3 4/      [employer] Used 1 2 3 4       Remaining              Pertinent Findings:      Test / Measure  3/20/2025   FOTO (Predicted 42) 16   Decreased gross LLE strength Avg of 3+/5      Access Code: [email or web address]  URL: [email or web address]  Date: 2025  Prepared by: Norberto Love    Exercises  - Seated Transversus Abdominis Bracing  - 1 x daily - 7 x weekly - 3 sets - 10 reps - 3 hold  - Seated Pelvic Tilt  - 1 x daily - 7 x weekly - 2 sets - 10  "reps  - Seated Sciatic Tensioner  - 1 x daily - 7 x weekly - 2 sets - 10 reps - 2 hold  - Seated Lumbar Flexion Stretch  - 4 x daily - 7 x weekly - 1 sets - 15 reps - 3 hold    Manuals 3/20 3/26 4/3 4/7         LE stretching    BM 10'         L/s Mobs             Central Gliders             MET Shotgun BM            Neuro Re-Ed             Patient Education 15'            TrA contractions  20x3\" 20x3\" 15x3\"         LTR  10x ea 10x ea 10x ea         Paloff Presses             Standing Pball Crush             3-way ball rollout    10x ea         HS stretch             Slump N Tensioner's  15x ea 10x ea b/l 15x ea b/l         Hip add isometric  2x10 3\" hold 2x10 3\" 2x10 3\" hold         Glute Isometric Squeeze  20x3\" 10x3\" 10x3\"         Ther Ex             NuStep  8' x L4 8' L5 LE only 8' x L5 LE only         Glute Bridges  6x with partial range np          Clamshells  3x10 Otb in supine 3x10 otb supine          Seated knee flex/ext with TB  2x10 OTB 2x10 ea b/l otb          HR/TR  6 10x HR          Leg Press             Titan LAQ/HS curl                          Repeated Mvmt Assessment    5'                      Ther Activity                                       Gait Training                                       Modalities                                                   "

## 2025-04-08 DIAGNOSIS — G89.29 CHRONIC BILATERAL LOW BACK PAIN WITHOUT SCIATICA: ICD-10-CM

## 2025-04-08 DIAGNOSIS — M54.50 CHRONIC BILATERAL LOW BACK PAIN WITHOUT SCIATICA: ICD-10-CM

## 2025-04-09 ENCOUNTER — PATIENT MESSAGE (OUTPATIENT)
Dept: PAIN MEDICINE | Facility: CLINIC | Age: 65
End: 2025-04-09

## 2025-04-09 RX ORDER — HYDROCODONE BITARTRATE AND ACETAMINOPHEN 5; 325 MG/1; MG/1
1 TABLET ORAL EVERY 4 HOURS PRN
Qty: 100 TABLET | Refills: 0 | Status: SHIPPED | OUTPATIENT
Start: 2025-04-09

## 2025-04-09 NOTE — PATIENT COMMUNICATION
At this time I do not have any further recommendations for the patient until I am able to evaluate him in the office.  He is already taking hydrocodone which is prescribed by his PCP and Robaxin.  He is not unable to take any NSAIDs secondary to being on Plavix.

## 2025-04-10 ENCOUNTER — OFFICE VISIT (OUTPATIENT)
Dept: PHYSICAL THERAPY | Facility: REHABILITATION | Age: 65
End: 2025-04-10
Payer: COMMERCIAL

## 2025-04-10 DIAGNOSIS — M79.18 MYOFASCIAL PAIN: ICD-10-CM

## 2025-04-10 DIAGNOSIS — M47.816 LUMBAR SPONDYLOSIS: ICD-10-CM

## 2025-04-10 DIAGNOSIS — M54.16 LUMBAR RADICULOPATHY: Primary | ICD-10-CM

## 2025-04-10 PROCEDURE — 97110 THERAPEUTIC EXERCISES: CPT

## 2025-04-10 PROCEDURE — 97112 NEUROMUSCULAR REEDUCATION: CPT

## 2025-04-10 PROCEDURE — 97140 MANUAL THERAPY 1/> REGIONS: CPT

## 2025-04-10 NOTE — PROGRESS NOTES
"Daily Note     Today's date: 4/10/2025  Patient name: Liban Montes  : 1960  MRN: [id number]  Referring provider: Tony Cabrera DO  Dx:   Encounter Diagnosis     ICD-10-CM    1. Lumbar radiculopathy  M54.16       2. Lumbar spondylosis  M47.816       3. Myofascial pain  M79.18                      Subjective: Pt states he is not having a good day today, states that L side has a lot of pain and is sensitive to positional changes.      Objective: See treatment diary below      Assessment: Tolerated treatment fair. Patient would benefit from continued PT.  Pt limited overall with mobility by pain, particularly on L side.  Pt presents with increased irritability today, but noted relief at end of session.  Pt. 1:1 with PTA for entirety.      Plan: Continue per plan of care.      Precautions: CAD, HTN, acute on chronic systolic congestive heart failure, COPD, T2DM, Morbid Obesity    POC expires Unit limit Auth Expiration date PT/OT + Visit Limit?   2025  BOMN  30         Visit/Unit Tracking  AUTH Status:  Date 3/20/2025 3/26 4/3 4/7 4/10     [employer] Used 1 2 3 4 5      Remaining              Pertinent Findings:      Test / Measure  3/20/2025   FOTO (Predicted 42) 16   Decreased gross LLE strength Avg of 3+/5      Access Code: [email or web address]  URL: [email or web address]  Date: 2025  Prepared by: Norberto Love    Exercises  - Seated Transversus Abdominis Bracing  - 1 x daily - 7 x weekly - 3 sets - 10 reps - 3 hold  - Seated Pelvic Tilt  - 1 x daily - 7 x weekly - 2 sets - 10 reps  - Seated Sciatic Tensioner  - 1 x daily - 7 x weekly - 2 sets - 10 reps - 2 hold  - Seated Lumbar Flexion Stretch  - 4 x daily - 7 x weekly - 1 sets - 15 reps - 3 hold    Manuals 3/20 3/26 4/3 4/7 4/10        LE stretching    BM 10' KP 8' LLE        L/s Mobs             Central Gliders             MET Shotgun BM            Neuro Re-Ed             Patient Education 15'            TrA contractions  20x3\" 20x3\" 15x3\" " "15x3\"        LTR  10x ea 10x ea 10x ea 10x ea        Paloff Presses             Standing Pball Crush             3-way ball rollout    10x ea 15x ea        HS stretch             Slump N Tensioner's  15x ea 10x ea b/l 15x ea b/l 15x ea b/l        Hip add isometric  2x10 3\" hold 2x10 3\" 2x10 3\" hold 2x10 3\"         Glute Isometric Squeeze  20x3\" 10x3\" 10x3\" 15x3\"        Ther Ex             NuStep  8' x L4 8' L5 LE only 8' x L5 LE only 8' L6 LE        Glute Bridges  6x with partial range np          Clamshells  3x10 Otb in supine 3x10 otb supine  2x10 otb supine        Seated knee flex/ext with TB  2x10 OTB 2x10 ea b/l otb          HR/TR  6 10x HR          Leg Press             Titan LAQ/HS curl                          Repeated Mvmt Assessment    5'                      Ther Activity                                       Gait Training                                       Modalities                                                     "

## 2025-04-11 DIAGNOSIS — Z79.4 TYPE 2 DIABETES MELLITUS WITH OTHER CIRCULATORY COMPLICATION, WITH LONG-TERM CURRENT USE OF INSULIN (HCC): ICD-10-CM

## 2025-04-11 DIAGNOSIS — E11.59 TYPE 2 DIABETES MELLITUS WITH OTHER CIRCULATORY COMPLICATION, WITH LONG-TERM CURRENT USE OF INSULIN (HCC): ICD-10-CM

## 2025-04-11 RX ORDER — CLOPIDOGREL BISULFATE 75 MG/1
75 TABLET ORAL DAILY
Qty: 90 TABLET | Refills: 1 | Status: SHIPPED | OUTPATIENT
Start: 2025-04-11

## 2025-04-14 ENCOUNTER — OFFICE VISIT (OUTPATIENT)
Dept: PHYSICAL THERAPY | Facility: REHABILITATION | Age: 65
End: 2025-04-14
Payer: COMMERCIAL

## 2025-04-14 DIAGNOSIS — M47.816 LUMBAR SPONDYLOSIS: ICD-10-CM

## 2025-04-14 DIAGNOSIS — M79.18 MYOFASCIAL PAIN: ICD-10-CM

## 2025-04-14 DIAGNOSIS — M54.16 LUMBAR RADICULOPATHY: Primary | ICD-10-CM

## 2025-04-14 PROCEDURE — 97140 MANUAL THERAPY 1/> REGIONS: CPT

## 2025-04-14 PROCEDURE — 97110 THERAPEUTIC EXERCISES: CPT

## 2025-04-14 PROCEDURE — 97112 NEUROMUSCULAR REEDUCATION: CPT

## 2025-04-14 NOTE — PROGRESS NOTES
Daily Note     Today's date: 2025  Patient name: Liban Montes  : 1960  MRN: [id number]  Referring provider: Tony Cabrera DO  Dx:   Encounter Diagnosis     ICD-10-CM    1. Lumbar radiculopathy  M54.16       2. Lumbar spondylosis  M47.816       3. Myofascial pain  M79.18           Start Time: 1400  Stop Time: 1445  Total time in clinic (min): 45 minutes    Subjective: Pt states he is feeling a small decrease in pain compared to last session, he rates his current pain at a 7/10 on the NPRS. He notes having increased soreness/pain over the weekend.      Objective: See treatment diary below      Assessment: Pt tolerated today's session well with no increase in pain noted during or after completion of exercises. Will monitor status NV and progress as appropriate. Pt will benefit from continued skilled PT services to address remaining impairments and improve QOL. Pt was with Norberto Love PT, DPT from 200-238, IEP for remainder.        Plan: Continue per plan of care.      Precautions: CAD, HTN, acute on chronic systolic congestive heart failure, COPD, T2DM, Morbid Obesity    POC expires Unit limit Auth Expiration date PT/OT + Visit Limit?   2025  BOMN  30         Visit/Unit Tracking  AUTH Status:  Date 3/20/2025 3/26 4/3 4/7 4/10 4/14    [employer] Used 1 2 3 4 5 6     Remaining              Pertinent Findings:      Test / Measure  3/20/2025   FOTO (Predicted 42) 16   Decreased gross LLE strength Avg of 3+/5      Access Code: [email or web address]  URL: [email or web address]  Date: 2025  Prepared by: Norberto Love    Exercises  - Seated Transversus Abdominis Bracing  - 1 x daily - 7 x weekly - 3 sets - 10 reps - 3 hold  - Seated Pelvic Tilt  - 1 x daily - 7 x weekly - 2 sets - 10 reps  - Seated Sciatic Tensioner  - 1 x daily - 7 x weekly - 2 sets - 10 reps - 2 hold  - Seated Lumbar Flexion Stretch  - 4 x daily - 7 x weekly - 1 sets - 15 reps - 3 hold    Manuals 3/20 3/26 4/3 4/7 4/10 4/14   "     LE stretching    BM 10' KP 8' LLE BM 8'       L/s Mobs             Central Gliders             MET Shotgun BM            Neuro Re-Ed             Patient Education 15'            TrA contractions  20x3\" 20x3\" 15x3\" 15x3\" 10x3\"       LTR  10x ea 10x ea 10x ea 10x ea 10x       Paloff Presses             Standing Pball Crush             3-way ball rollout    10x ea 15x ea 10x ea       HS stretch             Slump N Tensioner's  15x ea 10x ea b/l 15x ea b/l 15x ea b/l 10x B with therapist hip flexion       Hip add isometric  2x10 3\" hold 2x10 3\" 2x10 3\" hold 2x10 3\"  2x10 3\"       Glute Isometric Squeeze  20x3\" 10x3\" 10x3\" 15x3\"        Ther Ex             NuStep  8' x L4 8' L5 LE only 8' x L5 LE only 8' L6 LE 8' x L6       Glute Bridges  6x with partial range np   2x8 with partal range and glute squeeze       Clamshells  3x10 Otb in supine 3x10 otb supine  2x10 otb supine        Seated knee flex/ext with TB  2x10 OTB 2x10 ea b/l otb          HR/TR   10x HR          Leg Press             Titan LAQ/HS curl                          Repeated Mvmt Assessment    5'                      Ther Activity                                       Gait Training                                       Modalities                                                     "

## 2025-04-17 ENCOUNTER — OFFICE VISIT (OUTPATIENT)
Dept: PHYSICAL THERAPY | Facility: REHABILITATION | Age: 65
End: 2025-04-17
Payer: COMMERCIAL

## 2025-04-17 DIAGNOSIS — M54.16 LUMBAR RADICULOPATHY: Primary | ICD-10-CM

## 2025-04-17 DIAGNOSIS — M79.18 MYOFASCIAL PAIN: ICD-10-CM

## 2025-04-17 DIAGNOSIS — M47.816 LUMBAR SPONDYLOSIS: ICD-10-CM

## 2025-04-17 PROCEDURE — 97112 NEUROMUSCULAR REEDUCATION: CPT

## 2025-04-17 PROCEDURE — 97110 THERAPEUTIC EXERCISES: CPT

## 2025-04-17 NOTE — PROGRESS NOTES
Daily Note     Today's date: 2025  Patient name: Liban Montes  : 1960  MRN: [id number]  Referring provider: Tony Cabrera DO  Dx:   Encounter Diagnosis     ICD-10-CM    1. Lumbar radiculopathy  M54.16       2. Lumbar spondylosis  M47.816       3. Myofascial pain  M79.18                      Subjective: Pt reports improvement overall upon arrival, states that pain improved from last week.      Objective: See treatment diary below      Assessment: Tolerated treatment well. Patient would benefit from continued PT.  Pt. able to complete all exercises with no increase in pain during or after session.  Pt continues to be limited with mobility due to pain, but is improving in available ROM and tolerance.  Pt. 1:1 with PTA for entirety.      Plan: Continue per plan of care.      Precautions: CAD, HTN, acute on chronic systolic congestive heart failure, COPD, T2DM, Morbid Obesity    POC expires Unit limit Auth Expiration date PT/OT + Visit Limit?   2025  BOMN  30         Visit/Unit Tracking  AUTH Status:  Date 3/20/2025 3/26 4/3 4/7 4/10 4/14 4/17   [employer] Used 1 2 3 4 5 6 7    Remaining              Pertinent Findings:      Test / Measure  3/20/2025   FOTO (Predicted 42) 16   Decreased gross LLE strength Avg of 3+/5      Access Code: [email or web address]  URL: [email or web address]  Date: 2025  Prepared by: Norberto Love    Exercises  - Seated Transversus Abdominis Bracing  - 1 x daily - 7 x weekly - 3 sets - 10 reps - 3 hold  - Seated Pelvic Tilt  - 1 x daily - 7 x weekly - 2 sets - 10 reps  - Seated Sciatic Tensioner  - 1 x daily - 7 x weekly - 2 sets - 10 reps - 2 hold  - Seated Lumbar Flexion Stretch  - 4 x daily - 7 x weekly - 1 sets - 15 reps - 3 hold    Manuals 3/20 3/26 4/3 4/7 4/10 4/14 4/17      LE stretching    BM 10' KP 8' LLE BM 8' KP 8' BLE      L/s Mobs             Central Gliders             MET Shotgun BM            Neuro Re-Ed             Patient Education 15           "  TrA contractions  20x3\" 20x3\" 15x3\" 15x3\" 10x3\" 15x3\"      LTR  10x ea 10x ea 10x ea 10x ea 10x 10x ea      Paloff Presses             Standing Pball Crush             3-way ball rollout    10x ea 15x ea 10x ea 10x ea      HS stretch             Slump N Tensioner's  15x ea 10x ea b/l 15x ea b/l 15x ea b/l 10x B with therapist hip flexion 10x b/l w/assisted flex      Hip add isometric  2x10 3\" hold 2x10 3\" 2x10 3\" hold 2x10 3\"  2x10 3\" 2x10 3\"      Glute Isometric Squeeze  20x3\" 10x3\" 10x3\" 15x3\"        Ther Ex             NuStep  8' x L4 8' L5 LE only 8' x L5 LE only 8' L6 LE 8' x L6 8' L6 LE      Glute Bridges  6x with partial range np   2x8 with partal range and glute squeeze 2x10 partial range      Clamshells  3x10 Otb in supine 3x10 otb supine  2x10 otb supine        Seated knee flex/ext with TB  2x10 OTB 2x10 ea b/l otb          HR/TR   10x HR          Leg Press             Titan LAQ/HS curl                          Repeated Mvmt Assessment    5'                      Ther Activity                                       Gait Training                                       Modalities                                                       "

## 2025-04-18 ENCOUNTER — OFFICE VISIT (OUTPATIENT)
Dept: PAIN MEDICINE | Facility: CLINIC | Age: 65
End: 2025-04-18
Payer: COMMERCIAL

## 2025-04-18 VITALS
DIASTOLIC BLOOD PRESSURE: 86 MMHG | BODY MASS INDEX: 50.43 KG/M2 | SYSTOLIC BLOOD PRESSURE: 132 MMHG | OXYGEN SATURATION: 95 % | HEIGHT: 66 IN | HEART RATE: 75 BPM | WEIGHT: 313.8 LBS

## 2025-04-18 DIAGNOSIS — M54.16 LUMBAR RADICULOPATHY: ICD-10-CM

## 2025-04-18 DIAGNOSIS — M47.816 LUMBAR SPONDYLOSIS: Primary | ICD-10-CM

## 2025-04-18 DIAGNOSIS — G89.29 CHRONIC LEFT-SIDED LOW BACK PAIN WITH LEFT-SIDED SCIATICA: ICD-10-CM

## 2025-04-18 DIAGNOSIS — M54.42 CHRONIC LEFT-SIDED LOW BACK PAIN WITH LEFT-SIDED SCIATICA: ICD-10-CM

## 2025-04-18 PROCEDURE — 99214 OFFICE O/P EST MOD 30 MIN: CPT | Performed by: ANESTHESIOLOGY

## 2025-04-18 NOTE — PROGRESS NOTES
Assessment:  1. Lumbar spondylosis    2. Lumbar radiculopathy    3. Chronic left-sided low back pain with left-sided sciatica        Plan:  Patient is a 64-year-old male past medical history of CAD, CHF, COPD, FREDY, diabetes, lumbar spondylosis returns for follow-up regarding his chronic low back pain.  Patient last office visit was in February 2025 where he was offered a left L5 TFESI, but stated that he would like to hold off on interventional therapy at this time.  Patient states that he has been going to physical therapy as prescribed and has been taking Robaxin 1000 mg every 6 hours for his pain.  Patient also states that he is taking gabapentin 300 mg 3 times a day for which he does not have any significant side effects.  Patient states that he continues to have shooting pain down his left lower extremity and has associated weakness.  At this time, suspect that patient symptoms are secondary to left lumbar radiculopathy and would benefit from interventional procedure at this time.    Given that clinical presentation of lumbar radiculopathy matches CT findings, I would like to proceed forward with performing Left L5 TFESI. Risks vs benefits discussed in detail with the patient. These risks include, but are not limited to, bleeding, infection, nerve damage, paralysis. Patient is on prescription antiplatelet Plavix and will need medication hold for 7 days. Patient denies contrast allergy. All patient’s questions were answered. Patient understands risks and is willing to pursue the procedure. The approach was demonstrated using models and literature was provided. Verbal consent obtained.        Orders Placed This Encounter   Procedures    FL spine and pain procedure     Standing Status:   Future     Expected Date:   4/18/2025     Expiration Date:   4/18/2029     Reason for Exam::   Left L5 TFESI     Anticoagulant hold needed?:   Plavix       No orders of the defined types were placed in this encounter.      My  impressions and treatment recommendations were discussed in detail with the patient, who verbalized understanding and had no further questions.      History of Present Illness:    Liban Montes is a 64 y.o. male who presents to [facility] for initial evaluation of the above stated pain complaints. The patient has a past medical and chronic pain history as outlined in the assessment section. He was referred by No referring provider defined for this encounter.       Patient is a 64-year-old male past medical history of CAD, CHF, COPD, FREDY, diabetes, lumbar spondylosis returns for follow-up regarding his chronic low back pain.  Patient last office visit was in February 2025 where he was offered a left L5 TFESI, but stated that he would like to hold off on interventional therapy at this time.  Patient states that his pain is intermittent, described as a burning, dull aching, sharp like sensation, and has associated weakness down his left lower extremity.  Patient states that his pain does interfere with activities of daily living and states that he has been continuing conservative measures.          Review of Systems:  General: no recent infections, no unintentional weight loss  Neuro: no saddle anesthesia, no dexterity or balance issues   GI: no changes in bowel habits  : no changes in bladder habits  Hem: On Plavix         Past Medical History:   Diagnosis Date    Abscess of left thigh     Acute myocardial infarction (HCC)     Anesthesia     woke up during procedure    Anxiety     Arteriosclerotic cardiovascular disease     Arthritis     Asthma     Chest pain     COPD (chronic obstructive pulmonary disease) (HCC)     Coronary artery disease     Diabetes mellitus (HCC)     Fatty liver     Gastric ulcer     GERD (gastroesophageal reflux disease)     Hyperlipidemia     Hypertension     Low back pain     Myocardial infarction (HCC) 2006,2007    Obesity     Obstructive sleep apnea     no Cpap     Spondylosis of lumbar region without myelopathy or radiculopathy        Past Surgical History:   Procedure Laterality Date    CARPAL TUNNEL RELEASE Right     COLONOSCOPY      CORONARY ANGIOPLASTY WITH STENT PLACEMENT  ,     EGD      HEMORRHOID SURGERY      NEUROPLASTY / TRANSPOSITION MEDIAN NERVE AT CARPAL TUNNEL Right     OK SURGICAL ARTHROSCOPY SHOULDER W/LSS&RESCJ ADS Left 6/10/2020    Procedure: SHOULDER ARTHROSCOPIC CAPSULAR RELEASE;  Surgeon: Christiano Aguiar MD;  Location: [facility];  Service: Orthopedics       Family History   Problem Relation Age of Onset    Alzheimer's disease Mother     Heart failure Mother     Dementia Mother     Heart attack Father     Other Father         Cardiac Disorder    Heart disease Father     Other Family         Back Pain    Hypertension Family     Stroke Family         Complications    Cancer Other        Social History     Occupational History    Not on file   Tobacco Use    Smoking status: Former     Current packs/day: 0.00     Average packs/day: 1.5 packs/day for 38.0 years (57.0 ttl pk-yrs)     Types: Cigarettes     Start date: 1976     Quit date: 2014     Years since quittin.3    Smokeless tobacco: Never    Tobacco comments:     tobacco use not continuous- quit for 2  4 yrs long periods .   Vaping Use    Vaping status: Never Used   Substance and Sexual Activity    Alcohol use: No    Drug use: No    Sexual activity: Not Currently         Current Outpatient Medications:     acetaminophen (TYLENOL) 325 mg tablet, Take 650 mg by mouth every 6 (six) hours as needed for mild pain, Disp: , Rfl:     albuterol (PROVENTIL HFA,VENTOLIN HFA) 90 mcg/act inhaler, INHALE 2 PUFFS AS NEEDED FOR SHORTNESS OF BREATH, Disp: 18 g, Rfl: 5    aspirin (Aspirin Low Dose) 81 mg EC tablet, TAKE 1 TABLET BY MOUTH EVERY DAY, Disp: 30 tablet, Rfl: 5    atorvastatin (LIPITOR) 40 mg tablet, TAKE 1 TABLET BY MOUTH EVERY DAY, Disp: 90 tablet, Rfl: 1    clopidogrel (PLAVIX) 75  "mg tablet, TAKE 1 TABLET BY MOUTH EVERY DAY, Disp: 90 tablet, Rfl: 1    ezetimibe (ZETIA) 10 mg tablet, Take 10 mg by mouth daily, Disp: , Rfl:     fenofibrate (TRICOR) 145 mg tablet, TAKE 1 TABLET BY MOUTH EVERY DAY, Disp: 90 tablet, Rfl: 1    furosemide (LASIX) 40 mg tablet, , Disp: , Rfl:     gabapentin (NEURONTIN) 300 mg capsule, Take 300 mg by mouth 3 (three) times a day, Disp: , Rfl:     HYDROcodone-acetaminophen (Norco) 5-325 mg per tablet, Take 1 tablet by mouth every 4 (four) hours as needed for pain Max Daily Amount: 6 tablets, Disp: 100 tablet, Rfl: 0    Insulin Glargine-yfgn 100 UNIT/ML SOPN, INJECT 0.33 ML (33 UNITS TOTAL) AS DIRECTED 2 (TWO) TIMES A  UNITS TWICE A DAY, Disp: 180 mL, Rfl: 5    Insulin Pen Needle (B-D UF III MINI PEN NEEDLES) 31G X 5 MM MISC, Inject as directed 2 (two) times a day Use as directed, Disp: 200 each, Rfl: 1    Jardiance 25 MG TABS, TAKE 1 TABLET (25 MG TOTAL) BY MOUTH DAILY., Disp: 90 tablet, Rfl: 1    losartan (COZAAR) 25 mg tablet, TAKE 1 TABLET (25 MG TOTAL) BY MOUTH DAILY., Disp: 90 tablet, Rfl: 1    methocarbamol (Robaxin-750) 750 mg tablet, Take 1 tablet (750 mg total) by mouth every 6 (six) hours as needed for muscle spasms, Disp: 90 tablet, Rfl: 1    metoprolol tartrate (LOPRESSOR) 50 mg tablet, Take 25 mg by mouth 2 (two) times a day, Disp: , Rfl:     nitroglycerin (NITROSTAT) 0.4 mg SL tablet, PLEASE SEE ATTACHED FOR DETAILED DIRECTIONS, Disp: , Rfl:     pantoprazole (PROTONIX) 40 mg tablet, TAKE 1 TABLET BY MOUTH EVERY DAY, Disp: 90 tablet, Rfl: 5    Allergies   Allergen Reactions    Bactrim [Sulfamethoxazole-Trimethoprim] Edema    Cefaclor Shortness Of Breath     Other reaction(s): Respiratory Distress    Simvastatin Shortness Of Breath     Other reaction(s): Respiratory Distress       Physical Exam:    /86 (Patient Position: Sitting, Cuff Size: Adult)   Pulse 75   Ht 5' 6\" (1.676 m)   Wt (!) 142 kg (313 lb 12.8 oz)   SpO2 95%   BMI 50.65 kg/m² "       Constitutional: no apparent distress, does not appear sedated   HEENT: pupils equal and round, symmetric facial muscles   Neck: supple  Pulmonary: good chest wall excursion, breathing unlabored   Psych: appropriate affect and insight, No evidence of aberrant behavior   Skin: No apparent rashes or lesions  Neuro: cranial nerves II-XII grossly intact     MSK:  Inspection: no signs of infection to lumbar spine  Palpation: no tender to palpation to lumbar spine  ROM: no significant rom abnormalities in  lumbar spine  MMT 5/5 strength in B/L LE  Sensation to light touch intact B/L LE  Gait: ambulates unassisted, gait is not antalgic          Imaging  FL spine and pain procedure    (Results Pending)       Orders Placed This Encounter   Procedures    FL spine and pain procedure

## 2025-04-21 ENCOUNTER — OFFICE VISIT (OUTPATIENT)
Dept: PHYSICAL THERAPY | Facility: REHABILITATION | Age: 65
End: 2025-04-21
Payer: COMMERCIAL

## 2025-04-21 DIAGNOSIS — M47.816 LUMBAR SPONDYLOSIS: ICD-10-CM

## 2025-04-21 DIAGNOSIS — M79.18 MYOFASCIAL PAIN: ICD-10-CM

## 2025-04-21 DIAGNOSIS — M54.16 LUMBAR RADICULOPATHY: Primary | ICD-10-CM

## 2025-04-21 PROCEDURE — 97140 MANUAL THERAPY 1/> REGIONS: CPT

## 2025-04-21 PROCEDURE — 97112 NEUROMUSCULAR REEDUCATION: CPT

## 2025-04-21 NOTE — PROGRESS NOTES
Daily Note     Today's date: 2025  Patient name: Liban Montes  : 1960  MRN: [id number]  Referring provider: Tony Cabrera DO  Dx:   Encounter Diagnosis     ICD-10-CM    1. Lumbar radiculopathy  M54.16       2. Lumbar spondylosis  M47.816       3. Myofascial pain  M79.18                      Subjective: Pt reports increased discomfort at the start of today's session, as he notes he negotiated 4 flights of stairs at home today.      Objective: See treatment diary below      Assessment: Pt tolerated today's session with no increase in pain noted during or after completion of exercises. Will monitor status NV and progress as appropriate. Pt will benefit from continued skilled PT services to address remaining impairments and improve QOL. Pt was with Norberto Love PT, DPT from 1:57-2:20, IEP for remainder.        Plan: Continue per plan of care.      Precautions: CAD, HTN, acute on chronic systolic congestive heart failure, COPD, T2DM, Morbid Obesity    POC expires Unit limit Auth Expiration date PT/OT + Visit Limit?   2025  BOMN  30         Visit/Unit Tracking  AUTH Status:  Date 3/20/2025 3/26 4/3 4/7 4/10 4/14 4/17 4/21   [employer] Used 1 2 3 4 5 6 7 8    Remaining               Pertinent Findings:      Test / Measure  3/20/2025   FOTO (Predicted 42) 16   Decreased gross LLE strength Avg of 3+/5      Access Code: [email or web address]  URL: [email or web address]  Date: 2025  Prepared by: Norberto Love    Exercises  - Seated Transversus Abdominis Bracing  - 1 x daily - 7 x weekly - 3 sets - 10 reps - 3 hold  - Seated Pelvic Tilt  - 1 x daily - 7 x weekly - 2 sets - 10 reps  - Seated Sciatic Tensioner  - 1 x daily - 7 x weekly - 2 sets - 10 reps - 2 hold  - Seated Lumbar Flexion Stretch  - 4 x daily - 7 x weekly - 1 sets - 15 reps - 3 hold    Manuals 3/20 3/26 4/3 4/7 4/10 4/14 4/17 4/21     LE stretching    BM 10' KP 8' LLE BM 8' KP 8' BLE BM 8' BLE     L/s [facility]  "Gliders             MET Shotgun BM            Neuro Re-Ed             Patient Education 15'            TrA contractions  20x3\" 20x3\" 15x3\" 15x3\" 10x3\" 15x3\" 15x3\"     LTR  10x ea 10x ea 10x ea 10x ea 10x 10x ea 10x ea     Paloff Presses             Standing Pball Crush             3-way ball rollout    10x ea 15x ea 10x ea 10x ea 10x ea     HS stretch             Slump N Tensioner's  15x ea 10x ea b/l 15x ea b/l 15x ea b/l 10x B with therapist hip flexion 10x b/l w/assisted flex 15x ea b/l     Hip add isometric  2x10 3\" hold 2x10 3\" 2x10 3\" hold 2x10 3\"  2x10 3\" 2x10 3\" 3x10 3\" hold     Glute Isometric Squeeze  20x3\" 10x3\" 10x3\" 15x3\"        Ther Ex             NuStep  8' x L4 8' L5 LE only 8' x L5 LE only 8' L6 LE 8' x L6 8' L6 LE 8' L6 LE     Glute Bridges  6x with partial range np   2x8 with partal range and glute squeeze 2x10 partial range 2x10 partial range     Clamshells  3x10 Otb in supine 3x10 otb supine  2x10 otb supine   2x12 Otb in supine     Seated knee flex/ext with TB  2x10 OTB 2x10 ea b/l otb          HR/TR   10x HR          Leg Press             Titan LAQ/HS curl             Standing Hip abd        15x ea     Repeated Mvmt Assessment    5'                      Ther Activity                                       Gait Training                                       Modalities                                                       "

## 2025-04-22 ENCOUNTER — TELEPHONE (OUTPATIENT)
Age: 65
End: 2025-04-22

## 2025-04-22 NOTE — TELEPHONE ENCOUNTER
Caller: [facility]    Doctor: Dr. ARAGON    Reason for call: Faxed over Clearance for Plavix hold    Faxed received and understood     Call back#:   [phone]

## 2025-04-23 ENCOUNTER — TELEPHONE (OUTPATIENT)
Dept: RADIOLOGY | Facility: CLINIC | Age: 65
End: 2025-04-23

## 2025-04-24 ENCOUNTER — OFFICE VISIT (OUTPATIENT)
Dept: PHYSICAL THERAPY | Facility: REHABILITATION | Age: 65
End: 2025-04-24
Payer: COMMERCIAL

## 2025-04-24 DIAGNOSIS — M54.16 LUMBAR RADICULOPATHY: Primary | ICD-10-CM

## 2025-04-24 DIAGNOSIS — E78.01 FAMILIAL HYPERCHOLESTEROLEMIA: ICD-10-CM

## 2025-04-24 DIAGNOSIS — Z79.4 TYPE 2 DIABETES MELLITUS WITH OTHER CIRCULATORY COMPLICATION, WITH LONG-TERM CURRENT USE OF INSULIN (HCC): ICD-10-CM

## 2025-04-24 DIAGNOSIS — E11.59 TYPE 2 DIABETES MELLITUS WITH OTHER CIRCULATORY COMPLICATION, WITH LONG-TERM CURRENT USE OF INSULIN (HCC): ICD-10-CM

## 2025-04-24 DIAGNOSIS — M79.18 MYOFASCIAL PAIN: ICD-10-CM

## 2025-04-24 DIAGNOSIS — M47.816 LUMBAR SPONDYLOSIS: ICD-10-CM

## 2025-04-24 PROCEDURE — 97140 MANUAL THERAPY 1/> REGIONS: CPT

## 2025-04-24 PROCEDURE — 97110 THERAPEUTIC EXERCISES: CPT

## 2025-04-24 PROCEDURE — 97112 NEUROMUSCULAR REEDUCATION: CPT

## 2025-04-24 RX ORDER — EMPAGLIFLOZIN 25 MG/1
25 TABLET, FILM COATED ORAL DAILY
Qty: 90 TABLET | Refills: 1 | Status: SHIPPED | OUTPATIENT
Start: 2025-04-24

## 2025-04-24 RX ORDER — ATORVASTATIN CALCIUM 40 MG/1
40 TABLET, FILM COATED ORAL DAILY
Qty: 90 TABLET | Refills: 1 | Status: SHIPPED | OUTPATIENT
Start: 2025-04-24

## 2025-04-24 NOTE — PROGRESS NOTES
Daily Note     Today's date: 2025  Patient name: Liban Montes  : 1960  MRN: [id number]  Referring provider: Tony Cabrera DO  Dx:   Encounter Diagnosis     ICD-10-CM    1. Lumbar radiculopathy  M54.16       2. Lumbar spondylosis  M47.816       3. Myofascial pain  M79.18                      Subjective: Pt reports he had 2 good days and feels some increased pain today as a result of activity at home, but feels improved overall.      Objective: See treatment diary below      Assessment: Tolerated treatment well. Patient would benefit from continued PT.  Pt. able to complete all exercises with no increase in pain during or after session.  Pt has some mild limitation with mobility today due to pain present, but able to complete all exercises.  Pt. 1:1 with PTA for entirety.      Plan: Continue per plan of care.      Precautions: CAD, HTN, acute on chronic systolic congestive heart failure, COPD, T2DM, Morbid Obesity    POC expires Unit limit Auth Expiration date PT/OT + Visit Limit?   2025  BOMN  30         Visit/Unit Tracking  AUTH Status:  Date 3/20/2025 3/26 4/3 4/7 4/10 4/14 4/17 4/21 4/24   [employer] Used 1 2 3 4 5 6 7 8 9    Remaining                Pertinent Findings:      Test / Measure  3/20/2025   FOTO (Predicted 42) 16   Decreased gross LLE strength Avg of 3+/5      Access Code: [email or web address]  URL: [email or web address]  Date: 2025  Prepared by: Norberto Love    Exercises  - Seated Transversus Abdominis Bracing  - 1 x daily - 7 x weekly - 3 sets - 10 reps - 3 hold  - Seated Pelvic Tilt  - 1 x daily - 7 x weekly - 2 sets - 10 reps  - Seated Sciatic Tensioner  - 1 x daily - 7 x weekly - 2 sets - 10 reps - 2 hold  - Seated Lumbar Flexion Stretch  - 4 x daily - 7 x weekly - 1 sets - 15 reps - 3 hold    Manuals 3/20 3/26 4/3 4 4/10 4/14 4/17 4/21 4/24    LE stretching    BM 10' KP 8' LLE BM 8' KP 8' BLE BM 8' BLE KP 8' BLE    L/s Mobs             Central Gliders            "  MET Shotgun BM            Neuro Re-Ed             Patient Education 15'            TrA contractions  20x3\" 20x3\" 15x3\" 15x3\" 10x3\" 15x3\" 15x3\" 15x3\"    LTR  10x ea 10x ea 10x ea 10x ea 10x 10x ea 10x ea 10x ea    Paloff Presses             Standing Pball Crush             3-way ball rollout    10x ea 15x ea 10x ea 10x ea 10x ea 10x ea    HS stretch             Slump N Tensioner's  15x ea 10x ea b/l 15x ea b/l 15x ea b/l 10x B with therapist hip flexion 10x b/l w/assisted flex 15x ea b/l 15x ea b/l    Hip add isometric  2x10 3\" hold 2x10 3\" 2x10 3\" hold 2x10 3\"  2x10 3\" 2x10 3\" 3x10 3\" hold 3x10 3\"     Glute Isometric Squeeze  20x3\" 10x3\" 10x3\" 15x3\"        Ther Ex             NuStep  8' x L4 8' L5 LE only 8' x L5 LE only 8' L6 LE 8' x L6 8' L6 LE 8' L6 LE 8' L6 LE    Glute Bridges  6x with partial range np   2x8 with partal range and glute squeeze 2x10 partial range 2x10 partial range 2x10 partial range    Clamshells  3x10 Otb in supine 3x10 otb supine  2x10 otb supine   2x12 Otb in supine 2x12 otb supine    Seated knee flex/ext with TB  2x10 OTB 2x10 ea b/l otb          HR/TR   10x HR          Leg Press             Titan LAQ/HS curl             Standing Hip abd        15x ea     Repeated Mvmt Assessment    5'                      Ther Activity                                       Gait Training                                       Modalities                                                         "

## 2025-04-26 DIAGNOSIS — M79.18 MYOFASCIAL PAIN: ICD-10-CM

## 2025-04-28 ENCOUNTER — OFFICE VISIT (OUTPATIENT)
Dept: PHYSICAL THERAPY | Facility: REHABILITATION | Age: 65
End: 2025-04-28
Payer: COMMERCIAL

## 2025-04-28 DIAGNOSIS — M54.16 LUMBAR RADICULOPATHY: Primary | ICD-10-CM

## 2025-04-28 DIAGNOSIS — M79.18 MYOFASCIAL PAIN: ICD-10-CM

## 2025-04-28 DIAGNOSIS — M47.816 LUMBAR SPONDYLOSIS: ICD-10-CM

## 2025-04-28 PROCEDURE — 97110 THERAPEUTIC EXERCISES: CPT

## 2025-04-28 PROCEDURE — 97140 MANUAL THERAPY 1/> REGIONS: CPT

## 2025-04-28 PROCEDURE — 97112 NEUROMUSCULAR REEDUCATION: CPT

## 2025-04-28 RX ORDER — METHOCARBAMOL 750 MG/1
750 TABLET, FILM COATED ORAL EVERY 6 HOURS PRN
Qty: 90 TABLET | Refills: 2 | Status: SHIPPED | OUTPATIENT
Start: 2025-04-28

## 2025-04-28 NOTE — PROGRESS NOTES
Daily Note     Today's date: 2025  Patient name: Liban Montes  : 1960  MRN: [id number]  Referring provider: Tony Cabrera DO  Dx:   Encounter Diagnosis     ICD-10-CM    1. Lumbar radiculopathy  M54.16       2. Lumbar spondylosis  M47.816       3. Myofascial pain  M79.18           Start Time: 1358  Stop Time: 1443  Total time in clinic (min): 45 minutes    Subjective: Pt reports continued improvement and notes that he has been able to ambulate more without utilizing an AD. He rates his pain at a 5/10 on the NPRS at the start of today's session. He notes he will be receiving a steroid injection next Tuesday.      Objective: See treatment diary below      Assessment: Pt tolerated today's session well with no increase in pain noted during or after completion of exercises. Pt was most challenged during today's session with the performance of STS.  Pt responded favorably to the implementation of left tibial N central gliders during today's session and noted a significant reduction in sx irritability after completion. HEP was updated and reviewed. Will monitor status NV and progress as appropriate. Pt will benefit from continued skilled PT services to address remaining impairments and improve QOL. Pt was with Norberto Love PT, DPT for the entirety of today's session.        Plan: Continue per plan of care.      Precautions: CAD, HTN, acute on chronic systolic congestive heart failure, COPD, T2DM, Morbid Obesity    POC expires Unit limit Auth Expiration date PT/OT + Visit Limit?   2025  BOMN  30         Visit/Unit Tracking  AUTH Status:  Date 3/20/2025 3/26 4/3 4/7 4/10 4/14 4/17 4/21 4/24 4/28   [employer] Used 1 2 3 4 5 6 7 8 9 10    Remaining                 Pertinent Findings:      Test / Measure  3/20/2025 4/28/25   FOTO (Predicted 42) 16 38   Decreased gross LLE strength Avg of 3+/5  NT     Access Code: [email or web address]  URL: [email or web address]  Date: 2025  Prepared by: Norberto  "Ivan    Exercises  - Seated Transversus Abdominis Bracing  - 1 x daily - 7 x weekly - 3 sets - 10 reps - 3 hold  - Seated Pelvic Tilt  - 1 x daily - 7 x weekly - 2 sets - 10 reps  - Seated Sciatic Tensioner  - 1 x daily - 7 x weekly - 2 sets - 10 reps - 2 hold  - Seated Lumbar Flexion Stretch  - 4 x daily - 7 x weekly - 1 sets - 15 reps - 3 hold  - Seated Hamstring Stretch  - 1 x daily - 7 x weekly - 1 sets - 3 reps - 30 hold  - Sit to Stand  - 1 x daily - 4 x weekly - 3 sets - 5 reps  - Seated Hip Abduction  - 1 x daily - 4 x weekly - 2 sets - 12 reps    Manuals 3/20 3/26 4/3 4/7 4/10 4/14 4/17 4/21 4/24 4/28   LE stretching    BM 10' KP 8' LLE BM 8' KP 8' BLE BM 8' BLE KP 8' BLE BM 8'   L/s Mobs             Central Gliders          Bm 6' Tibial N LLE (3x30\")   MET Shotgun BM            Neuro Re-Ed             Patient Education 15'            TrA contractions  20x3\" 20x3\" 15x3\" 15x3\" 10x3\" 15x3\" 15x3\" 15x3\" 15x3\"   LTR  10x ea 10x ea 10x ea 10x ea 10x 10x ea 10x ea 10x ea 10x   Paloff Presses             Seated HS stretch          2x30\" B   Standing Pball Crush             3-way ball rollout    10x ea 15x ea 10x ea 10x ea 10x ea 10x ea    HS stretch             Slump N Tensioner's  15x ea 10x ea b/l 15x ea b/l 15x ea b/l 10x B with therapist hip flexion 10x b/l w/assisted flex 15x ea b/l 15x ea b/l    Hip add isometric  2x10 3\" hold 2x10 3\" 2x10 3\" hold 2x10 3\"  2x10 3\" 2x10 3\" 3x10 3\" hold 3x10 3\"     Glute Isometric Squeeze  20x3\" 10x3\" 10x3\" 15x3\"        Ther Ex             NuStep  8' x L4 8' L5 LE only 8' x L5 LE only 8' L6 LE 8' x L6 8' L6 LE 8' L6 LE 8' L6 LE 8' x L7   Glute Bridges  6x with partial range np   2x8 with partal range and glute squeeze 2x10 partial range 2x10 partial range 2x10 partial range 2x12    Clamshells  3x10 Otb in supine 3x10 otb supine  2x10 otb supine   2x12 Otb in supine 2x12 otb supine    Seated knee flex/ext with TB  2x10 OTB 2x10 ea b/l otb          HR/TR   10x HR          Leg " Press             Titan LAQ/HS curl             Standing Hip abd        15x ea     Repeated Mvmt Assessment    5'         STS          3x5 +1 foam   Ther Activity                                       Gait Training                                       Modalities

## 2025-04-29 DIAGNOSIS — G89.29 CHRONIC BILATERAL LOW BACK PAIN WITHOUT SCIATICA: Primary | ICD-10-CM

## 2025-04-29 DIAGNOSIS — M54.50 CHRONIC BILATERAL LOW BACK PAIN WITHOUT SCIATICA: Primary | ICD-10-CM

## 2025-04-30 RX ORDER — GABAPENTIN 300 MG/1
300 CAPSULE ORAL 3 TIMES DAILY
Qty: 270 CAPSULE | Refills: 1 | Status: SHIPPED | OUTPATIENT
Start: 2025-04-30

## 2025-05-01 ENCOUNTER — OFFICE VISIT (OUTPATIENT)
Dept: PHYSICAL THERAPY | Facility: REHABILITATION | Age: 65
End: 2025-05-01
Attending: ANESTHESIOLOGY
Payer: COMMERCIAL

## 2025-05-01 DIAGNOSIS — M79.18 MYOFASCIAL PAIN: ICD-10-CM

## 2025-05-01 DIAGNOSIS — M54.16 LUMBAR RADICULOPATHY: Primary | ICD-10-CM

## 2025-05-01 DIAGNOSIS — M47.816 LUMBAR SPONDYLOSIS: ICD-10-CM

## 2025-05-01 PROCEDURE — 97140 MANUAL THERAPY 1/> REGIONS: CPT

## 2025-05-01 PROCEDURE — 97110 THERAPEUTIC EXERCISES: CPT

## 2025-05-01 NOTE — PROGRESS NOTES
"Daily Note     Today's date: 2025  Patient name: Liban Montes  : 1960  MRN: [id number]  Referring provider: Tony Cabrera DO  Dx:   Encounter Diagnosis     ICD-10-CM    1. Lumbar radiculopathy  M54.16       2. Lumbar spondylosis  M47.816       3. Myofascial pain  M79.18                      Subjective: Pt reports feeling \"pretty good\", and notes very little pain at the start of today's session. He denies any adverse effects after last session.      Objective: See treatment diary below      Assessment: Pt tolerated today's session well with no increase in pain noted during or after completion of exercises. Will monitor status NV and progress as appropriate. Pt will benefit from continued skilled PT services to address remaining impairments and improve QOL. Pt was with Norberto Love PT, DPT for the entirety of today's session.          Plan: Continue per plan of care.      Precautions: CAD, HTN, acute on chronic systolic congestive heart failure, COPD, T2DM, Morbid Obesity    POC expires Unit limit Auth Expiration date PT/OT + Visit Limit?   2025  BOMN  30         Visit/Unit Tracking  AUTH Status:  Date 3/20/2025 3/26 4/3 4/7 4/10 4/14 4/17 4/21 4/24 4/28   [employer] Used 1 2 3 4 5 6 7 8 9 10    Remaining                 Pertinent Findings:      Test / Measure  3/20/2025 4/28/25   FOTO (Predicted 42) 16 38   Decreased gross LLE strength Avg of 3+/5  NT     Access Code: [email or web address]  URL: [email or web address]  Date: 2025  Prepared by: Norberto Love    Exercises  - Seated Transversus Abdominis Bracing  - 1 x daily - 7 x weekly - 3 sets - 10 reps - 3 hold  - Seated Pelvic Tilt  - 1 x daily - 7 x weekly - 2 sets - 10 reps  - Seated Sciatic Tensioner  - 1 x daily - 7 x weekly - 2 sets - 10 reps - 2 hold  - Seated Lumbar Flexion Stretch  - 4 x daily - 7 x weekly - 1 sets - 15 reps - 3 hold  - Seated Hamstring Stretch  - 1 x daily - 7 x weekly - 1 sets - 3 reps - 30 hold  - Sit to " "Stand  - 1 x daily - 4 x weekly - 3 sets - 5 reps  - Seated Hip Abduction  - 1 x daily - 4 x weekly - 2 sets - 12 reps    Manuals 3/20 3/26 4/3 4/7 4/10 4/14 4/17 4/21 4/24 4/28 5/1   LE stretching    BM 10' KP 8' LLE BM 8' KP 8' BLE BM 8' BLE KP 8' BLE BM 8' BM 10'   L/s Mobs              Central Gliders          Bm 6' Tibial N LLE (3x30\") Bm 6' Tibial N LLE (3x30\")   MET Shotgun BM             Neuro Re-Ed              Patient Education 15'             TrA contractions  20x3\" 20x3\" 15x3\" 15x3\" 10x3\" 15x3\" 15x3\" 15x3\" 15x3\"    LTR  10x ea 10x ea 10x ea 10x ea 10x 10x ea 10x ea 10x ea 10x    Paloff Presses              Seated HS stretch          2x30\" B    Standing Pball Crush              3-way ball rollout    10x ea 15x ea 10x ea 10x ea 10x ea 10x ea     HS stretch              Slump N Tensioner's  15x ea 10x ea b/l 15x ea b/l 15x ea b/l 10x B with therapist hip flexion 10x b/l w/assisted flex 15x ea b/l 15x ea b/l     Hip add isometric  2x10 3\" hold 2x10 3\" 2x10 3\" hold 2x10 3\"  2x10 3\" 2x10 3\" 3x10 3\" hold 3x10 3\"      Glute Isometric Squeeze  20x3\" 10x3\" 10x3\" 15x3\"         Ther Ex              NuStep  8' x L4 8' L5 LE only 8' x L5 LE only 8' L6 LE 8' x L6 8' L6 LE 8' L6 LE 8' L6 LE 8' x L7 8' x L7   Glute Bridges  6x with partial range np   2x8 with partal range and glute squeeze 2x10 partial range 2x10 partial range 2x10 partial range  2x8 in supine   Clamshells  3x10 Otb in supine 3x10 otb supine  2x10 otb supine   2x12 Otb in supine 2x12 otb supine     Seated knee flex/ext with TB  2x10 OTB 2x10 ea b/l otb           HR/TR   10x HR           Leg Press              Titan LAQ/HS curl              Standing Hip abd        15x ea      Standing Hip Ext           2x10 B   Repeated Mvmt Assessment    5'          STS          3x5 +1 foam 2x5 +1 foam    2x5 no foam   Side lying Hip Abd           2x8 B   Ther Activity                                          Gait Training                                        "   Modalities

## 2025-05-05 ENCOUNTER — OFFICE VISIT (OUTPATIENT)
Dept: PHYSICAL THERAPY | Facility: REHABILITATION | Age: 65
End: 2025-05-05
Attending: ANESTHESIOLOGY
Payer: COMMERCIAL

## 2025-05-05 DIAGNOSIS — M47.816 LUMBAR SPONDYLOSIS: ICD-10-CM

## 2025-05-05 DIAGNOSIS — M79.18 MYOFASCIAL PAIN: ICD-10-CM

## 2025-05-05 DIAGNOSIS — M54.16 LUMBAR RADICULOPATHY: Primary | ICD-10-CM

## 2025-05-05 PROCEDURE — 97110 THERAPEUTIC EXERCISES: CPT

## 2025-05-05 PROCEDURE — 97140 MANUAL THERAPY 1/> REGIONS: CPT

## 2025-05-05 NOTE — PROGRESS NOTES
Daily Note     Today's date: 2025  Patient name: Liban Montes  : 1960  MRN: [id number]  Referring provider: Tony Cabrera DO  Dx:   Encounter Diagnosis     ICD-10-CM    1. Lumbar radiculopathy  M54.16       2. Lumbar spondylosis  M47.816       3. Myofascial pain  M79.18                      Subjective: Pt reports continued improvement, has shot in back scheduled for tomorrow.  Pt states he was able to cut his grass this past weekend with some minor soreness afterwards.      Objective: See treatment diary below      Assessment: Tolerated treatment well. Patient would benefit from continued PT.  Pt. able to complete all exercises with no increase in pain during or after session.  Pt. 1:1 with PTA for entirety.      Plan: Continue per plan of care.      Precautions: CAD, HTN, acute on chronic systolic congestive heart failure, COPD, T2DM, Morbid Obesity    POC expires Unit limit Auth Expiration date PT/OT + Visit Limit?   2025  BOMN  30         Visit/Unit Tracking  AUTH Status:  Date    Highmark Used 6 7 8 9 10 11    Remaining             Pertinent Findings:      Test / Measure  3/20/2025 4/28/25   FOTO (Predicted 42) 16 38   Decreased gross LLE strength Avg of 3+/5  NT     Access Code: [email or web address]  URL: [email or web address]  Date: 2025  Prepared by: Norberto Love    Exercises  - Seated Transversus Abdominis Bracing  - 1 x daily - 7 x weekly - 3 sets - 10 reps - 3 hold  - Seated Pelvic Tilt  - 1 x daily - 7 x weekly - 2 sets - 10 reps  - Seated Sciatic Tensioner  - 1 x daily - 7 x weekly - 2 sets - 10 reps - 2 hold  - Seated Lumbar Flexion Stretch  - 4 x daily - 7 x weekly - 1 sets - 15 reps - 3 hold  - Seated Hamstring Stretch  - 1 x daily - 7 x weekly - 1 sets - 3 reps - 30 hold  - Sit to Stand  - 1 x daily - 4 x weekly - 3 sets - 5 reps  - Seated Hip Abduction  - 1 x daily - 4 x weekly - 2 sets - 12 reps    Manuals 4/14 4/  "  LE stretching BM 8' KP 8' BLE BM 8' BLE KP 8' BLE BM 8' BM 10' KP 10'   L/s Mobs          Central Gliders     Bm 6' Tibial N LLE (3x30\") Bm 6' Tibial N LLE (3x30\")    MET Shotgun          Neuro Re-Ed          Patient Education          TrA contractions 10x3\" 15x3\" 15x3\" 15x3\" 15x3\"     LTR 10x 10x ea 10x ea 10x ea 10x     Paloff Presses          Seated HS stretch     2x30\" B     Standing Pball Crush          3-way ball rollout 10x ea 10x ea 10x ea 10x ea   10x ea   HS stretch          Slump N Tensioner's 10x B with therapist hip flexion 10x b/l w/assisted flex 15x ea b/l 15x ea b/l      Hip add isometric 2x10 3\" 2x10 3\" 3x10 3\" hold 3x10 3\"       Glute Isometric Squeeze          Ther Ex          NuStep 8' x L6 8' L6 LE 8' L6 LE 8' L6 LE 8' x L7 8' x L7 8' L7   Glute Bridges 2x8 with partal range and glute squeeze 2x10 partial range 2x10 partial range 2x10 partial range  2x8 in supine 2x10    Clamshells   2x12 Otb in supine 2x12 otb supine      Seated knee flex/ext with TB          HR/TR          Leg Press          Titan LAQ/HS curl          Standing Hip abd   15x ea       Standing Hip Ext      2x10 B 2x10 b/l   Repeated Mvmt Assessment          STS     3x5 +1 foam 2x5 +1 foam    2x5 no foam 2x10 no foam   Side lying Hip Abd      2x8 B 2x8 b/l   Ther Activity                              Gait Training                              Modalities                                                  "

## 2025-05-06 ENCOUNTER — HOSPITAL ENCOUNTER (OUTPATIENT)
Dept: RADIOLOGY | Facility: CLINIC | Age: 65
Discharge: HOME | End: 2025-05-06
Attending: ANESTHESIOLOGY
Payer: COMMERCIAL

## 2025-05-06 VITALS
DIASTOLIC BLOOD PRESSURE: 75 MMHG | SYSTOLIC BLOOD PRESSURE: 144 MMHG | OXYGEN SATURATION: 95 % | RESPIRATION RATE: 20 BRPM | HEART RATE: 76 BPM | TEMPERATURE: 97.8 F

## 2025-05-06 DIAGNOSIS — M54.16 LUMBAR RADICULOPATHY: ICD-10-CM

## 2025-05-06 PROCEDURE — 64483 NJX AA&/STRD TFRM EPI L/S 1: CPT | Performed by: ANESTHESIOLOGY

## 2025-05-06 RX ORDER — PAPAVERINE HCL 150 MG
10 CAPSULE, EXTENDED RELEASE ORAL ONCE
Status: COMPLETED | OUTPATIENT
Start: 2025-05-06 | End: 2025-05-06

## 2025-05-06 RX ADMIN — LIDOCAINE HYDROCHLORIDE 1 ML: 20 INJECTION, SOLUTION EPIDURAL; INFILTRATION; INTRACAUDAL; PERINEURAL at 14:01

## 2025-05-06 RX ADMIN — IOHEXOL 1 ML: 300 INJECTION, SOLUTION INTRAVENOUS at 14:01

## 2025-05-06 RX ADMIN — DEXAMETHASONE SODIUM PHOSPHATE 10 MG: 10 INJECTION, SOLUTION INTRAMUSCULAR; INTRAVENOUS at 14:01

## 2025-05-06 NOTE — H&P
History of Present Illness: The patient is a 64 y.o. male who presents with complaints of low back and leg pain.    Past Medical History:   Diagnosis Date    Abscess of left thigh     Acute myocardial infarction (HCC)     Anesthesia     woke up during procedure    Anxiety     Arteriosclerotic cardiovascular disease     Arthritis     Asthma     Chest pain     COPD (chronic obstructive pulmonary disease) (HCC)     Coronary artery disease     Diabetes mellitus (HCC)     Fatty liver     Gastric ulcer     GERD (gastroesophageal reflux disease)     Hyperlipidemia     Hypertension     Low back pain     Myocardial infarction (HCC) 2006,2007    Obesity     Obstructive sleep apnea     no Cpap    Spondylosis of lumbar region without myelopathy or radiculopathy        Past Surgical History:   Procedure Laterality Date    CARPAL TUNNEL RELEASE Right     COLONOSCOPY      CORONARY ANGIOPLASTY WITH STENT PLACEMENT  2006, 2007    EGD      HEMORRHOID SURGERY      NEUROPLASTY / TRANSPOSITION MEDIAN NERVE AT CARPAL TUNNEL Right     CO SURGICAL ARTHROSCOPY SHOULDER W/LSS&RESCJ ADS Left 6/10/2020    Procedure: SHOULDER ARTHROSCOPIC CAPSULAR RELEASE;  Surgeon: Christiano Aguiar MD;  Location: [facility];  Service: Orthopedics         Current Outpatient Medications:     gabapentin (NEURONTIN) 300 mg capsule, Take 1 capsule (300 mg total) by mouth 3 (three) times a day, Disp: 270 capsule, Rfl: 1    acetaminophen (TYLENOL) 325 mg tablet, Take 650 mg by mouth every 6 (six) hours as needed for mild pain, Disp: , Rfl:     albuterol (PROVENTIL HFA,VENTOLIN HFA) 90 mcg/act inhaler, INHALE 2 PUFFS AS NEEDED FOR SHORTNESS OF BREATH, Disp: 18 g, Rfl: 5    aspirin (Aspirin Low Dose) 81 mg EC tablet, TAKE 1 TABLET BY MOUTH EVERY DAY, Disp: 30 tablet, Rfl: 5    atorvastatin (LIPITOR) 40 mg tablet, TAKE 1 TABLET BY MOUTH EVERY DAY, Disp: 90 tablet, Rfl: 1    clopidogrel (PLAVIX) 75 mg tablet, TAKE 1 TABLET BY MOUTH EVERY DAY, Disp: 90 tablet, Rfl: 1     ezetimibe (ZETIA) 10 mg tablet, Take 10 mg by mouth daily, Disp: , Rfl:     fenofibrate (TRICOR) 145 mg tablet, TAKE 1 TABLET BY MOUTH EVERY DAY, Disp: 90 tablet, Rfl: 1    furosemide (LASIX) 40 mg tablet, , Disp: , Rfl:     HYDROcodone-acetaminophen (Norco) 5-325 mg per tablet, Take 1 tablet by mouth every 4 (four) hours as needed for pain Max Daily Amount: 6 tablets, Disp: 100 tablet, Rfl: 0    Insulin Glargine-yfgn 100 UNIT/ML SOPN, INJECT 0.33 ML (33 UNITS TOTAL) AS DIRECTED 2 (TWO) TIMES A  UNITS TWICE A DAY, Disp: 180 mL, Rfl: 5    Insulin Pen Needle (B-D UF III MINI PEN NEEDLES) 31G X 5 MM MISC, Inject as directed 2 (two) times a day Use as directed, Disp: 200 each, Rfl: 1    Jardiance 25 MG TABS, TAKE 1 TABLET (25 MG TOTAL) BY MOUTH DAILY., Disp: 90 tablet, Rfl: 1    losartan (COZAAR) 25 mg tablet, TAKE 1 TABLET (25 MG TOTAL) BY MOUTH DAILY., Disp: 90 tablet, Rfl: 1    methocarbamol (Robaxin-750) 750 mg tablet, Take 1 tablet (750 mg total) by mouth every 6 (six) hours as needed for muscle spasms, Disp: 90 tablet, Rfl: 2    metoprolol tartrate (LOPRESSOR) 50 mg tablet, Take 25 mg by mouth 2 (two) times a day, Disp: , Rfl:     nitroglycerin (NITROSTAT) 0.4 mg SL tablet, PLEASE SEE ATTACHED FOR DETAILED DIRECTIONS, Disp: , Rfl:     pantoprazole (PROTONIX) 40 mg tablet, TAKE 1 TABLET BY MOUTH EVERY DAY, Disp: 90 tablet, Rfl: 5    Allergies   Allergen Reactions    Bactrim [Sulfamethoxazole-Trimethoprim] Edema    Cefaclor Shortness Of Breath     Other reaction(s): Respiratory Distress    Simvastatin Shortness Of Breath     Other reaction(s): Respiratory Distress       Physical Exam:   Vitals:    05/06/25 1338   BP: 155/76   Pulse: 77   Resp: 20   Temp: 97.8 °F (36.6 °C)   SpO2: 95%     General: Awake, Alert, Oriented x 3, Mood and affect appropriate  Respiratory: Respirations even and unlabored  Cardiovascular: Peripheral pulses intact; no edema  Musculoskeletal Exam: antalgic gait    ASA Score:  3    Patient/Chart Verification  Patient ID Verified: Verbal  ID Band Applied: No  Consents Confirmed: To be obtained in the Procedural area  H&P( within 30 days) Verified: To be obtained in the Procedural area  Interval H&P(within 24 hr) Complete (required for Outpatients and Surgery Admit only): To be obtained in the Procedural area  Allergies Reviewed: Yes  Anticoag/NSAID held?: Yes (stopped Plavix on 4/28/25)  Currently on antibiotics?: No    Assessment:   1. Lumbar radiculopathy        Plan: Left L5 TFESI

## 2025-05-06 NOTE — DISCHARGE INSTR - LAB
Epidural Steroid Injection   WHAT YOU NEED TO KNOW:   An epidural steroid injection (CARMEN) is a procedure to inject steroid medicine into the epidural space. The epidural space is between your spinal cord and vertebrae. Steroids reduce inflammation and fluid buildup in your spine that may be causing pain. You may be given pain medicine along with the steroids.          ACTIVITY  Do not drive or operate machinery today.  No strenuous activity today - bending, lifting, etc.  You may resume normal activites starting tomorrow - start slowly and as tolerated.  You may shower today, but no tub baths or hot tubs.  You may have numbness for several hours from the local anesthetic. Please use caution and common sense, especially with weight-bearing activities.    CARE OF THE INJECTION SITE  If you have soreness or pain, apply ice to the area today (20 minutes on/20 minutes off).  Starting tomorrow, you may use warm, moist heat or ice if needed.  You may have an increase or change in your discomfort for 36-48 hours after your treatment.  Apply ice and continue with any pain medication you have been prescribed.  Notify the [facility] if you have any of the following: redness, drainage, swelling, headache, stiff neck or fever above 100°F.    SPECIAL INSTRUCTIONS  Our office will contact you in approximately 14 days for a progress report.    MEDICATIONS  Continue to take all routine medications.  Our office may have instructed you to hold some medications.    As no general anesthesia was used in today's procedure, you should not experience any side effects related to anesthesia.     If you are diabetic, the steroids used in today's injection may temporarily increase your blood sugar levels after the first few days after your injection. Please keep a close eye on your sugars and alert the doctor who manages your diabetes if your sugars are significantly high from your baseline or you are symptomatic.     If you have a  problem specifically related to your procedure, please call our office at [phone].  Problems not related to your procedure should be directed to your primary care physician.

## 2025-05-08 ENCOUNTER — OFFICE VISIT (OUTPATIENT)
Dept: PHYSICAL THERAPY | Facility: REHABILITATION | Age: 65
End: 2025-05-08
Attending: ANESTHESIOLOGY
Payer: COMMERCIAL

## 2025-05-08 DIAGNOSIS — M54.50 CHRONIC BILATERAL LOW BACK PAIN WITHOUT SCIATICA: ICD-10-CM

## 2025-05-08 DIAGNOSIS — M54.16 LUMBAR RADICULOPATHY: Primary | ICD-10-CM

## 2025-05-08 DIAGNOSIS — G89.29 CHRONIC BILATERAL LOW BACK PAIN WITHOUT SCIATICA: ICD-10-CM

## 2025-05-08 DIAGNOSIS — M79.18 MYOFASCIAL PAIN: ICD-10-CM

## 2025-05-08 DIAGNOSIS — M47.816 LUMBAR SPONDYLOSIS: ICD-10-CM

## 2025-05-08 PROCEDURE — 97140 MANUAL THERAPY 1/> REGIONS: CPT

## 2025-05-08 PROCEDURE — 97110 THERAPEUTIC EXERCISES: CPT

## 2025-05-08 RX ORDER — HYDROCODONE BITARTRATE AND ACETAMINOPHEN 5; 325 MG/1; MG/1
1 TABLET ORAL EVERY 4 HOURS PRN
Qty: 100 TABLET | Refills: 0 | Status: SHIPPED | OUTPATIENT
Start: 2025-05-08

## 2025-05-08 NOTE — PROGRESS NOTES
Daily Note     Today's date: 2025  Patient name: Liban Montes  : 1960  MRN: [id number]  Referring provider: Tony Cabrera DO  Dx:   Encounter Diagnosis     ICD-10-CM    1. Lumbar radiculopathy  M54.16       2. Lumbar spondylosis  M47.816       3. Myofascial pain  M79.18                      Subjective: Pt reports continued improvement, states that shots in back on Tues went well.      Objective: See treatment diary below      Assessment: Tolerated treatment well. Patient would benefit from continued PT.  Pt. able to complete all exercises with no increase in pain during or after session.  Pt demonstrated improvement with overall mobility and ambulation throughout session.  Pt. 1:1 with PTA for entirety.      Plan: Continue per plan of care.      Precautions: CAD, HTN, acute on chronic systolic congestive heart failure, COPD, T2DM, Morbid Obesity    POC expires Unit limit Auth Expiration date PT/OT + Visit Limit?   2025  BOMN  30         Visit/Unit Tracking  AUTH Status:  Date    Highmark Used 6 7 8 9 10 11 12    Remaining              Pertinent Findings:      Test / Measure  3/20/2025 4/28/25   FOTO (Predicted 42) 16 38   Decreased gross LLE strength Avg of 3+/5  NT     Access Code: [email or web address]  URL: [email or web address]  Date: 2025  Prepared by: Norberto Love    Exercises  - Seated Transversus Abdominis Bracing  - 1 x daily - 7 x weekly - 3 sets - 10 reps - 3 hold  - Seated Pelvic Tilt  - 1 x daily - 7 x weekly - 2 sets - 10 reps  - Seated Sciatic Tensioner  - 1 x daily - 7 x weekly - 2 sets - 10 reps - 2 hold  - Seated Lumbar Flexion Stretch  - 4 x daily - 7 x weekly - 1 sets - 15 reps - 3 hold  - Seated Hamstring Stretch  - 1 x daily - 7 x weekly - 1 sets - 3 reps - 30 hold  - Sit to Stand  - 1 x daily - 4 x weekly - 3 sets - 5 reps  - Seated Hip Abduction  - 1 x daily - 4 x weekly - 2 sets - 12 reps    Manuals /1  "5/5 5/8   LE stretching BM 8' KP 8' BLE BM 8' BLE KP 8' BLE BM 8' BM 10' KP 10' KP 10'   L/s Mobs           Central Gliders     Bm 6' Tibial N LLE (3x30\") Bm 6' Tibial N LLE (3x30\")     MET Shotgun           Neuro Re-Ed           Patient Education           TrA contractions 10x3\" 15x3\" 15x3\" 15x3\" 15x3\"      LTR 10x 10x ea 10x ea 10x ea 10x      Paloff Presses           Seated HS stretch     2x30\" B      Standing Pball Crush           3-way ball rollout 10x ea 10x ea 10x ea 10x ea   10x ea 10x ea   HS stretch           Slump N Tensioner's 10x B with therapist hip flexion 10x b/l w/assisted flex 15x ea b/l 15x ea b/l       Hip add isometric 2x10 3\" 2x10 3\" 3x10 3\" hold 3x10 3\"        Glute Isometric Squeeze           Ther Ex           NuStep 8' x L6 8' L6 LE 8' L6 LE 8' L6 LE 8' x L7 8' x L7 8' L7 8' L7   Glute Bridges 2x8 with partal range and glute squeeze 2x10 partial range 2x10 partial range 2x10 partial range  2x8 in supine 2x10  2x10    Clamshells   2x12 Otb in supine 2x12 otb supine       Seated knee flex/ext with TB           HR/TR           Leg Press           Titan LAQ/HS curl           Standing Hip abd   15x ea        Standing Hip Ext      2x10 B 2x10 b/l 2x10 b/l   Repeated Mvmt Assessment           STS     3x5 +1 foam 2x5 +1 foam    2x5 no foam 2x10 no foam 2x10 no foam   Side lying Hip Abd      2x8 B 2x8 b/l 2x8 b/l   Ther Activity                                 Gait Training                                 Modalities                                                       "

## 2025-05-08 NOTE — TELEPHONE ENCOUNTER
Please confirm dose decrease on levothyroxine 137mcg from one tablet by mouth daily to one tablet by mouth 6 days of the week.  A pharmacist can be reached directly at [phone] [place] [facility] [place].    Thanks in advance    Sheri Jasso, PharmD  [facility]   [address] [facility] [address] [address]  Ph: [phone]   Fax:[phone]     Requested medication(s) are due for refill today: Yes  Patient has already received a courtesy refill: No  Other reason request has been forwarded to provider:

## 2025-05-09 DIAGNOSIS — E78.00 PURE HYPERCHOLESTEROLEMIA: ICD-10-CM

## 2025-05-09 RX ORDER — FENOFIBRATE 145 MG/1
145 TABLET, FILM COATED ORAL DAILY
Qty: 90 TABLET | Refills: 1 | Status: SHIPPED | OUTPATIENT
Start: 2025-05-09

## 2025-05-12 ENCOUNTER — OFFICE VISIT (OUTPATIENT)
Dept: PHYSICAL THERAPY | Facility: REHABILITATION | Age: 65
End: 2025-05-12
Attending: ANESTHESIOLOGY
Payer: COMMERCIAL

## 2025-05-12 DIAGNOSIS — M79.18 MYOFASCIAL PAIN: ICD-10-CM

## 2025-05-12 DIAGNOSIS — M47.816 LUMBAR SPONDYLOSIS: ICD-10-CM

## 2025-05-12 DIAGNOSIS — M54.16 LUMBAR RADICULOPATHY: Primary | ICD-10-CM

## 2025-05-12 PROCEDURE — 97110 THERAPEUTIC EXERCISES: CPT

## 2025-05-12 PROCEDURE — 97140 MANUAL THERAPY 1/> REGIONS: CPT

## 2025-05-12 NOTE — PROGRESS NOTES
Daily Note     Today's date: 2025  Patient name: Liban Montes  : 1960  MRN: [id number]  Referring provider: Tony Cabrera DO  Dx:   Encounter Diagnosis     ICD-10-CM    1. Lumbar radiculopathy  M54.16       2. Lumbar spondylosis  M47.816       3. Myofascial pain  M79.18                      Subjective: Pt reports minor increase in pain soreness onset over the weekend, no known cause.  Pt states he continues to feel overall improvement.      Objective: See treatment diary below      Assessment: Tolerated treatment well. Patient would benefit from continued PT.  Pt. able to complete all exercises with no increase in pain during or after session.  Pt noted some minor relief post session.  Pt. 1:1 with PTA for entirety.      Plan: Continue per plan of care.      Precautions: CAD, HTN, acute on chronic systolic congestive heart failure, COPD, T2DM, Morbid Obesity    POC expires Unit limit Auth Expiration date PT/OT + Visit Limit?   2025  BOMN  30         Visit/Unit Tracking  AUTH Status:  Date    High[employer] Used 6 7 8 9 10 11 12 13    Remaining               Pertinent Findings:      Test / Measure  3/20/2025 4/28/25   FOTO (Predicted 42) 16 38   Decreased gross LLE strength Avg of 3+/5  NT     Access Code: [email or web address]  URL: [email or web address]  Date: 2025  Prepared by: Norberto Love    Exercises  - Seated Transversus Abdominis Bracing  - 1 x daily - 7 x weekly - 3 sets - 10 reps - 3 hold  - Seated Pelvic Tilt  - 1 x daily - 7 x weekly - 2 sets - 10 reps  - Seated Sciatic Tensioner  - 1 x daily - 7 x weekly - 2 sets - 10 reps - 2 hold  - Seated Lumbar Flexion Stretch  - 4 x daily - 7 x weekly - 1 sets - 15 reps - 3 hold  - Seated Hamstring Stretch  - 1 x daily - 7 x weekly - 1 sets - 3 reps - 30 hold  - Sit to Stand  - 1 x daily - 4 x weekly - 3 sets - 5 reps  - Seated Hip Abduction  - 1 x daily - 4 x weekly - 2 sets - 12 reps    Manuals   "4/17 4/21 4/24 4/28 5/1 5/5 5/8 5/12   LE stretching BM 8' KP 8' BLE BM 8' BLE KP 8' BLE BM 8' BM 10' KP 10' KP 10' KP 10'   L/s Mobs            Central Gliders     Bm 6' Tibial N LLE (3x30\") Bm 6' Tibial N LLE (3x30\")      MET Shotgun            Neuro Re-Ed            Patient Education            TrA contractions 10x3\" 15x3\" 15x3\" 15x3\" 15x3\"       LTR 10x 10x ea 10x ea 10x ea 10x       Paloff Presses            Seated HS stretch     2x30\" B       Standing Pball Crush            3-way ball rollout 10x ea 10x ea 10x ea 10x ea   10x ea 10x ea 10x ea   HS stretch            Slump N Tensioner's 10x B with therapist hip flexion 10x b/l w/assisted flex 15x ea b/l 15x ea b/l        Hip add isometric 2x10 3\" 2x10 3\" 3x10 3\" hold 3x10 3\"         Glute Isometric Squeeze            Ther Ex            NuStep 8' x L6 8' L6 LE 8' L6 LE 8' L6 LE 8' x L7 8' x L7 8' L7 8' L7 8' L7   Glute Bridges 2x8 with partal range and glute squeeze 2x10 partial range 2x10 partial range 2x10 partial range  2x8 in supine 2x10  2x10  2x10   Clamshells   2x12 Otb in supine 2x12 otb supine        Seated knee flex/ext with TB            HR/TR            Leg Press            Titan LAQ/HS curl            Standing Hip abd   15x ea         Standing Hip Ext      2x10 B 2x10 b/l 2x10 b/l 2x10 b/l   Repeated Mvmt Assessment            STS     3x5 +1 foam 2x5 +1 foam    2x5 no foam 2x10 no foam 2x10 no foam 2x10 no foam   Side lying Hip Abd      2x8 B 2x8 b/l 2x8 b/l 2x10 b/l   Ther Activity                                    Gait Training                                    Modalities                                                            "

## 2025-05-15 ENCOUNTER — OFFICE VISIT (OUTPATIENT)
Dept: PHYSICAL THERAPY | Facility: REHABILITATION | Age: 65
End: 2025-05-15
Attending: ANESTHESIOLOGY
Payer: COMMERCIAL

## 2025-05-15 DIAGNOSIS — M47.816 LUMBAR SPONDYLOSIS: ICD-10-CM

## 2025-05-15 DIAGNOSIS — M79.18 MYOFASCIAL PAIN: ICD-10-CM

## 2025-05-15 DIAGNOSIS — M54.16 LUMBAR RADICULOPATHY: Primary | ICD-10-CM

## 2025-05-15 PROCEDURE — 97110 THERAPEUTIC EXERCISES: CPT

## 2025-05-15 PROCEDURE — 97112 NEUROMUSCULAR REEDUCATION: CPT

## 2025-05-15 NOTE — PROGRESS NOTES
Daily Note     Today's date: 5/15/2025  Patient name: Liban Montes  : 1960  MRN: [id number]  Referring provider: Tony Cabrera DO  Dx:   Encounter Diagnosis     ICD-10-CM    1. Lumbar radiculopathy  M54.16       2. Lumbar spondylosis  M47.816       3. Myofascial pain  M79.18             Start Time: 1403  Stop Time: 1445  Total time in clinic (min): 42 minutes    Subjective: Pt reports a significant increase in pain at the start of today's session. He rates his pain at a 7/10 on NRPS and thinks it may be linked to the poor weather. He also notes cutting his grass again for approximately 30 mins which increased his sx irritability.      Objective: See treatment diary below      Assessment: Pt tolerated today's session well with no increase in pain noted during or after completion of exercises. Exercise volume and intensity was decreased during today's session due to an increase in sx irritability at the start of today's session. Will monitor status NV and progress as appropriate. Pt will benefit from continued skilled PT services to address remaining impairments and improve QOL. Pt was with Norberto Love PT, DPT for the entirety of today's session.        Plan: Continue per plan of care.      Precautions: CAD, HTN, acute on chronic systolic congestive heart failure, COPD, T2DM, Morbid Obesity    POC expires Unit limit Auth Expiration date PT/OT + Visit Limit?   2025  BOMN  30         Visit/Unit Tracking  AUTH Status:  Date 5/1 5/5 5/8 5/12 5/15   [employer]mark Used 11 12 13 14 15    Remaining            Pertinent Findings:      Test / Measure  3/20/2025 4/28/25   FOTO (Predicted 42) 16 38   Decreased gross LLE strength Avg of 3+/5  NT     Access Code: [email or web address]  URL: [email or web address]  Date: 2025  Prepared by: Norberto Love    Exercises  - Seated Transversus Abdominis Bracing  - 1 x daily - 7 x weekly - 3 sets - 10 reps - 3 hold  - Seated Pelvic Tilt  - 1 x daily - 7 x weekly -  "2 sets - 10 reps  - Seated Sciatic Tensioner  - 1 x daily - 7 x weekly - 2 sets - 10 reps - 2 hold  - Seated Lumbar Flexion Stretch  - 4 x daily - 7 x weekly - 1 sets - 15 reps - 3 hold  - Seated Hamstring Stretch  - 1 x daily - 7 x weekly - 1 sets - 3 reps - 30 hold  - Sit to Stand  - 1 x daily - 4 x weekly - 3 sets - 5 reps  - Seated Hip Abduction  - 1 x daily - 4 x weekly - 2 sets - 12 reps    Manuals 5/1 5/5 5/8 5/12 5/15   LE stretching BM 10' KP 10' KP 10' KP 10' BM 10'   L/s Mobs        Central Gliders Bm 6' Tibial N LLE (3x30\")       MET Shotgun        Neuro Re-Ed        Patient Education        TrA contractions        LTR        Paloff Presses        Seated HS stretch     2x30\" B/l   Standing Pball Crush        3-way ball rollout  10x ea 10x ea 10x ea 10x ea 3\" hold   HS stretch        Slump N Tensioner's        Hip add isometric        Glute Isometric Squeeze        Ther Ex        NuStep 8' x L7 8' L7 8' L7 8' L7 8' x L6   Glute Bridges 2x8 in supine 2x10  2x10  2x10 NT   Clamshells        Seated knee flex/ext with TB        HR/TR        Leg Press        Titan LAQ/HS curl        Standing Hip abd     2x8 B/l   Standing Hip Ext 2x10 B 2x10 b/l 2x10 b/l 2x10 b/l 2x8 B/l   Repeated Mvmt Assessment        STS 2x5 +1 foam    2x5 no foam 2x10 no foam 2x10 no foam 2x10 no foam 3x5 +1 foam   Side lying Hip Abd 2x8 B 2x8 b/l 2x8 b/l 2x10 b/l    Ther Activity                        Gait Training                        Modalities                                                "

## 2025-05-19 ENCOUNTER — OFFICE VISIT (OUTPATIENT)
Dept: PHYSICAL THERAPY | Facility: REHABILITATION | Age: 65
End: 2025-05-19
Attending: ANESTHESIOLOGY
Payer: COMMERCIAL

## 2025-05-19 DIAGNOSIS — M54.16 LUMBAR RADICULOPATHY: Primary | ICD-10-CM

## 2025-05-19 DIAGNOSIS — M47.816 LUMBAR SPONDYLOSIS: ICD-10-CM

## 2025-05-19 DIAGNOSIS — M79.18 MYOFASCIAL PAIN: ICD-10-CM

## 2025-05-19 PROCEDURE — 97110 THERAPEUTIC EXERCISES: CPT

## 2025-05-19 PROCEDURE — 97140 MANUAL THERAPY 1/> REGIONS: CPT

## 2025-05-19 NOTE — PROGRESS NOTES
Daily Note     Today's date: 2025  Patient name: Liban Montes  : 1960  MRN: [id number]  Referring provider: Tony Cabrera DO  Dx:   Encounter Diagnosis     ICD-10-CM    1. Lumbar radiculopathy  M54.16       2. Lumbar spondylosis  M47.816       3. Myofascial pain  M79.18             Start Time: 1400  Stop Time: 1445  Total time in clinic (min): 45 minutes    Subjective: Pt reports continued pain at the start of today's session, and denies any significant change in status following last session. He notes that he was feeling good overall after last session, however, the weekend was really bothersome for him.      Objective: See treatment diary below      Assessment: Pt tolerated today's session fair due to increased sx irritability. Exercise volume and intensity was decreased during today's session due to an increase in sx irritability at the start of today's session. PT frequency was decreased to 1x/week to assess sx irritability, pt verbalized understanding and agreement with this plan. HEP was updated and reviewed.Will monitor status NV and progress as appropriate. Pt will benefit from continued skilled PT services to address remaining impairments and improve QOL. Pt was with Norberto Love PT, DPT for the entirety of today's session.        Plan: Continue per plan of care.      Precautions: CAD, HTN, acute on chronic systolic congestive heart failure, COPD, T2DM, Morbid Obesity    POC expires Unit limit Auth Expiration date PT/OT + Visit Limit?   2025  BOMN  30         Visit/Unit Tracking  AUTH Status:  Date 5/1 5/5 5/8 5/12 5/15 5/19   [employer] Used 11 12 13 14 15 16    Remaining             Pertinent Findings:      Test / Measure  3/20/2025 4/28/25   FOTO (Predicted 42) 16 38   Decreased gross LLE strength Avg of 3+/5  NT     Access Code: [email or web address]  URL: [email or web address]  Date: 2025  Prepared by: Norberto Love    Exercises  - Seated Transversus Abdominis Bracing   "- 1 x daily - 7 x weekly - 3 sets - 10 reps - 3 hold  - Seated Pelvic Tilt  - 1 x daily - 7 x weekly - 2 sets - 10 reps  - Seated Sciatic Tensioner  - 1 x daily - 7 x weekly - 2 sets - 10 reps - 2 hold  - Seated Lumbar Flexion Stretch  - 4 x daily - 7 x weekly - 1 sets - 15 reps - 3 hold  - Seated Hamstring Stretch  - 1 x daily - 4 x weekly - 1 sets - 3 reps - 30 hold  - Sit to Stand  - 1 x daily - 3 x weekly - 3 sets - 5 reps  - Seated Hip Abduction  - 1 x daily - 3 x weekly - 2 sets - 12 reps  - Standing Hip Abduction  - 1 x daily - 3 x weekly - 2 sets - 10 reps  - Standing Hip Extension  - 1 x daily - 3 x weekly - 2 sets - 10 reps    Manuals 5/1 5/5 5/8 5/12 5/15 5/19   LE stretching BM 10' KP 10' KP 10' KP 10' BM 10' BM 10'   L/s Mobs         Central Gliders Bm 6' Tibial N LLE (3x30\")        MET Shotgun         Neuro Re-Ed         Patient Education         TrA contractions         LTR         Paloff Presses         Seated HS stretch     2x30\" B/l    Standing Pball Crush         3-way ball rollout  10x ea 10x ea 10x ea 10x ea 3\" hold 10x ea 3\" hold    HS stretch         Slump N Tensioner's         Hip add isometric         Glute Isometric Squeeze         Ther Ex         NuStep 8' x L7 8' L7 8' L7 8' L7 8' x L6 8' x L6   Glute Bridges 2x8 in supine 2x10  2x10  2x10 NT 2x10   Clamshells         Seated knee flex/ext with TB         HR/TR      2x10 ea B   Leg Press         Titan LAQ/HS curl         Standing Hip abd     2x8 B/l 2x10 b/l   Standing Hip Ext 2x10 B 2x10 b/l 2x10 b/l 2x10 b/l 2x8 B/l 2x10 b/l   Repeated Mvmt Assessment         STS 2x5 +1 foam    2x5 no foam 2x10 no foam 2x10 no foam 2x10 no foam 3x5 +1 foam 3x5 +1 foam   Side lying Hip Abd 2x8 B 2x8 b/l 2x8 b/l 2x10 b/l  2x10 b/l   Ther Activity                           Gait Training                           Modalities                                                   "

## 2025-05-20 ENCOUNTER — TELEPHONE (OUTPATIENT)
Dept: PAIN MEDICINE | Facility: CLINIC | Age: 65
End: 2025-05-20

## 2025-05-20 NOTE — TELEPHONE ENCOUNTER
Pt called in with 0% improvement and pain level 7-8/10.    Pt stated not constant pain but he will get short bursts of pain when he is walking that is getting worse

## 2025-05-22 ENCOUNTER — APPOINTMENT (OUTPATIENT)
Dept: PHYSICAL THERAPY | Facility: REHABILITATION | Age: 65
End: 2025-05-22
Attending: ANESTHESIOLOGY
Payer: COMMERCIAL

## 2025-05-27 ENCOUNTER — OFFICE VISIT (OUTPATIENT)
Dept: PHYSICAL THERAPY | Facility: REHABILITATION | Age: 65
End: 2025-05-27
Attending: ANESTHESIOLOGY
Payer: COMMERCIAL

## 2025-05-27 DIAGNOSIS — M54.16 LUMBAR RADICULOPATHY: Primary | ICD-10-CM

## 2025-05-27 DIAGNOSIS — M47.816 LUMBAR SPONDYLOSIS: ICD-10-CM

## 2025-05-27 DIAGNOSIS — M79.18 MYOFASCIAL PAIN: ICD-10-CM

## 2025-05-27 PROCEDURE — 97140 MANUAL THERAPY 1/> REGIONS: CPT

## 2025-05-27 PROCEDURE — 97110 THERAPEUTIC EXERCISES: CPT

## 2025-05-27 NOTE — PROGRESS NOTES
Daily Note     Today's date: 2025  Patient name: Liban Montes  : 1960  MRN: [id number]  Referring provider: Tony Cabrera DO  Dx:   Encounter Diagnosis     ICD-10-CM    1. Lumbar radiculopathy  M54.16       2. Lumbar spondylosis  M47.816       3. Myofascial pain  M79.18               Start Time: 1243  Stop Time: 1328  Total time in clinic (min): 45 minutes    Subjective: Pt reports feeling better overall with less pain noted at the start of today's session. He denies any adverse effects after last session.      Objective: See treatment diary below      Assessment: Pt tolerated today's session well with no increase in pain noted during or after completion of exercises. Pt was able to progress several exercises in regards to resistance, no form compensations observed or adverse effects noted. Will monitor status NV and progress as appropriate. Pt will benefit from continued skilled PT services to address remaining impairments and improve QOL. Pt was with Norberto Love PT, DPT for the entirety of today's session.        Plan: Continue per plan of care.      Precautions: CAD, HTN, acute on chronic systolic congestive heart failure, COPD, T2DM, Morbid Obesity    POC expires Unit limit Auth Expiration date PT/OT + Visit Limit?   2025  BOMN  30         Visit/Unit Tracking  AUTH Status:  Date 5/1 5/5 5/8 5/12 5/15 5/19 5/27   [employer]mark Used 11 12 13 14 15 16 17    Remaining              Pertinent Findings:      Test / Measure  3/20/2025 4/28/25   FOTO (Predicted 42) 16 38   Decreased gross LLE strength Avg of 3+/5  NT     Access Code: [email or web address]  URL: [email or web address]  Date: 2025  Prepared by: Norberto Love    Exercises  - Seated Transversus Abdominis Bracing  - 1 x daily - 7 x weekly - 3 sets - 10 reps - 3 hold  - Seated Pelvic Tilt  - 1 x daily - 7 x weekly - 2 sets - 10 reps  - Seated Sciatic Tensioner  - 1 x daily - 7 x weekly - 2 sets - 10 reps - 2 hold  - Seated Lumbar  "Flexion Stretch  - 4 x daily - 7 x weekly - 1 sets - 15 reps - 3 hold  - Seated Hamstring Stretch  - 1 x daily - 4 x weekly - 1 sets - 3 reps - 30 hold  - Sit to Stand  - 1 x daily - 3 x weekly - 3 sets - 5 reps  - Seated Hip Abduction  - 1 x daily - 3 x weekly - 2 sets - 12 reps  - Standing Hip Abduction  - 1 x daily - 3 x weekly - 2 sets - 10 reps  - Standing Hip Extension  - 1 x daily - 3 x weekly - 2 sets - 10 reps    Manuals 5/1 5/5 5/8 5/12 5/15 5/19 5/27   LE stretching BM 10' KP 10' KP 10' KP 10' BM 10' BM 10'    L/s Mobs          Central Gliders Bm 6' Tibial N LLE (3x30\")      Bm 6' Tibial N LLE (3x30\")   Left Glute STM       BM (using elbow)   MET Shotgun          Neuro Re-Ed          Patient Education          TrA contractions          LTR          Paloff Presses          Seated HS stretch     2x30\" B/l     Standing Pball Crush          3-way ball rollout  10x ea 10x ea 10x ea 10x ea 3\" hold 10x ea 3\" hold     HS stretch          Slump N Tensioner's       15x B   Hip add isometric          Glute Isometric Squeeze          Ther Ex          NuStep 8' x L7 8' L7 8' L7 8' L7 8' x L6 8' x L6 8' x L7   Glute Bridges 2x8 in supine 2x10  2x10  2x10 NT 2x10 2x12    Clamshells       3x10 Btb in supine   Seated knee flex/ext with TB       2x10 Gtb   HR/TR      2x10 ea B    Leg Press          Titan LAQ/HS curl          Standing Hip abd     2x8 B/l 2x10 b/l 2x10 b/l   Standing Hip Ext 2x10 B 2x10 b/l 2x10 b/l 2x10 b/l 2x8 B/l 2x10 b/l 2x10 b/l   Repeated Mvmt Assessment          STS 2x5 +1 foam    2x5 no foam 2x10 no foam 2x10 no foam 2x10 no foam 3x5 +1 foam 3x5 +1 foam 3x10 no foam   Side lying Hip Abd 2x8 B 2x8 b/l 2x8 b/l 2x10 b/l  2x10 b/l    Ther Activity                              Gait Training                              Modalities                                                      "

## 2025-05-29 ENCOUNTER — APPOINTMENT (OUTPATIENT)
Dept: PHYSICAL THERAPY | Facility: REHABILITATION | Age: 65
End: 2025-05-29
Attending: ANESTHESIOLOGY
Payer: COMMERCIAL

## 2025-06-02 ENCOUNTER — OFFICE VISIT (OUTPATIENT)
Dept: PHYSICAL THERAPY | Facility: REHABILITATION | Age: 65
End: 2025-06-02
Attending: ANESTHESIOLOGY
Payer: COMMERCIAL

## 2025-06-02 DIAGNOSIS — M79.18 MYOFASCIAL PAIN: ICD-10-CM

## 2025-06-02 DIAGNOSIS — M54.16 LUMBAR RADICULOPATHY: Primary | ICD-10-CM

## 2025-06-02 DIAGNOSIS — M47.816 LUMBAR SPONDYLOSIS: ICD-10-CM

## 2025-06-02 PROCEDURE — 97110 THERAPEUTIC EXERCISES: CPT

## 2025-06-02 PROCEDURE — 97112 NEUROMUSCULAR REEDUCATION: CPT

## 2025-06-02 PROCEDURE — 97140 MANUAL THERAPY 1/> REGIONS: CPT

## 2025-06-02 NOTE — PROGRESS NOTES
Daily Note     Today's date: 2025  Patient name: Liban Montes  : 1960  MRN: [id number]  Referring provider: Tony Cabrera DO  Dx:   Encounter Diagnosis     ICD-10-CM    1. Lumbar radiculopathy  M54.16       2. Lumbar spondylosis  M47.816       3. Myofascial pain  M79.18               Start Time: 1357  Stop Time: 1450  Total time in clinic (min): 53 minutes    Subjective: Pt reports continued improvement, however, he notes his pain is slightly heightened today after cutting his grass over the weekend.      Objective: See treatment diary below      Assessment: Pt tolerated today's session well with no increase in pain noted during or after completion of exercises. Pt continues to be appropriately challenged by the current exercise selection, along with the current exercise volume and intensity. Will monitor status NV and progress as appropriate. Pt will benefit from continued skilled PT services to address remaining impairments and improve QOL. Pt was 1:1 with Norberto Love PT, DPT for the entirety of today's session.        Plan: Continue per plan of care.      Precautions: CAD, HTN, acute on chronic systolic congestive heart failure, COPD, T2DM, Morbid Obesity    POC expires Unit limit Auth Expiration date PT/OT + Visit Limit?   2025  BOMN  30         Visit/Unit Tracking  AUTH Status:  Date 5/1 5/5 5/8 5/12 5/15 5/19 5/27 6/2   [employer] Used 11 12 13 14 15 16 17 18    Remaining               Pertinent Findings:      Test / Measure  3/20/2025 4/28/25 6/2/25   FOTO (Predicted 42) 16 38    Decreased gross LLE strength Avg of 3+/5  NT NT     Access Code: [email or web address]  URL: [email or web address]  Date: 2025  Prepared by: Norberto Love    Exercises  - Seated Transversus Abdominis Bracing  - 1 x daily - 7 x weekly - 3 sets - 10 reps - 3 hold  - Seated Pelvic Tilt  - 1 x daily - 7 x weekly - 2 sets - 10 reps  - Seated Sciatic Tensioner  - 1 x daily - 7 x weekly - 2 sets - 10 reps -  "2 hold  - Seated Lumbar Flexion Stretch  - 4 x daily - 7 x weekly - 1 sets - 15 reps - 3 hold  - Seated Hamstring Stretch  - 1 x daily - 4 x weekly - 1 sets - 3 reps - 30 hold  - Sit to Stand  - 1 x daily - 3 x weekly - 3 sets - 5 reps  - Seated Hip Abduction  - 1 x daily - 3 x weekly - 2 sets - 12 reps  - Standing Hip Abduction  - 1 x daily - 3 x weekly - 2 sets - 10 reps  - Standing Hip Extension  - 1 x daily - 3 x weekly - 2 sets - 10 reps    Manuals 5/1 5/5 5/8 5/12 5/15 5/19 5/27 6/2   LE stretching BM 10' KP 10' KP 10' KP 10' BM 10' BM 10'     L/s Mobs           Central Gliders Bm 6' Tibial N LLE (3x30\")      Bm 6' Tibial N LLE (3x30\") Bm 6' Tibial N LLE (3x30\")   Left Glute STM       BM (using elbow) BM (using elbow)   MET Shotgun           Neuro Re-Ed           Patient Education           TrA contractions           LTR           Paloff Presses           Seated HS stretch     2x30\" B/l      Standing Pball Crush           3-way ball rollout  10x ea 10x ea 10x ea 10x ea 3\" hold 10x ea 3\" hold   10x ea 3\" hold   HS stretch           Slump N Tensioner's       15x B 15x B   Hip add isometric           Glute Isometric Squeeze           Ther Ex           NuStep 8' x L7 8' L7 8' L7 8' L7 8' x L6 8' x L6 8' x L7 8' x L7   Glute Bridges 2x8 in supine 2x10  2x10  2x10 NT 2x10 2x12  2x10    Clamshells       3x10 Btb in supine 2x12 Btb in supine   Seated knee flex/ext with TB       2x10 Gtb 2x10 Gtb   HR/TR      2x10 ea B  2x12 ea B   Leg Press           Titan LAQ/HS curl           Standing Hip abd     2x8 B/l 2x10 b/l 2x10 b/l 2x10 b/l   Standing Hip Ext 2x10 B 2x10 b/l 2x10 b/l 2x10 b/l 2x8 B/l 2x10 b/l 2x10 b/l 2x10 b/l   FSU        2x10 4\" step   Repeated Mvmt Assessment           STS 2x5 +1 foam    2x5 no foam 2x10 no foam 2x10 no foam 2x10 no foam 3x5 +1 foam 3x5 +1 foam 3x10 no foam 3x10 no foam   Side lying Hip Abd 2x8 B 2x8 b/l 2x8 b/l 2x10 b/l  2x10 b/l  2x8 B   Ther Activity                                 Gait " Training                                 Modalities

## 2025-06-04 DIAGNOSIS — Z79.4 TYPE 2 DIABETES MELLITUS WITH OTHER CIRCULATORY COMPLICATION, WITH LONG-TERM CURRENT USE OF INSULIN (HCC): ICD-10-CM

## 2025-06-04 DIAGNOSIS — E11.59 TYPE 2 DIABETES MELLITUS WITH OTHER CIRCULATORY COMPLICATION, WITH LONG-TERM CURRENT USE OF INSULIN (HCC): ICD-10-CM

## 2025-06-05 DIAGNOSIS — M54.50 CHRONIC BILATERAL LOW BACK PAIN WITHOUT SCIATICA: ICD-10-CM

## 2025-06-05 DIAGNOSIS — G89.29 CHRONIC BILATERAL LOW BACK PAIN WITHOUT SCIATICA: ICD-10-CM

## 2025-06-05 RX ORDER — HYDROCODONE BITARTRATE AND ACETAMINOPHEN 5; 325 MG/1; MG/1
1 TABLET ORAL EVERY 4 HOURS PRN
Qty: 100 TABLET | Refills: 0 | Status: SHIPPED | OUTPATIENT
Start: 2025-06-05

## 2025-06-05 RX ORDER — INSULIN GLARGINE-YFGN 100 [IU]/ML
INJECTION, SOLUTION SUBCUTANEOUS
Qty: 180 ML | Refills: 1 | Status: SHIPPED | OUTPATIENT
Start: 2025-06-05

## 2025-06-06 ENCOUNTER — APPOINTMENT (OUTPATIENT)
Dept: LAB | Age: 65
End: 2025-06-06
Attending: INTERNAL MEDICINE
Payer: COMMERCIAL

## 2025-06-06 DIAGNOSIS — Z79.4 TYPE 2 DIABETES MELLITUS WITHOUT COMPLICATION, WITH LONG-TERM CURRENT USE OF INSULIN (HCC): ICD-10-CM

## 2025-06-06 DIAGNOSIS — E11.9 TYPE 2 DIABETES MELLITUS WITHOUT COMPLICATION, WITH LONG-TERM CURRENT USE OF INSULIN (HCC): ICD-10-CM

## 2025-06-06 DIAGNOSIS — I10 BENIGN ESSENTIAL HYPERTENSION: ICD-10-CM

## 2025-06-06 LAB
ANION GAP SERPL CALCULATED.3IONS-SCNC: 9 MMOL/L (ref 4–13)
BUN SERPL-MCNC: 19 MG/DL (ref 5–25)
CALCIUM SERPL-MCNC: 9 MG/DL (ref 8.4–10.2)
CHLORIDE SERPL-SCNC: 107 MMOL/L (ref 96–108)
CHOLEST SERPL-MCNC: 95 MG/DL (ref ?–200)
CO2 SERPL-SCNC: 25 MMOL/L (ref 21–32)
CREAT SERPL-MCNC: 1.15 MG/DL (ref 0.6–1.3)
EST. AVERAGE GLUCOSE BLD GHB EST-MCNC: 134 MG/DL
GFR SERPL CREATININE-BSD FRML MDRD: 66 ML/MIN/1.73SQ M
GLUCOSE P FAST SERPL-MCNC: 69 MG/DL (ref 65–99)
HBA1C MFR BLD: 6.3 %
HDLC SERPL-MCNC: 24 MG/DL
LDLC SERPL CALC-MCNC: 35 MG/DL (ref 0–100)
NONHDLC SERPL-MCNC: 71 MG/DL
POTASSIUM SERPL-SCNC: 4.2 MMOL/L (ref 3.5–5.3)
SODIUM SERPL-SCNC: 141 MMOL/L (ref 135–147)
TRIGL SERPL-MCNC: 179 MG/DL (ref ?–150)

## 2025-06-06 PROCEDURE — 36415 COLL VENOUS BLD VENIPUNCTURE: CPT

## 2025-06-06 PROCEDURE — 80061 LIPID PANEL: CPT

## 2025-06-06 PROCEDURE — 83036 HEMOGLOBIN GLYCOSYLATED A1C: CPT

## 2025-06-06 PROCEDURE — 80048 BASIC METABOLIC PNL TOTAL CA: CPT

## 2025-06-09 ENCOUNTER — OFFICE VISIT (OUTPATIENT)
Age: 65
End: 2025-06-09
Payer: COMMERCIAL

## 2025-06-09 VITALS
WEIGHT: 315 LBS | DIASTOLIC BLOOD PRESSURE: 70 MMHG | RESPIRATION RATE: 21 BRPM | BODY MASS INDEX: 50.62 KG/M2 | OXYGEN SATURATION: 96 % | SYSTOLIC BLOOD PRESSURE: 146 MMHG | TEMPERATURE: 98.6 F | HEIGHT: 66 IN | HEART RATE: 74 BPM

## 2025-06-09 DIAGNOSIS — Z00.00 HEALTHCARE MAINTENANCE: Primary | ICD-10-CM

## 2025-06-09 DIAGNOSIS — E66.01 MORBID OBESITY WITH BODY MASS INDEX OF 45.0-49.9 IN ADULT (HCC): ICD-10-CM

## 2025-06-09 DIAGNOSIS — I25.10 CORONARY ARTERY DISEASE INVOLVING NATIVE HEART, UNSPECIFIED VESSEL OR LESION TYPE, UNSPECIFIED WHETHER ANGINA PRESENT: ICD-10-CM

## 2025-06-09 DIAGNOSIS — J43.1 PANLOBULAR EMPHYSEMA (HCC): ICD-10-CM

## 2025-06-09 DIAGNOSIS — I10 BENIGN ESSENTIAL HYPERTENSION: ICD-10-CM

## 2025-06-09 DIAGNOSIS — F11.20 CONTINUOUS OPIOID DEPENDENCE (HCC): ICD-10-CM

## 2025-06-09 DIAGNOSIS — Z79.4 TYPE 2 DIABETES MELLITUS WITHOUT COMPLICATION, WITH LONG-TERM CURRENT USE OF INSULIN (HCC): ICD-10-CM

## 2025-06-09 DIAGNOSIS — E11.9 TYPE 2 DIABETES MELLITUS WITHOUT COMPLICATION, WITH LONG-TERM CURRENT USE OF INSULIN (HCC): ICD-10-CM

## 2025-06-09 DIAGNOSIS — M46.1 SACROILIITIS (HCC): ICD-10-CM

## 2025-06-09 DIAGNOSIS — I50.23 ACUTE ON CHRONIC SYSTOLIC CONGESTIVE HEART FAILURE (HCC): ICD-10-CM

## 2025-06-09 PROCEDURE — 99214 OFFICE O/P EST MOD 30 MIN: CPT | Performed by: INTERNAL MEDICINE

## 2025-06-09 NOTE — PROGRESS NOTES
Name: Liban Montes      : 1960      MRN: [id number]  Encounter Provider: Zain Balderas DO  Encounter Date: 2025   Encounter department: [facility] INTERNAL MEDICINE    Assessment & Plan  Type 2 diabetes mellitus without complication, with long-term current use of insulin (HCC)  Patient is commended and he has done an excellent job controlling his blood sugars keeping track of blood sugars and making adjustments with his insulin dose in order to keep his sugars under control.  Despite all of this patient continues to gain weight and we have tried desperately to get him involved in a weight management endeavor.  He states he still has not made a call to initiate an appointment to be seen and evaluated which we feel is critical at this point in time.  Patient underwent foot exam today and promises to make an appointment for eye exam in the near future.  Check a fasting blood sugar hemoglobin A1c with his next visit.  Lab Results   Component Value Date    HGBA1C 6.3 (H) 2025       Orders:  •  Hemoglobin A1C; Future  •  Albumin / creatinine urine ratio; Future  •  TSH, 3rd generation with Free T4 reflex; Future    Healthcare maintenance  Patient will be due for repeat yearly physical when he returns to the office.  Patient was given a slip for complete labs to be performed prior to that visit.  As previously patient knows to call in the interim with any new medical problems or concerns.    Orders:  •  Comprehensive metabolic panel; Future  •  CBC and differential; Future  •  Lipid panel; Future  •  Urinalysis with microscopic; Future  •  Hemoglobin A1C; Future  •  Albumin / creatinine urine ratio; Future  •  TSH, 3rd generation with Free T4 reflex; Future    Morbid obesity with body mass index of 45.0-49.9 in adult (HCC)   we have made it consults for the patient to be seen by weight management for working on best modality in order to lose weight.  Patient states he tries to limit his  intake of sweets and carbohydrates and knows that he is not doing a good job as far as his dietary intake.  Patient needs a program and workup and evaluation and hopefully will make an appointment to be seen in the near future.  States because of acute back pain and discomfort had not made an appointment for evaluation    Orders:  •  TSH, 3rd generation with Free T4 reflex; Future    Benign essential hypertension  History of hypertension.  Patient remains on medication in order to control his blood pressure losartan 25 mg daily along with metoprolol 50 mg twice a day.  We feel the patient may benefit from an increase in his losartan from 25 to 50 mg daily.  Patient is checking his blood pressure readings at home and states they have been stable    Orders:  •  TSH, 3rd generation with Free T4 reflex; Future    Sacroiliitis (HCC)  Chronic low back pain.  Patient states he recently was on a large dose of oral steroids in order to help him with his discomfort.  With this this also causes his blood sugars to go up and he does gain weight.  States that his back now is slightly quiescent but is still having difficulties with ambulation secondary to his obesity and DJD.         Coronary artery disease involving native heart, unspecified vessel or lesion type, unspecified whether angina present  History of coronary artery disease.  Patient denies any chest as pressure.  Has chronic shortness of breath with exertion but no increase.  He will continue to monitor his blood sugars and we will continue to make adjustment of medication if needed as far as cholesterol is concerned.  Urged the patient to continue again to follow-up with his cardiologist and undergo studies if necessary in the future to make sure there is stability to to his disease.  Does have major risk factors including diabetes, obesity and hypertension.         Continuous opioid dependence (HCC)  Because of his chronic back pain patient remains on hydrocodone  and takes this usually on a daily basis if needed.  Again you are interested in any modality that might reduce his chronic pain and what is the most important consideration I feel is weight loss         Panlobular emphysema (HCC)  COPD.  Patient has had no acute exacerbations recently.  He has his albuterol inhaler to use as needed.  Again we discussed with the patient that we feel his respiratory status may be complicated by his obesity causing not only chronic obstructive disease but also some restrictive lung disease.         Acute on chronic systolic congestive heart failure (HCC)  Wt Readings from Last 3 Encounters:   06/09/25 (!) 146 kg (322 lb)   04/18/25 (!) 142 kg (313 lb 12.8 oz)   04/04/25 (!) 138 kg (305 lb)       Patient is urged to continue to follow-up with cardiology.  As noted patient's weight has continued to increase and this may be a secondary effect from him being on a high dose of steroids recently for exacerbation of his back pain.  Again I can urged the patient enough to be seen by weight management for evaluation and definitive treatment.                  History of Present Illness     Patient is a 64-year-old male history of medical problems outlined previously who is here today for routine follow-up after 4-month period of time.  Patient states he finally has had a decrease in his back pain after being on high dose of steroids and taking narcotic analgesics on a daily basis.  He still has some chronic shortness of breath with exertion and has yet to make an appointment to be seen by weight management for evaluation and/or treatment with his exogenous severe obesity.  Review of Systems   Constitutional:  Positive for activity change. Negative for appetite change, chills, diaphoresis, fatigue, fever and unexpected weight change.        Extremely limited with his activity level which has not changed   HENT: Negative.     Eyes: Negative.    Respiratory: Negative.     Cardiovascular: Negative.   "  Gastrointestinal: Negative.    Endocrine: Negative.    Genitourinary: Negative.    Musculoskeletal:  Positive for arthralgias, back pain and gait problem. Negative for joint swelling, myalgias, neck pain and neck stiffness.   Skin: Negative.    Allergic/Immunologic: Negative.    Neurological:  Negative for dizziness, tremors, seizures, syncope, facial asymmetry, speech difficulty, weakness, light-headedness, numbness and headaches.   Hematological: Negative.    Psychiatric/Behavioral: Negative.     Past Medical History[1]  Past Surgical History[2]  Family History[3]  Social History[4]  Medications[5]  Allergies   Allergen Reactions   • Bactrim [Sulfamethoxazole-Trimethoprim] Edema   • Cefaclor Shortness Of Breath     Other reaction(s): Respiratory Distress   • Simvastatin Shortness Of Breath     Other reaction(s): Respiratory Distress     Immunization History   Administered Date(s) Administered   • COVID-19 PFIZER VACCINE 0.3 ML IM 03/19/2021, 04/10/2021, 10/16/2021   • Influenza Quadrivalent, 6-35 Months IM 10/17/2016, 10/24/2017   • Influenza Recombinant Preservative Free Im 09/26/2024   • Influenza, injectable, quadrivalent, preservative free 0.5 mL 12/11/2023   • Influenza, recombinant, quadrivalent,injectable, preservative free 12/06/2018, 12/23/2019, 11/13/2020, 11/15/2021, 12/01/2022     Objective   /70 (BP Location: Left arm, Patient Position: Sitting, Cuff Size: Large)   Pulse 74   Temp 98.6 °F (37 °C) (Tympanic Core)   Resp 21   Ht 5' 6\" (1.676 m)   Wt (!) 146 kg (322 lb)   SpO2 96%   BMI 51.97 kg/m²     Physical Exam  Vitals and nursing note reviewed.   Constitutional:       General: He is not in acute distress.     Appearance: Normal appearance. He is obese. He is not ill-appearing, toxic-appearing or diaphoretic.      Comments: Morbidly obese 64-year-old male who is awake alert in no acute distress.  Difficulties with ambulation and transfer, waddling gait.   HENT:      Head: " Normocephalic and atraumatic.      Right Ear: Tympanic membrane, ear canal and external ear normal. There is no impacted cerumen.      Left Ear: Tympanic membrane, ear canal and external ear normal. There is no impacted cerumen.      Nose: Nose normal. No congestion or rhinorrhea.      Mouth/Throat:      Mouth: Mucous membranes are moist.      Pharynx: No oropharyngeal exudate or posterior oropharyngeal erythema.      Comments: Severe obstruction of oral airway, thick tongue, history of sleep apnea and obstructive airway disease    Eyes:      General: No scleral icterus.        Right eye: No discharge.         Left eye: No discharge.      Extraocular Movements: Extraocular movements intact.      Conjunctiva/sclera: Conjunctivae normal.      Pupils: Pupils are equal, round, and reactive to light.     Neck:      Vascular: No carotid bruit.      Comments: Thick short neck  Cardiovascular:      Rate and Rhythm: Normal rate and regular rhythm.      Heart sounds: Normal heart sounds. No murmur heard.     No friction rub. No gallop.   Pulmonary:      Effort: Pulmonary effort is normal. No respiratory distress.      Breath sounds: No stridor. No wheezing, rhonchi or rales.      Comments: Decreased breath sounds anteriorly and posteriorly some discomfort with low back pain with respiratory maneuvers.  No rales rhonchi or wheezes  Chest:      Chest wall: No tenderness.   Abdominal:      General: Abdomen is flat. Bowel sounds are normal. There is no distension.      Palpations: Abdomen is soft. There is no mass.      Tenderness: There is no abdominal tenderness. There is no right CVA tenderness, left CVA tenderness, guarding or rebound.      Hernia: No hernia is present.     Musculoskeletal:         General: Tenderness and deformity present. No swelling or signs of injury.      Cervical back: Normal range of motion and neck supple. No rigidity or tenderness.      Right lower leg: Edema present.      Left lower leg: Edema  present.      Comments: Because of his back pain and discomfort we did have to limited maneuvers today.  Patient does have a decreased curvature through the lumbar spine and decreased range of motion both actively and passively and some chronic discomfort with any movement usually discomfort is going down his left leg he states.  Again is trying to limit his movement activity level to reduce and keep under control his pain..  +1 pitting edema bilateral lower extremities   Lymphadenopathy:      Cervical: No cervical adenopathy.     Skin:     General: Skin is warm and dry.      Coloration: Skin is not jaundiced or pale.      Findings: No bruising, erythema, lesion or rash.     Neurological:      Mental Status: He is alert and oriented to person, place, and time. Mental status is at baseline.      Cranial Nerves: No cranial nerve deficit.      Sensory: No sensory deficit.      Motor: Weakness present.      Coordination: Coordination normal.      Gait: Gait abnormal.      Deep Tendon Reflexes: Reflexes abnormal.      Comments: A waddling gait secondary to obesity.  Absent patellar and Achilles tendon reflex bilaterally and generalized weakness to lower extremities and difficulty with transfer and mobility   Psychiatric:         Mood and Affect: Mood normal.         Behavior: Behavior normal.         Thought Content: Thought content normal.         Judgment: Judgment normal.            [1]  Past Medical History:  Diagnosis Date   • Abscess of left thigh    • Acute myocardial infarction (HCC)    • Anesthesia     woke up during procedure   • Anxiety    • Arteriosclerotic cardiovascular disease    • Arthritis    • Asthma    • Chest pain    • COPD (chronic obstructive pulmonary disease) (HCC)    • Coronary artery disease    • Diabetes mellitus (HCC)    • Fatty liver    • Gastric ulcer    • GERD (gastroesophageal reflux disease)    • Hyperlipidemia    • Hypertension    • Low back pain    • Myocardial infarction (HCC)  ,   • Obesity    • Obstructive sleep apnea     no Cpap   • Spondylosis of lumbar region without myelopathy or radiculopathy    [2]  Past Surgical History:  Procedure Laterality Date   • CARPAL TUNNEL RELEASE Right    • COLONOSCOPY     • CORONARY ANGIOPLASTY WITH STENT PLACEMENT  ,    • EGD     • HEMORRHOID SURGERY     • NEUROPLASTY / TRANSPOSITION MEDIAN NERVE AT CARPAL TUNNEL Right    • UT SURGICAL ARTHROSCOPY SHOULDER W/LSS&RESCJ ADS Left 6/10/2020    Procedure: SHOULDER ARTHROSCOPIC CAPSULAR RELEASE;  Surgeon: Christiano Aguiar MD;  Location: [facility]  MAIN [facility];  Service: Orthopedics   [3]  Family History  Problem Relation Name Age of Onset   • Alzheimer's disease Mother Janelle    • Heart failure Mother Janelle    • Dementia Mother Janelle    • Heart attack Father August    • Other Father August         Cardiac Disorder   • Heart disease Father August    • Other Family          Back Pain   • Hypertension Family     • Stroke Family          Complications   • Cancer Other Unknown    [4]  Social History  Tobacco Use   • Smoking status: Former     Current packs/day: 0.00     Average packs/day: 1.5 packs/day for 38.0 years (57.0 ttl pk-yrs)     Types: Cigarettes     Start date: 1976     Quit date: 2014     Years since quittin.4   • Smokeless tobacco: Never   • Tobacco comments:     tobacco use not continuous- quit for 2  4 yrs long periods .   Vaping Use   • Vaping status: Never Used   Substance and Sexual Activity   • Alcohol use: No   • Drug use: No   • Sexual activity: Not Currently   [5]  Current Outpatient Medications on File Prior to Visit   Medication Sig   • acetaminophen (TYLENOL) 325 mg tablet Take 650 mg by mouth every 6 (six) hours as needed for mild pain   • albuterol (PROVENTIL HFA,VENTOLIN HFA) 90 mcg/act inhaler INHALE 2 PUFFS AS NEEDED FOR SHORTNESS OF BREATH   • aspirin (Aspirin Low Dose) 81 mg EC tablet TAKE 1 TABLET BY MOUTH EVERY DAY   • atorvastatin (LIPITOR) 40 mg  tablet TAKE 1 TABLET BY MOUTH EVERY DAY   • clopidogrel (PLAVIX) 75 mg tablet TAKE 1 TABLET BY MOUTH EVERY DAY   • ezetimibe (ZETIA) 10 mg tablet Take 10 mg by mouth in the morning.   • fenofibrate (TRICOR) 145 mg tablet TAKE 1 TABLET BY MOUTH EVERY DAY   • furosemide (LASIX) 40 mg tablet    • gabapentin (NEURONTIN) 300 mg capsule Take 1 capsule (300 mg total) by mouth 3 (three) times a day   • HYDROcodone-acetaminophen (Norco) 5-325 mg per tablet Take 1 tablet by mouth every 4 (four) hours as needed for pain Max Daily Amount: 6 tablets   • Insulin Glargine-yfgn 100 UNIT/ML SOPN INJECT 132 UNITS UNDER THE SKIN TWICE A DAY AS DIRECTED   • Insulin Pen Needle (B-D UF III MINI PEN NEEDLES) 31G X 5 MM MISC Inject as directed 2 (two) times a day Use as directed   • Jardiance 25 MG TABS TAKE 1 TABLET (25 MG TOTAL) BY MOUTH DAILY.   • losartan (COZAAR) 25 mg tablet TAKE 1 TABLET (25 MG TOTAL) BY MOUTH DAILY.   • methocarbamol (Robaxin-750) 750 mg tablet Take 1 tablet (750 mg total) by mouth every 6 (six) hours as needed for muscle spasms   • metoprolol tartrate (LOPRESSOR) 50 mg tablet Take 25 mg by mouth in the morning and 25 mg in the evening.   • nitroglycerin (NITROSTAT) 0.4 mg SL tablet    • pantoprazole (PROTONIX) 40 mg tablet TAKE 1 TABLET BY MOUTH EVERY DAY

## 2025-06-09 NOTE — ASSESSMENT & PLAN NOTE
Patient will be due for repeat yearly physical when he returns to the office.  Patient was given a slip for complete labs to be performed prior to that visit.  As previously patient knows to call in the interim with any new medical problems or concerns.    Orders:    Comprehensive metabolic panel; Future    CBC and differential; Future    Lipid panel; Future    Urinalysis with microscopic; Future    Hemoglobin A1C; Future    Albumin / creatinine urine ratio; Future    TSH, 3rd generation with Free T4 reflex; Future

## 2025-06-09 NOTE — ASSESSMENT & PLAN NOTE
Chronic low back pain.  Patient states he recently was on a large dose of oral steroids in order to help him with his discomfort.  With this this also causes his blood sugars to go up and he does gain weight.  States that his back now is slightly quiescent but is still having difficulties with ambulation secondary to his obesity and DJD.

## 2025-06-09 NOTE — ASSESSMENT & PLAN NOTE
COPD.  Patient has had no acute exacerbations recently.  He has his albuterol inhaler to use as needed.  Again we discussed with the patient that we feel his respiratory status may be complicated by his obesity causing not only chronic obstructive disease but also some restrictive lung disease.

## 2025-06-09 NOTE — ASSESSMENT & PLAN NOTE
we have made it consults for the patient to be seen by weight management for working on best modality in order to lose weight.  Patient states he tries to limit his intake of sweets and carbohydrates and knows that he is not doing a good job as far as his dietary intake.  Patient needs a program and workup and evaluation and hopefully will make an appointment to be seen in the near future.  States because of acute back pain and discomfort had not made an appointment for evaluation    Orders:    TSH, 3rd generation with Free T4 reflex; Future

## 2025-06-09 NOTE — ASSESSMENT & PLAN NOTE
Because of his chronic back pain patient remains on hydrocodone and takes this usually on a daily basis if needed.  Again you are interested in any modality that might reduce his chronic pain and what is the most important consideration I feel is weight loss

## 2025-06-09 NOTE — ASSESSMENT & PLAN NOTE
Patient is commended and he has done an excellent job controlling his blood sugars keeping track of blood sugars and making adjustments with his insulin dose in order to keep his sugars under control.  Despite all of this patient continues to gain weight and we have tried desperately to get him involved in a weight management endeavor.  He states he still has not made a call to initiate an appointment to be seen and evaluated which we feel is critical at this point in time.  Patient underwent foot exam today and promises to make an appointment for eye exam in the near future.  Check a fasting blood sugar hemoglobin A1c with his next visit.  Lab Results   Component Value Date    HGBA1C 6.3 (H) 06/06/2025       Orders:    Hemoglobin A1C; Future    Albumin / creatinine urine ratio; Future    TSH, 3rd generation with Free T4 reflex; Future     no

## 2025-06-09 NOTE — ASSESSMENT & PLAN NOTE
History of coronary artery disease.  Patient denies any chest as pressure.  Has chronic shortness of breath with exertion but no increase.  He will continue to monitor his blood sugars and we will continue to make adjustment of medication if needed as far as cholesterol is concerned.  Urged the patient to continue again to follow-up with his cardiologist and undergo studies if necessary in the future to make sure there is stability to to his disease.  Does have major risk factors including diabetes, obesity and hypertension.

## 2025-06-09 NOTE — ASSESSMENT & PLAN NOTE
History of hypertension.  Patient remains on medication in order to control his blood pressure losartan 25 mg daily along with metoprolol 50 mg twice a day.  We feel the patient may benefit from an increase in his losartan from 25 to 50 mg daily.  Patient is checking his blood pressure readings at home and states they have been stable    Orders:    TSH, 3rd generation with Free T4 reflex; Future

## 2025-06-09 NOTE — ASSESSMENT & PLAN NOTE
Wt Readings from Last 3 Encounters:   06/09/25 (!) 146 kg (322 lb)   04/18/25 (!) 142 kg (313 lb 12.8 oz)   04/04/25 (!) 138 kg (305 lb)       Patient is urged to continue to follow-up with cardiology.  As noted patient's weight has continued to increase and this may be a secondary effect from him being on a high dose of steroids recently for exacerbation of his back pain.  Again I can urged the patient enough to be seen by weight management for evaluation and definitive treatment.

## 2025-06-11 ENCOUNTER — OFFICE VISIT (OUTPATIENT)
Dept: PHYSICAL THERAPY | Facility: REHABILITATION | Age: 65
End: 2025-06-11
Attending: ANESTHESIOLOGY
Payer: COMMERCIAL

## 2025-06-11 DIAGNOSIS — M79.18 MYOFASCIAL PAIN: ICD-10-CM

## 2025-06-11 DIAGNOSIS — M47.816 LUMBAR SPONDYLOSIS: ICD-10-CM

## 2025-06-11 DIAGNOSIS — M54.16 LUMBAR RADICULOPATHY: Primary | ICD-10-CM

## 2025-06-11 PROCEDURE — 97110 THERAPEUTIC EXERCISES: CPT

## 2025-06-11 PROCEDURE — 97140 MANUAL THERAPY 1/> REGIONS: CPT

## 2025-06-11 NOTE — PROGRESS NOTES
Daily Note     Today's date: 2025  Patient name: Liban Montes  : 1960  MRN: [id number]  Referring provider: Tony Cabrera DO  Dx:   Encounter Diagnosis     ICD-10-CM    1. Lumbar radiculopathy  M54.16       2. Lumbar spondylosis  M47.816       3. Myofascial pain  M79.18               Start Time: 1401  Stop Time: 1445  Total time in clinic (min): 44 minutes    Subjective: Pt reports having a recent flare up in sx, and notes that he has been attempting to remain as consistent as possible with his HEP.      Objective: See treatment diary below      Assessment: Pt tolerated today's session well with no increase in pain noted during or after completion of exercises. Exercise volume and intensity was decreased during today's session due to an increase in sx irritability at the start of today's session. Will monitor status NV and progress as appropriate. Pt will benefit from continued skilled PT services to address remaining impairments and improve QOL. Pt was 1:1 with Norberto Love PT, DPT for the entirety of today's session.        Plan: Continue per plan of care.      Precautions: CAD, HTN, acute on chronic systolic congestive heart failure, COPD, T2DM, Morbid Obesity    POC expires Unit limit Auth Expiration date PT/OT + Visit Limit?   2025  BOMN  30         Visit/Unit Tracking  AUTH Status:  Date 5/1 5/5 5/8 5/12 5/15 5/19 5/27 6/2 6/11   [employer] Used 11 12 13 14 15 16 17 18 19    Remaining                Pertinent Findings:      Test / Measure  3/20/2025 4/28/25 6/2/25   FOTO (Predicted 42) 16 38 54   Decreased gross LLE strength Avg of 3+/5  NT NT     Access Code: [email or web address]  URL: [email or web address]  Date: 2025  Prepared by: Norberto Love    Exercises  - Seated Transversus Abdominis Bracing  - 1 x daily - 7 x weekly - 3 sets - 10 reps - 3 hold  - Seated Pelvic Tilt  - 1 x daily - 7 x weekly - 2 sets - 10 reps  - Seated Sciatic Tensioner  - 1 x daily - 7 x weekly - 2  "sets - 10 reps - 2 hold  - Seated Lumbar Flexion Stretch  - 4 x daily - 7 x weekly - 1 sets - 15 reps - 3 hold  - Seated Hamstring Stretch  - 1 x daily - 4 x weekly - 1 sets - 3 reps - 30 hold  - Sit to Stand  - 1 x daily - 3 x weekly - 3 sets - 5 reps  - Seated Hip Abduction  - 1 x daily - 3 x weekly - 2 sets - 12 reps  - Standing Hip Abduction  - 1 x daily - 3 x weekly - 2 sets - 10 reps  - Standing Hip Extension  - 1 x daily - 3 x weekly - 2 sets - 10 reps    Manuals 5/1 5/5 5/8 5/12 5/15 5/19 5/27 6/2 6/11   LE stretching BM 10' KP 10' KP 10' KP 10' BM 10' BM 10'   BM 10'   L/s Mobs            Central Gliders Bm 6' Tibial N LLE (3x30\")      Bm 6' Tibial N LLE (3x30\") Bm 6' Tibial N LLE (3x30\")    Left Glute STM       BM (using elbow) BM (using elbow)    MET Shotgun            Neuro Re-Ed            Patient Education            TrA contractions            LTR         10x3\"   Paloff Presses            Seated HS stretch     2x30\" B/l       Standing Pball Crush            3-way ball rollout  10x ea 10x ea 10x ea 10x ea 3\" hold 10x ea 3\" hold   10x ea 3\" hold 10x ea 3\" hold   HS stretch            Slump N Tensioner's       15x B 15x B    Hip add isometric            Glute Isometric Squeeze            Ther Ex            NuStep 8' x L7 8' L7 8' L7 8' L7 8' x L6 8' x L6 8' x L7 8' x L7 8' x L7   Glute Bridges 2x8 in supine 2x10  2x10  2x10 NT 2x10 2x12  2x10  2x10   Clamshells       3x10 Btb in supine 2x12 Btb in supine 2x12 btb in supine   Seated knee flex/ext with TB       2x10 Gtb 2x10 Gtb 2x12 Gtb   HR/TR      2x10 ea B  2x12 ea B 2x10 ea B   Leg Press            Titan LAQ/HS curl            Standing Hip abd     2x8 B/l 2x10 b/l 2x10 b/l 2x10 b/l    Standing Hip Ext 2x10 B 2x10 b/l 2x10 b/l 2x10 b/l 2x8 B/l 2x10 b/l 2x10 b/l 2x10 b/l    FSU        2x10 4\" step    Repeated Mvmt Assessment            STS 2x5 +1 foam    2x5 no foam 2x10 no foam 2x10 no foam 2x10 no foam 3x5 +1 foam 3x5 +1 foam 3x10 no foam 3x10 no foam " 2x10 no foam   Side lying Hip Abd 2x8 B 2x8 b/l 2x8 b/l 2x10 b/l  2x10 b/l  2x8 B    Ther Activity                                    Gait Training                                    Modalities

## 2025-06-16 ENCOUNTER — APPOINTMENT (OUTPATIENT)
Dept: PHYSICAL THERAPY | Facility: REHABILITATION | Age: 65
End: 2025-06-16
Attending: ANESTHESIOLOGY
Payer: COMMERCIAL

## 2025-06-16 ENCOUNTER — TELEPHONE (OUTPATIENT)
Dept: PAIN MEDICINE | Facility: CLINIC | Age: 65
End: 2025-06-16

## 2025-06-16 DIAGNOSIS — M47.816 LUMBAR SPONDYLOSIS: ICD-10-CM

## 2025-06-16 DIAGNOSIS — M54.16 LUMBAR RADICULOPATHY: Primary | ICD-10-CM

## 2025-06-16 NOTE — TELEPHONE ENCOUNTER
"S/w the patient after reviewing his notes. He is S/P a Left L5 TFESI on 5/6/25. Reviewed with him that he did not receive any relief with the previous injection. He stated the pain feels worse currently. The pain continues in the area of the injection. He stated it feels like a stabbing spasm type of pain. He continues on the Norco, Gabapentin 300 mg TID and Robaxon with no relief. He does have DM. He stated he can't even hardly stand or drive. He couldn't go to PT because of the pain. He denies [employer] or fever. His pain currently is an 8. Reviewed that you were made aware of this and he would need a reevaluation done in office which is currently. Reviewed that if his pain is or should become worse above a \"10\" then he should seek treatment at the ED. Please advise, otherwise. Thank you  "

## 2025-06-16 NOTE — TELEPHONE ENCOUNTER
Agree with recommendations.  No further recommendations at this time.  Will reevaluate at next office visit

## 2025-06-16 NOTE — PROGRESS NOTES
Re-Evaluation    Today's date: 2025  Patient name: Liban Montes  : 1960  MRN: [id number]  Referring provider: Tony Cabrera DO  Dx: No diagnosis found.               Subjective: Liban Montes is a 64 y.o. male is presenting to B for a re-evaluation for LBP. They note *** progress since starting their PT POC, and feel that they have made improvements in the following areas: ***.  They note that they continue to struggle with performing the following activities: ***. At this point in their rehab process, they feel that they are ***% of where they hope to be at the end of their PT POC.    Pain:  Current:  Best:  Worst:       Objective: See treatment diary below    Strength and ROM evaluated B from a regional biomechanical perspective and values relevant to this episode recorded in tables below.     ROM:   Joint / Motion  Right: 3/20/2025  Left: 3/20/2025  Right: 2025 Left: 2025   Lumbar Flexion  50% P*       Lumbar Extension  15% P*       Lumbar Sidebending  10% P* 10%     Lumbar rotation 25% P* 50% P*     Hip ER  WFL WFL     Hip IR  WFL WFL     Hip Flex 90 deg 70 deg           Strength: MMT revealed the following findings.  Joint Motion Right: 3/20/2025 Left: 3/20/2025 Right: 2025 Left: 2025   Hip Flexion 4/5 4-/5 P*     Hip Abduction 4-/5 4-/5     Hip Adduction 4-/5 4-/5     Hip Extension Unable to assess due to an inability to achieve prone. Unable to assess due to an inability to achieve prone.     Knee Extension 4/5 3+/5     Knee Flexion 4/5 3+/5     Ankle Plantarflexion 4-/5 4-/5     Ankle Dorsiflexion 4-/5 4-/5              Additional Assessments:  Pain with palpation noted:  Left PSIS and lumbar paraspinals.  Passive intervertebral motion: Unable to assess due to an inability to achieve prone.  Supine to Sit: Unable to assess due to decreased supine tolerance.  Gait Assessment: SPC for ambulation, shortened stride length, decreased gait speed, antalgic gait.     Special  Tests:  Lumbar Specific and Neural Tension                                                                            Test / Measure  3/20/2025 6/16/2025   Straight Leg raise P on left, N on right    Crossed straight leg raise N b/l    Slump test N for tibial b/l, P for fib on left    Sural n (invert), tibial (elio) P sural on right           Hip Special Testing:  Test / Measure  3/20/2025 6/16/2025   TC N b/l    FADIR N b/l    Hip Scour N b/l        Assessment: Liban Montes is a 64 y.o. male being seen at [facility] for PT re-evaluation. Pt states they have made *** progress t/o the current PT plan of care thus far. Pt states their current limitations include ***. Pt continues to note increased pain with ***. Pt states they are ***% to their desired level with PT services thus far. ROM assessment showed ***. Strength assessment showed ***. Upon completion of the ***, the score showed a subjective *** in overall function receiving a score of *** compared to the initial evaluation score of ***. Pt continues to demonstrate the following impairments: ***. They will continue to benefit from skilled PT services to address these impairments, improve ADL performance, improve activity tolerance, and improve quality of life.    Goals  Impairment Based Goals:  1. Decreased pain by 50% in 4 weeks.  2. Improve ROM to WFL by discharge.   3. Improve strength by 1/2 measure in 6 weeks.   4. Improve FOTO greater than predicted outcome score by discharge.        Functional Based Goals: To be met upon discharge  1. Patient will be independent with household ADLs.   2. Patient will be fully independent with HEP by discharge  3. Patient will be able to manage symptoms independently.  4. Pt will be able to perform ADLs, iADLS, and household duties with minimal restriction indicating return to PLOF.  5. Pt independent with rationale, technique and plan for performance of advanced HEP to ensure independent self-management of symptoms upon  discharge.      Plan: Continue per plan of care.  Progress treatment as tolerated.      Planned therapy interventions: abdominal trunk stabilization, activity modification, ADL training, balance/weight bearing training, coordination, gait training, home exercise program, therapeutic exercise, therapeutic activities, stretching, strengthening, postural training, patient/caregiver education, neuromuscular re-education, nerve gliding, motor coordination training, manual therapy, IASTM, joint mobilization, balance, body mechanics training, flexibility, functional ROM exercises, graded activity, IADL retraining, transfer training, self care and massage    Frequency: 1-2x week  Duration in weeks: 12  Plan of Care beginning date: 3/20/2025  Plan of Care expiration date: 6/12/2025  Treatment plan discussed with: patient  Plan details: Patient was educated in HEP and Plan of Care.  All questions were answered to pt's satisfaction.              Precautions: CAD, HTN, acute on chronic systolic congestive heart failure, COPD, T2DM, Morbid Obesity    POC expires Unit limit Auth Expiration date PT/OT + Visit Limit?   6/12/2025  BOMN  30         Visit/Unit Tracking  AUTH Status:  Date 5/1 5/5 5/8 5/12 5/15 5/19 5/27 6/2 6/11   [employer] Used 11 12 13 14 15 16 17 18 19    Remaining                Pertinent Findings:      Test / Measure  3/20/2025 4/28/25 6/2/25   FOTO (Predicted 42) 16 38 54   Decreased gross LLE strength Avg of 3+/5  NT NT     Access Code: [email or web address]  URL: [email or web address]  Date: 05/19/2025  Prepared by: Norberto Love    Exercises  - Seated Transversus Abdominis Bracing  - 1 x daily - 7 x weekly - 3 sets - 10 reps - 3 hold  - Seated Pelvic Tilt  - 1 x daily - 7 x weekly - 2 sets - 10 reps  - Seated Sciatic Tensioner  - 1 x daily - 7 x weekly - 2 sets - 10 reps - 2 hold  - Seated Lumbar Flexion Stretch  - 4 x daily - 7 x weekly - 1 sets - 15 reps - 3 hold  - Seated Hamstring Stretch  - 1 x daily -  "4 x weekly - 1 sets - 3 reps - 30 hold  - Sit to Stand  - 1 x daily - 3 x weekly - 3 sets - 5 reps  - Seated Hip Abduction  - 1 x daily - 3 x weekly - 2 sets - 12 reps  - Standing Hip Abduction  - 1 x daily - 3 x weekly - 2 sets - 10 reps  - Standing Hip Extension  - 1 x daily - 3 x weekly - 2 sets - 10 reps    Manuals 5/1 5/5 5/8 5/12 5/15 5/19 5/27 6/2 6/11   LE stretching BM 10' KP 10' KP 10' KP 10' BM 10' BM 10'   BM 10'   L/s Mobs            Central Gliders Bm 6' Tibial N LLE (3x30\")      Bm 6' Tibial N LLE (3x30\") Bm 6' Tibial N LLE (3x30\")    Left Glute STM       BM (using elbow) BM (using elbow)    MET Shotgun            Neuro Re-Ed            Patient Education            TrA contractions            LTR         10x3\"   Paloff Presses            Seated HS stretch     2x30\" B/l       Standing Pball Crush            3-way ball rollout  10x ea 10x ea 10x ea 10x ea 3\" hold 10x ea 3\" hold   10x ea 3\" hold 10x ea 3\" hold   HS stretch            Slump N Tensioner's       15x B 15x B    Hip add isometric            Glute Isometric Squeeze            Ther Ex            NuStep 8' x L7 8' L7 8' L7 8' L7 8' x L6 8' x L6 8' x L7 8' x L7 8' x L7   Glute Bridges 2x8 in supine 2x10  2x10  2x10 NT 2x10 2x12  2x10  2x10   Clamshells       3x10 Btb in supine 2x12 Btb in supine 2x12 btb in supine   Seated knee flex/ext with TB       2x10 Gtb 2x10 Gtb 2x12 Gtb   HR/TR      2x10 ea B  2x12 ea B 2x10 ea B   Leg Press            Titan LAQ/HS curl            Standing Hip abd     2x8 B/l 2x10 b/l 2x10 b/l 2x10 b/l    Standing Hip Ext 2x10 B 2x10 b/l 2x10 b/l 2x10 b/l 2x8 B/l 2x10 b/l 2x10 b/l 2x10 b/l    FSU        2x10 4\" step    Repeated Mvmt Assessment            STS 2x5 +1 foam    2x5 no foam 2x10 no foam 2x10 no foam 2x10 no foam 3x5 +1 foam 3x5 +1 foam 3x10 no foam 3x10 no foam 2x10 no foam   Side lying Hip Abd 2x8 B 2x8 b/l 2x8 b/l 2x10 b/l  2x10 b/l  2x8 B    Ther Activity                                    Gait Training       "                              Modalities

## 2025-06-16 NOTE — TELEPHONE ENCOUNTER
Caller: Brock    Doctor: Dr. Cabrera    Reason for call: patient couldn't go to PT today can barely walk or drive please advise next step?    Call back#: [phone]

## 2025-06-17 ENCOUNTER — OFFICE VISIT (OUTPATIENT)
Dept: PAIN MEDICINE | Facility: CLINIC | Age: 65
End: 2025-06-17
Payer: COMMERCIAL

## 2025-06-17 VITALS — WEIGHT: 315 LBS | BODY MASS INDEX: 51.97 KG/M2

## 2025-06-17 DIAGNOSIS — M54.50 CHRONIC BILATERAL LOW BACK PAIN WITHOUT SCIATICA: ICD-10-CM

## 2025-06-17 DIAGNOSIS — G89.29 CHRONIC BILATERAL LOW BACK PAIN WITHOUT SCIATICA: ICD-10-CM

## 2025-06-17 DIAGNOSIS — M54.16 LUMBAR RADICULOPATHY: Primary | ICD-10-CM

## 2025-06-17 PROCEDURE — 99214 OFFICE O/P EST MOD 30 MIN: CPT | Performed by: NURSE PRACTITIONER

## 2025-06-17 RX ORDER — GABAPENTIN 300 MG/1
600 CAPSULE ORAL 3 TIMES DAILY
Qty: 180 CAPSULE | Refills: 1 | Status: SHIPPED | OUTPATIENT
Start: 2025-06-17

## 2025-06-17 NOTE — PATIENT INSTRUCTIONS
Gabapentin 300mg  Take 1 capsule in the morning, 1 capsule in the afternoon and 2 capsules at bedtime x 3 days  Then increase to 2 capsules in the morning, 1 capsule in the afternoon and 2 capsules at bedtime x 3 days  Then increase to 2 capsules three times a day.

## 2025-06-17 NOTE — PROGRESS NOTES
Name: Liban Montes      : 1960      MRN: [id number]  Encounter Provider: KIMBERLEE Steven  Encounter Date: 2025   [facility]  :  Assessment & Plan  Chronic bilateral low back pain without sciatica    Orders:    gabapentin (NEURONTIN) 300 mg capsule; Take 2 capsules (600 mg total) by mouth 3 (three) times a day    Lumbar radiculopathy    Orders:    FL spine and pain procedure; Future      Patient will be scheduled for an L5-S1 LESI to address the inflammatory component of the patient's pain.  We will first request permission for patient to hold Plavix prior to procedure.  Complete risks and benefits including bleeding, infection, tissue reaction, nerve injury and allergic reaction were discussed. The patient was agreeable and verbalized an understanding  I will increase gabapentin to 600 mg 3 times daily for neuropathic complaints.  Titration instructions were provided to the patient today.  I advised the patient that if they experience any side effects or issues with the changes in their medication regiment, they should give our office a call to discuss. I also advised the patient not to drive or operate machinery until they see how the changes in the medication regimen affects them. The patient was agreeable and verbalized an understanding.  Patient may continue hydrocodone and methocarbamol as prescribed  Will avoid NSAIDs secondary to anticoagulation  Patient may resume physical therapy once pain has improved  Follow-up after procedure or sooner if needed    My impressions and treatment recommendations were discussed in detail with the patient who verbalized understanding and had no further questions.  Discharge instructions were provided. I personally saw and examined the patient and I agree with the above discussed plan of care.    History of Present Illness     Liabn Montes is a 64 y.o. male with a history of CAD, CHF, COPD, FREDY, diabetes,  on anticoagulation last seen in the office on April 16, 2025 who presents to [facility] for interval re-evaluation in regards to pain in the Low back which is radiating into the left posterior lower extremity with associated numbness and tingling the patient denies right lower extremity symptoms, bowel or bladder incontinence or saddle anesthesia. Pain is described as Constant, Sharp, Pressure-like, and Shooting. On the numeric pain scale of 1-10, the pain typically increases to max of 7 out of 10, which is currently impacting their quality of life.  The patient is status post left L5 TFESI May 6, 2025 without any improvement of his symptoms.  He has been participating in physical therapy without improvement of his symptoms.  He continues on gabapentin 300 mg 3 times daily, Norco 5/325 mg every 4 hours as needed pain as prescribed by PCP and methocarbamol 750 mg as needed which has not improved his current pain    Review of Systems   Respiratory:  Negative for shortness of breath.    Cardiovascular:  Negative for chest pain.   Gastrointestinal:  Negative for constipation, diarrhea, nausea and vomiting.   Musculoskeletal:  Positive for back pain and gait problem. Negative for arthralgias, joint swelling and myalgias.   Skin:  Negative for rash.   Neurological:  Negative for dizziness, seizures and weakness.   All other systems reviewed and are negative.      Medical History Reviewed by provider this encounter:     .       Objective   Wt (!) 146 kg (322 lb)   BMI 51.97 kg/m²      Pain Score:   7  Physical Exam  Constitutional: normal, well developed, well nourished, alert, in no distress and non-toxic and no overt pain behavior.  Eyes: anicteric  HEENT: grossly intact  Neck: supple, symmetric, trachea midline and no masses   Pulmonary: even and unlabored  Cardiovascular: No edema or pitting edema present  Skin: Normal without rashes or lesions and well hydrated  Psychiatric: Mood and affect  appropriate  Neurologic: Cranial Nerves II-XII grossly intact  Musculoskeletal: antalgic and ambulates with cane.  Lumbar paraspinal musculature tender to palpation.  Positive seated straight leg raise on the left      Impression:  No acute fracture or dislocation of the thoracolumbar spine.    CT examination performed with dose lowering protocol in accordance with ALARA.          Workstation:[id number]  Narrative  History: Trauma.    Procedure: Data from the thoracic and lumbar spine regions of a previous  enhanced body CT scan was acquired and displayed in coronal, sagittal, and axial  planes. CT examination performed with dose lowering protocol in accordance with  ALARA.    Comparison: None.    Findings:    Injury: No acute fracture or dislocation.  Alignment: Unremarkable.  Prevertebral soft tissues: Unremarkable.  Discs/Degenerative changes: Overall mild thoracolumbar spondylosis L5-S1 neural  foraminal stenoses impinging bilateral exiting L5 nerve roots.    The current study does not evaluate for ligamentous laxity or injury. Evaluation  of contents of the spinal canal is suboptimal. Follow trauma protocol.

## 2025-06-23 ENCOUNTER — EVALUATION (OUTPATIENT)
Dept: PHYSICAL THERAPY | Facility: REHABILITATION | Age: 65
End: 2025-06-23
Attending: ANESTHESIOLOGY
Payer: COMMERCIAL

## 2025-06-23 DIAGNOSIS — M54.16 LUMBAR RADICULOPATHY: Primary | ICD-10-CM

## 2025-06-23 DIAGNOSIS — M79.18 MYOFASCIAL PAIN: ICD-10-CM

## 2025-06-23 DIAGNOSIS — M47.816 LUMBAR SPONDYLOSIS: ICD-10-CM

## 2025-06-23 PROCEDURE — 97112 NEUROMUSCULAR REEDUCATION: CPT

## 2025-06-23 PROCEDURE — 97140 MANUAL THERAPY 1/> REGIONS: CPT

## 2025-06-23 PROCEDURE — 97110 THERAPEUTIC EXERCISES: CPT

## 2025-06-23 NOTE — PROGRESS NOTES
Re-Evaluation    Today's date: 2025  Patient name: Liban Montes  : 1960  MRN: [id number]  Referring provider: Tony Cabrera DO  Dx:   Encounter Diagnosis     ICD-10-CM    1. Lumbar radiculopathy  M54.16       2. Lumbar spondylosis  M47.816       3. Myofascial pain  M79.18         Start Time: 1400  Stop Time: 1447  Total time in clinic (min): 47 minutes    Subjective: Liban Montes is a 64 y.o. male is presenting to B for a re-evaluation for LBP. Pt was scheduled for Re-evaluation last week, but had to cancel appointments due to having an intense flare up in low back pain. Pt was barely able to get out of bed. Pt contact pain management and they doubled his dose of gabapentin to help decrease the flare up.     Pain:  Current: 10  Best: /10  Worst: 10/10      Objective: See treatment diary below     ROM:   Joint / Motion  Right: 3/20/2025  Left: 3/20/2025  Right: 2025 Left: 2025   Lumbar Flexion  50% P*   50% P 50% P   Lumbar Extension  15% P*   Max restriction L/R- 10% P   Lumbar Sidebending  10% P* 10% 20% P 20% P   Lumbar rotation 25% P* 50% P* 50% P 50% P         Strength: MMT revealed the following findings.  Joint Motion Right: 3/20/2025 Left: 3/20/2025 Right: 2025 Left: 2025   Hip Flexion 4/5 4-/5 P* 4/5 4/5   Hip Abduction 4-/5 4-/5 4-/5 4-/5   Hip Adduction 4-/5 4-/5 4/5 4/5   Hip Extension Unable to assess due to an inability to achieve prone. Unable to assess due to an inability to achieve prone. Unable to assess due to an inability to achieve prone. Unable to assess due to an inability to achieve prone.   Knee Extension 4/5 3+/5 4/5 4-/5   Knee Flexion 4/5 3+/5 4/5 4-/5   Ankle Plantarflexion 4-/5 4-/5 4-/5 4-/5   Ankle Dorsiflexion 4-/5 4-/5 4+/5 4+/5        Additional Assessments:  Gait Assessment: SPC for ambulation, shortened stride length, decreased gait speed, antalgic gait.      Assessment: Patient is a pleasant 64 y.o. male who presents to physical  therapy with main reports of Chronic low back pain.    No further referral appears necessary at this time based upon examination results.    Primary movement impairment diagnosis of lumbar hypomobility w/ decreased LE strength resulting in pathoanatomical symptoms of decreased ROM, decreased functional strength, impaired gait, and increased symptom irritability. This limits Brock's ability to complete ADLs, ambulate w/o assistive device, and complete required daily tasks.     Prognosis is good given HEP compliance and PT 2 times per week over the next 12 weeks.  Positive prognostic indicators include positive attitude toward recovery.  Negative prognostic indicators include chronicity of pain in addition to other medical history.    Please contact me if you have any questions.  Thank you for the opportunity to share in Liban Montes care.      Goals  Impairment Based Goals:  1. Decreased pain by 50% in 4 weeks.- Ongoing  2. Improve ROM to WFL by discharge.- Ongoing  3. Improve strength by 1/2 measure in 6 weeks.- Partially MET  4. Improve FOTO greater than predicted outcome score by discharge.- Ongoing        Functional Based Goals: To be met upon discharge  1. Patient will be independent with household ADLs.- Ongoing  2. Patient will be fully independent with HEP by discharge- Ongoing  3. Patient will be able to manage symptoms independently.- Ongoing  4. Pt will be able to perform ADLs, iADLS, and household duties with minimal restriction indicating return to PLOF.- Ongoing   5. Pt independent with rationale, technique and plan for performance of advanced HEP to ensure independent self-management of symptoms upon discharge.- Ongoing      Plan: Continue per plan of care.  Progress treatment as tolerated.           Precautions: CAD, HTN, acute on chronic systolic congestive heart failure, COPD, T2DM, Morbid Obesity    POC expires Unit limit Auth Expiration date PT/OT + Visit Limit?   9/23/25 BOMN  30        "  Visit/Unit Tracking  AUTH Status:  Date 5/1 5/5 5/8 5/12 5/15 5/19 5/27 6/2 6/11 6/23   [employer] Used 11 12 13 14 15 16 17 18 19 20    Remaining                 Pertinent Findings:      Test / Measure  3/20/2025 4/28/25 6/2/25   FOTO (Predicted 42) 16 38 54   Decreased gross LLE strength Avg of 3+/5  NT NT     Access Code: [email or web address]  URL: [email or web address]  Date: 05/19/2025  Prepared by: Norberto Love    Exercises  - Seated Transversus Abdominis Bracing  - 1 x daily - 7 x weekly - 3 sets - 10 reps - 3 hold  - Seated Pelvic Tilt  - 1 x daily - 7 x weekly - 2 sets - 10 reps  - Seated Sciatic Tensioner  - 1 x daily - 7 x weekly - 2 sets - 10 reps - 2 hold  - Seated Lumbar Flexion Stretch  - 4 x daily - 7 x weekly - 1 sets - 15 reps - 3 hold  - Seated Hamstring Stretch  - 1 x daily - 4 x weekly - 1 sets - 3 reps - 30 hold  - Sit to Stand  - 1 x daily - 3 x weekly - 3 sets - 5 reps  - Seated Hip Abduction  - 1 x daily - 3 x weekly - 2 sets - 12 reps  - Standing Hip Abduction  - 1 x daily - 3 x weekly - 2 sets - 10 reps  - Standing Hip Extension  - 1 x daily - 3 x weekly - 2 sets - 10 reps    Manuals 5/1 5/5 5/8 5/12 5/15 5/19 5/27 6/2 6/11 6/23   LE stretching BM 10' KP 10' KP 10' KP 10' BM 10' BM 10'   BM 10'    L/s Mobs             Central Gliders Bm 6' Tibial N LLE (3x30\")      Bm 6' Tibial N LLE (3x30\") Bm 6' Tibial N LLE (3x30\")     Left Glute STM       BM (using elbow) BM (using elbow)     MET Shotgun             Re-evaluation          JMK 10'   Neuro Re-Ed             Patient Education             TrA contractions             LTR         10x3\" 10x3\"   Paloff Presses             Seated HS stretch     2x30\" B/l        Standing Pball Crush             3-way ball rollout  10x ea 10x ea 10x ea 10x ea 3\" hold 10x ea 3\" hold   10x ea 3\" hold 10x ea 3\" hold 10x ea 3\" hold   HS stretch             Slump N Tensioner's       15x B 15x B     Hip add isometric             Glute Isometric Squeeze           " "  Ther Ex             NuStep 8' x L7 8' L7 8' L7 8' L7 8' x L6 8' x L6 8' x L7 8' x L7 8' x L7 8' x L7   Glute Bridges 2x8 in supine 2x10  2x10  2x10 NT 2x10 2x12  2x10  2x10 2x10   Clamshells       3x10 Btb in supine 2x12 Btb in supine 2x12 btb in supine 2x12 btb in supine   Seated knee flex/ext with TB       2x10 Gtb 2x10 Gtb 2x12 Gtb    HR/TR      2x10 ea B  2x12 ea B 2x10 ea B 2x10 ea B   Leg Press             Titan LAQ/HS curl             Standing Hip abd     2x8 B/l 2x10 b/l 2x10 b/l 2x10 b/l     Standing Hip Ext 2x10 B 2x10 b/l 2x10 b/l 2x10 b/l 2x8 B/l 2x10 b/l 2x10 b/l 2x10 b/l     FSU        2x10 4\" step     Repeated Mvmt Assessment             STS 2x5 +1 foam    2x5 no foam 2x10 no foam 2x10 no foam 2x10 no foam 3x5 +1 foam 3x5 +1 foam 3x10 no foam 3x10 no foam 2x10 no foam 2x10 no foam   Side lying Hip Abd 2x8 B 2x8 b/l 2x8 b/l 2x10 b/l  2x10 b/l  2x8 B     Ther Activity                                       Gait Training                                       Modalities                                                                 "

## 2025-06-28 DIAGNOSIS — M79.18 MYOFASCIAL PAIN: ICD-10-CM

## 2025-06-29 DIAGNOSIS — I25.10 ARTERIOSCLEROTIC CARDIOVASCULAR DISEASE: ICD-10-CM

## 2025-06-30 ENCOUNTER — OFFICE VISIT (OUTPATIENT)
Dept: PHYSICAL THERAPY | Facility: REHABILITATION | Age: 65
End: 2025-06-30
Attending: ANESTHESIOLOGY
Payer: COMMERCIAL

## 2025-06-30 DIAGNOSIS — M47.816 LUMBAR SPONDYLOSIS: ICD-10-CM

## 2025-06-30 DIAGNOSIS — M79.18 MYOFASCIAL PAIN: ICD-10-CM

## 2025-06-30 DIAGNOSIS — M54.16 LUMBAR RADICULOPATHY: Primary | ICD-10-CM

## 2025-06-30 PROCEDURE — 97112 NEUROMUSCULAR REEDUCATION: CPT

## 2025-06-30 PROCEDURE — 97110 THERAPEUTIC EXERCISES: CPT

## 2025-06-30 RX ORDER — METHOCARBAMOL 750 MG/1
750 TABLET, FILM COATED ORAL EVERY 6 HOURS PRN
Qty: 90 TABLET | Refills: 2 | OUTPATIENT
Start: 2025-06-30

## 2025-06-30 NOTE — PROGRESS NOTES
Daily Note     Today's date: 2025  Patient name: Liban Montes  : 1960  MRN: [id number]  Referring provider: Tony Cabrera DO  Dx:   Encounter Diagnosis     ICD-10-CM    1. Lumbar radiculopathy  M54.16       2. Lumbar spondylosis  M47.816       3. Myofascial pain  M79.18         Start Time: 1500  Stop Time: 1537  Total time in clinic (min): 37 minutes    Subjective: Pt reports that his low back is still very sore and stiff today. Pt reports he is scheduled for follow-up w/ possible injections next week.       Objective: See treatment diary below      Assessment: Tolerated treatment fair. Pt still having elevated levels of pain during today's session. Pt was able to complete all exercises, but did not report any relief post session. Continue to monitor Pt's symptoms NV. Patient demonstrated fatigue post treatment, exhibited good technique with therapeutic exercises, and would benefit from continued PT for increased ROM, increased strength, and decreased symptom irritability.       Plan: Continue per plan of care.  Progress treatment as tolerated.       Precautions: CAD, HTN, acute on chronic systolic congestive heart failure, COPD, T2DM, Morbid Obesity    POC expires Unit limit Auth Expiration date PT/OT + Visit Limit?   25 BOMN  30         Visit/Unit Tracking  AUTH Status:  Date 5/1 5/5 5/8 5/12 5/15 5/19 5/27 6/2 6/11 6/23 6/30   [employer] Used 11 12 13 14 15 16 17 18 19 20 21    Remaining                  Pertinent Findings:      Test / Measure  3/20/2025 4/28/25 6/2/25   FOTO (Predicted 42) 16 38 54   Decreased gross LLE strength Avg of 3+/5  NT NT     Access Code: [email or web address]  URL: [email or web address]  Date: 2025  Prepared by: Norberto Love    Exercises  - Seated Transversus Abdominis Bracing  - 1 x daily - 7 x weekly - 3 sets - 10 reps - 3 hold  - Seated Pelvic Tilt  - 1 x daily - 7 x weekly - 2 sets - 10 reps  - Seated Sciatic Tensioner  - 1 x daily - 7 x weekly - 2  "sets - 10 reps - 2 hold  - Seated Lumbar Flexion Stretch  - 4 x daily - 7 x weekly - 1 sets - 15 reps - 3 hold  - Seated Hamstring Stretch  - 1 x daily - 4 x weekly - 1 sets - 3 reps - 30 hold  - Sit to Stand  - 1 x daily - 3 x weekly - 3 sets - 5 reps  - Seated Hip Abduction  - 1 x daily - 3 x weekly - 2 sets - 12 reps  - Standing Hip Abduction  - 1 x daily - 3 x weekly - 2 sets - 10 reps  - Standing Hip Extension  - 1 x daily - 3 x weekly - 2 sets - 10 reps    Manuals 5/1 5/5 5/8 5/12 5/15 5/19 5/27 6/2 6/11 6/23 6/30   LE stretching BM 10' KP 10' KP 10' KP 10' BM 10' BM 10'   BM 10'     L/s Mobs              Central Gliders Bm 6' Tibial N LLE (3x30\")      Bm 6' Tibial N LLE (3x30\") Bm 6' Tibial N LLE (3x30\")      Left Glute STM       BM (using elbow) BM (using elbow)      MET Shotgun              Re-evaluation          JMK 10'    Neuro Re-Ed              Patient Education              TrA contractions              LTR         10x3\" 10x3\" 10x3\"   Paloff Presses              Seated HS stretch     2x30\" B/l         Standing Pball Crush              3-way ball rollout  10x ea 10x ea 10x ea 10x ea 3\" hold 10x ea 3\" hold   10x ea 3\" hold 10x ea 3\" hold 10x ea 3\" hold 20x ea 3\" hold   HS stretch              Slump N Tensioner's       15x B 15x B      Hip add isometric              Glute Isometric Squeeze              Ther Ex              NuStep 8' x L7 8' L7 8' L7 8' L7 8' x L6 8' x L6 8' x L7 8' x L7 8' x L7 8' x L7 10'x L7   Glute Bridges 2x8 in supine 2x10  2x10  2x10 NT 2x10 2x12  2x10  2x10 2x10 2x10   Clamshells       3x10 Btb in supine 2x12 Btb in supine 2x12 btb in supine 2x12 btb in supine 2x15 btb in supine   Seated knee flex/ext with TB       2x10 Gtb 2x10 Gtb 2x12 Gtb     HR/TR      2x10 ea B  2x12 ea B 2x10 ea B 2x10 ea B 2x10 ea B   Leg Press              Titan LAQ/HS curl              Standing Hip abd     2x8 B/l 2x10 b/l 2x10 b/l 2x10 b/l      Standing Hip Ext 2x10 B 2x10 b/l 2x10 b/l 2x10 b/l 2x8 B/l " "2x10 b/l 2x10 b/l 2x10 b/l      FSU        2x10 4\" step      Repeated Mvmt Assessment              STS 2x5 +1 foam    2x5 no foam 2x10 no foam 2x10 no foam 2x10 no foam 3x5 +1 foam 3x5 +1 foam 3x10 no foam 3x10 no foam 2x10 no foam 2x10 no foam 2x10 no foam   Side lying Hip Abd 2x8 B 2x8 b/l 2x8 b/l 2x10 b/l  2x10 b/l  2x8 B   2x10 ea   Ther Activity                                          Gait Training                                          Modalities                                                                      "

## 2025-07-01 RX ORDER — ASPIRIN 81 MG/1
81 TABLET, COATED ORAL DAILY
Qty: 30 TABLET | Refills: 5 | Status: SHIPPED | OUTPATIENT
Start: 2025-07-01

## 2025-07-04 DIAGNOSIS — G89.29 CHRONIC BILATERAL LOW BACK PAIN WITHOUT SCIATICA: ICD-10-CM

## 2025-07-04 DIAGNOSIS — M54.50 CHRONIC BILATERAL LOW BACK PAIN WITHOUT SCIATICA: ICD-10-CM

## 2025-07-07 RX ORDER — HYDROCODONE BITARTRATE AND ACETAMINOPHEN 5; 325 MG/1; MG/1
1 TABLET ORAL EVERY 4 HOURS PRN
Qty: 100 TABLET | Refills: 0 | Status: SHIPPED | OUTPATIENT
Start: 2025-07-07

## 2025-07-09 ENCOUNTER — HOSPITAL ENCOUNTER (OUTPATIENT)
Dept: RADIOLOGY | Facility: CLINIC | Age: 65
Discharge: HOME | End: 2025-07-09
Attending: NURSE PRACTITIONER
Payer: COMMERCIAL

## 2025-07-09 VITALS
DIASTOLIC BLOOD PRESSURE: 68 MMHG | TEMPERATURE: 98.2 F | SYSTOLIC BLOOD PRESSURE: 169 MMHG | HEART RATE: 79 BPM | RESPIRATION RATE: 18 BRPM | OXYGEN SATURATION: 96 %

## 2025-07-09 DIAGNOSIS — M54.16 LUMBAR RADICULOPATHY: ICD-10-CM

## 2025-07-09 PROBLEM — Z00.00 HEALTHCARE MAINTENANCE: Status: RESOLVED | Noted: 2019-04-10 | Resolved: 2025-07-09

## 2025-07-09 PROCEDURE — 62323 NJX INTERLAMINAR LMBR/SAC: CPT | Performed by: ANESTHESIOLOGY

## 2025-07-09 RX ORDER — METHYLPREDNISOLONE ACETATE 80 MG/ML
80 INJECTION, SUSPENSION INTRA-ARTICULAR; INTRALESIONAL; INTRAMUSCULAR; PARENTERAL; SOFT TISSUE ONCE
Status: COMPLETED | OUTPATIENT
Start: 2025-07-09 | End: 2025-07-09

## 2025-07-09 RX ADMIN — METHYLPREDNISOLONE ACETATE 80 MG: 80 INJECTION, SUSPENSION INTRA-ARTICULAR; INTRALESIONAL; INTRAMUSCULAR; SOFT TISSUE at 11:22

## 2025-07-09 RX ADMIN — IOHEXOL 1 ML: 300 INJECTION, SOLUTION INTRAVENOUS at 11:20

## 2025-07-09 NOTE — H&P
History of Present Illness: The patient is a 64 y.o. male who presents with complaints of low back and leg pain.    Past Medical History[1]    Past Surgical History[2]    Current Medications[3]    Allergies[4]    Physical Exam:   Vitals:    07/09/25 1108   BP: 150/73   Pulse:    Resp:    Temp:    SpO2:      General: Awake, Alert, Oriented x 3, Mood and affect appropriate  Respiratory: Respirations even and unlabored  Cardiovascular: Peripheral pulses intact; no edema  Musculoskeletal Exam: antalgic gait    ASA Score: 3         Assessment:   1. Lumbar radiculopathy        Plan: L5-S1 LESI         [1]   Past Medical History:  Diagnosis Date    Abscess of left thigh     Acute myocardial infarction (HCC)     Anesthesia     woke up during procedure    Anxiety     Arteriosclerotic cardiovascular disease     Arthritis     Asthma     Chest pain     COPD (chronic obstructive pulmonary disease) (HCC)     Coronary artery disease     Diabetes mellitus (HCC)     Fatty liver     Gastric ulcer     GERD (gastroesophageal reflux disease)     Hyperlipidemia     Hypertension     Low back pain     Myocardial infarction (HCC) 2006,2007    Obesity     Obstructive sleep apnea     no Cpap    Spondylosis of lumbar region without myelopathy or radiculopathy    [2]   Past Surgical History:  Procedure Laterality Date    CARPAL TUNNEL RELEASE Right     COLONOSCOPY      CORONARY ANGIOPLASTY WITH STENT PLACEMENT  2006, 2007    EGD      HEMORRHOID SURGERY      NEUROPLASTY / TRANSPOSITION MEDIAN NERVE AT CARPAL TUNNEL Right     [place] SURGICAL ARTHROSCOPY SHOULDER W/LSS&RESCJ ADS Left 6/10/2020    Procedure: SHOULDER ARTHROSCOPIC CAPSULAR RELEASE;  Surgeon: Christiano Aguiar MD;  Location: [facility];  Service: Orthopedics   [3]   Current Outpatient Medications:     acetaminophen (TYLENOL) 325 mg tablet, Take 650 mg by mouth every 6 (six) hours as needed for mild pain, Disp: , Rfl:     albuterol (PROVENTIL HFA,VENTOLIN HFA) 90 mcg/act inhaler, INHALE  2 PUFFS AS NEEDED FOR SHORTNESS OF BREATH, Disp: 18 g, Rfl: 5    Aspirin Low Dose 81 MG EC tablet, TAKE 1 TABLET BY MOUTH EVERY DAY, Disp: 30 tablet, Rfl: 5    atorvastatin (LIPITOR) 40 mg tablet, TAKE 1 TABLET BY MOUTH EVERY DAY, Disp: 90 tablet, Rfl: 1    clopidogrel (PLAVIX) 75 mg tablet, TAKE 1 TABLET BY MOUTH EVERY DAY, Disp: 90 tablet, Rfl: 1    ezetimibe (ZETIA) 10 mg tablet, Take 10 mg by mouth in the morning., Disp: , Rfl:     fenofibrate (TRICOR) 145 mg tablet, TAKE 1 TABLET BY MOUTH EVERY DAY, Disp: 90 tablet, Rfl: 1    furosemide (LASIX) 40 mg tablet, , Disp: , Rfl:     gabapentin (NEURONTIN) 300 mg capsule, Take 2 capsules (600 mg total) by mouth 3 (three) times a day, Disp: 180 capsule, Rfl: 1    HYDROcodone-acetaminophen (Norco) 5-325 mg per tablet, Take 1 tablet by mouth every 4 (four) hours as needed for pain Max Daily Amount: 6 tablets, Disp: 100 tablet, Rfl: 0    Insulin Glargine-yfgn 100 UNIT/ML SOPN, INJECT 132 UNITS UNDER THE SKIN TWICE A DAY AS DIRECTED, Disp: 180 mL, Rfl: 1    Insulin Pen Needle (B-D UF III MINI PEN NEEDLES) 31G X 5 MM MISC, Inject as directed 2 (two) times a day Use as directed, Disp: 200 each, Rfl: 1    Jardiance 25 MG TABS, TAKE 1 TABLET (25 MG TOTAL) BY MOUTH DAILY., Disp: 90 tablet, Rfl: 1    losartan (COZAAR) 25 mg tablet, TAKE 1 TABLET (25 MG TOTAL) BY MOUTH DAILY., Disp: 90 tablet, Rfl: 1    methocarbamol (Robaxin-750) 750 mg tablet, Take 1 tablet (750 mg total) by mouth every 6 (six) hours as needed for muscle spasms, Disp: 90 tablet, Rfl: 2    metoprolol tartrate (LOPRESSOR) 50 mg tablet, Take 25 mg by mouth in the morning and 25 mg in the evening., Disp: , Rfl:     nitroglycerin (NITROSTAT) 0.4 mg SL tablet, , Disp: , Rfl:     pantoprazole (PROTONIX) 40 mg tablet, TAKE 1 TABLET BY MOUTH EVERY DAY, Disp: 90 tablet, Rfl: 5  [4]   Allergies  Allergen Reactions    Bactrim [Sulfamethoxazole-Trimethoprim] Edema    Cefaclor Shortness Of Breath     Other reaction(s):  Respiratory Distress    Simvastatin Shortness Of Breath     Other reaction(s): Respiratory Distress

## 2025-07-09 NOTE — DISCHARGE INSTR - LAB
Epidural Steroid Injection   WHAT YOU NEED TO KNOW:   An epidural steroid injection (CARMEN) is a procedure to inject steroid medicine into the epidural space. The epidural space is between your spinal cord and vertebrae. Steroids reduce inflammation and fluid buildup in your spine that may be causing pain. You may be given pain medicine along with the steroids.          ACTIVITY  Do not drive or operate machinery today.  No strenuous activity today - bending, lifting, etc.  You may resume normal activites starting tomorrow - start slowly and as tolerated.  You may shower today, but no tub baths or hot tubs.  You may have numbness for several hours from the local anesthetic. Please use caution and common sense, especially with weight-bearing activities.    CARE OF THE INJECTION SITE  If you have soreness or pain, apply ice to the area today (20 minutes on/20 minutes off).  Starting tomorrow, you may use warm, moist heat or ice if needed.  You may have an increase or change in your discomfort for 36-48 hours after your treatment.  Apply ice and continue with any pain medication you have been prescribed.  Notify the [facility] if you have any of the following: redness, drainage, swelling, headache, stiff neck or fever above 100°F.    SPECIAL INSTRUCTIONS  Our office will contact you in approximately 14 days for a progress report.    MEDICATIONS  Continue to take all routine medications. Resume Plavix on 07/10/25.  Our office may have instructed you to hold some medications.    As no general anesthesia was used in today's procedure, you should not experience any side effects related to anesthesia.     If you are diabetic, the steroids used in today's injection may temporarily increase your blood sugar levels after the first few days after your injection. Please keep a close eye on your sugars and alert the doctor who manages your diabetes if your sugars are significantly high from your baseline or you are  symptomatic.     If you have a problem specifically related to your procedure, please call our office at [phone].  Problems not related to your procedure should be directed to your primary care physician.

## 2025-07-16 ENCOUNTER — OFFICE VISIT (OUTPATIENT)
Dept: PHYSICAL THERAPY | Facility: REHABILITATION | Age: 65
End: 2025-07-16
Attending: ANESTHESIOLOGY
Payer: COMMERCIAL

## 2025-07-16 DIAGNOSIS — M47.816 LUMBAR SPONDYLOSIS: ICD-10-CM

## 2025-07-16 DIAGNOSIS — M54.16 LUMBAR RADICULOPATHY: Primary | ICD-10-CM

## 2025-07-16 PROCEDURE — 97112 NEUROMUSCULAR REEDUCATION: CPT

## 2025-07-16 PROCEDURE — 97110 THERAPEUTIC EXERCISES: CPT

## 2025-07-16 PROCEDURE — 97140 MANUAL THERAPY 1/> REGIONS: CPT

## 2025-07-16 NOTE — PROGRESS NOTES
"Daily Note     Today's date: 2025  Patient name: Liban Montes  : 1960  MRN: [id number]  Referring provider: Tony Cabrera DO  Dx:   Encounter Diagnosis     ICD-10-CM    1. Lumbar radiculopathy  M54.16       2. Lumbar spondylosis  M47.816           Start Time: 1445  Stop Time: 1530  Total time in clinic (min): 45 minutes    Subjective: Pt reports feeling \"pretty good\" at the start of today's session. He reports he has been attempting to be as consistent as possible performing his HEP.      Objective: See treatment diary below      Assessment: Pt tolerated today's session well with occasional increase in pain noted during or after completion of exercises. Pt continues to be appropriately challenged by the current exercise selection, along with the current exercise volume and intensity. Will monitor status NV and progress as appropriate. Pt will benefit from continued skilled PT services to address remaining impairments and improve QOL. Pt was 1:1 with Norberto Love PT, DPT for the entirety of today's session.        Plan: Continue per plan of care.  Progress treatment as tolerated.       Precautions: CAD, HTN, acute on chronic systolic congestive heart failure, COPD, T2DM, Morbid Obesity    POC expires Unit limit Auth Expiration date PT/OT + Visit Limit?   25 BOMN  30         Visit/Unit Tracking  AUTH Status:  Date    [employer] Used 19 20 21 22    Remaining           Pertinent Findings:      Test / Measure  3/20/2025 4/28/25 6/2/25   FOTO (Predicted 42) 16 38 54   Decreased gross LLE strength Avg of 3+/5  NT NT     Access Code: [email or web address]  URL: [email or web address]  Date: 2025  Prepared by: Norberto Love    Exercises  - Seated Transversus Abdominis Bracing  - 1 x daily - 7 x weekly - 3 sets - 10 reps - 3 hold  - Seated Pelvic Tilt  - 1 x daily - 7 x weekly - 2 sets - 10 reps  - Seated Sciatic Tensioner  - 1 x daily - 7 x weekly - 2 sets - 10 reps - 2 " "hold  - Seated Lumbar Flexion Stretch  - 4 x daily - 7 x weekly - 1 sets - 15 reps - 3 hold  - Seated Hamstring Stretch  - 1 x daily - 4 x weekly - 1 sets - 3 reps - 30 hold  - Sit to Stand  - 1 x daily - 3 x weekly - 3 sets - 5 reps  - Seated Hip Abduction  - 1 x daily - 3 x weekly - 2 sets - 12 reps  - Standing Hip Abduction  - 1 x daily - 3 x weekly - 2 sets - 10 reps  - Standing Hip Extension  - 1 x daily - 3 x weekly - 2 sets - 10 reps    Manuals 6/11 6/23 6/30 7/16   LE stretching BM 10'   BM 8'   L/s Mobs       Central Gliders       Left Glute STM       MET Shotgun       Re-evaluation  JMK 10'     Neuro Re-Ed       Patient Education       TrA contractions       LTR 10x3\" 10x3\" 10x3\" 10x3\"   Paloff Presses    2x10 5# Mini B   Seated HS stretch       Standing Pball Crush       3-way ball rollout 10x ea 3\" hold 10x ea 3\" hold 20x ea 3\" hold 10x 3\" hold   Pelvic Tilts    2x10 in sitting   HS stretch       Slump N Tensioner's    15x B   Hip add isometric       Glute Isometric Squeeze       Ther Ex       NuStep 8' x L7 8' x L7 10'x L7 10' x L7   Glute Bridges 2x10 2x10 2x10 2x12   Clamshells 2x12 btb in supine 2x12 btb in supine 2x15 btb in supine    Seated knee flex/ext with TB 2x12 Gtb      HR/TR 2x10 ea B 2x10 ea B 2x10 ea B 2x10 ea B   Leg Press       Titan LAQ/HS curl       Standing Hip abd    2x10   Standing Hip Ext    2x10   FSU    10x B 4\" step   Repeated Mvmt Assessment       STS 2x10 no foam 2x10 no foam 2x10 no foam 3x10 no foam   Side lying Hip Abd   2x10 ea    Ther Activity                     Gait Training                     Modalities                                                 "

## 2025-07-21 DIAGNOSIS — M79.18 MYOFASCIAL PAIN: ICD-10-CM

## 2025-07-22 RX ORDER — METHOCARBAMOL 750 MG/1
750 TABLET, FILM COATED ORAL EVERY 6 HOURS PRN
Qty: 90 TABLET | Refills: 1 | Status: SHIPPED | OUTPATIENT
Start: 2025-07-22

## 2025-07-23 ENCOUNTER — TELEPHONE (OUTPATIENT)
Dept: PAIN MEDICINE | Facility: CLINIC | Age: 65
End: 2025-07-23

## 2025-07-23 ENCOUNTER — OFFICE VISIT (OUTPATIENT)
Dept: PHYSICAL THERAPY | Facility: REHABILITATION | Age: 65
End: 2025-07-23
Attending: ANESTHESIOLOGY
Payer: COMMERCIAL

## 2025-07-23 DIAGNOSIS — M54.16 LUMBAR RADICULOPATHY: Primary | ICD-10-CM

## 2025-07-23 DIAGNOSIS — M47.816 LUMBAR SPONDYLOSIS: ICD-10-CM

## 2025-07-23 DIAGNOSIS — M79.18 MYOFASCIAL PAIN: ICD-10-CM

## 2025-07-23 PROCEDURE — 97110 THERAPEUTIC EXERCISES: CPT

## 2025-07-23 PROCEDURE — 97112 NEUROMUSCULAR REEDUCATION: CPT

## 2025-07-23 NOTE — PROGRESS NOTES
"Daily Note     Today's date: 2025  Patient name: Liban Montes  : 1960  MRN: [id number]  Referring provider: Tony Cabrera DO  Dx:   Encounter Diagnosis     ICD-10-CM    1. Lumbar radiculopathy  M54.16       2. Lumbar spondylosis  M47.816       3. Myofascial pain  M79.18           Start Time: 1445  Stop Time: 1530  Total time in clinic (min): 45 minutes    Subjective: Pt reports so much better at the start of the session which he attributes to the injection \"kicking in\". He has been feeling great since the weekend, and he has barely been using his SPC for ambulation.      Objective: See treatment diary below      Assessment: Pt tolerated today's session well with no increase in pain noted during or after completion of exercises. Pt was able to progress several exercises in regards to resistance, no form compensations observed or adverse effects noted. Will monitor status NV and progress as appropriate. Pt will benefit from continued skilled PT services to address remaining impairments and improve QOL. Pt was 1:1 with Norberto Love PT, DPT for the entirety of today's session.          Plan: Continue per plan of care.  Progress treatment as tolerated.       Precautions: CAD, HTN, acute on chronic systolic congestive heart failure, COPD, T2DM, Morbid Obesity    POC expires Unit limit Auth Expiration date PT/OT + Visit Limit?   25 BOMN  30         Visit/Unit Tracking  AUTH Status:  Date    [employer] Used 19  21 22 23    Remaining            Pertinent Findings:      Test / Measure  3/20/2025 4/28/25 6/2/25 7/16   FOTO (Predicted 42) 16 38 54 53   Decreased gross LLE strength Avg of 3+/5  NT NT      Access Code: [email or web address]  URL: [email or web address]  Date: 2025  Prepared by: Norberto Love    Exercises  - Seated Transversus Abdominis Bracing  - 1 x daily - 7 x weekly - 3 sets - 10 reps - 3 hold  - Seated Pelvic Tilt  - 1 x daily - 7 x weekly - 2 sets - " "10 reps  - Seated Sciatic Tensioner  - 1 x daily - 7 x weekly - 2 sets - 10 reps - 2 hold  - Seated Lumbar Flexion Stretch  - 4 x daily - 7 x weekly - 1 sets - 15 reps - 3 hold  - Seated Hamstring Stretch  - 1 x daily - 4 x weekly - 1 sets - 3 reps - 30 hold  - Sit to Stand  - 1 x daily - 3 x weekly - 3 sets - 5 reps  - Seated Hip Abduction  - 1 x daily - 3 x weekly - 2 sets - 12 reps  - Standing Hip Abduction  - 1 x daily - 3 x weekly - 2 sets - 10 reps  - Standing Hip Extension  - 1 x daily - 3 x weekly - 2 sets - 10 reps    Manuals 6/11 6/23 6/30 7/16 7/23   LE stretching BM 10'   BM 8' BM 5'   L/s Mobs        Central Gliders        Left Glute STM        MET Shotgun        Re-evaluation  JMK 10'      Neuro Re-Ed        Patient Education        TrA contractions        LTR 10x3\" 10x3\" 10x3\" 10x3\"    Paloff Presses    2x10 5# Felton B 2x10 5# Felton B   Seated HS stretch        Standing Pball Crush        3-way ball rollout 10x ea 3\" hold 10x ea 3\" hold 20x ea 3\" hold 10x 3\" hold 10x 3\" hold   Pelvic Tilts    2x10 in sitting    HS stretch        Slump N Tensioner's    15x B 15x B   Hip add isometric        Glute Isometric Squeeze        Ther Ex        NuStep 8' x L7 8' x L7 10'x L7 10' x L7 8' x L8   Glute Bridges 2x10 2x10 2x10 2x12 3x10 3\" hold   Clamshells 2x12 btb in supine 2x12 btb in supine 2x15 btb in supine  2x10 mtb in supine   Seated knee flex/ext with TB 2x12 Gtb       HR/TR 2x10 ea B 2x10 ea B 2x10 ea B 2x10 ea B    Leg Press        Titan LAQ/HS curl        Standing Hip abd    2x10 3x10   Standing Hip Ext    2x10 3x10   FSU    10x B 4\" step 2x8 6\" step   Repeated Mvmt Assessment        STS 2x10 no foam 2x10 no foam 2x10 no foam 3x10 no foam 2x10 no foam   Side lying Hip Abd   2x10 ea     Ther Activity                        Gait Training                        Modalities                                                    "

## 2025-07-24 DIAGNOSIS — I25.10 ARTERIOSCLEROTIC CARDIOVASCULAR DISEASE: ICD-10-CM

## 2025-07-25 NOTE — TELEPHONE ENCOUNTER
Caller: pt    Doctor: Dr. christie    Reason for call: 25% improvement    Call back#: [phone]

## 2025-07-28 RX ORDER — ASPIRIN 81 MG/1
81 TABLET, COATED ORAL DAILY
Qty: 90 TABLET | Refills: 2 | OUTPATIENT
Start: 2025-07-28

## 2025-07-30 ENCOUNTER — OFFICE VISIT (OUTPATIENT)
Dept: PHYSICAL THERAPY | Facility: REHABILITATION | Age: 65
End: 2025-07-30
Attending: ANESTHESIOLOGY
Payer: COMMERCIAL

## 2025-07-30 DIAGNOSIS — M79.18 MYOFASCIAL PAIN: ICD-10-CM

## 2025-07-30 DIAGNOSIS — M47.816 LUMBAR SPONDYLOSIS: ICD-10-CM

## 2025-07-30 DIAGNOSIS — M54.16 LUMBAR RADICULOPATHY: Primary | ICD-10-CM

## 2025-07-30 PROCEDURE — 97112 NEUROMUSCULAR REEDUCATION: CPT

## 2025-07-30 PROCEDURE — 97110 THERAPEUTIC EXERCISES: CPT

## 2025-08-05 DIAGNOSIS — M54.50 CHRONIC BILATERAL LOW BACK PAIN WITHOUT SCIATICA: ICD-10-CM

## 2025-08-05 DIAGNOSIS — G89.29 CHRONIC BILATERAL LOW BACK PAIN WITHOUT SCIATICA: ICD-10-CM

## 2025-08-06 RX ORDER — HYDROCODONE BITARTRATE AND ACETAMINOPHEN 5; 325 MG/1; MG/1
1 TABLET ORAL EVERY 4 HOURS PRN
Qty: 100 TABLET | Refills: 0 | Status: SHIPPED | OUTPATIENT
Start: 2025-08-06

## (undated) DEVICE — GLOVE SRG BIOGEL ECLIPSE 7

## (undated) DEVICE — THREADED CLEAR CANNULA WITH OBTURATOR 7MM X 75MM

## (undated) DEVICE — DRESSING MEPILEX AG BORDER 4 X 4 IN

## (undated) DEVICE — 3M™ IOBAN™ 2 ANTIMICROBIAL INCISE DRAPE 6650EZ: Brand: IOBAN™ 2

## (undated) DEVICE — SUT MONOCRYL 4-0 PS-2 18 IN Y496G

## (undated) DEVICE — TUBING ARTHROSCOPY REDUCE PUMP

## (undated) DEVICE — PACK PBDS SHOULDER ARTHROSCOPY RF

## (undated) DEVICE — TUBING SUCTION 5MM X 12 FT

## (undated) DEVICE — GLOVE INDICATOR PI UNDERGLOVE SZ 7.5 BLUE

## (undated) DEVICE — LIGHT HANDLE COVER SLEEVE DISP BLUE STELLAR

## (undated) DEVICE — ADHESIVE SKIN HIGH VISCOSITY EXOFIN 1ML

## (undated) DEVICE — GLOVE SRG BIOGEL 7.5

## (undated) DEVICE — SPONGE PVP SCRUB WING STERILE

## (undated) DEVICE — THREADED CLEAR CANNULA WITH OBTURATOR 8.5MM X 75MM

## (undated) DEVICE — DISPOSABLE EQUIPMENT COVER: Brand: SMALL TOWEL DRAPE

## (undated) DEVICE — INTENDED FOR TISSUE SEPARATION, AND OTHER PROCEDURES THAT REQUIRE A SHARP SURGICAL BLADE TO PUNCTURE OR CUT.: Brand: BARD-PARKER SAFETY BLADES SIZE 11, STERILE

## (undated) DEVICE — BLADE SHAVER DISSECTOR 3.5MM 13CM COOLCUT

## (undated) DEVICE — SHOULDER SUSPENSION KIT 6 PER BOX

## (undated) DEVICE — TUBING ARTHROSCOPY REDUCE PATIENT

## (undated) DEVICE — CHLORAPREP HI-LITE 26ML ORANGE